# Patient Record
Sex: FEMALE | Race: OTHER | Employment: UNEMPLOYED | URBAN - METROPOLITAN AREA
[De-identification: names, ages, dates, MRNs, and addresses within clinical notes are randomized per-mention and may not be internally consistent; named-entity substitution may affect disease eponyms.]

---

## 2019-02-15 ENCOUNTER — OFFICE VISIT (OUTPATIENT)
Dept: FAMILY MEDICINE CLINIC | Age: 49
End: 2019-02-15
Payer: COMMERCIAL

## 2019-02-15 VITALS
WEIGHT: 195 LBS | TEMPERATURE: 96.7 F | RESPIRATION RATE: 18 BRPM | HEART RATE: 87 BPM | DIASTOLIC BLOOD PRESSURE: 82 MMHG | OXYGEN SATURATION: 100 % | BODY MASS INDEX: 35.88 KG/M2 | SYSTOLIC BLOOD PRESSURE: 138 MMHG | HEIGHT: 62 IN

## 2019-02-15 DIAGNOSIS — J06.9 URTI (ACUTE UPPER RESPIRATORY INFECTION): ICD-10-CM

## 2019-02-15 DIAGNOSIS — K59.00 CONSTIPATION, UNSPECIFIED CONSTIPATION TYPE: ICD-10-CM

## 2019-02-15 DIAGNOSIS — K64.4 EXTERNAL HEMORRHOIDS: Primary | ICD-10-CM

## 2019-02-15 DIAGNOSIS — N95.1 MENOPAUSAL AND FEMALE CLIMACTERIC STATES: ICD-10-CM

## 2019-02-15 DIAGNOSIS — Z00.00 HEALTH CARE MAINTENANCE: ICD-10-CM

## 2019-02-15 PROCEDURE — G8419 CALC BMI OUT NRM PARAM NOF/U: HCPCS | Performed by: INTERNAL MEDICINE

## 2019-02-15 PROCEDURE — 4004F PT TOBACCO SCREEN RCVD TLK: CPT | Performed by: INTERNAL MEDICINE

## 2019-02-15 PROCEDURE — G8427 DOCREV CUR MEDS BY ELIG CLIN: HCPCS | Performed by: INTERNAL MEDICINE

## 2019-02-15 PROCEDURE — G8484 FLU IMMUNIZE NO ADMIN: HCPCS | Performed by: INTERNAL MEDICINE

## 2019-02-15 PROCEDURE — 99204 OFFICE O/P NEW MOD 45 MIN: CPT | Performed by: INTERNAL MEDICINE

## 2019-02-15 PROCEDURE — 3017F COLORECTAL CA SCREEN DOC REV: CPT | Performed by: INTERNAL MEDICINE

## 2019-02-15 RX ORDER — AMOXICILLIN 250 MG
2 CAPSULE ORAL DAILY PRN
Qty: 60 TABLET | Refills: 2 | Status: ON HOLD | OUTPATIENT
Start: 2019-02-15 | End: 2020-08-31

## 2019-02-15 RX ORDER — FLUTICASONE PROPIONATE 50 MCG
2 SPRAY, SUSPENSION (ML) NASAL DAILY
Qty: 1 BOTTLE | Refills: 1 | Status: ON HOLD | OUTPATIENT
Start: 2019-02-15 | End: 2020-08-31

## 2019-02-15 ASSESSMENT — PATIENT HEALTH QUESTIONNAIRE - PHQ9
SUM OF ALL RESPONSES TO PHQ9 QUESTIONS 1 & 2: 0
2. FEELING DOWN, DEPRESSED OR HOPELESS: 0
SUM OF ALL RESPONSES TO PHQ QUESTIONS 1-9: 0
SUM OF ALL RESPONSES TO PHQ QUESTIONS 1-9: 0
1. LITTLE INTEREST OR PLEASURE IN DOING THINGS: 0

## 2019-02-17 ASSESSMENT — ENCOUNTER SYMPTOMS
CONSTIPATION: 1
WHEEZING: 0
NAUSEA: 0
RHINORRHEA: 0
SORE THROAT: 0
SHORTNESS OF BREATH: 0
RECTAL PAIN: 1
DIARRHEA: 0
SINUS PRESSURE: 0
ABDOMINAL PAIN: 0
SINUS PAIN: 0
VOMITING: 0
COUGH: 1

## 2019-02-18 ENCOUNTER — HOSPITAL ENCOUNTER (OUTPATIENT)
Dept: WOMENS IMAGING | Age: 49
Discharge: HOME | End: 2019-02-20
Payer: COMMERCIAL

## 2019-02-18 DIAGNOSIS — Z00.00 HEALTH CARE MAINTENANCE: ICD-10-CM

## 2019-02-18 LAB
ALBUMIN SERPL-MCNC: 3.7 G/DL (ref 3.5–4.6)
ALP BLD-CCNC: 88 U/L (ref 40–130)
ALT SERPL-CCNC: 11 U/L (ref 0–33)
ANION GAP SERPL CALCULATED.3IONS-SCNC: 13 MEQ/L (ref 9–15)
AST SERPL-CCNC: 16 U/L (ref 0–35)
BASOPHILS ABSOLUTE: 0 K/UL (ref 0–0.2)
BASOPHILS RELATIVE PERCENT: 0.5 %
BILIRUB SERPL-MCNC: <0.2 MG/DL (ref 0.2–0.7)
BUN BLDV-MCNC: 8 MG/DL (ref 6–20)
CALCIUM SERPL-MCNC: 9.2 MG/DL (ref 8.5–9.9)
CHLORIDE BLD-SCNC: 104 MEQ/L (ref 95–107)
CHOLESTEROL, TOTAL: 240 MG/DL (ref 0–199)
CO2: 24 MEQ/L (ref 20–31)
CREAT SERPL-MCNC: 0.64 MG/DL (ref 0.5–0.9)
EOSINOPHILS ABSOLUTE: 0.3 K/UL (ref 0–0.7)
EOSINOPHILS RELATIVE PERCENT: 5.1 %
GFR AFRICAN AMERICAN: >60
GFR NON-AFRICAN AMERICAN: >60
GLOBULIN: 3.4 G/DL (ref 2.3–3.5)
GLUCOSE BLD-MCNC: 84 MG/DL (ref 70–99)
HBA1C MFR BLD: 6.6 % (ref 4.8–5.9)
HCT VFR BLD CALC: 42.7 % (ref 37–47)
HDLC SERPL-MCNC: 38 MG/DL (ref 40–59)
HEMOGLOBIN: 14.2 G/DL (ref 12–16)
LDL CHOLESTEROL CALCULATED: 146 MG/DL (ref 0–129)
LYMPHOCYTES ABSOLUTE: 2 K/UL (ref 1–4.8)
LYMPHOCYTES RELATIVE PERCENT: 33.1 %
MCH RBC QN AUTO: 31.2 PG (ref 27–31.3)
MCHC RBC AUTO-ENTMCNC: 33.3 % (ref 33–37)
MCV RBC AUTO: 93.8 FL (ref 82–100)
MONOCYTES ABSOLUTE: 0.2 K/UL (ref 0.2–0.8)
MONOCYTES RELATIVE PERCENT: 3.9 %
NEUTROPHILS ABSOLUTE: 3.4 K/UL (ref 1.4–6.5)
NEUTROPHILS RELATIVE PERCENT: 57.4 %
PDW BLD-RTO: 13.7 % (ref 11.5–14.5)
PLATELET # BLD: 152 K/UL (ref 130–400)
POTASSIUM SERPL-SCNC: 4.3 MEQ/L (ref 3.4–4.9)
RBC # BLD: 4.55 M/UL (ref 4.2–5.4)
SODIUM BLD-SCNC: 141 MEQ/L (ref 135–144)
TOTAL PROTEIN: 7.1 G/DL (ref 6.3–8)
TRIGL SERPL-MCNC: 279 MG/DL (ref 0–150)
TSH SERPL DL<=0.05 MIU/L-ACNC: 1.26 UIU/ML (ref 0.44–3.86)
WBC # BLD: 5.9 K/UL (ref 4.8–10.8)

## 2019-02-18 PROCEDURE — 77067 SCR MAMMO BI INCL CAD: CPT

## 2019-02-19 ENCOUNTER — OFFICE VISIT (OUTPATIENT)
Dept: SURGERY | Age: 49
End: 2019-02-19
Payer: COMMERCIAL

## 2019-02-19 VITALS
BODY MASS INDEX: 37.17 KG/M2 | HEIGHT: 62 IN | OXYGEN SATURATION: 98 % | TEMPERATURE: 97.8 F | WEIGHT: 202 LBS | HEART RATE: 86 BPM

## 2019-02-19 DIAGNOSIS — K62.5 RECTAL BLEEDING: Primary | ICD-10-CM

## 2019-02-19 DIAGNOSIS — R73.09 ELEVATED HEMOGLOBIN A1C: Primary | ICD-10-CM

## 2019-02-19 PROCEDURE — 3017F COLORECTAL CA SCREEN DOC REV: CPT | Performed by: COLON & RECTAL SURGERY

## 2019-02-19 PROCEDURE — G8417 CALC BMI ABV UP PARAM F/U: HCPCS | Performed by: COLON & RECTAL SURGERY

## 2019-02-19 PROCEDURE — G8427 DOCREV CUR MEDS BY ELIG CLIN: HCPCS | Performed by: COLON & RECTAL SURGERY

## 2019-02-19 PROCEDURE — 46600 DIAGNOSTIC ANOSCOPY SPX: CPT | Performed by: COLON & RECTAL SURGERY

## 2019-02-19 PROCEDURE — 4004F PT TOBACCO SCREEN RCVD TLK: CPT | Performed by: COLON & RECTAL SURGERY

## 2019-02-19 PROCEDURE — G8484 FLU IMMUNIZE NO ADMIN: HCPCS | Performed by: COLON & RECTAL SURGERY

## 2019-02-19 PROCEDURE — 99203 OFFICE O/P NEW LOW 30 MIN: CPT | Performed by: COLON & RECTAL SURGERY

## 2019-02-19 ASSESSMENT — ENCOUNTER SYMPTOMS
VOMITING: 0
COLOR CHANGE: 0
DIARRHEA: 0
NAUSEA: 0
SHORTNESS OF BREATH: 0
ABDOMINAL DISTENTION: 0
ANAL BLEEDING: 1
CONSTIPATION: 1
RECTAL PAIN: 0
ABDOMINAL PAIN: 0
CHEST TIGHTNESS: 0

## 2019-02-20 LAB — HIV 1,2 COMBO ANTIGEN/ANTIBODY: NEGATIVE

## 2019-02-21 DIAGNOSIS — R73.09 ELEVATED HEMOGLOBIN A1C: ICD-10-CM

## 2019-02-21 LAB — HBA1C MFR BLD: 6.2 % (ref 4.8–5.9)

## 2019-02-27 DIAGNOSIS — E78.00 HYPERCHOLESTEREMIA: ICD-10-CM

## 2019-02-27 DIAGNOSIS — R73.03 PREDIABETES: Primary | ICD-10-CM

## 2019-02-27 RX ORDER — ATORVASTATIN CALCIUM 80 MG/1
80 TABLET, FILM COATED ORAL DAILY
Qty: 30 TABLET | Refills: 3 | Status: SHIPPED | OUTPATIENT
Start: 2019-02-27 | End: 2021-03-17 | Stop reason: DRUGHIGH

## 2019-03-26 ENCOUNTER — OFFICE VISIT (OUTPATIENT)
Dept: SURGERY | Age: 49
End: 2019-03-26
Payer: COMMERCIAL

## 2019-03-26 VITALS
BODY MASS INDEX: 36.99 KG/M2 | HEART RATE: 82 BPM | TEMPERATURE: 97.8 F | HEIGHT: 62 IN | WEIGHT: 201 LBS | OXYGEN SATURATION: 98 %

## 2019-03-26 DIAGNOSIS — K64.1 GRADE II HEMORRHOIDS: Primary | ICD-10-CM

## 2019-03-26 PROCEDURE — 99213 OFFICE O/P EST LOW 20 MIN: CPT | Performed by: COLON & RECTAL SURGERY

## 2019-03-26 PROCEDURE — 3017F COLORECTAL CA SCREEN DOC REV: CPT | Performed by: COLON & RECTAL SURGERY

## 2019-03-26 PROCEDURE — G8427 DOCREV CUR MEDS BY ELIG CLIN: HCPCS | Performed by: COLON & RECTAL SURGERY

## 2019-03-26 PROCEDURE — G8484 FLU IMMUNIZE NO ADMIN: HCPCS | Performed by: COLON & RECTAL SURGERY

## 2019-03-26 PROCEDURE — G8417 CALC BMI ABV UP PARAM F/U: HCPCS | Performed by: COLON & RECTAL SURGERY

## 2019-03-26 PROCEDURE — 4004F PT TOBACCO SCREEN RCVD TLK: CPT | Performed by: COLON & RECTAL SURGERY

## 2019-03-26 PROCEDURE — 46221 LIGATION OF HEMORRHOID(S): CPT | Performed by: COLON & RECTAL SURGERY

## 2019-03-26 ASSESSMENT — ENCOUNTER SYMPTOMS
ABDOMINAL PAIN: 0
ANAL BLEEDING: 1
CHEST TIGHTNESS: 0
SHORTNESS OF BREATH: 0
CHOKING: 0
DIARRHEA: 0
VOMITING: 0
CONSTIPATION: 1
ABDOMINAL DISTENTION: 0
COLOR CHANGE: 0

## 2019-07-19 ENCOUNTER — APPOINTMENT (OUTPATIENT)
Dept: GENERAL RADIOLOGY | Age: 49
End: 2019-07-19
Payer: COMMERCIAL

## 2019-07-19 ENCOUNTER — HOSPITAL ENCOUNTER (EMERGENCY)
Age: 49
Discharge: HOME | End: 2019-07-19
Attending: EMERGENCY MEDICINE
Payer: COMMERCIAL

## 2019-07-19 VITALS
RESPIRATION RATE: 16 BRPM | WEIGHT: 200 LBS | BODY MASS INDEX: 36.8 KG/M2 | TEMPERATURE: 98.2 F | SYSTOLIC BLOOD PRESSURE: 159 MMHG | HEART RATE: 98 BPM | OXYGEN SATURATION: 97 % | DIASTOLIC BLOOD PRESSURE: 90 MMHG | HEIGHT: 62 IN

## 2019-07-19 DIAGNOSIS — R51.9 ACUTE NONINTRACTABLE HEADACHE, UNSPECIFIED HEADACHE TYPE: ICD-10-CM

## 2019-07-19 DIAGNOSIS — T54.91XA INGESTION OF CORROSIVE CHEMICAL, ACCIDENTAL OR UNINTENTIONAL, INITIAL ENCOUNTER: Primary | ICD-10-CM

## 2019-07-19 LAB
ALBUMIN SERPL-MCNC: 4.5 G/DL (ref 3.5–4.6)
ALP BLD-CCNC: 103 U/L (ref 40–130)
ALT SERPL-CCNC: 18 U/L (ref 0–33)
ANION GAP SERPL CALCULATED.3IONS-SCNC: 15 MEQ/L (ref 9–15)
AST SERPL-CCNC: 17 U/L (ref 0–35)
BASOPHILS ABSOLUTE: 0 K/UL (ref 0–0.2)
BASOPHILS RELATIVE PERCENT: 0.5 %
BILIRUB SERPL-MCNC: 0.4 MG/DL (ref 0.2–0.7)
BUN BLDV-MCNC: 12 MG/DL (ref 6–20)
CALCIUM SERPL-MCNC: 9.2 MG/DL (ref 8.5–9.9)
CHLORIDE BLD-SCNC: 108 MEQ/L (ref 95–107)
CO2: 21 MEQ/L (ref 20–31)
CREAT SERPL-MCNC: 0.73 MG/DL (ref 0.5–0.9)
EOSINOPHILS ABSOLUTE: 0.5 K/UL (ref 0–0.7)
EOSINOPHILS RELATIVE PERCENT: 5.3 %
GFR AFRICAN AMERICAN: >60
GFR NON-AFRICAN AMERICAN: >60
GLOBULIN: 3.4 G/DL (ref 2.3–3.5)
GLUCOSE BLD-MCNC: 111 MG/DL (ref 70–99)
HCT VFR BLD CALC: 41.8 % (ref 37–47)
HEMOGLOBIN: 14.6 G/DL (ref 12–16)
LYMPHOCYTES ABSOLUTE: 2.4 K/UL (ref 1–4.8)
LYMPHOCYTES RELATIVE PERCENT: 27.8 %
MCH RBC QN AUTO: 31.4 PG (ref 27–31.3)
MCHC RBC AUTO-ENTMCNC: 34.8 % (ref 33–37)
MCV RBC AUTO: 90.3 FL (ref 82–100)
MONOCYTES ABSOLUTE: 0.5 K/UL (ref 0.2–0.8)
MONOCYTES RELATIVE PERCENT: 5.6 %
NEUTROPHILS ABSOLUTE: 5.3 K/UL (ref 1.4–6.5)
NEUTROPHILS RELATIVE PERCENT: 60.8 %
PDW BLD-RTO: 13.6 % (ref 11.5–14.5)
PLATELET # BLD: 199 K/UL (ref 130–400)
POTASSIUM SERPL-SCNC: 3.7 MEQ/L (ref 3.4–4.9)
RBC # BLD: 4.64 M/UL (ref 4.2–5.4)
SODIUM BLD-SCNC: 144 MEQ/L (ref 135–144)
TOTAL PROTEIN: 7.9 G/DL (ref 6.3–8)
WBC # BLD: 8.7 K/UL (ref 4.8–10.8)

## 2019-07-19 PROCEDURE — 71046 X-RAY EXAM CHEST 2 VIEWS: CPT

## 2019-07-19 PROCEDURE — 80053 COMPREHEN METABOLIC PANEL: CPT

## 2019-07-19 PROCEDURE — 85025 COMPLETE CBC W/AUTO DIFF WBC: CPT

## 2019-07-19 PROCEDURE — 2580000003 HC RX 258: Performed by: EMERGENCY MEDICINE

## 2019-07-19 PROCEDURE — 6370000000 HC RX 637 (ALT 250 FOR IP): Performed by: EMERGENCY MEDICINE

## 2019-07-19 PROCEDURE — 96374 THER/PROPH/DIAG INJ IV PUSH: CPT

## 2019-07-19 PROCEDURE — 99284 EMERGENCY DEPT VISIT MOD MDM: CPT

## 2019-07-19 PROCEDURE — 36415 COLL VENOUS BLD VENIPUNCTURE: CPT

## 2019-07-19 PROCEDURE — 6360000002 HC RX W HCPCS: Performed by: EMERGENCY MEDICINE

## 2019-07-19 RX ORDER — KETOROLAC TROMETHAMINE 30 MG/ML
30 INJECTION, SOLUTION INTRAMUSCULAR; INTRAVENOUS ONCE
Status: COMPLETED | OUTPATIENT
Start: 2019-07-19 | End: 2019-07-19

## 2019-07-19 RX ORDER — ACETAMINOPHEN 500 MG
1000 TABLET ORAL ONCE
Status: COMPLETED | OUTPATIENT
Start: 2019-07-19 | End: 2019-07-19

## 2019-07-19 RX ORDER — 0.9 % SODIUM CHLORIDE 0.9 %
500 INTRAVENOUS SOLUTION INTRAVENOUS ONCE
Status: COMPLETED | OUTPATIENT
Start: 2019-07-19 | End: 2019-07-19

## 2019-07-19 RX ADMIN — ACETAMINOPHEN 1000 MG: 500 TABLET ORAL at 18:36

## 2019-07-19 RX ADMIN — SODIUM CHLORIDE 500 ML: 9 INJECTION, SOLUTION INTRAVENOUS at 17:46

## 2019-07-19 RX ADMIN — KETOROLAC TROMETHAMINE 30 MG: 30 INJECTION, SOLUTION INTRAMUSCULAR at 18:36

## 2019-07-19 ASSESSMENT — ENCOUNTER SYMPTOMS
VOMITING: 1
BACK PAIN: 0
DIARRHEA: 0
NAUSEA: 0
COUGH: 0
SORE THROAT: 0
ABDOMINAL PAIN: 0
SHORTNESS OF BREATH: 1

## 2019-07-19 ASSESSMENT — PAIN SCALES - GENERAL: PAINLEVEL_OUTOF10: 8

## 2020-06-10 ENCOUNTER — HOSPITAL ENCOUNTER (EMERGENCY)
Age: 50
Discharge: HOME | End: 2020-06-10
Payer: COMMERCIAL

## 2020-06-10 VITALS
WEIGHT: 212 LBS | HEART RATE: 91 BPM | HEIGHT: 62 IN | BODY MASS INDEX: 39.01 KG/M2 | RESPIRATION RATE: 16 BRPM | DIASTOLIC BLOOD PRESSURE: 78 MMHG | SYSTOLIC BLOOD PRESSURE: 158 MMHG | TEMPERATURE: 98 F | OXYGEN SATURATION: 98 %

## 2020-06-10 LAB
ALBUMIN SERPL-MCNC: 4.3 G/DL (ref 3.5–4.6)
ALP BLD-CCNC: 114 U/L (ref 40–130)
ALT SERPL-CCNC: 43 U/L (ref 0–33)
ANION GAP SERPL CALCULATED.3IONS-SCNC: 10 MEQ/L (ref 9–15)
AST SERPL-CCNC: 28 U/L (ref 0–35)
BASE EXCESS VENOUS: 2 (ref -3–3)
BASOPHILS ABSOLUTE: 0.1 K/UL (ref 0–0.2)
BASOPHILS RELATIVE PERCENT: 0.8 %
BETA-HYDROXYBUTYRATE: 1.6 MG/DL (ref 0.2–2.8)
BILIRUB SERPL-MCNC: 0.3 MG/DL (ref 0.2–0.7)
BUN BLDV-MCNC: 11 MG/DL (ref 6–20)
CALCIUM IONIZED: 1.16 MMOL/L (ref 1.12–1.32)
CALCIUM SERPL-MCNC: 9.8 MG/DL (ref 8.5–9.9)
CHLORIDE BLD-SCNC: 100 MEQ/L (ref 95–107)
CO2: 25 MEQ/L (ref 20–31)
CREAT SERPL-MCNC: 0.63 MG/DL (ref 0.5–0.9)
EOSINOPHILS ABSOLUTE: 0.4 K/UL (ref 0–0.7)
EOSINOPHILS RELATIVE PERCENT: 5.5 %
GFR AFRICAN AMERICAN: >60
GFR AFRICAN AMERICAN: >60
GFR NON-AFRICAN AMERICAN: >60
GFR NON-AFRICAN AMERICAN: >60
GLOBULIN: 3.1 G/DL (ref 2.3–3.5)
GLUCOSE BLD-MCNC: 327 MG/DL (ref 70–99)
GLUCOSE BLD-MCNC: 339 MG/DL (ref 60–115)
HCO3 VENOUS: 27.1 MMOL/L (ref 23–29)
HCT VFR BLD CALC: 45.5 % (ref 37–47)
HEMOGLOBIN: 15.3 G/DL (ref 12–16)
HEMOGLOBIN: 16.5 GM/DL (ref 12–16)
LACTATE: 2.24 MMOL/L (ref 0.4–2)
LYMPHOCYTES ABSOLUTE: 2 K/UL (ref 1–4.8)
LYMPHOCYTES RELATIVE PERCENT: 28.7 %
MAGNESIUM: 2 MG/DL (ref 1.7–2.4)
MCH RBC QN AUTO: 30.5 PG (ref 27–31.3)
MCHC RBC AUTO-ENTMCNC: 33.7 % (ref 33–37)
MCV RBC AUTO: 90.6 FL (ref 82–100)
MONOCYTES ABSOLUTE: 0.4 K/UL (ref 0.2–0.8)
MONOCYTES RELATIVE PERCENT: 5.3 %
NEUTROPHILS ABSOLUTE: 4.1 K/UL (ref 1.4–6.5)
NEUTROPHILS RELATIVE PERCENT: 59.7 %
O2 SAT, VEN: 42 %
PCO2, VEN: 49.4 MM HG (ref 40–50)
PDW BLD-RTO: 13.1 % (ref 11.5–14.5)
PERFORMED ON: ABNORMAL
PH VENOUS: 7.35 (ref 7.35–7.45)
PLATELET # BLD: 176 K/UL (ref 130–400)
PO2, VEN: 25 MM HG
POC CHLORIDE: 101 MEQ/L (ref 99–110)
POC CREATININE: 0.7 MG/DL (ref 0.6–1.1)
POC HEMATOCRIT: 49 % (ref 36–48)
POC POTASSIUM: 3.8 MEQ/L (ref 3.5–5.1)
POC SAMPLE TYPE: ABNORMAL
POC SODIUM: 137 MEQ/L (ref 136–145)
POTASSIUM SERPL-SCNC: 4.1 MEQ/L (ref 3.4–4.9)
RBC # BLD: 5.02 M/UL (ref 4.2–5.4)
SODIUM BLD-SCNC: 135 MEQ/L (ref 135–144)
TCO2 CALC VENOUS: 29 MMOL/L
TOTAL PROTEIN: 7.4 G/DL (ref 6.3–8)
WBC # BLD: 6.9 K/UL (ref 4.8–10.8)

## 2020-06-10 PROCEDURE — 94640 AIRWAY INHALATION TREATMENT: CPT

## 2020-06-10 PROCEDURE — 82435 ASSAY OF BLOOD CHLORIDE: CPT

## 2020-06-10 PROCEDURE — 2580000003 HC RX 258: Performed by: PHYSICIAN ASSISTANT

## 2020-06-10 PROCEDURE — 85014 HEMATOCRIT: CPT

## 2020-06-10 PROCEDURE — 6370000000 HC RX 637 (ALT 250 FOR IP): Performed by: PHYSICIAN ASSISTANT

## 2020-06-10 PROCEDURE — 82330 ASSAY OF CALCIUM: CPT

## 2020-06-10 PROCEDURE — 82803 BLOOD GASES ANY COMBINATION: CPT

## 2020-06-10 PROCEDURE — 80053 COMPREHEN METABOLIC PANEL: CPT

## 2020-06-10 PROCEDURE — 36415 COLL VENOUS BLD VENIPUNCTURE: CPT

## 2020-06-10 PROCEDURE — 83605 ASSAY OF LACTIC ACID: CPT

## 2020-06-10 PROCEDURE — 99283 EMERGENCY DEPT VISIT LOW MDM: CPT

## 2020-06-10 PROCEDURE — 36600 WITHDRAWAL OF ARTERIAL BLOOD: CPT

## 2020-06-10 PROCEDURE — 82565 ASSAY OF CREATININE: CPT

## 2020-06-10 PROCEDURE — 96374 THER/PROPH/DIAG INJ IV PUSH: CPT

## 2020-06-10 PROCEDURE — 82010 KETONE BODYS QUAN: CPT

## 2020-06-10 PROCEDURE — 84132 ASSAY OF SERUM POTASSIUM: CPT

## 2020-06-10 PROCEDURE — 83735 ASSAY OF MAGNESIUM: CPT

## 2020-06-10 PROCEDURE — 85025 COMPLETE CBC W/AUTO DIFF WBC: CPT

## 2020-06-10 PROCEDURE — 84295 ASSAY OF SERUM SODIUM: CPT

## 2020-06-10 RX ORDER — 0.9 % SODIUM CHLORIDE 0.9 %
2000 INTRAVENOUS SOLUTION INTRAVENOUS ONCE
Status: COMPLETED | OUTPATIENT
Start: 2020-06-10 | End: 2020-06-10

## 2020-06-10 RX ORDER — ALBUTEROL SULFATE 90 UG/1
2 AEROSOL, METERED RESPIRATORY (INHALATION) 4 TIMES DAILY PRN
Qty: 1 INHALER | Refills: 1 | Status: ON HOLD | OUTPATIENT
Start: 2020-06-10 | End: 2020-07-23 | Stop reason: SDUPTHER

## 2020-06-10 RX ORDER — LABETALOL 20 MG/4 ML (5 MG/ML) INTRAVENOUS SYRINGE
10 ONCE
Status: DISCONTINUED | OUTPATIENT
Start: 2020-06-10 | End: 2020-06-10 | Stop reason: HOSPADM

## 2020-06-10 RX ORDER — IPRATROPIUM BROMIDE AND ALBUTEROL SULFATE 2.5; .5 MG/3ML; MG/3ML
1 SOLUTION RESPIRATORY (INHALATION) CONTINUOUS PRN
Status: DISCONTINUED | OUTPATIENT
Start: 2020-06-10 | End: 2020-06-10 | Stop reason: HOSPADM

## 2020-06-10 RX ADMIN — INSULIN HUMAN 6 UNITS: 100 INJECTION, SOLUTION PARENTERAL at 16:37

## 2020-06-10 RX ADMIN — IPRATROPIUM BROMIDE AND ALBUTEROL SULFATE 1 AMPULE: .5; 3 SOLUTION RESPIRATORY (INHALATION) at 16:38

## 2020-06-10 RX ADMIN — SODIUM CHLORIDE 2000 ML: 9 INJECTION, SOLUTION INTRAVENOUS at 16:15

## 2020-06-10 ASSESSMENT — ENCOUNTER SYMPTOMS
EYE DISCHARGE: 0
VOICE CHANGE: 0
WHEEZING: 1
BACK PAIN: 0
ABDOMINAL PAIN: 0
ABDOMINAL DISTENTION: 0
VOMITING: 0
PHOTOPHOBIA: 0
APNEA: 0
ANAL BLEEDING: 0
COUGH: 0

## 2020-06-10 NOTE — ED PROVIDER NOTES
[address] [facility] ED  eMERGENCY dEPARTMENT eNCOUnter      Pt Name: Suzan Cat  MRN: [id number]  [place] 1970  Date of evaluation: 6/10/2020  Provider: Jaky Kramer PA-C    CHIEF COMPLAINT       Chief Complaint   Patient presents with    Blood Sugar Problem     went to [facility] was advised she has elevated glucose 513         HISTORY OF PRESENT ILLNESS   (Location/Symptom, Timing/Onset,Context/Setting, Quality, Duration, Modifying Factors, Severity)  Note limiting factors. Suzan Cat is a 44 y.o. female who presents to the emergency department patient presents with elevated blood sugars she was seen at [facility] for frequent urination burning with urination. They noted that she had glucose in her urine blood sugar checked was 513 per [facility] notes that she brings with her patient notes she is a diabetic has not been taking any of her medications for diabetes blood pressure or asthma and over a year she was on metformin in the past she does have frequent urination she does have increased thirst denies chest pain shortness of breath nausea vomiting denies diarrhea denies leg swelling symptoms moderate severity nothing improves or worsens her symptoms. HPI    NursingNotes were reviewed. REVIEW OF SYSTEMS    (2-9 systems for level 4, 10 or more for level 5)     Review of Systems   Constitutional: Negative for activity change, appetite change, chills, fever and unexpected weight change. HENT: Negative for congestion, ear discharge, nosebleeds and voice change. Eyes: Negative for photophobia and discharge. Respiratory: Positive for wheezing. Negative for apnea and cough. Cardiovascular: Negative for chest pain. Gastrointestinal: Negative for abdominal distention, abdominal pain, anal bleeding and vomiting. Endocrine: Negative for cold intolerance, heat intolerance and polyphagia. Genitourinary: Positive for dysuria and frequency. Negative for genital sores. 147/82 (!) 158/78   Pulse: 96 82 91   Resp: 18 18 16   Temp: 98 °F (36.7 °C)     TempSrc: Oral     SpO2: 96% 97% 98%   Weight: 212 lb (96.2 kg)     Height: 5' 2\" (1.575 m)              MDM  Number of Diagnoses or Management Options  Diagnosis management comments: We will add medications fluids repeat Suhas glucose basic labs including VBG patient states [facility] is looking to find her primary care physician the next 2 days       Amount and/or Complexity of Data Reviewed  Clinical lab tests: ordered          CONSULTS:  None    PROCEDURES:  Unless otherwise noted below, none     Procedures    FINAL IMPRESSION      1. Type 2 diabetes mellitus with hyperglycemia, without long-term current use of insulin ([facility] [address]    2. Essential hypertension    3. History of medication noncompliance    4. Prediabetes          DISPOSITION/PLAN   DISPOSITION Decision To Discharge 06/10/2020 05:39:05 PM      PATIENT REFERRED TO:  Juan Carlos Quiroga MD  [facility] [phone]    Call in 1 day      [facility]) ED  [address] [address]  [phone]  Call in 1 day        DISCHARGE MEDICATIONS:  Discharge Medication List as of 6/10/2020  5:43 PM             (Please note that portions of this note were completed with a voice recognition program.  Efforts were made to edit the dictations but occasionally words are mis-transcribed.)    Phylicia Su PA-C (electronically signed)  Attending Emergency Physician       Phylicia Su PA-C  06/11/20 1410    ATTENDING PROVIDER ATTESTATION:     Tasneem Arredondo presented to the emergency department for evaluation of Blood Sugar Problem (went to [facility] was advised she has elevated glucose 513)    I have reviewed and discussed the case, including pertinent history (medical, surgical, family and social) and exam findings with the Midlevel and the Nurse assigned to Tasneem Arredondo.   I have personally performed and/or participated in the history, exam, medical

## 2020-07-21 ENCOUNTER — APPOINTMENT (OUTPATIENT)
Dept: GENERAL RADIOLOGY | Age: 50
DRG: 141 | End: 2020-07-21
Payer: COMMERCIAL

## 2020-07-21 ENCOUNTER — HOSPITAL ENCOUNTER (INPATIENT)
Age: 50
LOS: 2 days | Discharge: HOME | DRG: 141 | End: 2020-07-23
Attending: STUDENT IN AN ORGANIZED HEALTH CARE EDUCATION/TRAINING PROGRAM | Admitting: INTERNAL MEDICINE
Payer: COMMERCIAL

## 2020-07-21 PROBLEM — J45.901 ACUTE SEVERE EXACERBATION OF ASTHMA: Status: ACTIVE | Noted: 2020-07-21

## 2020-07-21 LAB
ALBUMIN SERPL-MCNC: 4.6 G/DL (ref 3.5–4.6)
ALP BLD-CCNC: 116 U/L (ref 40–130)
ALT SERPL-CCNC: 30 U/L (ref 0–33)
ANION GAP SERPL CALCULATED.3IONS-SCNC: 14 MEQ/L (ref 9–15)
AST SERPL-CCNC: 22 U/L (ref 0–35)
BASE EXCESS ARTERIAL: -2 (ref -3–3)
BASOPHILS ABSOLUTE: 0.1 K/UL (ref 0–0.2)
BASOPHILS RELATIVE PERCENT: 0.8 %
BILIRUB SERPL-MCNC: 0.6 MG/DL (ref 0.2–0.7)
BUN BLDV-MCNC: 8 MG/DL (ref 6–20)
CALCIUM IONIZED: 1.19 MMOL/L (ref 1.12–1.32)
CALCIUM SERPL-MCNC: 9.4 MG/DL (ref 8.5–9.9)
CHLORIDE BLD-SCNC: 102 MEQ/L (ref 95–107)
CO2: 23 MEQ/L (ref 20–31)
CREAT SERPL-MCNC: 0.57 MG/DL (ref 0.5–0.9)
D DIMER: 0.49 MG/L FEU (ref 0–0.5)
EOSINOPHILS ABSOLUTE: 0.6 K/UL (ref 0–0.7)
EOSINOPHILS RELATIVE PERCENT: 6.8 %
FIBRINOGEN: 529 MG/DL (ref 235–507)
GFR AFRICAN AMERICAN: >60
GFR AFRICAN AMERICAN: >60
GFR NON-AFRICAN AMERICAN: >60
GFR NON-AFRICAN AMERICAN: >60
GLOBULIN: 3.2 G/DL (ref 2.3–3.5)
GLUCOSE BLD-MCNC: 163 MG/DL (ref 70–99)
GLUCOSE BLD-MCNC: 167 MG/DL (ref 60–115)
GLUCOSE BLD-MCNC: 193 MG/DL (ref 60–115)
GLUCOSE BLD-MCNC: 285 MG/DL (ref 60–115)
GONADOTROPIN, CHORIONIC (HCG) QUANT: 0.4 MIU/ML
HCO3 ARTERIAL: 22.9 MMOL/L (ref 21–29)
HCT VFR BLD CALC: 45.4 % (ref 37–47)
HEMOGLOBIN: 14.6 GM/DL (ref 12–16)
HEMOGLOBIN: 15.4 G/DL (ref 12–16)
LACTATE DEHYDROGENASE: 521 U/L (ref 135–214)
LACTATE: 1.4 MMOL/L (ref 0.4–2)
LYMPHOCYTES ABSOLUTE: 2.1 K/UL (ref 1–4.8)
LYMPHOCYTES RELATIVE PERCENT: 26.3 %
MAGNESIUM: 1.9 MG/DL (ref 1.7–2.4)
MCH RBC QN AUTO: 30.6 PG (ref 27–31.3)
MCHC RBC AUTO-ENTMCNC: 34 % (ref 33–37)
MCV RBC AUTO: 90 FL (ref 82–100)
MONOCYTES ABSOLUTE: 0.4 K/UL (ref 0.2–0.8)
MONOCYTES RELATIVE PERCENT: 4.6 %
NEUTROPHILS ABSOLUTE: 5 K/UL (ref 1.4–6.5)
NEUTROPHILS RELATIVE PERCENT: 61.5 %
O2 SAT, ARTERIAL: 96 % (ref 93–100)
PCO2 ARTERIAL: 35 MM HG (ref 35–45)
PDW BLD-RTO: 13 % (ref 11.5–14.5)
PERFORMED ON: ABNORMAL
PH ARTERIAL: 7.42 (ref 7.35–7.45)
PLATELET # BLD: 193 K/UL (ref 130–400)
PO2 ARTERIAL: 81 MM HG (ref 75–108)
POC CHLORIDE: 109 MEQ/L (ref 99–110)
POC CREATININE: 0.6 MG/DL (ref 0.6–1.1)
POC FIO2: 2
POC HEMATOCRIT: 43 % (ref 36–48)
POC POTASSIUM: 3.7 MEQ/L (ref 3.5–5.1)
POC SAMPLE TYPE: ABNORMAL
POC SODIUM: 138 MEQ/L (ref 136–145)
POTASSIUM SERPL-SCNC: 3.8 MEQ/L (ref 3.4–4.9)
PROCALCITONIN: 0.04 NG/ML (ref 0–0.15)
RBC # BLD: 5.04 M/UL (ref 4.2–5.4)
SARS-COV-2, NAAT: NOT DETECTED
SODIUM BLD-SCNC: 139 MEQ/L (ref 135–144)
TCO2 ARTERIAL: 24 (ref 22–29)
TOTAL CK: 115 U/L (ref 0–170)
TOTAL PROTEIN: 7.8 G/DL (ref 6.3–8)
WBC # BLD: 8.1 K/UL (ref 4.8–10.8)

## 2020-07-21 PROCEDURE — 82550 ASSAY OF CK (CPK): CPT

## 2020-07-21 PROCEDURE — 83615 LACTATE (LD) (LDH) ENZYME: CPT

## 2020-07-21 PROCEDURE — 99255 IP/OBS CONSLTJ NEW/EST HI 80: CPT | Performed by: INTERNAL MEDICINE

## 2020-07-21 PROCEDURE — 82435 ASSAY OF BLOOD CHLORIDE: CPT

## 2020-07-21 PROCEDURE — 84145 PROCALCITONIN (PCT): CPT

## 2020-07-21 PROCEDURE — 84132 ASSAY OF SERUM POTASSIUM: CPT

## 2020-07-21 PROCEDURE — 6370000000 HC RX 637 (ALT 250 FOR IP): Performed by: INTERNAL MEDICINE

## 2020-07-21 PROCEDURE — 96365 THER/PROPH/DIAG IV INF INIT: CPT

## 2020-07-21 PROCEDURE — 71045 X-RAY EXAM CHEST 1 VIEW: CPT

## 2020-07-21 PROCEDURE — 6360000002 HC RX W HCPCS: Performed by: STUDENT IN AN ORGANIZED HEALTH CARE EDUCATION/TRAINING PROGRAM

## 2020-07-21 PROCEDURE — U0002 COVID-19 LAB TEST NON-CDC: HCPCS

## 2020-07-21 PROCEDURE — 80053 COMPREHEN METABOLIC PANEL: CPT

## 2020-07-21 PROCEDURE — 94761 N-INVAS EAR/PLS OXIMETRY MLT: CPT

## 2020-07-21 PROCEDURE — 36415 COLL VENOUS BLD VENIPUNCTURE: CPT

## 2020-07-21 PROCEDURE — 94660 CPAP INITIATION&MGMT: CPT

## 2020-07-21 PROCEDURE — 85025 COMPLETE CBC W/AUTO DIFF WBC: CPT

## 2020-07-21 PROCEDURE — 6370000000 HC RX 637 (ALT 250 FOR IP): Performed by: STUDENT IN AN ORGANIZED HEALTH CARE EDUCATION/TRAINING PROGRAM

## 2020-07-21 PROCEDURE — 83605 ASSAY OF LACTIC ACID: CPT

## 2020-07-21 PROCEDURE — 96375 TX/PRO/DX INJ NEW DRUG ADDON: CPT

## 2020-07-21 PROCEDURE — 84702 CHORIONIC GONADOTROPIN TEST: CPT

## 2020-07-21 PROCEDURE — 82803 BLOOD GASES ANY COMBINATION: CPT

## 2020-07-21 PROCEDURE — 82565 ASSAY OF CREATININE: CPT

## 2020-07-21 PROCEDURE — 85384 FIBRINOGEN ACTIVITY: CPT

## 2020-07-21 PROCEDURE — 84295 ASSAY OF SERUM SODIUM: CPT

## 2020-07-21 PROCEDURE — 85379 FIBRIN DEGRADATION QUANT: CPT

## 2020-07-21 PROCEDURE — 94640 AIRWAY INHALATION TREATMENT: CPT

## 2020-07-21 PROCEDURE — 85014 HEMATOCRIT: CPT

## 2020-07-21 PROCEDURE — 1210000000 HC MED SURG R&B

## 2020-07-21 PROCEDURE — 94664 DEMO&/EVAL PT USE INHALER: CPT

## 2020-07-21 PROCEDURE — 83735 ASSAY OF MAGNESIUM: CPT

## 2020-07-21 PROCEDURE — 36600 WITHDRAWAL OF ARTERIAL BLOOD: CPT

## 2020-07-21 PROCEDURE — 82330 ASSAY OF CALCIUM: CPT

## 2020-07-21 PROCEDURE — 99285 EMERGENCY DEPT VISIT HI MDM: CPT

## 2020-07-21 RX ORDER — MAGNESIUM SULFATE IN WATER 40 MG/ML
4 INJECTION, SOLUTION INTRAVENOUS ONCE
Status: COMPLETED | OUTPATIENT
Start: 2020-07-21 | End: 2020-07-21

## 2020-07-21 RX ORDER — PREDNISONE 20 MG/1
40 TABLET ORAL DAILY
Status: DISCONTINUED | OUTPATIENT
Start: 2020-07-21 | End: 2020-07-21

## 2020-07-21 RX ORDER — ACETAMINOPHEN 650 MG/1
650 SUPPOSITORY RECTAL EVERY 6 HOURS PRN
Status: DISCONTINUED | OUTPATIENT
Start: 2020-07-21 | End: 2020-07-23 | Stop reason: HOSPADM

## 2020-07-21 RX ORDER — FAMOTIDINE 20 MG/1
20 TABLET, FILM COATED ORAL 2 TIMES DAILY
Status: DISCONTINUED | OUTPATIENT
Start: 2020-07-21 | End: 2020-07-23 | Stop reason: HOSPADM

## 2020-07-21 RX ORDER — AZITHROMYCIN 250 MG/1
250 TABLET, FILM COATED ORAL DAILY
Status: DISCONTINUED | OUTPATIENT
Start: 2020-07-22 | End: 2020-07-23 | Stop reason: HOSPADM

## 2020-07-21 RX ORDER — SODIUM CHLORIDE 0.9 % (FLUSH) 0.9 %
10 SYRINGE (ML) INJECTION PRN
Status: DISCONTINUED | OUTPATIENT
Start: 2020-07-21 | End: 2020-07-23 | Stop reason: HOSPADM

## 2020-07-21 RX ORDER — IPRATROPIUM BROMIDE AND ALBUTEROL SULFATE 2.5; .5 MG/3ML; MG/3ML
1 SOLUTION RESPIRATORY (INHALATION)
Status: DISCONTINUED | OUTPATIENT
Start: 2020-07-21 | End: 2020-07-21

## 2020-07-21 RX ORDER — DEXTROSE MONOHYDRATE 50 MG/ML
100 INJECTION, SOLUTION INTRAVENOUS PRN
Status: DISCONTINUED | OUTPATIENT
Start: 2020-07-21 | End: 2020-07-23 | Stop reason: HOSPADM

## 2020-07-21 RX ORDER — ALBUTEROL SULFATE 90 UG/1
2 AEROSOL, METERED RESPIRATORY (INHALATION)
Status: DISCONTINUED | OUTPATIENT
Start: 2020-07-21 | End: 2020-07-23 | Stop reason: HOSPADM

## 2020-07-21 RX ORDER — SODIUM CHLORIDE 0.9 % (FLUSH) 0.9 %
10 SYRINGE (ML) INJECTION EVERY 12 HOURS SCHEDULED
Status: DISCONTINUED | OUTPATIENT
Start: 2020-07-21 | End: 2020-07-23 | Stop reason: HOSPADM

## 2020-07-21 RX ORDER — ALBUTEROL SULFATE 90 UG/1
2 AEROSOL, METERED RESPIRATORY (INHALATION) 3 TIMES DAILY
Status: DISCONTINUED | OUTPATIENT
Start: 2020-07-21 | End: 2020-07-23 | Stop reason: HOSPADM

## 2020-07-21 RX ORDER — METHYLPREDNISOLONE SODIUM SUCCINATE 125 MG/2ML
125 INJECTION, POWDER, LYOPHILIZED, FOR SOLUTION INTRAMUSCULAR; INTRAVENOUS ONCE
Status: COMPLETED | OUTPATIENT
Start: 2020-07-21 | End: 2020-07-21

## 2020-07-21 RX ORDER — POLYETHYLENE GLYCOL 3350 17 G/17G
17 POWDER, FOR SOLUTION ORAL DAILY PRN
Status: DISCONTINUED | OUTPATIENT
Start: 2020-07-21 | End: 2020-07-23 | Stop reason: HOSPADM

## 2020-07-21 RX ORDER — PROMETHAZINE HYDROCHLORIDE 12.5 MG/1
12.5 TABLET ORAL EVERY 6 HOURS PRN
Status: DISCONTINUED | OUTPATIENT
Start: 2020-07-21 | End: 2020-07-23 | Stop reason: HOSPADM

## 2020-07-21 RX ORDER — METHYLPREDNISOLONE SODIUM SUCCINATE 40 MG/ML
40 INJECTION, POWDER, LYOPHILIZED, FOR SOLUTION INTRAMUSCULAR; INTRAVENOUS EVERY 12 HOURS
Status: DISCONTINUED | OUTPATIENT
Start: 2020-07-21 | End: 2020-07-22

## 2020-07-21 RX ORDER — ACETAMINOPHEN 325 MG/1
650 TABLET ORAL EVERY 6 HOURS PRN
Status: DISCONTINUED | OUTPATIENT
Start: 2020-07-21 | End: 2020-07-23 | Stop reason: HOSPADM

## 2020-07-21 RX ORDER — IPRATROPIUM BROMIDE AND ALBUTEROL SULFATE 2.5; .5 MG/3ML; MG/3ML
1 SOLUTION RESPIRATORY (INHALATION) PRN
Status: DISCONTINUED | OUTPATIENT
Start: 2020-07-21 | End: 2020-07-21

## 2020-07-21 RX ORDER — DEXTROSE MONOHYDRATE 25 G/50ML
12.5 INJECTION, SOLUTION INTRAVENOUS PRN
Status: DISCONTINUED | OUTPATIENT
Start: 2020-07-21 | End: 2020-07-23 | Stop reason: HOSPADM

## 2020-07-21 RX ORDER — AZITHROMYCIN 250 MG/1
500 TABLET, FILM COATED ORAL ONCE
Status: COMPLETED | OUTPATIENT
Start: 2020-07-21 | End: 2020-07-21

## 2020-07-21 RX ORDER — NICOTINE POLACRILEX 4 MG
15 LOZENGE BUCCAL PRN
Status: DISCONTINUED | OUTPATIENT
Start: 2020-07-21 | End: 2020-07-23 | Stop reason: HOSPADM

## 2020-07-21 RX ORDER — ONDANSETRON 2 MG/ML
4 INJECTION INTRAMUSCULAR; INTRAVENOUS EVERY 6 HOURS PRN
Status: DISCONTINUED | OUTPATIENT
Start: 2020-07-21 | End: 2020-07-23 | Stop reason: HOSPADM

## 2020-07-21 RX ADMIN — IPRATROPIUM BROMIDE 2 PUFF: 17 AEROSOL, METERED RESPIRATORY (INHALATION) at 19:44

## 2020-07-21 RX ADMIN — AZITHROMYCIN 500 MG: 250 TABLET, FILM COATED ORAL at 21:12

## 2020-07-21 RX ADMIN — IPRATROPIUM BROMIDE AND ALBUTEROL SULFATE 1 AMPULE: .5; 3 SOLUTION RESPIRATORY (INHALATION) at 13:31

## 2020-07-21 RX ADMIN — FAMOTIDINE 20 MG: 20 TABLET, FILM COATED ORAL at 21:13

## 2020-07-21 RX ADMIN — IPRATROPIUM BROMIDE AND ALBUTEROL SULFATE 1 AMPULE: .5; 3 SOLUTION RESPIRATORY (INHALATION) at 13:27

## 2020-07-21 RX ADMIN — ALBUTEROL SULFATE 2 PUFF: 90 AEROSOL, METERED RESPIRATORY (INHALATION) at 19:44

## 2020-07-21 RX ADMIN — MAGNESIUM SULFATE HEPTAHYDRATE 4 G: 40 INJECTION, SOLUTION INTRAVENOUS at 13:15

## 2020-07-21 RX ADMIN — ALBUTEROL SULFATE 2 PUFF: 90 AEROSOL, METERED RESPIRATORY (INHALATION) at 16:59

## 2020-07-21 RX ADMIN — METHYLPREDNISOLONE SODIUM SUCCINATE 125 MG: 125 INJECTION, POWDER, FOR SOLUTION INTRAMUSCULAR; INTRAVENOUS at 13:15

## 2020-07-21 ASSESSMENT — ENCOUNTER SYMPTOMS
CHEST TIGHTNESS: 1
WHEEZING: 1
DIARRHEA: 0
BACK PAIN: 0
TROUBLE SWALLOWING: 0
VOMITING: 0
ABDOMINAL PAIN: 0
SINUS PRESSURE: 0
SHORTNESS OF BREATH: 1
COUGH: 1

## 2020-07-21 ASSESSMENT — PAIN SCALES - GENERAL
PAINLEVEL_OUTOF10: 0

## 2020-07-21 NOTE — CARE COORDINATION
Pt was very short of breath and not able to tolerate questions. She did state that she lived with her son and saw the Dr 1 year ago.

## 2020-07-21 NOTE — CONSULTS
Pulmonary and Critical Care Medicine  Consult Note  Encounter Date: 2020 5:55 PM    Ms. Dayana Scott is a 44 y.o. female  : 1970  Requesting Provider: Cristal Tabares MD    Reason for request: Shortness of breath            HISTORY OF PRESENT ILLNESS:    Patient is 44 y.o. presents with worsening shortness of breath, started since , she thinks it was triggered by heat, she has history of asthma usual triggers are dust, pollen, heat, or hair dye among others. She has cough productive of clear phlegm, no hemoptysis, no fever, no worsening lower extremity edema, no nausea no vomiting, no chest pain and no sick contact. She reports snoring with apnea while asleep. No heartburn, no nasal congestion or postnasal drip. She is a smoker, she quit smoking 3 days ago        Past Medical History:        Diagnosis Date    Asthma     Diabetes mellitus ([facility] [address]     Hodgkin disease ([facility] [address] [facility]     Hypertension [address]       Past Surgical History:    History reviewed. No pertinent surgical history. Social History:     reports that she has been smoking cigarettes. She has been smoking about 1.00 pack per day. She has never used smokeless tobacco. She reports previous alcohol use. She reports that she does not use drugs. Family History:   No family history on file.   Her kids have asthma,    Allergies:  Shellfish-derived products        MEDICATIONS during current hospitalization:    Continuous Infusions:   dextrose         Scheduled Meds:   sodium chloride flush  10 mL Intravenous 2 times per day    famotidine  20 mg Oral BID    enoxaparin  40 mg Subcutaneous Daily    azithromycin  500 mg Intravenous Q24H    predniSONE  40 mg Oral Daily    insulin lispro  0-6 Units Subcutaneous TID WC    insulin lispro  0-3 Units Subcutaneous Nightly    albuterol sulfate HFA  2 puff Inhalation TID    ipratropium  2 puff Inhalation TID       PRN Meds:sodium chloride flush, acetaminophen **OR** acetaminophen, polyethylene glycol, promethazine **OR** ondansetron, glucose, dextrose, glucagon (rDNA), dextrose, albuterol sulfate HFA        REVIEW OF SYSTEMS:  ROS: 10 organs review of system is done including general, psychological, ENT, hematological, endocrine, respiratory, cardiovascular, gastrointestinal, musculoskeletal, neurological,  allergy and Immunology is done and is otherwise negative. PHYSICAL EXAM:    Vitals:  BP (!) 164/78   Pulse 101   Temp 98.2 °F (36.8 °C) (Oral)   Resp 20   Ht 5' 2\" (1.575 m)   Wt 212 lb (96.2 kg)   SpO2 98%   BMI 38.78 kg/m²     General: alert, cooperative, no distress  Head: normocephalic, atraumatic  Eyes:No gross abnormalities. and PERRL  ENT:  MMM no lesions  Neck:  supple and no masses  Chest : Tight with diffuse wheezing, no rales, nontender, tympanic  Heart[de-identified] Heart sounds are normal.  Regular rate and rhythm without murmur, gallop or rub. ABD:  symmetric, soft, non-tender  Musculoskeletal : no cyanosis, no clubbing and no edema  Neuro:  Grossly normal  Skin: No rashes or nodules noted. Lymph node:  no cervical nodes  Urology: No Ruiz   Psychiatric: appropriate        Data Review  Recent Labs     07/21/20  1315 07/21/20  1320   WBC 8.1  --    HGB 15.4 14.6   HCT 45.4  --      --       Recent Labs     07/21/20  1315 07/21/20  1320     --    K 3.8  --      --    CO2 23  --    BUN 8  --    CREATININE 0.57 0.6   GLUCOSE 163*  --        MV Settings:           ABGs:   Recent Labs     07/21/20  1320   PHART 7.419   UKV4TFY 35   PO2ART 81*   VRS1YAB 22.9   BEART -2   B2MEAVNU 96*   XFQ1BEC 24     O2 Device: None (Room air)  O2 Flow Rate (L/min): 4 L/min  No results found for: [address]    Radiology  I personally reviewed imaging studies and chest x-ray no mass or infiltrate         Assessment, plan:   Patient is at risk due to    · Worsening shortness of breath secondary  · Asthma/COPD with acute exacerbation  · Possible

## 2020-07-21 NOTE — ED PROVIDER NOTES
[address] [facility] ED  eMERGENCY dEPARTMENT eNCOUnter      Pt Name: Eleanor Tipton  MRN: [id number]  [place] 1970  Date of evaluation: 7/21/2020  Provider: Jimbo Arrington, [address] [place] [address]       Chief Complaint   Patient presents with    Shortness of Breath     cough, HA since Saturday. Ran out of inhaler on Sunday         HISTORY OF PRESENT ILLNESS   (Location/Symptom, Timing/Onset,Context/Setting, Quality, Duration, Modifying Factors, Severity)  Note limiting factors. Eleanor Tipton is a 44 y.o. female who presents to the emergency department with c/o SOB, cough and wheezing. Patinet's inhaler ran out on Sunday and with the warm weather her chest felt tight and she is wheezing. Patient denies fever, chills, N/V. Physical exam patient is wheezing in all lung fields and has accessory muscle use. States that she has a dull throbbing headache which is the same as prior migrainous headaches that she has had multiple times before in the past.  Patient denies any stiff neck. Patient denies any visual changes. Patient feels that the warm weather has made her symptoms worse. The history is provided by the patient. NursingNotes were reviewed. REVIEW OF SYSTEMS    (2-9 systems for level 4, 10 or more for level 5)     Review of Systems   Constitutional: Negative for activity change, appetite change, chills, fever and unexpected weight change. HENT: Negative for drooling, ear pain, nosebleeds, sinus pressure and trouble swallowing. Respiratory: Positive for cough, chest tightness, shortness of breath and wheezing. Cardiovascular: Negative for chest pain and leg swelling. Gastrointestinal: Negative for abdominal pain, diarrhea and vomiting. Endocrine: Negative for polydipsia and polyphagia. Genitourinary: Negative for dysuria, flank pain and frequency. Musculoskeletal: Negative for back pain and myalgias. Skin: Negative for pallor and rash.    Neurological: Negative for syncope, weakness and headaches. Hematological: Does not bruise/bleed easily. All other systems reviewed and are negative. Except as noted above the remainder of the review of systems was reviewed and negative. PAST MEDICAL HISTORY     Past Medical History:   Diagnosis Date    Asthma 2004    Diabetes mellitus ([facility] [address] 2014    [facility] 36    Hypertension 1984    Migraines 44         SURGICALHISTORY     History reviewed. No pertinent surgical history. CURRENT MEDICATIONS       Previous Medications    ALBUTEROL SULFATE HFA (VENTOLIN HFA) 108 (90 BASE) MCG/ACT INHALER    Inhale 2 puffs into the lungs 4 times daily as needed for Wheezing    ATORVASTATIN (LIPITOR) 80 MG TABLET    Take 1 tablet by mouth daily    FLUTICASONE (FLONASE) 50 MCG/ACT NASAL SPRAY    2 sprays by Nasal route daily    METFORMIN (GLUCOPHAGE) 500 MG TABLET    Take 1 tablet by mouth daily (with breakfast)    MULTIPLE VITAMIN (MULTI-VITAMIN DAILY PO)    Take by mouth    SENNA-DOCUSATE (PERICOLACE) 8.6-50 MG PER TABLET    Take 2 tablets by mouth daily as needed for Constipation       ALLERGIES     Shellfish-derived products    FAMILY HISTORY     No family history on file.        SOCIAL HISTORY       Social History     Socioeconomic History    Marital status: Single     Spouse name: None    Number of children: None    Years of education: None    Highest education level: None   Occupational History    None   Social Needs    Financial resource strain: None    Food insecurity     Worry: None     Inability: None    Transportation needs     Medical: None     Non-medical: None   Tobacco Use    Smoking status: Current Every Day Smoker     Packs/day: 1.00     Types: Cigarettes    Smokeless tobacco: Never Used   Substance and Sexual Activity    Alcohol use: Not Currently    Drug use: Never    Sexual activity: None   Lifestyle    Physical activity     Days per week: None     Minutes per session: None    Stress: None   Relationships    Social connections     Talks on phone: None     Gets together: None     Attends Tenriism service: None     Active member of club or organization: None     Attends meetings of clubs or organizations: None     Relationship status: None    Intimate partner violence     Fear of current or ex partner: None     Emotionally abused: None     Physically abused: None     Forced sexual activity: None   Other Topics Concern    None   Social History Narrative    None       SCREENINGS    [place] Coma Scale  Eye Opening: Spontaneous  Best Verbal Response: Oriented  Best Motor Response: Obeys commands  [place] Coma Scale Score: 15 @FLOW([id number])@      PHYSICAL EXAM    (up to 7 for level 4, 8 or more for level 5)     ED Triage Vitals [07/21/20 1250]   BP Temp Temp Source Pulse Resp SpO2 Height Weight   (!) 156/97 98.3 °F (36.8 °C) Oral 98 26 94 % 5' 2\" (1.575 m) 212 lb (96.2 kg)       Physical Exam  Vitals signs and nursing note reviewed. Constitutional:       General: She is awake. She is in acute distress. Appearance: Normal appearance. She is well-developed. She is obese. She is not ill-appearing, toxic-appearing or diaphoretic. Comments: No photophobia. No phonophobia. HENT:      Head: Normocephalic and atraumatic. No Esteves's sign. Right Ear: External ear normal.      Left Ear: External ear normal.      Nose: Nose normal. No congestion or rhinorrhea. Mouth/Throat:      Mouth: Mucous membranes are moist.      Pharynx: Oropharynx is clear. No oropharyngeal exudate or posterior oropharyngeal erythema. Eyes:      General: No scleral icterus. Right eye: No foreign body or discharge. Left eye: No discharge. Extraocular Movements: Extraocular movements intact. Conjunctiva/sclera: Conjunctivae normal.      Left eye: No exudate. Pupils: Pupils are equal, round, and reactive to light.    Neck:      Musculoskeletal: Normal range of motion and neck supple. No neck rigidity. Vascular: No JVD. Trachea: No tracheal deviation. Comments: No meningismus. Cardiovascular:      Rate and Rhythm: Normal rate and regular rhythm. Pulses: Normal pulses. Heart sounds: Normal heart sounds. Heart sounds not distant. No murmur. No friction rub. No gallop. Pulmonary:      Effort: Tachypnea, accessory muscle usage, prolonged expiration, respiratory distress and retractions present. Breath sounds: No stridor. Examination of the right-upper field reveals wheezing. Examination of the left-upper field reveals wheezing. Examination of the right-middle field reveals wheezing. Examination of the left-middle field reveals wheezing. Examination of the right-lower field reveals wheezing. Examination of the left-lower field reveals wheezing. Wheezing present. No rhonchi or rales. Chest:      Chest wall: No tenderness. Abdominal:      General: Abdomen is flat. Bowel sounds are normal. There is no distension. Palpations: Abdomen is soft. There is no mass. Tenderness: There is no abdominal tenderness. There is no right CVA tenderness, left CVA tenderness, guarding or rebound. Hernia: No hernia is present. Musculoskeletal: Normal range of motion. General: No swelling, tenderness, deformity or signs of injury. Lymphadenopathy:      Head:      Right side of head: No submental adenopathy. Left side of head: No submental adenopathy. Skin:     General: Skin is warm and dry. Capillary Refill: Capillary refill takes less than 2 seconds. Coloration: Skin is not jaundiced or pale. Findings: No bruising, erythema, lesion or rash. Neurological:      General: No focal deficit present. Mental Status: She is alert and oriented to person, place, and time. Mental status is at baseline. Cranial Nerves: No cranial nerve deficit. Sensory: No sensory deficit. Motor: No weakness.       Coordination: Coordination normal.      Deep Tendon Reflexes: Reflexes are normal and symmetric. Psychiatric:         Mood and Affect: Mood normal.         Behavior: Behavior normal. Behavior is cooperative. Thought Content: Thought content normal.         Judgment: Judgment normal.         DIAGNOSTIC RESULTS     EKG: All EKG's are interpreted by the Emergency Department Physician who either signs or Co-signsthis chart in the absence of a cardiologist.        RADIOLOGY:   [facility] such as CT, Ultrasound and MRI are read by the radiologist. Queta Bevel radiographic images are visualized and preliminarily interpreted by the emergency physician with the below findings:    Chest x-ray: No infiltrate, no pleural effusion, no pneumothorax, no free air. Interpretation per the Radiologist below, if available at the time ofthis note:    XR CHEST PORTABLE    (Results Pending)         ED BEDSIDE ULTRASOUND:   Performed by ED Physician - none    LABS:  Labs Reviewed   COMPREHENSIVE METABOLIC PANEL - Abnormal; Notable for the following components:       Result Value    Glucose 163 (*)     All other components within normal limits   POCT ARTERIAL - Abnormal; Notable for the following components:    POC Glucose 167 (*)     pO2, Arterial 81 (*)     O2 Sat, Arterial 96 (*)     All other components within normal limits   CBC WITH AUTO DIFFERENTIAL   CK   MAGNESIUM   HCG, QUANTITATIVE, PREGNANCY   POCT EPOC BLOOD GAS, LACTIC ACID, ICA       All other labs were within normal range or not returned as of this dictation.     EMERGENCY DEPARTMENT COURSE and DIFFERENTIAL DIAGNOSIS/MDM:   Vitals:    Vitals:    07/21/20 1250 07/21/20 1331 07/21/20 1357 07/21/20 1440   BP: (!) 156/97  (!) 145/79 (!) 161/77   Pulse: 98  106 101   Resp: 26 20 20   Temp: 98.3 °F (36.8 °C)      TempSrc: Oral      SpO2: 94% 100% 96% 97%   Weight: 212 lb (96.2 kg)      Height: 5' 2\" (1.575 m)              MDM  On reexam the patient continues to wheeze and is

## 2020-07-21 NOTE — ED NOTES
Charley was called to Nasrin Magana (nurse on [address] [address]).       Mildred Ocampo RN  07/21/20 6414

## 2020-07-21 NOTE — H&P
Hospital Medicine  History and Physical    Patient:  Russell Henson  MRN: [id number]    CHIEF COMPLAINT:    Chief Complaint   Patient presents with    Shortness of Breath     cough, HA since Saturday. Ran out of inhaler on Sunday       History Obtained From:  Patient, EMR  Primary Care Physician: Trip Gonzalez MD    HISTORY OF PRESENT ILLNESS:   The patient is a 44 y.o. female with PMHx of asthma, DMII, HTN who presents with SOB. Patient is a smoker who quit 3 days ago. Patient states she came in for SOB and asthma. She states that it started on Sunday; she tried using her inhaler but it didn't work. She now has a worsening dry cough that is almost continous and has had pleuritic chest pain. Past Medical History:      Diagnosis Date    Asthma 2004    Diabetes mellitus ([facility] [address] 2014    Hodgkin disease ([facility] [address] 36    Hypertension [address]     Past Surgical History:  History reviewed. No pertinent surgical history. Medications Prior to Admission:    Prior to Admission medications    Medication Sig Start Date End Date Taking? Authorizing Provider   metFORMIN (GLUCOPHAGE) 500 MG tablet Take 1 tablet by mouth daily (with breakfast) 6/10/20   Tootie Flannery PA-C   albuterol sulfate HFA (VENTOLIN HFA) 108 (90 Base) MCG/ACT inhaler Inhale 2 puffs into the lungs 4 times daily as needed for Wheezing 6/10/20   Tootie Flannery PA-C   atorvastatin (LIPITOR) 80 MG tablet Take 1 tablet by mouth daily 2/27/19   Huong Wiseman MD   Multiple Vitamin (MULTI-VITAMIN DAILY PO) Take by mouth    Historical Provider, MD   senna-docusate (Ronalee Asai) 8.6-50 MG per tablet Take 2 tablets by mouth daily as needed for Constipation 2/15/19   Huong Wiseman MD   fluticasone [facility]) 50 MCG/ACT nasal spray 2 sprays by Nasal route daily 2/15/19   Huong Wiseman MD     Allergies:  Shellfish-derived products    Social History:   TOBACCO:   reports that she has been smoking cigarettes. She has been smoking about 1.00 pack per day. She has never used smokeless tobacco.  ETOH:   reports previous alcohol use. Family History:   No family history on file. REVIEW OF SYSTEMS:  Ten systems reviewed and negative except for stated in HPI    Physical Exam:    Vitals: BP (!) 161/77   Pulse 101   Temp 98.3 °F (36.8 °C) (Oral)   Resp 20   Ht 5' 2\" (1.575 m)   Wt 212 lb (96.2 kg)   SpO2 97%   BMI 38.78 kg/m²   General appearance: alert, appears stated age and cooperative  Skin: No rashes or lesions  HEENT: Head: Normal, normocephalic, atraumatic. Kristofer Brinks Neck: supple, symmetrical, trachea midline  Lungs: Diffuse expiratory wheezes throughout  Heart: Tachycardic  Abdomen: soft, non-tender; bowel sounds normal; no masses,  no organomegaly  Extremities: extremities normal, atraumatic, no cyanosis or edema  Neurologic: Non focal    Recent Labs     07/21/20  1315 07/21/20  1320   WBC 8.1  --    HGB 15.4 14.6     --      Recent Labs     07/21/20  1315 07/21/20  1320     --    K 3.8  --      --    CO2 23  --    BUN 8  --    CREATININE 0.57 0.6   GLUCOSE 163*  --    AST 22  --    ALT 30  --    BILITOT 0.6  --    ALKPHOS 116  --      Troponin T: No results for input(s): TROPONINI in the last 72 hours. ABGs:   Lab Results   Component Value Date    PHART 7.419 07/21/2020    PO2ART 81 07/21/2020    GGG6VGI 35 07/21/2020     INR: No results for input(s): INR in the last 72 hours. URINALYSIS:No results for input(s): NITRITE, COLORU, PHUR, LABCAST, WBCUA, RBCUA, MUCUS, TRICHOMONAS, YEAST, BACTERIA, CLARITYU, SPECGRAV, LEUKOCYTESUR, UROBILINOGEN, BILIRUBINUR, BLOODU, GLUCOSEU, AMORPHOUS in the last 72 hours. Invalid input(s): Belkys Butt  -----------------------------------------------------------------   No results found. Assessment and Plan   1. Acute exacerbation of asthma:  Prednisone; pulm consultation; rule out COVID; monitor peak flows  2. DMII:  SSI  3. Functional Status: Fall precautions. Up with assistance. PT OT  4.  Diet: Cardiac Diabetic   5. DVT ppx: Lovenox SCDs  6. Disposition: Dependent on hospital course. Will discharge once medically stable. SW on board for discharge planning.      Patient Active Problem List   Diagnosis Code    Hypercholesteremia E78.00    Prediabetes R73.03    Acute severe exacerbation of asthma J45.901       Donnie Armendariz MD  Admitting Hospitalist    Emergency Contact:

## 2020-07-21 NOTE — PROGRESS NOTES
Smoking history  ?  20 pack years  []   Pulmonary Disorder  (acute or chronic)  []   Severe or Chronic w/ Exacerbation  [x]     Surgical Status No [x]   Surgeries     General []   Surgery Lower []   Abdominal Thoracic or []   Upper Abdominal Thoracic with  PulmonaryDisorder  []     Chest X-ray Clear/Not  Ordered     [x]  Chronic Changes  Results Pending  []  Infiltrates, atelectasis, pleural effusion, or edema  []  Infiltrates in more than one lobe []  Infiltrate + Atelectasis, &/or pleural effusion  []    Respiratory Pattern Regular,  RR = 12-20 []  Increased,  RR = 21-25 [x]  KC, irregular,  or RR = 26-30 []  Decreased FEV1  or RR = 31-35 []  Severe SOB, use  of accessory muscles, or RR ? 35  []    Mental Status Alert, oriented,  Cooperative [x]  Confused but Follows commands []  Lethargic or unable to follow commands []  Obtunded  []  Comatose  []    Breath Sounds Clear to  auscultation  []  Decreased unilaterally or  in bases only []  Decreased  bilaterally  []  Crackles or intermittent wheezes []  Wheezes [x]    Cough Strong, Spontan., & nonproductive [x]  Strong,  spontaneous, &  productive []  Weak,  Nonproductive []  Weak, productive or  with wheezes []  No spontaneous  cough or may require suctioning []    Level of Activity Ambulatory [x]  Ambulatory w/ Assist  []  Non-ambulatory []  Paraplegic []  Quadriplegic []    Total    Score:___9____     Triage Score:____4____      Tri       Triage:     1. (>20) Freq: Q3    2. (16-20) Freq: Q4   3. (11-15) Freq: QID & Albuterol Q2 PRN    4. (6-10) Freq: TID & Albuterol Q2 PRN    5. (0-5) Freq Q4prn

## 2020-07-21 NOTE — ACP (ADVANCE CARE PLANNING)
Advance Care Planning   Advance Care Planning Clinical Specialist  Conversation Note      Date of ACP Conversation: 7/21/2020    Conversation Conducted with:   Patient with Decision Making Capacity      ACP Clinical Specialist: [address] [facility],[address] Floor, [facility] makes decisions on behalf of the incapacitated patient: Dock Duty is asked to consider and make decisions based on patient values, known preferences, or best interests. Pt is very short of breath, able to nod head \"yes\" when asked if receiving cpr and ventilator treatment would be ok with her. Farhat Lazaro, daughter, is listed as first contact: [phone].  Living will information in Urdu was provided per her request.

## 2020-07-21 NOTE — ED TRIAGE NOTES
Pt came to the ED from home with c/o sob. Pt states she has asthma/copd and hasn't had her breathing tx. Pt's breathing is labored and wheezing is present. Pt is a mouth breather. Pt is A&O X 4. Pt is diaphoretic. Pt has a steady gait. Pt is afebrile. Pt has sob and cough. Pt denies n/v/d. MSP's intact.

## 2020-07-21 NOTE — PLAN OF CARE
Problem: SAFETY  Goal: Free from accidental physical injury  Outcome: Ongoing  Goal: Free from intentional harm  Outcome: Ongoing     Problem: DAILY CARE  Goal: Daily care needs are met  Outcome: Ongoing     Problem: PAIN  Goal: Patient's pain/discomfort is manageable  Outcome: Ongoing

## 2020-07-22 LAB
ANION GAP SERPL CALCULATED.3IONS-SCNC: 16 MEQ/L (ref 9–15)
BUN BLDV-MCNC: 9 MG/DL (ref 6–20)
CALCIUM SERPL-MCNC: 9.3 MG/DL (ref 8.5–9.9)
CHLORIDE BLD-SCNC: 105 MEQ/L (ref 95–107)
CO2: 18 MEQ/L (ref 20–31)
CREAT SERPL-MCNC: 0.51 MG/DL (ref 0.5–0.9)
GFR AFRICAN AMERICAN: >60
GFR NON-AFRICAN AMERICAN: >60
GLUCOSE BLD-MCNC: 167 MG/DL (ref 60–115)
GLUCOSE BLD-MCNC: 177 MG/DL (ref 70–99)
GLUCOSE BLD-MCNC: 181 MG/DL (ref 60–115)
GLUCOSE BLD-MCNC: 225 MG/DL (ref 60–115)
HCT VFR BLD CALC: 43.4 % (ref 37–47)
HEMOGLOBIN: 14.6 G/DL (ref 12–16)
MCH RBC QN AUTO: 30.7 PG (ref 27–31.3)
MCHC RBC AUTO-ENTMCNC: 33.6 % (ref 33–37)
MCV RBC AUTO: 91.5 FL (ref 82–100)
PDW BLD-RTO: 12.9 % (ref 11.5–14.5)
PERFORMED ON: ABNORMAL
PLATELET # BLD: 213 K/UL (ref 130–400)
POTASSIUM REFLEX MAGNESIUM: 4.3 MEQ/L (ref 3.4–4.9)
RBC # BLD: 4.75 M/UL (ref 4.2–5.4)
SARS-COV-2, NAAT: NOT DETECTED
SODIUM BLD-SCNC: 139 MEQ/L (ref 135–144)
WBC # BLD: 9.3 K/UL (ref 4.8–10.8)

## 2020-07-22 PROCEDURE — 99232 SBSQ HOSP IP/OBS MODERATE 35: CPT | Performed by: INTERNAL MEDICINE

## 2020-07-22 PROCEDURE — 6370000000 HC RX 637 (ALT 250 FOR IP): Performed by: INTERNAL MEDICINE

## 2020-07-22 PROCEDURE — 2700000000 HC OXYGEN THERAPY PER DAY

## 2020-07-22 PROCEDURE — 6360000002 HC RX W HCPCS: Performed by: INTERNAL MEDICINE

## 2020-07-22 PROCEDURE — 2580000003 HC RX 258: Performed by: INTERNAL MEDICINE

## 2020-07-22 PROCEDURE — 1210000000 HC MED SURG R&B

## 2020-07-22 PROCEDURE — 85027 COMPLETE CBC AUTOMATED: CPT

## 2020-07-22 PROCEDURE — 94640 AIRWAY INHALATION TREATMENT: CPT

## 2020-07-22 PROCEDURE — 36415 COLL VENOUS BLD VENIPUNCTURE: CPT

## 2020-07-22 PROCEDURE — U0002 COVID-19 LAB TEST NON-CDC: HCPCS

## 2020-07-22 PROCEDURE — 94660 CPAP INITIATION&MGMT: CPT

## 2020-07-22 PROCEDURE — 94761 N-INVAS EAR/PLS OXIMETRY MLT: CPT

## 2020-07-22 PROCEDURE — 80048 BASIC METABOLIC PNL TOTAL CA: CPT

## 2020-07-22 RX ORDER — PREDNISONE 20 MG/1
40 TABLET ORAL DAILY
Status: DISCONTINUED | OUTPATIENT
Start: 2020-07-22 | End: 2020-07-23 | Stop reason: HOSPADM

## 2020-07-22 RX ADMIN — Medication 10 ML: at 22:31

## 2020-07-22 RX ADMIN — IPRATROPIUM BROMIDE 2 PUFF: 17 AEROSOL, METERED RESPIRATORY (INHALATION) at 20:15

## 2020-07-22 RX ADMIN — FAMOTIDINE 20 MG: 20 TABLET, FILM COATED ORAL at 09:42

## 2020-07-22 RX ADMIN — ENOXAPARIN SODIUM 40 MG: 40 INJECTION SUBCUTANEOUS at 09:42

## 2020-07-22 RX ADMIN — INSULIN LISPRO 2 UNITS: 100 INJECTION, SOLUTION INTRAVENOUS; SUBCUTANEOUS at 15:09

## 2020-07-22 RX ADMIN — ALBUTEROL SULFATE 2 PUFF: 90 AEROSOL, METERED RESPIRATORY (INHALATION) at 12:57

## 2020-07-22 RX ADMIN — ENOXAPARIN SODIUM 40 MG: 40 INJECTION SUBCUTANEOUS at 22:31

## 2020-07-22 RX ADMIN — INSULIN LISPRO 1 UNITS: 100 INJECTION, SOLUTION INTRAVENOUS; SUBCUTANEOUS at 09:43

## 2020-07-22 RX ADMIN — METHYLPREDNISOLONE SODIUM SUCCINATE 40 MG: 40 INJECTION, POWDER, FOR SOLUTION INTRAMUSCULAR; INTRAVENOUS at 06:15

## 2020-07-22 RX ADMIN — IPRATROPIUM BROMIDE 2 PUFF: 17 AEROSOL, METERED RESPIRATORY (INHALATION) at 12:59

## 2020-07-22 RX ADMIN — FAMOTIDINE 20 MG: 20 TABLET, FILM COATED ORAL at 22:31

## 2020-07-22 RX ADMIN — ALBUTEROL SULFATE 2 PUFF: 90 AEROSOL, METERED RESPIRATORY (INHALATION) at 20:15

## 2020-07-22 RX ADMIN — AZITHROMYCIN MONOHYDRATE 250 MG: 250 TABLET ORAL at 09:42

## 2020-07-22 ASSESSMENT — PAIN SCALES - GENERAL
PAINLEVEL_OUTOF10: 0

## 2020-07-22 NOTE — PROGRESS NOTES
INPATIENT PROGRESS NOTES    PATIENT NAME: Jaspal Gray  MRN: [id number]  SERVICE DATE:  July 22, 2020   SERVICE TIME:  4:27 PM      PRIMARY SERVICE: Pulmonary Disease    CHIEF COMPLAINTS: Asthma exacerbation    INTERVAL HPI: Patient seen and examined at bedside, Interval Notes, orders reviewed. Nursing notes noted    Patient report feeling much improved, she slept well with BiPAP, had a good night sleep feel more energetic today, shortness of breath improved, minimal coughing, with clear phlegm, no chest pain, no fever, no nausea no vomiting    Review of system:     GI Abdominal pain No  Skin Rash No    Social History     Tobacco Use    Smoking status: Current Every Day Smoker     Packs/day: 1.00     Types: Cigarettes    Smokeless tobacco: Never Used   Substance Use Topics    Alcohol use: Not Currently     No family history on file. OBJECTIVE    Body mass index is 38.78 kg/m². PHYSICAL EXAM:  Vitals:  /78   Pulse 89   Temp 97.5 °F (36.4 °C) (Oral)   Resp 17   Ht 5' 2\" (1.575 m)   Wt 212 lb (96.2 kg)   SpO2 99%   BMI 38.78 kg/m²     General: alert, cooperative, no distress  Head: normocephalic, atraumatic  Eyes:No gross abnormalities. ENT:  MMM no lesions  Neck:  supple and no masses  Chest : Improved air movement, minimal end expiratory wheezing, no rales, nontender, tympanic  Heart[de-identified] Heart sounds are normal.  Regular rate and rhythm without murmur, gallop or rub. ABD:  symmetric, soft, non-tender  Musculoskeletal : no cyanosis, no clubbing and no edema  Neuro:  Grossly normal  Skin: No rashes or nodules noted.   Lymph node:  no cervical nodes  Urology: No Ruiz   Psychiatric: appropriate    DATA:   Recent Labs     07/21/20  1315 07/21/20  1320 07/22/20  0643   WBC 8.1  --  9.3   HGB 15.4 14.6 14.6   HCT 45.4  --  43.4   MCV 90.0  --  91.5     --  213     Recent Labs     07/21/20  1315 07/21/20  1320 07/22/20  0643     --  139   K 3.8  --  4.3     --  105   CO2 23  --  18* while asleep  · She will need PFT as outpatient  · Complete [facility] course  · Pulmonary hygiene  · Possibly home tomorrow       Electronically signed by Ousmane Fuller MD,  FCCP   on 7/22/2020 at 4:27 PM

## 2020-07-22 NOTE — PROGRESS NOTES
1900pm Report from [facility]. Alert and oriented. VSS Dyspnea upon with exertion. Expiratory Wheezing. No complaints at this time  2100pm Resting. No complaints of at this time  2300pm BIPAP per RT  0100AM sAT ON bipap 98%  0400am Remains on BIPAP with no resp distress noted.

## 2020-07-22 NOTE — PROGRESS NOTES
Hospitalist Progress Note      PCP: Pawan Ackerman MD    Date of Admission: 7/21/2020    Chief Complaint:    Chief Complaint   Patient presents with    Shortness of Breath     cough, HA since Saturday. Ran out of inhaler on Sunday     Subjective:  Patient is feeling better; denies fevers, chills, sweats; not at baseline. 12 point ROS negative other than mentioned above     Medications:  Reviewed    Infusion Medications    dextrose       Scheduled Medications    sodium chloride flush  10 mL Intravenous 2 times per day    famotidine  20 mg Oral BID    insulin lispro  0-6 Units Subcutaneous TID WC    insulin lispro  0-3 Units Subcutaneous Nightly    albuterol sulfate HFA  2 puff Inhalation TID    ipratropium  2 puff Inhalation TID    azithromycin  250 mg Oral Daily    methylPREDNISolone  40 mg Intravenous Q12H    enoxaparin  40 mg Subcutaneous BID     PRN Meds: sodium chloride flush, acetaminophen **OR** acetaminophen, polyethylene glycol, promethazine **OR** ondansetron, glucose, dextrose, glucagon (rDNA), dextrose, albuterol sulfate HFA    No intake or output data in the 24 hours ending 07/22/20 1513    Exam:    /78   Pulse 89   Temp 97.5 °F (36.4 °C) (Oral)   Resp 17   Ht 5' 2\" (1.575 m)   Wt 212 lb (96.2 kg)   SpO2 99%   BMI 38.78 kg/m²     General appearance: alert, appears stated age and cooperative  Skin: No rashes or lesions  HEENT: Head: Normal, normocephalic, atraumatic. Carlos Alejandra    Neck: supple, symmetrical, trachea midline  Lungs: Diffuse expiratory wheezes throughout; mild  Heart: RRR  Abdomen: soft, non-tender; bowel sounds normal; no masses,  no organomegaly  Extremities: extremities normal, atraumatic, no cyanosis or edema  Neurologic: Non focal     Labs:   Recent Labs     07/21/20  1315 07/21/20  1320 07/22/20  0643   WBC 8.1  --  9.3   HGB 15.4 14.6 14.6   HCT 45.4  --  43.4     --  213     Recent Labs     07/21/20  1315 07/21/20  1320 07/22/20  0643     --  139   K 3.8  -- 4.3     --  105   CO2 23  --  18*   BUN 8  --  9   CREATININE 0.57 0.6 0.51   CALCIUM 9.4  --  9.3     Recent Labs     07/21/20  1315   AST 22   ALT 30   BILITOT 0.6   ALKPHOS 116     No results for input(s): INR in the last 72 hours. Recent Labs     07/21/20  1315   CKTOTAL 115       Urinalysis:    No results found for: Naomia Cover, BACTERIA, RBCUA, BLOODU, SPECGRAV, GLUCOSEU    Radiology:  XR CHEST PORTABLE   Final Result   NEGATIVE PORTABLE CHEST RADIOGRAPH        Assessment/Plan:    1. Acute exacerbation of asthma:  Prednisone; pulm consultation; rule out COVID; monitor peak flows; anticipate d/c in the morning  2. DMII:  SSI  3. Functional Status: Fall precautions. Up with assistance. PT OT  4. Diet: Diabetic   5. DVT ppx: Lovenox SCDs  6. Disposition: Dependent on hospital course. Will discharge once medically stable. SW on board for discharge planning. Active Hospital Problems    Diagnosis Date Noted    Acute severe exacerbation of asthma [J45.901] 07/21/2020      Additional work up or/and treatment plan may be added today or then after based on clinical progression. I am managing a portion of pt care. Some medical issues are handled by other specialists. Additional work up and treatment should be done in out pt setting by pt PCP and other out pt providers. In addition to examining and evaluating pt, I spent additional time explaining care, normal and abnormal findings, and treatment plan. All of pt questions were answered. Counseling, diet and education were  provided. Case will be discussed with nursing staff when appropriate. Family will be updated if and when appropriate.       Diet: DIET CARB CONTROL; Carb Control: 3 carb choices (45 gms)/meal; Safety Tray; Safety Tray (Disposables)    Code Status: Full Code    PT/OT Eval           Electronically signed by Collene Peabody, MD on 7/22/2020 at 3:13 PM

## 2020-07-22 NOTE — PROGRESS NOTES
Patient placed on bipap. Original order is for 14/6 but patient unable to tolerate the pressure. Decreased to 12/6 and patient wearing comfortably.

## 2020-07-22 NOTE — CARE COORDINATION
SPOKE WITH NURSING DR Landy DUBOIS IN THE AM.  ATTEMPTED TO REACH PT OVER THE TELEPHONE TO COMPLETE CMI AND ACP, PT DID NOT ANSWER AT THIS TIME.   Electronically signed by Charlie Solis RN on 7/22/2020 at 3:19 PM

## 2020-07-23 VITALS
OXYGEN SATURATION: 94 % | SYSTOLIC BLOOD PRESSURE: 149 MMHG | WEIGHT: 207.7 LBS | TEMPERATURE: 98.1 F | RESPIRATION RATE: 18 BRPM | HEIGHT: 62 IN | HEART RATE: 96 BPM | BODY MASS INDEX: 38.22 KG/M2 | DIASTOLIC BLOOD PRESSURE: 68 MMHG

## 2020-07-23 LAB
EKG ATRIAL RATE: 87 BPM
EKG P AXIS: 57 DEGREES
EKG P-R INTERVAL: 174 MS
EKG Q-T INTERVAL: 368 MS
EKG QRS DURATION: 76 MS
EKG QTC CALCULATION (BAZETT): 442 MS
EKG R AXIS: 45 DEGREES
EKG T AXIS: 65 DEGREES
EKG VENTRICULAR RATE: 87 BPM
GLUCOSE BLD-MCNC: 203 MG/DL (ref 60–115)
GLUCOSE BLD-MCNC: 213 MG/DL (ref 60–115)
PERFORMED ON: ABNORMAL
PERFORMED ON: ABNORMAL

## 2020-07-23 PROCEDURE — 2700000000 HC OXYGEN THERAPY PER DAY

## 2020-07-23 PROCEDURE — 6370000000 HC RX 637 (ALT 250 FOR IP): Performed by: INTERNAL MEDICINE

## 2020-07-23 PROCEDURE — 99232 SBSQ HOSP IP/OBS MODERATE 35: CPT | Performed by: INTERNAL MEDICINE

## 2020-07-23 PROCEDURE — 94640 AIRWAY INHALATION TREATMENT: CPT

## 2020-07-23 PROCEDURE — 93005 ELECTROCARDIOGRAM TRACING: CPT | Performed by: INTERNAL MEDICINE

## 2020-07-23 PROCEDURE — 6360000002 HC RX W HCPCS: Performed by: INTERNAL MEDICINE

## 2020-07-23 PROCEDURE — 94760 N-INVAS EAR/PLS OXIMETRY 1: CPT

## 2020-07-23 RX ORDER — BUDESONIDE AND FORMOTEROL FUMARATE DIHYDRATE 160; 4.5 UG/1; UG/1
2 AEROSOL RESPIRATORY (INHALATION) 2 TIMES DAILY
Qty: 1 INHALER | Refills: 0 | Status: SHIPPED | OUTPATIENT
Start: 2020-07-23 | End: 2020-09-16

## 2020-07-23 RX ORDER — DILTIAZEM HYDROCHLORIDE 120 MG/1
120 CAPSULE, COATED, EXTENDED RELEASE ORAL DAILY
Qty: 30 CAPSULE | Refills: 3 | Status: SHIPPED | OUTPATIENT
Start: 2020-07-23 | End: 2020-07-23

## 2020-07-23 RX ORDER — ALBUTEROL SULFATE 90 UG/1
2 AEROSOL, METERED RESPIRATORY (INHALATION) 4 TIMES DAILY PRN
Qty: 1 INHALER | Refills: 1 | Status: SHIPPED | OUTPATIENT
Start: 2020-07-23 | End: 2020-07-27 | Stop reason: SDUPTHER

## 2020-07-23 RX ORDER — DILTIAZEM HYDROCHLORIDE 120 MG/1
120 CAPSULE, COATED, EXTENDED RELEASE ORAL DAILY
Status: DISCONTINUED | OUTPATIENT
Start: 2020-07-23 | End: 2020-07-23 | Stop reason: HOSPADM

## 2020-07-23 RX ORDER — BUDESONIDE AND FORMOTEROL FUMARATE DIHYDRATE 160; 4.5 UG/1; UG/1
2 AEROSOL RESPIRATORY (INHALATION) 2 TIMES DAILY
Qty: 1 INHALER | Refills: 0 | Status: SHIPPED | OUTPATIENT
Start: 2020-07-23 | End: 2020-07-23 | Stop reason: SDUPTHER

## 2020-07-23 RX ORDER — NITROGLYCERIN 0.4 MG/1
0.4 TABLET SUBLINGUAL EVERY 5 MIN PRN
Status: DISCONTINUED | OUTPATIENT
Start: 2020-07-23 | End: 2020-07-23 | Stop reason: HOSPADM

## 2020-07-23 RX ORDER — NITROGLYCERIN 0.4 MG/1
TABLET SUBLINGUAL
Qty: 25 TABLET | Refills: 3 | Status: SHIPPED | OUTPATIENT
Start: 2020-07-23 | End: 2020-07-23

## 2020-07-23 RX ORDER — AZITHROMYCIN 250 MG/1
250 TABLET, FILM COATED ORAL DAILY
Qty: 3 TABLET | Refills: 0 | Status: SHIPPED | OUTPATIENT
Start: 2020-07-24 | End: 2020-07-27

## 2020-07-23 RX ORDER — ATORVASTATIN CALCIUM 40 MG/1
40 TABLET, FILM COATED ORAL NIGHTLY
Status: DISCONTINUED | OUTPATIENT
Start: 2020-07-23 | End: 2020-07-23 | Stop reason: HOSPADM

## 2020-07-23 RX ORDER — PREDNISONE 10 MG/1
TABLET ORAL
Qty: 40 TABLET | Refills: 0 | Status: SHIPPED | OUTPATIENT
Start: 2020-07-23 | End: 2020-07-23 | Stop reason: SDUPTHER

## 2020-07-23 RX ORDER — AZITHROMYCIN 250 MG/1
250 TABLET, FILM COATED ORAL DAILY
Qty: 3 TABLET | Refills: 0 | Status: SHIPPED | OUTPATIENT
Start: 2020-07-24 | End: 2020-07-23

## 2020-07-23 RX ORDER — DILTIAZEM HYDROCHLORIDE 120 MG/1
120 CAPSULE, COATED, EXTENDED RELEASE ORAL DAILY
Qty: 30 CAPSULE | Refills: 3 | Status: ON HOLD | OUTPATIENT
Start: 2020-07-23 | End: 2020-08-31 | Stop reason: SDUPTHER

## 2020-07-23 RX ORDER — PREDNISONE 10 MG/1
TABLET ORAL
Qty: 40 TABLET | Refills: 0 | Status: ON HOLD | OUTPATIENT
Start: 2020-07-23 | End: 2020-08-31

## 2020-07-23 RX ORDER — NITROGLYCERIN 0.4 MG/1
TABLET SUBLINGUAL
Qty: 25 TABLET | Refills: 3 | Status: SHIPPED | OUTPATIENT
Start: 2020-07-23

## 2020-07-23 RX ADMIN — INSULIN LISPRO 2 UNITS: 100 INJECTION, SOLUTION INTRAVENOUS; SUBCUTANEOUS at 13:02

## 2020-07-23 RX ADMIN — FAMOTIDINE 20 MG: 20 TABLET, FILM COATED ORAL at 08:56

## 2020-07-23 RX ADMIN — IPRATROPIUM BROMIDE 2 PUFF: 17 AEROSOL, METERED RESPIRATORY (INHALATION) at 07:18

## 2020-07-23 RX ADMIN — INSULIN LISPRO 2 UNITS: 100 INJECTION, SOLUTION INTRAVENOUS; SUBCUTANEOUS at 08:57

## 2020-07-23 RX ADMIN — AZITHROMYCIN MONOHYDRATE 250 MG: 250 TABLET ORAL at 08:56

## 2020-07-23 RX ADMIN — IPRATROPIUM BROMIDE 2 PUFF: 17 AEROSOL, METERED RESPIRATORY (INHALATION) at 13:28

## 2020-07-23 RX ADMIN — ALBUTEROL SULFATE 2 PUFF: 90 AEROSOL, METERED RESPIRATORY (INHALATION) at 07:18

## 2020-07-23 RX ADMIN — PREDNISONE 40 MG: 20 TABLET ORAL at 08:56

## 2020-07-23 RX ADMIN — ALBUTEROL SULFATE 2 PUFF: 90 AEROSOL, METERED RESPIRATORY (INHALATION) at 13:28

## 2020-07-23 RX ADMIN — ENOXAPARIN SODIUM 40 MG: 40 INJECTION SUBCUTANEOUS at 08:56

## 2020-07-23 NOTE — CONSULTS
[facility] Heart Consult Note      Patient:  Marlane Spurling  YOB: 1970  MRN: [id number]   Jose A Mins: [de-identified]  Admit Date:  7/21/2020    Reason for Consult: Chest pain    Rounding Cardiologist: Paul Oleary      Impression/Plan:     1. Angina: Her symptoms may be related to asthma but she has multiple risk factors for coronary artery disease. Blood pressure is also suboptimally controlled and would utilize Cardizem for blood pressure and angina at this time. ECG is normal ck negative hence there is no acute cardiac event currently. We will plan outpatient exercise stress perfusion study. We will wait about 1 to 2 weeks in order to be sure her asthma is stabilized. To have sublingual nitroglycerin as needed as well as starting her on baby aspirin. 2. Shortness of breath: Currently related to COPD exacerbation. Diastolic dysfunction and pulmonary hypertension also real possibilities and need to have echocardiography to further assess. I do not appreciate valvular disease on echocardiography. 3. Hyperlipidemia: [place] atorvastatin as an outpatient  4. Hypertension: Suboptimal control institute Cardizem for blood pressure and angina  5. Obesity: She has started a Mediterranean diet weight loss will be important for control of lipids blood pressure and her diabetes    Chief Complaint/Active Symptoms: 49-year-old  woman with diabetes, hypertension and continued tobacco abuse though she has not smoke for 1 week and intends to stop altogether. She presented to the hospital with severe exacerbation of asthma. This exacerbation began about 2 weeks ago after her daughter colored her hair but with products that to the patient felt like she was inhaling ammonia. This set off her asthma and her hospitalization. Also she noted since that time she has had anterior precordial fullness squeezing tightness crescendo decrescendo in pattern when she climbs her second flight of stairs at her home. It resolves that she gets to the top of the stairs. She is not had that sensation at rest.  She also does not recall having a sensation prior to getting her hair colored. She has no palpitation, lightheadedness or syncope. She is had no prior history of myocardial infarction, stroke or symptoms to suggest peripheral vascular disease. She previously smoked as much as 5 packs of cigarettes a day but that was some years ago. She does not abuse drugs or drink alcohol. She has a healthy 13year-old son at home. Remote history of Hodgkin's disease. Review of Systems:   12 point review of system negative. CARDIAC HISTORY:  No prior card events  Hyperlipidemia    Past Medical History:   Diagnosis Date    Asthma 2004    Diabetes mellitus ([facility] [address] 2014    Hodgkin disease ([facility] [address] 36    Hypertension [address]   History reviewed. No pertinent surgical history. Family history: Father with myocardial infarction and stroke in his 46s. Paternal and maternal grandparents with disease. Sister with diabetes        Soc: 1ppd tobacco a few years ago 3 to 5 packs a day lives at home    No current facility-administered medications on file prior to encounter.       Current Outpatient Medications on File Prior to Encounter   Medication Sig Dispense Refill    metFORMIN (GLUCOPHAGE) 500 MG tablet Take 1 tablet by mouth daily (with breakfast) 30 tablet 0    albuterol sulfate HFA (VENTOLIN HFA) 108 (90 Base) MCG/ACT inhaler Inhale 2 puffs into the lungs 4 times daily as needed for Wheezing 1 Inhaler 1    atorvastatin (LIPITOR) 80 MG tablet Take 1 tablet by mouth daily 30 tablet 3    Multiple Vitamin (MULTI-VITAMIN DAILY PO) Take by mouth      senna-docusate (PERICOLACE) 8.6-50 MG per tablet Take 2 tablets by mouth daily as needed for Constipation 60 tablet 2    fluticasone (FLONASE) 50 MCG/ACT nasal spray 2 sprays by Nasal route daily 1 Bottle 1     Allergies   Allergen Reactions    Shellfish-Derived Products              Objective:   Vitals:    07/23/20 0431 07/23/20 0600 07/23/20 0718 07/23/20 0754   BP:    138/71   Pulse:    72   Resp: 27  16 17   Temp:    97.5 °F (36.4 °C)   TempSrc:    Oral   SpO2:   97% 99%   Weight:  207 lb 11.2 oz (94.2 kg)     Height:          Wt Readings from Last 3 Encounters:   07/23/20 207 lb 11.2 oz (94.2 kg)   06/10/20 212 lb (96.2 kg)   07/19/19 200 lb (90.7 kg)       Physical Exam:  General Appearance: alert and oriented to person, place and time, in no acute distress  Cardiovascular: normal rate, regular rhythm, normal S1 and S2, no murmurs, rubs, clicks, or gallops,  no JVD  Pulmonary/Chest: soft, bilateral wheezing  Abdomen: soft, non-tender, non-distended, normal bowel sounds, no masses   Extremities: no cyanosis, clubbing or edema  Skin: warm and dry, no rash or erythema  Eyes: EOMI  Neck: supple and non-tender without mass, no thyromegaly   Neurological: alert, oriented, normal speech, no focal findings or movement disorder noted  Vascular: No bruits carotid femoral abdominal.  Pulses 2+    Allergies:   Allergies   Allergen Reactions    Shellfish-Derived Products         Medications:    atorvastatin  40 mg Oral Nightly    dilTIAZem  120 mg Oral Daily    predniSONE  40 mg Oral Daily    sodium chloride flush  10 mL Intravenous 2 times per day    famotidine  20 mg Oral BID    insulin lispro  0-6 Units Subcutaneous TID WC    insulin lispro  0-3 Units Subcutaneous Nightly    albuterol sulfate HFA  2 puff Inhalation TID    ipratropium  2 puff Inhalation TID    azithromycin  250 mg Oral Daily    enoxaparin  40 mg Subcutaneous BID      dextrose       sodium chloride flush, 10 mL, PRN  acetaminophen, 650 mg, Q6H PRN    Or  acetaminophen, 650 mg, Q6H PRN  polyethylene glycol, 17 g, Daily PRN  promethazine, 12.5 mg, Q6H PRN    Or  ondansetron, 4 mg, Q6H PRN  glucose, 15 g, PRN  dextrose, 12.5 g, PRN  glucagon (rDNA), 1 mg, PRN  dextrose, 100 mL/hr, PRN  albuterol sulfate

## 2020-07-23 NOTE — CARE COORDINATION
[facility] Case Management Initial Discharge Assessment    Met with Patient to discuss discharge plan. PCP: Dr. Tamara Carbone [facility]                          Date of Last Visit: June 2020    If no PCP, list provided? N/A    Discharge Planning    Living Arrangements: independently at home    Who do you live with? son    Who helps you with your care:  self    If lives at home:     Do you have any barriers navigating in your home? yes - 16 steps    Patient can perform ADL? Yes    Current Services (outpatient and in home) :  None    Dialysis: No    Is transportation available to get to your appointments? Yes    DME Equipment:  no    Respiratory equipment: None    Respiratory provider:  no     Pharmacy:  yes - [facility] with Medication Assistance Program?  No      Patient agreeable to [facility]? No    Patient agreeable to SNF/Rehab? N/A    Other discharge needs identified? N/A    Freedom of choice list provided with basic dialogue that supports the patient's individualized plan of care/goals and shares the quality data associated with the providers. Yes    The plan for Transition of Care is related to the following treatment goals: Medical plan finalized    Initial Discharge Plan? (Note: please see concurrent daily documentation for any updates after initial note). Met with patient who reports she is independent and has no needs. Home with 13 yr old son.      The Patient and/or patient representative: Patient was provided with choice of any post-acute providers for care and equipment and agrees with discharge plan  Yes    Electronically signed by MYRTLE Montalvo on 7/23/2020 at 1:10 PM   Electronically signed by MYRTLE Montalvo on 7/23/2020 at 1:16 PM

## 2020-07-23 NOTE — ACP (ADVANCE CARE PLANNING)
Advance Care Planning     Advance Care Planning Activator (Inpatient)  Conversation Note      Date of ACP Conversation: 7/21/2020    Conversation Conducted with: Patient with Decision Making Capacity    ACP Activator: GREG Kowalski LSW    *When Decision Maker makes decisions on behalf of the incapacitated patient: Decision Maker is asked to consider and make decisions based on patient values, known preferences, or best interests. Health Care Decision Maker:     Current Designated Health Care Decision Maker:   (If there is a valid [place] named in the [address] [place] [address] Makers\" box in the ACP activity, but it is not visible above, be sure to open that field and then select the health care decision maker relationship (ie \"primary\") in the blank space to the right of the name.) Validate  this information as still accurate & up-to-date; edit [place] field as needed.)    Note: Assess and validate information in current ACP documents, as indicated. If no Decision Maker listed above or available through scanned documents, then:    If no Authorized Decision Maker has previously been identified, then patient chooses [place]:  \"Who would you like to name as your primary health care decision-maker? \"               Name:    Sam Davis   Relationship: daughter      Phone number: [phone]  Sangeeta Fleischer this person be reached easily? \" Yes  \"Who would you like to name as your back-up decision maker? \"   Name: Ferdinand Negron       Relationship: boyfriend      Phone number: [phone]  Lena Fleischer this person be reached easily? \" Yes    Note: If the relationship of these Decision-Makers to the patient does NOT follow your state's Next of Kin hierarchy, recommend that patient complete ACP document that meets state-specific requirements to allow them to act on the patient's behalf when appropriate. Care Preferences    Ventilation:   \"If you were in your present state of health and suddenly became very ill and were unable to breathe on your own, what would your preference be about the use of a ventilator (breathing machine) if it were available to you? \"      Would the patient desire the use of ventilator (breathing machine)?: yes    \"If your health worsens and it becomes clear that your chance of recovery is unlikely, what would your preference be about the use of a ventilator (breathing machine) if it were available to you? \"     Would the patient desire the use of ventilator (breathing machine)?: Yes      Resuscitation  \"CPR works best to restart the heart when there is a sudden event, like a heart attack, in someone who is otherwise healthy. Unfortunately, CPR does not typically restart the heart for people who have serious health conditions or who are very sick. \"    \"In the event your heart stopped as a result of an underlying serious health condition, would you want attempts to be made to restart your heart (answer \"yes\" for attempt to resuscitate) or would you prefer a natural death (answer \"no\" for do not attempt to resuscitate)? \" yes      NOTE: If the patient has a valid advance directive AND now provides care preference(s) that are inconsistent with that prior directive, advise the patient to consider either: creating a new advance directive that complies with state-specific requirements; or, if that is not possible, orally revoking that prior directive in accordance with state-specific requirements, which must be documented in the EHR. [] Yes   [] No   Educated Patient / Alberto Palm regarding differences between Advance Directives and portable DNR orders.     Length of ACP Conversation in minutes:      Conversation Outcomes:  [x] ACP discussion completed  [] Existing advance directive reviewed with patient; no changes to patient's previously recorded wishes  [] New Advance Directive completed  [] Portable Do Not Rescitate prepared for Provider review and signature  [] POLST/POST/MOLST/MOST prepared for Provider review and signature      Follow-up plan:  NOT ROUTED AS DAVID IS NOT PCP ANY LONGER  [] Schedule follow-up conversation to continue planning  [] Referred individual to Provider for additional questions/concerns   [] Advised patient/agent/surrogate to review completed ACP document and update if needed with changes in condition, patient preferences or care setting    [] This note routed to one or more involved healthcare providers

## 2020-07-23 NOTE — PROGRESS NOTES
INPATIENT PROGRESS NOTES    PATIENT NAME: Kriss Fajardo  MRN: [id number]  SERVICE DATE:  July 23, 2020   SERVICE TIME:  1:18 PM      PRIMARY SERVICE: Pulmonary Disease    CHIEF COMPLAINTS: Asthma exacerbation    INTERVAL HPI: Patient seen and examined at bedside, Interval Notes, orders reviewed. Nursing notes noted    Patient feels much improved, minimal dyspnea on exertion however back to baseline, no chest pain, tolerated BiPAP overnight, no fever, no coughing, feels ready to go home, no lower extremity edema, no nausea no vomiting    Review of system:     GI Abdominal pain No  Skin Rash No    Social History     Tobacco Use    Smoking status: Current Every Day Smoker     Packs/day: 1.00     Types: Cigarettes    Smokeless tobacco: Never Used   Substance Use Topics    Alcohol use: Not Currently     No family history on file. OBJECTIVE    Body mass index is 37.99 kg/m². PHYSICAL EXAM:  Vitals:  /71   Pulse 72   Temp 97.5 °F (36.4 °C) (Oral)   Resp 17   Ht 5' 2\" (1.575 m)   Wt 207 lb 11.2 oz (94.2 kg)   SpO2 99%   BMI 37.99 kg/m²     General: alert, cooperative, no distress  Head: normocephalic, atraumatic  Eyes:No gross abnormalities. ENT:  MMM no lesions  Neck:  supple and no masses  Chest : Improved air movement, minimal end expiratory wheezing, no rales, nontender, tympanic  Heart[de-identified] Heart sounds are normal.  Regular rate and rhythm without murmur, gallop or rub. ABD:  symmetric, soft, non-tender  Musculoskeletal : no cyanosis, no clubbing and no edema  Neuro:  Grossly normal  Skin: No rashes or nodules noted.   Lymph node:  no cervical nodes  Urology: No Ruiz   Psychiatric: appropriate    DATA:   Recent Labs     07/21/20  1315 07/21/20  1320 07/22/20  0643   WBC 8.1  --  9.3   HGB 15.4 14.6 14.6   HCT 45.4  --  43.4   MCV 90.0  --  91.5     --  213     Recent Labs     07/21/20  1315 07/21/20  1320 07/22/20  0643     --  139   K 3.8  --  4.3     --  105   CO2 23  --  18* BUN 8  --  9   CREATININE 0.57 0.6 0.51   GLUCOSE 163*  --  177*   CALCIUM 9.4  --  9.3   PROT 7.8  --   --    LABALBU 4.6  --   --    BILITOT 0.6  --   --    ALKPHOS 116  --   --    AST 22  --   --    ALT 30  --   --    LABGLOM >60.0 >60 >60.0   GFRAA >60.0 >60 >60.0   GLOB 3.2  --   --        MV Settings:     FiO2 : 35 %    Recent Labs     07/21/20  1320   PHART 7.419   HMG3LQX 35   PO2ART 81*   UPL0NTT 22.9   BEART -2   B4JTITKK 96*       O2 Device: Nasal cannula  O2 Flow Rate (L/min): 2 L/min    DIET CARB CONTROL; Carb Control: 3 carb choices (45 gms)/meal; Safety Tray; Safety Tray (Disposables)     MEDICATIONS during current hospitalization:    Continuous Infusions:   dextrose         Scheduled Meds:   atorvastatin  40 mg Oral Nightly    dilTIAZem  120 mg Oral Daily    predniSONE  40 mg Oral Daily    sodium chloride flush  10 mL Intravenous 2 times per day    famotidine  20 mg Oral BID    insulin lispro  0-6 Units Subcutaneous TID WC    insulin lispro  0-3 Units Subcutaneous Nightly    albuterol sulfate HFA  2 puff Inhalation TID    ipratropium  2 puff Inhalation TID    azithromycin  250 mg Oral Daily    enoxaparin  40 mg Subcutaneous BID       PRN Meds:nitroGLYCERIN, sodium chloride flush, acetaminophen **OR** acetaminophen, polyethylene glycol, promethazine **OR** ondansetron, glucose, dextrose, glucagon (rDNA), dextrose, albuterol sulfate HFA    Radiology  Xr Chest Portable    Result Date: 7/21/2020  EXAMINATION: XR CHEST PORTABLE CLINICAL HISTORY:  SOB, wheezing, cough COMPARISONS: 7/19/2019 FINDINGS: Cardiac size and pulmonary vascularity are normal. The lungs are clear. There is no evidence of adenopathy. There are no pleural effusions. There is no pneumothorax. The bones are unremarkable.      NEGATIVE PORTABLE CHEST RADIOGRAPH            IMPRESSION AND SUGGESTION:  Patient is at risk due to   · Asthma COPD overlap syndrome with acute exacerbation  · Probable KEITH  · Smoking  · Obesity    Recommendation  · Start prednisone taper  · Sleep study as outpatient  · PFT as outpatient  · Complete Z-[facility] course  · Pulmonary hygiene  · Okay to DC home from pulmonary point of view  · Follow-up with me in 2 weeks       Electronically signed by Bernabe Walden MD,  FCCP   on 7/23/2020 at 1:17 PM

## 2020-07-24 ENCOUNTER — CARE COORDINATION (OUTPATIENT)
Dept: CASE MANAGEMENT | Age: 50
End: 2020-07-24

## 2020-07-24 NOTE — CARE COORDINATION
7/24/2020: COPD booklet and zones sheet mailed to patient's home. Care Transition Nurse will phone patient to review literature and provide follow up.

## 2020-07-26 NOTE — DISCHARGE SUMMARY
Hospital Medicine Discharge Summary    Timothy Bledsoe  :  1970  MRN:  [id number]    Admit date:  2020  Discharge date:  20    Admitting Physician:  Robin Chadwick MD  Primary Care Physician:  Poonam Rodriguez MD      Discharge Diagnoses:    Acute exacerbation of Asthma    Chief Complaint   Patient presents with    Shortness of Breath     cough, HA since Saturday. Ran out of inhaler on      Hospital Course:   Patient presented with SOB secondary to an acute exacerbation of Asthma. Improved with steroids; did complain of chest tightness which was evaluated by cardiology and plan for outpatient stress test when breathing has completely resolved. Discharged on prednisone taper. Patient was seen by the following consultants while admitted to [facility]:   Consults:  IP CONSULT TO PULMONOLOGY  IP CONSULT TO PULMONOLOGY  IP CONSULT TO CARDIOLOGY    Significant Diagnostic Studies:    Refer to chart     Please refer to chart if no studies are shown here    Xr Chest Portable    Result Date: 2020  EXAMINATION: XR CHEST PORTABLE CLINICAL HISTORY:  SOB, wheezing, cough COMPARISONS: 2019 FINDINGS: Cardiac size and pulmonary vascularity are normal. The lungs are clear. There is no evidence of adenopathy. There are no pleural effusions. There is no pneumothorax. The bones are unremarkable.      NEGATIVE PORTABLE CHEST RADIOGRAPH      Discharge Medications:       Shad Spears   Home Medication Instructions AXZ:[id number]    Printed on:20 6982   Medication Information                      albuterol sulfate HFA (VENTOLIN HFA) 108 (90 Base) MCG/ACT inhaler  Inhale 2 puffs into the lungs 4 times daily as needed for Wheezing             atorvastatin (LIPITOR) 80 MG tablet  Take 1 tablet by mouth daily             azithromycin (ZITHROMAX) 250 MG tablet  Take 1 tablet by mouth daily for 3 days             budesonide-formoterol (SYMBICORT) 160-4.5 MCG/ACT AERO  Inhale 2 puffs into the lungs 2 times daily             dilTIAZem (CARDIZEM CD) 120 MG extended release capsule  Take 1 capsule by mouth daily             fluticasone (FLONASE) 50 MCG/ACT nasal spray  2 sprays by Nasal route daily             metFORMIN (GLUCOPHAGE) 500 MG tablet  Take 1 tablet by mouth daily (with breakfast)             Multiple Vitamin (MULTI-VITAMIN DAILY PO)  Take by mouth             nitroGLYCERIN (NITROSTAT) 0.4 MG SL tablet  up to max of 3 total doses. If no relief after 1 dose, call 911. predniSONE (DELTASONE) 10 MG tablet  Take 4 tablet daily X 4 days then 3 tablets daily x 4 days then 2 tablets daily x 4 days then 1 tablet daily x 4 days then stop             senna-docusate (PERICOLACE) 8.6-50 MG per tablet  Take 2 tablets by mouth daily as needed for Constipation                 Disposition:   If discharged to Home, Any [place] [address] needs that were indicated and/or required as been addressed and set up by Social Work. Condition at discharge: good     Activity: activity as tolerated    Total time taken for discharging this patient: 40 minutes. Greater than 70% of time was spent focused exclusively on this patient. Time was taken to review chart, discuss plans with consultants, reconciling medications, discussing plan answering questions with patient.      SignedClaudene Hosteller  7/26/2020, 2:52 PM  ----------------------------------------------------------------------------------------------------------------------    Mae Urias

## 2020-07-27 RX ORDER — ALBUTEROL SULFATE 90 UG/1
2 AEROSOL, METERED RESPIRATORY (INHALATION) 4 TIMES DAILY PRN
Qty: 1 INHALER | Refills: 1 | Status: SHIPPED | OUTPATIENT
Start: 2020-07-27 | End: 2021-03-17 | Stop reason: SDUPTHER

## 2020-07-27 NOTE — TELEPHONE ENCOUNTER
Pt was discharged from hospital and they called stating she needed a refill on her inhaler.   Please send to pharmacy listed

## 2020-07-31 ENCOUNTER — CARE COORDINATION (OUTPATIENT)
Dept: CASE MANAGEMENT | Age: 50
End: 2020-07-31

## 2020-07-31 RX ORDER — ALBUTEROL SULFATE 90 UG/1
2 AEROSOL, METERED RESPIRATORY (INHALATION) 4 TIMES DAILY PRN
Qty: 2 INHALER | Refills: 3 | Status: ON HOLD | OUTPATIENT
Start: 2020-07-31 | End: 2020-08-31

## 2020-07-31 NOTE — CARE COORDINATION
Care Transition Nurse/ Ambulatory Care Manager contacted the patient by telephone to perform follow-up assessment. Verified name and  with patient as identifiers. Patient has following risk factors of: asthma. Symptoms reviewed with patient who verbalized the following symptoms: no new symptoms and no worsening symptoms. Due to no new or worsening symptoms encounter was not routed to provider for escalation. Education provided regarding infection prevention, and signs and symptoms of COVID-19 and when to seek medical attention with patient who verbalized understanding. Discussed exposure protocols and quarantine from [address] you at higher risk for severe illness  and given an opportunity for questions and concerns. The patient agrees to contact the COVID-19 hotline [phone] or PCP office for questions related to their healthcare. CTN/ACM provided contact information for future reference. From CDC: Are you at higher risk for severe illness?  Wash your hands often.  Avoid close contact (6 feet, which is about two arm lengths) with people who are sick.  Put distance between yourself and other people if COVID-19 is spreading in your community.  Clean and disinfect frequently touched surfaces.  Avoid all cruise travel and non-essential air travel.  Call your healthcare professional if you have concerns about COVID-19 and your underlying condition or if you are sick. For more information on steps you can take to protect yourself, see CDC's How to [place] for follow-up call in 5-7 days based on severity of symptoms and risk factors. Pt states she's been having some SOB with the hot weather. States she doesn't have an albuterol inhaler because when she went to  her medications at [facility], it wasn't there. Informed pt there is a refill on this inhaler at [facility].  States she doesn't use that pharmacy anymore because they don't take her insurance. Informed pt I would transfer her script to [address] [place] [address] on [place]. Pt states she will be able to pick that up when it's ready. States she's also been having panic attacks over the past couple days which contributes to her SOB. Discussed breathing exercises to help with panic and feelings of anxiety. Pt also expressed interest in seeing a therapist. Bianca Zapata pt to call the number on the back of her insurance card to find therapists in network. Pt verbalized understanding. States she hasn't been sleeping well lately and her BS have been close to 300. Informed pt prednisone can raise BS and also cause problems with sleep for some people. Pt states she has a few days left of her prednisone. She has a f/u with her PCP Monday morning and will discuss this at that time. Routed message to pt's PCP regarding BS. Pt denies other questions or concerns at this time. Placed call to [facility] and confirmed refill available for albuterol. Placed call to [address] [place] [address] and initiated prescription transfer.

## 2020-07-31 NOTE — TELEPHONE ENCOUNTER
Yes steroids can make her BG rise. But she is prediabetic and needs to repeat her A1c since last year. Pls have pt repeat her A1c. Any increased urination or thirst. If so might need to go to ER.

## 2020-08-04 ENCOUNTER — OFFICE VISIT (OUTPATIENT)
Dept: PULMONOLOGY | Age: 50
End: 2020-08-04
Payer: COMMERCIAL

## 2020-08-04 VITALS
SYSTOLIC BLOOD PRESSURE: 118 MMHG | TEMPERATURE: 98.7 F | OXYGEN SATURATION: 99 % | RESPIRATION RATE: 14 BRPM | BODY MASS INDEX: 38.09 KG/M2 | HEART RATE: 85 BPM | WEIGHT: 207 LBS | HEIGHT: 62 IN | DIASTOLIC BLOOD PRESSURE: 70 MMHG

## 2020-08-04 PROCEDURE — 99214 OFFICE O/P EST MOD 30 MIN: CPT | Performed by: INTERNAL MEDICINE

## 2020-08-04 PROCEDURE — G8417 CALC BMI ABV UP PARAM F/U: HCPCS | Performed by: INTERNAL MEDICINE

## 2020-08-04 PROCEDURE — 3017F COLORECTAL CA SCREEN DOC REV: CPT | Performed by: INTERNAL MEDICINE

## 2020-08-04 PROCEDURE — 4004F PT TOBACCO SCREEN RCVD TLK: CPT | Performed by: INTERNAL MEDICINE

## 2020-08-04 PROCEDURE — G8926 SPIRO NO PERF OR DOC: HCPCS | Performed by: INTERNAL MEDICINE

## 2020-08-04 PROCEDURE — 3023F SPIROM DOC REV: CPT | Performed by: INTERNAL MEDICINE

## 2020-08-04 PROCEDURE — G8427 DOCREV CUR MEDS BY ELIG CLIN: HCPCS | Performed by: INTERNAL MEDICINE

## 2020-08-04 PROCEDURE — 1111F DSCHRG MED/CURRENT MED MERGE: CPT | Performed by: INTERNAL MEDICINE

## 2020-08-04 RX ORDER — OMEPRAZOLE 20 MG/1
20 CAPSULE, DELAYED RELEASE ORAL
Qty: 30 CAPSULE | Refills: 5 | Status: ON HOLD | OUTPATIENT
Start: 2020-08-04 | End: 2020-11-13 | Stop reason: HOSPADM

## 2020-08-04 NOTE — PROGRESS NOTES
Subjective:     Samina Ruvalcaba is a 44 y.o. female who complains today of:     Chief Complaint   Patient presents with    Follow-Up from Hospital       HPI  Patient presents for post hospital discharge follow-up      She is back to smoking 5 to 6 cigarettes/day, she feels short of breath on exertion, she is currently on Symbicort, no chest pain, no fever, does have heartburn, she is on prednisone currently 20 mg daily and tapering, no lower extremity edema, does not have cough or wheezing at night but she does wake up early morning coughing occasionally, continues to have snoring and symptoms of sleep apnea, along with daytime tiredness, no lower extremity edema, no fever or chills, weight is stable. She was seen yesterday at PCP office found to have thrush and currently on nystatin    She has history of heavy smoking up to 5 packs/day since age 11, currently trying to cut down        Allergies:  Shellfish-derived products  Past Medical History:   Diagnosis Date    Asthma 2004    Diabetes mellitus ([facility] [address] 2014    Hodgkin disease ([facility] [address] 36    Hypertension [address]     History reviewed. No pertinent surgical history. No family history on file.   Social History     Socioeconomic History    Marital status: Single     Spouse name: Not on file    Number of children: Not on file    Years of education: Not on file    Highest education level: Not on file   Occupational History    Not on file   Social Needs    Financial resource strain: Not on file    Food insecurity     Worry: Not on file     Inability: Not on file    Transportation needs     Medical: Not on file     Non-medical: Not on file   Tobacco Use    Smoking status: Current Every Day Smoker     Packs/day: 1.00     Types: Cigarettes    Smokeless tobacco: Never Used   Substance and Sexual Activity    Alcohol use: Not Currently    Drug use: Never    Sexual activity: Not on file   Lifestyle    Physical activity     Days per week: Not on 1 tablet by mouth daily (with breakfast) 30 tablet 0    atorvastatin (LIPITOR) 80 MG tablet Take 1 tablet by mouth daily 30 tablet 3    Multiple Vitamin (MULTI-VITAMIN DAILY PO) Take by mouth      senna-docusate (PERICOLACE) 8.6-50 MG per tablet Take 2 tablets by mouth daily as needed for Constipation 60 tablet 2    fluticasone (FLONASE) 50 MCG/ACT nasal spray 2 sprays by Nasal route daily 1 Bottle 1    nitroGLYCERIN (NITROSTAT) 0.4 MG SL tablet up to max of 3 total doses. If no relief after 1 dose, call 911. (Patient not taking: Reported on 7/24/2020) 25 tablet 3     No current facility-administered medications for this visit. Objective:     Vitals:    08/04/20 1559   BP: 118/70   Site: Left Upper Arm   Position: Sitting   Pulse: 85   Resp: 14   Temp: 98.7 °F (37.1 °C)   TempSrc: Temporal   SpO2: 99%   Weight: 207 lb (93.9 kg)   Height: 5' 2\" (1.575 m)         Physical Exam  Constitutional:       General: She is not in acute distress. Appearance: She is well-developed. She is not diaphoretic. HENT:      Head: Normocephalic and atraumatic. Mouth/Throat:      Comments: Thrush  Eyes:      Conjunctiva/sclera: Conjunctivae normal.      Pupils: Pupils are equal, round, and reactive to light. Neck:      Musculoskeletal: Normal range of motion and neck supple. Cardiovascular:      Rate and Rhythm: Normal rate and regular rhythm. Heart sounds: No murmur. No friction rub. No gallop. Pulmonary:      Effort: Pulmonary effort is normal. No respiratory distress. Breath sounds: Normal breath sounds. No wheezing or rales. Chest:      Chest wall: No tenderness. Abdominal:      General: There is no distension. Palpations: Abdomen is soft. Tenderness: There is no abdominal tenderness. There is no rebound. Musculoskeletal:         General: No tenderness. Right lower leg: No edema. Left lower leg: No edema. Lymphadenopathy:      Cervical: No cervical adenopathy. Skin:     General: Skin is warm and dry. Findings: No erythema. Neurological:      Mental Status: She is alert and oriented to person, place, and time. Psychiatric:         Judgment: Judgment normal.         Imaging studies reviewed by me chest x-ray no mass or infiltrate  Lab results reviewed in chart  PFT none  ECHO: None    Assessment and Plan       Diagnosis Orders   1. Asthma-COPD overlap syndrome (HCC)  tiotropium (SPIRIVA RESPIMAT) 2.5 MCG/ACT AERS inhaler   2. KIETH (obstructive sleep apnea)     3. Gastroesophageal reflux disease without esophagitis  omeprazole (PRILOSEC) 20 MG delayed release capsule   4. Class 2 obesity due to excess calories without serious comorbidity with body mass index (BMI) of 37.0 to 37.9 in adult     5. Thrush     6. Smoking       · Probable asthma with COPD overlap syndrome, continue Symbicort, will add Spiriva for now  · She has PFT scheduled for the 21st of this month  · Probable KEITH, sleep study is ordered provided patient with a phone number to call and schedule  · Will start PPI  · Weight loss is recommended  · Continue nystatin swish and swallow  · Smoking cessation counseling done, reviewed with the patient STA RT steps, she tried Chantix in the past she had bad dreams and could not tolerate. No orders of the defined types were placed in this encounter. Orders Placed This Encounter   Medications    omeprazole (PRILOSEC) 20 MG delayed release capsule     Sig: Take 1 capsule by mouth every morning (before breakfast)     Dispense:  30 capsule     Refill:  5    tiotropium (SPIRIVA RESPIMAT) 2.5 MCG/ACT AERS inhaler     Sig: Inhale 2 puffs into the lungs daily     Dispense:  1 Inhaler     Refill:  2            Discussed with patient the importance of exercise and weight control and  overall health and well-being. Reviewed with the patient: current clinical status, medications, activities and diet.       Side effects, adverse effects of the medication prescribed today, as well as treatment plan and result expectations have been discussed with the patient who expresses understanding and desires to proceed. Return in about 6 weeks (around 9/15/2020).       Quyen Hooper MD

## 2020-08-09 ENCOUNTER — TELEPHONE (OUTPATIENT)
Dept: PRIMARY CARE CLINIC | Age: 50
End: 2020-08-09

## 2020-08-21 ENCOUNTER — HOSPITAL ENCOUNTER (OUTPATIENT)
Dept: PULMONOLOGY | Age: 50
Discharge: HOME | End: 2020-08-21
Payer: COMMERCIAL

## 2020-08-21 PROCEDURE — 94060 EVALUATION OF WHEEZING: CPT | Performed by: INTERNAL MEDICINE

## 2020-08-21 PROCEDURE — 94726 PLETHYSMOGRAPHY LUNG VOLUMES: CPT

## 2020-08-21 PROCEDURE — 94010 BREATHING CAPACITY TEST: CPT

## 2020-08-21 PROCEDURE — 94726 PLETHYSMOGRAPHY LUNG VOLUMES: CPT | Performed by: INTERNAL MEDICINE

## 2020-08-21 PROCEDURE — 94729 DIFFUSING CAPACITY: CPT | Performed by: INTERNAL MEDICINE

## 2020-08-21 PROCEDURE — 94729 DIFFUSING CAPACITY: CPT

## 2020-08-24 ENCOUNTER — NURSE ONLY (OUTPATIENT)
Dept: PRIMARY CARE CLINIC | Age: 50
End: 2020-08-24

## 2020-08-28 ENCOUNTER — HOSPITAL ENCOUNTER (OUTPATIENT)
Dept: GENERAL RADIOLOGY | Age: 50
Discharge: HOME | End: 2020-08-30
Payer: COMMERCIAL

## 2020-08-28 ENCOUNTER — HOSPITAL ENCOUNTER (OUTPATIENT)
Dept: ULTRASOUND IMAGING | Age: 50
Discharge: HOME | End: 2020-08-30
Payer: COMMERCIAL

## 2020-08-28 LAB
ALBUMIN SERPL-MCNC: 3.8 G/DL (ref 3.5–4.6)
ALP BLD-CCNC: 97 U/L (ref 40–130)
ALT SERPL-CCNC: 34 U/L (ref 0–33)
ANION GAP SERPL CALCULATED.3IONS-SCNC: 14 MEQ/L (ref 9–15)
AST SERPL-CCNC: 27 U/L (ref 0–35)
BILIRUB SERPL-MCNC: 0.4 MG/DL (ref 0.2–0.7)
BILIRUBIN DIRECT: <0.2 MG/DL (ref 0–0.4)
BILIRUBIN, INDIRECT: ABNORMAL MG/DL (ref 0–0.6)
BUN BLDV-MCNC: 5 MG/DL (ref 6–20)
C-REACTIVE PROTEIN, HIGH SENSITIVITY: 11.4 MG/L (ref 0–5)
CALCIUM SERPL-MCNC: 9.2 MG/DL (ref 8.5–9.9)
CHLORIDE BLD-SCNC: 104 MEQ/L (ref 95–107)
CHOLESTEROL, TOTAL: 142 MG/DL (ref 0–199)
CO2: 19 MEQ/L (ref 20–31)
CREAT SERPL-MCNC: 0.49 MG/DL (ref 0.5–0.9)
D DIMER: 0.39 MG/L FEU (ref 0–0.5)
GFR AFRICAN AMERICAN: >60
GFR NON-AFRICAN AMERICAN: >60
GLUCOSE BLD-MCNC: 120 MG/DL (ref 70–99)
HCT VFR BLD CALC: 42.7 % (ref 37–47)
HDLC SERPL-MCNC: 27 MG/DL (ref 40–59)
HEMOGLOBIN: 13.8 G/DL (ref 12–16)
LDL CHOLESTEROL CALCULATED: 51 MG/DL (ref 0–129)
MAGNESIUM: 2.1 MG/DL (ref 1.7–2.4)
MCH RBC QN AUTO: 30.6 PG (ref 27–31.3)
MCHC RBC AUTO-ENTMCNC: 32.3 % (ref 33–37)
MCV RBC AUTO: 94.7 FL (ref 82–100)
PDW BLD-RTO: 13.4 % (ref 11.5–14.5)
PLATELET # BLD: 187 K/UL (ref 130–400)
PLATELET SLIDE REVIEW: ADEQUATE
POTASSIUM SERPL-SCNC: 4.8 MEQ/L (ref 3.4–4.9)
RBC # BLD: 4.51 M/UL (ref 4.2–5.4)
SODIUM BLD-SCNC: 137 MEQ/L (ref 135–144)
TOTAL PROTEIN: 6.8 G/DL (ref 6.3–8)
TRIGL SERPL-MCNC: 318 MG/DL (ref 0–150)
TSH SERPL DL<=0.05 MIU/L-ACNC: 0.85 UIU/ML (ref 0.44–3.86)
WBC # BLD: 8.1 K/UL (ref 4.8–10.8)

## 2020-08-28 PROCEDURE — 71046 X-RAY EXAM CHEST 2 VIEWS: CPT

## 2020-08-28 PROCEDURE — 93970 EXTREMITY STUDY: CPT

## 2020-08-31 ENCOUNTER — HOSPITAL ENCOUNTER (OUTPATIENT)
Dept: CARDIAC CATH/INVASIVE PROCEDURES | Age: 50
Discharge: HOME | End: 2020-08-31
Attending: INTERNAL MEDICINE | Admitting: INTERNAL MEDICINE
Payer: COMMERCIAL

## 2020-08-31 VITALS
HEART RATE: 89 BPM | OXYGEN SATURATION: 100 % | WEIGHT: 211 LBS | BODY MASS INDEX: 38.83 KG/M2 | TEMPERATURE: 97.3 F | DIASTOLIC BLOOD PRESSURE: 55 MMHG | SYSTOLIC BLOOD PRESSURE: 130 MMHG | HEIGHT: 62 IN | RESPIRATION RATE: 22 BRPM

## 2020-08-31 PROBLEM — R94.39 ABNORMAL EXERCISE MYOCARDIAL PERFUSION STUDY: Status: ACTIVE | Noted: 2020-08-31

## 2020-08-31 PROBLEM — I20.8 ANGINA AT REST (HCC): Status: ACTIVE | Noted: 2020-08-31

## 2020-08-31 PROBLEM — I20.89 ANGINA AT REST: Status: ACTIVE | Noted: 2020-08-31

## 2020-08-31 LAB
EKG ATRIAL RATE: 89 BPM
EKG P AXIS: 49 DEGREES
EKG P-R INTERVAL: 204 MS
EKG Q-T INTERVAL: 364 MS
EKG QRS DURATION: 76 MS
EKG QTC CALCULATION (BAZETT): 442 MS
EKG R AXIS: 19 DEGREES
EKG T AXIS: 68 DEGREES
EKG VENTRICULAR RATE: 89 BPM

## 2020-08-31 PROCEDURE — 93005 ELECTROCARDIOGRAM TRACING: CPT | Performed by: INTERNAL MEDICINE

## 2020-08-31 PROCEDURE — 2709999900 HC NON-CHARGEABLE SUPPLY

## 2020-08-31 PROCEDURE — 2500000003 HC RX 250 WO HCPCS: Performed by: INTERNAL MEDICINE

## 2020-08-31 PROCEDURE — 6360000002 HC RX W HCPCS: Performed by: INTERNAL MEDICINE

## 2020-08-31 PROCEDURE — 93458 L HRT ARTERY/VENTRICLE ANGIO: CPT | Performed by: INTERNAL MEDICINE

## 2020-08-31 PROCEDURE — 2500000003 HC RX 250 WO HCPCS

## 2020-08-31 PROCEDURE — C1725 CATH, TRANSLUMIN NON-LASER: HCPCS

## 2020-08-31 PROCEDURE — 2580000003 HC RX 258: Performed by: INTERNAL MEDICINE

## 2020-08-31 PROCEDURE — C1760 CLOSURE DEV, VASC: HCPCS

## 2020-08-31 PROCEDURE — 6360000002 HC RX W HCPCS

## 2020-08-31 PROCEDURE — 92928 PRQ TCAT PLMT NTRAC ST 1 LES: CPT | Performed by: INTERNAL MEDICINE

## 2020-08-31 PROCEDURE — C1769 GUIDE WIRE: HCPCS

## 2020-08-31 PROCEDURE — 6370000000 HC RX 637 (ALT 250 FOR IP)

## 2020-08-31 PROCEDURE — C1887 CATHETER, GUIDING: HCPCS

## 2020-08-31 PROCEDURE — C1894 INTRO/SHEATH, NON-LASER: HCPCS

## 2020-08-31 PROCEDURE — 6360000004 HC RX CONTRAST MEDICATION: Performed by: INTERNAL MEDICINE

## 2020-08-31 PROCEDURE — C1874 STENT, COATED/COV W/DEL SYS: HCPCS

## 2020-08-31 RX ORDER — SODIUM CHLORIDE 0.9 % (FLUSH) 0.9 %
10 SYRINGE (ML) INJECTION EVERY 12 HOURS SCHEDULED
Status: DISCONTINUED | OUTPATIENT
Start: 2020-08-31 | End: 2020-08-31 | Stop reason: HOSPADM

## 2020-08-31 RX ORDER — ONDANSETRON 2 MG/ML
4 INJECTION INTRAMUSCULAR; INTRAVENOUS EVERY 6 HOURS PRN
Status: DISCONTINUED | OUTPATIENT
Start: 2020-08-31 | End: 2020-08-31 | Stop reason: HOSPADM

## 2020-08-31 RX ORDER — JUNIPER 300 MG
1000 CAPSULE ORAL DAILY PRN
Status: ON HOLD | COMMUNITY
End: 2020-08-31 | Stop reason: HOSPADM

## 2020-08-31 RX ORDER — 0.9 % SODIUM CHLORIDE 0.9 %
500 INTRAVENOUS SOLUTION INTRAVENOUS ONCE
Status: DISCONTINUED | OUTPATIENT
Start: 2020-08-31 | End: 2020-08-31 | Stop reason: HOSPADM

## 2020-08-31 RX ORDER — ACETAMINOPHEN 325 MG/1
650 TABLET ORAL EVERY 4 HOURS PRN
Status: DISCONTINUED | OUTPATIENT
Start: 2020-08-31 | End: 2020-08-31 | Stop reason: HOSPADM

## 2020-08-31 RX ORDER — ASPIRIN 81 MG/1
81 TABLET, CHEWABLE ORAL DAILY
COMMUNITY
End: 2020-09-16 | Stop reason: CLARIF

## 2020-08-31 RX ORDER — SODIUM CHLORIDE 0.9 % (FLUSH) 0.9 %
10 SYRINGE (ML) INJECTION PRN
Status: DISCONTINUED | OUTPATIENT
Start: 2020-08-31 | End: 2020-08-31 | Stop reason: HOSPADM

## 2020-08-31 RX ORDER — FUROSEMIDE 20 MG/1
20 TABLET ORAL DAILY
COMMUNITY

## 2020-08-31 RX ORDER — ASPIRIN 81 MG/1
81 TABLET ORAL DAILY
Status: DISCONTINUED | OUTPATIENT
Start: 2020-09-01 | End: 2020-08-31 | Stop reason: HOSPADM

## 2020-08-31 RX ORDER — ASPIRIN 81 MG/1
81 TABLET ORAL DAILY
Qty: 90 TABLET | Refills: 3 | Status: SHIPPED | OUTPATIENT
Start: 2020-09-01

## 2020-08-31 RX ORDER — ATROPINE SULFATE 0.4 MG/ML
0.6 AMPUL (ML) INJECTION
Status: DISCONTINUED | OUTPATIENT
Start: 2020-08-31 | End: 2020-08-31 | Stop reason: HOSPADM

## 2020-08-31 RX ORDER — DIPHENHYDRAMINE HYDROCHLORIDE 50 MG/ML
50 INJECTION INTRAMUSCULAR; INTRAVENOUS ONCE
Status: COMPLETED | OUTPATIENT
Start: 2020-08-31 | End: 2020-08-31

## 2020-08-31 RX ORDER — ASPIRIN 81 MG/1
81 TABLET, CHEWABLE ORAL ONCE
Status: DISCONTINUED | OUTPATIENT
Start: 2020-08-31 | End: 2020-08-31

## 2020-08-31 RX ORDER — SODIUM CHLORIDE 9 MG/ML
INJECTION, SOLUTION INTRAVENOUS CONTINUOUS
Status: DISCONTINUED | OUTPATIENT
Start: 2020-08-31 | End: 2020-08-31 | Stop reason: HOSPADM

## 2020-08-31 RX ORDER — LISINOPRIL 5 MG/1
10 TABLET ORAL DAILY
COMMUNITY

## 2020-08-31 RX ORDER — HYDRALAZINE HYDROCHLORIDE 20 MG/ML
10 INJECTION INTRAMUSCULAR; INTRAVENOUS EVERY 10 MIN PRN
Status: DISCONTINUED | OUTPATIENT
Start: 2020-08-31 | End: 2020-08-31 | Stop reason: HOSPADM

## 2020-08-31 RX ORDER — POTASSIUM CHLORIDE 750 MG/1
10 TABLET, EXTENDED RELEASE ORAL DAILY
COMMUNITY

## 2020-08-31 RX ORDER — DILTIAZEM HYDROCHLORIDE 120 MG/1
120 CAPSULE, COATED, EXTENDED RELEASE ORAL 2 TIMES DAILY
Qty: 60 CAPSULE | Refills: 2 | Status: ON HOLD | OUTPATIENT
Start: 2020-08-31 | End: 2021-05-20 | Stop reason: HOSPADM

## 2020-08-31 RX ADMIN — HYDROCORTISONE SODIUM SUCCINATE 100 MG: 100 INJECTION, POWDER, FOR SOLUTION INTRAMUSCULAR; INTRAVENOUS at 11:20

## 2020-08-31 RX ADMIN — IOPAMIDOL 167 ML: 612 INJECTION, SOLUTION INTRAVENOUS at 13:06

## 2020-08-31 RX ADMIN — FAMOTIDINE 40 MG: 10 INJECTION INTRAVENOUS at 11:20

## 2020-08-31 RX ADMIN — SODIUM CHLORIDE: 9 INJECTION, SOLUTION INTRAVENOUS at 08:20

## 2020-08-31 RX ADMIN — DIPHENHYDRAMINE HYDROCHLORIDE 50 MG: 50 INJECTION, SOLUTION INTRAMUSCULAR; INTRAVENOUS at 11:21

## 2020-08-31 NOTE — PROGRESS NOTES
Patient assisted up to ambulate. VSS. Denies any chest pain, SOB, lightheadedness or dizziness. Right groin site stable. IV's removed. Patient dressed. Discharge instructions given and patient verbalized understanding.

## 2020-08-31 NOTE — CONSULTS
Consult received from Dr. Tawanda Curz w/ thanks. Program introduced and brochure given. Patient interested. Will follow.

## 2020-08-31 NOTE — DISCHARGE INSTR - ACTIVITY
No driving for 24 hours. No excessive bending, twisting or lifting more than 5 lbs for 48 hours. Dressing can be removed in 24 hours.

## 2020-08-31 NOTE — OP NOTE
INDICATIONS:  The patient is a 44 y.o. female with risk factors and crescendo angina pectoris. Perfusion imaging study showed anterior ischemia. PROCEDURE:  Left heart catheterization, coronary angiography, left ventriculography, LV/AO pullback, and selective right common femoral angiography was performed. PCI and drug-eluting stent of the proximal right coronary artery was performed. Benefits, alternatives and risks of the procedure were explained to the patient to include but not limited to MI, Stroke, Death, Allergic reaction to dye, bleeding, risk of kidney injury, and possible need for emergent coronary artery bypass grafting and informed consent was obtained. The patient was brought to the cath lab and was prepped and draped in the normal sterile technique. IV conscious sedation was maintained. 1% lidocaine was used for local anesthesia. DESCRIPTION OF PROCEDURE: Under local anesthesia, a 5-Comoran short sheath wasplaced in the right common femoral artery by single anterior percutaneous stick. Selective right common femoral angiography showed that the access was in the right common femoral artery above its bifurcation. A 5-Comoran JL4 diagnostic catheter was advanced over a 0.035 guidewire and the left main coronary artery was selectively engaged. Angiography of the left system was performed in multiple orthogonal views. The 5-Comoran JL4 diagnostic catheter and the guidewire were removed. A 5-Comoran 3DRC diagnostic catheter was advanced over a 0.035 guidewire and the right coronary artery was selectively engaged. Angiography of the right coronary artery was performed in multiple orthogonal views. The 5-Comoran [facility] diagnostic catheter and guidewire were removed. A 5-Comoran angled pigtail catheter was advanced over a 0.035 guidewire across the aortic valve into the left ventricle. Left ventricular end-diastolic pressure was measured. Left ventriculography was performed in about 30° MORIN view. Slow manual pullback was performed across the aortic valve. A 5 Western Terri JL 3.5 diagnostic catheter was readvanced over a 0.035 guidewire, and the left main coronary artery was selectively engaged, this catheter was used to better opacify the left anterior descending artery and its branches. At the conclusion of the procedure, it was decided to proceed with PCI and stenting of the proximal right coronary artery. The existing 5 Namibian right femoral artery sheath was changed to a 6 Western Terri short sheath. Heparin was administered and ACT was documented. A 6 Namibian AR1  guide catheter with side holes  was advanced over a 0.035 guidewire and the Right  coronary artery was selectively engaged. 0.014 180 cm run-through wire was advanced into the distal right coronary artery. A 2.5 x 12 mmTrek balloon was advanced and the lesion was dilated to 13 to 14 darius. The balloon was removed. A 3.0 x 23 mm Xience Angelina drug-eluting stent was then deployed in the proximal right coronary artery at 15 darius. The stent balloon was removed. A 3.0 x 12 mm Xience Angelina drug-eluting stent was then advanced distal to the first stent and deployed with minimal overlap at 13 darius. Final results showed 0% residual stenosis with GENEVIEVE-3 flow and no dissection. The  guide catheter and the guidewire were removed. The right femoral artery sheath was removed and hemostasis was achieved using Angio-Seal hemostatic device. Patient was given 180 mg of Brilinta in the cardiac catheterization laboratory. There were no immediate complications. HEMODYNAMIC / ANGIOGRAPHIC DATA:    1. Left ventricular end diastolic pressure is 5 to 8  mmHg. No systolic gradient across the aortic valve. 2. Left ventriculography revealed an EF around 55%. 3. The left main coronary artery has about 20% stenosis. It arises from the left sinus of Valsalva and divides into the left anterior descending artery and the left circumflex artery.   4. The left anterior descending artery has 20 to 30% smooth proximal stenosis. There is 50 to 70% smooth stenosis in the mid left anterior descending artery. First and second diagonal branches have less than 20% stenosis septal perforators are small distal LAD has less than 20% stenosis it reaches the apex but does not curve around it. 5. The left circumflex is nondominant. Gives off a small first obtuse marginal branch, after that  is 40 to 50% tandem stenoses in the AV groove left circumflex artery. The left circumflex artery terminates in a moderate sized second obtuse marginal branch which has less than 10% stenosis te  6. The right coronary artery is dominant, there is about 20% ostial stenosis 90% tubular proximal stenosis and less than 20% stenosis in the mid and distal segments. The terminal branches have less than 10% stenosis. 7. Successful PCI/stenting of the proximal right coronary artery was performed, with reduction of stenosis from 90% to 0%. There was GENEVIEVE 3 flow before and after the procedure. 3.0 x 23 mm Xience Angelina drug-eluting stent and 3.0 x 12 mm Xience Angelina drug-eluting stent were used in a minimally overlapping fashion. Selective right common femoral angiography showed that the access was in the right common femoral artery above its bifurcation. Angio-Seal hemostatic device was used. There were no immediate complications. RECOMMENDATIONS:  Post-procedure care will focus on prevention of any ischemic events and congestive complications. Aggressive risk factor modification and dual antiplatelet therapy are recommended.      Ed Sam MD

## 2020-09-10 ENCOUNTER — HOSPITAL ENCOUNTER (EMERGENCY)
Age: 50
Discharge: HOME | End: 2020-09-10
Payer: COMMERCIAL

## 2020-09-10 ENCOUNTER — APPOINTMENT (OUTPATIENT)
Dept: ULTRASOUND IMAGING | Age: 50
End: 2020-09-10
Payer: COMMERCIAL

## 2020-09-10 ENCOUNTER — APPOINTMENT (OUTPATIENT)
Dept: CT IMAGING | Age: 50
End: 2020-09-10
Payer: COMMERCIAL

## 2020-09-10 VITALS
WEIGHT: 205 LBS | SYSTOLIC BLOOD PRESSURE: 111 MMHG | DIASTOLIC BLOOD PRESSURE: 74 MMHG | TEMPERATURE: 97.8 F | RESPIRATION RATE: 17 BRPM | HEIGHT: 62 IN | BODY MASS INDEX: 37.73 KG/M2 | OXYGEN SATURATION: 97 % | HEART RATE: 74 BPM

## 2020-09-10 PROCEDURE — 93971 EXTREMITY STUDY: CPT

## 2020-09-10 PROCEDURE — 96372 THER/PROPH/DIAG INJ SC/IM: CPT

## 2020-09-10 PROCEDURE — 99284 EMERGENCY DEPT VISIT MOD MDM: CPT

## 2020-09-10 PROCEDURE — 6360000002 HC RX W HCPCS: Performed by: PHYSICIAN ASSISTANT

## 2020-09-10 PROCEDURE — 72125 CT NECK SPINE W/O DYE: CPT

## 2020-09-10 RX ORDER — ORPHENADRINE CITRATE 30 MG/ML
60 INJECTION INTRAMUSCULAR; INTRAVENOUS ONCE
Status: COMPLETED | OUTPATIENT
Start: 2020-09-10 | End: 2020-09-10

## 2020-09-10 RX ORDER — KETOROLAC TROMETHAMINE 30 MG/ML
30 INJECTION, SOLUTION INTRAMUSCULAR; INTRAVENOUS ONCE
Status: COMPLETED | OUTPATIENT
Start: 2020-09-10 | End: 2020-09-10

## 2020-09-10 RX ORDER — CYCLOBENZAPRINE HCL 5 MG
5 TABLET ORAL 2 TIMES DAILY PRN
Qty: 10 TABLET | Refills: 0 | Status: SHIPPED | OUTPATIENT
Start: 2020-09-10 | End: 2020-09-20

## 2020-09-10 RX ORDER — HYDROCODONE BITARTRATE AND ACETAMINOPHEN 5; 325 MG/1; MG/1
1 TABLET ORAL EVERY 6 HOURS PRN
Qty: 10 TABLET | Refills: 0 | Status: SHIPPED | OUTPATIENT
Start: 2020-09-10 | End: 2020-09-13

## 2020-09-10 RX ADMIN — ORPHENADRINE CITRATE 60 MG: 30 INJECTION INTRAMUSCULAR; INTRAVENOUS at 15:16

## 2020-09-10 RX ADMIN — HYDROMORPHONE HYDROCHLORIDE 1 MG: 1 INJECTION, SOLUTION INTRAMUSCULAR; INTRAVENOUS; SUBCUTANEOUS at 15:16

## 2020-09-10 RX ADMIN — KETOROLAC TROMETHAMINE 30 MG: 30 INJECTION, SOLUTION INTRAMUSCULAR at 15:16

## 2020-09-10 ASSESSMENT — PAIN SCALES - GENERAL: PAINLEVEL_OUTOF10: 10

## 2020-09-10 ASSESSMENT — ENCOUNTER SYMPTOMS
NAUSEA: 0
VOMITING: 0
EYE DISCHARGE: 0
DIARRHEA: 0
BACK PAIN: 0
RHINORRHEA: 0
ABDOMINAL PAIN: 0
SINUS PAIN: 0
COUGH: 0
COLOR CHANGE: 0
EYE REDNESS: 0
SHORTNESS OF BREATH: 0
CHEST TIGHTNESS: 0

## 2020-09-10 ASSESSMENT — PAIN DESCRIPTION - DESCRIPTORS: DESCRIPTORS: STABBING

## 2020-09-10 ASSESSMENT — PAIN DESCRIPTION - PAIN TYPE: TYPE: ACUTE PAIN

## 2020-09-10 ASSESSMENT — PAIN DESCRIPTION - LOCATION: LOCATION: ARM

## 2020-09-10 ASSESSMENT — PAIN DESCRIPTION - ORIENTATION: ORIENTATION: RIGHT

## 2020-09-10 NOTE — ED PROVIDER NOTES
[address] [facility] ED  EMERGENCY DEPARTMENT ENCOUNTER      Pt Name: Viki Erazo  MRN: [id number]  [place] 1970  Date of evaluation: 9/10/2020  Provider: Beena Spaulding PA-C    CHIEF COMPLAINT       Chief Complaint   Patient presents with    Arm Pain     right arm pain since yesterday with no mechanism of injury         HISTORY OF PRESENT ILLNESS   (Location/Symptom, Timing/Onset, Context/Setting, Quality, Duration, Modifying Factors, Severity)  Note limiting factors. Viki Erazo is a 44 y.o. female who presents to the emergency department acute onset, severe, constant, sharp, right arm pain. Patient states last night she went to bed she was had to have some hand edema. Patient states that she began to have pain started from the hand went to the elbow. And now the pain goes from the hand all the way up to the shoulder joint. Now radiates into the T and C-spine. Patient states she did not fall. Patient denies any trauma. Patient denies loss of sensation. Patient states she has decreased range of motion both elbow and shoulder. Along with the wrist.    HPI    Nursing Notes were reviewed. REVIEW OF SYSTEMS    (2-9 systems for level 4, 10 or more for level 5)     Review of Systems   Constitutional: Negative for activity change, chills, fatigue and fever. HENT: Negative for congestion, hearing loss, rhinorrhea, sinus pain, sneezing and tinnitus. Eyes: Negative for discharge, redness and visual disturbance. Respiratory: Negative for cough, chest tightness and shortness of breath. Cardiovascular: Negative for chest pain and leg swelling. Gastrointestinal: Negative for abdominal pain, diarrhea, nausea and vomiting. Endocrine: Negative for heat intolerance, polydipsia and polyuria. Genitourinary: Negative for dysuria, flank pain, hematuria and urgency. Musculoskeletal: Positive for arthralgias. Negative for back pain, joint swelling and myalgias.         Right arm weakness and pain.  Denies numbness tingling loss of sensation. Skin: Negative for color change, rash and wound. Allergic/Immunologic: Negative for immunocompromised state. Neurological: Positive for weakness. Negative for tremors, seizures, syncope, light-headedness and headaches. Hematological: Does not bruise/bleed easily. Psychiatric/Behavioral: Negative for agitation and behavioral problems. The patient is not nervous/anxious. Except as noted above the remainder of the review of systems was reviewed and negative. PAST MEDICAL HISTORY     Past Medical History:   Diagnosis Date    Asthma 2004    Diabetes mellitus ([facility] [address] [facility] 2014    Hodgkin disease ([facility] [address] 36    Hypertension 1984    Migraines 1989         SURGICAL HISTORY     History reviewed. No pertinent surgical history. CURRENT MEDICATIONS       Previous Medications    ALBUTEROL SULFATE HFA (VENTOLIN HFA) 108 (90 BASE) MCG/ACT INHALER    Inhale 2 puffs into the lungs 4 times daily as needed for Wheezing    ASPIRIN 81 MG CHEWABLE TABLET    Take 81 mg by mouth daily    ASPIRIN 81 MG EC TABLET    Take 1 tablet by mouth daily    ATORVASTATIN (LIPITOR) 80 MG TABLET    Take 1 tablet by mouth daily    BUDESONIDE-FORMOTEROL (SYMBICORT) 160-4.5 MCG/ACT AERO    Inhale 2 puffs into the lungs 2 times daily    DILTIAZEM (CARDIZEM CD) 120 MG EXTENDED RELEASE CAPSULE    Take 1 capsule by mouth 2 times daily    FUROSEMIDE (LASIX) 20 MG TABLET    Take 20 mg by mouth daily    LISINOPRIL (PRINIVIL;ZESTRIL) 5 MG TABLET    Take 5 mg by mouth daily    MULTIPLE VITAMIN (MULTI-VITAMIN DAILY PO)    Take 1 tablet by mouth daily     NITROGLYCERIN (NITROSTAT) 0.4 MG SL TABLET    up to max of 3 total doses. If no relief after 1 dose, call 911.     OMEPRAZOLE (PRILOSEC) 20 MG DELAYED RELEASE CAPSULE    Take 1 capsule by mouth every morning (before breakfast)    POTASSIUM CHLORIDE (KLOR-CON M) 10 MEQ EXTENDED RELEASE TABLET    Take 10 mEq by mouth daily    TICAGRELOR (BRILINTA) 90 MG TABS TABLET    Take 1 tablet by mouth 2 times daily    TIOTROPIUM (SPIRIVA RESPIMAT) 2.5 MCG/ACT AERS INHALER    Inhale 2 puffs into the lungs daily       ALLERGIES     Shellfish-derived products    FAMILY HISTORY     History reviewed. No pertinent family history.        SOCIAL HISTORY       Social History     Socioeconomic History    Marital status: Single     Spouse name: None    Number of children: None    Years of education: None    Highest education level: None   Occupational History    None   Social Needs    Financial resource strain: None    Food insecurity     Worry: None     Inability: None    Transportation needs     Medical: None     Non-medical: None   Tobacco Use    Smoking status: Current Every Day Smoker     Packs/day: 1.00     Types: Cigarettes    Smokeless tobacco: Never Used   Substance and Sexual Activity    Alcohol use: Not Currently    Drug use: Never    Sexual activity: None   Lifestyle    Physical activity     Days per week: None     Minutes per session: None    Stress: None   Relationships    Social connections     Talks on phone: None     Gets together: None     Attends Shinto service: None     Active member of club or organization: None     Attends meetings of clubs or organizations: None     Relationship status: None    Intimate partner violence     Fear of current or ex partner: None     Emotionally abused: None     Physically abused: None     Forced sexual activity: None   Other Topics Concern    None   Social History Narrative    None       SCREENINGS        [place] Coma Scale  Eye Opening: Spontaneous  Best Verbal Response: Oriented  Best Motor Response: Obeys commands  Cat Coma Scale Score: 15               PHYSICAL EXAM    (up to 7 for level 4, 8 or more for level 5)     ED Triage Vitals [09/10/20 1447]   BP Temp Temp Source Pulse Resp SpO2 Height Weight   (!) 150/84 97.8 °F (36.6 °C) Temporal 98 17 97 % 5' 2\" (1.575 m) 205 lb (93 kg) Physical Exam  Vitals signs and nursing note reviewed. Constitutional:       General: She is not in acute distress. Appearance: Normal appearance. She is normal weight. HENT:      Head: Normocephalic. Right Ear: Tympanic membrane, ear canal and external ear normal.      Left Ear: Tympanic membrane, ear canal and external ear normal.      Nose: Nose normal.      Mouth/Throat:      Mouth: Mucous membranes are moist.      Pharynx: Oropharynx is clear. No oropharyngeal exudate or posterior oropharyngeal erythema. Eyes:      Conjunctiva/sclera: Conjunctivae normal.      Pupils: Pupils are equal, round, and reactive to light. Neck:      Musculoskeletal: Normal range of motion. No neck rigidity. Cardiovascular:      Rate and Rhythm: Normal rate and regular rhythm. Pulses: Normal pulses. Heart sounds: Normal heart sounds. No murmur. No friction rub. Pulmonary:      Effort: Pulmonary effort is normal. No respiratory distress. Breath sounds: Normal breath sounds. No stridor. Abdominal:      General: Abdomen is flat. Bowel sounds are normal.      Palpations: Abdomen is soft. Genitourinary:     Vagina: No vaginal discharge. Musculoskeletal:         General: No swelling. Right shoulder: She exhibits decreased range of motion, tenderness and pain. She exhibits no deformity and no spasm. Right elbow: She exhibits decreased range of motion. Tenderness found. Right wrist: She exhibits decreased range of motion and tenderness. Comments: Dorsum of right hand with mild edema. Cap refill less than 2 seconds. Patient has 2+ radial pulses. Negative loss of sensation. Decreased strength at the shoulder, elbow, wrist, . No discoloration noted to the right arm. Neurological:      General: No focal deficit present. Mental Status: She is alert.    Psychiatric:         Mood and Affect: Mood normal.         Behavior: Behavior normal.         DIAGNOSTIC RESULTS EKG: All EKG's are interpreted by the Emergency Department Physician who either signs or Co-signs this chart in the absence of a cardiologist.        RADIOLOGY:   Non-plain film images such as CT, Ultrasound and MRI are read by the radiologist. Plain radiographic images are visualized and preliminarily interpreted by the emergency physician with the below findings:      Interpretation per the Radiologist below, if available at the time of this note:    CT CERVICAL SPINE WO CONTRAST   Final Result      No fracture. All CT scans at this facility use dose modulation, iterative reconstruction, and/or weight based dosing when appropriate to reduce radiation dose to as low as reasonably achievable. US DUP UPPER EXTREMITY RIGHT VENOUS    (Results Pending)         ED BEDSIDE ULTRASOUND:   Performed by ED Physician - none    LABS:  Labs Reviewed - No data to display    All other labs were within normal range or not returned as of this dictation. EMERGENCY DEPARTMENT COURSE and DIFFERENTIAL DIAGNOSIS/MDM:   Vitals:    Vitals:    09/10/20 1447 09/10/20 1610 09/10/20 1630   BP: (!) 150/84 112/67 106/73   Pulse: 98 80 78   Resp: 17  17   Temp: 97.8 °F (36.6 °C)     TempSrc: Temporal     SpO2: 97% 97% 98%   Weight: 205 lb (93 kg)     Height: 5' 2\" (1.575 m)         51-year of age female who presents with sudden onset right arm pain. Patient also complaining of decreased strength and range of motion. Patient has pain on both active and passive range of motion. Patient has mild edema to the right hand which she said precipitated all of the pain. Patient is an everyday smoker, has diabetes. Patient had a heart cath on August 31. Patient states she is currently taking Brilinta and aspirin. Ultrasound of the right upper extremity will be obtained along with CT C-spine. Patient will be given Norflex, Dilaudid, Toradol IM. Patient be reassessed.     MDM        REASSESSMENT    On reassessment patient's pain

## 2020-09-10 NOTE — ED NOTES
Discharge instructions explained with verbalization of understanding. Patient discharged with a steady gait in the care of her .       Rex Hobbs RN  09/10/20 1303

## 2020-09-11 ENCOUNTER — CARE COORDINATION (OUTPATIENT)
Dept: CARE COORDINATION | Age: 50
End: 2020-09-11

## 2020-09-11 NOTE — CARE COORDINATION
Patient contacted regarding recent discharge and COVID-19 risk. Discussed COVID-19 related testing which was not done at this time. Test results were not done. Patient informed of results, if available? Yes     Care Transition Nurse/ Ambulatory Care Manager contacted the patient by telephone to perform post discharge assessment. Verified name and  with patient as identifiers. Patient has following risk factors of: COPD and diabetes. CTN/ACM reviewed discharge instructions, medical action plan and red flags related to discharge diagnosis. Reviewed and educated them on any new and changed medications related to discharge diagnosis. Advised obtaining a 90-day supply of all daily and as-needed medications. Education provided regarding infection prevention, and signs and symptoms of COVID-19 and when to seek medical attention with patient who verbalized understanding. Discussed exposure protocols and quarantine from [address] you at higher risk for severe illness  and given an opportunity for questions and concerns. The patient agrees to contact the COVID-19 hotline [phone] or PCP office for questions related to their healthcare. CTN/ACM provided contact information for future reference. From CDC: Are you at higher risk for severe illness?  Wash your hands often.  Avoid close contact (6 feet, which is about two arm lengths) with people who are sick.  Put distance between yourself and other people if COVID-19 is spreading in your community.  Clean and disinfect frequently touched surfaces.  Avoid all cruise travel and non-essential air travel.  Call your healthcare professional if you have concerns about COVID-19 and your underlying condition or if you are sick. For more information on steps you can take to protect yourself, see CDC's How to [place] for follow-up call in 7-14 days based on severity of symptoms and risk factors.   ACM REVIEWED ED VISIT AND AVS  PATIENT OBTAINED MEDS AND FEELS BETTER  ACM AD VISED PATIENT TO FU WITH HER PCP  ACM REVIEWED COVID RISKS/S/S

## 2020-09-16 ENCOUNTER — OFFICE VISIT (OUTPATIENT)
Dept: PULMONOLOGY | Age: 50
End: 2020-09-16
Payer: COMMERCIAL

## 2020-09-16 VITALS
DIASTOLIC BLOOD PRESSURE: 74 MMHG | BODY MASS INDEX: 37.54 KG/M2 | HEIGHT: 62 IN | HEART RATE: 90 BPM | WEIGHT: 204 LBS | OXYGEN SATURATION: 98 % | SYSTOLIC BLOOD PRESSURE: 128 MMHG | TEMPERATURE: 97.2 F

## 2020-09-16 PROCEDURE — 4004F PT TOBACCO SCREEN RCVD TLK: CPT | Performed by: INTERNAL MEDICINE

## 2020-09-16 PROCEDURE — 3017F COLORECTAL CA SCREEN DOC REV: CPT | Performed by: INTERNAL MEDICINE

## 2020-09-16 PROCEDURE — G8427 DOCREV CUR MEDS BY ELIG CLIN: HCPCS | Performed by: INTERNAL MEDICINE

## 2020-09-16 PROCEDURE — 99214 OFFICE O/P EST MOD 30 MIN: CPT | Performed by: INTERNAL MEDICINE

## 2020-09-16 PROCEDURE — G8417 CALC BMI ABV UP PARAM F/U: HCPCS | Performed by: INTERNAL MEDICINE

## 2020-09-16 RX ORDER — NICOTINE 21 MG/24HR
1 PATCH, TRANSDERMAL 24 HOURS TRANSDERMAL DAILY
Qty: 42 PATCH | Refills: 0 | Status: SHIPPED | OUTPATIENT
Start: 2020-09-16 | End: 2021-03-17 | Stop reason: SDUPTHER

## 2020-09-16 RX ORDER — VARENICLINE TARTRATE
KIT
Qty: 1 BOX | Refills: 0 | Status: SHIPPED | OUTPATIENT
Start: 2020-09-16 | End: 2020-10-29 | Stop reason: ALTCHOICE

## 2020-09-16 NOTE — PROGRESS NOTES
Subjective:     Carlos Flynn is a 44 y.o. female who complains today of:     Chief Complaint   Patient presents with    Asthma     6 week follow up       HPI  Patient presents for shortness of breath    She is feeling better with Spiriva, shortness of breath is minimal mainly exertional, she is not using Symbicort, no lower extremity edema, she had stent placed in August, no chest pain, no nasal congestion postnasal drip, no heartburn, weight is stable, no joint swelling or pain, no skin rash. She has been trying to do her sleep study however she has not been able to schedule her appointment sleep center is not answering, I did contact the sleep  today and they are working on scheduling her appointment. She is agreeable to quit smoking and would like to try Chantix, I discussed with her side effect that include abnormal dreams and suicidal ideation, she will stop the medication and call me if this occurred. Also will start nicotine patches and reviewed with her STA RT steps            Allergies:  Shellfish-derived products  Past Medical History:   Diagnosis Date    Asthma 2004    Diabetes mellitus ([facility] [address] 2014    [facility] 36    Hypertension 1984    Migraines 1989     No past surgical history on file. No family history on file.   Social History     Socioeconomic History    Marital status: Single     Spouse name: Not on file    Number of children: Not on file    Years of education: Not on file    Highest education level: Not on file   Occupational History    Not on file   Social Needs    Financial resource strain: Not on file    Food insecurity     Worry: Not on file     Inability: Not on file    Transportation needs     Medical: Not on file     Non-medical: Not on file   Tobacco Use    Smoking status: Current Every Day Smoker     Packs/day: 1.00     Types: Cigarettes    Smokeless tobacco: Never Used   Substance and Sexual Activity    Alcohol use: Not Currently    Drug use: Never    Sexual activity: Not on file   Lifestyle    Physical activity     Days per week: Not on file     Minutes per session: Not on file    Stress: Not on file   Relationships    Social connections     Talks on phone: Not on file     Gets together: Not on file     Attends Buddhism service: Not on file     Active member of club or organization: Not on file     Attends meetings of clubs or organizations: Not on file     Relationship status: Not on file    Intimate partner violence     Fear of current or ex partner: Not on file     Emotionally abused: Not on file     Physically abused: Not on file     Forced sexual activity: Not on file   Other Topics Concern    Not on file   Social History Narrative    Not on file         Review of Systems      ROS: 10 organs review of system is done including general, psychological, ENT, hematological, endocrine, respiratory, cardiovascular, gastrointestinal,musculoskeletal, neurological,  allergy and Immunology is done and is otherwise negative. Current Outpatient Medications   Medication Sig Dispense Refill    varenicline (CHANTIX STARTING MONTH PAK) 0.5 MG X 11 & 1 MG X 42 tablet Take by mouth.  1 box 0    nicotine (NICODERM CQ) 21 MG/24HR Place 1 patch onto the skin daily 42 patch 0    cyclobenzaprine (FLEXERIL) 5 MG tablet Take 1 tablet by mouth 2 times daily as needed for Muscle spasms 10 tablet 0    furosemide (LASIX) 20 MG tablet Take 20 mg by mouth daily      potassium chloride (KLOR-CON M) 10 MEQ extended release tablet Take 10 mEq by mouth daily      lisinopril (PRINIVIL;ZESTRIL) 5 MG tablet Take 5 mg by mouth daily      dilTIAZem (CARDIZEM CD) 120 MG extended release capsule Take 1 capsule by mouth 2 times daily 60 capsule 2    aspirin 81 MG EC tablet Take 1 tablet by mouth daily 90 tablet 3    ticagrelor (BRILINTA) 90 MG TABS tablet Take 1 tablet by mouth 2 times daily 60 tablet 5    omeprazole (PRILOSEC) 20 MG delayed release capsule Take 1 capsule by mouth every morning (before breakfast) 30 capsule 5    tiotropium (SPIRIVA RESPIMAT) 2.5 MCG/ACT AERS inhaler Inhale 2 puffs into the lungs daily 1 Inhaler 2    albuterol sulfate HFA (VENTOLIN HFA) 108 (90 Base) MCG/ACT inhaler Inhale 2 puffs into the lungs 4 times daily as needed for Wheezing (Patient taking differently: Inhale 1 puff into the lungs 4 times daily as needed for Wheezing ) 1 Inhaler 1    nitroGLYCERIN (NITROSTAT) 0.4 MG SL tablet up to max of 3 total doses. If no relief after 1 dose, call 911. (Patient taking differently: Place 0.4 mg under the tongue every 5 minutes as needed up to max of 3 total doses. If no relief after 1 dose, call 911.) 25 tablet 3    atorvastatin (LIPITOR) 80 MG tablet Take 1 tablet by mouth daily (Patient taking differently: Take 10 mg by mouth daily ) 30 tablet 3    Multiple Vitamin (MULTI-VITAMIN DAILY PO) Take 1 tablet by mouth daily        No current facility-administered medications for this visit. Objective:     Vitals:    09/16/20 0840   BP: 128/74   Pulse: 90   Temp: 97.2 °F (36.2 °C)   SpO2: 98%   Weight: 204 lb (92.5 kg)   Height: 5' 2\" (1.575 m)         Physical Exam  Constitutional:       General: She is not in acute distress. Appearance: She is well-developed. She is not diaphoretic. HENT:      Head: Normocephalic and atraumatic. Eyes:      Conjunctiva/sclera: Conjunctivae normal.      Pupils: Pupils are equal, round, and reactive to light. Neck:      Musculoskeletal: Normal range of motion and neck supple. Cardiovascular:      Rate and Rhythm: Normal rate and regular rhythm. Heart sounds: No murmur. No friction rub. No gallop. Pulmonary:      Effort: Pulmonary effort is normal. No respiratory distress. Breath sounds: Normal breath sounds. No wheezing or rales. Chest:      Chest wall: No tenderness. Abdominal:      General: There is no distension. Palpations: Abdomen is soft. Tenderness:  There is no abdominal tenderness. There is no rebound. Musculoskeletal:         General: No tenderness. Right lower leg: No edema. Left lower leg: No edema. Lymphadenopathy:      Cervical: No cervical adenopathy. Skin:     General: Skin is warm and dry. Findings: No erythema. Neurological:      Mental Status: She is alert and oriented to person, place, and time. Psychiatric:         Judgment: Judgment normal.         Imaging studies reviewed by me chest x-ray August 2020 no mass or infiltrate  Lab results reviewed in chart  PFT normal, FEV1 91%, lung volumes, and normal DLCO       Assessment and Plan       Diagnosis Orders   1. SOB (shortness of breath)     2. Smoking  varenicline (CHANTIX STARTING MONTH PAK) 0.5 MG X 11 & 1 MG X 42 tablet    nicotine (NICODERM CQ) 21 MG/24HR   3. KEITH (obstructive sleep apnea)     4. Class 2 obesity due to excess calories without serious comorbidity with body mass index (BMI) of 37.0 to 37.9 in adult     5. Gastroesophageal reflux disease without esophagitis       · Shortness of breath, significantly improved on Spiriva, PFT is normal, I do not have enough evidence to consider COPD or asthma at this point, probably her shortness of breath is related to her ongoing smoking. For now she has improved and will continue Spiriva, she needs to quit smoking and smoking cessation counseling done reviewed with the patient STA RT steps, will start patient on Chantix, reviewed with her risk of vivid dreams, and suicidal ideation along with other side effect including nausea vomiting or diarrhea she will stop the medication if this occurred and call me immediately also will start nicotine patches. I think if we can get her to quit smoking shortness of breath and respiratory status will improve significantly.   · At age 52 will plan CT lung cancer screening  · If shortness of breath start worsening we will consider methacholine challenge test for now continue Spiriva she is not

## 2020-09-17 ENCOUNTER — TELEPHONE (OUTPATIENT)
Dept: PULMONOLOGY | Age: 50
End: 2020-09-17

## 2020-09-17 NOTE — TELEPHONE ENCOUNTER
Alfredo Payne from [employer] called and states that she needs verification for Chantix and the nicotine patches. She states that they are normally not prescribed at the same time and can't be taken and used at the same time. Please call pharmacy.     Spoke with pharmacist, will proceed with prescription

## 2020-09-26 ENCOUNTER — CARE COORDINATION (OUTPATIENT)
Dept: CARE COORDINATION | Age: 50
End: 2020-09-26

## 2020-09-26 NOTE — CARE COORDINATION
You Patient resolved from the Care Transitions episode on 9/26/2020  Discussed COVID-19 related testing which was not done at this time. Test results were not done. Patient informed of results, if available? Yes    Patient/family has been provided the following resources and education related to COVID-19:                         Signs, symptoms and red flags related to COVID-19            CDC exposure and quarantine guidelines            [facility] exposure contact - [phone]            Contact for their local Department of Health                 Patient currently reports that the following symptoms have improved:  NO VIRAL S/S     No further outreach scheduled with this CTN/ACM. Episode of Care resolved. Patient has this CTN/ACM contact information if future needs arise. NO VIRAL S/S    PATIENT REPORTS DOING WELL HER ONLY CONCERN IS IN THE DELAY WITH SLEEP LAB DR. FREIRE CALLED THEM HIMSELF ON 9/16/2020. PATIENT REPORTS THEY DID CALL HER BACK BUT SHE WAS TRYING TO GET INTO HOUSE AND COULD NOT ANSWE. SHE RETURNED CALL RIGHT AWAY AND IT WENT TO MESSAGE. SHE STATES SHE CALLED 3 MORE TIMES THAT DAY LEAVING MESSAGES. SHE HAS HAD NO RESPONSE. AC CALLED TODAY AND WAS DIRECTED TO . ACM LEFT DETAILED MESSAGE REQUESTING PATIENT BE CALLED AND SET UP FOR STUDY.  PATIENTS NEXT PULM APPT 10/29/2020 TO REVIEW     ACM REVIEWED COVID EDU/RISKSAND S/S

## 2020-09-27 ENCOUNTER — APPOINTMENT (OUTPATIENT)
Dept: GENERAL RADIOLOGY | Age: 50
DRG: 141 | End: 2020-09-27
Payer: COMMERCIAL

## 2020-09-27 ENCOUNTER — HOSPITAL ENCOUNTER (INPATIENT)
Age: 50
LOS: 5 days | Discharge: HOME | DRG: 141 | End: 2020-10-02
Attending: STUDENT IN AN ORGANIZED HEALTH CARE EDUCATION/TRAINING PROGRAM | Admitting: INTERNAL MEDICINE
Payer: COMMERCIAL

## 2020-09-27 PROBLEM — J96.01 ACUTE RESPIRATORY FAILURE WITH HYPOXIA (HCC): Status: ACTIVE | Noted: 2020-09-27

## 2020-09-27 LAB
ALBUMIN SERPL-MCNC: 4.1 G/DL (ref 3.5–4.6)
ALP BLD-CCNC: 129 U/L (ref 40–130)
ALT SERPL-CCNC: 22 U/L (ref 0–33)
ANION GAP SERPL CALCULATED.3IONS-SCNC: 12 MEQ/L (ref 9–15)
AST SERPL-CCNC: 17 U/L (ref 0–35)
BASE EXCESS ARTERIAL: -2 (ref -3–3)
BASOPHILS ABSOLUTE: 0.1 K/UL (ref 0–0.2)
BASOPHILS RELATIVE PERCENT: 0.6 %
BILIRUB SERPL-MCNC: 0.8 MG/DL (ref 0.2–0.7)
BUN BLDV-MCNC: 9 MG/DL (ref 6–20)
CALCIUM IONIZED: 1.15 MMOL/L (ref 1.12–1.32)
CALCIUM SERPL-MCNC: 8.9 MG/DL (ref 8.5–9.9)
CHLORIDE BLD-SCNC: 98 MEQ/L (ref 95–107)
CO2: 22 MEQ/L (ref 20–31)
CREAT SERPL-MCNC: 0.6 MG/DL (ref 0.5–0.9)
EOSINOPHILS ABSOLUTE: 0.7 K/UL (ref 0–0.7)
EOSINOPHILS RELATIVE PERCENT: 4 %
GFR AFRICAN AMERICAN: >60
GFR AFRICAN AMERICAN: >60
GFR NON-AFRICAN AMERICAN: >60
GFR NON-AFRICAN AMERICAN: >60
GLOBULIN: 3.1 G/DL (ref 2.3–3.5)
GLUCOSE BLD-MCNC: 185 MG/DL (ref 70–99)
GLUCOSE BLD-MCNC: 191 MG/DL (ref 60–115)
GLUCOSE BLD-MCNC: 252 MG/DL (ref 60–115)
GLUCOSE BLD-MCNC: 289 MG/DL (ref 60–115)
GONADOTROPIN, CHORIONIC (HCG) QUANT: 0.2 MIU/ML
HCO3 ARTERIAL: 21.9 MMOL/L (ref 21–29)
HCT VFR BLD CALC: 40.7 % (ref 37–47)
HEMOGLOBIN: 13.8 G/DL (ref 12–16)
HEMOGLOBIN: 14 GM/DL (ref 12–16)
LACTATE: 2.04 MMOL/L (ref 0.4–2)
LYMPHOCYTES ABSOLUTE: 1.7 K/UL (ref 1–4.8)
LYMPHOCYTES RELATIVE PERCENT: 9.7 %
MCH RBC QN AUTO: 30.7 PG (ref 27–31.3)
MCHC RBC AUTO-ENTMCNC: 34 % (ref 33–37)
MCV RBC AUTO: 90.4 FL (ref 82–100)
MONOCYTES ABSOLUTE: 0.7 K/UL (ref 0.2–0.8)
MONOCYTES RELATIVE PERCENT: 3.8 %
NEUTROPHILS ABSOLUTE: 14.6 K/UL (ref 1.4–6.5)
NEUTROPHILS RELATIVE PERCENT: 81.9 %
O2 SAT, ARTERIAL: 93 % (ref 93–100)
PCO2 ARTERIAL: 31 MM HG (ref 35–45)
PDW BLD-RTO: 13.4 % (ref 11.5–14.5)
PERFORMED ON: ABNORMAL
PH ARTERIAL: 7.45 (ref 7.35–7.45)
PLATELET # BLD: 247 K/UL (ref 130–400)
PO2 ARTERIAL: 62 MM HG (ref 75–108)
POC CHLORIDE: 103 MEQ/L (ref 99–110)
POC CREATININE: 0.7 MG/DL (ref 0.6–1.1)
POC HEMATOCRIT: 41 % (ref 36–48)
POC POTASSIUM: 3.6 MEQ/L (ref 3.5–5.1)
POC SAMPLE TYPE: ABNORMAL
POC SODIUM: 135 MEQ/L (ref 136–145)
POTASSIUM SERPL-SCNC: 4 MEQ/L (ref 3.4–4.9)
RBC # BLD: 4.5 M/UL (ref 4.2–5.4)
SODIUM BLD-SCNC: 132 MEQ/L (ref 135–144)
TCO2 ARTERIAL: 23 (ref 22–29)
TOTAL PROTEIN: 7.2 G/DL (ref 6.3–8)
TROPONIN: <0.01 NG/ML (ref 0–0.01)
TROPONIN: <0.01 NG/ML (ref 0–0.01)
WBC # BLD: 17.8 K/UL (ref 4.8–10.8)

## 2020-09-27 PROCEDURE — 84132 ASSAY OF SERUM POTASSIUM: CPT

## 2020-09-27 PROCEDURE — 82435 ASSAY OF BLOOD CHLORIDE: CPT

## 2020-09-27 PROCEDURE — 85014 HEMATOCRIT: CPT

## 2020-09-27 PROCEDURE — 80053 COMPREHEN METABOLIC PANEL: CPT

## 2020-09-27 PROCEDURE — 2060000000 HC ICU INTERMEDIATE R&B

## 2020-09-27 PROCEDURE — 2700000000 HC OXYGEN THERAPY PER DAY

## 2020-09-27 PROCEDURE — 84702 CHORIONIC GONADOTROPIN TEST: CPT

## 2020-09-27 PROCEDURE — 6360000002 HC RX W HCPCS: Performed by: STUDENT IN AN ORGANIZED HEALTH CARE EDUCATION/TRAINING PROGRAM

## 2020-09-27 PROCEDURE — 71045 X-RAY EXAM CHEST 1 VIEW: CPT

## 2020-09-27 PROCEDURE — 99285 EMERGENCY DEPT VISIT HI MDM: CPT

## 2020-09-27 PROCEDURE — 94761 N-INVAS EAR/PLS OXIMETRY MLT: CPT

## 2020-09-27 PROCEDURE — 6360000002 HC RX W HCPCS: Performed by: INTERNAL MEDICINE

## 2020-09-27 PROCEDURE — 6370000000 HC RX 637 (ALT 250 FOR IP): Performed by: INTERNAL MEDICINE

## 2020-09-27 PROCEDURE — 96374 THER/PROPH/DIAG INJ IV PUSH: CPT

## 2020-09-27 PROCEDURE — 99254 IP/OBS CNSLTJ NEW/EST MOD 60: CPT | Performed by: INTERNAL MEDICINE

## 2020-09-27 PROCEDURE — 82803 BLOOD GASES ANY COMBINATION: CPT

## 2020-09-27 PROCEDURE — 36415 COLL VENOUS BLD VENIPUNCTURE: CPT

## 2020-09-27 PROCEDURE — 85025 COMPLETE CBC W/AUTO DIFF WBC: CPT

## 2020-09-27 PROCEDURE — 94664 DEMO&/EVAL PT USE INHALER: CPT

## 2020-09-27 PROCEDURE — 2580000003 HC RX 258: Performed by: INTERNAL MEDICINE

## 2020-09-27 PROCEDURE — 94640 AIRWAY INHALATION TREATMENT: CPT

## 2020-09-27 PROCEDURE — 6370000000 HC RX 637 (ALT 250 FOR IP): Performed by: STUDENT IN AN ORGANIZED HEALTH CARE EDUCATION/TRAINING PROGRAM

## 2020-09-27 PROCEDURE — 2580000003 HC RX 258: Performed by: STUDENT IN AN ORGANIZED HEALTH CARE EDUCATION/TRAINING PROGRAM

## 2020-09-27 PROCEDURE — 96375 TX/PRO/DX INJ NEW DRUG ADDON: CPT

## 2020-09-27 PROCEDURE — 84484 ASSAY OF TROPONIN QUANT: CPT

## 2020-09-27 PROCEDURE — 82330 ASSAY OF CALCIUM: CPT

## 2020-09-27 PROCEDURE — 82565 ASSAY OF CREATININE: CPT

## 2020-09-27 PROCEDURE — 83605 ASSAY OF LACTIC ACID: CPT

## 2020-09-27 PROCEDURE — 84295 ASSAY OF SERUM SODIUM: CPT

## 2020-09-27 PROCEDURE — 36600 WITHDRAWAL OF ARTERIAL BLOOD: CPT

## 2020-09-27 RX ORDER — ASPIRIN 81 MG/1
81 TABLET ORAL DAILY
Status: DISCONTINUED | OUTPATIENT
Start: 2020-09-27 | End: 2020-10-02 | Stop reason: HOSPADM

## 2020-09-27 RX ORDER — METHYLPREDNISOLONE SODIUM SUCCINATE 125 MG/2ML
125 INJECTION, POWDER, LYOPHILIZED, FOR SOLUTION INTRAMUSCULAR; INTRAVENOUS ONCE
Status: COMPLETED | OUTPATIENT
Start: 2020-09-27 | End: 2020-09-27

## 2020-09-27 RX ORDER — SODIUM CHLORIDE 0.9 % (FLUSH) 0.9 %
10 SYRINGE (ML) INJECTION EVERY 12 HOURS SCHEDULED
Status: DISCONTINUED | OUTPATIENT
Start: 2020-09-27 | End: 2020-10-02 | Stop reason: HOSPADM

## 2020-09-27 RX ORDER — IPRATROPIUM BROMIDE AND ALBUTEROL SULFATE 2.5; .5 MG/3ML; MG/3ML
1 SOLUTION RESPIRATORY (INHALATION)
Status: DISCONTINUED | OUTPATIENT
Start: 2020-09-27 | End: 2020-09-29

## 2020-09-27 RX ORDER — DEXTROSE MONOHYDRATE 50 MG/ML
100 INJECTION, SOLUTION INTRAVENOUS PRN
Status: DISCONTINUED | OUTPATIENT
Start: 2020-09-27 | End: 2020-10-02 | Stop reason: HOSPADM

## 2020-09-27 RX ORDER — ONDANSETRON 2 MG/ML
4 INJECTION INTRAMUSCULAR; INTRAVENOUS EVERY 6 HOURS PRN
Status: DISCONTINUED | OUTPATIENT
Start: 2020-09-27 | End: 2020-10-02 | Stop reason: HOSPADM

## 2020-09-27 RX ORDER — PANTOPRAZOLE SODIUM 20 MG/1
20 TABLET, DELAYED RELEASE ORAL
Status: DISCONTINUED | OUTPATIENT
Start: 2020-09-28 | End: 2020-10-02 | Stop reason: HOSPADM

## 2020-09-27 RX ORDER — IPRATROPIUM BROMIDE AND ALBUTEROL SULFATE 2.5; .5 MG/3ML; MG/3ML
1 SOLUTION RESPIRATORY (INHALATION) 3 TIMES DAILY
Status: DISCONTINUED | OUTPATIENT
Start: 2020-09-27 | End: 2020-09-27

## 2020-09-27 RX ORDER — BUDESONIDE 0.5 MG/2ML
0.5 INHALANT ORAL 2 TIMES DAILY
Status: DISCONTINUED | OUTPATIENT
Start: 2020-09-27 | End: 2020-10-02 | Stop reason: HOSPADM

## 2020-09-27 RX ORDER — IPRATROPIUM BROMIDE AND ALBUTEROL SULFATE 2.5; .5 MG/3ML; MG/3ML
1 SOLUTION RESPIRATORY (INHALATION) EVERY 4 HOURS
Status: DISCONTINUED | OUTPATIENT
Start: 2020-09-27 | End: 2020-09-27

## 2020-09-27 RX ORDER — ATORVASTATIN CALCIUM 10 MG/1
10 TABLET, FILM COATED ORAL DAILY
Status: DISCONTINUED | OUTPATIENT
Start: 2020-09-28 | End: 2020-10-02 | Stop reason: HOSPADM

## 2020-09-27 RX ORDER — IPRATROPIUM BROMIDE AND ALBUTEROL SULFATE 2.5; .5 MG/3ML; MG/3ML
1 SOLUTION RESPIRATORY (INHALATION)
Status: DISCONTINUED | OUTPATIENT
Start: 2020-09-27 | End: 2020-09-27

## 2020-09-27 RX ORDER — ATORVASTATIN CALCIUM 80 MG/1
80 TABLET, FILM COATED ORAL DAILY
Status: DISCONTINUED | OUTPATIENT
Start: 2020-09-27 | End: 2020-09-27

## 2020-09-27 RX ORDER — BUDESONIDE AND FORMOTEROL FUMARATE DIHYDRATE 160; 4.5 UG/1; UG/1
2 AEROSOL RESPIRATORY (INHALATION) 2 TIMES DAILY
Status: ON HOLD | COMMUNITY
End: 2020-09-27

## 2020-09-27 RX ORDER — FUROSEMIDE 20 MG/1
20 TABLET ORAL DAILY
Status: DISCONTINUED | OUTPATIENT
Start: 2020-09-28 | End: 2020-10-02 | Stop reason: HOSPADM

## 2020-09-27 RX ORDER — POLYETHYLENE GLYCOL 3350 17 G/17G
17 POWDER, FOR SOLUTION ORAL DAILY PRN
Status: DISCONTINUED | OUTPATIENT
Start: 2020-09-27 | End: 2020-10-02 | Stop reason: HOSPADM

## 2020-09-27 RX ORDER — ALBUTEROL SULFATE 2.5 MG/3ML
2.5 SOLUTION RESPIRATORY (INHALATION) ONCE
Status: COMPLETED | OUTPATIENT
Start: 2020-09-27 | End: 2020-09-27

## 2020-09-27 RX ORDER — DEXTROSE MONOHYDRATE 25 G/50ML
12.5 INJECTION, SOLUTION INTRAVENOUS PRN
Status: DISCONTINUED | OUTPATIENT
Start: 2020-09-27 | End: 2020-10-02 | Stop reason: HOSPADM

## 2020-09-27 RX ORDER — ACETAMINOPHEN 650 MG/1
650 SUPPOSITORY RECTAL EVERY 6 HOURS PRN
Status: DISCONTINUED | OUTPATIENT
Start: 2020-09-27 | End: 2020-10-02 | Stop reason: HOSPADM

## 2020-09-27 RX ORDER — NICOTINE POLACRILEX 4 MG
15 LOZENGE BUCCAL PRN
Status: DISCONTINUED | OUTPATIENT
Start: 2020-09-27 | End: 2020-10-02 | Stop reason: HOSPADM

## 2020-09-27 RX ORDER — PREDNISONE 10 MG/1
40 TABLET ORAL DAILY
Status: DISCONTINUED | OUTPATIENT
Start: 2020-09-29 | End: 2020-09-28

## 2020-09-27 RX ORDER — NICOTINE 21 MG/24HR
1 PATCH, TRANSDERMAL 24 HOURS TRANSDERMAL DAILY
Status: DISCONTINUED | OUTPATIENT
Start: 2020-09-27 | End: 2020-10-02 | Stop reason: HOSPADM

## 2020-09-27 RX ORDER — LISINOPRIL 5 MG/1
5 TABLET ORAL DAILY
Status: DISCONTINUED | OUTPATIENT
Start: 2020-09-27 | End: 2020-10-02 | Stop reason: HOSPADM

## 2020-09-27 RX ORDER — ALBUTEROL SULFATE 2.5 MG/3ML
2.5 SOLUTION RESPIRATORY (INHALATION)
Status: DISCONTINUED | OUTPATIENT
Start: 2020-09-27 | End: 2020-10-02 | Stop reason: HOSPADM

## 2020-09-27 RX ORDER — 0.9 % SODIUM CHLORIDE 0.9 %
1000 INTRAVENOUS SOLUTION INTRAVENOUS ONCE
Status: COMPLETED | OUTPATIENT
Start: 2020-09-27 | End: 2020-09-27

## 2020-09-27 RX ORDER — DILTIAZEM HYDROCHLORIDE 120 MG/1
120 CAPSULE, COATED, EXTENDED RELEASE ORAL 2 TIMES DAILY
Status: DISCONTINUED | OUTPATIENT
Start: 2020-09-27 | End: 2020-10-02 | Stop reason: HOSPADM

## 2020-09-27 RX ORDER — ACETAMINOPHEN 325 MG/1
650 TABLET ORAL EVERY 6 HOURS PRN
Status: DISCONTINUED | OUTPATIENT
Start: 2020-09-27 | End: 2020-10-02 | Stop reason: HOSPADM

## 2020-09-27 RX ORDER — PROMETHAZINE HYDROCHLORIDE 12.5 MG/1
12.5 TABLET ORAL EVERY 6 HOURS PRN
Status: DISCONTINUED | OUTPATIENT
Start: 2020-09-27 | End: 2020-10-02 | Stop reason: HOSPADM

## 2020-09-27 RX ORDER — MAGNESIUM SULFATE IN WATER 40 MG/ML
4 INJECTION, SOLUTION INTRAVENOUS ONCE
Status: COMPLETED | OUTPATIENT
Start: 2020-09-27 | End: 2020-09-27

## 2020-09-27 RX ORDER — METHYLPREDNISOLONE SODIUM SUCCINATE 40 MG/ML
40 INJECTION, POWDER, LYOPHILIZED, FOR SOLUTION INTRAMUSCULAR; INTRAVENOUS EVERY 6 HOURS
Status: DISCONTINUED | OUTPATIENT
Start: 2020-09-27 | End: 2020-09-28

## 2020-09-27 RX ORDER — SODIUM CHLORIDE 0.9 % (FLUSH) 0.9 %
10 SYRINGE (ML) INJECTION PRN
Status: DISCONTINUED | OUTPATIENT
Start: 2020-09-27 | End: 2020-10-02 | Stop reason: HOSPADM

## 2020-09-27 RX ADMIN — METHYLPREDNISOLONE SODIUM SUCCINATE 125 MG: 125 INJECTION, POWDER, FOR SOLUTION INTRAMUSCULAR; INTRAVENOUS at 10:32

## 2020-09-27 RX ADMIN — SODIUM CHLORIDE 1000 ML: 9 INJECTION, SOLUTION INTRAVENOUS at 10:49

## 2020-09-27 RX ADMIN — TICAGRELOR 90 MG: 90 TABLET ORAL at 14:56

## 2020-09-27 RX ADMIN — MAGNESIUM SULFATE HEPTAHYDRATE 4 G: 40 INJECTION, SOLUTION INTRAVENOUS at 10:32

## 2020-09-27 RX ADMIN — INSULIN LISPRO 3 UNITS: 100 INJECTION, SOLUTION INTRAVENOUS; SUBCUTANEOUS at 16:32

## 2020-09-27 RX ADMIN — DILTIAZEM HYDROCHLORIDE 120 MG: 120 CAPSULE, COATED, EXTENDED RELEASE ORAL at 23:47

## 2020-09-27 RX ADMIN — METHYLPREDNISOLONE SODIUM SUCCINATE 40 MG: 40 INJECTION, POWDER, FOR SOLUTION INTRAMUSCULAR; INTRAVENOUS at 18:01

## 2020-09-27 RX ADMIN — ALBUTEROL SULFATE 2.5 MG: 2.5 SOLUTION RESPIRATORY (INHALATION) at 10:34

## 2020-09-27 RX ADMIN — ENOXAPARIN SODIUM 40 MG: 40 INJECTION SUBCUTANEOUS at 14:56

## 2020-09-27 RX ADMIN — IPRATROPIUM BROMIDE AND ALBUTEROL SULFATE 1 AMPULE: 2.5; .5 SOLUTION RESPIRATORY (INHALATION) at 11:41

## 2020-09-27 RX ADMIN — IPRATROPIUM BROMIDE AND ALBUTEROL SULFATE 1 AMPULE: .5; 3 SOLUTION RESPIRATORY (INHALATION) at 10:34

## 2020-09-27 RX ADMIN — LISINOPRIL 5 MG: 5 TABLET ORAL at 14:56

## 2020-09-27 RX ADMIN — TICAGRELOR 90 MG: 90 TABLET ORAL at 23:47

## 2020-09-27 RX ADMIN — METHYLPREDNISOLONE SODIUM SUCCINATE 40 MG: 40 INJECTION, POWDER, FOR SOLUTION INTRAMUSCULAR; INTRAVENOUS at 23:47

## 2020-09-27 RX ADMIN — BUDESONIDE 500 MCG: 0.5 INHALANT RESPIRATORY (INHALATION) at 19:24

## 2020-09-27 RX ADMIN — DILTIAZEM HYDROCHLORIDE 120 MG: 120 CAPSULE, COATED, EXTENDED RELEASE ORAL at 14:56

## 2020-09-27 RX ADMIN — IPRATROPIUM BROMIDE AND ALBUTEROL SULFATE 1 AMPULE: .5; 3 SOLUTION RESPIRATORY (INHALATION) at 19:24

## 2020-09-27 RX ADMIN — IPRATROPIUM BROMIDE AND ALBUTEROL SULFATE 1 AMPULE: .5; 3 SOLUTION RESPIRATORY (INHALATION) at 16:37

## 2020-09-27 RX ADMIN — Medication 10 ML: at 23:48

## 2020-09-27 RX ADMIN — ASPIRIN 81 MG: 81 TABLET, COATED ORAL at 14:57

## 2020-09-27 ASSESSMENT — ENCOUNTER SYMPTOMS
VOMITING: 0
TROUBLE SWALLOWING: 0
ABDOMINAL PAIN: 0
BACK PAIN: 0
SINUS PRESSURE: 0
CHEST TIGHTNESS: 0
WHEEZING: 1
NAUSEA: 0
SHORTNESS OF BREATH: 1
DIARRHEA: 0
COUGH: 0

## 2020-09-27 ASSESSMENT — PAIN SCALES - GENERAL
PAINLEVEL_OUTOF10: 5
PAINLEVEL_OUTOF10: 0
PAINLEVEL_OUTOF10: 0
PAINLEVEL_OUTOF10: 3
PAINLEVEL_OUTOF10: 0

## 2020-09-27 ASSESSMENT — PAIN DESCRIPTION - PAIN TYPE: TYPE: CHRONIC PAIN

## 2020-09-27 ASSESSMENT — PULMONARY FUNCTION TESTS: PEFR_L/MIN: 100

## 2020-09-27 ASSESSMENT — PAIN DESCRIPTION - LOCATION: LOCATION: CHEST

## 2020-09-27 NOTE — CARE COORDINATION
[facility] Case Management Initial Discharge Assessment    Met with Patient to discuss discharge plan. PCP: Brandi Art MD                                Date of Last Visit: 9/16/20 pulm    If no PCP, list provided? N/A    Discharge Planning    Living Arrangements: independently at home    Who do you live with? son    Who helps you with your care:  self    If lives at home:     Do you have any barriers navigating in your home? no    Patient can perform ADL? Yes    Current Services (outpatient and in home) :  None    Dialysis: No    Is transportation available to get to your appointments? Yes    DME Equipment:  no    Respiratory equipment: None    Respiratory provider:  no     Pharmacy:  yes - [facility]    Consult with Medication Assistance Program?  No        Does Patient Have a High-Risk for Readmission Diagnosis (CHF, PN, MI, COPD)? no    Initial Discharge Plan? (Note: please see concurrent daily documentation for any updates after initial note). CM to assess for any further d/c needs and referrals.       Electronically signed by Ilda Marquez on 9/27/2020 at 1:51 PM

## 2020-09-27 NOTE — ED PROVIDER NOTES
[address] [facility] ED  eMERGENCY dEPARTMENT eNCOUnter      Pt Name: Dusty Garcia  MRN: [id number]  [place] 1970  Date of evaluation: 9/27/2020  Provider: Claudetta Pallas, DO    CHIEF COMPLAINT       Chief Complaint   Patient presents with    Asthma         HISTORY OF PRESENT ILLNESS   (Location/Symptom, Timing/Onset,Context/Setting, Quality, Duration, Modifying Factors, Severity)  Note limiting factors. Dusty Garcia is a 44 y.o. female who presents to the emergency department with c/o SOB, and wheezing. Symptoms x 2 days. Patient knows that being at [facility], [facility] yesterday may have exposed her to do some dust, pollen and that is when her symptoms that started. Patient is using an inhaler but without a spacer. Patient states her chest feels tight. Patient denies any fever, chills or cough. On physical exam the patient is wheezing in all lung fields and has tachypnea. Patient denies any pain with inspiration. Patient states this is exactly the same as prior asthma symptoms. While speaking with the physician the patient's pulse ox dropped to 88%. Patient speaks in 3-4 word sentences. The patient's boyfriend is present. I have the patient's permission to interview, examined her, discussed her care with her boyfriend present. The history is provided by the patient and a significant other. NursingNotes were reviewed. REVIEW OF SYSTEMS    (2-9 systems for level 4, 10 or more for level 5)     Review of Systems   Constitutional: Negative for activity change, appetite change, chills, fever and unexpected weight change. HENT: Negative for drooling, ear pain, nosebleeds, sinus pressure and trouble swallowing. Respiratory: Positive for shortness of breath and wheezing. Negative for cough and chest tightness. Cardiovascular: Negative for chest pain and leg swelling. Gastrointestinal: Negative for abdominal pain, diarrhea, nausea and vomiting.    Endocrine: Negative for polydipsia and polyphagia. Genitourinary: Negative for dysuria, flank pain and frequency. Musculoskeletal: Negative for back pain and myalgias. Skin: Negative for pallor and rash. Neurological: Negative for syncope, weakness and headaches. Hematological: Does not bruise/bleed easily. All other systems reviewed and are negative. Except as noted above the remainder of the review of systems was reviewed and negative. PAST MEDICAL HISTORY     Past Medical History:   Diagnosis Date    Asthma 2004    Diabetes mellitus ([facility] [address] 2014    Hodgkin disease ([facility] [address] 36    Hypertension 1984    Migraines 1989         SURGICALHISTORY     No past surgical history on file. CURRENT MEDICATIONS       Previous Medications    ALBUTEROL SULFATE HFA (VENTOLIN HFA) 108 (90 BASE) MCG/ACT INHALER    Inhale 2 puffs into the lungs 4 times daily as needed for Wheezing    ASPIRIN 81 MG EC TABLET    Take 1 tablet by mouth daily    ATORVASTATIN (LIPITOR) 80 MG TABLET    Take 1 tablet by mouth daily    DILTIAZEM (CARDIZEM CD) 120 MG EXTENDED RELEASE CAPSULE    Take 1 capsule by mouth 2 times daily    FUROSEMIDE (LASIX) 20 MG TABLET    Take 20 mg by mouth daily    LISINOPRIL (PRINIVIL;ZESTRIL) 5 MG TABLET    Take 5 mg by mouth daily    MULTIPLE VITAMIN (MULTI-VITAMIN DAILY PO)    Take 1 tablet by mouth daily     NICOTINE (NICODERM CQ) 21 MG/24HR    Place 1 patch onto the skin daily    NITROGLYCERIN (NITROSTAT) 0.4 MG SL TABLET    up to max of 3 total doses. If no relief after 1 dose, call 911.     OMEPRAZOLE (PRILOSEC) 20 MG DELAYED RELEASE CAPSULE    Take 1 capsule by mouth every morning (before breakfast)    POTASSIUM CHLORIDE (KLOR-CON M) 10 MEQ EXTENDED RELEASE TABLET    Take 10 mEq by mouth daily    TICAGRELOR (BRILINTA) 90 MG TABS TABLET    Take 1 tablet by mouth 2 times daily    TIOTROPIUM (SPIRIVA RESPIMAT) 2.5 MCG/ACT AERS INHALER    Inhale 2 puffs into the lungs daily    VARENICLINE (CHANTIX STARTING MONTH PAK) 0.5 MG X 11 & 1 MG X 42 TABLET    Take by mouth. ALLERGIES     Shellfish-derived products    FAMILY HISTORY     No family history on file.        SOCIAL HISTORY       Social History     Socioeconomic History    Marital status: Single     Spouse name: Not on file    Number of children: Not on file    Years of education: Not on file    Highest education level: Not on file   Occupational History    Not on file   Social Needs    Financial resource strain: Not on file    Food insecurity     Worry: Not on file     Inability: Not on file    Transportation needs     Medical: Not on file     Non-medical: Not on file   Tobacco Use    Smoking status: Current Every Day Smoker     Packs/day: 1.00     Types: Cigarettes    Smokeless tobacco: Never Used   Substance and Sexual Activity    Alcohol use: Not Currently    Drug use: Never    Sexual activity: Not on file   Lifestyle    Physical activity     Days per week: Not on file     Minutes per session: Not on file    Stress: Not on file   Relationships    Social connections     Talks on phone: Not on file     Gets together: Not on file     Attends Congregation service: Not on file     Active member of club or organization: Not on file     Attends meetings of clubs or organizations: Not on file     Relationship status: Not on file    Intimate partner violence     Fear of current or ex partner: Not on file     Emotionally abused: Not on file     Physically abused: Not on file     Forced sexual activity: Not on file   Other Topics Concern    Not on file   Social History Narrative    Not on file       SCREENINGS    Cat Coma Scale  Eye Opening: Spontaneous  Best Verbal Response: Oriented  Best Motor Response: Obeys commands  [place] Coma Scale Score: 15 @FLOW(06210131)@      PHYSICAL EXAM    (up to 7 for level 4, 8 or more for level 5)     ED Triage Vitals [09/27/20 1009]   BP Temp Temp src Pulse Resp SpO2 Height Weight   -- 98.7 °F (37.1 °C) -- 130 19 (!) 89 % 5' 2\" (1.575 m) 250 lb (113.4 kg)       Physical Exam  Vitals signs and nursing note reviewed. Constitutional:       General: She is awake. She is in acute distress ( Secondary to shortness of breath). Appearance: Normal appearance. She is well-developed and normal weight. She is not ill-appearing, toxic-appearing or diaphoretic. Comments: No photophobia. No phonophobia. HENT:      Head: Normocephalic and atraumatic. No Esteves's sign. Right Ear: External ear normal.      Left Ear: External ear normal.      Nose: Nose normal. No congestion or rhinorrhea. Mouth/Throat:      Mouth: Mucous membranes are moist.      Pharynx: Oropharynx is clear. No oropharyngeal exudate or posterior oropharyngeal erythema. Eyes:      General: No scleral icterus. Right eye: No foreign body or discharge. Left eye: No discharge. Extraocular Movements: Extraocular movements intact. Conjunctiva/sclera: Conjunctivae normal.      Left eye: No exudate. Pupils: Pupils are equal, round, and reactive to light. Neck:      Musculoskeletal: Normal range of motion and neck supple. No neck rigidity. Vascular: No JVD. Trachea: No tracheal deviation. Comments: No meningismus. Cardiovascular:      Rate and Rhythm: Regular rhythm. Tachycardia present. Pulses: Normal pulses. Heart sounds: Normal heart sounds. Heart sounds not distant. No murmur. No friction rub. No gallop. Pulmonary:      Effort: Tachypnea, accessory muscle usage, respiratory distress and retractions present. Breath sounds: No stridor. Examination of the right-upper field reveals wheezing. Examination of the left-upper field reveals wheezing. Examination of the right-middle field reveals wheezing. Examination of the left-middle field reveals wheezing. Examination of the right-lower field reveals wheezing. Examination of the left-lower field reveals wheezing. Wheezing present. No rhonchi or rales. Chest:      Chest wall: No tenderness. Abdominal:      General: Abdomen is flat. Bowel sounds are normal. There is no distension. Palpations: Abdomen is soft. There is no mass. Tenderness: There is no abdominal tenderness. There is no right CVA tenderness, left CVA tenderness, guarding or rebound. Hernia: No hernia is present. Musculoskeletal: Normal range of motion. General: No swelling, tenderness, deformity or signs of injury. Lymphadenopathy:      Head:      Right side of head: No submental adenopathy. Left side of head: No submental adenopathy. Skin:     General: Skin is warm and dry. Capillary Refill: Capillary refill takes less than 2 seconds. Coloration: Skin is not jaundiced or pale. Findings: No bruising, erythema, lesion or rash. Neurological:      General: No focal deficit present. Mental Status: She is alert and oriented to person, place, and time. Mental status is at baseline. Cranial Nerves: No cranial nerve deficit. Sensory: No sensory deficit. Motor: No weakness. Coordination: Coordination normal.      Deep Tendon Reflexes: Reflexes are normal and symmetric. Psychiatric:         Mood and Affect: Mood normal.         Behavior: Behavior normal. Behavior is cooperative. Thought Content: Thought content normal.         Judgment: Judgment normal.         DIAGNOSTIC RESULTS     EKG: All EKG's are interpreted by the Emergency Department Physician who either signs or Co-signsthis chart in the absence of a cardiologist.    EKG: Sinus tachycardia 123 bpm, normal axis, there is slow R wave transition the precordial leads, QT intervals 306 ms, no PVCs. Low voltage in the limb leads.     RADIOLOGY:   Non-plain filmimages such as CT, Ultrasound and MRI are read by the radiologist. Plain radiographic images are visualized and preliminarily interpreted by the emergency physician with the below findings:    Chest x-ray: No infiltrate, no pleural effusion, no pneumothorax, no free air. Interpretation per the Radiologist below, if available at the time ofthis note:    XR CHEST PORTABLE    (Results Pending)         ED BEDSIDE ULTRASOUND:   Performed by ED Physician - none    LABS:  Labs Reviewed   CBC WITH AUTO DIFFERENTIAL - Abnormal; Notable for the following components:       Result Value    WBC 17.8 (*)     Neutrophils Absolute 14.6 (*)     All other components within normal limits   POCT ARTERIAL - Abnormal; Notable for the following components:    POC Sodium 135 (*)     POC Glucose 191 (*)     pH, Arterial 7.454 (*)     pCO2, Arterial 31 (*)     pO2, Arterial 62 (*)     O2 Sat, Arterial 93 (*)     Lactate 2.04 (*)     All other components within normal limits   COMPREHENSIVE METABOLIC PANEL   HCG, QUANTITATIVE, PREGNANCY   POCT EPOC BLOOD GAS, LACTIC ACID, ICA       All other labs were within normal range or not returned as of this dictation. EMERGENCY DEPARTMENT COURSE and DIFFERENTIAL DIAGNOSIS/MDM:   Vitals:    Vitals:    09/27/20 1009 09/27/20 1018   Pulse: 130 131   Resp: 19 (!) 32   Temp: 98.7 °F (37.1 °C)    SpO2: (!) 89% (!) 88%   Weight: 250 lb (113.4 kg)    Height: 5' 2\" (1.575 m)            MDM  ABG shows hypoxia. Potassium is within normal limits. Patient ordered IV magnesium sulfate, IV Solu-Medrol, and aerosolized breathing treatments. Patient was given a liter of IV fluids. Patient is tachycardic due to increased work of breathing. Well's criteria score for pulmonary embolism is calculates out to 1.5 which is low risk. Further work-up is unnecessary. I have spoken with the patient for greater than 3 minutes about smoking cessation. I have advised the cold turkey method for quitting. I discussed the risks of smoking such as infection, blood clots, poor healing, cancer, heart attack, strokes, and neuropathy. Case was discussed with Dr. Mali Coronado via teleconference. Dr. Slime Will gave report.  He has accepted the patient. Dr. Matt Kelly recommends tele due to the level of hypoxia. CRITICAL CARE TIME   Total Critical Care time was 33 minutes, excluding separately reportableprocedures. There was a high probability of clinicallysignificant/life threatening deterioration in the patient's condition which required my urgent intervention. CONSULTS:  None    PROCEDURES:  Unless otherwise noted below, none     Procedures    FINAL IMPRESSION      1. Persistent asthma with acute exacerbation, unspecified asthma severity    2. Tobacco dependence    3. Dehydration    4. Hypoxia          DISPOSITION/PLAN   DISPOSITION Admitted 09/27/2020 10:45:00 AM      PATIENT REFERRED TO:  No follow-up provider specified.     DISCHARGE MEDICATIONS:  New Prescriptions    No medications on file          (Please note that portions of this note were completed with a voice recognition program.  Efforts were made to edit the dictations but occasionally words are mis-transcribed.)    David Cuadra DO (electronically signed)  Attending Emergency Physician         David Cuadra DO  09/27/20 1057

## 2020-09-27 NOTE — PROGRESS NOTES
[facility] [place] Respiratory Therapy Evaluation   Current Order:  [facility] Regimen: NONE      Ordering Physician: CHILO  Re-evaluation Date:  9/30     Diagnosis: RESP. FAILURE      Patient Status: Stable / Unstable + Physician notified    The following MDI Criteria must be met in order to convert aerosol to MDI with spacer. If unable to meet, MDI will be converted to aerosol:  []  Patient able to demonstrate the ability to use MDI effectively  []  Patient alert and cooperative  []  Patient able to take deep breath with 5-10 second hold  []  Medication(s) available in this delivery method   []  Peak flow greater than or equal to 200 ml/min            Current Order Substituted To  (same drug, same frequency)   Aerosol to MDI [] Albuterol Sulfate 0.083% unit dose by aerosol Albuterol Sulfate MDI 2 puffs by inhalation with spacer    [] Levalbuterol 1.25 mg unit dose by aerosol Levalbuterol MDI 2 puffs by inhalation with spacer    [] Levalbuterol 0.63 mg unit dose by aerosol Levalbuterol MDI 2 puffs by inhalation with spacer    [] Ipratropium Bromide 0.02% unit dose by aerosol Ipratropium Bromide MDI 2 puffs by inhalation with spacer    [] Duoneb (Ipratropium + Albuterol) unit dose by aerosol Ipratropium MDI + Albuterol MDI 2 puffs by inhalation w/spacer   MDI to Aerosol [] Albuterol Sulfate MDI Albuterol Sulfate 0.083% unit dose by aerosol    [] Levalbuterol MDI 2 puffs by inhalation Levalbuterol 1.25 mg unit dose by aerosol    [] Ipratropium Bromide MDI by inhalation Ipratropium Bromide 0.02% unit dose by aerosol    [] Combivent (Ipratropium + Albuterol) MDI by inhalation Duoneb (Ipratropium + Albuterol) unit dose by aerosol   Treatment Assessment [Frequency/Schedule]:  Change frequency to: __________TID & Albuterol Q2 PRN________________________________________per Protocol, P&T, MEC      Points 0 1 2 3 4   Pulmonary Status  Non-Smoker  []   Smoking history   < 20 pack years  []   Smoking history  ?  20 pack years  [x]   Pulmonary Disorder  (acute or chronic)  []   Severe or Chronic w/ Exacerbation  []     Surgical Status No [x]   Surgeries     General []   Surgery Lower []   Abdominal Thoracic or []   Upper Abdominal Thoracic with  PulmonaryDisorder  []     Chest X-ray Clear/Not  Ordered     []  Chronic Changes  Results Pending  [x]  Infiltrates, atelectasis, pleural effusion, or edema  []  Infiltrates in more than one lobe []  Infiltrate + Atelectasis, &/or pleural effusion  []    Respiratory Pattern Regular,  RR = 12-20 []  Increased,  RR = 21-25 [x]  KC, irregular,  or RR = 26-30 []  Decreased FEV1  or RR = 31-35 []  Severe SOB, use  of accessory muscles, or RR ? 35  []    Mental Status Alert, oriented,  Cooperative [x]  Confused but Follows commands []  Lethargic or unable to follow commands []  Obtunded  []  Comatose  []    Breath Sounds Clear to  auscultation  []  Decreased unilaterally or  in bases only []  Decreased  bilaterally  []  Crackles or intermittent wheezes []  Wheezes [x]    Cough Strong, Spontan., & nonproductive [x]  Strong,  spontaneous, &  productive []  Weak,  Nonproductive []  Weak, productive or  with wheezes []  No spontaneous  cough or may require suctioning []    Level of Activity Ambulatory []  Ambulatory w/ Assist  [x]  Non-ambulatory []  Paraplegic []  Quadriplegic []    Total    Score:____9___     Triage Score:__4______      Tri       Triage:     1. (>20) Freq: Q3    2. (16-20) Freq: Q4   3. (11-15) Freq: QID & Albuterol Q2 PRN    4. (6-10) Freq: TID & Albuterol Q2 PRN    5. (0-5) Freq Q4prn

## 2020-09-27 NOTE — FLOWSHEET NOTE
1230--Pt arrived to floor from ER. Pt very SOB going from cart to bed. Respiratory here for treatment. Admission assessment in progress. 1420-- Pt up to [facility]. Pt instructed to use BSC due to increase SOB and wheezing.      Electronically signed by Alessio Levine RN on 9/27/2020 at 2:27 PM

## 2020-09-27 NOTE — CONSULTS
Pulmonary Medicine  Consult Note      Reason for consultation: Acute asthma exacerbation    Consulting physician: Dr. Shell Robles:    This is 39-year-old female with history of asthma was presented to ER with worsening shortness of breath for 1 day. She said she was feeling well until onset of symptoms. Yesterday while going to try to get in [address] she parked her car very far away but when she went to [address] where however she was feeling very short of breath and shortness of breath continue to worsen during nighttime and she was having trouble sleeping so decided to come to ER. She has no fever or chills. No cough or sputum production. She has no hospitalization or intubations for asthma in the past.  She has some chest pressure radiating to her back. She has some complaint of nausea which she thinks is due to Chantix which he recently states she started. .  She does have intermittent diarrhea she thinks is due to metformin. She has a history of smoking quit 5 days ago and started on Chantix and NicoDerm patch. She is currently using 3 L O2 via nasal cannula. She is still very short of breath. She had a chest x-ray shows no infiltration. She is on nebulizer with DuoNeb every 4 hourly and every 2 hours as needed. Solu-Medrol 40 mg q. 8 hourly. She recently had PFT in August 2020 shows normal spirometry lung volume study and diffusion capacity. Past Medical History:        Diagnosis Date    Asthma 2004    Diabetes mellitus ([facility] [address] 2014    Hodgkin disease ([facility] [address] 1999    Hypertension [address]       Past Surgical History:    No past surgical history on file. Social History:     reports that she has been smoking cigarettes. She has been smoking about 1.00 pack per day. She has never used smokeless tobacco. She reports previous alcohol use. She reports that she does not use drugs. Family History:   No family history on file.     Allergies:  Shellfish-derived products        MEDICATIONS during current hospitalization:    Continuous Infusions:   dextrose         Scheduled Meds:   ticagrelor  90 mg Oral BID    aspirin  81 mg Oral Daily    sodium chloride flush  10 mL Intravenous 2 times per day    enoxaparin  40 mg Subcutaneous Daily    methylPREDNISolone  40 mg Intravenous Q6H    Followed by   Gabriel Navarro ON 9/29/2020] predniSONE  40 mg Oral Daily    insulin lispro  0-6 Units Subcutaneous TID WC    insulin lispro  0-3 Units Subcutaneous Nightly    ipratropium-albuterol  1 ampule Inhalation Q4H    budesonide  0.5 mg Nebulization BID    [START ON 9/28/2020] atorvastatin  10 mg Oral Daily    dilTIAZem  120 mg Oral BID    [START ON 9/28/2020] furosemide  20 mg Oral Daily    lisinopril  5 mg Oral Daily    nicotine  1 patch Transdermal Daily    [START ON 9/28/2020] pantoprazole  20 mg Oral QAM AC       PRN Meds:sodium chloride flush, acetaminophen **OR** acetaminophen, polyethylene glycol, promethazine **OR** ondansetron, albuterol, glucose, dextrose, glucagon (rDNA), dextrose    REVIEW OF SYSTEMS:  As in history of present illness  Other 14 point review of system is negative. PHYSICAL EXAM:    Vitals:  /66   Pulse 106   Temp 99.5 °F (37.5 °C) (Oral)   Resp 22   Ht 5' 2\" (1.575 m)   Wt 196 lb 8 oz (89.1 kg)   SpO2 98%   BMI 35.94 kg/m²   General:Alert, awake, Oriented x3  .comfortable in bed, mild distress with respiratory rate 22-26. Head: Atraumatic , Normocephalic   Eyes: PERRL. No sclera icterus. No conjunctival injection. No discharge   ENT: No nasal  discharge. Pharynx clear. Neck:  Trachea midline. No thyromegaly, no JVD, No cervical adenopathy. Chest : Bilaterally symmetrical ,Normal effort,  No accessory muscle use  Lung : Diminished BS bilateraly. No Rales. Bilateral diffuse expiratory and inspiratory wheezing  Heart[de-identified] Normal  rate. Regular rhythm. No mumur ,  Rub or gallop  ABD: Non-tender. Non-distended. No masses. No organmegaly. Normal bowel sounds. No hernia. Musculoskeleton: normal range of motion in all extremites, strength and tone   Extremities: No pitting in both lower leg , No Cyanosis ,No clubbing  Neuro: no cranial nerve abnormality, normal reflex and sensation, no focal weakness   Skin: Warm and dry. No erythema rash on exposed extremities. Data Review  Recent Labs     09/27/20  1027 09/27/20  1030   WBC  --  17.8*   HGB 14.0 13.8   HCT  --  40.7   PLT  --  247      Recent Labs     09/27/20  1027 09/27/20  1030   NA  --  132*   K  --  4.0   CL  --  98   CO2  --  22   BUN  --  9   CREATININE 0.7 0.60   GLUCOSE  --  185*       MV Settings: ABGs:   Recent Labs     09/27/20  1027   PHART 7.454*   UXV3QJM 31*   PO2ART 62*   JQN3EZX 21.9   BEART -2   P2KRHSZM 93*   QSM1WAM 23     O2 Device: Nasal cannula  O2 Flow Rate (L/min): 3 L/min  No results found for: [address]    Radiology  Xr Chest (2 Vw)    Result Date: 8/29/2020  XR CHEST (2 VW): 8/28/2020 CLINICAL HISTORY: I20.9 Angina pectoris ([address] ICD10. COMPARISON: Portable chest 7/21/2020 and two-view chest 7/19/2019. TECHNIQUE: Upright PA and lateral radiographs of the chest were obtained. FINDINGS: Hyperinflation and coarsening of the bronchovascular structures is consistent with COPD, which is best evaluated clinically. There are no developing infiltrates, pleural effusion, cardiomegaly, vascular congestion, pneumothorax, or displaced fractures identified. NO EVIDENCE OF ACTIVE CARDIOPULMONARY DISEASE. SUSPECT COPD, WHICH IS BEST EVALUATED CLINICALLY. Ct Cervical Spine Wo Contrast    Result Date: 9/10/2020  CT cervical spine without intravenous contrast medium. HISTORY: Right arm pain since yesterday. TECHNICAL FACTORS: CT imaging cervical spine obtained and formatted as 2.5 mm contiguous axial images from skull base, to T3-T4. 2-D sagittal and coronal reconstruction obtained during postprocessing. No intravenous contrast medium utilized.  No prior imaging cervical spine  available for comparison. FINDINGS: Cervical vertebral bodies normal in height and alignment. Disc space narrowing C4-5 through C7-T1. Anterior and posterior osteophytes 4 through C6. No fracture, dislocation, bone lesion. Limited imaging lung apices without anomaly. No fracture. All CT scans at this facility use dose modulation, iterative reconstruction, and/or weight based dosing when appropriate to reduce radiation dose to as low as reasonably achievable. Xr Chest Portable    Result Date: 9/27/2020  XR CHEST PORTABLE : 9/27/2020 CLINICAL HISTORY:  SOB, asthma, respiratory distress . COMPARISON: 8/28/2020. TECHNIQUE: An upright portable AP radiograph of the chest was obtained FINDINGS: Shallow inspiratory volumes are present without significant infiltrate identified. There is no cardiomegaly, significant pleural effusion, vascular congestion, pneumothorax, or displaced fractures identified. Mild coarsening of the bronchovascular structures consistent with asthma is again noted. NO EVIDENCE OF ACTIVE CARDIOPULMONARY DISEASE. Us Dup Upper Extremity Right Venous    Result Date: 9/11/2020  RIGHT UPPER EXTREMITY DEEP VENOUS DUPLEX DOPPLER ULTRASOUND CLINICAL HISTORY:   R arm edema and pain   Arm Pain COMPARISON: None TECHNIQUE:  Gray scale ultrasound with compression maneuvers where accessible, color and spectral Doppler of the right internal jugular, subclavian, axillary, brachial, radial, and ulnar veins was performed. Grey scale with and without compression of the right basilic and cephalic veins was performed. Images were obtained and stored in a permanent archive. RESULT: RIGHT UPPER EXTREMITY DEEP VEINS: Internal Jugular vein: Normal compression, normal spontaneous flow, Subclavian vein:  Normal, spontaneous flow, normal response to augmentation.  Axillary vein:  Normal compression, normal spontaneous flow, Brachial vein:  Normal compression, normal spontaneous flow, Radial vein: Normal compression, normal spontaneous flow, Ulnar vein: Normal compression, normal spontaneous flow, RIGHT UPPER EXTREMITY SUPERFICIAL VEINS: Basilic vein: Normal compression Cephalic vein:  Normal compression     NEGATIVE STUDY FOR ACUTE DVT IN THE RIGHT UPPER EXTREMITY. NEGATIVE STUDY FOR SUPERFICIAL THROMBOPHLEBITIS IN THE RIGHT UPPER EXTREMITY     Us Dup Lower Extremities Bilateral Venous    Result Date: 8/29/2020  US DUP LOWER EXTREMITIES BILATERAL VENOUS : 8/28/2020 CLINICAL HISTORY: I20.9 Angina pectoris ([address] ICD10. Lower extremity swelling for 4 days, chest pain, shortness of breath and bilateral toe numbness. COMPARISON: None available. Grayscale, compression, color and waveform Doppler analysis of both lower extremity deep venous systems was performed with augmentation. FINDINGS: There is no deep venous thrombosis, abnormal masses, fluid collections or other findings of concern identified within either lower extremity. NO DVT IDENTIFIED IN EITHER LOWER EXTREMITY. Assessment and  plan:     1. Severe persistent asthma with exacerbation  2. Acute hypoxic respiratory failure secondary to above  3. Coronary artery disease  4. History of tobacco abuse quit 5 days ago  5. Obesity, KEITH  6. Hypertension  7. Diabetes mellitus    She is currently on 3 L O2 via nasal cannula ABG done showing pH of 7.45 with PCO2 31 PO2 62 saturation 93 bicarb 21.9. Continue O2 to keep saturation 90 to 92% or above. She is on nebulizer with DuoNeb every 4 hourly, and every 2 hours as needed. Add Pulmicort 0.5 mg twice daily. Continue Solu-Medrol 40 mg every 8 hourly. NicoDerm patch 21 mg. DVT and GI prophylaxis. Smoking cessation, weight loss, sleep study as outpatient. Patient had a PFT done recently which shows normal spirometry lung volume study and diffusion capacity. Patient is following with Dr. Shayy Tejada as outpatient.   Will follow    Thank you for this consult  Electronically signed by Sarah Bartholomew MD, FCCP on 9/27/2020 at 2:05 PM

## 2020-09-27 NOTE — ED NOTES
Placed patient on 3lnc   Pt resting comfortabley  No distress noted     Kevin Boggs RN  09/27/20 0763

## 2020-09-27 NOTE — H&P
Hospital Medicine  History and Physical    Patient:  Louis Alvarez  MRN: [id number]    CHIEF COMPLAINT:    Chief Complaint   Patient presents with    Asthma       History Obtained From:  Patient, EMR  Primary Care Physician: Anna Jaramillo MD    HISTORY OF PRESENT ILLNESS:   She presents with 1 day history of worsening dyspnea. She was felt well until the onset of symptoms: Yesterday while going to [employer] she parked her car very far away to create a challenge for herself to exercise-when she got into the [address] [place] [address] Street she was feeling very short of breath. This dyspnea continued into nighttime and she was having trouble sleeping-it even worsened this morning leading her to come to the emergency department. She denies any obvious fever or chills. She has never been intubated or hospitalized in the ICU before for her asthma. She does also admit to associated chest pressure radiating to her back, along with pain in her joints-in particular her knees. She does endorse nausea which she attributes to recently starting Chantix. She also endorses intermittent diarrhea which she attributes to metformin use. She denies urinary symptoms. She is a smoker however she quit 5 days ago-she recently started Chantix and nicotine patch. She denies alcohol or illicit drug use. She had RCA stent placed on 8/31 and reports 100% compliance with her antiplatelet therapy. She has history of diabetes, hypertension, hyperlipidemia. Past Medical History:      Diagnosis Date    Asthma 2004    Diabetes mellitus ([facility] [address] 2014    Hodgkin disease ([facility] [address] 1999    Hypertension [address]       Past Surgical History:  No past surgical history on file. Medications Prior to Admission:    Prior to Admission medications    Medication Sig Start Date End Date Taking? Authorizing Provider   varenicline (CHANTIX STARTING MONTH PAK) 0.5 MG X 11 & 1 MG X 42 tablet Take by mouth.  9/16/20   Krysta Baker MD nicotine (NICODERM CQ) 21 MG/24HR Place 1 patch onto the skin daily 9/16/20 10/28/20  Martha Orellana MD   furosemide (LASIX) 20 MG tablet Take 20 mg by mouth daily    Historical Provider, MD   potassium chloride (KLOR-CON M) 10 MEQ extended release tablet Take 10 mEq by mouth daily    Historical Provider, MD   lisinopril (PRINIVIL;ZESTRIL) 5 MG tablet Take 5 mg by mouth daily    Historical Provider, MD   dilTIAZem (CARDIZEM CD) 120 MG extended release capsule Take 1 capsule by mouth 2 times daily 8/31/20   Issac Franco MD   aspirin 81 MG EC tablet Take 1 tablet by mouth daily 9/1/20   Issac Franco MD   ticagrelor (BRILINTA) 90 MG TABS tablet Take 1 tablet by mouth 2 times daily 8/31/20   Issac Franco MD   omeprazole (PRILOSEC) 20 MG delayed release capsule Take 1 capsule by mouth every morning (before breakfast) 8/4/20   Martha Orellana MD   tiotropium (SPIRIVA RESPIMAT) 2.5 MCG/ACT AERS inhaler Inhale 2 puffs into the lungs daily 8/4/20   Martha Orellana MD   albuterol sulfate HFA (VENTOLIN HFA) 108 (90 Base) MCG/ACT inhaler Inhale 2 puffs into the lungs 4 times daily as needed for Wheezing  Patient taking differently: Inhale 1 puff into the lungs 4 times daily as needed for Wheezing  7/27/20   Andrey Moody MD   nitroGLYCERIN (NITROSTAT) 0.4 MG SL tablet up to max of 3 total doses. If no relief after 1 dose, call 911. Patient taking differently: Place 0.4 mg under the tongue every 5 minutes as needed up to max of 3 total doses. If no relief after 1 dose, call 911. 7/23/20   Edward Khanna MD   atorvastatin (LIPITOR) 80 MG tablet Take 1 tablet by mouth daily  Patient taking differently: Take 10 mg by mouth daily  2/27/19   Andrey Moody MD   Multiple Vitamin (MULTI-VITAMIN DAILY PO) Take 1 tablet by mouth daily     Historical Provider, MD       Allergies:  Shellfish-derived products    Social History:   TOBACCO:   reports that she has been smoking cigarettes. She has been smoking about 1.00 pack per day.  She has never used smokeless tobacco.  ETOH:   reports previous alcohol use. Family History:   No family history on file. REVIEW OF SYSTEMS:  Ten systems reviewed and negative except for stated in HPI    Physical Exam:    Vitals: /66   Pulse 106   Temp 99.5 °F (37.5 °C) (Oral)   Resp 22   Ht 5' 2\" (1.575 m)   Wt 196 lb 8 oz (89.1 kg)   SpO2 98%   BMI 35.94 kg/m²   General appearance: She is uncomfortable and in some degree of respiratory distress. She is able to speak approximately 4-5 words at a time but then will have to catch her breath. Skin: Skin color, texture, turgor normal. No rashes or lesions  HEENT: eomi, perrla. MMM  Neck: No JVD or lymphadenopathy  Lungs: Diffuse wheeze in all lung fields  Heart: RRR, no murmur or gallp  Abdomen: soft, nontender. Bsx4. No masses or organomegaly  Extremities: no edema, redness, or tenderness in calves. Neurologic: No focal deficits     Recent Labs     09/27/20  1027 09/27/20  1030   WBC  --  17.8*   HGB 14.0 13.8   PLT  --  247     Recent Labs     09/27/20  1027 09/27/20  1030   NA  --  132*   K  --  4.0   CL  --  98   CO2  --  22   BUN  --  9   CREATININE 0.7 0.60   GLUCOSE  --  185*   AST  --  17   ALT  --  22   BILITOT  --  0.8*   ALKPHOS  --  129     Troponin T: No results for input(s): TROPONINI in the last 72 hours. ABGs:   Lab Results   Component Value Date    PHART 7.454 09/27/2020    PO2ART 62 09/27/2020    GRM5WWZ 31 09/27/2020     INR: No results for input(s): INR in the last 72 hours. URINALYSIS:No results for input(s): NITRITE, COLORU, PHUR, LABCAST, WBCUA, RBCUA, MUCUS, TRICHOMONAS, YEAST, BACTERIA, CLARITYU, SPECGRAV, LEUKOCYTESUR, UROBILINOGEN, BILIRUBINUR, BLOODU, GLUCOSEU, AMORPHOUS in the last 72 hours. Invalid input(s): Zari Whitehead  -----------------------------------------------------------------   Xr Chest Portable    Result Date: 9/27/2020  XR CHEST PORTABLE : 9/27/2020 CLINICAL HISTORY:  SOB, asthma, respiratory distress .

## 2020-09-28 ENCOUNTER — APPOINTMENT (OUTPATIENT)
Dept: CT IMAGING | Age: 50
DRG: 141 | End: 2020-09-28
Payer: COMMERCIAL

## 2020-09-28 LAB
ANION GAP SERPL CALCULATED.3IONS-SCNC: 13 MEQ/L (ref 9–15)
BUN BLDV-MCNC: 11 MG/DL (ref 6–20)
CALCIUM SERPL-MCNC: 9.3 MG/DL (ref 8.5–9.9)
CHLORIDE BLD-SCNC: 104 MEQ/L (ref 95–107)
CO2: 21 MEQ/L (ref 20–31)
CREAT SERPL-MCNC: 0.51 MG/DL (ref 0.5–0.9)
GFR AFRICAN AMERICAN: >60
GFR NON-AFRICAN AMERICAN: >60
GLUCOSE BLD-MCNC: 214 MG/DL (ref 70–99)
GLUCOSE BLD-MCNC: 246 MG/DL (ref 60–115)
GLUCOSE BLD-MCNC: 272 MG/DL (ref 60–115)
GLUCOSE BLD-MCNC: 336 MG/DL (ref 60–115)
GLUCOSE BLD-MCNC: 338 MG/DL (ref 60–115)
LV EF: 60 %
LVEF MODALITY: NORMAL
PERFORMED ON: ABNORMAL
POTASSIUM REFLEX MAGNESIUM: 4.2 MEQ/L (ref 3.4–4.9)
SODIUM BLD-SCNC: 138 MEQ/L (ref 135–144)
TROPONIN: <0.01 NG/ML (ref 0–0.01)

## 2020-09-28 PROCEDURE — 99233 SBSQ HOSP IP/OBS HIGH 50: CPT | Performed by: INTERNAL MEDICINE

## 2020-09-28 PROCEDURE — 6370000000 HC RX 637 (ALT 250 FOR IP): Performed by: INTERNAL MEDICINE

## 2020-09-28 PROCEDURE — 94761 N-INVAS EAR/PLS OXIMETRY MLT: CPT

## 2020-09-28 PROCEDURE — 6360000002 HC RX W HCPCS: Performed by: INTERNAL MEDICINE

## 2020-09-28 PROCEDURE — 89220 SPUTUM SPECIMEN COLLECTION: CPT

## 2020-09-28 PROCEDURE — 80048 BASIC METABOLIC PNL TOTAL CA: CPT

## 2020-09-28 PROCEDURE — 84484 ASSAY OF TROPONIN QUANT: CPT

## 2020-09-28 PROCEDURE — 36415 COLL VENOUS BLD VENIPUNCTURE: CPT

## 2020-09-28 PROCEDURE — 94762 N-INVAS EAR/PLS OXIMTRY CONT: CPT

## 2020-09-28 PROCEDURE — 94760 N-INVAS EAR/PLS OXIMETRY 1: CPT

## 2020-09-28 PROCEDURE — 71250 CT THORAX DX C-: CPT

## 2020-09-28 PROCEDURE — 2580000003 HC RX 258: Performed by: INTERNAL MEDICINE

## 2020-09-28 PROCEDURE — 94640 AIRWAY INHALATION TREATMENT: CPT

## 2020-09-28 PROCEDURE — 93306 TTE W/DOPPLER COMPLETE: CPT

## 2020-09-28 PROCEDURE — 2700000000 HC OXYGEN THERAPY PER DAY

## 2020-09-28 PROCEDURE — 2060000000 HC ICU INTERMEDIATE R&B

## 2020-09-28 RX ORDER — VARENICLINE TARTRATE 1 MG/1
1 TABLET, FILM COATED ORAL 2 TIMES DAILY
Status: DISCONTINUED | OUTPATIENT
Start: 2020-09-28 | End: 2020-10-02 | Stop reason: HOSPADM

## 2020-09-28 RX ORDER — METHYLPREDNISOLONE SODIUM SUCCINATE 40 MG/ML
40 INJECTION, POWDER, LYOPHILIZED, FOR SOLUTION INTRAMUSCULAR; INTRAVENOUS DAILY
Status: DISCONTINUED | OUTPATIENT
Start: 2020-09-28 | End: 2020-10-01

## 2020-09-28 RX ORDER — INSULIN GLARGINE 100 [IU]/ML
10 INJECTION, SOLUTION SUBCUTANEOUS DAILY
Status: DISCONTINUED | OUTPATIENT
Start: 2020-09-28 | End: 2020-09-29

## 2020-09-28 RX ADMIN — PANTOPRAZOLE SODIUM 20 MG: 20 TABLET, DELAYED RELEASE ORAL at 06:34

## 2020-09-28 RX ADMIN — METHYLPREDNISOLONE SODIUM SUCCINATE 40 MG: 40 INJECTION, POWDER, FOR SOLUTION INTRAMUSCULAR; INTRAVENOUS at 12:09

## 2020-09-28 RX ADMIN — VARENICLINE TARTRATE 1 MG: 1 TABLET, FILM COATED ORAL at 11:46

## 2020-09-28 RX ADMIN — METHYLPREDNISOLONE SODIUM SUCCINATE 40 MG: 40 INJECTION, POWDER, FOR SOLUTION INTRAMUSCULAR; INTRAVENOUS at 06:34

## 2020-09-28 RX ADMIN — INSULIN LISPRO 4 UNITS: 100 INJECTION, SOLUTION INTRAVENOUS; SUBCUTANEOUS at 11:44

## 2020-09-28 RX ADMIN — BUDESONIDE 500 MCG: 0.5 INHALANT RESPIRATORY (INHALATION) at 20:07

## 2020-09-28 RX ADMIN — TICAGRELOR 90 MG: 90 TABLET ORAL at 08:48

## 2020-09-28 RX ADMIN — Medication 10 ML: at 06:35

## 2020-09-28 RX ADMIN — IPRATROPIUM BROMIDE AND ALBUTEROL SULFATE 1 AMPULE: .5; 3 SOLUTION RESPIRATORY (INHALATION) at 20:07

## 2020-09-28 RX ADMIN — BUDESONIDE 500 MCG: 0.5 INHALANT RESPIRATORY (INHALATION) at 07:00

## 2020-09-28 RX ADMIN — INSULIN LISPRO 3 UNITS: 100 INJECTION, SOLUTION INTRAVENOUS; SUBCUTANEOUS at 08:48

## 2020-09-28 RX ADMIN — IPRATROPIUM BROMIDE AND ALBUTEROL SULFATE 1 AMPULE: .5; 3 SOLUTION RESPIRATORY (INHALATION) at 14:22

## 2020-09-28 RX ADMIN — FUROSEMIDE 20 MG: 20 TABLET ORAL at 08:48

## 2020-09-28 RX ADMIN — IPRATROPIUM BROMIDE AND ALBUTEROL SULFATE 1 AMPULE: .5; 3 SOLUTION RESPIRATORY (INHALATION) at 07:00

## 2020-09-28 RX ADMIN — Medication 10 ML: at 09:00

## 2020-09-28 RX ADMIN — NITROGLYCERIN 1 INCH: 20 OINTMENT TOPICAL at 14:33

## 2020-09-28 RX ADMIN — INSULIN LISPRO 4 UNITS: 100 INJECTION, SOLUTION INTRAVENOUS; SUBCUTANEOUS at 16:22

## 2020-09-28 RX ADMIN — BENZOCAINE AND MENTHOL, UNSPECIFIED FORM 1 LOZENGE: 15; 3.6 LOZENGE ORAL at 18:00

## 2020-09-28 RX ADMIN — INSULIN GLARGINE 10 UNITS: 100 INJECTION, SOLUTION SUBCUTANEOUS at 11:44

## 2020-09-28 RX ADMIN — ASPIRIN 81 MG: 81 TABLET, COATED ORAL at 08:48

## 2020-09-28 RX ADMIN — LISINOPRIL 5 MG: 5 TABLET ORAL at 08:48

## 2020-09-28 RX ADMIN — DILTIAZEM HYDROCHLORIDE 120 MG: 120 CAPSULE, COATED, EXTENDED RELEASE ORAL at 08:48

## 2020-09-28 RX ADMIN — BENZOCAINE AND MENTHOL, UNSPECIFIED FORM 1 LOZENGE: 15; 3.6 LOZENGE ORAL at 22:39

## 2020-09-28 RX ADMIN — DILTIAZEM HYDROCHLORIDE 120 MG: 120 CAPSULE, COATED, EXTENDED RELEASE ORAL at 22:39

## 2020-09-28 RX ADMIN — NITROGLYCERIN 1 INCH: 20 OINTMENT TOPICAL at 22:40

## 2020-09-28 RX ADMIN — ENOXAPARIN SODIUM 40 MG: 40 INJECTION SUBCUTANEOUS at 08:50

## 2020-09-28 RX ADMIN — ATORVASTATIN CALCIUM 10 MG: 10 TABLET, FILM COATED ORAL at 08:48

## 2020-09-28 RX ADMIN — BENZOCAINE AND MENTHOL, UNSPECIFIED FORM 1 LOZENGE: 15; 3.6 LOZENGE ORAL at 11:12

## 2020-09-28 RX ADMIN — TICAGRELOR 90 MG: 90 TABLET ORAL at 22:39

## 2020-09-28 RX ADMIN — IPRATROPIUM BROMIDE AND ALBUTEROL SULFATE 1 AMPULE: .5; 3 SOLUTION RESPIRATORY (INHALATION) at 11:08

## 2020-09-28 RX ADMIN — Medication 10 ML: at 22:39

## 2020-09-28 ASSESSMENT — PAIN SCALES - GENERAL
PAINLEVEL_OUTOF10: 0

## 2020-09-28 ASSESSMENT — PULMONARY FUNCTION TESTS: PEFR_L/MIN: 150

## 2020-09-28 NOTE — FLOWSHEET NOTE
Patient just got up to the commode and after that she is very short of breath,her lung sounds have crackles through out lung campbell ,Natalie THOMPSON was in to administer her respiratory treatment. 20:45;She is seated on the bed and she voiced less short of breath,but coughing up thick yellow sputum      0000:patient VSS ,her lung sounds are diminished,the crackles that was heard earlier disappeared,she voiced that she is less short of breath     0630 patient expressed that she feel better this morning as compared to earlier on the shift. Amro Melendez

## 2020-09-28 NOTE — PROGRESS NOTES
INPATIENT PROGRESS NOTES    PATIENT NAME: Josh Martin  MRN: [id number]  SERVICE DATE:  September 28, 2020   SERVICE TIME:  10:49 AM      PRIMARY SERVICE: Pulmonary Disease    CHIEF COMPLAINTS: Shortness of breath    INTERVAL HPI: Patient seen and examined at bedside, Interval Notes, orders reviewed. Nursing notes noted    Patient report feeling better today, still has dyspnea on exertion but slightly improved, no chest pain, no coughing, she does report orthopnea but improved, no nausea no vomiting. Review of system:     GI Abdominal pain No  Skin Rash No    Social History     Tobacco Use    Smoking status: Current Every Day Smoker     Packs/day: 1.00     Types: Cigarettes    Smokeless tobacco: Never Used   Substance Use Topics    Alcohol use: Not Currently     No family history on file. OBJECTIVE    Body mass index is 35.94 kg/m². PHYSICAL EXAM:  Vitals:  /64   Pulse 97   Temp 97.9 °F (36.6 °C)   Resp 20   Ht 5' 2\" (1.575 m)   Wt 196 lb 8 oz (89.1 kg)   SpO2 97%   BMI 35.94 kg/m²     General: alert, cooperative, no distress  Head: normocephalic, atraumatic  Eyes:No gross abnormalities. ENT:  MMM no lesions  Neck:  supple and no masses  Chest : Good air movement, fine end expiratory wheezing, no rales, nontender, tympanic  Heart[de-identified] Heart sounds are normal.  Regular rate and rhythm without murmur, gallop or rub. ABD:  symmetric, soft, non-tender  Musculoskeletal : no cyanosis, no clubbing and no edema  Neuro:  Grossly normal  Skin: No rashes or nodules noted.   Lymph node:  no cervical nodes  Urology: No Ruiz   Psychiatric: appropriate    DATA:   Recent Labs     09/27/20  1027 09/27/20  1030   WBC  --  17.8*   HGB 14.0 13.8   HCT  --  40.7   MCV  --  90.4   PLT  --  247     Recent Labs     09/27/20  1030 09/28/20  0502   * 138   K 4.0 4.2   CL 98 104   CO2 22 21   BUN 9 11   CREATININE 0.60 0.51   GLUCOSE 185* 214*   CALCIUM 8.9 9.3   PROT 7.2  --    LABALBU 4.1  --    BILITOT 0.8* --    ALKPHOS 129  --    AST 17  --    ALT 22  --    LABGLOM >60.0 >60.0   GFRAA >60.0 >60.0   GLOB 3.1  --        MV Settings:          Recent Labs     09/27/20  1027   PHART 7.454*   EZF0WDM 31*   PO2ART 62*   SCX0IBU 21.9   BEART -2   [id number] 93*       O2 Device: Nasal cannula  O2 Flow Rate (L/min): 4 L/min(decreased to 3L)    DIET CARDIAC; Low Sodium (2 GM)     MEDICATIONS during current hospitalization:    Continuous Infusions:   dextrose         Scheduled Meds:   insulin glargine  10 Units Subcutaneous Daily    ticagrelor  90 mg Oral BID    aspirin  81 mg Oral Daily    sodium chloride flush  10 mL Intravenous 2 times per day    enoxaparin  40 mg Subcutaneous Daily    methylPREDNISolone  40 mg Intravenous Q6H    Followed by   Riley Free ON 9/29/2020] predniSONE  40 mg Oral Daily    insulin lispro  0-6 Units Subcutaneous TID WC    insulin lispro  0-3 Units Subcutaneous Nightly    budesonide  0.5 mg Nebulization BID    atorvastatin  10 mg Oral Daily    dilTIAZem  120 mg Oral BID    furosemide  20 mg Oral Daily    lisinopril  5 mg Oral Daily    nicotine  1 patch Transdermal Daily    pantoprazole  20 mg Oral QAM AC    ipratropium-albuterol  1 ampule Inhalation Q4H WA       PRN Meds:benzocaine-menthol, sodium chloride flush, acetaminophen **OR** acetaminophen, polyethylene glycol, promethazine **OR** ondansetron, albuterol, glucose, dextrose, glucagon (rDNA), dextrose    Radiology  Ct Cervical Spine Wo Contrast    Result Date: 9/10/2020  CT cervical spine without intravenous contrast medium. HISTORY: Right arm pain since yesterday. TECHNICAL FACTORS: CT imaging cervical spine obtained and formatted as 2.5 mm contiguous axial images from skull base, to T3-T4. 2-D sagittal and coronal reconstruction obtained during postprocessing. No intravenous contrast medium utilized. No prior imaging cervical spine  available for comparison. FINDINGS: Cervical vertebral bodies normal in height and alignment.  Disc space narrowing C4-5 through C7-T1. Anterior and posterior osteophytes 4 through C6. No fracture, dislocation, bone lesion. Limited imaging lung apices without anomaly. No fracture. All CT scans at this facility use dose modulation, iterative reconstruction, and/or weight based dosing when appropriate to reduce radiation dose to as low as reasonably achievable. Xr Chest Portable    Result Date: 9/27/2020  XR CHEST PORTABLE : 9/27/2020 CLINICAL HISTORY:  SOB, asthma, respiratory distress . COMPARISON: 8/28/2020. TECHNIQUE: An upright portable AP radiograph of the chest was obtained FINDINGS: Shallow inspiratory volumes are present without significant infiltrate identified. There is no cardiomegaly, significant pleural effusion, vascular congestion, pneumothorax, or displaced fractures identified. Mild coarsening of the bronchovascular structures consistent with asthma is again noted. NO EVIDENCE OF ACTIVE CARDIOPULMONARY DISEASE. Us Dup Upper Extremity Right Venous    Result Date: 9/11/2020  RIGHT UPPER EXTREMITY DEEP VENOUS DUPLEX DOPPLER ULTRASOUND CLINICAL HISTORY:   R arm edema and pain   Arm Pain COMPARISON: None TECHNIQUE:  Gray scale ultrasound with compression maneuvers where accessible, color and spectral Doppler of the right internal jugular, subclavian, axillary, brachial, radial, and ulnar veins was performed. Grey scale with and without compression of the right basilic and cephalic veins was performed. Images were obtained and stored in a permanent archive. RESULT: RIGHT UPPER EXTREMITY DEEP VEINS: Internal Jugular vein: Normal compression, normal spontaneous flow, Subclavian vein:  Normal, spontaneous flow, normal response to augmentation.  Axillary vein:  Normal compression, normal spontaneous flow, Brachial vein:  Normal compression, normal spontaneous flow, Radial vein: Normal compression, normal spontaneous flow, Ulnar vein: Normal compression, normal spontaneous flow, RIGHT UPPER EXTREMITY SUPERFICIAL VEINS: Basilic vein: Normal compression Cephalic vein:  Normal compression     NEGATIVE STUDY FOR ACUTE DVT IN THE RIGHT UPPER EXTREMITY. NEGATIVE STUDY FOR SUPERFICIAL THROMBOPHLEBITIS IN THE RIGHT UPPER EXTREMITY       Chest x-ray hazy interstitial changes at the bases but no infiltrate      IMPRESSION AND SUGGESTION:  Patient is at risk due to   · Probable asthma with acute exacerbation  · Need to rule out other etiologies, will obtain high-resolution CAT scan  · Smoking, patient quit recently she is on Chantix and will resume home dose  · Possible KEITH, she has sleep study and supposed to schedule sleep  · Obesity    Recommendations  · Solu-Medrol 40 mg IV daily  · Cardiology consult  · High-resolution CT scan  · Budesonide neb  · Continue DuoNeb  · Restart home dose Chantix  · Patient will need sleep study as outpatient  ·   methacholine challenge test as outpatient    Discussed with Dr. Bulmaro Erwin  I spent 35 min with this patient, greater the 50% of this time was spent in counseling and/or coordinating of care.     Electronically signed by Michael Price MD,  FCCP   on 9/28/2020 at 10:49 AM

## 2020-09-28 NOTE — PROGRESS NOTES
Physician Progress Note    9/28/2020   1:58 PM    Name:  Yamilet Cruz  MRN:    [id number]      Day: 1     Admit Date: 9/27/2020 10:11 AM  PCP: Nilton Esqueda MD    Code Status:  Full Code    Subjective:     Breathing improved. Coughing up yellow sputum.      Current Facility-Administered Medications   Medication Dose Route Frequency Provider Last Rate Last Dose    benzocaine-menthol (CEPACOL SORE THROAT) lozenge 1 lozenge  1 lozenge Oral Q2H PRN Adalberto Palmer MD   1 lozenge at 09/28/20 1112    insulin glargine (LANTUS) injection vial 10 Units  10 Units Subcutaneous Daily Miguel Meredith, DO   10 Units at 09/28/20 1144    varenicline (CHANTIX) tablet 1 mg  1 mg Oral BID Adalberto Palmer MD   1 mg at 09/28/20 1146    methylPREDNISolone sodium (SOLU-MEDROL) injection 40 mg  40 mg Intravenous Daily Adalberto Palmer MD   40 mg at 09/28/20 1209    nitroglycerin (NITRO-BID) 2 % ointment 1 inch  1 inch Topical 3 times per day Deniz Travis MD        ticagrelor (BRILINTA) tablet 90 mg  90 mg Oral BID Miguel Meredith, DO   90 mg at 09/28/20 0848    aspirin EC tablet 81 mg  81 mg Oral Daily Miguel Meredith, DO   81 mg at 09/28/20 0848    sodium chloride flush 0.9 % injection 10 mL  10 mL Intravenous 2 times per day Miguel Meredith, DO   10 mL at 09/28/20 0900    sodium chloride flush 0.9 % injection 10 mL  10 mL Intravenous PRN Miguel Ruizo, DO   10 mL at 09/28/20 9686    acetaminophen (TYLENOL) tablet 650 mg  650 mg Oral Q6H PRN Miguel Meredith, DO        Or    acetaminophen (TYLENOL) suppository 650 mg  650 mg Rectal Q6H PRN Miguel Meredith, DO        polyethylene glycol (GLYCOLAX) packet 17 g  17 g Oral Daily PRN Miguel Ruizo, DO        promethazine (PHENERGAN) tablet 12.5 mg  12.5 mg Oral Q6H PRN Miguel Ruizo, DO        Or    ondansetron [facility]) injection 4 mg  4 mg Intravenous Q6H PRN Miguel Ruizo, DO        enoxaparin (LOVENOX) injection 40 mg  40 mg Subcutaneous Daily Beverly Merritt Germán, DO   40 mg at 20 0850    albuterol (PROVENTIL) nebulizer solution 2.5 mg  2.5 mg Nebulization Q2H PRN Louellen Honey, DO        glucose (GLUTOSE) 40 % oral gel 15 g  15 g Oral PRN Louellen Honey, DO        dextrose 50 % IV solution  12.5 g Intravenous PRN Louellen Honey, DO        glucagon (rDNA) injection 1 mg  1 mg Intramuscular PRN Louellen Honey, DO        dextrose 5 % solution  100 mL/hr Intravenous PRN Louellen Honey, DO        insulin lispro (HUMALOG) injection vial 0-6 Units  0-6 Units Subcutaneous TID WC Louellen Honey, DO   4 Units at 20 1144    insulin lispro (HUMALOG) injection vial 0-3 Units  0-3 Units Subcutaneous Nightly Louellen Honey, DO        budesonide (PULMICORT) nebulizer suspension 500 mcg  0.5 mg Nebulization BID Ree Ray MD   500 mcg at 20 0700    atorvastatin (LIPITOR) tablet 10 mg  10 mg Oral Daily Louellen Honey, DO   10 mg at 20 0848    dilTIAZem (CARDIZEM CD) extended release capsule 120 mg  120 mg Oral BID Louellen Honey, DO   120 mg at 20 0848    furosemide (LASIX) tablet 20 mg  20 mg Oral Daily Louellen Honey, DO   20 mg at 20 0848    lisinopril (PRINIVIL;ZESTRIL) tablet 5 mg  5 mg Oral Daily Louellen Honey, DO   5 mg at 20 0848    nicotine (NICODERM CQ) 21 MG/24HR 1 patch  1 patch Transdermal Daily Louellen Honey, DO   1 patch at 20 0849    pantoprazole (PROTONIX) tablet 20 mg  20 mg Oral QAM AC Louellen Honey, DO   20 mg at 20 0058    ipratropium-albuterol (DUONEB) nebulizer solution 1 ampule  1 ampule Inhalation Q4H WA Louellen Honey, DO   1 ampule at 20 1108       Physical Examination:      Vitals:  /64   Pulse 97   Temp 97.9 °F (36.6 °C)   Resp 20   Ht 5' 2\" (1.575 m)   Wt 196 lb 8 oz (89.1 kg)   SpO2 97%   BMI 35.94 kg/m²   Temp (24hrs), Av °F (36.7 °C), Min:97.9 °F (36.6 °C), Max:98.1 °F (36.7 °C)      General appearance: alert, cooperative and mild distress- pausing after each sentence. obese  Mental Status: oriented to person, place and time and normal affect  Lungs: expiratory wheeze present much improved from admission  Heart: regular rate and rhythm, no murmur  Abdomen: soft, nontender, nondistended, bowel sounds present, no masses  Extremities: no edema, redness, tenderness in the calves  Skin: no gross lesions, rashes    Data:     Labs:  Recent Labs     09/27/20  1027 09/27/20  1030   WBC  --  17.8*   HGB 14.0 13.8   PLT  --  247     Recent Labs     09/27/20  1030 09/28/20  0502   * 138   K 4.0 4.2   CL 98 104   CO2 22 21   BUN 9 11   CREATININE 0.60 0.51   GLUCOSE 185* 214*     Recent Labs     09/27/20  1030   AST 17   ALT 22   BILITOT 0.8*   ALKPHOS 129       Assessment and Plan:        49-year-old female with obesity, asthma, CAD (s/p ERMIAS 8/31), tobacco use (quit 5 days prior to admission), type 2 diabetes, hypertension presents with 1 day history of worsening dyspnea.     1. Acute respiratory failure with hypoxia secondary to acute exacerbation of asthma: Symptoms started during visit to [address] [place] [address] Ave: patient improving  - IV steroids, beta agonist, and supportive respiratory care  - ACS ruled out  - pulmonology has ordered CT and consulted cardiology who have ordered stress test- I prefer to hold off on stress test while she is in an acute asthma exacerbation. Can reorder when patient improved or as outpatient.     CAD: Continue dual antiplatelet, statin  X9UN: SSI for now  HTN: cont home medications  H/o tobacco use  Obesity      Lovenox ppx  Full Code    Electronically signed by Park Apley, DO on 9/28/2020 at 1:58 PM

## 2020-09-28 NOTE — CONSULTS
[facility] Consult Note        Date of Consult:   2020    Patient:    Josh Martin    :    1970  CSN:    [id number]    Consulting Cardiologist: Sharita Martin MD     Primary Cardiologist: Desean Muñiz MD    Requesting Physician:  Lily Chua DO      Reason for Consult:  Chest Pain, Shortness of Breathe      Assessment:    Shortness of breath  Chest pain symptoms with exertion  Palpitations. Edema, bipedal  Negative troponins x3 to date. CAD, post RCA ERMIAS 2020, Known 50% LCX medically treated to date. Preserved left ventricular function, LVEF 75%  Hypertension  Hyperlipidemia  Diabetes  COPD  Nocturnal snoring / insomnia, probable sleep apnea  Obesity  Ongoing tobacco abuse    Plan:    1. Cardiac Supportive Care  2. Probable Respiratory primary illness, Pulmonary care. 3. Suggest Myoview Stress test in AM or as outpatient if discharged today. 4. Serial Labs. 5. Telemetry and ECG. 6. Continue current Medications. 7. Dr Roni Ross to reassume care upon her return. 8. Further Recommendations to follow  9. See Orders        HISTORY OF PRESENT ILLNESS:      Josh Martin is a pleasant 44 y.o. female who has the above-noted history including recent right coronary stent angioplasty 2020 by Dr. Roni Ross. She has preserved left ventricular function, hypertension, hyperlipidemia, diabetes and COPD. She has probable sleep apnea. She presented to the emergency room with 1 day history of worsening shortness of breath. Shortness of breath is with exertion. Her symptoms progressed and had troubles sleeping and therefore came to the emergency room. She is had no fever or chills. She said no productive cough or sputum production. She noted some mild chest discomfort symptoms radiating to her back. She has had no exertional chest pain symptoms. She has had some intermittent diarrhea. She quit smoking 5 days ago and started Chantix and NicoDerm patch.   She uses 3 L of nasal cannula oxygen at home. Given her cardiovascular history she was admitted for further evaluation and given her cardiac history she was asked to see cardiology. Patient follows with Kellie Dorantes MD, [address]. See attached notes below. Patient History and Records, EMR reviewed. Patient Interviewed and examined. Fair historian. She was conservatively treated has been seen by pulmonary medicine. She currently feels much improved. She still has some shortness of breath and is on oxygen. Her chest pain has resolved. Serial troponin levels have been negative. EKG is unremarkable. Telemetry is unremarkable. Denies LH, Dizziness, TIA or CVA Symptoms. No Orthopnea, Edema or CHF symptoms. No Palpitations. No Syncope. No Fever, Chills or Cold symptoms. No GI,  or Bleeding complaints. Cardiac and general ROS otherwise negative. [address] otherwise negative other than noted. Past Medical History:   Diagnosis Date    Asthma 2004    Diabetes mellitus ([facility] [address] [facility] 2014    Hodgkin disease ([facility] [address] 36    Hypertension 1984    Migraines 1989       No past surgical history on file. Prior to Admission medications    Medication Sig Start Date End Date Taking? Authorizing Provider   varenicline (CHANTIX STARTING MONTH PAK) 0.5 MG X 11 & 1 MG X 42 tablet Take by mouth.  9/16/20  Yes Cadence Child MD   nicotine (NICODERM CQ) 21 MG/24HR Place 1 patch onto the skin daily 9/16/20 10/28/20 Yes Cadence Child MD   furosemide (LASIX) 20 MG tablet Take 20 mg by mouth daily   Yes Historical Provider, MD   potassium chloride (KLOR-CON M) 10 MEQ extended release tablet Take 10 mEq by mouth daily   Yes Historical Provider, MD   lisinopril (PRINIVIL;ZESTRIL) 5 MG tablet Take 5 mg by mouth daily   Yes Historical Provider, MD   dilTIAZem (CARDIZEM CD) 120 MG extended release capsule Take 1 capsule by mouth 2 times daily 8/31/20  Yes Dinorah Gómez MD   aspirin 81 MG EC tablet Take 1 tablet by mouth daily 9/1/20  Yes Terese Correa Megan Cassidy MD   ticagrelor (BRILINTA) 90 MG TABS tablet Take 1 tablet by mouth 2 times daily 8/31/20  Yes Ирина Stanley MD   omeprazole (PRILOSEC) 20 MG delayed release capsule Take 1 capsule by mouth every morning (before breakfast) 8/4/20  Yes Lary Briones MD   tiotropium (SPIRIVA RESPIMAT) 2.5 MCG/ACT AERS inhaler Inhale 2 puffs into the lungs daily 8/4/20  Yes Lary Briones MD   albuterol sulfate HFA (VENTOLIN HFA) 108 (90 Base) MCG/ACT inhaler Inhale 2 puffs into the lungs 4 times daily as needed for Wheezing  Patient taking differently: Inhale 1 puff into the lungs 4 times daily as needed for Wheezing  7/27/20  Yes Clair Jones MD   nitroGLYCERIN (NITROSTAT) 0.4 MG SL tablet up to max of 3 total doses. If no relief after 1 dose, call 911. Patient taking differently: Place 0.4 mg under the tongue every 5 minutes as needed up to max of 3 total doses.  If no relief after 1 dose, call 911. 7/23/20  Yes Leandro Duong MD   atorvastatin (LIPITOR) 80 MG tablet Take 1 tablet by mouth daily  Patient taking differently: Take 10 mg by mouth daily  2/27/19  Yes Clair Jones MD       Scheduled Meds:   insulin glargine  10 Units Subcutaneous Daily    varenicline  1 mg Oral BID    methylPREDNISolone  40 mg Intravenous Daily    ticagrelor  90 mg Oral BID    aspirin  81 mg Oral Daily    sodium chloride flush  10 mL Intravenous 2 times per day    enoxaparin  40 mg Subcutaneous Daily    insulin lispro  0-6 Units Subcutaneous TID WC    insulin lispro  0-3 Units Subcutaneous Nightly    budesonide  0.5 mg Nebulization BID    atorvastatin  10 mg Oral Daily    dilTIAZem  120 mg Oral BID    furosemide  20 mg Oral Daily    lisinopril  5 mg Oral Daily    nicotine  1 patch Transdermal Daily    pantoprazole  20 mg Oral QAM AC    ipratropium-albuterol  1 ampule Inhalation Q4H WA     Continuous Infusions:   dextrose       PRN Meds:benzocaine-menthol, sodium chloride flush, acetaminophen **OR** acetaminophen, polyethylene glycol, promethazine **OR** ondansetron, albuterol, glucose, dextrose, glucagon (rDNA), dextrose    Allergies   Allergen Reactions    Shellfish-Derived Products        Social History     Socioeconomic History    Marital status: Single     Spouse name: Not on file    Number of children: Not on file    Years of education: Not on file    Highest education level: Not on file   Occupational History    Not on file   Social Needs    Financial resource strain: Not on file    Food insecurity     Worry: Not on file     Inability: Not on file    Transportation needs     Medical: Not on file     Non-medical: Not on file   Tobacco Use    Smoking status: Current Every Day Smoker     Packs/day: 1.00     Types: Cigarettes    Smokeless tobacco: Never Used   Substance and Sexual Activity    Alcohol use: Not Currently    Drug use: Never    Sexual activity: Not on file   Lifestyle    Physical activity     Days per week: Not on file     Minutes per session: Not on file    Stress: Not on file   Relationships    Social connections     Talks on phone: Not on file     Gets together: Not on file     Attends Synagogue service: Not on file     Active member of club or organization: Not on file     Attends meetings of clubs or organizations: Not on file     Relationship status: Not on file    Intimate partner violence     Fear of current or ex partner: Not on file     Emotionally abused: Not on file     Physically abused: Not on file     Forced sexual activity: Not on file   Other Topics Concern    Not on file   Social History Narrative    Not on file       No family history on file. Review Of Systems:    14 point ROS negative other than mentioned.      Physical Exam:    CURRENT VITALS: /64   Pulse 97   Temp 97.9 °F (36.6 °C)   Resp 20   Ht 5' 2\" (1.575 m)   Wt 196 lb 8 oz (89.1 kg)   SpO2 97%   BMI 35.94 kg/m²     CONSTITUTIONAL:  awake, alert, cooperative, no apparent distress,   ENT:  Normocephalic, without Calcium 9.3 8.5 - 9.9 mg/dL   POCT Glucose    Collection Time: 09/28/20  6:10 AM   Result Value Ref Range    POC Glucose 246 (H) 60 - 115 mg/dl    Performed on ACCU-CHEK    POCT Glucose    Collection Time: 09/28/20 11:12 AM   Result Value Ref Range    POC Glucose 338 (H) 60 - 115 mg/dl    Performed on ACCU-CHEK        ECG:     Pending     Myoview cardiac stress Test:  Pending. Adalberto Vargas MD  [facility] Cardiologist      Electronically signed on 9/28/20 at 11:46 AM EDT      -----     [facility] OV NOTE:    Subjective  PCP: Primary Care Physician is Dr. Lemus Harness:   08/28/2020 - Ssuanne Espinoza was seen in cardiac evaluation at the [facility] office 08/28/2020. The patients problems are listed in the impression below. Electronic medical records reviewed. I was asked to see this patient for Dr. Elmer Laguna in her absence. Patient has right greater than left worsening edema. She has some shortness of breath and chest pain particularly with exertion. She has a recent Myoview perfusion stress test which shows anterior apical ischemia with ejection fraction 75%. She has been scheduled for cardiac catheterization for next week. She otherwise feels well. She has no other cardiovascular complaints. Patient denies Lightheadedness, Dizziness, TIA or CVA symptoms. No CHF or Edema. No Palpitations. No GI,  or Bleeding Issues. No Recent Fever or Chills. Patient continues to smoke a half a pack of cigarettes per day. Cardiovascular and general review of systems is otherwise negative. A 14-system review is otherwise negative, other than noted. ELECTROCARDIOGRAM: Sinus rhythm rate 94 no acute changes. LABORATORY DATA: None    All above testing was personally reviewed. PHYSICAL EXAMINATION:   General: No acute distress. Vital signs as noted. Alert and oriented. Head And Neck Examination: No jugular venous distention, no carotid bruits, no mass.  Carotid upstrokes preserved. Oral mucosa moist. No xanthelasma. Head and neck examination otherwise unremarkable. Lungs: Clear to auscultation and percussion. No wheezes, no rales, and no rhonchi. Chest: Excursion appeared to be normal. No chest wall tenderness on palpation. Heart: Normal S1 and S2. No S3. No S4. No rub. Grade 1/6 systolic murmur, best heard at the left sternal border. Point of maximal impulse was within normal limits. Abdomen: Soft. Nontender. No organomegaly. No bruits. No masses. Obese. Extremities: Right greater than left 2+ bipedal edema. No clubbing. No cyanosis. Pulses are diminished throughout. No bruits. Musculoskeletal Exam: No ulcers, otherwise unremarkable. Neuro: Neurologically appeared grossly intact. IMPRESSION:     Shortness of breath  Chest pain symptoms with exertion  Edema, bipedal  Abnormal Myoview perfusion stress test with anterior apical ischemia by Lexiscan, LVEF 75% 8/2020. Preserved left ventricular function  Hypertension  Hyperlipidemia  Diabetes  COPD  Nocturnal snoring / insomnia, probable sleep apnea  Obesity  Ongoing tobacco abuse  Otherwise as per assessment below. RECOMMENDATIONS:     Would suggest that we adjust medications as follows: Lasix 20 mg daily, K-Dur 10 mEq daily increase Cartia 220 mg twice daily. Patient will continue other medications as current. Refills were provided. Agree with cardiac catheterization which is scheduled for next week with Dr. Dora Travis. Patient wishes to proceed. Patient should have a sleep study later for evaluation of her probable sleep apnea. I have instructed her to discontinue tobacco.    Exercise dietary program was encouraged after her cardiac catheterization is complete. Weight loss program.    Hydration. Follow my health portal was encouraged. We will plan to see back in 1 month with Dr. Dora Travis post cardiac catheterization with Laboratory Studies and ECG as noted below.      Patient will follow up with their primary physician for general care. The patient knows to contact medical care earlier if need be. Javi Jain MD, [address] / [address] [facility]      Of Note:  Quofore voice recognition dictation software was utilized partially in the preparation of this note, therefore, inaccuracies in spelling, word choice and punctuation may have occurred which were not recognized the time of signing. Chief Complaint  Cardiology Reason for Visit_NOH:   Additional Information: OV GM EKG. Active Problems   1. Abnormal stress test (794.39) (R94.39)   2. Angina pectoris (413.9) (I20.9)   3. Current every day smoker (305.1) (F17.200)   4. Diabetes mellitus (250.00) (E11.9)   5. Leg swelling (729.81) (M79.89)   6. Shortness of breath (786.05) (R06.02)   7. Tachycardia (785.0) (R00.0)    Family History   1. Family history of Aneurysm : Father   2. Family history of cerebrovascular accident (CVA) (V17.1) (Z82.3) : Father   3. Family history of diabetes mellitus (V18.0) (Z83.3) : Maternal Grandmother, Paternal   Grandmother   3. Family history of hypertension (V17.49) (Z82.49) : Maternal Grandmother, Paternal   Grandmother   11. Family history of malignant neoplasm of ovary (V16.41) (Z80.41) : Mother   6. Family history of myocardial infarction (V17.3) (Z82.49) : Maternal Grandfather, Paternal   Grandfather    Social History   · Current every day smoker (305.1) (F17.200)   · Daily caffeine consumption, 2-3 servings a day   · No alcohol use   · No illicit drug use    Current Meds   1. Albuterol Sulfate  (90 Base) MCG/ACT Inhalation Aerosol Solution; INHALE 1   PUFF EVERY 4 HOURS AS NEEDED; Therapy: 34Ugx2361 to Recorded   2. Aspirin Adult Low Dose 81 MG Oral Tablet Delayed Release; take 4 tablets today, then 1   tablet daily thereafter; Therapy: 87CUV2679 to (Evaluate:84Dhf3234); Last Rx:00Gxl0768 Ordered   3. Atorvastatin Calcium 10 MG Oral Tablet; TAKE 1 TABLET DAILY; Therapy: 88Bcn4000 to Recorded   4. Cartia  MG Oral Capsule Extended Release 24 Hour; TAKE 1 CAPSULE ONCE   DAILY; Therapy: 86Tqc2001 to Recorded   5. Lisinopril 5 MG Oral Tablet; TAKE 1 TABLET DAILY; Therapy: 93NHQ8968 to Recorded   6. metFORMIN HCl - 500 MG Oral Tablet; TAKE TWO TABS TWICE DAILY; Therapy: 95Lfy8078 to Recorded   7. Mood Formula TABS; Natrol Stress & Anxiety; Therapy: (Recorded:16Rta3812) to Recorded   8. Omeprazole 20 MG Oral Capsule Delayed Release; TAKE 1 CAPSULE DAILY EVERY   MORNING BEFORE BREAKFAST; Therapy: 69Yha1880 to Recorded   9. Spiriva Respimat 2.5 MCG/ACT Inhalation Aerosol Solution; TAKE 2 INHALATIONS DAILY; Therapy: 05Ipy5024 to Recorded   10. St Johns Wort 1000 MG Oral Capsule; TAKE AS DIRECTED; Therapy: (Recorded:39Oxi3902) to Recorded    Allergies   1. Shellfish-derived Products   Anaphylaxis;; Updated By: Dario Benson; 08/27/2020 10:33:56 AM    Vitals  Vital Signs   Recorded: 28Aug2020 10:44AM   Height: 5 ft 2 in  Weight: 211 lb   BMI Calculated: 38.59 kg/m2  BSA Calculated: 1.96  Falls Screening: No falls within the past year  Blood Pressure: 116 / 60, RUE, Sitting  Heart Rate: 94, Apical    Procedure    EKG done in office today; :   .     Plan   1. Stop: Cartia  MG Oral Capsule Extended Release 24 Hour   2. Start: Cartia  MG Oral Capsule Extended Release 24 Hour; TAKE 1 CAPSULE   TWICE DAILY   3. Start: Furosemide 20 MG Oral Tablet (Lasix); take 1 tablet daily   4. Start: Potassium Chloride Carrol ER 10 MEQ Oral Tablet Extended Release; TAKE 1   TABLET DAILY   5. D-Dimer; Status:Active; Requested for:28Aug2020;    6. High Sensitivity CRP; Status:Active; Requested for:28Aug2020;    7. Lifestyle education regarding diet; Status:Complete;   Done: 28Aug2020   8. Lipid Panel w/Reflex LDL; Status:Active; Requested for:28Aug2020;    9. LIVER PANEL; Status:Active; Requested for:28Aug2020;    10. Magnesium; Status:Active; Requested for:28Aug2020;    11. TSH; Status:Active;  Requested for:35Jro2243;    12. Venous Duplex (bilateral legs); Status:Active; Requested for:18Wym7044;    13. XRay Chest, PA/LAT; Status:Active; Requested for:37Kjs6015;    14. Access your medical record, request prescriptions, view laboratory and testing done in    our office, request appointments, view immunization records and message your provider    through our safe and secure patient portal. Ask a staff member how    to register or visit us at [email or web address] to get started today.;    Status:Complete;   Done: 56Yyg1059   15. Begin or continue regular aerobic exercise. Gradually work up to at least 3 sessions of    30 minutes of exercise a week.; Status:Active; Requested for:17Anw0465;    16. Counseled on smoking cessation; Status:Complete;   Done: 30Orh5609   17. Drink plenty of fluids.; Status:Active; Requested for:30Vup9765;    18. Please bring all medicines, vitamins, and herbal supplements with you when you come    to the office.; Status:Active; Requested for:64Adh4306;    19. Please bring any lab results from other providers/physicians to your next appointment.;    Status:Active; Requested for:38Ygd6698;    20. Prescriptions will not be filled unless you are compliant with your follow up appointments    or have a follow up appointments scheduled as per the instruction of your physician. Refills for medications should be requested at the time of your office visit.; Status:Active; Requested for:38Xep0759;    21. Thank you for being a patient at [facility]. Our goal is to    provide high quality medical care. Following today's visit, please check your email for an    invitation to complete our patient satisfaction survey. By sharing your feedback, you will    help us continue our mission to provide excellent medical care.  Your participation in the    survey is voluntary and completely confidential. We appreciate your thoughts regarding    today's visit.; Status:Active; Requested for:32Awq0253;    22. Tobacco use Hx Reported; Status:Complete;   Done: 25Dxm9234   23. You are overweight. Please increase activity level and reduce calorie intake. Please    inform the staff if you are willing to go for dietary counseling; Status:Active; Requested    for:98Vne4806;    24. You need to quit smoking.; Status:Active; Requested for:49Npz0445;    25. 1 week follow-up/call if interval problems. Evaluation and Treatment  Follow-up  Status:    Active  Requested for: 80Pre0522   26. Follow up per family physician. Evaluation and Treatment  Follow-up  Status: Active     Requested for: 91Rzf1514    Discussion/Summary  Increase Cardizem to 120 mg twice daily  Start Lasix 20 mg daily  Start Potassium 10 meq daily     Signatures  Electronically signed by : Ayala Proctor, ; Aug 28 2020 10:48AM EST                        Electronically signed by : Neta Apley, LPN;  Aug 28 2020 11:58AM EST                        Electronically signed by : Cassidy Fung M.D.; Sep  3 2020  7:16PM EST

## 2020-09-29 ENCOUNTER — APPOINTMENT (OUTPATIENT)
Dept: GENERAL RADIOLOGY | Age: 50
DRG: 141 | End: 2020-09-29
Payer: COMMERCIAL

## 2020-09-29 LAB
ANION GAP SERPL CALCULATED.3IONS-SCNC: 12 MEQ/L (ref 9–15)
BASOPHILS ABSOLUTE: 0 K/UL (ref 0–0.2)
BASOPHILS RELATIVE PERCENT: 0.1 %
BUN BLDV-MCNC: 16 MG/DL (ref 6–20)
C-REACTIVE PROTEIN, HIGH SENSITIVITY: 46.6 MG/L (ref 0–5)
CALCIUM SERPL-MCNC: 8.9 MG/DL (ref 8.5–9.9)
CHLORIDE BLD-SCNC: 103 MEQ/L (ref 95–107)
CO2: 23 MEQ/L (ref 20–31)
CREAT SERPL-MCNC: 0.53 MG/DL (ref 0.5–0.9)
EKG ATRIAL RATE: 123 BPM
EKG ATRIAL RATE: 88 BPM
EKG P AXIS: 60 DEGREES
EKG P AXIS: 62 DEGREES
EKG P-R INTERVAL: 154 MS
EKG P-R INTERVAL: 182 MS
EKG Q-T INTERVAL: 306 MS
EKG Q-T INTERVAL: 362 MS
EKG QRS DURATION: 72 MS
EKG QRS DURATION: 80 MS
EKG QTC CALCULATION (BAZETT): 438 MS
EKG QTC CALCULATION (BAZETT): 438 MS
EKG R AXIS: 39 DEGREES
EKG R AXIS: 48 DEGREES
EKG T AXIS: 57 DEGREES
EKG T AXIS: 63 DEGREES
EKG VENTRICULAR RATE: 123 BPM
EKG VENTRICULAR RATE: 88 BPM
EOSINOPHILS ABSOLUTE: 0 K/UL (ref 0–0.7)
EOSINOPHILS RELATIVE PERCENT: 0 %
GFR AFRICAN AMERICAN: >60
GFR NON-AFRICAN AMERICAN: >60
GLUCOSE BLD-MCNC: 238 MG/DL (ref 70–99)
GLUCOSE BLD-MCNC: 240 MG/DL (ref 60–115)
GLUCOSE BLD-MCNC: 260 MG/DL (ref 60–115)
GLUCOSE BLD-MCNC: 276 MG/DL (ref 60–115)
GLUCOSE BLD-MCNC: 291 MG/DL (ref 60–115)
HBA1C MFR BLD: 8.6 % (ref 4.8–5.9)
HCT VFR BLD CALC: 35.2 % (ref 37–47)
HEMOGLOBIN: 11.7 G/DL (ref 12–16)
LYMPHOCYTES ABSOLUTE: 0.9 K/UL (ref 1–4.8)
LYMPHOCYTES RELATIVE PERCENT: 5.9 %
MCH RBC QN AUTO: 30.8 PG (ref 27–31.3)
MCHC RBC AUTO-ENTMCNC: 33.2 % (ref 33–37)
MCV RBC AUTO: 92.5 FL (ref 82–100)
MONOCYTES ABSOLUTE: 0.7 K/UL (ref 0.2–0.8)
MONOCYTES RELATIVE PERCENT: 4.8 %
NEUTROPHILS ABSOLUTE: 13.7 K/UL (ref 1.4–6.5)
NEUTROPHILS RELATIVE PERCENT: 89.2 %
PDW BLD-RTO: 13.5 % (ref 11.5–14.5)
PERFORMED ON: ABNORMAL
PLATELET # BLD: 246 K/UL (ref 130–400)
POTASSIUM REFLEX MAGNESIUM: 4.1 MEQ/L (ref 3.4–4.9)
RBC # BLD: 3.81 M/UL (ref 4.2–5.4)
SARS-COV-2, NAAT: NOT DETECTED
SEDIMENTATION RATE, ERYTHROCYTE: 74 MM (ref 0–30)
SODIUM BLD-SCNC: 138 MEQ/L (ref 135–144)
TROPONIN: <0.01 NG/ML (ref 0–0.01)
WBC # BLD: 15.4 K/UL (ref 4.8–10.8)

## 2020-09-29 PROCEDURE — 93005 ELECTROCARDIOGRAM TRACING: CPT | Performed by: INTERNAL MEDICINE

## 2020-09-29 PROCEDURE — 71045 X-RAY EXAM CHEST 1 VIEW: CPT

## 2020-09-29 PROCEDURE — 6370000000 HC RX 637 (ALT 250 FOR IP): Performed by: INTERNAL MEDICINE

## 2020-09-29 PROCEDURE — 87205 SMEAR GRAM STAIN: CPT

## 2020-09-29 PROCEDURE — 6360000002 HC RX W HCPCS: Performed by: INTERNAL MEDICINE

## 2020-09-29 PROCEDURE — 87185 SC STD ENZYME DETCJ PER NZM: CPT

## 2020-09-29 PROCEDURE — 87077 CULTURE AEROBIC IDENTIFY: CPT

## 2020-09-29 PROCEDURE — 85025 COMPLETE CBC W/AUTO DIFF WBC: CPT

## 2020-09-29 PROCEDURE — 94761 N-INVAS EAR/PLS OXIMETRY MLT: CPT

## 2020-09-29 PROCEDURE — 2060000000 HC ICU INTERMEDIATE R&B

## 2020-09-29 PROCEDURE — 87070 CULTURE OTHR SPECIMN AEROBIC: CPT

## 2020-09-29 PROCEDURE — 83036 HEMOGLOBIN GLYCOSYLATED A1C: CPT

## 2020-09-29 PROCEDURE — 99233 SBSQ HOSP IP/OBS HIGH 50: CPT | Performed by: INTERNAL MEDICINE

## 2020-09-29 PROCEDURE — 2580000003 HC RX 258: Performed by: INTERNAL MEDICINE

## 2020-09-29 PROCEDURE — 80048 BASIC METABOLIC PNL TOTAL CA: CPT

## 2020-09-29 PROCEDURE — 84484 ASSAY OF TROPONIN QUANT: CPT

## 2020-09-29 PROCEDURE — 94640 AIRWAY INHALATION TREATMENT: CPT

## 2020-09-29 PROCEDURE — 86141 C-REACTIVE PROTEIN HS: CPT

## 2020-09-29 PROCEDURE — U0002 COVID-19 LAB TEST NON-CDC: HCPCS

## 2020-09-29 PROCEDURE — 36415 COLL VENOUS BLD VENIPUNCTURE: CPT

## 2020-09-29 PROCEDURE — 85652 RBC SED RATE AUTOMATED: CPT

## 2020-09-29 RX ORDER — LEVOFLOXACIN 5 MG/ML
750 INJECTION, SOLUTION INTRAVENOUS EVERY 24 HOURS
Status: DISCONTINUED | OUTPATIENT
Start: 2020-09-29 | End: 2020-10-01

## 2020-09-29 RX ORDER — INSULIN GLARGINE 100 [IU]/ML
10 INJECTION, SOLUTION SUBCUTANEOUS 2 TIMES DAILY
Status: DISCONTINUED | OUTPATIENT
Start: 2020-09-29 | End: 2020-10-02 | Stop reason: HOSPADM

## 2020-09-29 RX ADMIN — ACETAMINOPHEN 650 MG: 325 TABLET, FILM COATED ORAL at 22:14

## 2020-09-29 RX ADMIN — NITROGLYCERIN 1 INCH: 20 OINTMENT TOPICAL at 21:56

## 2020-09-29 RX ADMIN — INSULIN LISPRO 3 UNITS: 100 INJECTION, SOLUTION INTRAVENOUS; SUBCUTANEOUS at 12:28

## 2020-09-29 RX ADMIN — FUROSEMIDE 20 MG: 20 TABLET ORAL at 09:39

## 2020-09-29 RX ADMIN — VARENICLINE TARTRATE 1 MG: 1 TABLET, FILM COATED ORAL at 00:01

## 2020-09-29 RX ADMIN — INSULIN LISPRO 3 UNITS: 100 INJECTION, SOLUTION INTRAVENOUS; SUBCUTANEOUS at 17:32

## 2020-09-29 RX ADMIN — ENOXAPARIN SODIUM 40 MG: 40 INJECTION SUBCUTANEOUS at 09:41

## 2020-09-29 RX ADMIN — IPRATROPIUM BROMIDE AND ALBUTEROL SULFATE 1 AMPULE: .5; 3 SOLUTION RESPIRATORY (INHALATION) at 07:13

## 2020-09-29 RX ADMIN — Medication 10 ML: at 22:14

## 2020-09-29 RX ADMIN — TICAGRELOR 90 MG: 90 TABLET ORAL at 09:39

## 2020-09-29 RX ADMIN — IPRATROPIUM BROMIDE AND ALBUTEROL 1 PUFF: 20; 100 SPRAY, METERED RESPIRATORY (INHALATION) at 19:22

## 2020-09-29 RX ADMIN — BENZOCAINE AND MENTHOL, UNSPECIFIED FORM 1 LOZENGE: 15; 3.6 LOZENGE ORAL at 22:14

## 2020-09-29 RX ADMIN — ACETAMINOPHEN 650 MG: 325 TABLET, FILM COATED ORAL at 10:01

## 2020-09-29 RX ADMIN — VARENICLINE TARTRATE 1 MG: 1 TABLET, FILM COATED ORAL at 11:39

## 2020-09-29 RX ADMIN — INSULIN LISPRO 2 UNITS: 100 INJECTION, SOLUTION INTRAVENOUS; SUBCUTANEOUS at 09:47

## 2020-09-29 RX ADMIN — Medication 10 ML: at 09:41

## 2020-09-29 RX ADMIN — METHYLPREDNISOLONE SODIUM SUCCINATE 40 MG: 40 INJECTION, POWDER, FOR SOLUTION INTRAMUSCULAR; INTRAVENOUS at 09:40

## 2020-09-29 RX ADMIN — ATORVASTATIN CALCIUM 10 MG: 10 TABLET, FILM COATED ORAL at 09:39

## 2020-09-29 RX ADMIN — LISINOPRIL 5 MG: 5 TABLET ORAL at 09:39

## 2020-09-29 RX ADMIN — LEVOFLOXACIN 750 MG: 5 INJECTION, SOLUTION INTRAVENOUS at 11:38

## 2020-09-29 RX ADMIN — DILTIAZEM HYDROCHLORIDE 120 MG: 120 CAPSULE, COATED, EXTENDED RELEASE ORAL at 21:56

## 2020-09-29 RX ADMIN — IPRATROPIUM BROMIDE AND ALBUTEROL SULFATE 1 AMPULE: .5; 3 SOLUTION RESPIRATORY (INHALATION) at 11:28

## 2020-09-29 RX ADMIN — INSULIN GLARGINE 10 UNITS: 100 INJECTION, SOLUTION SUBCUTANEOUS at 09:46

## 2020-09-29 RX ADMIN — BUDESONIDE 500 MCG: 0.5 INHALANT RESPIRATORY (INHALATION) at 07:13

## 2020-09-29 RX ADMIN — INSULIN GLARGINE 10 UNITS: 100 INJECTION, SOLUTION SUBCUTANEOUS at 22:00

## 2020-09-29 RX ADMIN — DILTIAZEM HYDROCHLORIDE 120 MG: 120 CAPSULE, COATED, EXTENDED RELEASE ORAL at 09:39

## 2020-09-29 RX ADMIN — NITROGLYCERIN 1 INCH: 20 OINTMENT TOPICAL at 06:05

## 2020-09-29 RX ADMIN — PANTOPRAZOLE SODIUM 20 MG: 20 TABLET, DELAYED RELEASE ORAL at 06:05

## 2020-09-29 RX ADMIN — TICAGRELOR 90 MG: 90 TABLET ORAL at 21:56

## 2020-09-29 RX ADMIN — ASPIRIN 81 MG: 81 TABLET, COATED ORAL at 09:39

## 2020-09-29 ASSESSMENT — PAIN SCALES - GENERAL
PAINLEVEL_OUTOF10: 5
PAINLEVEL_OUTOF10: 6

## 2020-09-29 NOTE — PROGRESS NOTES
Physician Progress Note    9/29/2020   2:40 PM    Name:  Ryan Avila  MRN:    [id number]      Day: 2     Admit Date: 9/27/2020 10:11 AM  PCP: Candy Coleman MD    Code Status:  Full Code    Subjective:     Breathing improved. Coughing up yellow sputum.      Current Facility-Administered Medications   Medication Dose Route Frequency Provider Last Rate Last Dose    levoFLOXacin (LEVAQUIN) 750 MG/150ML infusion 750 mg  750 mg Intravenous Q24H Sheeba Junior  mL/hr at 09/29/20 1138 750 mg at 09/29/20 1138    benzocaine-menthol (CEPACOL SORE THROAT) lozenge 1 lozenge  1 lozenge Oral Q2H PRN Sheeba Junior MD   1 lozenge at 09/28/20 2239    insulin glargine (LANTUS) injection vial 10 Units  10 Units Subcutaneous Daily Francella Needs, DO   10 Units at 09/29/20 0946    varenicline (CHANTIX) tablet 1 mg  1 mg Oral BID Sheeba Junior MD   1 mg at 09/29/20 1139    methylPREDNISolone sodium (SOLU-MEDROL) injection 40 mg  40 mg Intravenous Daily Sheeba Junior MD   40 mg at 09/29/20 0940    nitroglycerin (NITRO-BID) 2 % ointment 1 inch  1 inch Topical 3 times per day Ced Ruiz MD   1 inch at 09/29/20 0605    ticagrelor (BRILINTA) tablet 90 mg  90 mg Oral BID Francella Needs, DO   90 mg at 09/29/20 1142    aspirin EC tablet 81 mg  81 mg Oral Daily Francella Needs, DO   81 mg at 09/29/20 1705    sodium chloride flush 0.9 % injection 10 mL  10 mL Intravenous 2 times per day Francella Needs, DO   10 mL at 09/29/20 0941    sodium chloride flush 0.9 % injection 10 mL  10 mL Intravenous PRN Francella Needs, DO   10 mL at 09/28/20 1527    acetaminophen (TYLENOL) tablet 650 mg  650 mg Oral Q6H PRN Francella Needs, DO   650 mg at 09/29/20 1001    Or    acetaminophen (TYLENOL) suppository 650 mg  650 mg Rectal Q6H PRN Francella Needs, DO        polyethylene glycol (GLYCOLAX) packet 17 g  17 g Oral Daily PRN Lit Needs, DO        promethazine (PHENERGAN) tablet 12.5 mg  12.5 mg Oral Q6H PRN Savannah Cove, DO        Or    ondansetron [facility]) injection 4 mg  4 mg Intravenous Q6H PRN Savannah Cove, DO        enoxaparin (LOVENOX) injection 40 mg  40 mg Subcutaneous Daily Savannah Cove, DO   40 mg at 09/29/20 0941    albuterol (PROVENTIL) nebulizer solution 2.5 mg  2.5 mg Nebulization Q2H PRN Savannah Cove, DO        glucose (GLUTOSE) 40 % oral gel 15 g  15 g Oral PRN Savannah Cove, DO        dextrose 50 % IV solution  12.5 g Intravenous PRN Savannah Cove, DO        glucagon (rDNA) injection 1 mg  1 mg Intramuscular PRN Savannah Cove, DO        dextrose 5 % solution  100 mL/hr Intravenous PRN Savannah Cove, DO        insulin lispro (HUMALOG) injection vial 0-6 Units  0-6 Units Subcutaneous TID WC Saavnnah Meriden, DO   3 Units at 09/29/20 1228    insulin lispro (HUMALOG) injection vial 0-3 Units  0-3 Units Subcutaneous Nightly Savannah Meriden, DO   2 Units at 09/28/20 2241    budesonide (PULMICORT) nebulizer suspension 500 mcg  0.5 mg Nebulization BID Cody Walsh MD   500 mcg at 09/29/20 0713    atorvastatin (LIPITOR) tablet 10 mg  10 mg Oral Daily Savannah Cove, DO   10 mg at 09/29/20 6986    dilTIAZem (CARDIZEM CD) extended release capsule 120 mg  120 mg Oral BID Savannah Cove, DO   120 mg at 09/29/20 1288    furosemide (LASIX) tablet 20 mg  20 mg Oral Daily Savannah Cove, DO   20 mg at 09/29/20 5916    lisinopril (PRINIVIL;ZESTRIL) tablet 5 mg  5 mg Oral Daily Savannah Cove, DO   5 mg at 09/29/20 7595    nicotine (NICODERM CQ) 21 MG/24HR 1 patch  1 patch Transdermal Daily Savannah Cove, DO   1 patch at 09/29/20 0941    pantoprazole (PROTONIX) tablet 20 mg  20 mg Oral QAM AC Savannah Cove, DO   20 mg at 09/29/20 5701    ipratropium-albuterol (DUONEB) nebulizer solution 1 ampule  1 ampule Inhalation Q4H NICOLE Cagle DO   1 ampule at 09/29/20 1128       Physical Examination:      Vitals:  /62   Pulse 100   Temp 97.9 °F (36.6 °C) (Oral) Resp 18   Ht 5' 2\" (1.575 m)   Wt 196 lb 8 oz (89.1 kg)   SpO2 96%   BMI 35.94 kg/m²   Temp (24hrs), Av.9 °F (36.6 °C), Min:97.9 °F (36.6 °C), Max:97.9 °F (36.6 °C)      General appearance: alert, cooperative and mild distress- pausing after each sentence. obese  Mental Status: oriented to person, place and time and normal affect  Lungs: expiratory wheeze present much improved from admission  Heart: regular rate and rhythm, no murmur  Abdomen: soft, nontender, nondistended, bowel sounds present, no masses  Extremities: no edema, redness, tenderness in the calves  Skin: no gross lesions, rashes    Data:     Labs:  Recent Labs     20  1030 20  0533   WBC 17.8* 15.4*   HGB 13.8 11.7*    246     Recent Labs     20  0502 20  0534    138   K 4.2 4.1    103   CO2 21 23   BUN 11 16   CREATININE 0.51 0.53   GLUCOSE 214* 238*     Recent Labs     20  1030   AST 17   ALT 22   BILITOT 0.8*   ALKPHOS 129                 Assessment and Plan:        20-year-old female with obesity, asthma, CAD (s/p ERMIAS ), tobacco use (quit 5 days prior to admission), type 2 diabetes, hypertension presents with 1 day history of worsening dyspnea.     1. Acute respiratory failure with hypoxia secondary to acute exacerbation of asthma with probable community acquired pneumonia:  Improving  - CT reviewed- images shown above- I will discuss with pulmonology if he feels further viral testing needs to be performed  - IV steroids, beta agonist, antibiotics and supportive respiratory care    CAD: Continue dual antiplatelet, statin.  ACS ruled out  T2DM with hyperglycemia: Escalate lantus (on metformin at home- not on home medication list)  HTN: cont home medications  H/o tobacco use  Obesity      Lovenox ppx  Full Code    Electronically signed by Byron Wagner DO on 2020 at 2:40 PM

## 2020-09-29 NOTE — CARE COORDINATION
PLAN DISCUSSED IN AM ROUNDS. PATIENT IN ISOLATION. PT FROM HOME INDEPENDENT. DENIES NEEDS. PLAN REMAINS HOME.

## 2020-09-29 NOTE — PROGRESS NOTES
Alert & oriented x4. Patient continues to cough up green/yellow sputum, with crackles heard bilaterally. Remains on 3L NC with Sp02 at 98%. Patient has throat pain (7/10) that was relieved with benzocaine-menthol. Patient is content and laying in semi-fowlers.

## 2020-09-29 NOTE — PROGRESS NOTES
INPATIENT PROGRESS NOTES    PATIENT NAME: Ryan Avila  MRN: [id number]  SERVICE DATE:  September 29, 2020   SERVICE TIME:  9:54 AM      PRIMARY SERVICE: Pulmonary Disease    CHIEF COMPLAINTS: Shortness of breath    INTERVAL HPI: Patient seen and examined at bedside, Interval Notes, orders reviewed. Nursing notes noted    Patient still wheezing tight and short of breath, denies chest pain, no fever, slept okay, no nausea no vomiting no worsening lower extremity edema. She has cough productive of yellow dark/brown phlegm    Review of system:     GI Abdominal pain No  Skin Rash No    Social History     Tobacco Use    Smoking status: Current Every Day Smoker     Packs/day: 1.00     Types: Cigarettes    Smokeless tobacco: Never Used   Substance Use Topics    Alcohol use: Not Currently     No family history on file. OBJECTIVE    Body mass index is 35.94 kg/m². PHYSICAL EXAM:  Vitals:  /62   Pulse 100   Temp 97.9 °F (36.6 °C) (Oral)   Resp 18   Ht 5' 2\" (1.575 m)   Wt 196 lb 8 oz (89.1 kg)   SpO2 94%   BMI 35.94 kg/m²     General: alert, cooperative, no distress  Head: normocephalic, atraumatic  Eyes:No gross abnormalities. ENT:  MMM no lesions  Neck:  supple and no masses  Chest : More tight today, with minimal rales, and wheezing, nontender, tympanic  Heart[de-identified] Heart sounds are normal.  Regular rate and rhythm without murmur, gallop or rub. ABD:  symmetric, soft, non-tender  Musculoskeletal : no cyanosis, no clubbing and no edema  Neuro:  Grossly normal  Skin: No rashes or nodules noted.   Lymph node:  no cervical nodes  Urology: No Ruiz   Psychiatric: appropriate    DATA:   Recent Labs     09/27/20  1030 09/29/20  0533   WBC 17.8* 15.4*   HGB 13.8 11.7*   HCT 40.7 35.2*   MCV 90.4 92.5    246     Recent Labs     09/27/20  1030 09/28/20  0502 09/29/20  0534   * 138 138   K 4.0 4.2 4.1   CL 98 104 103   CO2 22 21 23   BUN 9 11 16   CREATININE 0.60 0.51 0.53   GLUCOSE 185* 214* 238* CALCIUM 8.9 9.3 8.9   PROT 7.2  --   --    LABALBU 4.1  --   --    BILITOT 0.8*  --   --    ALKPHOS 129  --   --    AST 17  --   --    ALT 22  --   --    LABGLOM >60.0 >60.0 >60.0   GFRAA >60.0 >60.0 >60.0   GLOB 3.1  --   --        MV Settings:          Recent Labs     09/27/20  1027   PHART 7.454*   [id number] 31*   PO2ART 62*   [id number] 21.9   BEART -2   [id number] 93*       O2 Device: None (Room air)  O2 Flow Rate (L/min): 3 L/min    DIET CARDIAC; Low Sodium (2 GM)     MEDICATIONS during current hospitalization:    Continuous Infusions:   dextrose         Scheduled Meds:   levofloxacin  750 mg Intravenous Q24H    insulin glargine  10 Units Subcutaneous Daily    varenicline  1 mg Oral BID    methylPREDNISolone  40 mg Intravenous Daily    nitroglycerin  1 inch Topical 3 times per day    ticagrelor  90 mg Oral BID    aspirin  81 mg Oral Daily    sodium chloride flush  10 mL Intravenous 2 times per day    enoxaparin  40 mg Subcutaneous Daily    insulin lispro  0-6 Units Subcutaneous TID WC    insulin lispro  0-3 Units Subcutaneous Nightly    budesonide  0.5 mg Nebulization BID    atorvastatin  10 mg Oral Daily    dilTIAZem  120 mg Oral BID    furosemide  20 mg Oral Daily    lisinopril  5 mg Oral Daily    nicotine  1 patch Transdermal Daily    pantoprazole  20 mg Oral QAM AC    ipratropium-albuterol  1 ampule Inhalation Q4H WA       PRN Meds:benzocaine-menthol, sodium chloride flush, acetaminophen **OR** acetaminophen, polyethylene glycol, promethazine **OR** ondansetron, albuterol, glucose, dextrose, glucagon (rDNA), dextrose    Radiology  Ct Cervical Spine Wo Contrast    Result Date: 9/10/2020  CT cervical spine without intravenous contrast medium. HISTORY: Right arm pain since yesterday. TECHNICAL FACTORS: CT imaging cervical spine obtained and formatted as 2.5 mm contiguous axial images from skull base, to T3-T4. 2-D sagittal and coronal reconstruction obtained during postprocessing.  No intravenous contrast medium utilized. No prior imaging cervical spine  available for comparison. FINDINGS: Cervical vertebral bodies normal in height and alignment. Disc space narrowing C4-5 through C7-T1. Anterior and posterior osteophytes 4 through C6. No fracture, dislocation, bone lesion. Limited imaging lung apices without anomaly. No fracture. All CT scans at this facility use dose modulation, iterative reconstruction, and/or weight based dosing when appropriate to reduce radiation dose to as low as reasonably achievable. Xr Chest Portable    Result Date: 9/27/2020  XR CHEST PORTABLE : 9/27/2020 CLINICAL HISTORY:  SOB, asthma, respiratory distress . COMPARISON: 8/28/2020. TECHNIQUE: An upright portable AP radiograph of the chest was obtained FINDINGS: Shallow inspiratory volumes are present without significant infiltrate identified. There is no cardiomegaly, significant pleural effusion, vascular congestion, pneumothorax, or displaced fractures identified. Mild coarsening of the bronchovascular structures consistent with asthma is again noted. NO EVIDENCE OF ACTIVE CARDIOPULMONARY DISEASE. Us Dup Upper Extremity Right Venous    Result Date: 9/11/2020  RIGHT UPPER EXTREMITY DEEP VENOUS DUPLEX DOPPLER ULTRASOUND CLINICAL HISTORY:   R arm edema and pain   Arm Pain COMPARISON: None TECHNIQUE:  Gray scale ultrasound with compression maneuvers where accessible, color and spectral Doppler of the right internal jugular, subclavian, axillary, brachial, radial, and ulnar veins was performed. Grey scale with and without compression of the right basilic and cephalic veins was performed. Images were obtained and stored in a permanent archive. RESULT: RIGHT UPPER EXTREMITY DEEP VEINS: Internal Jugular vein: Normal compression, normal spontaneous flow, Subclavian vein:  Normal, spontaneous flow, normal response to augmentation.  Axillary vein:  Normal compression, normal spontaneous flow, Brachial vein: Normal compression, normal spontaneous flow, Radial vein: Normal compression, normal spontaneous flow, Ulnar vein: Normal compression, normal spontaneous flow, RIGHT UPPER EXTREMITY SUPERFICIAL VEINS: Basilic vein: Normal compression Cephalic vein:  Normal compression     NEGATIVE STUDY FOR ACUTE DVT IN THE RIGHT UPPER EXTREMITY. NEGATIVE STUDY FOR SUPERFICIAL THROMBOPHLEBITIS IN THE RIGHT UPPER EXTREMITY     CT chest shows hazy groundglass infiltrates some looks like nodules    IMPRESSION AND SUGGESTION:  Patient is at risk due to   · Probable asthma with acute exacerbation  · Probable community-acquired pneumonia  · Need to rule out interstitial lung disease also patient has history of non-Hodgkin lymphoma   · Smoking, smoking and she quit recently  · Possible KEITH, she did not schedule sleep study yet  · Obesity    Recommendations  · Change Solu-Medrol 40 mg IV twice daily  · Appreciate cardiology, patient has appointment for left heart cath next week  · Start levofloxacin  · Sputum Gram stain and culture  · Budesonide neb  · Continue DuoNeb  · Restart home dose Chantix  · Discussed with social workers, we will schedule sleep study for the patient  ·   methacholine challenge test as outpatient    DW RN     I spent 30 min with this patient, greater the 50% of this time was spent in counseling and/or coordinating of care.     Electronically signed by Enedina Roca MD,  [facility]   on 9/29/2020 at 9:54 AM

## 2020-09-29 NOTE — PROGRESS NOTES
[address] [facility] Cardiology Progress Note        Date:   2020    Patient:    Carlie Boxer    :    1970  CSN:    [id number]    Rounding Cardiologist: Susana Thompson MD     Primary Cardiologist: Chanda Benito MD    Requesting Physician:  Vanna Kelly DO      Reason for Initial Consult:  Chest Pain, Shortness of Breathe    Subjective:    Still quite SOB. Still with cough and CP with deep breaths. CXR and CT Suggest PNA. Myoview Cancelled, Troponins negative x 4, CAD progression unlikely. No new complaints otherwise. 14 system General and Cardiac ROS otherwise negative or unchanged. Assessment:    Shortness of breath  Chest pain symptoms with exertion  Palpitations. Edema, bipedal  PNA. COPD, Exacerbation  Negative troponins x3 to date. CAD, post RCA ERMIAS 2020, Known 50% LCX medically treated to date. Preserved left ventricular function, LVEF 60%  Negative ECHO,    Hypertension  Hyperlipidemia  Diabetes  Nocturnal snoring / insomnia, probable sleep apnea  Obesity  Ongoing tobacco abuse    Plan:    1. Cardiac Supportive Care  2. Probable Respiratory primary illness / PNA, Pulmonary care. 3. Suggest Myoview Stress test eventually as outpatient. 4. Telemetry and ECG. 5. Continue current Medications. 6. Dr Melvena Angelucci to reassume care upon her return. 7. OK to Discharge once ok with others. 8. Further Recommendations to follow  9. See Orders        HISTORY OF PRESENT ILLNESS:      Carlie Boxer is a pleasant 44 y.o. female who has the above-noted history including recent right coronary stent angioplasty 2020 by Dr. Melvena Angelucci. She has preserved left ventricular function, hypertension, hyperlipidemia, diabetes and COPD. She has probable sleep apnea. She presented to the emergency room with 1 day history of worsening shortness of breath. Shortness of breath is with exertion. Her symptoms progressed and had troubles sleeping and therefore came to the emergency room.   She is had no fever or chills. She said no productive cough or sputum production. She noted some mild chest discomfort symptoms radiating to her back. She has had no exertional chest pain symptoms. She has had some intermittent diarrhea. She quit smoking 5 days ago and started Chantix and NicoDerm patch. She uses 3 L of nasal cannula oxygen at home. Given her cardiovascular history she was admitted for further evaluation and given her cardiac history she was asked to see cardiology. Patient follows with Yessica Khan MD, [facility]. See attached notes below. Patient History and Records, EMR reviewed. Patient Interviewed and examined. Fair historian. She was conservatively treated has been seen by pulmonary medicine. She currently feels much improved. She still has some shortness of breath and is on oxygen. Her chest pain has resolved. Serial troponin levels have been negative. EKG is unremarkable. Telemetry is unremarkable. Denies LH, Dizziness, TIA or CVA Symptoms. No Orthopnea, Edema or CHF symptoms. No Palpitations. No Syncope. No Fever, Chills or Cold symptoms. No GI,  or Bleeding complaints. Cardiac and general ROS otherwise negative. [address] otherwise negative other than noted. Past Medical History:   Diagnosis Date    Asthma 2004    Diabetes mellitus ([facility] [address] 2014    Hodgkin disease ([facility] [address] 36    Hypertension 1984    Migraines 1989       No past surgical history on file. Prior to Admission medications    Medication Sig Start Date End Date Taking? Authorizing Provider   varenicline (CHANTIX STARTING MONTH PAK) 0.5 MG X 11 & 1 MG X 42 tablet Take by mouth.  9/16/20  Yes Rufino Varner MD   nicotine (NICODERM CQ) 21 MG/24HR Place 1 patch onto the skin daily 9/16/20 10/28/20 Yes Rufino Varner MD   furosemide (LASIX) 20 MG tablet Take 20 mg by mouth daily   Yes Historical Provider, MD   potassium chloride (KLOR-CON M) 10 MEQ extended release tablet Take 10 mEq by mouth daily   Yes Historical Provider, MD   lisinopril (PRINIVIL;ZESTRIL) 5 MG tablet Take 5 mg by mouth daily   Yes Historical Provider, MD   dilTIAZem (CARDIZEM CD) 120 MG extended release capsule Take 1 capsule by mouth 2 times daily 8/31/20  Yes Marisel Ferguson MD   aspirin 81 MG EC tablet Take 1 tablet by mouth daily 9/1/20  Yes Marisel Ferguson MD   ticagrelor (BRILINTA) 90 MG TABS tablet Take 1 tablet by mouth 2 times daily 8/31/20  Yes Marisel Ferguson MD   omeprazole (PRILOSEC) 20 MG delayed release capsule Take 1 capsule by mouth every morning (before breakfast) 8/4/20  Yes Geremias Melo MD   tiotropium (SPIRIVA RESPIMAT) 2.5 MCG/ACT AERS inhaler Inhale 2 puffs into the lungs daily 8/4/20  Yes Geremias Melo MD   albuterol sulfate HFA (VENTOLIN HFA) 108 (90 Base) MCG/ACT inhaler Inhale 2 puffs into the lungs 4 times daily as needed for Wheezing  Patient taking differently: Inhale 1 puff into the lungs 4 times daily as needed for Wheezing  7/27/20  Yes Christi Billy MD   nitroGLYCERIN (NITROSTAT) 0.4 MG SL tablet up to max of 3 total doses. If no relief after 1 dose, call 911. Patient taking differently: Place 0.4 mg under the tongue every 5 minutes as needed up to max of 3 total doses.  If no relief after 1 dose, call 911. 7/23/20  Yes Neptali Raymond MD   atorvastatin (LIPITOR) 80 MG tablet Take 1 tablet by mouth daily  Patient taking differently: Take 10 mg by mouth daily  2/27/19  Yes Christi Billy MD       Scheduled Meds:   levofloxacin  750 mg Intravenous Q24H    insulin glargine  10 Units Subcutaneous Daily    varenicline  1 mg Oral BID    methylPREDNISolone  40 mg Intravenous Daily    nitroglycerin  1 inch Topical 3 times per day    ticagrelor  90 mg Oral BID    aspirin  81 mg Oral Daily    sodium chloride flush  10 mL Intravenous 2 times per day    enoxaparin  40 mg Subcutaneous Daily    insulin lispro  0-6 Units Subcutaneous TID WC    insulin lispro  0-3 Units Subcutaneous Nightly    budesonide  0.5 mg Nebulization BID    atorvastatin  10 mg Oral Daily    dilTIAZem  120 mg Oral BID    furosemide  20 mg Oral Daily    lisinopril  5 mg Oral Daily    nicotine  1 patch Transdermal Daily    pantoprazole  20 mg Oral QAM AC    ipratropium-albuterol  1 ampule Inhalation Q4H WA     Continuous Infusions:   dextrose       PRN Meds:benzocaine-menthol, sodium chloride flush, acetaminophen **OR** acetaminophen, polyethylene glycol, promethazine **OR** ondansetron, albuterol, glucose, dextrose, glucagon (rDNA), dextrose    Allergies   Allergen Reactions    Shellfish-Derived Products        Social History     Socioeconomic History    Marital status: Single     Spouse name: Not on file    Number of children: Not on file    Years of education: Not on file    Highest education level: Not on file   Occupational History    Not on file   Social Needs    Financial resource strain: Not on file    Food insecurity     Worry: Not on file     Inability: Not on file    Transportation needs     Medical: Not on file     Non-medical: Not on file   Tobacco Use    Smoking status: Current Every Day Smoker     Packs/day: 1.00     Types: Cigarettes    Smokeless tobacco: Never Used   Substance and Sexual Activity    Alcohol use: Not Currently    Drug use: Never    Sexual activity: Not on file   Lifestyle    Physical activity     Days per week: Not on file     Minutes per session: Not on file    Stress: Not on file   Relationships    Social connections     Talks on phone: Not on file     Gets together: Not on file     Attends Nondenominational service: Not on file     Active member of club or organization: Not on file     Attends meetings of clubs or organizations: Not on file     Relationship status: Not on file    Intimate partner violence     Fear of current or ex partner: Not on file     Emotionally abused: Not on file     Physically abused: Not on file     Forced sexual activity: Not on file   Other Topics Concern    Not on file   Social History Narrative    Not on file       No family history on file. Review Of Systems:    14 point ROS negative other than mentioned. Physical Exam:    CURRENT VITALS: /62   Pulse 100   Temp 97.9 °F (36.6 °C) (Oral)   Resp 18   Ht 5' 2\" (1.575 m)   Wt 196 lb 8 oz (89.1 kg)   SpO2 96%   BMI 35.94 kg/m²     CONSTITUTIONAL:  awake, alert, cooperative, no apparent distress,   ENT:  Normocephalic, without obvious abnormality, atraumatic, sinuses nontender on palpation, external ears without lesions,  NECK:  Supple, symmetrical, trachea midline, no adenopathy, thyroid symmetric, not enlarged and no tenderness, skin normal, No bruits. LUNGS:  Increased work of breathing, Decreased air exchange, bilateral wheezing. CARDIOVASCULAR:  Normal apical impulse, regular rate and rhythm, normal S1 and S2,  1/6 Systolic murmur noted. ABDOMEN:  Obese, normal bowel sounds, soft, non-distended, non-tender, no masses palpated, no hepatosplenomegally  EXTREMETIES: 1+ edema, Pulses Strong Thruout. No ulcers. NEUROLOGIC:  Awake, alert, oriented to name, place and time. Following all commands and moving all extremties.   SKIN:  no bruising or bleeding, normal skin color, texture, turgor and no rashes    Labs:  Recent Results (from the past 24 hour(s))   POCT Glucose    Collection Time: 09/28/20  4:01 PM   Result Value Ref Range    POC Glucose 336 (H) 60 - 115 mg/dl    Performed on ACCU-CHEK    POCT Glucose    Collection Time: 09/28/20 10:32 PM   Result Value Ref Range    POC Glucose 272 (H) 60 - 115 mg/dl    Performed on ACCU-CHEK    EKG 12 Lead    Collection Time: 09/29/20 12:47 AM   Result Value Ref Range    Ventricular Rate 88 BPM    Atrial Rate 88 BPM    P-R Interval 182 ms    QRS Duration 80 ms    Q-T Interval 362 ms    QTc Calculation (Bazett) 438 ms    P Axis 60 degrees    R Axis 39 degrees    T Axis 63 degrees   CBC Auto Differential    Collection Time: 09/29/20  5:33 AM   Result Value Ref Range WBC 15.4 (H) 4.8 - 10.8 K/uL    RBC 3.81 (L) 4.20 - 5.40 M/uL    Hemoglobin 11.7 (L) 12.0 - 16.0 g/dL    Hematocrit 35.2 (L) 37.0 - 47.0 %    MCV 92.5 82.0 - 100.0 fL    MCH 30.8 27.0 - 31.3 pg    MCHC 33.2 33.0 - 37.0 %    RDW 13.5 11.5 - 14.5 %    Platelets 527 859 - 764 K/uL    Neutrophils % 89.2 %    Lymphocytes % 5.9 %    Monocytes % 4.8 %    Eosinophils % 0.0 %    Basophils % 0.1 %    Neutrophils Absolute 13.7 (H) 1.4 - 6.5 K/uL    Lymphocytes Absolute 0.9 (L) 1.0 - 4.8 K/uL    Monocytes Absolute 0.7 0.2 - 0.8 K/uL    Eosinophils Absolute 0.0 0.0 - 0.7 K/uL    Basophils Absolute 0.0 0.0 - 0.2 K/uL   Hemoglobin A1C    Collection Time: 09/29/20  5:33 AM   Result Value Ref Range    Hemoglobin A1C 8.6 (H) 4.8 - 5.9 %   Sedimentation Rate    Collection Time: 09/29/20  5:33 AM   Result Value Ref Range    Sed Rate 74 (H) 0 - 30 mm   Basic Metabolic Panel w/ Reflex to MG    Collection Time: 09/29/20  5:34 AM   Result Value Ref Range    Sodium 138 135 - 144 mEq/L    Potassium reflex Magnesium 4.1 3.4 - 4.9 mEq/L    Chloride 103 95 - 107 mEq/L    CO2 23 20 - 31 mEq/L    Anion Gap 12 9 - 15 mEq/L    Glucose 238 (H) 70 - 99 mg/dL    BUN 16 6 - 20 mg/dL    CREATININE 0.53 0.50 - 0.90 mg/dL    GFR Non-African American >60.0 >60    GFR  >60.0 >60    Calcium 8.9 8.5 - 9.9 mg/dL   Troponin    Collection Time: 09/29/20  5:34 AM   Result Value Ref Range    Troponin <0.010 0.000 - 0.010 ng/mL   High sensitivity CRP    Collection Time: 09/29/20  5:34 AM   Result Value Ref Range    CRP High Sensitivity 46.6 (H) 0.0 - 5.0 mg/L   POCT Glucose    Collection Time: 09/29/20  5:40 AM   Result Value Ref Range    POC Glucose 240 (H) 60 - 115 mg/dl    Performed on ACCU-CHEK    POCT Glucose    Collection Time: 09/29/20 10:52 AM   Result Value Ref Range    POC Glucose 260 (H) 60 - 115 mg/dl    Performed on ACCU-CHEK        ECG:     Normal sinus rhythm, 100 rate  Normal ECG     ECHO:   Normal left ventricular function, LVEF chest wall tenderness on palpation. Heart: Normal S1 and S2. No S3. No S4. No rub. Grade 1/6 systolic murmur, best heard at the left sternal border. Point of maximal impulse was within normal limits. Abdomen: Soft. Nontender. No organomegaly. No bruits. No masses. Obese. Extremities: Right greater than left 2+ bipedal edema. No clubbing. No cyanosis. Pulses are diminished throughout. No bruits. Musculoskeletal Exam: No ulcers, otherwise unremarkable. Neuro: Neurologically appeared grossly intact. IMPRESSION:     Shortness of breath  Chest pain symptoms with exertion  Edema, bipedal  Abnormal Myoview perfusion stress test with anterior apical ischemia by Lexiscan, LVEF 75% 8/2020. Preserved left ventricular function  Hypertension  Hyperlipidemia  Diabetes  COPD  Nocturnal snoring / insomnia, probable sleep apnea  Obesity  Ongoing tobacco abuse  Otherwise as per assessment below. RECOMMENDATIONS:     Would suggest that we adjust medications as follows: Lasix 20 mg daily, K-Dur 10 mEq daily increase Cartia 220 mg twice daily. Patient will continue other medications as current. Refills were provided. Agree with cardiac catheterization which is scheduled for next week with Dr. Melvena Angelucci. Patient wishes to proceed. Patient should have a sleep study later for evaluation of her probable sleep apnea. I have instructed her to discontinue tobacco.    Exercise dietary program was encouraged after her cardiac catheterization is complete. Weight loss program.    Hydration. Follow my health portal was encouraged. We will plan to see back in 1 month with Dr. Melvena Angelucci post cardiac catheterization with Laboratory Studies and ECG as noted below. Patient will follow up with their primary physician for general care. The patient knows to contact medical care earlier if need be.       Susana Thompson MD, [address] [facility] Cardiology      Of Note:  Compario voice recognition dictation software was utilized partially in the preparation of this note, therefore, inaccuracies in spelling, word choice and punctuation may have occurred which were not recognized the time of signing. Chief Complaint  Cardiology Reason for Visit_NOH:   Additional Information: OV GM EKG. Active Problems   1. Abnormal stress test (794.39) (R94.39)   2. Angina pectoris (413.9) (I20.9)   3. Current every day smoker (305.1) (F17.200)   4. Diabetes mellitus (250.00) (E11.9)   5. Leg swelling (729.81) (M79.89)   6. Shortness of breath (786.05) (R06.02)   7. Tachycardia (785.0) (R00.0)    Family History   1. Family history of Aneurysm : Father   2. Family history of cerebrovascular accident (CVA) (V17.1) (Z82.3) : Father   3. Family history of diabetes mellitus (V18.0) (Z83.3) : Maternal Grandmother, Paternal   Grandmother   3. Family history of hypertension (V17.49) (Z82.49) : Maternal Grandmother, Paternal   Grandmother   11. Family history of malignant neoplasm of ovary (V16.41) (Z80.41) : Mother   6. Family history of myocardial infarction (V17.3) (Z82.49) : Maternal Grandfather, Paternal   Grandfather    Social History   · Current every day smoker (305.1) (F17.200)   · Daily caffeine consumption, 2-3 servings a day   · No alcohol use   · No illicit drug use    Current Meds   1. Albuterol Sulfate  (90 Base) MCG/ACT Inhalation Aerosol Solution; INHALE 1   PUFF EVERY 4 HOURS AS NEEDED; Therapy: 42Gjt6114 to Recorded   2. Aspirin Adult Low Dose 81 MG Oral Tablet Delayed Release; take 4 tablets today, then 1   tablet daily thereafter; Therapy: 91ZYD4773 to (Evaluate:15Dpm8461); Last Rx:43Sse1555 Ordered   3. Atorvastatin Calcium 10 MG Oral Tablet; TAKE 1 TABLET DAILY; Therapy: 63Kcw6097 to Recorded   4. Cartia  MG Oral Capsule Extended Release 24 Hour; TAKE 1 CAPSULE ONCE   DAILY; Therapy: 36Zul2005 to Recorded   5. Lisinopril 5 MG Oral Tablet; TAKE 1 TABLET DAILY;    Therapy: 18HTZ2090 to Recorded   6. metFORMIN HCl - 500 MG Oral Tablet; TAKE TWO TABS TWICE DAILY; Therapy: 89Kwt3460 to Recorded   7. Mood Formula TABS; Natrol Stress & Anxiety; Therapy: (Recorded:26Nrn5230) to Recorded   8. Omeprazole 20 MG Oral Capsule Delayed Release; TAKE 1 CAPSULE DAILY EVERY   MORNING BEFORE BREAKFAST; Therapy: 35Opn2934 to Recorded   9. Spiriva Respimat 2.5 MCG/ACT Inhalation Aerosol Solution; TAKE 2 INHALATIONS DAILY; Therapy: 44Kvg9281 to Recorded   10. St Johns Wort 1000 MG Oral Capsule; TAKE AS DIRECTED; Therapy: (Recorded:11Sss8721) to Recorded    Allergies   1. Shellfish-derived Products   Anaphylaxis;; Updated By: Lucy Ireland; 08/27/2020 10:33:56 AM    Vitals  Vital Signs   Recorded: 28Aug2020 10:44AM   Height: 5 ft 2 in  Weight: 211 lb   BMI Calculated: 38.59 kg/m2  BSA Calculated: 1.96  Falls Screening: No falls within the past year  Blood Pressure: 116 / 60, RUE, Sitting  Heart Rate: 94, Apical    Procedure    EKG done in office today; :   .     Plan   1. Stop: Cartia  MG Oral Capsule Extended Release 24 Hour   2. Start: Cartia  MG Oral Capsule Extended Release 24 Hour; TAKE 1 CAPSULE   TWICE DAILY   3. Start: Furosemide 20 MG Oral Tablet (Lasix); take 1 tablet daily   4. Start: Potassium Chloride Carrol ER 10 MEQ Oral Tablet Extended Release; TAKE 1   TABLET DAILY   5. D-Dimer; Status:Active; Requested for:28Aug2020;    6. High Sensitivity CRP; Status:Active; Requested for:28Aug2020;    7. Lifestyle education regarding diet; Status:Complete;   Done: 28Aug2020   8. Lipid Panel w/Reflex LDL; Status:Active; Requested for:28Aug2020;    9. LIVER PANEL; Status:Active; Requested for:28Aug2020;    10. Magnesium; Status:Active; Requested for:16Nrp2848;    11. TSH; Status:Active; Requested for:75Luj0083;    12. Venous Duplex (bilateral legs); Status:Active; Requested for:15Lam8577;    13. XRay Chest, PA/LAT; Status:Active; Requested for:59Akv9264;    14.  Access your medical record, request prescriptions, view laboratory and testing done in    our office, request appointments, view immunization records and message your provider    through our safe and secure patient portal. Ask a staff member how    to register or visit us at [email or web address] to get started today.;    Status:Complete;   Done: 55Grg9408   15. Begin or continue regular aerobic exercise. Gradually work up to at least 3 sessions of    30 minutes of exercise a week.; Status:Active; Requested for:92Ftc3983;    16. Counseled on smoking cessation; Status:Complete;   Done: 23Zmo4276   17. Drink plenty of fluids.; Status:Active; Requested for:25Flm5556;    18. Please bring all medicines, vitamins, and herbal supplements with you when you come    to the office.; Status:Active; Requested for:40Sty0913;    19. Please bring any lab results from other providers/physicians to your next appointment.;    Status:Active; Requested for:81Ezc9137;    20. Prescriptions will not be filled unless you are compliant with your follow up appointments    or have a follow up appointments scheduled as per the instruction of your physician. Refills for medications should be requested at the time of your office visit.; Status:Active; Requested for:06Txp8523;    21. Thank you for being a patient at [facility]. Our goal is to    provide high quality medical care. Following today's visit, please check your email for an    invitation to complete our patient satisfaction survey. By sharing your feedback, you will    help us continue our mission to provide excellent medical care. Your participation in the    survey is voluntary and completely confidential. We appreciate your thoughts regarding    today's visit.; Status:Active; Requested for:00Elt8338;    22. Tobacco use Hx Reported; Status:Complete;   Done: 53Rir1662   23. You are overweight. Please increase activity level and reduce calorie intake.  Please    inform the staff if you are willing to go for dietary counseling; Status:Active; Requested    for:85Xbq3353;    24. You need to quit smoking.; Status:Active; Requested for:28Nvz4977;    25. 1 week follow-up/call if interval problems. Evaluation and Treatment  Follow-up  Status:    Active  Requested for: 45Fic9616   26. Follow up per family physician. Evaluation and Treatment  Follow-up  Status: Active     Requested for: 14Xuy6604    Discussion/Summary  Increase Cardizem to 120 mg twice daily  Start Lasix 20 mg daily  Start Potassium 10 meq daily     Signatures  Electronically signed by : Ronan Neal, ; Aug 28 2020 10:48AM EST                        Electronically signed by : Usman Guillaume LPN;  Aug 28 2020 11:58AM EST                        Electronically signed by : Allen Tom M.D.; Sep  3 2020  7:16PM EST

## 2020-09-29 NOTE — PROGRESS NOTES
Spiritual Care Services     Summary of Visit:  Initial visit with this patient. She was resting in bed. Patient was having obvious difficulty breathing. Patient is Ethiopian speaking, however, always responded to this 's Ethiopian queries with [place]. Patient is very deliberate in her choice of words and expressions in [place]. Patient share some of her life journey to this point. Patient expressed that she recently quit smoking and is hopeful that will help her with her respiratory issues. This  provided encouragement and a blessing. Spiritual Assessment/Intervention/Outcomes:    Encounter Summary  Services provided to[de-identified] Patient  Referral/Consult From[de-identified] [facility]  Support System: Significant other, Children, Family members  Place of Jewish: None  Continue Visiting: No  Complexity of Encounter: Low  Length of Encounter: 15 minutes  Spiritual Assessment Completed: Yes  Routine  Type: Initial  Assessment: Calm, Approachable, Coping  Intervention: Active listening, Explored feelings, thoughts, concerns, Nurtured hope, [place], Discussed belief system/Sikhism practices/reinaldo, Discussed illness/injury and it's impact  Outcome: Acceptance, Expressed gratitude, Engaged in conversation, Expressed feelings/needs/concerns, Receptive, Encouraged              Advance Directives (For Healthcare)  Pre-existing DNR Comfort Care/DNR Arrest/DNI Order: No  Healthcare Directive: No, patient does not have an advance directive for healthcare treatment  Information on Healthcare Directives Requested: No  Patient Requests Assistance: No  Advance Directives: Pt. not interested at this time           Values / Beliefs  Do you have any ethnic, cultural, sacramental, or spiritual Sikhism needs you would like us to be aware of while you are in the hospital?: No    Care Plan:    No follow up required unless patient requests.     Spiritual Care Services   Electronically signed by Brenda Nick on 9/29/20 at 11:15 AM EDT To reach a  for emotional and spiritual support, place an [facility] consult request.   If a  is needed immediately, dial 0 and ask to page the on-call .

## 2020-09-29 NOTE — FLOWSHEET NOTE
Dr. Ochoa Nolen called. He is placing patient on droplet plus isolation to R/O covid 19. Patient move to rm 165 with reverse air flow. Patient updated regarding reason for move and will notify her family that she can no longer have visitors until covid 19 is ruled out.

## 2020-09-30 LAB
ANION GAP SERPL CALCULATED.3IONS-SCNC: 12 MEQ/L (ref 9–15)
BUN BLDV-MCNC: 17 MG/DL (ref 6–20)
CALCIUM SERPL-MCNC: 9.1 MG/DL (ref 8.5–9.9)
CHLORIDE BLD-SCNC: 104 MEQ/L (ref 95–107)
CO2: 23 MEQ/L (ref 20–31)
CREAT SERPL-MCNC: 0.53 MG/DL (ref 0.5–0.9)
GFR AFRICAN AMERICAN: >60
GFR NON-AFRICAN AMERICAN: >60
GLUCOSE BLD-MCNC: 198 MG/DL (ref 70–99)
GLUCOSE BLD-MCNC: 203 MG/DL (ref 60–115)
GLUCOSE BLD-MCNC: 234 MG/DL (ref 60–115)
GLUCOSE BLD-MCNC: 281 MG/DL (ref 60–115)
GLUCOSE BLD-MCNC: 304 MG/DL (ref 60–115)
PERFORMED ON: ABNORMAL
POTASSIUM REFLEX MAGNESIUM: 3.9 MEQ/L (ref 3.4–4.9)
SARS-COV-2, NAAT: NOT DETECTED
SODIUM BLD-SCNC: 139 MEQ/L (ref 135–144)

## 2020-09-30 PROCEDURE — 6370000000 HC RX 637 (ALT 250 FOR IP): Performed by: INTERNAL MEDICINE

## 2020-09-30 PROCEDURE — 99232 SBSQ HOSP IP/OBS MODERATE 35: CPT | Performed by: INTERNAL MEDICINE

## 2020-09-30 PROCEDURE — 6360000002 HC RX W HCPCS: Performed by: INTERNAL MEDICINE

## 2020-09-30 PROCEDURE — 94664 DEMO&/EVAL PT USE INHALER: CPT

## 2020-09-30 PROCEDURE — 94640 AIRWAY INHALATION TREATMENT: CPT

## 2020-09-30 PROCEDURE — 94761 N-INVAS EAR/PLS OXIMETRY MLT: CPT

## 2020-09-30 PROCEDURE — 36415 COLL VENOUS BLD VENIPUNCTURE: CPT

## 2020-09-30 PROCEDURE — 80048 BASIC METABOLIC PNL TOTAL CA: CPT

## 2020-09-30 PROCEDURE — U0002 COVID-19 LAB TEST NON-CDC: HCPCS

## 2020-09-30 PROCEDURE — 2580000003 HC RX 258: Performed by: INTERNAL MEDICINE

## 2020-09-30 PROCEDURE — 2060000000 HC ICU INTERMEDIATE R&B

## 2020-09-30 RX ORDER — POLYETHYLENE GLYCOL 3350 17 G/17G
17 POWDER, FOR SOLUTION ORAL DAILY
Status: COMPLETED | OUTPATIENT
Start: 2020-09-30 | End: 2020-10-01

## 2020-09-30 RX ORDER — SENNA PLUS 8.6 MG/1
1 TABLET ORAL NIGHTLY
Status: COMPLETED | OUTPATIENT
Start: 2020-09-30 | End: 2020-09-30

## 2020-09-30 RX ADMIN — METFORMIN HYDROCHLORIDE 500 MG: 500 TABLET ORAL at 17:12

## 2020-09-30 RX ADMIN — Medication 10 ML: at 19:58

## 2020-09-30 RX ADMIN — POLYETHYLENE GLYCOL 3350 17 G: 17 POWDER, FOR SOLUTION ORAL at 11:57

## 2020-09-30 RX ADMIN — NITROGLYCERIN 1 INCH: 20 OINTMENT TOPICAL at 21:21

## 2020-09-30 RX ADMIN — IPRATROPIUM BROMIDE AND ALBUTEROL 1 PUFF: 20; 100 SPRAY, METERED RESPIRATORY (INHALATION) at 11:36

## 2020-09-30 RX ADMIN — INSULIN LISPRO 4 UNITS: 100 INJECTION, SOLUTION INTRAVENOUS; SUBCUTANEOUS at 12:03

## 2020-09-30 RX ADMIN — ATORVASTATIN CALCIUM 10 MG: 10 TABLET, FILM COATED ORAL at 08:12

## 2020-09-30 RX ADMIN — TICAGRELOR 90 MG: 90 TABLET ORAL at 08:12

## 2020-09-30 RX ADMIN — INSULIN GLARGINE 10 UNITS: 100 INJECTION, SOLUTION SUBCUTANEOUS at 20:51

## 2020-09-30 RX ADMIN — METFORMIN HYDROCHLORIDE 500 MG: 500 TABLET ORAL at 11:57

## 2020-09-30 RX ADMIN — DILTIAZEM HYDROCHLORIDE 120 MG: 120 CAPSULE, COATED, EXTENDED RELEASE ORAL at 19:57

## 2020-09-30 RX ADMIN — DILTIAZEM HYDROCHLORIDE 120 MG: 120 CAPSULE, COATED, EXTENDED RELEASE ORAL at 08:12

## 2020-09-30 RX ADMIN — LISINOPRIL 5 MG: 5 TABLET ORAL at 08:12

## 2020-09-30 RX ADMIN — ASPIRIN 81 MG: 81 TABLET, COATED ORAL at 08:11

## 2020-09-30 RX ADMIN — IPRATROPIUM BROMIDE AND ALBUTEROL 1 PUFF: 20; 100 SPRAY, METERED RESPIRATORY (INHALATION) at 07:57

## 2020-09-30 RX ADMIN — Medication 10 ML: at 08:14

## 2020-09-30 RX ADMIN — INSULIN LISPRO 2 UNITS: 100 INJECTION, SOLUTION INTRAVENOUS; SUBCUTANEOUS at 17:12

## 2020-09-30 RX ADMIN — Medication 8.6 MG: at 19:58

## 2020-09-30 RX ADMIN — PANTOPRAZOLE SODIUM 20 MG: 20 TABLET, DELAYED RELEASE ORAL at 05:57

## 2020-09-30 RX ADMIN — ENOXAPARIN SODIUM 40 MG: 40 INJECTION SUBCUTANEOUS at 08:13

## 2020-09-30 RX ADMIN — VARENICLINE TARTRATE 1 MG: 1 TABLET, FILM COATED ORAL at 19:57

## 2020-09-30 RX ADMIN — IPRATROPIUM BROMIDE AND ALBUTEROL 1 PUFF: 20; 100 SPRAY, METERED RESPIRATORY (INHALATION) at 19:59

## 2020-09-30 RX ADMIN — NITROGLYCERIN 1 INCH: 20 OINTMENT TOPICAL at 17:12

## 2020-09-30 RX ADMIN — INSULIN GLARGINE 10 UNITS: 100 INJECTION, SOLUTION SUBCUTANEOUS at 08:17

## 2020-09-30 RX ADMIN — VARENICLINE TARTRATE 1 MG: 1 TABLET, FILM COATED ORAL at 08:12

## 2020-09-30 RX ADMIN — BUDESONIDE 500 MCG: 0.5 INHALANT RESPIRATORY (INHALATION) at 20:10

## 2020-09-30 RX ADMIN — METHYLPREDNISOLONE SODIUM SUCCINATE 40 MG: 40 INJECTION, POWDER, FOR SOLUTION INTRAMUSCULAR; INTRAVENOUS at 08:12

## 2020-09-30 RX ADMIN — NITROGLYCERIN 1 INCH: 20 OINTMENT TOPICAL at 05:57

## 2020-09-30 RX ADMIN — TICAGRELOR 90 MG: 90 TABLET ORAL at 19:57

## 2020-09-30 RX ADMIN — FUROSEMIDE 20 MG: 20 TABLET ORAL at 08:11

## 2020-09-30 RX ADMIN — LEVOFLOXACIN 750 MG: 5 INJECTION, SOLUTION INTRAVENOUS at 11:57

## 2020-09-30 RX ADMIN — IPRATROPIUM BROMIDE AND ALBUTEROL 1 PUFF: 20; 100 SPRAY, METERED RESPIRATORY (INHALATION) at 15:38

## 2020-09-30 RX ADMIN — INSULIN LISPRO 2 UNITS: 100 INJECTION, SOLUTION INTRAVENOUS; SUBCUTANEOUS at 08:17

## 2020-09-30 ASSESSMENT — PAIN SCALES - GENERAL
PAINLEVEL_OUTOF10: 0

## 2020-09-30 NOTE — PROGRESS NOTES
Physician Progress Note    9/30/2020   1:52 PM    Name:  Delmy Salgado  MRN:    [id number]      Day: 3     Admit Date: 9/27/2020 10:11 AM  PCP: Nena Osborne MD    Code Status:  Full Code    Subjective:     Breathing continues to improve but still with significant cough and yellow sputum. She has not had bowel movement in 2 days. Sputum color has changed from green to yellowish-white.     Current Facility-Administered Medications   Medication Dose Route Frequency Provider Last Rate Last Dose    metFORMIN (GLUCOPHAGE) tablet 500 mg  500 mg Oral BID WC Hellen Grief, DO   500 mg at 09/30/20 1157    [START ON 10/1/2020] metFORMIN (GLUCOPHAGE) tablet 1,000 mg  1,000 mg Oral BID WC Hellen Grief, DO        polyethylene glycol (GLYCOLAX) packet 17 g  17 g Oral Daily Hellen Grief, DO   17 g at 09/30/20 1157    senna (SENOKOT) tablet 8.6 mg  1 tablet Oral Nightly Hellen Grief, DO        levoFLOXacin (LEVAQUIN) 750 MG/150ML infusion 750 mg  750 mg Intravenous Q24H Martha Orellana  mL/hr at 09/30/20 1157 750 mg at 09/30/20 1157    insulin glargine (LANTUS) injection vial 10 Units  10 Units Subcutaneous BID Hellen Grief, DO   10 Units at 09/30/20 0817    albuterol-ipratropium (COMBIVENT RESPIMAT)  MCG/ACT inhaler 1 puff  1 puff Inhalation Q4H While awake Hellen Grief, DO   1 puff at 09/30/20 1136    benzocaine-menthol (CEPACOL SORE THROAT) lozenge 1 lozenge  1 lozenge Oral Q2H PRN Martha Orellana MD   1 lozenge at 09/29/20 2214    varenicline (CHANTIX) tablet 1 mg  1 mg Oral BID Martha Orellana MD   1 mg at 09/30/20 4363    methylPREDNISolone sodium (SOLU-MEDROL) injection 40 mg  40 mg Intravenous Daily Martha Orellana MD   40 mg at 09/30/20 0812    nitroglycerin (NITRO-BID) 2 % ointment 1 inch  1 inch Topical 3 times per day Yvon Hollis MD   1 inch at 09/30/20 0557    ticagrelor (BRILINTA) tablet 90 mg  90 mg Oral BID Hellen Grief, DO   90 mg at 09/30/20 6088    aspirin EC tablet 81 mg  81 mg Oral Daily Louellen Honey, DO   81 mg at 09/30/20 7209    sodium chloride flush 0.9 % injection 10 mL  10 mL Intravenous 2 times per day Louellen Honey, DO   10 mL at 09/30/20 3396    sodium chloride flush 0.9 % injection 10 mL  10 mL Intravenous PRN Louellen Honey, DO   10 mL at 09/28/20 5272    acetaminophen (TYLENOL) tablet 650 mg  650 mg Oral Q6H PRN Louellen Honey, DO   650 mg at 09/29/20 2214    Or    acetaminophen (TYLENOL) suppository 650 mg  650 mg Rectal Q6H PRN Louellen Honey, DO        polyethylene glycol (GLYCOLAX) packet 17 g  17 g Oral Daily PRN Louellen Honey, DO        promethazine (PHENERGAN) tablet 12.5 mg  12.5 mg Oral Q6H PRN Louellen Honey, DO        Or    ondansetron [facility]) injection 4 mg  4 mg Intravenous Q6H PRN Louellen Honey, DO        enoxaparin (LOVENOX) injection 40 mg  40 mg Subcutaneous Daily Louellen Honey, DO   40 mg at 09/30/20 0813    albuterol (PROVENTIL) nebulizer solution 2.5 mg  2.5 mg Nebulization Q2H PRN Louellen Honey, DO        glucose (GLUTOSE) 40 % oral gel 15 g  15 g Oral PRN Louellen Honey, DO        dextrose 50 % IV solution  12.5 g Intravenous PRN Louellen Honey, DO        glucagon (rDNA) injection 1 mg  1 mg Intramuscular PRN Louellen Honey, DO        dextrose 5 % solution  100 mL/hr Intravenous PRN Louellen Honey, DO        insulin lispro (HUMALOG) injection vial 0-6 Units  0-6 Units Subcutaneous TID WC Louellen Honey, DO   4 Units at 09/30/20 1203    insulin lispro (HUMALOG) injection vial 0-3 Units  0-3 Units Subcutaneous Nightly Louellen Honey, DO   2 Units at 09/29/20 2200    budesonide (PULMICORT) nebulizer suspension 500 mcg  0.5 mg Nebulization BID Ree Ray MD   Stopped at 09/30/20 0728    atorvastatin (LIPITOR) tablet 10 mg  10 mg Oral Daily Louellen Honey, DO   10 mg at 09/30/20 7440    dilTIAZem (CARDIZEM CD) extended release capsule 120 mg  120 mg Oral BID Kishore Boateng, DO 120 mg at 20 2300    furosemide (LASIX) tablet 20 mg  20 mg Oral Daily Lucy Nay, DO   20 mg at 20 9534    lisinopril (PRINIVIL;ZESTRIL) tablet 5 mg  5 mg Oral Daily Lucy Nay, DO   5 mg at 20 6217    nicotine (NICODERM CQ) 21 MG/24HR 1 patch  1 patch Transdermal Daily Lucy Nay, DO   1 patch at 20 0813    pantoprazole (PROTONIX) tablet 20 mg  20 mg Oral QAM AC Lucy Nay, DO   20 mg at 20 0557       Physical Examination:      Vitals:  BP (!) 147/83   Pulse 95   Temp 98.1 °F (36.7 °C) (Oral)   Resp 15   Ht 5' 2\" (1.575 m)   Wt 196 lb 8 oz (89.1 kg)   SpO2 97%   BMI 35.94 kg/m²   Temp (24hrs), Av.1 °F (36.7 °C), Min:98.1 °F (36.7 °C), Max:98.2 °F (36.8 °C)      General appearance: alert, cooperative and mild distress- pausing after each sentence. obese  Mental Status: oriented to person, place and time and normal affect  Lungs: expiratory wheeze present much improved from admission  Heart: regular rate and rhythm, no murmur  Abdomen: soft, nontender, nondistended, bowel sounds present, no masses  Extremities: no edema, redness, tenderness in the calves  Skin: no gross lesions, rashes    Data:     Labs:  Recent Labs     20  0533   WBC 15.4*   HGB 11.7*        Recent Labs     20  0534 20  0531    139   K 4.1 3.9    104   CO2 23 23   BUN 16 17   CREATININE 0.53 0.53   GLUCOSE 238* 198*     No results for input(s): AST, ALT, ALB, BILITOT, ALKPHOS in the last 72 hours. Assessment and Plan:        49-year-old female with obesity, asthma, CAD (s/p ERMIAS ), tobacco use (quit 5 days prior to admission), type 2 diabetes, hypertension presented on  with 1 day history of worsening dyspnea.     1.   Acute respiratory failure with hypoxia secondary to acute exacerbation of asthma with probable community acquired pneumonia:  Improving  - CT reviewed-COVID rule out in process  - continue IV steroids, beta agonist, antibiotics and supportive respiratory care    2. T2DM with hyperglycemia exacerbated by steroids: On Lantus while in house (on metformin at home- not on home medication list.  PCP also prescribed Jardiance which she has not yet picked up from pharmacy). Will reduce dose of steroid and add back metformin. CAD: Continue dual antiplatelet, statin. ACS ruled out  HTN: cont home medications  H/o tobacco use  Obesity  Constipation: Add bowel regimen     Lovenox ppx  Full Code    Anticipate d/c home in 1-2 days.      Electronically signed by Kishore Boateng DO on 9/30/2020 at 1:52 PM

## 2020-09-30 NOTE — PROGRESS NOTES
INPATIENT PROGRESS NOTES    PATIENT NAME: Osiris Malloy  MRN: [id number]  SERVICE DATE:  September 30, 2020   SERVICE TIME:  12:58 PM      PRIMARY SERVICE: Pulmonary Disease    CHIEF COMPLAINTS: Shortness of breath    INTERVAL HPI: Patient seen and examined at bedside, Interval Notes, orders reviewed. Nursing notes noted    She feels better today, cough and shortness of breath improved, she did have hemoptysis yesterday but this is clearing now, she slept better, no lower extremity edema, no fever, no nausea no vomiting. Cough is productive of yellow phlegm and is getting clear  Review of system:     GI Abdominal pain No  Skin Rash No    Social History     Tobacco Use    Smoking status: Current Every Day Smoker     Packs/day: 1.00     Types: Cigarettes    Smokeless tobacco: Never Used   Substance Use Topics    Alcohol use: Not Currently     No family history on file. OBJECTIVE    Body mass index is 35.94 kg/m². PHYSICAL EXAM:  Vitals:  BP (!) 147/83   Pulse 95   Temp 98.1 °F (36.7 °C) (Oral)   Resp 15   Ht 5' 2\" (1.575 m)   Wt 196 lb 8 oz (89.1 kg)   SpO2 97%   BMI 35.94 kg/m²     General: alert, cooperative, no distress  Head: normocephalic, atraumatic  Eyes:No gross abnormalities. ENT:  MMM no lesions  Neck:  supple and no masses  Chest : Better than yesterday, mild end expiratory wheezing anteriorly, scattered minimal rales, nontender, tympanic  Heart[de-identified] Heart sounds are normal.  Regular rate and rhythm without murmur, gallop or rub. ABD:  symmetric, soft, non-tender  Musculoskeletal : no cyanosis, no clubbing and no edema  Neuro:  Grossly normal  Skin: No rashes or nodules noted.   Lymph node:  no cervical nodes  Urology: No Ruiz   Psychiatric: appropriate    DATA:   Recent Labs     09/29/20  0533   WBC 15.4*   HGB 11.7*   HCT 35.2*   MCV 92.5        Recent Labs     09/29/20  0534 09/30/20  0531    139   K 4.1 3.9    104   CO2 23 23   BUN 16 17   CREATININE 0.53 0.53 obtained during postprocessing. No intravenous contrast medium utilized. No prior imaging cervical spine  available for comparison. FINDINGS: Cervical vertebral bodies normal in height and alignment. Disc space narrowing C4-5 through C7-T1. Anterior and posterior osteophytes 4 through C6. No fracture, dislocation, bone lesion. Limited imaging lung apices without anomaly. No fracture. All CT scans at this facility use dose modulation, iterative reconstruction, and/or weight based dosing when appropriate to reduce radiation dose to as low as reasonably achievable. Xr Chest Portable    Result Date: 9/27/2020  XR CHEST PORTABLE : 9/27/2020 CLINICAL HISTORY:  SOB, asthma, respiratory distress . COMPARISON: 8/28/2020. TECHNIQUE: An upright portable AP radiograph of the chest was obtained FINDINGS: Shallow inspiratory volumes are present without significant infiltrate identified. There is no cardiomegaly, significant pleural effusion, vascular congestion, pneumothorax, or displaced fractures identified. Mild coarsening of the bronchovascular structures consistent with asthma is again noted. NO EVIDENCE OF ACTIVE CARDIOPULMONARY DISEASE. Us Dup Upper Extremity Right Venous    Result Date: 9/11/2020  RIGHT UPPER EXTREMITY DEEP VENOUS DUPLEX DOPPLER ULTRASOUND CLINICAL HISTORY:   R arm edema and pain   Arm Pain COMPARISON: None TECHNIQUE:  Gray scale ultrasound with compression maneuvers where accessible, color and spectral Doppler of the right internal jugular, subclavian, axillary, brachial, radial, and ulnar veins was performed. Grey scale with and without compression of the right basilic and cephalic veins was performed. Images were obtained and stored in a permanent archive. RESULT: RIGHT UPPER EXTREMITY DEEP VEINS: Internal Jugular vein: Normal compression, normal spontaneous flow, Subclavian vein:  Normal, spontaneous flow, normal response to augmentation.  Axillary vein:  Normal compression, normal spontaneous flow, Brachial vein:  Normal compression, normal spontaneous flow, Radial vein: Normal compression, normal spontaneous flow, Ulnar vein: Normal compression, normal spontaneous flow, RIGHT UPPER EXTREMITY SUPERFICIAL VEINS: Basilic vein: Normal compression Cephalic vein:  Normal compression     NEGATIVE STUDY FOR ACUTE DVT IN THE RIGHT UPPER EXTREMITY.  NEGATIVE STUDY FOR SUPERFICIAL THROMBOPHLEBITIS IN THE RIGHT UPPER EXTREMITY     CT chest shows hazy groundglass infiltrates some looks like nodules    IMPRESSION AND SUGGESTION:  Patient is at risk due to   · Probable asthma with acute exacerbation  · Community-acquired pneumonia  · Need to rule out interstitial lung disease also patient has history of non-Hodgkin lymphoma   · History of smoking, she quit recently  · Possible KEITH, she did not schedule sleep study yet  · Obesity  · Hyperglycemia secondary to steroid    Recommendations  · Decrease Solu-Medrol to once daily due to hyperglycemia  · Continue levofloxacin  · Follow-up sputum culture  · Budesonide neb  · Continue DuoNeb  · Continue Chantix  · Sleep study scheduled for the patient  ·   methacholine challenge test as outpatient    JUANA ARROYO       Electronically signed by Dany Pak MD,  [facility]   on 9/30/2020 at 12:58 PM

## 2020-09-30 NOTE — PROGRESS NOTES
[address] [facility] Cardiology Progress Note        Date:   2020    Patient:    Yamilet Cruz    :    1970  CSN:    [id number]    Rounding Cardiologist: Deniz Travis MD     Primary Cardiologist: Agusto Rebollar MD    Requesting Physician:  Miguel Meredith DO      Reason for Initial Consult:  Chest Pain, Shortness of Breathe    Subjective:    Still quite SOB. Still with cough and CP with deep breaths. CXR and CT Suggest PNA. Myoview Cancelled, Troponins negative x 4, CAD progression unlikely. No new complaints otherwise. 14 system General and Cardiac ROS otherwise negative or unchanged. Assessment:    Shortness of breath  Chest pain symptoms with exertion  Palpitations. Edema, bipedal  PNA. COPD, Exacerbation  Negative troponins x3 to date. CAD, post RCA ERMIAS 2020, Known 50% LCX medically treated to date. Preserved left ventricular function, LVEF 60%  Negative ECHO,    Hypertension  Hyperlipidemia  Diabetes  Nocturnal snoring / insomnia, probable sleep apnea  Obesity  Ongoing tobacco abuse    Plan:    1. Cardiac Supportive Care  2. Probable Respiratory primary illness / PNA, Pulmonary care. 3. Suggest Myoview Stress test eventually as outpatient. 4. Telemetry and ECG. 5. Continue current Medications. 6. Dr Selam Rousseau to reassume care upon her return. 7. OK to Discharge once ok with others. 8. Further Recommendations to follow  9. See Orders        HISTORY OF PRESENT ILLNESS:      Yamilet Cruz is a pleasant 44 y.o. female who has the above-noted history including recent right coronary stent angioplasty 2020 by Dr. Selam Rousseau. She has preserved left ventricular function, hypertension, hyperlipidemia, diabetes and COPD. She has probable sleep apnea. She presented to the emergency room with 1 day history of worsening shortness of breath. Shortness of breath is with exertion. Her symptoms progressed and had troubles sleeping and therefore came to the emergency room.   She is had no fever or chills. She said no productive cough or sputum production. She noted some mild chest discomfort symptoms radiating to her back. She has had no exertional chest pain symptoms. She has had some intermittent diarrhea. She quit smoking 5 days ago and started Chantix and NicoDerm patch. She uses 3 L of nasal cannula oxygen at home. Given her cardiovascular history she was admitted for further evaluation and given her cardiac history she was asked to see cardiology. Patient follows with Radha Marquis MD, [facility]. See attached notes below. Patient History and Records, EMR reviewed. Patient Interviewed and examined. Fair historian. She was conservatively treated has been seen by pulmonary medicine. She currently feels much improved. She still has some shortness of breath and is on oxygen. Her chest pain has resolved. Serial troponin levels have been negative. EKG is unremarkable. Telemetry is unremarkable. Denies LH, Dizziness, TIA or CVA Symptoms. No Orthopnea, Edema or CHF symptoms. No Palpitations. No Syncope. No Fever, Chills or Cold symptoms. No GI,  or Bleeding complaints. Cardiac and general ROS otherwise negative. [address] otherwise negative other than noted. Past Medical History:   Diagnosis Date    Asthma 2004    Diabetes mellitus ([facility] [address] 2014    Hodgkin disease ([facility] [address] 36    Hypertension 1984    Migraines 1989       No past surgical history on file. Prior to Admission medications    Medication Sig Start Date End Date Taking? Authorizing Provider   varenicline (CHANTIX STARTING MONTH PAK) 0.5 MG X 11 & 1 MG X 42 tablet Take by mouth.  9/16/20  Yes Bennie Rodriguez MD   nicotine (NICODERM CQ) 21 MG/24HR Place 1 patch onto the skin daily 9/16/20 10/28/20 Yes Bennie Rodriguez MD   furosemide (LASIX) 20 MG tablet Take 20 mg by mouth daily   Yes Historical Provider, MD   potassium chloride (KLOR-CON M) 10 MEQ extended release tablet Take 10 mEq by mouth daily   Yes Historical Provider, MD   lisinopril (PRINIVIL;ZESTRIL) 5 MG tablet Take 5 mg by mouth daily   Yes Historical Provider, MD   dilTIAZem (CARDIZEM CD) 120 MG extended release capsule Take 1 capsule by mouth 2 times daily 8/31/20  Yes Brady Barreto MD   aspirin 81 MG EC tablet Take 1 tablet by mouth daily 9/1/20  Yes Brady Barreto MD   ticagrelor (BRILINTA) 90 MG TABS tablet Take 1 tablet by mouth 2 times daily 8/31/20  Yes Brady Barreto MD   omeprazole (PRILOSEC) 20 MG delayed release capsule Take 1 capsule by mouth every morning (before breakfast) 8/4/20  Yes Nathalia Miller MD   tiotropium (SPIRIVA RESPIMAT) 2.5 MCG/ACT AERS inhaler Inhale 2 puffs into the lungs daily 8/4/20  Yes Nathalia Miller MD   albuterol sulfate HFA (VENTOLIN HFA) 108 (90 Base) MCG/ACT inhaler Inhale 2 puffs into the lungs 4 times daily as needed for Wheezing  Patient taking differently: Inhale 1 puff into the lungs 4 times daily as needed for Wheezing  7/27/20  Yes Terri Mata MD   nitroGLYCERIN (NITROSTAT) 0.4 MG SL tablet up to max of 3 total doses. If no relief after 1 dose, call 911. Patient taking differently: Place 0.4 mg under the tongue every 5 minutes as needed up to max of 3 total doses.  If no relief after 1 dose, call 911. 7/23/20  Yes Anselmo Hart MD   atorvastatin (LIPITOR) 80 MG tablet Take 1 tablet by mouth daily  Patient taking differently: Take 10 mg by mouth daily  2/27/19  Yes Terri Mata MD       Scheduled Meds:   levofloxacin  750 mg Intravenous Q24H    insulin glargine  10 Units Subcutaneous BID    albuterol-ipratropium  1 puff Inhalation Q4H While awake    varenicline  1 mg Oral BID    methylPREDNISolone  40 mg Intravenous Daily    nitroglycerin  1 inch Topical 3 times per day    ticagrelor  90 mg Oral BID    aspirin  81 mg Oral Daily    sodium chloride flush  10 mL Intravenous 2 times per day    enoxaparin  40 mg Subcutaneous Daily    insulin lispro  0-6 Units Subcutaneous TID WC    insulin lispro  0-3 Units Subcutaneous Nightly    budesonide  0.5 mg Nebulization BID    atorvastatin  10 mg Oral Daily    dilTIAZem  120 mg Oral BID    furosemide  20 mg Oral Daily    lisinopril  5 mg Oral Daily    nicotine  1 patch Transdermal Daily    pantoprazole  20 mg Oral QAM AC     Continuous Infusions:   dextrose       PRN Meds:benzocaine-menthol, sodium chloride flush, acetaminophen **OR** acetaminophen, polyethylene glycol, promethazine **OR** ondansetron, albuterol, glucose, dextrose, glucagon (rDNA), dextrose    Allergies   Allergen Reactions    Shellfish-Derived Products        Social History     Socioeconomic History    Marital status: Single     Spouse name: Not on file    Number of children: Not on file    Years of education: Not on file    Highest education level: Not on file   Occupational History    Not on file   Social Needs    Financial resource strain: Not on file    Food insecurity     Worry: Not on file     Inability: Not on file    Transportation needs     Medical: Not on file     Non-medical: Not on file   Tobacco Use    Smoking status: Current Every Day Smoker     Packs/day: 1.00     Types: Cigarettes    Smokeless tobacco: Never Used   Substance and Sexual Activity    Alcohol use: Not Currently    Drug use: Never    Sexual activity: Not on file   Lifestyle    Physical activity     Days per week: Not on file     Minutes per session: Not on file    Stress: Not on file   Relationships    Social connections     Talks on phone: Not on file     Gets together: Not on file     Attends Jainism service: Not on file     Active member of club or organization: Not on file     Attends meetings of clubs or organizations: Not on file     Relationship status: Not on file    Intimate partner violence     Fear of current or ex partner: Not on file     Emotionally abused: Not on file     Physically abused: Not on file     Forced sexual activity: Not on file   Other Topics Concern    Not on file   Social History Narrative    Not on file       No family history on file. Review Of Systems:    14 point ROS negative other than mentioned. Physical Exam:    CURRENT VITALS: BP (!) 147/83   Pulse 95   Temp 98.1 °F (36.7 °C) (Oral)   Resp 15   Ht 5' 2\" (1.575 m)   Wt 196 lb 8 oz (89.1 kg)   SpO2 98%   BMI 35.94 kg/m²     CONSTITUTIONAL:  awake, alert, cooperative, no apparent distress,   ENT:  Normocephalic, without obvious abnormality, atraumatic, sinuses nontender on palpation, external ears without lesions,  NECK:  Supple, symmetrical, trachea midline, no adenopathy, thyroid symmetric, not enlarged and no tenderness, skin normal, No bruits. LUNGS:  Increased work of breathing, Decreased air exchange, bilateral wheezing. CARDIOVASCULAR:  Normal apical impulse, regular rate and rhythm, normal S1 and S2,  1/6 Systolic murmur noted. ABDOMEN:  Obese, normal bowel sounds, soft, non-distended, non-tender, no masses palpated, no hepatosplenomegally  EXTREMETIES: 1+ edema, Pulses Strong Thruout. No ulcers. NEUROLOGIC:  Awake, alert, oriented to name, place and time. Following all commands and moving all extremties.   SKIN:  no bruising or bleeding, normal skin color, texture, turgor and no rashes    Labs:  Recent Results (from the past 24 hour(s))   POCT Glucose    Collection Time: 09/29/20 10:52 AM   Result Value Ref Range    POC Glucose 260 (H) 60 - 115 mg/dl    Performed on ACCU-CHEK    COVID-19    Collection Time: 09/29/20  3:50 PM   Result Value Ref Range    SARS-CoV-2, NAAT Not Detected Not Detected   POCT Glucose    Collection Time: 09/29/20  5:16 PM   Result Value Ref Range    POC Glucose 276 (H) 60 - 115 mg/dl    Performed on ACCU-CHEK    POCT Glucose    Collection Time: 09/29/20  8:21 PM   Result Value Ref Range    POC Glucose 291 (H) 60 - 115 mg/dl    Performed on ACCU-CHEK    Basic Metabolic Panel w/ Reflex to MG    Collection Time: 09/30/20  5:31 AM   Result Value Ref Range Sodium 139 135 - 144 mEq/L    Potassium reflex Magnesium 3.9 3.4 - 4.9 mEq/L    Chloride 104 95 - 107 mEq/L    CO2 23 20 - 31 mEq/L    Anion Gap 12 9 - 15 mEq/L    Glucose 198 (H) 70 - 99 mg/dL    BUN 17 6 - 20 mg/dL    CREATININE 0.53 0.50 - 0.90 mg/dL    GFR Non-African American >60.0 >60    GFR  >60.0 >60    Calcium 9.1 8.5 - 9.9 mg/dL   POCT Glucose    Collection Time: 09/30/20  6:04 AM   Result Value Ref Range    POC Glucose 203 (H) 60 - 115 mg/dl    Performed on ACCU-CHEK        ECG:     Normal sinus rhythm, 100 rate  Normal ECG     ECHO:   Normal left ventricular function, LVEF 60%.    Normal chamber sizes.    No significant valvular heart disease noted.    Normal pericardium. Myoview cardiac stress Test:  Scheduled for outpatient. Sharita Martin MD  [facility] Cardiologist      Electronically signed on 9/28/20 at 11:46 AM EDT      -----     [facility] OV NOTE:    Subjective  PCP: Primary Care Physician is Dr. Dilcia Cleaning:   08/28/2020 - Jayden May was seen in cardiac evaluation at the [facility] office 08/28/2020. The patients problems are listed in the impression below. Electronic medical records reviewed. I was asked to see this patient for Dr. Roni Ross in her absence. Patient has right greater than left worsening edema. She has some shortness of breath and chest pain particularly with exertion. She has a recent Myoview perfusion stress test which shows anterior apical ischemia with ejection fraction 75%. She has been scheduled for cardiac catheterization for next week. She otherwise feels well. She has no other cardiovascular complaints. Patient denies Lightheadedness, Dizziness, TIA or CVA symptoms. No CHF or Edema. No Palpitations. No GI,  or Bleeding Issues. No Recent Fever or Chills. Patient continues to smoke a half a pack of cigarettes per day. Cardiovascular and general review of systems is otherwise negative.     A 14-system review is otherwise negative, other than noted. ELECTROCARDIOGRAM: Sinus rhythm rate 94 no acute changes. LABORATORY DATA: None    All above testing was personally reviewed. PHYSICAL EXAMINATION:   General: No acute distress. Vital signs as noted. Alert and oriented. Head And Neck Examination: No jugular venous distention, no carotid bruits, no mass. Carotid upstrokes preserved. Oral mucosa moist. No xanthelasma. Head and neck examination otherwise unremarkable. Lungs: Clear to auscultation and percussion. No wheezes, no rales, and no rhonchi. Chest: Excursion appeared to be normal. No chest wall tenderness on palpation. Heart: Normal S1 and S2. No S3. No S4. No rub. Grade 1/6 systolic murmur, best heard at the left sternal border. Point of maximal impulse was within normal limits. Abdomen: Soft. Nontender. No organomegaly. No bruits. No masses. Obese. Extremities: Right greater than left 2+ bipedal edema. No clubbing. No cyanosis. Pulses are diminished throughout. No bruits. Musculoskeletal Exam: No ulcers, otherwise unremarkable. Neuro: Neurologically appeared grossly intact. IMPRESSION:     Shortness of breath  Chest pain symptoms with exertion  Edema, bipedal  Abnormal Myoview perfusion stress test with anterior apical ischemia by Lexiscan, LVEF 75% 8/2020. Preserved left ventricular function  Hypertension  Hyperlipidemia  Diabetes  COPD  Nocturnal snoring / insomnia, probable sleep apnea  Obesity  Ongoing tobacco abuse  Otherwise as per assessment below. RECOMMENDATIONS:     Would suggest that we adjust medications as follows: Lasix 20 mg daily, K-Dur 10 mEq daily increase Cartia 220 mg twice daily. Patient will continue other medications as current. Refills were provided. Agree with cardiac catheterization which is scheduled for next week with Dr. Bin Mcclellan. Patient wishes to proceed. Patient should have a sleep study later for evaluation of her probable sleep apnea.     I have instructed her to discontinue tobacco.    Exercise dietary program was encouraged after her cardiac catheterization is complete. Weight loss program.    Hydration. Follow my health portal was encouraged. We will plan to see back in 1 month with Dr. Alban Yoon post cardiac catheterization with Laboratory Studies and ECG as noted below. Patient will follow up with their primary physician for general care. The patient knows to contact medical care earlier if need be. Mita Javier MD, [address] / [address] [facility] Cardiology      Of Note:  [employer] voice recognition dictation software was utilized partially in the preparation of this note, therefore, inaccuracies in spelling, word choice and punctuation may have occurred which were not recognized the time of signing. Chief Complaint  Cardiology Reason for Visit_NOH:   Additional Information: OV GM EKG. Active Problems   1. Abnormal stress test (794.39) (R94.39)   2. Angina pectoris (413.9) (I20.9)   3. Current every day smoker (305.1) (F17.200)   4. Diabetes mellitus (250.00) (E11.9)   5. Leg swelling (729.81) (M79.89)   6. Shortness of breath (786.05) (R06.02)   7. Tachycardia (785.0) (R00.0)    Family History   1. Family history of Aneurysm : Father   2. Family history of cerebrovascular accident (CVA) (V17.1) (Z82.3) : Father   3. Family history of diabetes mellitus (V18.0) (Z83.3) : Maternal Grandmother, Paternal   Grandmother   3. Family history of hypertension (V17.49) (Z82.49) : Maternal Grandmother, Paternal   Grandmother   11. Family history of malignant neoplasm of ovary (V16.41) (Z80.41) : Mother   6. Family history of myocardial infarction (V17.3) (Z82.49) : Maternal Grandfather, Paternal   Grandfather    Social History   · Current every day smoker (305.1) (F17.200)   · Daily caffeine consumption, 2-3 servings a day   · No alcohol use   · No illicit drug use    Current Meds   1.  Albuterol Sulfate  (90 Base) MCG/ACT Inhalation Aerosol Solution; INHALE 1   PUFF EVERY 4 HOURS AS NEEDED; Therapy: 61Dsn0393 to Recorded   2. Aspirin Adult Low Dose 81 MG Oral Tablet Delayed Release; take 4 tablets today, then 1   tablet daily thereafter; Therapy: 46BRP7566 to (Evaluate:65Urq1363); Last Rx:06Egb5171 Ordered   3. Atorvastatin Calcium 10 MG Oral Tablet; TAKE 1 TABLET DAILY; Therapy: 63Dqc7911 to Recorded   4. Cartia  MG Oral Capsule Extended Release 24 Hour; TAKE 1 CAPSULE ONCE   DAILY; Therapy: 41Ety9745 to Recorded   5. Lisinopril 5 MG Oral Tablet; TAKE 1 TABLET DAILY; Therapy: 98PQU9394 to Recorded   6. metFORMIN HCl - 500 MG Oral Tablet; TAKE TWO TABS TWICE DAILY; Therapy: 94Bwx7184 to Recorded   7. Mood Formula TABS; Natrol Stress & Anxiety; Therapy: (Recorded:51Lzs6797) to Recorded   8. Omeprazole 20 MG Oral Capsule Delayed Release; TAKE 1 CAPSULE DAILY EVERY   MORNING BEFORE BREAKFAST; Therapy: 41Epm7124 to Recorded   9. Spiriva Respimat 2.5 MCG/ACT Inhalation Aerosol Solution; TAKE 2 INHALATIONS DAILY; Therapy: 50Cab6790 to Recorded   10. St Johns Wort 1000 MG Oral Capsule; TAKE AS DIRECTED; Therapy: (Recorded:57Tpi1951) to Recorded    Allergies   1. Shellfish-derived Products   Anaphylaxis;; Updated By: Sandhya Jennings; 08/27/2020 10:33:56 AM    Vitals  Vital Signs   Recorded: 28Aug2020 10:44AM   Height: 5 ft 2 in  Weight: 211 lb   BMI Calculated: 38.59 kg/m2  BSA Calculated: 1.96  Falls Screening: No falls within the past year  Blood Pressure: 116 / 60, RUE, Sitting  Heart Rate: 94, Apical    Procedure    EKG done in office today; :   .     Plan   1. Stop: Cartia  MG Oral Capsule Extended Release 24 Hour   2. Start: Cartia  MG Oral Capsule Extended Release 24 Hour; TAKE 1 CAPSULE   TWICE DAILY   3. Start: Furosemide 20 MG Oral Tablet (Lasix); take 1 tablet daily   4. Start: Potassium Chloride Carrol ER 10 MEQ Oral Tablet Extended Release; TAKE 1   TABLET DAILY   5. D-Dimer; Status:Active;  Requested for:28Aug2020;    6. High Sensitivity CRP; Status:Active; Requested for:24Out8354;    7. Lifestyle education regarding diet; Status:Complete;   Done: 83Clc4870   8. Lipid Panel w/Reflex LDL; Status:Active; Requested for:18Iez6885;    9. LIVER PANEL; Status:Active; Requested for:89Ocv9122;    10. Magnesium; Status:Active; Requested for:89Lzm2310;    11. TSH; Status:Active; Requested for:82Lcp3136;    12. Venous Duplex (bilateral legs); Status:Active; Requested for:97Rzw8611;    13. XRay Chest, PA/LAT; Status:Active; Requested for:98Fdh1035;    14. Access your medical record, request prescriptions, view laboratory and testing done in    our office, request appointments, view immunization records and message your provider    through our safe and secure patient portal. Ask a staff member how    to register or visit us at [email or web address] to get started today.;    Status:Complete;   Done: 10Plr8634   15. Begin or continue regular aerobic exercise. Gradually work up to at least 3 sessions of    30 minutes of exercise a week.; Status:Active; Requested for:91Kfe7296;    16. Counseled on smoking cessation; Status:Complete;   Done: 85Jis8440   17. Drink plenty of fluids.; Status:Active; Requested for:23Ptd3857;    18. Please bring all medicines, vitamins, and herbal supplements with you when you come    to the office.; Status:Active; Requested for:86Nma7470;    19. Please bring any lab results from other providers/physicians to your next appointment.;    Status:Active; Requested for:58Yvr1325;    20. Prescriptions will not be filled unless you are compliant with your follow up appointments    or have a follow up appointments scheduled as per the instruction of your physician. Refills for medications should be requested at the time of your office visit.; Status:Active; Requested for:00Cpc9649;    21. Thank you for being a patient at [facility].   Our goal is to    provide high quality medical care. Following today's visit, please check your email for an    invitation to complete our patient satisfaction survey. By sharing your feedback, you will    help us continue our mission to provide excellent medical care. Your participation in the    survey is voluntary and completely confidential. We appreciate your thoughts regarding    today's visit.; Status:Active; Requested for:41Jhm8356;    22. Tobacco use Hx Reported; Status:Complete;   Done: 78Gig0798   23. You are overweight. Please increase activity level and reduce calorie intake. Please    inform the staff if you are willing to go for dietary counseling; Status:Active; Requested    for:96Tyv5859;    24. You need to quit smoking.; Status:Active; Requested for:50Aqb0956;    25. 1 week follow-up/call if interval problems. Evaluation and Treatment  Follow-up  Status:    Active  Requested for: 05Scu2180   26. Follow up per family physician. Evaluation and Treatment  Follow-up  Status: Active     Requested for: 03Yfc1090    Discussion/Summary  Increase Cardizem to 120 mg twice daily  Start Lasix 20 mg daily  Start Potassium 10 meq daily     Signatures  Electronically signed by : Jonathan Hogan, ; Aug 28 2020 10:48AM EST                        Electronically signed by : Joselito Catherine LPN;  Aug 28 2020 11:58AM EST                        Electronically signed by : Luna Villanueva M.D.; Sep  3 2020  7:16PM EST

## 2020-09-30 NOTE — PROGRESS NOTES
[facility] AT [place] Respiratory Therapy Evaluation   Current Order:  combivent tid     Home Regimen: albuterol hfa qidprn     Ordering Physician: Allyssa Hamilton  Re-evaluation Date: 10/3     Diagnosis: acute respiratory failure      Patient Status: Stable / Unstable + Physician notified    The following MDI Criteria must be met in order to convert aerosol to MDI with spacer.  If unable to meet, MDI will be converted to aerosol:  []  Patient able to demonstrate the ability to use MDI effectively  []  Patient alert and cooperative  []  Patient able to take deep breath with 5-10 second hold  []  Medication(s) available in this delivery method   []  Peak flow greater than or equal to 200 ml/min            Current Order Substituted To  (same drug, same frequency)   Aerosol to MDI [] Albuterol Sulfate 0.083% unit dose by aerosol Albuterol Sulfate MDI 2 puffs by inhalation with spacer    [] Levalbuterol 1.25 mg unit dose by aerosol Levalbuterol MDI 2 puffs by inhalation with spacer    [] Levalbuterol 0.63 mg unit dose by aerosol Levalbuterol MDI 2 puffs by inhalation with spacer    [] Ipratropium Bromide 0.02% unit dose by aerosol Ipratropium Bromide MDI 2 puffs by inhalation with spacer    [] Duoneb (Ipratropium + Albuterol) unit dose by aerosol Ipratropium MDI + Albuterol MDI 2 puffs by inhalation w/spacer   MDI to Aerosol [] Albuterol Sulfate MDI Albuterol Sulfate 0.083% unit dose by aerosol    [] Levalbuterol MDI 2 puffs by inhalation Levalbuterol 1.25 mg unit dose by aerosol    [] Ipratropium Bromide MDI by inhalation Ipratropium Bromide 0.02% unit dose by aerosol    [] Combivent (Ipratropium + Albuterol) MDI by inhalation Duoneb (Ipratropium + Albuterol) unit dose by aerosol   Treatment Assessment [Frequency/Schedule]:  Change frequency to:no change __________________________________________________per Protocol, P&T, MEC      Points 0 1 2 3 4   Pulmonary Status  Non-Smoker  []   Smoking history   < 20 pack years  []   Smoking history  ?  20 pack years  []   Pulmonary Disorder  (acute or chronic)  [x]   Severe or Chronic w/ Exacerbation  []     Surgical Status No [x]   Surgeries     General []   Surgery Lower []   Abdominal Thoracic or []   Upper Abdominal Thoracic with  PulmonaryDisorder  []     Chest X-ray Clear/Not  Ordered     []  Chronic Changes  Results Pending  []  Infiltrates, atelectasis, pleural effusion, or edema  []  Infiltrates in more than one lobe [x]  Infiltrate + Atelectasis, &/or pleural effusion  []    Respiratory Pattern Regular,  RR = 12-20 [x]  Increased,  RR = 21-25 []  KC, irregular,  or RR = 26-30 []  Decreased FEV1  or RR = 31-35 []  Severe SOB, use  of accessory muscles, or RR ? 35  []    Mental Status Alert, oriented,  Cooperative [x]  Confused but Follows commands []  Lethargic or unable to follow commands []  Obtunded  []  Comatose  []    Breath Sounds Clear to  auscultation  []  Decreased unilaterally or  in bases only [x]  Decreased  bilaterally  []  Crackles or intermittent wheezes []  Wheezes []    Cough Strong, Spontan., & nonproductive [x]  Strong,  spontaneous, &  productive []  Weak,  Nonproductive []  Weak, productive or  with wheezes []  No spontaneous  cough or may require suctioning []    Level of Activity Ambulatory [x]  Ambulatory w/ Assist  []  Non-ambulatory []  Paraplegic []  Quadriplegic []    Total    Score:___7____     Triage Score:_4_______      Tri       Triage:     1. (>20) Freq: Q3    2. (16-20) Freq: Q4   3. (11-15) Freq: QID & Albuterol Q2 PRN    4. (6-10) Freq: TID & Albuterol Q2 PRN    5. (0-5) Freq Q4prn

## 2020-10-01 LAB
ANION GAP SERPL CALCULATED.3IONS-SCNC: 10 MEQ/L (ref 9–15)
BASOPHILS ABSOLUTE: 0 K/UL (ref 0–0.2)
BASOPHILS RELATIVE PERCENT: 0.1 %
BUN BLDV-MCNC: 20 MG/DL (ref 6–20)
CALCIUM SERPL-MCNC: 8.7 MG/DL (ref 8.5–9.9)
CHLORIDE BLD-SCNC: 100 MEQ/L (ref 95–107)
CO2: 26 MEQ/L (ref 20–31)
CREAT SERPL-MCNC: 0.59 MG/DL (ref 0.5–0.9)
CULTURE, RESPIRATORY: ABNORMAL
EOSINOPHILS ABSOLUTE: 0.1 K/UL (ref 0–0.7)
EOSINOPHILS RELATIVE PERCENT: 1 %
GFR AFRICAN AMERICAN: >60
GFR NON-AFRICAN AMERICAN: >60
GLUCOSE BLD-MCNC: 138 MG/DL (ref 60–115)
GLUCOSE BLD-MCNC: 157 MG/DL (ref 70–99)
GLUCOSE BLD-MCNC: 160 MG/DL (ref 60–115)
GLUCOSE BLD-MCNC: 212 MG/DL (ref 60–115)
GLUCOSE BLD-MCNC: 233 MG/DL (ref 60–115)
GRAM STAIN RESULT: ABNORMAL
HCT VFR BLD CALC: 36.5 % (ref 37–47)
HEMOGLOBIN: 12.2 G/DL (ref 12–16)
LYMPHOCYTES ABSOLUTE: 2.1 K/UL (ref 1–4.8)
LYMPHOCYTES RELATIVE PERCENT: 19 %
MCH RBC QN AUTO: 30.4 PG (ref 27–31.3)
MCHC RBC AUTO-ENTMCNC: 33.4 % (ref 33–37)
MCV RBC AUTO: 91 FL (ref 82–100)
METAMYELOCYTES RELATIVE PERCENT: 1 %
MONOCYTES ABSOLUTE: 0.5 K/UL (ref 0.2–0.8)
MONOCYTES RELATIVE PERCENT: 3.8 %
NEUTROPHILS ABSOLUTE: 8.6 K/UL (ref 1.4–6.5)
NEUTROPHILS RELATIVE PERCENT: 75 %
ORGANISM: ABNORMAL
PDW BLD-RTO: 13.4 % (ref 11.5–14.5)
PERFORMED ON: ABNORMAL
PLATELET # BLD: 257 K/UL (ref 130–400)
PLATELET SLIDE REVIEW: NORMAL
POTASSIUM REFLEX MAGNESIUM: 3.6 MEQ/L (ref 3.4–4.9)
RBC # BLD: 4.02 M/UL (ref 4.2–5.4)
RBC # BLD: NORMAL 10*6/UL
SODIUM BLD-SCNC: 136 MEQ/L (ref 135–144)
WBC # BLD: 11.3 K/UL (ref 4.8–10.8)

## 2020-10-01 PROCEDURE — 6360000002 HC RX W HCPCS: Performed by: INTERNAL MEDICINE

## 2020-10-01 PROCEDURE — 6370000000 HC RX 637 (ALT 250 FOR IP): Performed by: INTERNAL MEDICINE

## 2020-10-01 PROCEDURE — 2060000000 HC ICU INTERMEDIATE R&B

## 2020-10-01 PROCEDURE — 94761 N-INVAS EAR/PLS OXIMETRY MLT: CPT

## 2020-10-01 PROCEDURE — 85025 COMPLETE CBC W/AUTO DIFF WBC: CPT

## 2020-10-01 PROCEDURE — 2580000003 HC RX 258: Performed by: INTERNAL MEDICINE

## 2020-10-01 PROCEDURE — 36415 COLL VENOUS BLD VENIPUNCTURE: CPT

## 2020-10-01 PROCEDURE — 99232 SBSQ HOSP IP/OBS MODERATE 35: CPT | Performed by: INTERNAL MEDICINE

## 2020-10-01 PROCEDURE — 80048 BASIC METABOLIC PNL TOTAL CA: CPT

## 2020-10-01 PROCEDURE — 94640 AIRWAY INHALATION TREATMENT: CPT

## 2020-10-01 RX ORDER — PREDNISONE 10 MG/1
30 TABLET ORAL DAILY
Status: DISCONTINUED | OUTPATIENT
Start: 2020-10-02 | End: 2020-10-02 | Stop reason: HOSPADM

## 2020-10-01 RX ORDER — CEFUROXIME AXETIL 500 MG/1
500 TABLET ORAL EVERY 12 HOURS SCHEDULED
Status: DISCONTINUED | OUTPATIENT
Start: 2020-10-02 | End: 2020-10-02 | Stop reason: HOSPADM

## 2020-10-01 RX ORDER — PREDNISONE 10 MG/1
10 TABLET ORAL DAILY
Status: DISCONTINUED | OUTPATIENT
Start: 2020-10-08 | End: 2020-10-02 | Stop reason: HOSPADM

## 2020-10-01 RX ORDER — BENZONATATE 100 MG/1
100 CAPSULE ORAL EVERY 4 HOURS PRN
Status: DISCONTINUED | OUTPATIENT
Start: 2020-10-01 | End: 2020-10-02 | Stop reason: HOSPADM

## 2020-10-01 RX ORDER — PREDNISONE 10 MG/1
20 TABLET ORAL DAILY
Status: DISCONTINUED | OUTPATIENT
Start: 2020-10-05 | End: 2020-10-02 | Stop reason: HOSPADM

## 2020-10-01 RX ADMIN — METFORMIN HYDROCHLORIDE 1000 MG: 500 TABLET ORAL at 17:11

## 2020-10-01 RX ADMIN — Medication 10 ML: at 08:40

## 2020-10-01 RX ADMIN — TICAGRELOR 90 MG: 90 TABLET ORAL at 08:39

## 2020-10-01 RX ADMIN — FUROSEMIDE 20 MG: 20 TABLET ORAL at 08:39

## 2020-10-01 RX ADMIN — INSULIN GLARGINE 10 UNITS: 100 INJECTION, SOLUTION SUBCUTANEOUS at 22:07

## 2020-10-01 RX ADMIN — INSULIN LISPRO 2 UNITS: 100 INJECTION, SOLUTION INTRAVENOUS; SUBCUTANEOUS at 17:11

## 2020-10-01 RX ADMIN — LISINOPRIL 5 MG: 5 TABLET ORAL at 08:39

## 2020-10-01 RX ADMIN — BUDESONIDE 500 MCG: 0.5 INHALANT RESPIRATORY (INHALATION) at 08:46

## 2020-10-01 RX ADMIN — NITROGLYCERIN 1 INCH: 20 OINTMENT TOPICAL at 05:19

## 2020-10-01 RX ADMIN — LEVOFLOXACIN 750 MG: 5 INJECTION, SOLUTION INTRAVENOUS at 11:44

## 2020-10-01 RX ADMIN — METHYLPREDNISOLONE SODIUM SUCCINATE 40 MG: 40 INJECTION, POWDER, FOR SOLUTION INTRAMUSCULAR; INTRAVENOUS at 08:39

## 2020-10-01 RX ADMIN — INSULIN GLARGINE 10 UNITS: 100 INJECTION, SOLUTION SUBCUTANEOUS at 11:44

## 2020-10-01 RX ADMIN — ASPIRIN 81 MG: 81 TABLET, COATED ORAL at 08:39

## 2020-10-01 RX ADMIN — METFORMIN HYDROCHLORIDE 1000 MG: 500 TABLET ORAL at 08:39

## 2020-10-01 RX ADMIN — IPRATROPIUM BROMIDE AND ALBUTEROL 1 PUFF: 20; 100 SPRAY, METERED RESPIRATORY (INHALATION) at 08:46

## 2020-10-01 RX ADMIN — Medication 10 ML: at 21:20

## 2020-10-01 RX ADMIN — IPRATROPIUM BROMIDE AND ALBUTEROL 1 PUFF: 20; 100 SPRAY, METERED RESPIRATORY (INHALATION) at 17:10

## 2020-10-01 RX ADMIN — TICAGRELOR 90 MG: 90 TABLET ORAL at 21:19

## 2020-10-01 RX ADMIN — ATORVASTATIN CALCIUM 10 MG: 10 TABLET, FILM COATED ORAL at 08:39

## 2020-10-01 RX ADMIN — BUDESONIDE 500 MCG: 0.5 INHALANT RESPIRATORY (INHALATION) at 20:33

## 2020-10-01 RX ADMIN — ENOXAPARIN SODIUM 40 MG: 40 INJECTION SUBCUTANEOUS at 08:40

## 2020-10-01 RX ADMIN — DILTIAZEM HYDROCHLORIDE 120 MG: 120 CAPSULE, COATED, EXTENDED RELEASE ORAL at 08:40

## 2020-10-01 RX ADMIN — DILTIAZEM HYDROCHLORIDE 120 MG: 120 CAPSULE, COATED, EXTENDED RELEASE ORAL at 21:20

## 2020-10-01 RX ADMIN — VARENICLINE TARTRATE 1 MG: 1 TABLET, FILM COATED ORAL at 21:20

## 2020-10-01 RX ADMIN — IPRATROPIUM BROMIDE AND ALBUTEROL 1 PUFF: 20; 100 SPRAY, METERED RESPIRATORY (INHALATION) at 12:23

## 2020-10-01 RX ADMIN — POLYETHYLENE GLYCOL 3350 17 G: 17 POWDER, FOR SOLUTION ORAL at 08:40

## 2020-10-01 RX ADMIN — INSULIN LISPRO 1 UNITS: 100 INJECTION, SOLUTION INTRAVENOUS; SUBCUTANEOUS at 08:40

## 2020-10-01 RX ADMIN — PANTOPRAZOLE SODIUM 20 MG: 20 TABLET, DELAYED RELEASE ORAL at 05:19

## 2020-10-01 RX ADMIN — VARENICLINE TARTRATE 1 MG: 1 TABLET, FILM COATED ORAL at 08:39

## 2020-10-01 RX ADMIN — BENZOCAINE AND MENTHOL, UNSPECIFIED FORM 1 LOZENGE: 15; 3.6 LOZENGE ORAL at 05:27

## 2020-10-01 RX ADMIN — ALBUTEROL SULFATE 2.5 MG: 2.5 SOLUTION RESPIRATORY (INHALATION) at 20:34

## 2020-10-01 ASSESSMENT — PAIN SCALES - GENERAL
PAINLEVEL_OUTOF10: 0

## 2020-10-01 ASSESSMENT — PULMONARY FUNCTION TESTS: PEFR_L/MIN: 230

## 2020-10-01 NOTE — PROGRESS NOTES
Physician Progress Note    10/1/2020   4:35 PM    Name:  Louis Alvarez  MRN:    [id number]     [address] [facility] Day: 4     Admit Date: 9/27/2020 10:11 AM  PCP: Anna Jaramillo MD    Code Status:  Full Code    Subjective:     Pt seen and examined. Still with sob. Respiratory culture shows H influenzae. Pt on Ceftin. COVID negative x2.  Isolation removed    Current Facility-Administered Medications   Medication Dose Route Frequency Provider Last Rate Last Dose    benzonatate (TESSALON) capsule 100 mg  100 mg Oral Q4H PRN Eden Boswell, APRN - CNP       Payette Self Bisi Bigness ON 10/2/2020] predniSONE (DELTASONE) tablet 30 mg  30 mg Oral Daily Krysta Baker MD        Followed by   Bri Pacheco ON 10/5/2020] predniSONE (DELTASONE) tablet 20 mg  20 mg Oral Daily Krysta Baker MD        Followed by   Bri Pacheco ON 10/8/2020] predniSONE (DELTASONE) tablet 10 mg  10 mg Oral Daily Krysta Baker MD        [START ON 10/2/2020] cefUROXime (CEFTIN) tablet 500 mg  500 mg Oral 2 times per day Krysta Baker MD        metFORMIN (GLUCOPHAGE) tablet 1,000 mg  1,000 mg Oral BID WC Nithin Loan, DO   1,000 mg at 10/01/20 0839    insulin glargine (LANTUS) injection vial 10 Units  10 Units Subcutaneous BID Nithin Loan, DO   10 Units at 10/01/20 1144    albuterol-ipratropium (COMBIVENT RESPIMAT)  MCG/ACT inhaler 1 puff  1 puff Inhalation Q4H While awake Nithin Loan, DO   1 puff at 10/01/20 1223    benzocaine-menthol (CEPACOL SORE THROAT) lozenge 1 lozenge  1 lozenge Oral Q2H PRN Krysta Baker MD   1 lozenge at 10/01/20 0527    varenicline (CHANTIX) tablet 1 mg  1 mg Oral BID Krysta Baker MD   1 mg at 10/01/20 0839    nitroglycerin (NITRO-BID) 2 % ointment 1 inch  1 inch Topical 3 times per day Adelso Chahal MD   1 inch at 10/01/20 0519    ticagrelor (BRILINTA) tablet 90 mg  90 mg Oral BID Nithin Broussard DO   90 mg at 10/01/20 0839    aspirin EC tablet 81 mg  81 mg Oral Daily Janes Dunlap DO   81 mg at 10/01/20 0839    sodium chloride flush 0.9 % injection 10 mL  10 mL Intravenous 2 times per day Suzanne Essie, DO   10 mL at 10/01/20 0840    sodium chloride flush 0.9 % injection 10 mL  10 mL Intravenous PRN Suzanne Essie, DO   10 mL at 09/28/20 0635    acetaminophen (TYLENOL) tablet 650 mg  650 mg Oral Q6H PRN Suzanne Essie, DO   650 mg at 09/29/20 2214    Or    acetaminophen (TYLENOL) suppository 650 mg  650 mg Rectal Q6H PRN Suzanne Essie, DO        polyethylene glycol (GLYCOLAX) packet 17 g  17 g Oral Daily PRN Suzanne Essie, DO        promethazine (PHENERGAN) tablet 12.5 mg  12.5 mg Oral Q6H PRN Suzanne Essie, DO        Or    ondansetron [facility]) injection 4 mg  4 mg Intravenous Q6H PRN Suzanne Essie, DO        enoxaparin (LOVENOX) injection 40 mg  40 mg Subcutaneous Daily Suzanne Essie, DO   40 mg at 10/01/20 0840    albuterol (PROVENTIL) nebulizer solution 2.5 mg  2.5 mg Nebulization Q2H PRN Suzanne Essie, DO        glucose (GLUTOSE) 40 % oral gel 15 g  15 g Oral PRN Suzanne Essie, DO        dextrose 50 % IV solution  12.5 g Intravenous PRN Suzanne Essie, DO        glucagon (rDNA) injection 1 mg  1 mg Intramuscular PRN Suzanne Essie, DO        dextrose 5 % solution  100 mL/hr Intravenous PRN Suzanne Essie, DO        insulin lispro (HUMALOG) injection vial 0-6 Units  0-6 Units Subcutaneous TID WC Suzanne Essie, DO   1 Units at 10/01/20 0840    insulin lispro (HUMALOG) injection vial 0-3 Units  0-3 Units Subcutaneous Nightly Suzanne Essie, DO   2 Units at 09/30/20 2051    budesonide (PULMICORT) nebulizer suspension 500 mcg  0.5 mg Nebulization BID Jose Novoa MD   500 mcg at 10/01/20 0846    atorvastatin (LIPITOR) tablet 10 mg  10 mg Oral Daily Suzanne Essie, DO   10 mg at 10/01/20 4744    dilTIAZem (CARDIZEM CD) extended release capsule 120 mg  120 mg Oral BID Suzanne Fountain DO   120 mg at 10/01/20 0840    furosemide (LASIX) tablet 20 mg  20 mg Oral Daily Albinoabimael Escobedoman, DO   20 mg at 10/01/20 9470    lisinopril (PRINIVIL;ZESTRIL) tablet 5 mg  5 mg Oral Daily Albino Sevilla, DO   5 mg at 10/01/20 0253    nicotine (NICODERM CQ) 21 MG/24HR 1 patch  1 patch Transdermal Daily Albino Sevilla, DO   1 patch at 10/01/20 0840    pantoprazole (PROTONIX) tablet 20 mg  20 mg Oral QAM AC Albino Sevilla, DO   20 mg at 10/01/20 1313       Physical Examination:      Vitals:  BP (!) 143/76   Pulse 85   Temp 97.9 °F (36.6 °C) (Oral)   Resp 16   Ht 5' 2\" (1.575 m)   Wt 196 lb 6.4 oz (89.1 kg)   SpO2 95%   BMI 35.92 kg/m²   Temp (24hrs), Av.9 °F (36.6 °C), Min:97.9 °F (36.6 °C), Max:97.9 °F (36.6 °C)      General appearance: alert, cooperative and mild distress- pausing after each sentence. obese  Mental Status: oriented to person, place and time and normal affect  Lungs: expiratory wheeze present much improved from admission  Heart: regular rate and rhythm, no murmur  Abdomen: soft, nontender, nondistended, bowel sounds present, no masses  Extremities: no edema, redness, tenderness in the calves  Skin: no gross lesions, rashes    Data:     Labs:  Recent Labs     20  0533 10/01/20  0548   WBC 15.4* 11.3*   HGB 11.7* 12.2    257     Recent Labs     20  0531 10/01/20  0548    136   K 3.9 3.6    100   CO2 23 26   BUN 17 20   CREATININE 0.53 0.59   GLUCOSE 198* 157*     No results for input(s): AST, ALT, ALB, BILITOT, ALKPHOS in the last 72 hours.               Assessment and Plan:        57-year-old female with obesity, asthma, CAD (s/p ERMIAS ), tobacco use (quit 5 days prior to admission), type 2 diabetes, hypertension presented on  with 1 day history of worsening dyspnea.     # Acute respiratory failure with hypoxia secondary to acute exacerbation of asthma with probable community acquired pneumonia:  Improving  - Resp Cx with H influenzae - on ceftin per pulm  - COVID negative x2  - continue IV steroids, beta agonist, antibiotics and supportive respiratory care    # T2DM with hyperglycemia exacerbated by steroids: On Lantus while in house (on metformin at home- not on home medication list.  PCP also prescribed Jardiance which she has not yet picked up from pharmacy). Will reduce dose of steroid and add back metformin. CAD: Continue dual antiplatelet, statin. ACS ruled out  HTN: cont home medications  H/o tobacco use  Obesity  Constipation: Add bowel regimen     Lovenox ppx  Full Code    Disposition: Pt improved today. Possible discharge home tomorrow if improved.     Richy Barnard DO  Internal Medicine      Electronically signed by Richy Barnard DO on 10/1/2020 at 4:35 PM

## 2020-10-01 NOTE — PROGRESS NOTES
INPATIENT PROGRESS NOTES    PATIENT NAME: Samuel Paredes  MRN: [id number]  SERVICE DATE:  October 1, 2020   SERVICE TIME:  1:23 PM      PRIMARY SERVICE: Pulmonary Disease    CHIEF COMPLAINTS: Shortness of breath    INTERVAL HPI: Patient seen and examined at bedside, Interval Notes, orders reviewed. Nursing notes noted    She did have a rough night yesterday, with insomnia, woke up in the morning tight with skin rash, she improved after removing nicotine patch, she feels better now, no chest pain, no coughing, no wheezing, and shortness of breath has improved. Review of system:     GI Abdominal pain No  Skin Rash No    Social History     Tobacco Use    Smoking status: Current Every Day Smoker     Packs/day: 1.00     Types: Cigarettes    Smokeless tobacco: Never Used   Substance Use Topics    Alcohol use: Not Currently     No family history on file. OBJECTIVE    Body mass index is 35.92 kg/m². PHYSICAL EXAM:  Vitals:  BP (!) 143/76   Pulse 85   Temp 97.9 °F (36.6 °C) (Oral)   Resp 16   Ht 5' 2\" (1.575 m)   Wt 196 lb 6.4 oz (89.1 kg)   SpO2 95%   BMI 35.92 kg/m²     General: alert, cooperative, no distress  Head: normocephalic, atraumatic  Eyes:No gross abnormalities. ENT:  MMM no lesions  Neck:  supple and no masses  Chest : Good air movement, no wheezing, no rales, nontender, tympanic  Heart[de-identified] Heart sounds are normal.  Regular rate and rhythm without murmur, gallop or rub. ABD:  symmetric, soft, non-tender  Musculoskeletal : no cyanosis, no clubbing and no edema  Neuro:  Grossly normal  Skin: No rashes or nodules noted.   Lymph node:  no cervical nodes  Urology: No Ruiz   Psychiatric: appropriate    DATA:   Recent Labs     09/29/20  0533 10/01/20  0548   WBC 15.4* 11.3*   HGB 11.7* 12.2   HCT 35.2* 36.5*   MCV 92.5 91.0    257     Recent Labs     09/30/20  0531 10/01/20  0548    136   K 3.9 3.6    100   CO2 23 26   BUN 17 20   CREATININE 0.53 0.59   GLUCOSE 198* 157*   CALCIUM 9.1 8.7   LABGLOM >60.0 >60.0   GFRAA >60.0 >60.0       MV Settings:          No results for input(s): PHART, ODY7ACQ, PO2ART, TQO7FFA, BEART, A1TTVMFH in the last 72 hours. O2 Device: None (Room air)  O2 Flow Rate (L/min): 3 L/min    DIET CARDIAC; Low Sodium (2 GM)     MEDICATIONS during current hospitalization:    Continuous Infusions:   dextrose         Scheduled Meds:   metFORMIN  1,000 mg Oral BID WC    levofloxacin  750 mg Intravenous Q24H    insulin glargine  10 Units Subcutaneous BID    albuterol-ipratropium  1 puff Inhalation Q4H While awake    varenicline  1 mg Oral BID    methylPREDNISolone  40 mg Intravenous Daily    nitroglycerin  1 inch Topical 3 times per day    ticagrelor  90 mg Oral BID    aspirin  81 mg Oral Daily    sodium chloride flush  10 mL Intravenous 2 times per day    enoxaparin  40 mg Subcutaneous Daily    insulin lispro  0-6 Units Subcutaneous TID WC    insulin lispro  0-3 Units Subcutaneous Nightly    budesonide  0.5 mg Nebulization BID    atorvastatin  10 mg Oral Daily    dilTIAZem  120 mg Oral BID    furosemide  20 mg Oral Daily    lisinopril  5 mg Oral Daily    nicotine  1 patch Transdermal Daily    pantoprazole  20 mg Oral QAM AC       PRN Meds:benzonatate, benzocaine-menthol, sodium chloride flush, acetaminophen **OR** acetaminophen, polyethylene glycol, promethazine **OR** ondansetron, albuterol, glucose, dextrose, glucagon (rDNA), dextrose    Radiology  Ct Cervical Spine Wo Contrast    Result Date: 9/10/2020  CT cervical spine without intravenous contrast medium. HISTORY: Right arm pain since yesterday. TECHNICAL FACTORS: CT imaging cervical spine obtained and formatted as 2.5 mm contiguous axial images from skull base, to T3-T4. 2-D sagittal and coronal reconstruction obtained during postprocessing. No intravenous contrast medium utilized. No prior imaging cervical spine  available for comparison.  FINDINGS: Cervical vertebral bodies normal in height and alignment. Disc space narrowing C4-5 through C7-T1. Anterior and posterior osteophytes 4 through C6. No fracture, dislocation, bone lesion. Limited imaging lung apices without anomaly. No fracture. All CT scans at this facility use dose modulation, iterative reconstruction, and/or weight based dosing when appropriate to reduce radiation dose to as low as reasonably achievable. Xr Chest Portable    Result Date: 9/27/2020  XR CHEST PORTABLE : 9/27/2020 CLINICAL HISTORY:  SOB, asthma, respiratory distress . COMPARISON: 8/28/2020. TECHNIQUE: An upright portable AP radiograph of the chest was obtained FINDINGS: Shallow inspiratory volumes are present without significant infiltrate identified. There is no cardiomegaly, significant pleural effusion, vascular congestion, pneumothorax, or displaced fractures identified. Mild coarsening of the bronchovascular structures consistent with asthma is again noted. NO EVIDENCE OF ACTIVE CARDIOPULMONARY DISEASE. Us Dup Upper Extremity Right Venous    Result Date: 9/11/2020  RIGHT UPPER EXTREMITY DEEP VENOUS DUPLEX DOPPLER ULTRASOUND CLINICAL HISTORY:   R arm edema and pain   Arm Pain COMPARISON: None TECHNIQUE:  Gray scale ultrasound with compression maneuvers where accessible, color and spectral Doppler of the right internal jugular, subclavian, axillary, brachial, radial, and ulnar veins was performed. Grey scale with and without compression of the right basilic and cephalic veins was performed. Images were obtained and stored in a permanent archive. RESULT: RIGHT UPPER EXTREMITY DEEP VEINS: Internal Jugular vein: Normal compression, normal spontaneous flow, Subclavian vein:  Normal, spontaneous flow, normal response to augmentation.  Axillary vein:  Normal compression, normal spontaneous flow, Brachial vein:  Normal compression, normal spontaneous flow, Radial vein: Normal compression, normal spontaneous flow, Ulnar vein: Normal compression, normal spontaneous flow, RIGHT UPPER EXTREMITY SUPERFICIAL VEINS: Basilic vein: Normal compression Cephalic vein:  Normal compression     NEGATIVE STUDY FOR ACUTE DVT IN THE RIGHT UPPER EXTREMITY.  NEGATIVE STUDY FOR SUPERFICIAL THROMBOPHLEBITIS IN THE RIGHT UPPER EXTREMITY     CT chest shows hazy groundglass infiltrates some looks like nodules  H. influenzae positive  IMPRESSION AND SUGGESTION:  Patient is at risk due to   · Probable asthma with acute exacerbation  · H. influenzae community-acquired pneumonia  · History of smoking, she quit recently  · Possible KEITH, sleep study scheduled  · Obesity  · Hyperglycemia secondary to steroid    Recommendations  · Change to prednisone and start taper  · Change to cefuroxime to complete 8 days of treatment  · Budesonide neb  · Continue DuoNeb  · Continue Chantix  · Sleep study scheduled for the patient  ·   methacholine challenge test as outpatient  · Likely home tomorrow       Electronically signed by Yocasta Gutierrez MD,  FCCP   on 10/1/2020 at 1:23 PM

## 2020-10-01 NOTE — PROGRESS NOTES
[address] [facility] Cardiology Progress Note        Date:   10/1/2020    Patient:    Dusty Garcia    :    1970  CSN:    [id number]    Rounding Cardiologist: Richy No MD     Primary Cardiologist: Whit Duvall MD    Requesting Physician:  Deana Foster DO      Reason for Initial Consult:  Chest Pain, Shortness of Breathe    Subjective:    Still quite SOB. Still with cough and CP with deep breaths. CXR and CT Suggest PNA. Myoview Cancelled, Troponins negative x 4, CAD progression unlikely. No new complaints otherwise. 14 system General and Cardiac ROS otherwise negative or unchanged. Assessment:    Shortness of breath  Chest pain symptoms with exertion  Palpitations. Edema, bipedal  PNA. COPD, Exacerbation  Negative troponins x3 to date. CAD, post RCA ERMIAS 2020, Known 50% LCX medically treated to date. Preserved left ventricular function, LVEF 60%  Negative ECHO,    Hypertension  Hyperlipidemia  Diabetes  Nocturnal snoring / insomnia, probable sleep apnea  Obesity  Ongoing tobacco abuse    Plan:    1. Cardiac Supportive Care  2. Probable Respiratory primary illness / PNA, Pulmonary care. 3. Suggest Myoview Stress test eventually as outpatient. 4. Telemetry and ECG. 5. Continue current Medications. 6. Dr Amberly Shankar to reassume care upon her return. 7. OK to Discharge once ok with others. 8. Further Recommendations to follow  9. See Orders        HISTORY OF PRESENT ILLNESS:      Dusty Garcia is a pleasant 44 y.o. female who has the above-noted history including recent right coronary stent angioplasty 2020 by Dr. Amberly Shankar. She has preserved left ventricular function, hypertension, hyperlipidemia, diabetes and COPD. She has probable sleep apnea. She presented to the emergency room with 1 day history of worsening shortness of breath. Shortness of breath is with exertion. Her symptoms progressed and had troubles sleeping and therefore came to the emergency room.   She is had Oral BID    aspirin  81 mg Oral Daily    sodium chloride flush  10 mL Intravenous 2 times per day    enoxaparin  40 mg Subcutaneous Daily    insulin lispro  0-6 Units Subcutaneous TID WC    insulin lispro  0-3 Units Subcutaneous Nightly    budesonide  0.5 mg Nebulization BID    atorvastatin  10 mg Oral Daily    dilTIAZem  120 mg Oral BID    furosemide  20 mg Oral Daily    lisinopril  5 mg Oral Daily    nicotine  1 patch Transdermal Daily    pantoprazole  20 mg Oral QAM AC     Continuous Infusions:   dextrose       PRN Meds:benzonatate, benzocaine-menthol, sodium chloride flush, acetaminophen **OR** acetaminophen, polyethylene glycol, promethazine **OR** ondansetron, albuterol, glucose, dextrose, glucagon (rDNA), dextrose    Allergies   Allergen Reactions    Shellfish-Derived Products        Social History     Socioeconomic History    Marital status: Single     Spouse name: Not on file    Number of children: Not on file    Years of education: Not on file    Highest education level: Not on file   Occupational History    Not on file   Social Needs    Financial resource strain: Not on file    Food insecurity     Worry: Not on file     Inability: Not on file    Transportation needs     Medical: Not on file     Non-medical: Not on file   Tobacco Use    Smoking status: Current Every Day Smoker     Packs/day: 1.00     Types: Cigarettes    Smokeless tobacco: Never Used   Substance and Sexual Activity    Alcohol use: Not Currently    Drug use: Never    Sexual activity: Not on file   Lifestyle    Physical activity     Days per week: Not on file     Minutes per session: Not on file    Stress: Not on file   Relationships    Social connections     Talks on phone: Not on file     Gets together: Not on file     Attends Restorationism service: Not on file     Active member of club or organization: Not on file     Attends meetings of clubs or organizations: Not on file     Relationship status: Not on file   Amy [place] Intimate partner violence     Fear of current or ex partner: Not on file     Emotionally abused: Not on file     Physically abused: Not on file     Forced sexual activity: Not on file   Other Topics Concern    Not on file   Social History Narrative    Not on file       No family history on file. Review Of Systems:    14 point ROS negative other than mentioned. Physical Exam:    CURRENT VITALS: BP (!) 143/76   Pulse 85   Temp 97.9 °F (36.6 °C) (Oral)   Resp 16   Ht 5' 2\" (1.575 m)   Wt 196 lb 6.4 oz (89.1 kg)   SpO2 95%   BMI 35.92 kg/m²     CONSTITUTIONAL:  awake, alert, cooperative, no apparent distress,   ENT:  Normocephalic, without obvious abnormality, atraumatic, sinuses nontender on palpation, external ears without lesions,  NECK:  Supple, symmetrical, trachea midline, no adenopathy, thyroid symmetric, not enlarged and no tenderness, skin normal, No bruits. LUNGS:  Increased work of breathing, Decreased air exchange, bilateral wheezing. CARDIOVASCULAR:  Normal apical impulse, regular rate and rhythm, normal S1 and S2,  1/6 Systolic murmur noted. ABDOMEN:  Obese, normal bowel sounds, soft, non-distended, non-tender, no masses palpated, no hepatosplenomegally  EXTREMETIES: 1+ edema, Pulses Strong Thruout. No ulcers. NEUROLOGIC:  Awake, alert, oriented to name, place and time. Following all commands and moving all extremties.   SKIN:  no bruising or bleeding, normal skin color, texture, turgor and no rashes    Labs:  Recent Results (from the past 24 hour(s))   POCT Glucose    Collection Time: 09/30/20  8:45 PM   Result Value Ref Range    POC Glucose 281 (H) 60 - 115 mg/dl    Performed on ACCU-CHEK    Basic Metabolic Panel w/ Reflex to MG    Collection Time: 10/01/20  5:48 AM   Result Value Ref Range    Sodium 136 135 - 144 mEq/L    Potassium reflex Magnesium 3.6 3.4 - 4.9 mEq/L    Chloride 100 95 - 107 mEq/L    CO2 26 20 - 31 mEq/L    Anion Gap 10 9 - 15 mEq/L    Glucose 157 (H) 70 - 99 mg/dL BUN 20 6 - 20 mg/dL    CREATININE 0.59 0.50 - 0.90 mg/dL    GFR Non-African American >60.0 >60    GFR  >60.0 >60    Calcium 8.7 8.5 - 9.9 mg/dL   CBC Auto Differential    Collection Time: 10/01/20  5:48 AM   Result Value Ref Range    WBC 11.3 (H) 4.8 - 10.8 K/uL    RBC 4.02 (L) 4.20 - 5.40 M/uL    Hemoglobin 12.2 12.0 - 16.0 g/dL    Hematocrit 36.5 (L) 37.0 - 47.0 %    MCV 91.0 82.0 - 100.0 fL    MCH 30.4 27.0 - 31.3 pg    MCHC 33.4 33.0 - 37.0 %    RDW 13.4 11.5 - 14.5 %    Platelets 735 094 - 818 K/uL    PLATELET SLIDE REVIEW Normal     Neutrophils % 75.0 %    Lymphocytes % 19.0 %    Monocytes % 3.8 %    Eosinophils % 1.0 %    Basophils % 0.1 %    Neutrophils Absolute 8.6 (H) 1.4 - 6.5 K/uL    Lymphocytes Absolute 2.1 1.0 - 4.8 K/uL    Monocytes Absolute 0.5 0.2 - 0.8 K/uL    Eosinophils Absolute 0.1 0.0 - 0.7 K/uL    Basophils Absolute 0.0 0.0 - 0.2 K/uL    Metamyelocytes Relative 1 %    RBC Morphology Normal    POCT Glucose    Collection Time: 10/01/20  6:36 AM   Result Value Ref Range    POC Glucose 160 (H) 60 - 115 mg/dl    Performed on ACCU-CHEK    POCT Glucose    Collection Time: 10/01/20 10:56 AM   Result Value Ref Range    POC Glucose 138 (H) 60 - 115 mg/dl    Performed on ACCU-CHEK    POCT Glucose    Collection Time: 10/01/20  3:46 PM   Result Value Ref Range    POC Glucose 233 (H) 60 - 115 mg/dl    Performed on ACCU-CHEK        ECG:     Normal sinus rhythm, 100 rate  Normal ECG     ECHO:   Normal left ventricular function, LVEF 60%.    Normal chamber sizes.    No significant valvular heart disease noted.    Normal pericardium. Myoview cardiac stress Test:  Scheduled for outpatient.         Beatrice Hernandez MD  [address] Cardiologist      Electronically signed on 9/28/20 at 11:46 AM EDT      -----     [facility] OV NOTE:    Subjective  PCP: Primary Care Physician is Dr. Zuluaga Her:   08/28/2020 - Derricklyly Yoo was seen in cardiac evaluation at the [facility] office 08/28/2020. The patients problems are listed in the impression below. Electronic medical records reviewed. I was asked to see this patient for Dr. Fernando Meléndez in her absence. Patient has right greater than left worsening edema. She has some shortness of breath and chest pain particularly with exertion. She has a recent Myoview perfusion stress test which shows anterior apical ischemia with ejection fraction 75%. She has been scheduled for cardiac catheterization for next week. She otherwise feels well. She has no other cardiovascular complaints. Patient denies Lightheadedness, Dizziness, TIA or CVA symptoms. No CHF or Edema. No Palpitations. No GI,  or Bleeding Issues. No Recent Fever or Chills. Patient continues to smoke a half a pack of cigarettes per day. Cardiovascular and general review of systems is otherwise negative. A 14-system review is otherwise negative, other than noted. ELECTROCARDIOGRAM: Sinus rhythm rate 94 no acute changes. LABORATORY DATA: None    All above testing was personally reviewed. PHYSICAL EXAMINATION:   General: No acute distress. Vital signs as noted. Alert and oriented. Head And Neck Examination: No jugular venous distention, no carotid bruits, no mass. Carotid upstrokes preserved. Oral mucosa moist. No xanthelasma. Head and neck examination otherwise unremarkable. Lungs: Clear to auscultation and percussion. No wheezes, no rales, and no rhonchi. Chest: Excursion appeared to be normal. No chest wall tenderness on palpation. Heart: Normal S1 and S2. No S3. No S4. No rub. Grade 1/6 systolic murmur, best heard at the left sternal border. Point of maximal impulse was within normal limits. Abdomen: Soft. Nontender. No organomegaly. No bruits. No masses. Obese. Extremities: Right greater than left 2+ bipedal edema. No clubbing. No cyanosis. Pulses are diminished throughout. No bruits. Musculoskeletal Exam: No ulcers, otherwise unremarkable.   Neuro: Neurologically appeared grossly intact. IMPRESSION:     Shortness of breath  Chest pain symptoms with exertion  Edema, bipedal  Abnormal Myoview perfusion stress test with anterior apical ischemia by Lexiscan, LVEF 75% 8/2020. Preserved left ventricular function  Hypertension  Hyperlipidemia  Diabetes  COPD  Nocturnal snoring / insomnia, probable sleep apnea  Obesity  Ongoing tobacco abuse  Otherwise as per assessment below. RECOMMENDATIONS:     Would suggest that we adjust medications as follows: Lasix 20 mg daily, K-Dur 10 mEq daily increase Cartia 220 mg twice daily. Patient will continue other medications as current. Refills were provided. Agree with cardiac catheterization which is scheduled for next week with Dr. Shannan Pedro. Patient wishes to proceed. Patient should have a sleep study later for evaluation of her probable sleep apnea. I have instructed her to discontinue tobacco.    Exercise dietary program was encouraged after her cardiac catheterization is complete. Weight loss program.    Hydration. Follow my health portal was encouraged. We will plan to see back in 1 month with Dr. Shannan Pedro post cardiac catheterization with Laboratory Studies and ECG as noted below. Patient will follow up with their primary physician for general care. The patient knows to contact medical care earlier if need be. Nicole Benitez MD, [address] [facility] Cardiology      Of Note:  [employer] voice recognition dictation software was utilized partially in the preparation of this note, therefore, inaccuracies in spelling, word choice and punctuation may have occurred which were not recognized the time of signing. Chief Complaint  Cardiology Reason for Visit_NOH:   Additional Information: OV GM EKG. Active Problems   1. Abnormal stress test (794.39) (R94.39)   2. Angina pectoris (413.9) (I20.9)   3. Current every day smoker (305.1) (F17.200)   4. Diabetes mellitus (250.00) (E11.9)   5.  Leg Capsule; TAKE AS DIRECTED; Therapy: (Recorded:07Zje1600) to Recorded    Allergies   1. Shellfish-derived Products   Anaphylaxis;; Updated By: Farhat Tao; 08/27/2020 10:33:56 AM    Vitals  Vital Signs   Recorded: 28Aug2020 10:44AM   Height: 5 ft 2 in  Weight: 211 lb   BMI Calculated: 38.59 kg/m2  BSA Calculated: 1.96  Falls Screening: No falls within the past year  Blood Pressure: 116 / 60, RUE, Sitting  Heart Rate: 94, Apical    Procedure    EKG done in office today; :   .     Plan   1. Stop: Cartia  MG Oral Capsule Extended Release 24 Hour   2. Start: Cartia  MG Oral Capsule Extended Release 24 Hour; TAKE 1 CAPSULE   TWICE DAILY   3. Start: Furosemide 20 MG Oral Tablet (Lasix); take 1 tablet daily   4. Start: Potassium Chloride Carrol ER 10 MEQ Oral Tablet Extended Release; TAKE 1   TABLET DAILY   5. D-Dimer; Status:Active; Requested for:28Aug2020;    6. High Sensitivity CRP; Status:Active; Requested for:28Aug2020;    7. Lifestyle education regarding diet; Status:Complete;   Done: 28Aug2020   8. Lipid Panel w/Reflex LDL; Status:Active; Requested for:28Aug2020;    9. LIVER PANEL; Status:Active; Requested for:28Aug2020;    10. Magnesium; Status:Active; Requested for:96Xdm4883;    11. TSH; Status:Active; Requested for:17Rjs2459;    12. Venous Duplex (bilateral legs); Status:Active; Requested for:28Aug2020;    13. XRay Chest, PA/LAT; Status:Active; Requested for:17Fgc9298;    14. Access your medical record, request prescriptions, view laboratory and testing done in    our office, request appointments, view immunization records and message your provider    through our safe and secure patient portal. Ask a staff member how    to register or visit us at [email or web address] to get started today.;    Status:Complete;   Done: 28Aug2020   15. Begin or continue regular aerobic exercise. Gradually work up to at least 3 sessions of    30 minutes of exercise a week.; Status:Active;  Requested for:25Xhc4970;    16. Counseled on smoking cessation; Status:Complete;   Done: 15Bnr2914   17. Drink plenty of fluids.; Status:Active; Requested for:00Gwz0440;    18. Please bring all medicines, vitamins, and herbal supplements with you when you come    to the office.; Status:Active; Requested for:01Rqs1436;    19. Please bring any lab results from other providers/physicians to your next appointment.;    Status:Active; Requested for:75Mth1099;    20. Prescriptions will not be filled unless you are compliant with your follow up appointments    or have a follow up appointments scheduled as per the instruction of your physician. Refills for medications should be requested at the time of your office visit.; Status:Active; Requested for:10Fgk2650;    21. Thank you for being a patient at [facility]. Our goal is to    provide high quality medical care. Following today's visit, please check your email for an    invitation to complete our patient satisfaction survey. By sharing your feedback, you will    help us continue our mission to provide excellent medical care. Your participation in the    survey is voluntary and completely confidential. We appreciate your thoughts regarding    today's visit.; Status:Active; Requested for:57Yod2873;    22. Tobacco use Hx Reported; Status:Complete;   Done: 17Mue2746   23. You are overweight. Please increase activity level and reduce calorie intake. Please    inform the staff if you are willing to go for dietary counseling; Status:Active; Requested    for:38Tmc3649;    24. You need to quit smoking.; Status:Active; Requested for:87Uxg4964;    25. 1 week follow-up/call if interval problems. Evaluation and Treatment  Follow-up  Status:    Active  Requested for: 36Frk1864   26. Follow up per family physician.  Evaluation and Treatment  Follow-up  Status: Active     Requested for: 03Olo4206    Discussion/Summary  Increase Cardizem to 120 mg twice daily  Start Lasix 20 mg daily  Start Potassium 10 meq daily     Signatures  Electronically signed by : Esa Castro, ; Aug 28 2020 10:48AM EST                        Electronically signed by : Kiara Rondon LPN;  Aug 28 2020 11:58AM EST                        Electronically signed by : Sharon Lehman M.D.; Sep  3 2020  7:16PM EST

## 2020-10-02 VITALS
BODY MASS INDEX: 36.14 KG/M2 | SYSTOLIC BLOOD PRESSURE: 133 MMHG | HEART RATE: 99 BPM | HEIGHT: 62 IN | DIASTOLIC BLOOD PRESSURE: 78 MMHG | TEMPERATURE: 98.2 F | WEIGHT: 196.4 LBS | OXYGEN SATURATION: 97 % | RESPIRATION RATE: 18 BRPM

## 2020-10-02 LAB
ANION GAP SERPL CALCULATED.3IONS-SCNC: 16 MEQ/L (ref 9–15)
BUN BLDV-MCNC: 21 MG/DL (ref 6–20)
CALCIUM SERPL-MCNC: 9.1 MG/DL (ref 8.5–9.9)
CHLORIDE BLD-SCNC: 100 MEQ/L (ref 95–107)
CO2: 22 MEQ/L (ref 20–31)
CREAT SERPL-MCNC: 0.6 MG/DL (ref 0.5–0.9)
GFR AFRICAN AMERICAN: >60
GFR NON-AFRICAN AMERICAN: >60
GLUCOSE BLD-MCNC: 165 MG/DL (ref 60–115)
GLUCOSE BLD-MCNC: 175 MG/DL (ref 60–115)
GLUCOSE BLD-MCNC: 184 MG/DL (ref 70–99)
PERFORMED ON: ABNORMAL
PERFORMED ON: ABNORMAL
POTASSIUM REFLEX MAGNESIUM: 3.9 MEQ/L (ref 3.4–4.9)
SODIUM BLD-SCNC: 138 MEQ/L (ref 135–144)

## 2020-10-02 PROCEDURE — 6370000000 HC RX 637 (ALT 250 FOR IP): Performed by: INTERNAL MEDICINE

## 2020-10-02 PROCEDURE — 6360000002 HC RX W HCPCS: Performed by: INTERNAL MEDICINE

## 2020-10-02 PROCEDURE — 36415 COLL VENOUS BLD VENIPUNCTURE: CPT

## 2020-10-02 PROCEDURE — 99232 SBSQ HOSP IP/OBS MODERATE 35: CPT | Performed by: INTERNAL MEDICINE

## 2020-10-02 PROCEDURE — 2580000003 HC RX 258: Performed by: INTERNAL MEDICINE

## 2020-10-02 PROCEDURE — 80048 BASIC METABOLIC PNL TOTAL CA: CPT

## 2020-10-02 PROCEDURE — 94640 AIRWAY INHALATION TREATMENT: CPT

## 2020-10-02 PROCEDURE — 94760 N-INVAS EAR/PLS OXIMETRY 1: CPT

## 2020-10-02 RX ORDER — PREDNISONE 10 MG/1
TABLET ORAL
Qty: 40 TABLET | Refills: 0 | Status: SHIPPED | OUTPATIENT
Start: 2020-10-02 | End: 2020-10-29 | Stop reason: ALTCHOICE

## 2020-10-02 RX ORDER — PREDNISONE 20 MG/1
40 TABLET ORAL DAILY
Qty: 14 TABLET | Refills: 0 | Status: SHIPPED | OUTPATIENT
Start: 2020-10-02 | End: 2020-10-02 | Stop reason: HOSPADM

## 2020-10-02 RX ORDER — BUDESONIDE AND FORMOTEROL FUMARATE DIHYDRATE 160; 4.5 UG/1; UG/1
2 AEROSOL RESPIRATORY (INHALATION) 2 TIMES DAILY
Qty: 1 INHALER | Refills: 3 | Status: SHIPPED | OUTPATIENT
Start: 2020-10-02 | End: 2021-03-17 | Stop reason: SDUPTHER

## 2020-10-02 RX ORDER — CEFUROXIME AXETIL 500 MG/1
500 TABLET ORAL EVERY 12 HOURS SCHEDULED
Qty: 8 TABLET | Refills: 0 | Status: SHIPPED | OUTPATIENT
Start: 2020-10-02 | End: 2020-10-06

## 2020-10-02 RX ORDER — IPRATROPIUM BROMIDE AND ALBUTEROL SULFATE 2.5; .5 MG/3ML; MG/3ML
1 SOLUTION RESPIRATORY (INHALATION) 3 TIMES DAILY PRN
Qty: 270 ML | Refills: 0 | Status: SHIPPED | OUTPATIENT
Start: 2020-10-02 | End: 2021-09-20 | Stop reason: SDUPTHER

## 2020-10-02 RX ORDER — BENZONATATE 100 MG/1
100 CAPSULE ORAL EVERY 4 HOURS PRN
Qty: 42 CAPSULE | Refills: 0 | Status: SHIPPED | OUTPATIENT
Start: 2020-10-02 | End: 2020-10-09

## 2020-10-02 RX ADMIN — ENOXAPARIN SODIUM 40 MG: 40 INJECTION SUBCUTANEOUS at 09:09

## 2020-10-02 RX ADMIN — DILTIAZEM HYDROCHLORIDE 120 MG: 120 CAPSULE, COATED, EXTENDED RELEASE ORAL at 09:09

## 2020-10-02 RX ADMIN — METFORMIN HYDROCHLORIDE 1000 MG: 500 TABLET ORAL at 09:09

## 2020-10-02 RX ADMIN — VARENICLINE TARTRATE 1 MG: 1 TABLET, FILM COATED ORAL at 09:08

## 2020-10-02 RX ADMIN — LISINOPRIL 5 MG: 5 TABLET ORAL at 09:09

## 2020-10-02 RX ADMIN — FUROSEMIDE 20 MG: 20 TABLET ORAL at 09:09

## 2020-10-02 RX ADMIN — IPRATROPIUM BROMIDE AND ALBUTEROL 1 PUFF: 20; 100 SPRAY, METERED RESPIRATORY (INHALATION) at 07:32

## 2020-10-02 RX ADMIN — Medication 10 ML: at 09:09

## 2020-10-02 RX ADMIN — INSULIN LISPRO 1 UNITS: 100 INJECTION, SOLUTION INTRAVENOUS; SUBCUTANEOUS at 12:05

## 2020-10-02 RX ADMIN — IPRATROPIUM BROMIDE AND ALBUTEROL 1 PUFF: 20; 100 SPRAY, METERED RESPIRATORY (INHALATION) at 11:36

## 2020-10-02 RX ADMIN — PANTOPRAZOLE SODIUM 20 MG: 20 TABLET, DELAYED RELEASE ORAL at 05:25

## 2020-10-02 RX ADMIN — INSULIN LISPRO 1 UNITS: 100 INJECTION, SOLUTION INTRAVENOUS; SUBCUTANEOUS at 09:11

## 2020-10-02 RX ADMIN — BUDESONIDE 500 MCG: 0.5 INHALANT RESPIRATORY (INHALATION) at 07:32

## 2020-10-02 RX ADMIN — INSULIN GLARGINE 10 UNITS: 100 INJECTION, SOLUTION SUBCUTANEOUS at 09:11

## 2020-10-02 RX ADMIN — ASPIRIN 81 MG: 81 TABLET, COATED ORAL at 09:09

## 2020-10-02 RX ADMIN — PREDNISONE 30 MG: 10 TABLET ORAL at 09:09

## 2020-10-02 RX ADMIN — TICAGRELOR 90 MG: 90 TABLET ORAL at 09:09

## 2020-10-02 RX ADMIN — ATORVASTATIN CALCIUM 10 MG: 10 TABLET, FILM COATED ORAL at 09:09

## 2020-10-02 RX ADMIN — CEFUROXIME AXETIL 500 MG: 500 TABLET ORAL at 09:08

## 2020-10-02 ASSESSMENT — PAIN SCALES - GENERAL
PAINLEVEL_OUTOF10: 0
PAINLEVEL_OUTOF10: 0

## 2020-10-02 NOTE — PROGRESS NOTES
INPATIENT PROGRESS NOTES    PATIENT NAME: Higinio Soriano  MRN: [id number]  SERVICE DATE:  October 2, 2020   SERVICE TIME:  3:42 PM      PRIMARY SERVICE: Pulmonary Disease    CHIEF COMPLAINTS: Shortness of breath    INTERVAL HPI: Patient seen and examined at bedside, Interval Notes, orders reviewed. Nursing notes noted    Feels much improved, shortness of breath is at baseline, no coughing, no chest pain, no fever, slept well, no nausea no vomiting    Review of system:     GI Abdominal pain No  Skin Rash No    Social History     Tobacco Use    Smoking status: Current Every Day Smoker     Packs/day: 1.00     Types: Cigarettes    Smokeless tobacco: Never Used   Substance Use Topics    Alcohol use: Not Currently     No family history on file. OBJECTIVE    Body mass index is 35.92 kg/m². PHYSICAL EXAM:  Vitals:  /78   Pulse 99   Temp 98.2 °F (36.8 °C) (Oral)   Resp 18   Ht 5' 2\" (1.575 m)   Wt 196 lb 6.4 oz (89.1 kg)   SpO2 97%   BMI 35.92 kg/m²     General: alert, cooperative, no distress  Head: normocephalic, atraumatic  Eyes:No gross abnormalities. ENT:  MMM no lesions  Neck:  supple and no masses  Chest : Good air movement, no wheezing, no rales, nontender, tympanic  Heart[de-identified] Heart sounds are normal.  Regular rate and rhythm without murmur, gallop or rub. ABD:  symmetric, soft, non-tender  Musculoskeletal : no cyanosis, no clubbing and no edema  Neuro:  Grossly normal  Skin: No rashes or nodules noted.   Lymph node:  no cervical nodes  Urology: No Ruiz   Psychiatric: appropriate    DATA:   Recent Labs     10/01/20  0548   WBC 11.3*   HGB 12.2   HCT 36.5*   MCV 91.0        Recent Labs     10/01/20  0548 10/02/20  0623    138   K 3.6 3.9    100   CO2 26 22   BUN 20 21*   CREATININE 0.59 0.60   GLUCOSE 157* 184*   CALCIUM 8.7 9.1   LABGLOM >60.0 >60.0   GFRAA >60.0 >60.0       MV Settings:          No results for input(s): PHART, SJO1AMY, PO2ART, PDO3FSA, BEART, A3VXWMWE in the last 72 hours. O2 Device: None (Room air)  O2 Flow Rate (L/min): 3 L/min    DIET CARDIAC; Low Sodium (2 GM)     MEDICATIONS during current hospitalization:    Continuous Infusions:   dextrose         Scheduled Meds:   predniSONE  30 mg Oral Daily    Followed by   Clovis Carmen ON 10/5/2020] predniSONE  20 mg Oral Daily    Followed by   Clovis Carmen ON 10/8/2020] predniSONE  10 mg Oral Daily    cefUROXime  500 mg Oral 2 times per day    metFORMIN  1,000 mg Oral BID WC    insulin glargine  10 Units Subcutaneous BID    albuterol-ipratropium  1 puff Inhalation Q4H While awake    varenicline  1 mg Oral BID    nitroglycerin  1 inch Topical 3 times per day    ticagrelor  90 mg Oral BID    aspirin  81 mg Oral Daily    sodium chloride flush  10 mL Intravenous 2 times per day    enoxaparin  40 mg Subcutaneous Daily    insulin lispro  0-6 Units Subcutaneous TID WC    insulin lispro  0-3 Units Subcutaneous Nightly    budesonide  0.5 mg Nebulization BID    atorvastatin  10 mg Oral Daily    dilTIAZem  120 mg Oral BID    furosemide  20 mg Oral Daily    lisinopril  5 mg Oral Daily    nicotine  1 patch Transdermal Daily    pantoprazole  20 mg Oral QAM AC       PRN Meds:benzonatate, benzocaine-menthol, sodium chloride flush, acetaminophen **OR** acetaminophen, polyethylene glycol, promethazine **OR** ondansetron, albuterol, glucose, dextrose, glucagon (rDNA), dextrose    Radiology  Ct Cervical Spine Wo Contrast    Result Date: 9/10/2020  CT cervical spine without intravenous contrast medium. HISTORY: Right arm pain since yesterday. TECHNICAL FACTORS: CT imaging cervical spine obtained and formatted as 2.5 mm contiguous axial images from skull base, to T3-T4. 2-D sagittal and coronal reconstruction obtained during postprocessing. No intravenous contrast medium utilized. No prior imaging cervical spine  available for comparison. FINDINGS: Cervical vertebral bodies normal in height and alignment.  Disc space narrowing C4-5 through C7-T1. Anterior and posterior osteophytes 4 through C6. No fracture, dislocation, bone lesion. Limited imaging lung apices without anomaly. No fracture. All CT scans at this facility use dose modulation, iterative reconstruction, and/or weight based dosing when appropriate to reduce radiation dose to as low as reasonably achievable. Xr Chest Portable    Result Date: 9/27/2020  XR CHEST PORTABLE : 9/27/2020 CLINICAL HISTORY:  SOB, asthma, respiratory distress . COMPARISON: 8/28/2020. TECHNIQUE: An upright portable AP radiograph of the chest was obtained FINDINGS: Shallow inspiratory volumes are present without significant infiltrate identified. There is no cardiomegaly, significant pleural effusion, vascular congestion, pneumothorax, or displaced fractures identified. Mild coarsening of the bronchovascular structures consistent with asthma is again noted. NO EVIDENCE OF ACTIVE CARDIOPULMONARY DISEASE. Us Dup Upper Extremity Right Venous    Result Date: 9/11/2020  RIGHT UPPER EXTREMITY DEEP VENOUS DUPLEX DOPPLER ULTRASOUND CLINICAL HISTORY:   R arm edema and pain   Arm Pain COMPARISON: None TECHNIQUE:  Gray scale ultrasound with compression maneuvers where accessible, color and spectral Doppler of the right internal jugular, subclavian, axillary, brachial, radial, and ulnar veins was performed. Grey scale with and without compression of the right basilic and cephalic veins was performed. Images were obtained and stored in a permanent archive. RESULT: RIGHT UPPER EXTREMITY DEEP VEINS: Internal Jugular vein: Normal compression, normal spontaneous flow, Subclavian vein:  Normal, spontaneous flow, normal response to augmentation.  Axillary vein:  Normal compression, normal spontaneous flow, Brachial vein:  Normal compression, normal spontaneous flow, Radial vein: Normal compression, normal spontaneous flow, Ulnar vein: Normal compression, normal spontaneous flow, RIGHT UPPER EXTREMITY SUPERFICIAL VEINS: Basilic vein: Normal compression Cephalic vein:  Normal compression     NEGATIVE STUDY FOR ACUTE DVT IN THE RIGHT UPPER EXTREMITY.  NEGATIVE STUDY FOR SUPERFICIAL THROMBOPHLEBITIS IN THE RIGHT UPPER EXTREMITY     CT chest shows hazy groundglass infiltrates some looks like nodules  H. influenzae positive  IMPRESSION AND SUGGESTION:  Patient is at risk due to   · Probable asthma with acute exacerbation  · H. influenzae community-acquired pneumonia  · History of smoking, she quit recently  · Possible KEITH, sleep study scheduled  · Obesity  · Hyperglycemia secondary to steroid    Recommendations  · Taper of prednisone  · Complete 8 days of antibiotic  · Resume home inhalers  · Continue DuoNeb  · Continue Chantix  · Sleep study scheduled for the patient  · methacholine challenge test as outpatient  · Ok to DC from pulmonary point of view  · Follow-up with me scheduled for October 29th       Electronically signed by Alicia Méndez MD,  [facility]   on 10/2/2020 at 3:42 PM

## 2020-10-02 NOTE — PROGRESS NOTES
Physician Progress Note      PATIENTMellisa Gonzalez  CSN #:                  [id number]  :                       1970  ADMIT DATE:       2020 10:11 AM  DISCH DATE:  RESPONDING  PROVIDER #:        Melanie De Dios DO          QUERY TEXT:    Pt admitted with pneumonia . Pt noted to have  Haemophilus influenzae in   respiratory culture. If possible, please document in the progress notes and   discharge summary if you are evaluating and/or treating any of the following: The medical record reflects the following:  Risk Factors: asthma, DM, Hodgkins, HTN  Clinical Indicators:  Haemophilus influenzae, WBC 17.8, 15.4,   7.454/31/62/21.9/93%  Treatment: pulmonology consult, NS 1L, combivent, Pulmicort, duoneb, levaquin,   solumedrol    Dearl Jeff BSN, RN, CDS  [phone]  Options provided:  -- Gram negative pneumonia  -- Viral pneumonia  -- Aspiration pneumonia  -- Unable to determine pneumonia specificity  -- Other - I will add my own diagnosis  -- Disagree - Not applicable / Not valid  -- Disagree - Clinically unable to determine / Unknown  -- Refer to Clinical Documentation Reviewer    PROVIDER RESPONSE TEXT:    This patient has gram negative pneumonia.     Query created by: Yumiko Smith on 10/1/2020 11:09 AM      Electronically signed by:  Melanie De Dios DO 10/2/2020 3:49 PM

## 2020-10-02 NOTE — CONSULTS
Patient seen at bedside about pulmonary rehab. Pt very interested ,  Will talk to her  On follow up.

## 2020-10-02 NOTE — FLOWSHEET NOTE
Perfect serve to pulmonary to determine if patient ok to d.c from pulm standpoint.  Electronically signed by Da Patiño RN on 10/2/2020 at 1:47 PM

## 2020-10-02 NOTE — PROGRESS NOTES
room.  She is had no fever or chills. She said no productive cough or sputum production. She noted some mild chest discomfort symptoms radiating to her back. She has had no exertional chest pain symptoms. She has had some intermittent diarrhea. She quit smoking 5 days ago and started Chantix and NicoDerm patch. She uses 3 L of nasal cannula oxygen at home. Given her cardiovascular history she was admitted for further evaluation and given her cardiac history she was asked to see cardiology. Patient follows with Kishor Zheng MD, [address] [facility]. See attached notes below. Patient History and Records, EMR reviewed. Patient Interviewed and examined. Fair historian. She was conservatively treated has been seen by pulmonary medicine. She currently feels much improved. She still has some shortness of breath and is on oxygen. Her chest pain has resolved. Serial troponin levels have been negative. EKG is unremarkable. Telemetry is unremarkable. Denies LH, Dizziness, TIA or CVA Symptoms. No Orthopnea, Edema or CHF symptoms. No Palpitations. No Syncope. No Fever, Chills or Cold symptoms. No GI,  or Bleeding complaints. Cardiac and general ROS otherwise negative. [address] otherwise negative other than noted. Past Medical History:   Diagnosis Date    Asthma 2004    Diabetes mellitus ([facility] [address] 2014    Hodgkin disease ([facility] [address] [facility]    Hypertension 1984    Migraines 1989       No past surgical history on file. Prior to Admission medications    Medication Sig Start Date End Date Taking?  Authorizing Provider   cefUROXime (CEFTIN) 500 MG tablet Take 1 tablet by mouth every 12 hours for 4 days 10/2/20 10/6/20 Yes [facility] DO   predniSONE (DELTASONE) 20 MG tablet Take 2 tablets by mouth daily for 7 days 10/2/20 10/9/20 Yes [facility] DO   benzonatate (TESSALON) 100 MG capsule Take 1 capsule by mouth every 4 hours as needed for Cough 10/2/20 10/9/20 Yes [facility] DO benzocaine-menthol (CEPACOL SORE THROAT) 15-3.6 MG lozenge Take 1 lozenge by mouth every 2 hours as needed for Sore Throat 10/2/20 10/9/20 Yes Master Merida,    tiotropium (SPIRIVA RESPIMAT) 2.5 MCG/ACT AERS inhaler Inhale 2 puffs into the lungs daily 10/2/20  Yes Master Merida,    ipratropium-albuterol (DUONEB) 0.5-2.5 (3) MG/3ML SOLN nebulizer solution Inhale 3 mLs into the lungs 3 times daily as needed for Shortness of Breath 10/2/20 11/1/20 Yes Flavio Lainez, DO   varenicline (CHANTIX STARTING MONTH PAK) 0.5 MG X 11 & 1 MG X 42 tablet Take by mouth. 9/16/20  Yes Alicia Méndez MD   nicotine (NICODERM CQ) 21 MG/24HR Place 1 patch onto the skin daily 9/16/20 10/28/20 Yes Alicia Méndez MD   furosemide (LASIX) 20 MG tablet Take 20 mg by mouth daily   Yes Historical Provider, MD   potassium chloride (KLOR-CON M) 10 MEQ extended release tablet Take 10 mEq by mouth daily   Yes Historical Provider, MD   lisinopril (PRINIVIL;ZESTRIL) 5 MG tablet Take 5 mg by mouth daily   Yes Historical Provider, MD   dilTIAZem (CARDIZEM CD) 120 MG extended release capsule Take 1 capsule by mouth 2 times daily 8/31/20  Yes Inés Fermin MD   aspirin 81 MG EC tablet Take 1 tablet by mouth daily 9/1/20  Yes Inés Fermin MD   ticagrelor (BRILINTA) 90 MG TABS tablet Take 1 tablet by mouth 2 times daily 8/31/20  Yes Inés Fermin MD   omeprazole (PRILOSEC) 20 MG delayed release capsule Take 1 capsule by mouth every morning (before breakfast) 8/4/20  Yes Alicia Méndez MD   albuterol sulfate HFA (VENTOLIN HFA) 108 (90 Base) MCG/ACT inhaler Inhale 2 puffs into the lungs 4 times daily as needed for Wheezing  Patient taking differently: Inhale 1 puff into the lungs 4 times daily as needed for Wheezing  7/27/20  Yes Kriss Tee MD   nitroGLYCERIN (NITROSTAT) 0.4 MG SL tablet up to max of 3 total doses. If no relief after 1 dose, call 911.   Patient taking differently: Place 0.4 mg under the tongue every 5 minutes as needed up to max of 3 total doses.  If no relief after 1 dose, call 911. 7/23/20  Yes Arlene Barrera MD   atorvastatin (LIPITOR) 80 MG tablet Take 1 tablet by mouth daily  Patient taking differently: Take 10 mg by mouth daily  2/27/19  Yes Manan Lockhart MD       Scheduled Meds:   predniSONE  30 mg Oral Daily    Followed by   Willard Queen ON 10/5/2020] predniSONE  20 mg Oral Daily    Followed by   Willard Queen ON 10/8/2020] predniSONE  10 mg Oral Daily    cefUROXime  500 mg Oral 2 times per day    metFORMIN  1,000 mg Oral BID WC    insulin glargine  10 Units Subcutaneous BID    albuterol-ipratropium  1 puff Inhalation Q4H While awake    varenicline  1 mg Oral BID    nitroglycerin  1 inch Topical 3 times per day    ticagrelor  90 mg Oral BID    aspirin  81 mg Oral Daily    sodium chloride flush  10 mL Intravenous 2 times per day    enoxaparin  40 mg Subcutaneous Daily    insulin lispro  0-6 Units Subcutaneous TID WC    insulin lispro  0-3 Units Subcutaneous Nightly    budesonide  0.5 mg Nebulization BID    atorvastatin  10 mg Oral Daily    dilTIAZem  120 mg Oral BID    furosemide  20 mg Oral Daily    lisinopril  5 mg Oral Daily    nicotine  1 patch Transdermal Daily    pantoprazole  20 mg Oral QAM AC     Continuous Infusions:   dextrose       PRN Meds:benzonatate, benzocaine-menthol, sodium chloride flush, acetaminophen **OR** acetaminophen, polyethylene glycol, promethazine **OR** ondansetron, albuterol, glucose, dextrose, glucagon (rDNA), dextrose    Allergies   Allergen Reactions    Shellfish-Derived Products        Social History     Socioeconomic History    Marital status: Single     Spouse name: Not on file    Number of children: Not on file    Years of education: Not on file    Highest education level: Not on file   Occupational History    Not on file   Social Needs    Financial resource strain: Not on file    Food insecurity     Worry: Not on file     Inability: Not on file   [employer] needs Medical: Not on file     Non-medical: Not on file   Tobacco Use    Smoking status: Current Every Day Smoker     Packs/day: 1.00     Types: Cigarettes    Smokeless tobacco: Never Used   Substance and Sexual Activity    Alcohol use: Not Currently    Drug use: Never    Sexual activity: Not on file   Lifestyle    Physical activity     Days per week: Not on file     Minutes per session: Not on file    Stress: Not on file   Relationships    Social connections     Talks on phone: Not on file     Gets together: Not on file     Attends Gnosticist service: Not on file     Active member of club or organization: Not on file     Attends meetings of clubs or organizations: Not on file     Relationship status: Not on file    Intimate partner violence     Fear of current or ex partner: Not on file     Emotionally abused: Not on file     Physically abused: Not on file     Forced sexual activity: Not on file   Other Topics Concern    Not on file   Social History Narrative    Not on file       No family history on file. Review Of Systems:    14 point ROS negative other than mentioned. Physical Exam:    CURRENT VITALS: /78   Pulse 99   Temp 98.2 °F (36.8 °C) (Oral)   Resp 18   Ht 5' 2\" (1.575 m)   Wt 196 lb 6.4 oz (89.1 kg)   SpO2 97%   BMI 35.92 kg/m²     CONSTITUTIONAL:  awake, alert, cooperative, no apparent distress,   ENT:  Normocephalic, without obvious abnormality, atraumatic, sinuses nontender on palpation, external ears without lesions,  NECK:  Supple, symmetrical, trachea midline, no adenopathy, thyroid symmetric, not enlarged and no tenderness, skin normal, No bruits. LUNGS:  Increased work of breathing, Decreased air exchange, bilateral wheezing. CARDIOVASCULAR:  Normal apical impulse, regular rate and rhythm, normal S1 and S2,  1/6 Systolic murmur noted.   ABDOMEN:  Obese, normal bowel sounds, soft, non-distended, non-tender, no masses palpated, no hepatosplenomegally  EXTREMETIES: 1+ edema, Pulses Strong Thruout. No ulcers. NEUROLOGIC:  Awake, alert, oriented to name, place and time. Following all commands and moving all extremties. SKIN:  no bruising or bleeding, normal skin color, texture, turgor and no rashes    Labs:  Recent Results (from the past 24 hour(s))   POCT Glucose    Collection Time: 10/01/20  9:08 PM   Result Value Ref Range    POC Glucose 212 (H) 60 - 115 mg/dl    Performed on ACCU-CHEK    Basic Metabolic Panel w/ Reflex to MG    Collection Time: 10/02/20  6:23 AM   Result Value Ref Range    Sodium 138 135 - 144 mEq/L    Potassium reflex Magnesium 3.9 3.4 - 4.9 mEq/L    Chloride 100 95 - 107 mEq/L    CO2 22 20 - 31 mEq/L    Anion Gap 16 (H) 9 - 15 mEq/L    Glucose 184 (H) 70 - 99 mg/dL    BUN 21 (H) 6 - 20 mg/dL    CREATININE 0.60 0.50 - 0.90 mg/dL    GFR Non-African American >60.0 >60    GFR  >60.0 >60    Calcium 9.1 8.5 - 9.9 mg/dL   POCT Glucose    Collection Time: 10/02/20  6:29 AM   Result Value Ref Range    POC Glucose 175 (H) 60 - 115 mg/dl    Performed on ACCU-CHEK    POCT Glucose    Collection Time: 10/02/20 11:08 AM   Result Value Ref Range    POC Glucose 165 (H) 60 - 115 mg/dl    Performed on ACCU-CHEK        ECG:     Normal sinus rhythm, 100 rate  Normal ECG     ECHO:   Normal left ventricular function, LVEF 60%.    Normal chamber sizes.    No significant valvular heart disease noted.    Normal pericardium. Myoview cardiac stress Test:  Scheduled for outpatient. Yvon Hollis MD  [facility] Cardiologist      Electronically signed on 9/28/20 at 11:46 AM EDT      -----     [facility] OV NOTE:    Subjective  PCP: Primary Care Physician is Dr. Lucie Alva:   08/28/2020 - Ruperto Myers was seen in cardiac evaluation at the [facility] office 08/28/2020. The patients problems are listed in the impression below. Electronic medical records reviewed. I was asked to see this patient for Dr. Fernando Meléndez in her absence.  Patient has right greater than left worsening edema. She has some shortness of breath and chest pain particularly with exertion. She has a recent Myoview perfusion stress test which shows anterior apical ischemia with ejection fraction 75%. She has been scheduled for cardiac catheterization for next week. She otherwise feels well. She has no other cardiovascular complaints. Patient denies Lightheadedness, Dizziness, TIA or CVA symptoms. No CHF or Edema. No Palpitations. No GI,  or Bleeding Issues. No Recent Fever or Chills. Patient continues to smoke a half a pack of cigarettes per day. Cardiovascular and general review of systems is otherwise negative. A 14-system review is otherwise negative, other than noted. ELECTROCARDIOGRAM: Sinus rhythm rate 94 no acute changes. LABORATORY DATA: None    All above testing was personally reviewed. PHYSICAL EXAMINATION:   General: No acute distress. Vital signs as noted. Alert and oriented. Head And Neck Examination: No jugular venous distention, no carotid bruits, no mass. Carotid upstrokes preserved. Oral mucosa moist. No xanthelasma. Head and neck examination otherwise unremarkable. Lungs: Clear to auscultation and percussion. No wheezes, no rales, and no rhonchi. Chest: Excursion appeared to be normal. No chest wall tenderness on palpation. Heart: Normal S1 and S2. No S3. No S4. No rub. Grade 1/6 systolic murmur, best heard at the left sternal border. Point of maximal impulse was within normal limits. Abdomen: Soft. Nontender. No organomegaly. No bruits. No masses. Obese. Extremities: Right greater than left 2+ bipedal edema. No clubbing. No cyanosis. Pulses are diminished throughout. No bruits. Musculoskeletal Exam: No ulcers, otherwise unremarkable. Neuro: Neurologically appeared grossly intact.     IMPRESSION:     Shortness of breath  Chest pain symptoms with exertion  Edema, bipedal  Abnormal Myoview perfusion stress test with anterior apical ischemia by Lexiscan, LVEF 75% 8/2020. Preserved left ventricular function  Hypertension  Hyperlipidemia  Diabetes  COPD  Nocturnal snoring / insomnia, probable sleep apnea  Obesity  Ongoing tobacco abuse  Otherwise as per assessment below. RECOMMENDATIONS:     Would suggest that we adjust medications as follows: Lasix 20 mg daily, K-Dur 10 mEq daily increase Cartia 220 mg twice daily. Patient will continue other medications as current. Refills were provided. Agree with cardiac catheterization which is scheduled for next week with Dr. Coleen Curiel. Patient wishes to proceed. Patient should have a sleep study later for evaluation of her probable sleep apnea. I have instructed her to discontinue tobacco.    Exercise dietary program was encouraged after her cardiac catheterization is complete. Weight loss program.    Hydration. Follow my health portal was encouraged. We will plan to see back in 1 month with Dr. Coleen Curiel post cardiac catheterization with Laboratory Studies and ECG as noted below. Patient will follow up with their primary physician for general care. The patient knows to contact medical care earlier if need be. Corey Antony MD, [address] / [facility] Cardiology      Of Note:  [email or web address] voice recognition dictation software was utilized partially in the preparation of this note, therefore, inaccuracies in spelling, word choice and punctuation may have occurred which were not recognized the time of signing. Chief Complaint  Cardiology Reason for Visit_NOH:   Additional Information: OV GM EKG. Active Problems   1. Abnormal stress test (794.39) (R94.39)   2. Angina pectoris (413.9) (I20.9)   3. Current every day smoker (305.1) (F17.200)   4. Diabetes mellitus (250.00) (E11.9)   5. Leg swelling (729.81) (M79.89)   6. Shortness of breath (786.05) (R06.02)   7. Tachycardia (785.0) (R00.0)    Family History   1. Family history of Aneurysm : Father   2.  Family history of cerebrovascular accident (CVA) (V17.1) (Z82.3) : Father   3. Family history of diabetes mellitus (V18.0) (Z83.3) : Maternal Grandmother, Paternal   Grandmother   3. Family history of hypertension (V17.49) (Z82.49) : Maternal Grandmother, Paternal   Grandmother   11. Family history of malignant neoplasm of ovary (V16.41) (Z80.41) : Mother   6. Family history of myocardial infarction (V17.3) (Z82.49) : Maternal Grandfather, Paternal   Grandfather    Social History   · Current every day smoker (305.1) (F17.200)   · Daily caffeine consumption, 2-3 servings a day   · No alcohol use   · No illicit drug use    Current Meds   1. Albuterol Sulfate  (90 Base) MCG/ACT Inhalation Aerosol Solution; INHALE 1   PUFF EVERY 4 HOURS AS NEEDED; Therapy: [id number] to Recorded   2. Aspirin Adult Low Dose 81 MG Oral Tablet Delayed Release; take 4 tablets today, then 1   tablet daily thereafter; Therapy: 15VUB2210 to (Evaluate:90Njg7251); Last Rx:[id number] Ordered   3. Atorvastatin Calcium 10 MG Oral Tablet; TAKE 1 TABLET DAILY; Therapy: 42Umc1005 to Recorded   4. Cartia  MG Oral Capsule Extended Release 24 Hour; TAKE 1 CAPSULE ONCE   DAILY; Therapy: 14Foo3617 to Recorded   5. Lisinopril 5 MG Oral Tablet; TAKE 1 TABLET DAILY; Therapy: 73PMG7565 to Recorded   6. metFORMIN HCl - 500 MG Oral Tablet; TAKE TWO TABS TWICE DAILY; Therapy: 85Oyc3242 to Recorded   7. Mood Formula TABS; Natrol Stress & Anxiety; Therapy: (Recorded:29Hjg0410) to Recorded   8. Omeprazole 20 MG Oral Capsule Delayed Release; TAKE 1 CAPSULE DAILY EVERY   MORNING BEFORE BREAKFAST; Therapy: 82Tzm7173 to Recorded   9. Spiriva Respimat 2.5 MCG/ACT Inhalation Aerosol Solution; TAKE 2 INHALATIONS DAILY; Therapy: 63Ybf1904 to Recorded   10. St Johns Wort 1000 MG Oral Capsule; TAKE AS DIRECTED; Therapy: (Recorded:59Qjn4491) to Recorded    Allergies   1.  Shellfish-derived Products   Anaphylaxis;; Updated By: Laura Narayan; 08/27/2020 10:33:56 AM    Vitals  Vital Signs   Recorded: 28Aug2020 10:44AM   Height: 5 ft 2 in  Weight: 211 lb   BMI Calculated: 38.59 kg/m2  BSA Calculated: 1.96  Falls Screening: No falls within the past year  Blood Pressure: 116 / 60, RUE, Sitting  Heart Rate: 94, Apical    Procedure    EKG done in office today; :   .     Plan   1. Stop: Cartia  MG Oral Capsule Extended Release 24 Hour   2. Start: Cartia  MG Oral Capsule Extended Release 24 Hour; TAKE 1 CAPSULE   TWICE DAILY   3. Start: Furosemide 20 MG Oral Tablet (Lasix); take 1 tablet daily   4. Start: Potassium Chloride Carrol ER 10 MEQ Oral Tablet Extended Release; TAKE 1   TABLET DAILY   5. D-Dimer; Status:Active; Requested for:28Aug2020;    6. High Sensitivity CRP; Status:Active; Requested for:28Aug2020;    7. Lifestyle education regarding diet; Status:Complete;   Done: 28Aug2020   8. Lipid Panel w/Reflex LDL; Status:Active; Requested for:28Aug2020;    9. LIVER PANEL; Status:Active; Requested for:28Aug2020;    10. Magnesium; Status:Active; Requested for:28Aug2020;    11. TSH; Status:Active; Requested for:28Aug2020;    12. Venous Duplex (bilateral legs); Status:Active; Requested for:28Aug2020;    13. XRay Chest, PA/LAT; Status:Active; Requested for:28Aug2020;    14. Access your medical record, request prescriptions, view laboratory and testing done in    our office, request appointments, view immunization records and message your provider    through our safe and secure patient portal. Ask a staff member how    to register or visit us at [email or web address] to get started today.;    Status:Complete;   Done: 28Aug2020   15. Begin or continue regular aerobic exercise. Gradually work up to at least 3 sessions of    30 minutes of exercise a week.; Status:Active; Requested for:28Aug2020;    16. Counseled on smoking cessation; Status:Complete;   Done: 28Aug2020   17. Drink plenty of fluids.; Status:Active;  Requested for:28Aug2020;    18. Please bring all Flaquita Copeland LPN;  Aug 28 2020 11:58AM EST                        Electronically signed by : Geoffrey Mckeon M.D.; Sep  3 2020  7:16PM EST

## 2020-10-02 NOTE — FLOWSHEET NOTE
Perfect serve to hospitalist to notify that patient states she does not have spiriva refill for home. Also notified that patient had requested order for home nebulizer machine. Electronically signed by Francois Lucas RN on 10/2/2020 at 3:31 PM    Remainder of discharge instructions reviewed with patient. Pts  here to  patient. Awaiting response from hospitalist regarding spiriva and nebulizer. Electronically signed by Francois Lucas RN on 10/2/2020 at 3:32 PM    Orders received. Will notify patient. Electronically signed by Francois Lucas RN on 10/2/2020 at 3:37 PM    Pt aware of nebulizer order. Pt also aware to follow up with Dr. Mina Flores as appointment has already been made. Electronically signed by Francois Lucas RN on 10/2/2020 at 3:54 PM    Pt declining wheelchair  - she prefers to walk to front door with her .  Electronically signed by Francois Lucas RN on 10/2/2020 at 3:55 PM

## 2020-10-02 NOTE — ADT AUTH CERT
Patient Demographics     Name  Patient ID  SSN  Gender Identity  Birth Date    Jayesh Hodges  [id number]    Female  [birth year] (44 yrs)    Address  Phone  Email  Employer     [employer] [address] [phone] (A)   [phone] (H)  [email or web address] [facility]  Race  Occupation  Emp Status     Griselda Cake  Other  --  Not Employed     Reg Status  PCP  Date Last Verified  Next Review Date     Verified  [place], [place]  [phone]  20     Admission Date  Discharge Date  Admitting Provider      20  --  Tessie Moss,       Marital Status  Muslim       Single  None       Emergency Contact 1  Emergency Contact 2    Nancy Torres (P)   [phone] (J)   [phone] Candy Gentle)  Bubba Johnson (C)   [phone] (H)    Scout Alvarez  [de-identified]   CV  Subscriber Name/Sex/Relation  Subscriber   Subscriber Address/Phone  Subscriber Emp/Emp Phone    1. Shayna Edilson   [phone]  Demetrice Wolfe - Female   (Self)  1970  [address]   Griselda Cake[address]   [phone](O)  NONE    Utilization Reviews         COVID by Margaux Chen RN         Review Status  Review Entered    In Primary  10/1/2020 14:51        Criteria Review    The illness suspected to be related to the Coronavirus (COVID-19)? No, Other  Has the member been tested for the COVID-19? Yes   If Yes, what are the results of the COVID-19? Negative   What is the severity of the members condition (i.e. Isolation, Ventilator use)? MACHO PRATER influenza isolation          Asthma - Care Day 5 (10/1/2020) by Margaux Chen RN         Review Status  Review Entered    Completed  10/1/2020 14:45        Criteria Review       Care Day: 5 Care Date: 10/1/2020 Level of Care: Inpatient Floor    Guideline Day 2    Level Of Care    ( ) Floor to discharge    10/1/2020 2:45 PM EDT by Jordan Thompson      no discharge today, start prednosine taper, continue duoneb, schedule sleep study (X) Complete discharge planning    10/1/2020 2:45 PM EDT by Jonnie Ingram      per CM, denies needs @ discharge    Clinical Status    (X) * No requirement for assisted ventilation    10/1/2020 2:45 PM EDT by Jonnie Ingram      RA    (X) * Hemodynamic stability    10/1/2020 2:45 PM EDT by Jonnie Ingram      143/76, 85, 16, 97.9, 95% on RA    (X) * Infection absent or outpatient treatment planned    10/1/2020 2:45 PM EDT by Shelbie Shaw to cefuroxime to complete 8 days of treatment    (X) * Airflow rates greater than or equal to 60% of baseline or predicted    (X) * Tachypnea absent    10/1/2020 2:45 PM EDT by Jonnie Ingram      RR 16    (X) * Hypoxemia absent    10/1/2020 2:45 PM EDT by Jonnie Ingram      95% on RA    (X) * Breathing without retractions or accessory muscle use    10/1/2020 2:45 PM EDT by Jonnie Ingram      Good air movement, no wheezing, no rales, nontender, tympanic    (X) * Beta-agonist treatment not needed or response sustained for at least 4 hours    10/1/2020 2:45 PM EDT by Jonnie Ingram      albuterol-ipratropium (COMBIVENT RESPIMAT)  MCG/ACT inhaler 1 puff Q 4 hr w/a, no prn use, budesonide (PULMICORT) nebulizer suspension 500 mcg  INH BID    ( ) * Discharge plans and education understood    Activity    (X) * Ambulatory or acceptable for next level of care    Routes    (X) * Oral hydration, medications, and diet    10/1/2020 2:45 PM EDT by Jonnie Ingram      diet and meds ordered    Interventions    (X) * Oxygen absent or at baseline    10/1/2020 2:45 PM EDT by Jonnie Ingram      on RA    (X) Pulse oximetry    10/1/2020 2:45 PM EDT by Jonnie Ingram      routine w/ vitals    (X) Airflow measurements    Medications    (X) Convert to oral and inhaled medications    * Milestone    Additional Notes    10/1/2020 Care day 5       Physical Exam:    General: alert, cooperative, no distress    Head: normocephalic, atraumatic    Eyes:No gross abnormalities.     ENT:  MMM no lesions    Neck:  supple and no masses    Chest : Good air movement, no wheezing, no rales, nontender, tympanic    Heart[de-identified] Heart sounds are normal.  Regular rate and rhythm without murmur, gallop or rub. ABD:  symmetric, soft, non-tender    Musculoskeletal : no cyanosis, no clubbing and no edema    Neuro:  Grossly normal    Skin: No rashes or nodules noted. Lymph node:  no cervical nodes    Urology: No Ruiz    Psychiatric: appropriate       Radiology testing/Labs pertinent:    No imaging    LABS:    WBC 4.8 - 10.8 K/uL 11.3High      RBC 4.20 - 5.40 M/uL 4. 02Low      Hemoglobin 12.0 - 16.0 g/dL 12.2    Hematocrit 37.0 - 47.0 % 36.5Low      MCV 82.0 - 100.0 fL 91.0    MCH 27.0 - 31.3 pg 30.4    MCHC 33.0 - 37.0 % 33.4    RDW 11.5 - 14.5 % 13.4    Platelets 056 - 932 K/uL 257       9/29    m Stain Result Many WBC's    Rare epithelial cells    Many Gram negative rods    Organism Haemophilus influenzaeAbnormal                  Meds-name, dose, route,rate:    albuterol-ipratropium (COMBIVENT RESPIMAT)  MCG/ACT inhaler 1 puff Q 4 hrs w/a    Aspirin 81 mg po daily    Lipitor 10 mg po daily    budesonide (PULMICORT) nebulizer suspension 500 mcg INH BID    Cardizem cd 120 mg po BID    Lovenox 40 mg SC daily    Lasix 20 mg po daily    insulin glargine (LANTUS) injection vial 10 Units SC BID    S/S insulin AC & HS    Levaquin 750 mg IV Q 24 hrs - dose given - d/c'd this day    Lisinopril 5 mg po daily    Metformin 1,000 mg po BID    nicotine (NICODERM CQ) 21 MG/24HR 1 patch TD daily    Protonix 20 mg po daily    ticagrelor (BRILINTA) tablet 90 mg po BID    Chantix 1 mg po BID    HEP-LOCKED    PRN: benzocaine-menthol (CEPACOL SORE THROAT) lozenge 1 lozenge X 1,                   Treatment plan-attending, consults:    PULMONOLOGY IMPRESSION AND SUGGESTION:    She did have a rough night yesterday, with insomnia, woke up in the morning tight with skin rash, she improved after removing nicotine patch, she feels better now, no chest pain, no coughing, no wheezing, and shortness of breath has improved.     Patient is at risk due to    · Probable asthma with acute exacerbation    · H. influenzae community-acquired pneumonia    · History of smoking, she quit recently    · Possible KEITH, sleep study scheduled    · Obesity    · Hyperglycemia secondary to steroid    Recommendations    · Change to prednisone and start taper    · Change to cefuroxime to complete 8 days of treatment    · Budesonide neb    · Continue DuoNeb    · Continue Chantix    · Sleep study scheduled for the patient    ·  methacholine challenge test as outpatient    · Likely home tomorrow       Orders              Asthma - Care Day 4 (9/30/2020) by Chapincito Carrera RN         Review Status  Review Entered    Completed  10/1/2020 13:04        Criteria Review       Care Day: 4 Care Date: 9/30/2020 Level of Care: Inpatient Floor    Guideline Day 2    Level Of Care    ( ) Floor to discharge    10/1/2020 1:04 PM EDT by Ileana Fermin      no d/c  inpt tele floor  dist- cardiac  droplet isolation  up with assist  tele montitor ordered  continuous pulse ox   bipap    ( ) Complete discharge planning    10/1/2020 1:04 PM EDT by Ileana Fermin      per CM    Clinical Status    (X) * No requirement for assisted ventilation    (X) * Hemodynamic stability    10/1/2020 1:04 PM EDT by Alana Davis      temp 98.2, pulse 94, tele nsr, resp 18, bp 164/83, pulse ox 97 % on ra    ( ) * Infection absent or outpatient treatment planned    10/1/2020 1:04 PM EDT by Aric Davis      gluc 203  resp culture + Haemophilus influenzae  covid not detected    (X) * Airflow rates greater than or equal to 60% of baseline or predicted    10/1/2020 1:04 PM EDT by Alana Davis      PRE FLOW 100 L/MIN POST FLOW 110 L/MIN    (X) * Tachypnea absent    (X) * Hypoxemia absent    ( ) * Breathing without retractions or accessory muscle use    10/1/2020 1:04 PM EDT by Aric Davis      Better than yesterday, mild end expiratory wheezing anteriorly, scattered minimal rales, nontender, tympanic    (X) * Beta-agonist treatment not needed or response sustained for at least 4 hours    ( ) * Discharge plans and education understood    10/1/2020 1:04 PM EDT by Kierra inman d/c this day    Activity    (X) * Ambulatory or acceptable for next level of care    Routes    (X) * Oral hydration, medications, and diet    10/1/2020 1:04 PM EDT by Gato Davis      cardiac diet  lovenox 40 mg sc daily   remains on iv levaquin 750 mg iv daily   remains on iv solumedrol 40 mg iv daily   oral hydration  iv- hl'd  prn none this day    Interventions    (X) * Oxygen absent or at baseline    (X) Pulse oximetry    (X) Airflow measurements    10/1/2020 1:04 PM EDT by Alana Davis      PRE FLOW 100 L/MIN POST FLOW 110 L/MIN    Medications    ( ) Convert to oral and inhaled medications    10/1/2020 1:04 PM EDT by Alana Davis      combivent 20/100 mcg 1 puff Q4h  pulmoicort 500 mcg neb bid    * Milestone    Additional Notes    9/30/2020  care day 4    Breathing continues to improve but still with significant cough and yellow sputum.  Sputum color has changed from green to yellowish-white. Physical Exam    Chest : Better than yesterday, mild end expiratory wheezing anteriorly, scattered minimal rales, nontender, tympanic    Heart[de-identified] Heart sounds are normal.  Regular rate and rhythm without murmur, gallop or rub.        Meds- names, dose, route, rate    Scheduled Meds:    metFORMIN, 1,000 mg, Oral, BID     levofloxacin, 750 mg, Intravenous, Q24H    insulin glargine, 10 Units, Subcutaneous, BID    albuterol-ipratropium, 1 puff, Inhalation, Q4H While awake    varenicline, 1 mg, Oral, BID    methylPREDNISolone, 40 mg, Intravenous, Daily    nitroglycerin, 1 inch, Topical, 3 times per day    ticagrelor, 90 mg, Oral, BID    aspirin, 81 mg, Oral, Daily    enoxaparin, 40 mg, Subcutaneous, Daily    insulin lispro, 0-6 Units, Subcutaneous, TID WC    insulin lispro, 0-3 Units, Subcutaneous, for the patient    ·  methacholine challenge test as outpatient         Cardiac impression    Assessment:     Shortness of breath    Chest pain symptoms with exertion    Palpitations. Edema, bipedal    PNA. COPD, Exacerbation    Negative troponins x3 to date. CAD, post RCA ERMIAS 8/2020, Known 50% LCX medically treated to date. Preserved left ventricular function, LVEF 60%    Negative ECHO,  9/20    Hypertension    Hyperlipidemia    Diabetes    Nocturnal snoring / insomnia, probable sleep apnea    Obesity    Ongoing tobacco abuse         Plan:     1. Cardiac Supportive Care    2. Probable Respiratory primary illness / PNA, Pulmonary care. 3. Suggest Myoview Stress test eventually as outpatient. 4. Telemetry and ECG. 5. Continue current Medications. 6. Dr Sandoval [place] to reassume care upon her return. 7. OK to Discharge once ok with others. 8. Further Recommendations to follow    9.  See Orders

## 2020-10-02 NOTE — DISCHARGE SUMMARY
Physician Discharge Summary     Patient ID:  Freddy López  [id number]  86 y.o.  1970    Admit date: 9/27/2020    Discharge date : 10/02/20     Admitting Physician: Lucy Reynolds DO     Discharge Physician: Krystal Serna DO     Admission Diagnoses: Acute respiratory failure with hypoxia [facility]) [J96.01]    Discharge Diagnoses: Acute hypoxic respiratory failure 2/2 acute asthma exacerbation and H Influenzae pneumonia    Admission Condition: fair    Discharged Condition: good    Hospital Course: Pt presented with 1 day history of worsening dyspnea. She had felt well until the onset of symptoms: While going to [employer] she parked her car very far away to create a challenge for herself to exercise-when she got into the [address] she was feeling very short of breath. This dyspnea continued into nighttime and she was having trouble sleeping-it even worsened this morning leading her to come to the emergency department. Pt noted to be hypoxic in the ED requiring nasal cannula and was wheezing. Pt admitted for acute asthma exacerbation and pulm was consulted. Pt started on steroids and nebs and BiPAP as needed, but was able to be weaned to just NC. Respiratory culture came back with H. Influenzae and patient was started on Ceftin by pulm. Pt continued to improve and was weaned down to room air. Pulm was ok for discharge on 10/2 and patient was discharged home in stable and improved condition to finish course of steroids and antibiotics and follow up with PCP and pulm after discharge. Of note, COVID was ruled out after two negative tests. Consults: pulmonary/intensive care      Discharge Exam:  Constitutional: Awake and alert in no acute distress.  Lying in bed comfortably  Head: Normocephalic, atraumatic  Eyes: EOMI, PERRLA  ENT: moist mucous membranes  Neck: neck supple, trachea midline  Lungs: Good inspiratory effort, CTABL, no wheeze, no rhonchi, no rales  Heart: RRR, normal S1 and S2, no murmurs  GI: Soft, non-distended, non tender, no guarding, no rebound, +BS  MSK: Full ROM bilaterally, 5/5 strength bilaterally, no edema noted  Skin: warm, dry  Psych: appropriate affect       Labs:   Recent Labs     10/01/20  0548   WBC 11.3*   HGB 12.2   HCT 36.5*        Recent Labs     09/30/20  0531 10/01/20  0548 10/02/20  0623    136 138   K 3.9 3.6 3.9    100 100   CO2 23 26 22   BUN 17 20 21*   CREATININE 0.53 0.59 0.60   CALCIUM 9.1 8.7 9.1     No results for input(s): AST, ALT, BILIDIR, BILITOT, ALKPHOS in the last 72 hours. No results for input(s): INR in the last 72 hours. No results for input(s): Edy Stanton in the last 72 hours. Urinalysis:   No results found for: Dee Li, BACTERIA, RBCUA, BLOODU, [address], [address]    Radiology:   Most recent    Chest CT      WITH CONTRAST:No results found for this or any previous visit. WITHOUT CONTRAST: No results found for this or any previous visit. CXR      2-view:   Results for orders placed during the hospital encounter of 08/28/20   XR CHEST (2 VW)    Narrative XR CHEST (2 VW): 8/28/2020    CLINICAL HISTORY: I20.9 Angina pectoris ([facility] [address] ICD10. COMPARISON: Portable chest 7/21/2020 and two-view chest 7/19/2019. TECHNIQUE: Upright PA and lateral radiographs of the chest were obtained. FINDINGS:    Hyperinflation and coarsening of the bronchovascular structures is consistent with COPD, which is best evaluated clinically. There are no developing infiltrates, pleural effusion, cardiomegaly, vascular congestion, pneumothorax, or displaced fractures identified. Impression NO EVIDENCE OF ACTIVE CARDIOPULMONARY DISEASE. SUSPECT COPD, WHICH IS BEST EVALUATED CLINICALLY.                 Portable:   Results for orders placed during the hospital encounter of 09/27/20   XR CHEST PORTABLE    Narrative Exam: XR CHEST PORTABLE    History: Breast    Technique: AP portable view of the chest obtained. Comparison: CT chest from September 28, 2020    Findings: The cardiomediastinal silhouette is within normal limits. Small subtle patchy bilateral airspace opacities are identified but are better visualized on recent CT. No pneumothorax or pleural effusion. No acute osseous abnormality. Impression Small scattered bilateral infiltrates correlating with the abnormalities seen on recent CT are again identified. Echo No results found for this or any previous visit. Disposition: home    In process/preliminary results:  Outstanding Order Results     No orders found from 8/29/2020 to 9/28/2020. Patient Instructions:   Current Discharge Medication List      START taking these medications    Details   cefUROXime (CEFTIN) 500 MG tablet Take 1 tablet by mouth every 12 hours for 4 days  Qty: 8 tablet, Refills: 0      predniSONE (DELTASONE) 20 MG tablet Take 2 tablets by mouth daily for 7 days  Qty: 14 tablet, Refills: 0      benzonatate (TESSALON) 100 MG capsule Take 1 capsule by mouth every 4 hours as needed for Cough  Qty: 42 capsule, Refills: 0      benzocaine-menthol (CEPACOL SORE THROAT) 15-3.6 MG lozenge Take 1 lozenge by mouth every 2 hours as needed for Sore Throat  Qty: 84 lozenge, Refills: 0      ipratropium-albuterol (DUONEB) 0.5-2.5 (3) MG/3ML SOLN nebulizer solution Inhale 3 mLs into the lungs 3 times daily as needed for Shortness of Breath  Qty: 270 mL, Refills: 0         CONTINUE these medications which have CHANGED    Details   tiotropium (SPIRIVA RESPIMAT) 2.5 MCG/ACT AERS inhaler Inhale 2 puffs into the lungs daily  Qty: 1 Inhaler, Refills: 2    Associated Diagnoses: Asthma-COPD overlap syndrome (HCC)         CONTINUE these medications which have NOT CHANGED    Details   varenicline (CHANTIX STARTING MONTH PAK) 0.5 MG X 11 & 1 MG X 42 tablet Take by mouth.   Qty: 1 box, Refills: 0    Associated Diagnoses: Smoking      nicotine (NICODERM CQ) 21 MG/24HR Place 1 patch onto the skin daily  Qty: 42 patch, Refills: 0    Associated Diagnoses: Smoking      furosemide (LASIX) 20 MG tablet Take 20 mg by mouth daily      potassium chloride (KLOR-CON M) 10 MEQ extended release tablet Take 10 mEq by mouth daily      lisinopril (PRINIVIL;ZESTRIL) 5 MG tablet Take 5 mg by mouth daily      dilTIAZem (CARDIZEM CD) 120 MG extended release capsule Take 1 capsule by mouth 2 times daily  Qty: 60 capsule, Refills: 2      aspirin 81 MG EC tablet Take 1 tablet by mouth daily  Qty: 90 tablet, Refills: 3      ticagrelor (BRILINTA) 90 MG TABS tablet Take 1 tablet by mouth 2 times daily  Qty: 60 tablet, Refills: 5      omeprazole (PRILOSEC) 20 MG delayed release capsule Take 1 capsule by mouth every morning (before breakfast)  Qty: 30 capsule, Refills: 5    Associated Diagnoses: Gastroesophageal reflux disease without esophagitis      albuterol sulfate HFA (VENTOLIN HFA) 108 (90 Base) MCG/ACT inhaler Inhale 2 puffs into the lungs 4 times daily as needed for Wheezing  Qty: 1 Inhaler, Refills: 1      nitroGLYCERIN (NITROSTAT) 0.4 MG SL tablet up to max of 3 total doses. If no relief after 1 dose, call 911. Qty: 25 tablet, Refills: 3      atorvastatin (LIPITOR) 80 MG tablet Take 1 tablet by mouth daily  Qty: 30 tablet, Refills: 3    Associated Diagnoses: Prediabetes; Hypercholesteremia         STOP taking these medications       budesonide-formoterol (SYMBICORT) 160-4.5 MCG/ACT AERO Comments:   Reason for Stopping:         Multiple Vitamin (MULTI-VITAMIN DAILY PO) Comments:   Reason for Stopping:             Activity: activity as tolerated  Diet: diabetic diet  Wound Care: none needed    Follow-up with Ezekiel Breswter MD  in 1 week.     DC time 35 minutes    Signed:  Electronically signed by Cecily Ruano DO on 10/2/2020 at 3:50 PM

## 2020-10-03 ENCOUNTER — CARE COORDINATION (OUTPATIENT)
Dept: CASE MANAGEMENT | Age: 50
End: 2020-10-03

## 2020-10-03 NOTE — CARE COORDINATION
-10/2/2020 [address] Acute hypoxic respiratory failure 2/2 acute asthma exacerbation and H Influenzae pneumonia. NR  2020 CV-19 lab neg  Dr Sarita Valenzuela 10/29 9:30  Pending Pulmonary Rehab  Med rec/1111F order completed. Challenges to be reviewed by the provider   Additional needs identified to be addressed with provider No  none    Discussed COVID-19 related testing which was available at this time. Test results were negative. Patient informed of results, if available? Yes         Method of communication with provider : none    Advance Care Planning:   Does patient have an Advance Directive:  not on file. Was this a readmission? No  Patient stated reason for admission: Flu, PN  Patients top risk factors for readmission: medical condition    Care Transition Nurse (CTN) contacted the patient by telephone to perform post hospital discharge assessment. Verified name and  with patient as identifiers. Provided introduction to self, and explanation of the CTN role. CTN reviewed discharge instructions, medical action plan and red flags with patient who verbalized understanding. Patient given an opportunity to ask questions and does not have any further questions or concerns at this time. Were discharge instructions available to patient? Yes. Reviewed appropriate site of care based on symptoms and resources available to patient including: When to call 911. The patient agrees to contact the PCP office for questions related to their healthcare. Medication reconciliation was performed with patient, who verbalizes understanding of administration of home medications. Advised obtaining a 90-day supply of all daily and as-needed medications. Covid Risk Education    Patient has following risk factors of: COPD and diabetes. Education provided regarding infection prevention, and signs and symptoms of COVID-19 and when to seek medical attention with patient who verbalized understanding.  Discussed exposure protocols and quarantine From CDC: Are you at higher risk for severe illness?   and given an opportunity for questions and concerns. The patient agrees to contact the COVID-19 hotline [phone] or PCP office for questions related to COVID-19. For more information on steps you can take to protect yourself, see CDC's How to Protect Yourself     Patient/family/caregiver given information for [facility] and agrees to enroll no  Patient's preferred e-mail: declines  Patient's preferred phone number: declines    Discussed follow-up appointments. If no appointment was previously scheduled, appointment scheduling offered: Reviewed. Is follow up appointment scheduled within 7 days of discharge? Yes  Non-[facility] follow up appointment(s):     Plan for follow-up call in 7-10 days based on severity of symptoms and risk factors. Plan for next call: symptom management-Flu/PN. Pt admits cough worse at night, fatigue, and some weakness. Denies fevers, chills, or SOB at rest. Using nebs as directed. Reviewed to complete course of antibiotic and steroid, v/u. Reports today's BS as 148. Advised to closely monitor BS and limit carbs while on steroids, v/u. Denies any home or DME needs. CTN provided contact information for future needs.

## 2020-10-05 ENCOUNTER — TELEPHONE (OUTPATIENT)
Dept: PULMONOLOGY | Age: 50
End: 2020-10-05

## 2020-10-05 NOTE — TELEPHONE ENCOUNTER
I discharge patient on tapered dose prednisone for tablet daily for 4 days then 4 tablets daily for 4 days, then 2 tablets daily for 4 days then 1 tablet daily for 4 days then stop. I think the order noted below   was placed by hospitalist and she does not need both orders listed below chest or when she was discharged home.

## 2020-10-05 NOTE — TELEPHONE ENCOUNTER
[facility] Coordinator called stating when Pt was discharged from the hospital (10/2/2020), she was prescribed PREDNISONE (DELTASONE) 20 MG tablet Take 2 tablets by mouth daily for 7 days  Qty: 14 tablet, Refills: 0 and the pt also mentioned she was prescribed MEDROL DOSE PACKET. There is nothing in her chart mentioning the MEDROL DOSE PACKET. The Care Coordinator would like to know if you want the Pt taking both of these medications.  Please advise, thanks.

## 2020-10-20 ENCOUNTER — HOSPITAL ENCOUNTER (OUTPATIENT)
Dept: CARDIAC REHAB | Age: 50
Setting detail: THERAPIES SERIES
Discharge: HOME | End: 2020-10-20
Payer: COMMERCIAL

## 2020-10-20 PROCEDURE — G0422 INTENS CARDIAC REHAB W/EXERC: HCPCS

## 2020-10-20 PROCEDURE — G0423 INTENS CARDIAC REHAB NO EXER: HCPCS

## 2020-10-20 NOTE — PROGRESS NOTES
Video Education Report - ICR/CR    Name:  Louis Alvarez  Date:  10/20/2020  MRN: [id number]       Session Length: 34:22 min    Recommended Videos        [x]01 [employer] - Program Overview   34:22    []02 Overview of [employer] Eating Plan   34:10    []03 Becoming a Morenita Later   33:08     []04 Diseases of Our Time - Part 1   34:22    []05 Calorie Density     33:39   []06 Label Reading - Part 1    32:15   []07 Move it      32.54   []08 Healthy Minds, Bodies, Hearts   32:14   []09 Dining Out - Part 1    32:28   []10 Heart Disease Risk Reduction   61:32   []81 Metabolic Syndrome and Belly Fat  31:52   []12 Facts on Fat     35:29   []13 Diseases of Our Time - Part 2   33:07   []14 Biology of Weight Control   32:36   []15 Biomechanical Limitations   35:20   []16 Nutrition Action Plan    34:23    Additional Videos         []17 Hypertension & Heart Disease   32:39        []18 Cooking Breakfasts and Snacks  32:00   []19 Planning Your Eating Strategy   33:30   []20 Label Reading - Part 2    32:36  []21 Cooking Soups and Desserts   31:41  []22 How Our Thoughts Can Heal Our Hearts 33:05  []23 Targeting Your Nutrition Priorities  33:58  []24 Healthy Salads & Dressings   35:32  []25 Dining Out - Part 2    32:35  []26 Cooking Dinner and Sides   35:06  []27 Sleep Disorders     33:14  []28 Menu Workshop     32:06  []29 Decoding Lab Results    32:42     []30 Vitamins and Minerals    32:54  []31 Exercise Action Plan    32:26  []32 Body Composition    34:03  []33 Improving Performance    32:13  []34 Fueling a Healthy Body    31:32  []35 Introduction to Yoga    33:47  []36 Aging-Enhancing the Quality of Your Life 33:22  []37 Smoking Cessation    36:19    Comments:  Video completed, binder reviewed, and handouts given

## 2020-10-21 ENCOUNTER — HOSPITAL ENCOUNTER (OUTPATIENT)
Dept: SLEEP CENTER | Age: 50
Discharge: HOME | End: 2020-10-23
Payer: COMMERCIAL

## 2020-10-21 ENCOUNTER — HOSPITAL ENCOUNTER (OUTPATIENT)
Dept: CARDIAC REHAB | Age: 50
Setting detail: THERAPIES SERIES
Discharge: HOME | End: 2020-10-21
Payer: COMMERCIAL

## 2020-10-21 PROCEDURE — 95810 POLYSOM 6/> YRS 4/> PARAM: CPT

## 2020-10-21 PROCEDURE — G0422 INTENS CARDIAC REHAB W/EXERC: HCPCS

## 2020-10-21 PROCEDURE — G0423 INTENS CARDIAC REHAB NO EXER: HCPCS

## 2020-10-21 PROCEDURE — 95810 POLYSOM 6/> YRS 4/> PARAM: CPT | Performed by: INTERNAL MEDICINE

## 2020-10-21 NOTE — PROGRESS NOTES
[facility]  Card[facility] Rehabilitation Department    Leonor Rubinstein YOB: 1970    MRN:  [id number]    Date: 10/21/2020      Session Length:  48 min   Session # ___1____    EXERCISE WORKSHOP:  Understanding the Benefits of Exercise                      The purpose of today's class is to promote a comprehensive and effective weekly exercise routine in order to improve ICR patients overall level of fitness. At the conclusion of this workshop, [facility] patients will understand the benefits of incorporating physical activity and regular exercise into their weekly routines. Patients will understand the FITT (Frequency, Intensity, Time, and Type) principle and how this principle impacts their fitness levels. In addition, safety concerns and other considerations for exercise and cardiac rehab will be addressed by the presenter. Readiness to change:    ( ) Pre-contemplative   (x ) Contemplative - ambivalent about change    ( ) Action - ready to set action plan and implement   ( ) Maintenance - has made change and is trying, and or practicing different alternative behaviors     Additional Notes:      Marcello Spear was in the Workshop with the exercise specialist  for 48 minutes. The content was presented via Powerpoint, lecture, and patient participation based format. Motivational interviewing was utilized when needed, to promote change. Patient voiced understanding.     Electronically signed by Fidelina Feliciano on 10/21/2020 at 4:26 PM

## 2020-10-23 ENCOUNTER — HOSPITAL ENCOUNTER (OUTPATIENT)
Dept: CARDIAC REHAB | Age: 50
Setting detail: THERAPIES SERIES
Discharge: HOME | End: 2020-10-23
Payer: COMMERCIAL

## 2020-10-23 PROCEDURE — G0422 INTENS CARDIAC REHAB W/EXERC: HCPCS

## 2020-10-23 PROCEDURE — G0423 INTENS CARDIAC REHAB NO EXER: HCPCS

## 2020-10-26 ENCOUNTER — HOSPITAL ENCOUNTER (OUTPATIENT)
Dept: CARDIAC REHAB | Age: 50
Setting detail: THERAPIES SERIES
Discharge: HOME | End: 2020-10-26
Payer: COMMERCIAL

## 2020-10-26 PROCEDURE — G0422 INTENS CARDIAC REHAB W/EXERC: HCPCS

## 2020-10-26 PROCEDURE — G0423 INTENS CARDIAC REHAB NO EXER: HCPCS

## 2020-10-26 NOTE — PROGRESS NOTES
Video Education Report - ICR/CR    Name:  Charleen Nageotte     Date:  10/26/2020  MRN: [id number]     Session #:  1  Session Length: 50 min    Recommended Videos        []01 [employer] - Program Overview   34:22    []02 Overview of [employer] Eating Plan   34:10    []03 Becoming a Amber Starr   33:08     []04 Diseases of Our Time - Part 1   34:22    []05 Calorie Density     33:39   []06 Label Reading - Part 1    32:15   []07 Move it      32.54   []08 Healthy Minds, Bodies, Hearts   32:14   []09 Dining Out - Part 1    32:28   []10 Heart Disease Risk Reduction   09:95   []39 Metabolic Syndrome and Belly Fat  31:52   []12 Facts on Fat     35:29   []13 Diseases of Our Time - Part 2   33:07   []14 Biology of Weight Control   32:36   [x]15 Biomechanical Limitations   35:20   []16 Nutrition Action Plan    34:23    Additional Videos         []17 Hypertension & Heart Disease   32:39        []18 Cooking Breakfasts and Snacks  32:00   []19 Planning Your Eating Strategy   33:30   []20 Label Reading - Part 2    32:36  []21 Cooking Soups and Desserts   31:41  []22 How Our Thoughts Can Heal Our Hearts 33:05  []23 Targeting Your Nutrition Priorities  33:58  []24 Healthy Salads & Dressings   35:32  []25 Dining Out - Part 2    32:35  []26 Cooking Dinner and Sides   35:06  []27 Sleep Disorders     33:14  []28 Menu Workshop     32:06  []29 Decoding Lab Results    32:42     []30 Vitamins and Minerals    32:54  []31 Exercise Action Plan    32:26  []32 Body Composition    34:03  []33 Improving Performance    32:13  []34 Fueling a Healthy Body    31:32  []35 Introduction to Yoga    33:47  []36 Aging-Enhancing the Quality of Your Life 33:22  []37 Smoking Cessation    36:19    Comments:  Video completed.

## 2020-10-28 ENCOUNTER — HOSPITAL ENCOUNTER (OUTPATIENT)
Dept: CARDIAC REHAB | Age: 50
Setting detail: THERAPIES SERIES
Discharge: HOME | End: 2020-10-28
Payer: COMMERCIAL

## 2020-10-28 PROCEDURE — G0422 INTENS CARDIAC REHAB W/EXERC: HCPCS

## 2020-10-28 PROCEDURE — G0423 INTENS CARDIAC REHAB NO EXER: HCPCS

## 2020-10-29 ENCOUNTER — OFFICE VISIT (OUTPATIENT)
Dept: PULMONOLOGY | Age: 50
End: 2020-10-29
Payer: COMMERCIAL

## 2020-10-29 VITALS
DIASTOLIC BLOOD PRESSURE: 72 MMHG | HEIGHT: 62 IN | OXYGEN SATURATION: 99 % | BODY MASS INDEX: 37.91 KG/M2 | TEMPERATURE: 97.4 F | WEIGHT: 206 LBS | HEART RATE: 89 BPM | SYSTOLIC BLOOD PRESSURE: 130 MMHG

## 2020-10-29 PROCEDURE — 1036F TOBACCO NON-USER: CPT | Performed by: INTERNAL MEDICINE

## 2020-10-29 PROCEDURE — G8427 DOCREV CUR MEDS BY ELIG CLIN: HCPCS | Performed by: INTERNAL MEDICINE

## 2020-10-29 PROCEDURE — 1111F DSCHRG MED/CURRENT MED MERGE: CPT | Performed by: INTERNAL MEDICINE

## 2020-10-29 PROCEDURE — 99213 OFFICE O/P EST LOW 20 MIN: CPT | Performed by: INTERNAL MEDICINE

## 2020-10-29 PROCEDURE — G8417 CALC BMI ABV UP PARAM F/U: HCPCS | Performed by: INTERNAL MEDICINE

## 2020-10-29 PROCEDURE — G8484 FLU IMMUNIZE NO ADMIN: HCPCS | Performed by: INTERNAL MEDICINE

## 2020-10-29 PROCEDURE — 3017F COLORECTAL CA SCREEN DOC REV: CPT | Performed by: INTERNAL MEDICINE

## 2020-10-29 NOTE — PROGRESS NOTES
Subjective:     Jovon Kincaid is a 44 y.o. female who complains today of:     Chief Complaint   Patient presents with    Asthma     6 week f/u patient currently using inhalers    COPD       HPI  Patient presents for asthma follow-up    She is doing good, she quit smoking and continues not to smoke, she is done with Chantix and doing well, no craving symptoms, no chest pain, she is using Spiriva and Symbicort symptoms of shortness of breath has resolved, she does 20-minute of treadmill today in cardiac rehab with no shortness of breath, she has not been needing to use any of her rescue inhalers, no fever or chills, no heartburn, no lower extremity edema, weight is stable, no chest pain no joint swelling or pain no joint stiffness. She did have sleep study on the 21st.            Allergies:  Shellfish-derived products  Past Medical History:   Diagnosis Date    Asthma 2004    Diabetes mellitus ([facility] [address] 2014    Hodgkin disease ([facility] [address] 36    Hypertension 1984    Migraines 1989     No past surgical history on file. No family history on file.   Social History     Socioeconomic History    Marital status: Single     Spouse name: Not on file    Number of children: Not on file    Years of education: Not on file    Highest education level: Not on file   Occupational History    Not on file   Social Needs    Financial resource strain: Not on file    Food insecurity     Worry: Not on file     Inability: Not on file    Transportation needs     Medical: Not on file     Non-medical: Not on file   Tobacco Use    Smoking status: Former Smoker     Packs/day: 1.00     Years: 37.00     Pack years: 37.00     Types: Cigarettes    Smokeless tobacco: Never Used   Substance and Sexual Activity    Alcohol use: Not Currently    Drug use: Never    Sexual activity: Not on file   Lifestyle    Physical activity     Days per week: Not on file     Minutes per session: Not on file    Stress: Not on file   Relationships    Social connections     Talks on phone: Not on file     Gets together: Not on file     Attends Caodaism service: Not on file     Active member of club or organization: Not on file     Attends meetings of clubs or organizations: Not on file     Relationship status: Not on file    Intimate partner violence     Fear of current or ex partner: Not on file     Emotionally abused: Not on file     Physically abused: Not on file     Forced sexual activity: Not on file   Other Topics Concern    Not on file   Social History Narrative    Not on file         Review of Systems      ROS: 10 organs review of system is done including general, psychological, ENT, hematological, endocrine, respiratory, cardiovascular, gastrointestinal,musculoskeletal, neurological,  allergy and Immunology is done and is otherwise negative.     Current Outpatient Medications   Medication Sig Dispense Refill    tiotropium (SPIRIVA RESPIMAT) 2.5 MCG/ACT AERS inhaler Inhale 2 puffs into the lungs daily 1 Inhaler 2    ipratropium-albuterol (DUONEB) 0.5-2.5 (3) MG/3ML SOLN nebulizer solution Inhale 3 mLs into the lungs 3 times daily as needed for Shortness of Breath 270 mL 0    budesonide-formoterol (SYMBICORT) 160-4.5 MCG/ACT AERO Inhale 2 puffs into the lungs 2 times daily 1 Inhaler 3    furosemide (LASIX) 20 MG tablet Take 20 mg by mouth daily      potassium chloride (KLOR-CON M) 10 MEQ extended release tablet Take 10 mEq by mouth daily      lisinopril (PRINIVIL;ZESTRIL) 5 MG tablet Take 5 mg by mouth daily      dilTIAZem (CARDIZEM CD) 120 MG extended release capsule Take 1 capsule by mouth 2 times daily 60 capsule 2    aspirin 81 MG EC tablet Take 1 tablet by mouth daily 90 tablet 3    ticagrelor (BRILINTA) 90 MG TABS tablet Take 1 tablet by mouth 2 times daily 60 tablet 5    omeprazole (PRILOSEC) 20 MG delayed release capsule Take 1 capsule by mouth every morning (before breakfast) 30 capsule 5    albuterol sulfate HFA (VENTOLIN HFA) 108 (90 leg: No edema. Lymphadenopathy:      Cervical: No cervical adenopathy. Skin:     General: Skin is warm and dry. Findings: No erythema. Neurological:      Mental Status: She is alert and oriented to person, place, and time. Psychiatric:         Judgment: Judgment normal.         Imaging studies reviewed by me CT chest September 2020 shows patchy groundglass infiltrates  Lab results reviewed in chart  PFT August 2020 shows FEV1 91%, FEV1/FVC 0.81  ECHO: August 2020 shows EF 60%  Sleep study is negative and no desaturation at night  Assessment and Plan       Diagnosis Orders   1. Moderate persistent asthma without complication     2. Class 2 obesity due to excess calories without serious comorbidity with body mass index (BMI) of 37.0 to 37.9 in adult     3. Gastroesophageal reflux disease without esophagitis     4. Abnormal CT of the chest     5. History of smoking       · Continue Spiriva and Symbicort  · Patient quit smoking and congratulated  · Lose weight  · Continue PPI  · Patient on Brilinta, currently no signs of shortness of breath and tolerating well  · Abnormal CT chest in setting of Haemophilus influenza infection  · Will plan CT lung cancer screening at age 52  · Sleep study is negative  · Yearly flu shot      No orders of the defined types were placed in this encounter. No orders of the defined types were placed in this encounter. Discussed with patient the importance of exercise and weight control and  overall health and well-being. Reviewed with the patient: current clinical status, medications, activities and diet. Side effects, adverse effects of the medication prescribed today, as well as treatment plan and result expectations have been discussed with the patient who expresses understanding and desires to proceed. Return in about 3 months (around 1/29/2021).       Constantino Frye MD

## 2020-10-30 ENCOUNTER — HOSPITAL ENCOUNTER (OUTPATIENT)
Dept: CARDIAC REHAB | Age: 50
Setting detail: THERAPIES SERIES
Discharge: HOME | End: 2020-10-30
Payer: COMMERCIAL

## 2020-10-30 PROCEDURE — G0422 INTENS CARDIAC REHAB W/EXERC: HCPCS

## 2020-10-30 PROCEDURE — G0423 INTENS CARDIAC REHAB NO EXER: HCPCS

## 2020-10-30 NOTE — PROGRESS NOTES
Sheree HERNANDEZ.:  1970  Acct Number: [de-identified]  MRN:  [id number]                [facility] COOKING SCHOOL WORKSHOP             Date: 10/30/2020        Session # 8  Todays class covered:      () Adding Flavor     () Fast Breakfasts     () Salads and Dressings     () Soups and Sauces     () Simple Sides     () Appetizers and Snacks     () Delicious Desserts     (X) Plant Based Proteins     () Fast Evening Meals     () Weekend Breakfasts     () Efficiency Cooking     () One [facility]     Patients were shown how to choose, prep, and cook; substitutions and other options were given. Samples were offered. Recipes were given and questions answered. The patient above was in the [employer] for 45 minutes.       Electronically signed by Pawel Smith RD, LD on 10/30/2020 at 11:57 AM

## 2020-11-02 ENCOUNTER — HOSPITAL ENCOUNTER (OUTPATIENT)
Dept: CARDIAC REHAB | Age: 50
Setting detail: THERAPIES SERIES
Discharge: HOME | End: 2020-11-02
Payer: COMMERCIAL

## 2020-11-02 PROCEDURE — G0423 INTENS CARDIAC REHAB NO EXER: HCPCS

## 2020-11-02 PROCEDURE — G0422 INTENS CARDIAC REHAB W/EXERC: HCPCS

## 2020-11-04 ENCOUNTER — HOSPITAL ENCOUNTER (OUTPATIENT)
Dept: CARDIAC REHAB | Age: 50
Setting detail: THERAPIES SERIES
Discharge: HOME | End: 2020-11-04
Payer: COMMERCIAL

## 2020-11-04 PROCEDURE — G0423 INTENS CARDIAC REHAB NO EXER: HCPCS

## 2020-11-04 PROCEDURE — G0422 INTENS CARDIAC REHAB W/EXERC: HCPCS

## 2020-11-04 NOTE — PROGRESS NOTES
Sissy HERNANDEZ.:  1970    Acct Number: [de-identified]   MRN:  [id number]        [facility] NUTRITION 1:1 Counseling             Date:        Session #     Todays class covered:    1:1 Counseling Session  Receptiveness to education/goals:  (x) Agreeable ( ) No Interest   ( ) Refused  Evaluation of education:  (x) Indicates understanding   ( ) Needs reinforcement     () Unsuccessful     Readiness to change:    ( ) Pre-contemplative   ( ) Contemplative - ambivalent about change    (x) Action - ready to set action plan and implement   (x ) Maintenance - has made change and is trying, and or practicing different alternative behaviors     GOALS:  1. Decrease salt intake, discussed low sodium foods  2. Fill 1/2 plate with non starchy veggies at most meals. 3. Try brown rice and other whole grains such as quinoa; limit refined grains. 4. Try fat-free sour cream or fat free Greek yogurt in place of regular sour cream    Braxton Hernandez reports making many changes since starting [employer] [facility] including eating more vegetables, stopping frying, cutting out high fat meats, cutting out all caffeine, and watching portion sizes. Braxton Hernandez is very motivated to continue to apply the changes she has made while incorporating more changes to improve her diet. She has already lost 17 lbs x past month (intentional planned loss) and would like to continue to lose weight to reach her goal of weighing less than 150 lbs. She is a smoker, but is trying to quit. She realizes that quitting smoking may contribute to unintentional weight gain.      Food Recall:  Breakfast: plain oatmeal ; egg whites with veggies; raghavendra bread  Lunch:  Typically dinner leftovers; ie: meat and vegetables  Dinner:  Chicken, quinoa and black bean salad; lettuce  Snacks:  Fresh fruit  Main Beverages:  Water; 1% milk at times (rare) ; NO alcohol or caffeine    Grocery Shopping: she does (typically [facility])  Meal Prep/Cooking: she does  Dining Out: rarely; for special occassions only    Mariana Clark was counseled by the Dietitian for 65 minutes. Motivational Interviewing was used to help promote change. Patient voiced understanding. Provided pt with : [email or web address] eating plan brochure and 14 day sample menus.     Electronically signed by Shahla Haynes RD, JHON on 11/4/2020 at 11:31 AM

## 2020-11-06 ENCOUNTER — HOSPITAL ENCOUNTER (OUTPATIENT)
Dept: CARDIAC REHAB | Age: 50
Setting detail: THERAPIES SERIES
Discharge: HOME | End: 2020-11-06
Payer: COMMERCIAL

## 2020-11-06 PROCEDURE — G0423 INTENS CARDIAC REHAB NO EXER: HCPCS

## 2020-11-06 PROCEDURE — G0422 INTENS CARDIAC REHAB W/EXERC: HCPCS

## 2020-11-06 NOTE — PROGRESS NOTES
Osiris HERNANDEZ.:  1970  Acct Number: [de-identified]  MRN:  [id number]                [facility] COOKING SCHOOL WORKSHOP             Date: 2020        Session # 9  Todays class covered:      () Adding Flavor     () Fast Breakfasts     () Salads and Dressings     () Soups and Sauces     () Simple Sides     () Appetizers and Snacks     () Delicious Desserts     () Plant Based Proteins     (X) Fast Evening Meals     () Weekend Breakfasts     () Efficiency Cooking     () One Pot Meals     Patients were shown how to choose, prep, and cook; substitutions and other options were given. Samples were offered. Recipes were given and questions answered. The patient above was in the [employer] for 45 minutes.       Electronically signed by Niraj Mehta RD, LD on 2020 at 11:25 AM

## 2020-11-09 ENCOUNTER — HOSPITAL ENCOUNTER (OUTPATIENT)
Dept: CARDIAC REHAB | Age: 50
Setting detail: THERAPIES SERIES
Discharge: HOME | End: 2020-11-09
Payer: COMMERCIAL

## 2020-11-09 PROCEDURE — G0422 INTENS CARDIAC REHAB W/EXERC: HCPCS

## 2020-11-09 PROCEDURE — G0423 INTENS CARDIAC REHAB NO EXER: HCPCS

## 2020-11-11 ENCOUNTER — HOSPITAL ENCOUNTER (OUTPATIENT)
Dept: CARDIAC REHAB | Age: 50
Setting detail: THERAPIES SERIES
Discharge: HOME | End: 2020-11-11
Payer: COMMERCIAL

## 2020-11-11 PROCEDURE — G0422 INTENS CARDIAC REHAB W/EXERC: HCPCS

## 2020-11-11 PROCEDURE — G0423 INTENS CARDIAC REHAB NO EXER: HCPCS

## 2020-11-11 NOTE — CONSULTS
Video Education Report - ICR/CR    Name:  Robert Sanchez     Date:  11/11/2020  MRN: [id number]     Session #:  3  Session Length: 50 min    Recommended Videos        []01 [employer] - Program Overview   34:22    []02 Overview of [employer] Eating Plan   34:10    []03 Becoming a Kassidy Herr   33:08     []04 Diseases of Our Time - Part 1   34:22    []05 Calorie Density     33:39   []06 Label Reading - Part 1    32:15   []07 Move it      32.54   []08 Healthy Minds, Bodies, Hearts   32:14   []09 Dining Out - Part 1    32:28   []10 Heart Disease Risk Reduction   06:14   []23 Metabolic Syndrome and Belly Fat  31:52   []12 Facts on Fat     35:29   []13 Diseases of Our Time - Part 2   33:07   [x]14 Biology of Weight Control   32:36   []15 Biomechanical Limitations   35:20   []16 Nutrition Action Plan    34:23    Additional Videos         []17 Hypertension & Heart Disease   32:39        []18 Cooking Breakfasts and Snacks  32:00   []19 Planning Your Eating Strategy   33:30   []20 Label Reading - Part 2    32:36  []21 Cooking Soups and Desserts   31:41  []22 How Our Thoughts Can Heal Our Hearts 33:05  []23 Targeting Your Nutrition Priorities  33:58  []24 Healthy Salads & Dressings   35:32  []25 Dining Out - Part 2    32:35  []26 Cooking Dinner and Sides   35:06  []27 Sleep Disorders     33:14  []28 Menu Workshop     32:06  []29 Decoding Lab Results    32:42     []30 Vitamins and Minerals    32:54  []31 Exercise Action Plan    32:26  []32 Body Composition    34:03  []33 Improving Performance    32:13  []34 Fueling a Healthy Body    31:32  []35 Introduction to Yoga    33:47  []36 Aging-Enhancing the Quality of Your Life 33:22  []37 Smoking Cessation    36:19    Comments:  Video completed.

## 2020-11-11 NOTE — CONSULTS
Video Education Report - ICR/CR    Name:  Robert Sanchez     Date:  11/11/2020  MRN: [id number]     Session #:  10  Session Length: 50 min    Recommended Videos        []01 [employer] - Program Overview   34:22    []02 Overview of [employer] Eating Plan   34:10    []03 Becoming a Kassidy Rubbetty   33:08     []04 Diseases of Our Time - Part 1   34:22    []05 Calorie Density     33:39   []06 Label Reading - Part 1    32:15   []07 Move it      32.54   []08 Healthy Minds, Bodies, Hearts   32:14   []09 Dining Out - Part 1    32:28   []10 Heart Disease Risk Reduction   04:82   []93 Metabolic Syndrome and Belly Fat  31:52   []12 Facts on Fat     35:29   []13 Diseases of Our Time - Part 2   33:07   [x]14 Biology of Weight Control   32:36   []15 Biomechanical Limitations   35:20   []16 Nutrition Action Plan    34:23    Additional Videos         []17 Hypertension & Heart Disease   32:39        []18 Cooking Breakfasts and Snacks  32:00   []19 Planning Your Eating Strategy   33:30   []20 Label Reading - Part 2    32:36  []21 Cooking Soups and Desserts   31:41  []22 How Our Thoughts Can Heal Our Hearts 33:05  []23 Targeting Your Nutrition Priorities  33:58  []24 Healthy Salads & Dressings   35:32  []25 Dining Out - Part 2    32:35  []26 Cooking Dinner and Sides   35:06  []27 Sleep Disorders     33:14  []28 Menu Workshop     32:06  []29 Decoding Lab Results    32:42     []30 Vitamins and Minerals    32:54  []31 Exercise Action Plan    32:26  []32 Body Composition    34:03  []33 Improving Performance    32:13  []34 Fueling a Healthy Body    31:32  []35 Introduction to Yoga    33:47  []36 Aging-Enhancing the Quality of Your Life 33:22  []37 Smoking Cessation    36:19    Comments:  Video completed.

## 2020-11-12 ENCOUNTER — HOSPITAL ENCOUNTER (OUTPATIENT)
Age: 50
Setting detail: OBSERVATION
Discharge: HOME | End: 2020-11-13
Attending: INTERNAL MEDICINE | Admitting: INTERNAL MEDICINE
Payer: COMMERCIAL

## 2020-11-12 ENCOUNTER — APPOINTMENT (OUTPATIENT)
Dept: GENERAL RADIOLOGY | Age: 50
End: 2020-11-12
Payer: COMMERCIAL

## 2020-11-12 PROBLEM — R07.9 CHEST PAIN: Status: ACTIVE | Noted: 2020-11-12

## 2020-11-12 LAB
ALBUMIN SERPL-MCNC: 4.2 G/DL (ref 3.5–4.6)
ALP BLD-CCNC: 84 U/L (ref 40–130)
ALT SERPL-CCNC: 36 U/L (ref 0–33)
ANION GAP SERPL CALCULATED.3IONS-SCNC: 14 MEQ/L (ref 9–15)
AST SERPL-CCNC: 27 U/L (ref 0–35)
BASOPHILS ABSOLUTE: 0.1 K/UL (ref 0–0.2)
BASOPHILS RELATIVE PERCENT: 0.9 %
BILIRUB SERPL-MCNC: 0.3 MG/DL (ref 0.2–0.7)
BUN BLDV-MCNC: 12 MG/DL (ref 6–20)
CALCIUM SERPL-MCNC: 8.9 MG/DL (ref 8.5–9.9)
CHLORIDE BLD-SCNC: 102 MEQ/L (ref 95–107)
CO2: 25 MEQ/L (ref 20–31)
CREAT SERPL-MCNC: 0.67 MG/DL (ref 0.5–0.9)
D DIMER: 0.28 MG/L FEU (ref 0–0.5)
EKG ATRIAL RATE: 74 BPM
EKG ATRIAL RATE: 79 BPM
EKG ATRIAL RATE: 81 BPM
EKG P AXIS: 49 DEGREES
EKG P AXIS: 52 DEGREES
EKG P AXIS: 53 DEGREES
EKG P-R INTERVAL: 182 MS
EKG P-R INTERVAL: 212 MS
EKG P-R INTERVAL: 214 MS
EKG Q-T INTERVAL: 368 MS
EKG Q-T INTERVAL: 382 MS
EKG Q-T INTERVAL: 384 MS
EKG QRS DURATION: 76 MS
EKG QRS DURATION: 78 MS
EKG QRS DURATION: 84 MS
EKG QTC CALCULATION (BAZETT): 424 MS
EKG QTC CALCULATION (BAZETT): 427 MS
EKG QTC CALCULATION (BAZETT): 440 MS
EKG R AXIS: 14 DEGREES
EKG R AXIS: 17 DEGREES
EKG R AXIS: 31 DEGREES
EKG T AXIS: 56 DEGREES
EKG T AXIS: 58 DEGREES
EKG T AXIS: 62 DEGREES
EKG VENTRICULAR RATE: 74 BPM
EKG VENTRICULAR RATE: 79 BPM
EKG VENTRICULAR RATE: 81 BPM
EOSINOPHILS ABSOLUTE: 0.3 K/UL (ref 0–0.7)
EOSINOPHILS RELATIVE PERCENT: 3.7 %
GFR AFRICAN AMERICAN: >60
GFR NON-AFRICAN AMERICAN: >60
GLOBULIN: 2.8 G/DL (ref 2.3–3.5)
GLUCOSE BLD-MCNC: 113 MG/DL (ref 60–115)
GLUCOSE BLD-MCNC: 92 MG/DL (ref 60–115)
GLUCOSE BLD-MCNC: 97 MG/DL (ref 70–99)
HCT VFR BLD CALC: 38.3 % (ref 37–47)
HEMOGLOBIN: 13 G/DL (ref 12–16)
LYMPHOCYTES ABSOLUTE: 2 K/UL (ref 1–4.8)
LYMPHOCYTES RELATIVE PERCENT: 23.8 %
MCH RBC QN AUTO: 31.3 PG (ref 27–31.3)
MCHC RBC AUTO-ENTMCNC: 34 % (ref 33–37)
MCV RBC AUTO: 91.9 FL (ref 82–100)
MONOCYTES ABSOLUTE: 0.4 K/UL (ref 0.2–0.8)
MONOCYTES RELATIVE PERCENT: 4.8 %
NEUTROPHILS ABSOLUTE: 5.6 K/UL (ref 1.4–6.5)
NEUTROPHILS RELATIVE PERCENT: 66.8 %
PDW BLD-RTO: 13.8 % (ref 11.5–14.5)
PERFORMED ON: NORMAL
PERFORMED ON: NORMAL
PLATELET # BLD: 246 K/UL (ref 130–400)
POTASSIUM SERPL-SCNC: 3.9 MEQ/L (ref 3.4–4.9)
RBC # BLD: 4.16 M/UL (ref 4.2–5.4)
SODIUM BLD-SCNC: 141 MEQ/L (ref 135–144)
TOTAL PROTEIN: 7 G/DL (ref 6.3–8)
TROPONIN: <0.01 NG/ML (ref 0–0.01)
WBC # BLD: 8.4 K/UL (ref 4.8–10.8)

## 2020-11-12 PROCEDURE — 96372 THER/PROPH/DIAG INJ SC/IM: CPT

## 2020-11-12 PROCEDURE — 36415 COLL VENOUS BLD VENIPUNCTURE: CPT

## 2020-11-12 PROCEDURE — 2580000003 HC RX 258: Performed by: INTERNAL MEDICINE

## 2020-11-12 PROCEDURE — 85025 COMPLETE CBC W/AUTO DIFF WBC: CPT

## 2020-11-12 PROCEDURE — G0378 HOSPITAL OBSERVATION PER HR: HCPCS

## 2020-11-12 PROCEDURE — 94761 N-INVAS EAR/PLS OXIMETRY MLT: CPT

## 2020-11-12 PROCEDURE — 99285 EMERGENCY DEPT VISIT HI MDM: CPT

## 2020-11-12 PROCEDURE — 6370000000 HC RX 637 (ALT 250 FOR IP): Performed by: PHYSICIAN ASSISTANT

## 2020-11-12 PROCEDURE — 6370000000 HC RX 637 (ALT 250 FOR IP): Performed by: INTERNAL MEDICINE

## 2020-11-12 PROCEDURE — 85379 FIBRIN DEGRADATION QUANT: CPT

## 2020-11-12 PROCEDURE — 84484 ASSAY OF TROPONIN QUANT: CPT

## 2020-11-12 PROCEDURE — 6360000002 HC RX W HCPCS: Performed by: INTERNAL MEDICINE

## 2020-11-12 PROCEDURE — 93005 ELECTROCARDIOGRAM TRACING: CPT | Performed by: PHYSICIAN ASSISTANT

## 2020-11-12 PROCEDURE — 94664 DEMO&/EVAL PT USE INHALER: CPT

## 2020-11-12 PROCEDURE — 80053 COMPREHEN METABOLIC PANEL: CPT

## 2020-11-12 PROCEDURE — 2060000000 HC ICU INTERMEDIATE R&B

## 2020-11-12 PROCEDURE — 71045 X-RAY EXAM CHEST 1 VIEW: CPT

## 2020-11-12 PROCEDURE — 94640 AIRWAY INHALATION TREATMENT: CPT

## 2020-11-12 RX ORDER — POLYETHYLENE GLYCOL 3350 17 G/17G
17 POWDER, FOR SOLUTION ORAL DAILY PRN
Status: DISCONTINUED | OUTPATIENT
Start: 2020-11-12 | End: 2020-11-14 | Stop reason: HOSPADM

## 2020-11-12 RX ORDER — EMPAGLIFLOZIN 10 MG/1
25 TABLET, FILM COATED ORAL DAILY
COMMUNITY

## 2020-11-12 RX ORDER — OMEPRAZOLE 20 MG/1
20 CAPSULE, DELAYED RELEASE ORAL
Status: DISCONTINUED | OUTPATIENT
Start: 2020-11-13 | End: 2020-11-12 | Stop reason: CLARIF

## 2020-11-12 RX ORDER — SODIUM CHLORIDE 0.9 % (FLUSH) 0.9 %
10 SYRINGE (ML) INJECTION EVERY 12 HOURS SCHEDULED
Status: DISCONTINUED | OUTPATIENT
Start: 2020-11-12 | End: 2020-11-14 | Stop reason: HOSPADM

## 2020-11-12 RX ORDER — PROMETHAZINE HYDROCHLORIDE 12.5 MG/1
12.5 TABLET ORAL EVERY 6 HOURS PRN
Status: DISCONTINUED | OUTPATIENT
Start: 2020-11-12 | End: 2020-11-14 | Stop reason: HOSPADM

## 2020-11-12 RX ORDER — MAGNESIUM SULFATE IN WATER 40 MG/ML
2 INJECTION, SOLUTION INTRAVENOUS PRN
Status: DISCONTINUED | OUTPATIENT
Start: 2020-11-12 | End: 2020-11-14 | Stop reason: HOSPADM

## 2020-11-12 RX ORDER — POTASSIUM CHLORIDE 7.45 MG/ML
10 INJECTION INTRAVENOUS PRN
Status: DISCONTINUED | OUTPATIENT
Start: 2020-11-12 | End: 2020-11-14 | Stop reason: HOSPADM

## 2020-11-12 RX ORDER — ALBUTEROL SULFATE 90 UG/1
1 AEROSOL, METERED RESPIRATORY (INHALATION) 4 TIMES DAILY PRN
Status: DISCONTINUED | OUTPATIENT
Start: 2020-11-12 | End: 2020-11-14 | Stop reason: HOSPADM

## 2020-11-12 RX ORDER — ASPIRIN 81 MG/1
81 TABLET, CHEWABLE ORAL DAILY
Status: DISCONTINUED | OUTPATIENT
Start: 2020-11-13 | End: 2020-11-14 | Stop reason: HOSPADM

## 2020-11-12 RX ORDER — DEXTROSE MONOHYDRATE 25 G/50ML
12.5 INJECTION, SOLUTION INTRAVENOUS PRN
Status: DISCONTINUED | OUTPATIENT
Start: 2020-11-12 | End: 2020-11-14 | Stop reason: HOSPADM

## 2020-11-12 RX ORDER — NICOTINE POLACRILEX 4 MG
15 LOZENGE BUCCAL PRN
Status: DISCONTINUED | OUTPATIENT
Start: 2020-11-12 | End: 2020-11-14 | Stop reason: HOSPADM

## 2020-11-12 RX ORDER — ATORVASTATIN CALCIUM 40 MG/1
40 TABLET, FILM COATED ORAL NIGHTLY
Status: DISCONTINUED | OUTPATIENT
Start: 2020-11-12 | End: 2020-11-14 | Stop reason: HOSPADM

## 2020-11-12 RX ORDER — POTASSIUM CHLORIDE 20 MEQ/1
40 TABLET, EXTENDED RELEASE ORAL PRN
Status: DISCONTINUED | OUTPATIENT
Start: 2020-11-12 | End: 2020-11-14 | Stop reason: HOSPADM

## 2020-11-12 RX ORDER — BUDESONIDE AND FORMOTEROL FUMARATE DIHYDRATE 160; 4.5 UG/1; UG/1
2 AEROSOL RESPIRATORY (INHALATION) 2 TIMES DAILY
Status: DISCONTINUED | OUTPATIENT
Start: 2020-11-12 | End: 2020-11-14 | Stop reason: HOSPADM

## 2020-11-12 RX ORDER — ACETAMINOPHEN 325 MG/1
650 TABLET ORAL EVERY 6 HOURS PRN
Status: DISCONTINUED | OUTPATIENT
Start: 2020-11-12 | End: 2020-11-14 | Stop reason: HOSPADM

## 2020-11-12 RX ORDER — LISINOPRIL 5 MG/1
5 TABLET ORAL DAILY
Status: DISCONTINUED | OUTPATIENT
Start: 2020-11-13 | End: 2020-11-14 | Stop reason: HOSPADM

## 2020-11-12 RX ORDER — SODIUM CHLORIDE 0.9 % (FLUSH) 0.9 %
10 SYRINGE (ML) INJECTION PRN
Status: DISCONTINUED | OUTPATIENT
Start: 2020-11-12 | End: 2020-11-14 | Stop reason: HOSPADM

## 2020-11-12 RX ORDER — ACETAMINOPHEN 650 MG/1
650 SUPPOSITORY RECTAL EVERY 6 HOURS PRN
Status: DISCONTINUED | OUTPATIENT
Start: 2020-11-12 | End: 2020-11-14 | Stop reason: HOSPADM

## 2020-11-12 RX ORDER — ONDANSETRON 2 MG/ML
4 INJECTION INTRAMUSCULAR; INTRAVENOUS EVERY 6 HOURS PRN
Status: DISCONTINUED | OUTPATIENT
Start: 2020-11-12 | End: 2020-11-14 | Stop reason: HOSPADM

## 2020-11-12 RX ORDER — PANTOPRAZOLE SODIUM 40 MG/1
40 TABLET, DELAYED RELEASE ORAL
Status: DISCONTINUED | OUTPATIENT
Start: 2020-11-13 | End: 2020-11-14 | Stop reason: HOSPADM

## 2020-11-12 RX ORDER — DILTIAZEM HYDROCHLORIDE 120 MG/1
120 CAPSULE, COATED, EXTENDED RELEASE ORAL 2 TIMES DAILY
Status: DISCONTINUED | OUTPATIENT
Start: 2020-11-12 | End: 2020-11-14 | Stop reason: HOSPADM

## 2020-11-12 RX ORDER — MOMETASONE FUROATE 1 MG/G
CREAM TOPICAL DAILY
COMMUNITY

## 2020-11-12 RX ORDER — FUROSEMIDE 20 MG/1
20 TABLET ORAL DAILY
Status: DISCONTINUED | OUTPATIENT
Start: 2020-11-12 | End: 2020-11-14 | Stop reason: HOSPADM

## 2020-11-12 RX ORDER — NITROGLYCERIN 0.4 MG/1
0.4 TABLET SUBLINGUAL EVERY 5 MIN PRN
Status: DISCONTINUED | OUTPATIENT
Start: 2020-11-12 | End: 2020-11-14 | Stop reason: HOSPADM

## 2020-11-12 RX ORDER — DEXTROSE MONOHYDRATE 50 MG/ML
100 INJECTION, SOLUTION INTRAVENOUS PRN
Status: DISCONTINUED | OUTPATIENT
Start: 2020-11-12 | End: 2020-11-14 | Stop reason: HOSPADM

## 2020-11-12 RX ORDER — DOXYCYCLINE HYCLATE 100 MG/1
100 CAPSULE ORAL 2 TIMES DAILY
COMMUNITY
Start: 2020-11-06 | End: 2020-11-16

## 2020-11-12 RX ADMIN — ENOXAPARIN SODIUM 40 MG: 40 INJECTION SUBCUTANEOUS at 17:53

## 2020-11-12 RX ADMIN — NITROGLYCERIN 0.4 MG: 0.4 TABLET, ORALLY DISINTEGRATING SUBLINGUAL at 11:20

## 2020-11-12 RX ADMIN — TICAGRELOR 90 MG: 90 TABLET ORAL at 20:21

## 2020-11-12 RX ADMIN — DILTIAZEM HYDROCHLORIDE 120 MG: 120 CAPSULE, COATED, EXTENDED RELEASE ORAL at 20:20

## 2020-11-12 RX ADMIN — BUDESONIDE AND FORMOTEROL FUMARATE DIHYDRATE 2 PUFF: 160; 4.5 AEROSOL RESPIRATORY (INHALATION) at 19:41

## 2020-11-12 RX ADMIN — Medication 10 ML: at 20:21

## 2020-11-12 ASSESSMENT — ENCOUNTER SYMPTOMS
VOMITING: 0
NAUSEA: 1
ABDOMINAL PAIN: 0
RHINORRHEA: 0
BACK PAIN: 0
SHORTNESS OF BREATH: 0
EYE PAIN: 0
DIARRHEA: 0
COUGH: 0
SORE THROAT: 0
PHOTOPHOBIA: 0

## 2020-11-12 ASSESSMENT — PAIN DESCRIPTION - ONSET: ONSET: ON-GOING

## 2020-11-12 ASSESSMENT — PAIN SCALES - GENERAL
PAINLEVEL_OUTOF10: 6
PAINLEVEL_OUTOF10: 0
PAINLEVEL_OUTOF10: 5

## 2020-11-12 ASSESSMENT — PAIN DESCRIPTION - PAIN TYPE: TYPE: ACUTE PAIN

## 2020-11-12 ASSESSMENT — PAIN DESCRIPTION - FREQUENCY: FREQUENCY: INTERMITTENT

## 2020-11-12 ASSESSMENT — PAIN DESCRIPTION - LOCATION: LOCATION: CHEST;ABDOMEN

## 2020-11-12 ASSESSMENT — PAIN DESCRIPTION - ORIENTATION: ORIENTATION: MID

## 2020-11-12 ASSESSMENT — PAIN DESCRIPTION - DESCRIPTORS: DESCRIPTORS: PRESSURE

## 2020-11-12 ASSESSMENT — PAIN DESCRIPTION - PROGRESSION: CLINICAL_PROGRESSION: NOT CHANGED

## 2020-11-12 NOTE — CONSULTS
[facility] Cardiology Consult Note        Date of Consult:   2020    Patient:    Robert Sanchez    :    1970  CSN:    [id number]    Consulting Cardiologist: Marcela Mo MD     Primary Cardiologist: Marcela Mo MD [facility]      Requesting Physician:  Glen Cardenas DO      Reason for Consult:  CP / SOB      Assessment:    1. Shortness of breath  2. Chest pain symptoms with exertion  3. Palpitations. 4. Edema, bipedal  5. COPD  6. Negative troponins to date. 7. CAD, post RCA ERMIAS 2020, Known 50% LCX medically treated to date. 8. Preserved left ventricular function, LVEF 60%  9. Negative ECHO,    10. Hypertension  11. Hyperlipidemia  12. Diabetes  13. Nocturnal snoring / insomnia, probable sleep apnea  14. Obesity  15. Ongoing tobacco abuse    Plan:    1. Cardiac Supportive Care  2. Lexiscan Myoview stress test in the morning if troponins remain negative. 3. Cardiac catheterization possible vascular patient troponins are positive. 4. Continue current medications. Adjust as needed. 5. Further Recommendations to follow  6. See Orders        HISTORY OF PRESENT ILLNESS:      Robert Sanchez is a pleasant 44 y.o. female who has the above-noted history including recent right coronary stent angioplasty 2020 by Dr. Flavio Jama. She has preserved left ventricular function, hypertension, hyperlipidemia, diabetes and COPD. She has probable sleep apnea. She presented to the emergency room with 1 day history of worsening shortness of breath. Shortness of breath is with exertion. Her symptoms progressed and had troubles sleeping and therefore came to the emergency room. She is had no fever or chills. She said no productive cough or sputum production. She noted some mild chest discomfort symptoms radiating to her back. She has had no exertional chest pain symptoms. She has had some intermittent diarrhea. She quit smoking 5 days ago and started Chantix and NicoDerm patch.   She uses 3 L of nasal cannula oxygen at home.     Given her cardiovascular history she was admitted for further evaluation and given her cardiac history she was asked to see cardiology.     Patient follows with Flakita Dale MD, [facility]. See attached notes below. Patient History and Records, EMR reviewed. Patient Interviewed and examined. Denies CP, SOB, LH, Dizziness, TIA or CVA Symptoms. No Orthopnea, Edema or CHF symptoms. No Palpitations. No Syncope. No Fever, Chills or Cold symptoms. No GI,  or Bleeding complaints. Cardiac and general ROS otherwise negative. [address] otherwise negative other than noted. Past Medical History:   Diagnosis Date    Anticoagulant long-term use     Asthma 2004    CAD (coronary artery disease)     COPD (chronic obstructive pulmonary disease) (HCC)     Diabetes mellitus ([facility] [facility] 2014    Hodgkin disease ([facility] [facility] 1999    Hyperlipidemia     Hypertension 1984    Migraines 1989    Sleep apnea     recently tested     Type 2 diabetes mellitus without complication ([facility] [address]     type II       Past Surgical History:   Procedure Laterality Date    TUBAL LIGATION         Prior to Admission medications    Medication Sig Start Date End Date Taking?  Authorizing Provider   metFORMIN (GLUCOPHAGE) 500 MG tablet Take 1,000 mg by mouth 2 times daily (with meals)   Yes Historical Provider, MD   empagliflozin (JARDIANCE) 10 MG tablet Take 10 mg by mouth daily   Yes Historical Provider, MD   doxycycline hyclate (VIBRAMYCIN) 100 MG capsule Take 100 mg by mouth 2 times daily 11/6/20 11/16/20 Yes Historical Provider, MD   mupirocin (BACTROBAN) 2 % ointment Apply topically 2 times daily Apply topically twice daily inside of nose   Yes Historical Provider, MD   mometasone (ELOCON) 0.1 % cream Apply topically daily Apply topically daily inside of both ears   Yes Historical Provider, MD   tiotropium (SPIRIVA RESPIMAT) 2.5 MCG/ACT AERS inhaler Inhale 2 puffs into the lungs daily 10/2/20  Yes Caryl Lowry Migel,    budesonide-formoterol (SYMBICORT) 160-4.5 MCG/ACT AERO Inhale 2 puffs into the lungs 2 times daily 10/2/20  Yes Geremias Melo MD   furosemide (LASIX) 20 MG tablet Take 20 mg by mouth daily   Yes Historical Provider, MD   potassium chloride (KLOR-CON M) 10 MEQ extended release tablet Take 10 mEq by mouth daily   Yes Historical Provider, MD   lisinopril (PRINIVIL;ZESTRIL) 5 MG tablet Take 5 mg by mouth daily   Yes Historical Provider, MD   dilTIAZem (CARDIZEM CD) 120 MG extended release capsule Take 1 capsule by mouth 2 times daily 8/31/20  Yes Marisel Ferguson MD   aspirin 81 MG EC tablet Take 1 tablet by mouth daily 9/1/20  Yes Marisel Ferguson MD   ticagrelor (BRILINTA) 90 MG TABS tablet Take 1 tablet by mouth 2 times daily 8/31/20  Yes Marisel Ferguson MD   omeprazole (PRILOSEC) 20 MG delayed release capsule Take 1 capsule by mouth every morning (before breakfast) 8/4/20  Yes Geremias Melo MD   albuterol sulfate HFA (VENTOLIN HFA) 108 (90 Base) MCG/ACT inhaler Inhale 2 puffs into the lungs 4 times daily as needed for Wheezing  Patient taking differently: Inhale 1 puff into the lungs 4 times daily as needed for Wheezing  7/27/20  Yes Christi Billy MD   nitroGLYCERIN (NITROSTAT) 0.4 MG SL tablet up to max of 3 total doses.  If no relief after 1 dose, call 911. 7/23/20  Yes Neptali Raymond MD   atorvastatin (LIPITOR) 80 MG tablet Take 1 tablet by mouth daily  Patient taking differently: Take 10 mg by mouth daily  2/27/19  Yes Christi Billy MD   ipratropium-albuterol (DUONEB) 0.5-2.5 (3) MG/3ML SOLN nebulizer solution Inhale 3 mLs into the lungs 3 times daily as needed for Shortness of Breath 10/2/20 11/1/20  Lizette Dillard DO   nicotine (NICODERM CQ) 21 MG/24HR Place 1 patch onto the skin daily  Patient not taking: Reported on 10/3/2020 9/16/20 10/28/20  Geremias eMlo MD       Scheduled Meds:   sodium chloride flush  10 mL Intravenous 2 times per day    atorvastatin  40 mg Oral Nightly    [START ON 2020] aspirin  81 mg Oral Daily    enoxaparin  40 mg Subcutaneous Daily    insulin lispro  0-6 Units Subcutaneous TID WC    insulin lispro  0-3 Units Subcutaneous Nightly     Continuous Infusions:   dextrose       PRN Meds:nitroGLYCERIN, sodium chloride flush, acetaminophen **OR** acetaminophen, polyethylene glycol, promethazine **OR** ondansetron, potassium chloride **OR** potassium alternative oral replacement **OR** potassium chloride, magnesium sulfate, glucose, dextrose, glucagon (rDNA), dextrose    Allergies   Allergen Reactions    Shellfish-Derived Products Anaphylaxis       Social History     Socioeconomic History    Marital status: Single     Spouse name: Not on file    Number of children: Not on file    Years of education: Not on file    Highest education level: Not on file   Occupational History    Not on file   Social Needs    Financial resource strain: Not on file    Food insecurity     Worry: Not on file     Inability: Not on file   [employer] needs     Medical: Not on file     Non-medical: Not on file   Tobacco Use    Smoking status: Former Smoker     Packs/day: 1.00     Years: 37.00     Pack years: 37.00     Types: Cigarettes     Last attempt to quit: 2020     Years since quittin.1    Smokeless tobacco: Never Used   Substance and Sexual Activity    Alcohol use: Not Currently    Drug use: Never    Sexual activity: Yes     Partners: Male   Lifestyle    Physical activity     Days per week: Not on file     Minutes per session: Not on file    Stress: Not on file   Relationships    Social connections     Talks on phone: Not on file     Gets together: Not on file     Attends Yarsani service: Not on file     Active member of club or organization: Not on file     Attends meetings of clubs or organizations: Not on file     Relationship status: Not on file    Intimate partner violence     Fear of current or ex partner: Not on file     Emotionally abused: Not on file Physically abused: Not on file     Forced sexual activity: Not on file   Other Topics Concern    Not on file   Social History Narrative    Not on file       History reviewed. No pertinent family history. Review Of Systems:    14 point ROS negative other than mentioned. Physical Exam:    CURRENT VITALS: /64   Pulse 74   Temp 98.2 °F (36.8 °C) (Oral)   Resp 18   Ht 5' 2\" (1.575 m)   Wt 201 lb 8 oz (91.4 kg)   SpO2 99%   BMI 36.85 kg/m²     CONSTITUTIONAL:  awake, alert, cooperative, no apparent distress,   ENT:  Normocephalic, without obvious abnormality, atraumatic, sinuses nontender on palpation, external ears without lesions,  NECK:  Supple, symmetrical, trachea midline, no adenopathy, thyroid symmetric, not enlarged and no tenderness, skin normal, No bruits. LUNGS:  Increased work of breathing, Decreased air exchange, bilateral wheezing. CARDIOVASCULAR:  Normal apical impulse, regular rate and rhythm, normal S1 and S2,  1/6 Systolic murmur noted. ABDOMEN:  Obese, normal bowel sounds, soft, non-distended, non-tender, no masses palpated, no hepatosplenomegally  EXTREMETIES: 1+ edema, Pulses Strong Thruout. No ulcers. NEUROLOGIC:  Awake, alert, oriented to name, place and time. Following all commands and moving all extremties.   SKIN:  no bruising or bleeding, normal skin color, texture, turgor and no rashes    Labs:  Recent Results (from the past 24 hour(s))   EKG 12 Lead    Collection Time: 11/12/20 10:28 AM   Result Value Ref Range    Ventricular Rate 81 BPM    Atrial Rate 81 BPM    P-R Interval 182 ms    QRS Duration 78 ms    Q-T Interval 368 ms    QTc Calculation (Bazett) 427 ms    P Axis 53 degrees    R Axis 31 degrees    T Axis 56 degrees   Comprehensive Metabolic Panel    Collection Time: 11/12/20 10:45 AM   Result Value Ref Range    Sodium 141 135 - 144 mEq/L    Potassium 3.9 3.4 - 4.9 mEq/L    Chloride 102 95 - 107 mEq/L    CO2 25 20 - 31 mEq/L    Anion Gap 14 9 - 15 mEq/L Glucose 97 70 - 99 mg/dL    BUN 12 6 - 20 mg/dL    CREATININE 0.67 0.50 - 0.90 mg/dL    GFR Non-African American >60.0 >60    GFR  >60.0 >60    Calcium 8.9 8.5 - 9.9 mg/dL    Total Protein 7.0 6.3 - 8.0 g/dL    Alb 4.2 3.5 - 4.6 g/dL    Total Bilirubin 0.3 0.2 - 0.7 mg/dL    Alkaline Phosphatase 84 40 - 130 U/L    ALT 36 (H) 0 - 33 U/L    AST 27 0 - 35 U/L    Globulin 2.8 2.3 - 3.5 g/dL   CBC Auto Differential    Collection Time: 11/12/20 10:45 AM   Result Value Ref Range    WBC 8.4 4.8 - 10.8 K/uL    RBC 4.16 (L) 4.20 - 5.40 M/uL    Hemoglobin 13.0 12.0 - 16.0 g/dL    Hematocrit 38.3 37.0 - 47.0 %    MCV 91.9 82.0 - 100.0 fL    MCH 31.3 27.0 - 31.3 pg    MCHC 34.0 33.0 - 37.0 %    RDW 13.8 11.5 - 14.5 %    Platelets 620 815 - 858 K/uL    Neutrophils % 66.8 %    Lymphocytes % 23.8 %    Monocytes % 4.8 %    Eosinophils % 3.7 %    Basophils % 0.9 %    Neutrophils Absolute 5.6 1.4 - 6.5 K/uL    Lymphocytes Absolute 2.0 1.0 - 4.8 K/uL    Monocytes Absolute 0.4 0.2 - 0.8 K/uL    Eosinophils Absolute 0.3 0.0 - 0.7 K/uL    Basophils Absolute 0.1 0.0 - 0.2 K/uL   Troponin    Collection Time: 11/12/20 10:45 AM   Result Value Ref Range    Troponin <0.010 0.000 - 0.010 ng/mL   D-Dimer, Quantitative    Collection Time: 11/12/20 10:45 AM   Result Value Ref Range    D-Dimer, Quant 0.28 0.00 - 0.50 mg/L FEU   POCT Glucose    Collection Time: 11/12/20  3:57 PM   Result Value Ref Range    POC Glucose 92 60 - 115 mg/dl    Performed on ACCU-CHEK        ECG:     NSR, NSSTS, rate 81     ECHO:    Pending        Doris Alfaro MD  [facility] Cardiologist      Electronically signed on 11/12/20 at 4:16 PM EST      -----

## 2020-11-12 NOTE — ED PROVIDER NOTES
[facility] [facility] ED  eMERGENCY dEPARTMENTeNCOUnter      Pt Name: Viet Estevez  MRN: [id number]  [place] 1970  Date ofevaluation: 11/12/2020  Provider: Shelbie Calle Dr       Chief Complaint   Patient presents with    Chest Pain     sharp chest pain on and off since 530 am         HISTORY OF PRESENT ILLNESS   (Location/Symptom, Timing/Onset,Context/Setting, Quality, Duration, Modifying Factors, Severity)  Note limiting factors. Viet Estevez is a 44 y.o. female who presents to the emergency department chest pain that started this morning. She says she has a history of angina at rest and this feels very similar. She was at her [facility] clinic doctor's appointment when she got another episode of this chest pain that was described as pressure with associated nausea with no diaphoresis and the pain did not radiate to her arm. They told her to come to the emergency department due to this pain. She does see Dr. Jarvis Nail has stents and states she has been compliant with her medications. She denies any fevers cough. HPI    NursingNotes were reviewed. REVIEW OF SYSTEMS    (2-9 systems for level 4, 10 or more for level 5)     Review of Systems   Constitutional: Negative for chills, diaphoresis, fatigue and fever. HENT: Negative for congestion, rhinorrhea and sore throat. Eyes: Negative for photophobia and pain. Respiratory: Negative for cough and shortness of breath. Cardiovascular: Positive for chest pain. Negative for palpitations. Gastrointestinal: Positive for nausea. Negative for abdominal pain, diarrhea and vomiting. Genitourinary: Negative for dysuria and flank pain. Musculoskeletal: Negative for back pain. Skin: Negative for rash. Neurological: Negative for dizziness, light-headedness and headaches. Psychiatric/Behavioral: Negative. All other systems reviewed and are negative.       Except as noted above the remainder of the review of systems education: Not on file    Highest education level: Not on file   Occupational History    Not on file   Social Needs    Financial resource strain: Not on file    Food insecurity     Worry: Not on file     Inability: Not on file    Transportation needs     Medical: Not on file     Non-medical: Not on file   Tobacco Use    Smoking status: Former Smoker     Packs/day: 1.00     Years: 37.00     Pack years: 37.00     Types: Cigarettes    Smokeless tobacco: Never Used   Substance and Sexual Activity    Alcohol use: Not Currently    Drug use: Never    Sexual activity: Not on file   Lifestyle    Physical activity     Days per week: Not on file     Minutes per session: Not on file    Stress: Not on file   Relationships    Social connections     Talks on phone: Not on file     Gets together: Not on file     Attends Rastafari service: Not on file     Active member of club or organization: Not on file     Attends meetings of clubs or organizations: Not on file     Relationship status: Not on file    Intimate partner violence     Fear of current or ex partner: Not on file     Emotionally abused: Not on file     Physically abused: Not on file     Forced sexual activity: Not on file   Other Topics Concern    Not on file   Social History Narrative    Not on file       SCREENINGS      @PODR([id number])@      PHYSICAL EXAM    (up to 7 for level 4, 8 or more for level 5)     ED Triage Vitals [11/12/20 1017]   BP Temp Temp Source Pulse Resp SpO2 Height Weight   (!) 141/88 97.5 °F (36.4 °C) Oral 95 20 98 % 5' 2\" (1.575 m) 200 lb (90.7 kg)       Physical Exam  Vitals signs and nursing note reviewed. Constitutional:       General: She is not in acute distress. Appearance: Normal appearance. She is well-developed. She is not diaphoretic. HENT:      Head: Normocephalic and atraumatic.    Eyes:      General: Lids are normal.      Conjunctiva/sclera: Conjunctivae normal.   Neck:      Musculoskeletal: Normal range of motion and neck supple. Cardiovascular:      Rate and Rhythm: Normal rate and regular rhythm. Pulses: Normal pulses. Heart sounds: Normal heart sounds. Pulmonary:      Effort: Pulmonary effort is normal.      Breath sounds: Normal breath sounds. Abdominal:      General: Bowel sounds are normal.      Palpations: Abdomen is soft. Tenderness: There is no abdominal tenderness. Lymphadenopathy:      Cervical: No cervical adenopathy. Skin:     General: Skin is warm and dry. Capillary Refill: Capillary refill takes less than 2 seconds. Findings: No rash. Neurological:      Mental Status: She is alert and oriented to person, place, and time. Psychiatric:         Thought Content: Thought content normal.         Judgment: Judgment normal.         DIAGNOSTIC RESULTS     EKG: All EKG's are interpreted by the Emergency Department Physician who either signs or Co-signsthis chart in the absence of a cardiologist.    nsr 81bpm no acute st changes no ectopy    RADIOLOGY:   Non-plain filmimages such as CT, Ultrasound and MRI are read by the radiologist. Plain radiographic images are visualized and preliminarily interpreted by the emergency physician with the below findings:        Interpretation per the Radiologist below, if available at the time ofthis note:    XR CHEST PORTABLE   Final Result      No significant interval change from prior. Nonspecific patchy bibasilar opacities. ED BEDSIDE ULTRASOUND:   Performed by ED Physician - none    LABS:  Labs Reviewed   COMPREHENSIVE METABOLIC PANEL - Abnormal; Notable for the following components:       Result Value    ALT 36 (*)     All other components within normal limits   CBC WITH AUTO DIFFERENTIAL - Abnormal; Notable for the following components:    RBC 4.16 (*)     All other components within normal limits   TROPONIN   D-DIMER, QUANTITATIVE       All other labs were within normal range or not returned as of this dictation.     EMERGENCY DEPARTMENT COURSE and DIFFERENTIAL DIAGNOSIS/MDM:   Vitals:    Vitals:    11/12/20 1017   BP: (!) 141/88   Pulse: 95   Resp: 20   Temp: 97.5 °F (36.4 °C)   TempSrc: Oral   SpO2: 98%   Weight: 200 lb (90.7 kg)   Height: 5' 2\" (1.575 m)           MDM    Patient is nontoxic no acute distress afebrile hemodynamically stable. She is provided with nitro for her chest pain completely relieved her pain. EKG is normal sinus rhythm she has no elevation in her troponin but has a significant cardiac history will admit for cardiac rule out. Dr. Sofiya Mckeon hospitalist accepted the patient. I did speak to Dr. Mika Roy no further orders per cardiology. Patient is stable ready for admission. REASSESSMENT          CRITICAL CARE TIME   Total Critical Care time was  minutes, excluding separatelyreportable procedures. There was a high probability ofclinically significant/life threatening deterioration in the patient's condition which required my urgent intervention. CONSULTS:  None    PROCEDURES:  Unless otherwise noted below, none     Procedures    FINAL IMPRESSION      1. Chest pain, unspecified type          DISPOSITION/PLAN   DISPOSITION Decision To Admit 11/12/2020 12:34:28 PM      PATIENT REFERREDTO:  No follow-up provider specified.     DISCHARGEMEDICATIONS:  New Prescriptions    No medications on file          (Please note that portions of this note were completed with a voice recognition program.  Efforts were made to edit the dictations but occasionally words are mis-transcribed.)    Lorena Queen PA-C (electronically signed)  Attending Emergency Physician         Lorena Queen PA-C  11/12/20 [id number]

## 2020-11-12 NOTE — PROGRESS NOTES
Not on file   Tobacco Use    Smoking status: Former Smoker     Packs/day: 1.00     Years: 37.00     Pack years: 37.00     Types: Cigarettes     Last attempt to quit: 2020     Years since quittin.1    Smokeless tobacco: Never Used   Substance and Sexual Activity    Alcohol use: Not Currently    Drug use: Never    Sexual activity: Yes     Partners: Male   Lifestyle    Physical activity     Days per week: Not on file     Minutes per session: Not on file    Stress: Not on file   Relationships    Social connections     Talks on phone: Not on file     Gets together: Not on file     Attends [employer] service: Not on file     Active member of club or organization: Not on file     Attends meetings of clubs or organizations: Not on file     Relationship status: Not on file    Intimate partner violence     Fear of current or ex partner: Not on file     Emotionally abused: Not on file     Physically abused: Not on file     Forced sexual activity: Not on file   Other Topics Concern    Not on file   Social History Narrative    Not on file       Family History:   History reviewed. No pertinent family history. Medications Prior to Admission:    Prior to Admission medications    Medication Sig Start Date End Date Taking?  Authorizing Provider   metFORMIN (GLUCOPHAGE) 500 MG tablet Take 1,000 mg by mouth 2 times daily (with meals)   Yes Historical Provider, MD   tiotropium (SPIRIVA RESPIMAT) 2.5 MCG/ACT AERS inhaler Inhale 2 puffs into the lungs daily 10/2/20  Yes Micaela Gates DO   budesonide-formoterol (SYMBICORT) 160-4.5 MCG/ACT AERO Inhale 2 puffs into the lungs 2 times daily 10/2/20  Yes Cadence Child MD   furosemide (LASIX) 20 MG tablet Take 20 mg by mouth daily   Yes Historical Provider, MD   potassium chloride (KLOR-CON M) 10 MEQ extended release tablet Take 10 mEq by mouth daily   Yes Historical Provider, MD   lisinopril (PRINIVIL;ZESTRIL) 5 MG tablet Take 5 mg by mouth daily   Yes Historical MD Lisha   dilTIAZem (CARDIZEM CD) 120 MG extended release capsule Take 1 capsule by mouth 2 times daily 20  Yes Kenan Stallings MD   aspirin 81 MG EC tablet Take 1 tablet by mouth daily 20  Yes Kenan Stallings MD   ticagrelor (BRILINTA) 90 MG TABS tablet Take 1 tablet by mouth 2 times daily 20  Yes Kenan Stallings MD   omeprazole (PRILOSEC) 20 MG delayed release capsule Take 1 capsule by mouth every morning (before breakfast) 20  Yes Bob Parnell MD   albuterol sulfate HFA (VENTOLIN HFA) 108 (90 Base) MCG/ACT inhaler Inhale 2 puffs into the lungs 4 times daily as needed for Wheezing  Patient taking differently: Inhale 1 puff into the lungs 4 times daily as needed for Wheezing  20  Yes Viviane Zaragoza MD   nitroGLYCERIN (NITROSTAT) 0.4 MG SL tablet up to max of 3 total doses. If no relief after 1 dose, call 911. 20  Yes Janes Koch MD   atorvastatin (LIPITOR) 80 MG tablet Take 1 tablet by mouth daily  Patient taking differently: Take 10 mg by mouth daily  19  Yes Viviane Zaragoza MD   ipratropium-albuterol (DUONEB) 0.5-2.5 (3) MG/3ML SOLN nebulizer solution Inhale 3 mLs into the lungs 3 times daily as needed for Shortness of Breath 10/2/20 11/1/20  Osmin Livers, DO   nicotine (NICODERM CQ) 21 MG/24HR Place 1 patch onto the skin daily  Patient not taking: Reported on 10/3/2020 9/16/20 10/28/20  Bob Parnell MD       Allergies:  Shellfish-derived products    REVIEW OF SYSTEMS:  All systems reviewed and negative except for what is in HPI . PHYSICAL EXAM:  Vitals:  /64   Pulse 74   Temp 98.2 °F (36.8 °C) (Oral)   Resp 18   Ht 5' 2\" (1.575 m)   Wt 201 lb 8 oz (91.4 kg)   SpO2 99%   BMI 36.85 kg/m²   BMI Classification: Obese (BMI 30.0-39. 9)  Pulse Ox:  SpO2  Av.3 %  Min: 98 %  Max: 99 %  Supplemental O2:      CONSTITUTIONAL:  awake, alert, cooperative, no apparent distress, and appears stated age  [de-identified]: Normocephalic, PERRLA  NECK: no JVD, no LAD  HEART: RRR, no murmurs, gallops, or rubs  LUNGS: clear to auscultation bilaterally, no wheezes, crackles, or rhonchi.   ABDOMEN: soft/NT/ND, positive BS  MUSCULOSKELETAL: negative for edema, +2 pulses  SKIN: intact without rash or jaundice  NEURO:  CN intact and no focal deficits    DATA:  Recent Results (from the past 24 hour(s))   EKG 12 Lead    Collection Time: 11/12/20 10:28 AM   Result Value Ref Range    Ventricular Rate 81 BPM    Atrial Rate 81 BPM    P-R Interval 182 ms    QRS Duration 78 ms    Q-T Interval 368 ms    QTc Calculation (Bazett) 427 ms    P Axis 53 degrees    R Axis 31 degrees    T Axis 56 degrees   Comprehensive Metabolic Panel    Collection Time: 11/12/20 10:45 AM   Result Value Ref Range    Sodium 141 135 - 144 mEq/L    Potassium 3.9 3.4 - 4.9 mEq/L    Chloride 102 95 - 107 mEq/L    CO2 25 20 - 31 mEq/L    Anion Gap 14 9 - 15 mEq/L    Glucose 97 70 - 99 mg/dL    BUN 12 6 - 20 mg/dL    CREATININE 0.67 0.50 - 0.90 mg/dL    GFR Non-African American >60.0 >60    GFR  >60.0 >60    Calcium 8.9 8.5 - 9.9 mg/dL    Total Protein 7.0 6.3 - 8.0 g/dL    Alb 4.2 3.5 - 4.6 g/dL    Total Bilirubin 0.3 0.2 - 0.7 mg/dL    Alkaline Phosphatase 84 40 - 130 U/L    ALT 36 (H) 0 - 33 U/L    AST 27 0 - 35 U/L    Globulin 2.8 2.3 - 3.5 g/dL   CBC Auto Differential    Collection Time: 11/12/20 10:45 AM   Result Value Ref Range    WBC 8.4 4.8 - 10.8 K/uL    RBC 4.16 (L) 4.20 - 5.40 M/uL    Hemoglobin 13.0 12.0 - 16.0 g/dL    Hematocrit 38.3 37.0 - 47.0 %    MCV 91.9 82.0 - 100.0 fL    MCH 31.3 27.0 - 31.3 pg    MCHC 34.0 33.0 - 37.0 %    RDW 13.8 11.5 - 14.5 %    Platelets 195 251 - 438 K/uL    Neutrophils % 66.8 %    Lymphocytes % 23.8 %    Monocytes % 4.8 %    Eosinophils % 3.7 %    Basophils % 0.9 %    Neutrophils Absolute 5.6 1.4 - 6.5 K/uL    Lymphocytes Absolute 2.0 1.0 - 4.8 K/uL    Monocytes Absolute 0.4 0.2 - 0.8 K/uL    Eosinophils Absolute 0.3 0.0 - 0.7 K/uL    Basophils Absolute 0.1 0.0 - 0.2 K/uL Troponin    Collection Time: 11/12/20 10:45 AM   Result Value Ref Range    Troponin <0.010 0.000 - 0.010 ng/mL   D-Dimer, Quantitative    Collection Time: 11/12/20 10:45 AM   Result Value Ref Range    D-Dimer, Quant 0.28 0.00 - 0.50 mg/L FEU           ASSESSMENT AND PLAN:  1) chest pain-history of CAD status post recent PCI       We will observe on telemetry floor, consult cardiology, trend troponin, EKG and troponin so far not showing acute coronary syndrome picture. However, given her high risk will work her up for ACS    2) diabetes       We will use sliding scale insulin, monitor, hypoglycemia protocol.   Hold Metformin    3) HTN/HLD/KEITH      Resume outpatient management**    DVT prophylaxis         DISCHARGE PLANNING  Observation    Code status: Full Code    Electronically signed by Lacy Pedro DO on 11/12/20 at 3:32 PM EST

## 2020-11-12 NOTE — FLOWSHEET NOTE
Spiritual Care Services     Summary of Visit:   responded to spiritual care consult, Josie Mir was given to patient for her review and let her know that a  is avialable to assist in completing the document if needed     Spiritual Assessment/Intervention/Outcomes:    Encounter Summary  Services provided to[de-identified] (P) Patient  Referral/Consult From[de-identified] (P) Nurse, Physician  Support System: (P) Significant other, Children  Continue Visiting: (P) Yes  Complexity of Encounter: (P) Moderate  Length of Encounter: (P) 15 minutes  Spiritual Assessment Completed: (P) Yes  Advance Care Planning: (P) Yes  Routine  Type: (P) Initial  Assessment: (P) Approachable, Calm  Intervention: (P) Sustaining presence/ Ministry of presence  Outcome: (P) Expressed gratitude, Receptive              Advance Directives (For Healthcare)  Pre-existing DNR Comfort Care/DNR Arrest/DNI Order: No  Healthcare Directive: (P) No, patient does not have an advance directive for healthcare treatment  Information on Healthcare Directives Requested: (P) Yes  Patient Requests Assistance: (P) Yes, will do independently  Advance Directives: (P) Documents given           Values / Beliefs  Do you have any ethnic, cultural, sacramental, or spiritual Church needs you would like us to be aware of while you are in the hospital?: No    Care Plan:        [id number] [address]   Electronically signed by Maksim Thurman on 11/12/20 at 4:55 PM EST     To reach a  for emotional and spiritual support, place an [facility] consult request.   If a  is needed immediately, dial 0 and ask to page the on-call .

## 2020-11-12 NOTE — ED NOTES
Report called to [facility] on [address], [address] [facility]  11/12/20 2001

## 2020-11-12 NOTE — PROGRESS NOTES
[facility] Respiratory Therapy Evaluation   Current Order:  ALB PRN SPIRIVIA SYM      Home Regimen: Y      Ordering Physician: LEO  Re-evaluation Date:  EVAL DONE     Diagnosis: CP      Patient Status: Stable / Unstable + Physician notified    The following MDI Criteria must be met in order to convert aerosol to MDI with spacer. If unable to meet, MDI will be converted to aerosol:  []  Patient able to demonstrate the ability to use MDI effectively  []  Patient alert and cooperative  []  Patient able to take deep breath with 5-10 second hold  []  Medication(s) available in this delivery method   []  Peak flow greater than or equal to 200 ml/min            Current Order Substituted To  (same drug, same frequency)   Aerosol to MDI [] Albuterol Sulfate 0.083% unit dose by aerosol Albuterol Sulfate MDI 2 puffs by inhalation with spacer    [] Levalbuterol 1.25 mg unit dose by aerosol Levalbuterol MDI 2 puffs by inhalation with spacer    [] Levalbuterol 0.63 mg unit dose by aerosol Levalbuterol MDI 2 puffs by inhalation with spacer    [] Ipratropium Bromide 0.02% unit dose by aerosol Ipratropium Bromide MDI 2 puffs by inhalation with spacer    [] Duoneb (Ipratropium + Albuterol) unit dose by aerosol Ipratropium MDI + Albuterol MDI 2 puffs by inhalation w/spacer   MDI to Aerosol [] Albuterol Sulfate MDI Albuterol Sulfate 0.083% unit dose by aerosol    [] Levalbuterol MDI 2 puffs by inhalation Levalbuterol 1.25 mg unit dose by aerosol    [] Ipratropium Bromide MDI by inhalation Ipratropium Bromide 0.02% unit dose by aerosol    [] Combivent (Ipratropium + Albuterol) MDI by inhalation Duoneb (Ipratropium + Albuterol) unit dose by aerosol   Treatment Assessment [Frequency/Schedule]:  Change frequency to: __________________________________________________per Protocol, P&T, MEC      Points 0 1 2 3 4   Pulmonary Status  Non-Smoker  []   Smoking history   < 20 pack years  []   Smoking history  ?  20 pack years  []   Pulmonary Disorder  (acute or chronic)  [x]   Severe or Chronic w/ Exacerbation  []     Surgical Status No [x]   Surgeries     General []   Surgery Lower []   Abdominal Thoracic or []   Upper Abdominal Thoracic with  PulmonaryDisorder  []     Chest X-ray Clear/Not  Ordered     [x]  Chronic Changes  Results Pending  []  Infiltrates, atelectasis, pleural effusion, or edema  []  Infiltrates in more than one lobe []  Infiltrate + Atelectasis, &/or pleural effusion  []    Respiratory Pattern Regular,  RR = 12-20 [x]  Increased,  RR = 21-25 []  KC, irregular,  or RR = 26-30 []  Decreased FEV1  or RR = 31-35 []  Severe SOB, use  of accessory muscles, or RR ? 35  []    Mental Status Alert, oriented,  Cooperative [x]  Confused but Follows commands []  Lethargic or unable to follow commands []  Obtunded  []  Comatose  []    Breath Sounds Clear to  auscultation  []  Decreased unilaterally or  in bases only [x]  Decreased  bilaterally  []  Crackles or intermittent wheezes []  Wheezes []    Cough Strong, Spontan., & nonproductive [x]  Strong,  spontaneous, &  productive []  Weak,  Nonproductive []  Weak, productive or  with wheezes []  No spontaneous  cough or may require suctioning []    Level of Activity Ambulatory [x]  Ambulatory w/ Assist  []  Non-ambulatory []  Paraplegic []  Quadriplegic []    Total    Score:__4____     Triage Score:____5____      Tri       Triage:     1. (>20) Freq: Q3    2. (16-20) Freq: Q4   3. (11-15) Freq: QID & Albuterol Q2 PRN    4. (6-10) Freq: TID & Albuterol Q2 PRN    5. (0-5) Freq Q4prn

## 2020-11-12 NOTE — ED TRIAGE NOTES
Pt was at [address] [place] [address]  For an appointment for her allergies but she  Told them off the  Sharp chest pains she has been having on and off since  530 am and she was instructed to come to er.  Pt had stent placed aug 31st

## 2020-11-12 NOTE — FLOWSHEET NOTE
1500  Pt arrived to [address] [place] from ER. Ambulatory to bed with steady gait. Pt has no c/o chest pain or SOB. Resp unlabored, no distress noted. Admission assessment started. Pt calm and cooperative. Will continue to monitor.

## 2020-11-13 ENCOUNTER — APPOINTMENT (OUTPATIENT)
Dept: NUCLEAR MEDICINE | Age: 50
End: 2020-11-13
Payer: COMMERCIAL

## 2020-11-13 VITALS
OXYGEN SATURATION: 97 % | WEIGHT: 201.72 LBS | TEMPERATURE: 98.1 F | BODY MASS INDEX: 37.12 KG/M2 | HEIGHT: 62 IN | HEART RATE: 80 BPM | DIASTOLIC BLOOD PRESSURE: 60 MMHG | RESPIRATION RATE: 18 BRPM | SYSTOLIC BLOOD PRESSURE: 114 MMHG

## 2020-11-13 LAB
ALBUMIN SERPL-MCNC: 3.7 G/DL (ref 3.5–4.6)
ALBUMIN SERPL-MCNC: 3.8 G/DL (ref 3.5–4.6)
ALP BLD-CCNC: 79 U/L (ref 40–130)
ALP BLD-CCNC: 80 U/L (ref 40–130)
ALT SERPL-CCNC: 32 U/L (ref 0–33)
ALT SERPL-CCNC: 34 U/L (ref 0–33)
ANION GAP SERPL CALCULATED.3IONS-SCNC: 11 MEQ/L (ref 9–15)
ANION GAP SERPL CALCULATED.3IONS-SCNC: 11 MEQ/L (ref 9–15)
AST SERPL-CCNC: 19 U/L (ref 0–35)
AST SERPL-CCNC: 19 U/L (ref 0–35)
BILIRUB SERPL-MCNC: 0.4 MG/DL (ref 0.2–0.7)
BILIRUB SERPL-MCNC: 0.5 MG/DL (ref 0.2–0.7)
BUN BLDV-MCNC: 11 MG/DL (ref 6–20)
BUN BLDV-MCNC: 9 MG/DL (ref 6–20)
C-REACTIVE PROTEIN, HIGH SENSITIVITY: 10.2 MG/L (ref 0–5)
CALCIUM SERPL-MCNC: 8.3 MG/DL (ref 8.5–9.9)
CALCIUM SERPL-MCNC: 8.5 MG/DL (ref 8.5–9.9)
CHLORIDE BLD-SCNC: 102 MEQ/L (ref 95–107)
CHLORIDE BLD-SCNC: 104 MEQ/L (ref 95–107)
CO2: 25 MEQ/L (ref 20–31)
CO2: 25 MEQ/L (ref 20–31)
CREAT SERPL-MCNC: 0.61 MG/DL (ref 0.5–0.9)
CREAT SERPL-MCNC: 0.75 MG/DL (ref 0.5–0.9)
GFR AFRICAN AMERICAN: >60
GFR AFRICAN AMERICAN: >60
GFR NON-AFRICAN AMERICAN: >60
GFR NON-AFRICAN AMERICAN: >60
GLOBULIN: 2.8 G/DL (ref 2.3–3.5)
GLOBULIN: 2.8 G/DL (ref 2.3–3.5)
GLUCOSE BLD-MCNC: 118 MG/DL (ref 70–99)
GLUCOSE BLD-MCNC: 128 MG/DL (ref 60–115)
GLUCOSE BLD-MCNC: 156 MG/DL (ref 60–115)
GLUCOSE BLD-MCNC: 178 MG/DL (ref 70–99)
GLUCOSE BLD-MCNC: 196 MG/DL (ref 60–115)
HCT VFR BLD CALC: 36 % (ref 37–47)
HEMOGLOBIN: 12.3 G/DL (ref 12–16)
MAGNESIUM: 1.2 MG/DL (ref 1.7–2.4)
MCH RBC QN AUTO: 31.5 PG (ref 27–31.3)
MCHC RBC AUTO-ENTMCNC: 34.1 % (ref 33–37)
MCV RBC AUTO: 92.3 FL (ref 82–100)
PDW BLD-RTO: 13.7 % (ref 11.5–14.5)
PERFORMED ON: ABNORMAL
PLATELET # BLD: 237 K/UL (ref 130–400)
POTASSIUM SERPL-SCNC: 3 MEQ/L (ref 3.4–4.9)
POTASSIUM SERPL-SCNC: 3.7 MEQ/L (ref 3.4–4.9)
RBC # BLD: 3.91 M/UL (ref 4.2–5.4)
SEDIMENTATION RATE, ERYTHROCYTE: 39 MM (ref 0–30)
SODIUM BLD-SCNC: 138 MEQ/L (ref 135–144)
SODIUM BLD-SCNC: 140 MEQ/L (ref 135–144)
TOTAL PROTEIN: 6.5 G/DL (ref 6.3–8)
TOTAL PROTEIN: 6.6 G/DL (ref 6.3–8)
TROPONIN: <0.01 NG/ML (ref 0–0.01)
WBC # BLD: 6.5 K/UL (ref 4.8–10.8)

## 2020-11-13 PROCEDURE — 2580000003 HC RX 258: Performed by: INTERNAL MEDICINE

## 2020-11-13 PROCEDURE — 85027 COMPLETE CBC AUTOMATED: CPT

## 2020-11-13 PROCEDURE — 96366 THER/PROPH/DIAG IV INF ADDON: CPT

## 2020-11-13 PROCEDURE — G0378 HOSPITAL OBSERVATION PER HR: HCPCS

## 2020-11-13 PROCEDURE — 83735 ASSAY OF MAGNESIUM: CPT

## 2020-11-13 PROCEDURE — 6360000002 HC RX W HCPCS: Performed by: INTERNAL MEDICINE

## 2020-11-13 PROCEDURE — 93010 ELECTROCARDIOGRAM REPORT: CPT | Performed by: INTERNAL MEDICINE

## 2020-11-13 PROCEDURE — 93005 ELECTROCARDIOGRAM TRACING: CPT | Performed by: INTERNAL MEDICINE

## 2020-11-13 PROCEDURE — A9502 TC99M TETROFOSMIN: HCPCS | Performed by: INTERNAL MEDICINE

## 2020-11-13 PROCEDURE — 78452 HT MUSCLE IMAGE SPECT MULT: CPT

## 2020-11-13 PROCEDURE — 6370000000 HC RX 637 (ALT 250 FOR IP): Performed by: INTERNAL MEDICINE

## 2020-11-13 PROCEDURE — 80053 COMPREHEN METABOLIC PANEL: CPT

## 2020-11-13 PROCEDURE — 36415 COLL VENOUS BLD VENIPUNCTURE: CPT

## 2020-11-13 PROCEDURE — 85652 RBC SED RATE AUTOMATED: CPT

## 2020-11-13 PROCEDURE — 86141 C-REACTIVE PROTEIN HS: CPT

## 2020-11-13 PROCEDURE — 3430000000 HC RX DIAGNOSTIC RADIOPHARMACEUTICAL: Performed by: INTERNAL MEDICINE

## 2020-11-13 PROCEDURE — 93017 CV STRESS TEST TRACING ONLY: CPT

## 2020-11-13 PROCEDURE — 96372 THER/PROPH/DIAG INJ SC/IM: CPT

## 2020-11-13 PROCEDURE — 96365 THER/PROPH/DIAG IV INF INIT: CPT

## 2020-11-13 PROCEDURE — 84484 ASSAY OF TROPONIN QUANT: CPT

## 2020-11-13 RX ORDER — MAGNESIUM SULFATE IN WATER 40 MG/ML
4 INJECTION, SOLUTION INTRAVENOUS ONCE
Status: COMPLETED | OUTPATIENT
Start: 2020-11-13 | End: 2020-11-13

## 2020-11-13 RX ORDER — SODIUM CHLORIDE 0.9 % (FLUSH) 0.9 %
10 SYRINGE (ML) INJECTION PRN
Status: DISCONTINUED | OUTPATIENT
Start: 2020-11-13 | End: 2020-11-14 | Stop reason: HOSPADM

## 2020-11-13 RX ORDER — LANOLIN ALCOHOL/MO/W.PET/CERES
400 CREAM (GRAM) TOPICAL 2 TIMES DAILY
Status: DISCONTINUED | OUTPATIENT
Start: 2020-11-13 | End: 2020-11-14 | Stop reason: HOSPADM

## 2020-11-13 RX ORDER — POTASSIUM CHLORIDE 7.45 MG/ML
40 INJECTION INTRAVENOUS ONCE
Status: DISCONTINUED | OUTPATIENT
Start: 2020-11-13 | End: 2020-11-13 | Stop reason: ALTCHOICE

## 2020-11-13 RX ORDER — POTASSIUM CHLORIDE 7.45 MG/ML
10 INJECTION INTRAVENOUS
Status: DISPENSED | OUTPATIENT
Start: 2020-11-13 | End: 2020-11-13

## 2020-11-13 RX ORDER — PANTOPRAZOLE SODIUM 40 MG/1
40 TABLET, DELAYED RELEASE ORAL
Qty: 30 TABLET | Refills: 3 | Status: SHIPPED | OUTPATIENT
Start: 2020-11-14

## 2020-11-13 RX ORDER — LANOLIN ALCOHOL/MO/W.PET/CERES
400 CREAM (GRAM) TOPICAL 2 TIMES DAILY
Qty: 30 TABLET | Refills: 3 | Status: SHIPPED | OUTPATIENT
Start: 2020-11-13 | End: 2022-02-08

## 2020-11-13 RX ORDER — SODIUM CHLORIDE 9 MG/ML
INJECTION, SOLUTION INTRAVENOUS
Status: DISCONTINUED
Start: 2020-11-13 | End: 2020-11-13 | Stop reason: HOSPADM

## 2020-11-13 RX ADMIN — LISINOPRIL 5 MG: 5 TABLET ORAL at 13:41

## 2020-11-13 RX ADMIN — TETROFOSMIN 11.7 MILLICURIE: 1.38 INJECTION, POWDER, LYOPHILIZED, FOR SOLUTION INTRAVENOUS at 09:00

## 2020-11-13 RX ADMIN — REGADENOSON 0.4 MG: 0.08 INJECTION, SOLUTION INTRAVENOUS at 10:53

## 2020-11-13 RX ADMIN — DILTIAZEM HYDROCHLORIDE 120 MG: 120 CAPSULE, COATED, EXTENDED RELEASE ORAL at 13:41

## 2020-11-13 RX ADMIN — Medication 10 ML: at 10:53

## 2020-11-13 RX ADMIN — TETROFOSMIN 31 MILLICURIE: 1.38 INJECTION, POWDER, LYOPHILIZED, FOR SOLUTION INTRAVENOUS at 10:53

## 2020-11-13 RX ADMIN — MAGNESIUM SULFATE HEPTAHYDRATE 4 G: 40 INJECTION, SOLUTION INTRAVENOUS at 13:42

## 2020-11-13 RX ADMIN — POTASSIUM CHLORIDE 10 MEQ: 10 INJECTION, SOLUTION INTRAVENOUS at 18:30

## 2020-11-13 RX ADMIN — PANTOPRAZOLE SODIUM 40 MG: 40 TABLET, DELAYED RELEASE ORAL at 05:38

## 2020-11-13 RX ADMIN — FUROSEMIDE 20 MG: 20 TABLET ORAL at 13:41

## 2020-11-13 RX ADMIN — POTASSIUM CHLORIDE 10 MEQ: 10 INJECTION, SOLUTION INTRAVENOUS at 15:01

## 2020-11-13 RX ADMIN — POTASSIUM CHLORIDE 10 MEQ: 10 INJECTION, SOLUTION INTRAVENOUS at 16:39

## 2020-11-13 RX ADMIN — ASPIRIN 81 MG: 81 TABLET, CHEWABLE ORAL at 13:41

## 2020-11-13 RX ADMIN — POTASSIUM CHLORIDE 10 MEQ: 10 INJECTION, SOLUTION INTRAVENOUS at 13:42

## 2020-11-13 RX ADMIN — TICAGRELOR 90 MG: 90 TABLET ORAL at 13:41

## 2020-11-13 RX ADMIN — INSULIN LISPRO 1 UNITS: 100 INJECTION, SOLUTION INTRAVENOUS; SUBCUTANEOUS at 16:42

## 2020-11-13 RX ADMIN — ENOXAPARIN SODIUM 40 MG: 40 INJECTION SUBCUTANEOUS at 16:40

## 2020-11-13 RX ADMIN — INSULIN LISPRO 1 UNITS: 100 INJECTION, SOLUTION INTRAVENOUS; SUBCUTANEOUS at 13:51

## 2020-11-13 RX ADMIN — Medication 10 ML: at 09:14

## 2020-11-13 ASSESSMENT — PAIN DESCRIPTION - PROGRESSION
CLINICAL_PROGRESSION: NOT CHANGED

## 2020-11-13 NOTE — H&P
Not on file   Tobacco Use    Smoking status: Former Smoker     Packs/day: 1.00     Years: 37.00     Pack years: 37.00     Types: Cigarettes     Last attempt to quit: 2020     Years since quittin.1    Smokeless tobacco: Never Used   Substance and Sexual Activity    Alcohol use: Not Currently    Drug use: Never    Sexual activity: Yes     Partners: Male   Lifestyle    Physical activity     Days per week: Not on file     Minutes per session: Not on file    Stress: Not on file   Relationships    Social connections     Talks on phone: Not on file     Gets together: Not on file     Attends Yazdanism service: Not on file     Active member of club or organization: Not on file     Attends meetings of clubs or organizations: Not on file     Relationship status: Not on file    Intimate partner violence     Fear of current or ex partner: Not on file     Emotionally abused: Not on file     Physically abused: Not on file     Forced sexual activity: Not on file   Other Topics Concern    Not on file   Social History Narrative    Not on file       Family History:   History reviewed. No pertinent family history. Medications Prior to Admission:    Prior to Admission medications    Medication Sig Start Date End Date Taking?  Authorizing Provider   metFORMIN (GLUCOPHAGE) 500 MG tablet Take 1,000 mg by mouth 2 times daily (with meals)   Yes Historical Provider, MD   empagliflozin (JARDIANCE) 10 MG tablet Take 10 mg by mouth daily   Yes Historical Provider, MD   doxycycline hyclate (VIBRAMYCIN) 100 MG capsule Take 100 mg by mouth 2 times daily 20 Yes Historical Provider, MD   mupirocin (BACTROBAN) 2 % ointment Apply topically 2 times daily Apply topically twice daily inside of nose   Yes Historical Provider, MD   mometasone (ELOCON) 0.1 % cream Apply topically daily Apply topically daily inside of both ears   Yes Historical Provider, MD   tiotropium (SPIRIVA RESPIMAT) 2.5 MCG/ACT AERS inhaler Inhale 2 puffs into the lungs daily 10/2/20  Yes Zahra Melo DO   budesonide-formoterol [facility]) 160-4.5 MCG/ACT AERO Inhale 2 puffs into the lungs 2 times daily 10/2/20  Yes Yocasta Gutierrez MD   furosemide (LASIX) 20 MG tablet Take 20 mg by mouth daily   Yes Historical Provider, MD   potassium chloride (KLOR-CON M) 10 MEQ extended release tablet Take 10 mEq by mouth daily   Yes Historical Provider, MD   lisinopril (PRINIVIL;ZESTRIL) 5 MG tablet Take 5 mg by mouth daily   Yes Historical Provider, MD   dilTIAZem (CARDIZEM CD) 120 MG extended release capsule Take 1 capsule by mouth 2 times daily 8/31/20  Yes Maame Fox MD   aspirin 81 MG EC tablet Take 1 tablet by mouth daily 9/1/20  Yes Maame Fox MD   ticagrelor (BRILINTA) 90 MG TABS tablet Take 1 tablet by mouth 2 times daily 8/31/20  Yes Maame Fox MD   omeprazole (PRILOSEC) 20 MG delayed release capsule Take 1 capsule by mouth every morning (before breakfast) 8/4/20  Yes Yocasta Gutierrez MD   albuterol sulfate HFA (VENTOLIN HFA) 108 (90 Base) MCG/ACT inhaler Inhale 2 puffs into the lungs 4 times daily as needed for Wheezing  Patient taking differently: Inhale 1 puff into the lungs 4 times daily as needed for Wheezing  7/27/20  Yes Hayward Prader, MD   nitroGLYCERIN (NITROSTAT) 0.4 MG SL tablet up to max of 3 total doses.  If no relief after 1 dose, call 911. 7/23/20  Yes Mark Jacobson MD   atorvastatin (LIPITOR) 80 MG tablet Take 1 tablet by mouth daily  Patient taking differently: Take 10 mg by mouth daily  2/27/19  Yes Hayward Prader, MD   ipratropium-albuterol (DUONEB) 0.5-2.5 (3) MG/3ML SOLN nebulizer solution Inhale 3 mLs into the lungs 3 times daily as needed for Shortness of Breath 10/2/20 11/1/20  Zahra Melo DO   nicotine (NICODERM CQ) 21 MG/24HR Place 1 patch onto the skin daily  Patient not taking: Reported on 10/3/2020 9/16/20 10/28/20  Yocasta Gutierrez MD       Allergies:  Shellfish-derived products    REVIEW OF SYSTEMS:  All systems reviewed and Hematocrit 38.3 37.0 - 47.0 %    MCV 91.9 82.0 - 100.0 fL    MCH 31.3 27.0 - 31.3 pg    MCHC 34.0 33.0 - 37.0 %    RDW 13.8 11.5 - 14.5 %    Platelets 531 334 - 932 K/uL    Neutrophils % 66.8 %    Lymphocytes % 23.8 %    Monocytes % 4.8 %    Eosinophils % 3.7 %    Basophils % 0.9 %    Neutrophils Absolute 5.6 1.4 - 6.5 K/uL    Lymphocytes Absolute 2.0 1.0 - 4.8 K/uL    Monocytes Absolute 0.4 0.2 - 0.8 K/uL    Eosinophils Absolute 0.3 0.0 - 0.7 K/uL    Basophils Absolute 0.1 0.0 - 0.2 K/uL   Troponin    Collection Time: 11/12/20 10:45 AM   Result Value Ref Range    Troponin <0.010 0.000 - 0.010 ng/mL   D-Dimer, Quantitative    Collection Time: 11/12/20 10:45 AM   Result Value Ref Range    D-Dimer, Quant 0.28 0.00 - 0.50 mg/L FEU   POCT Glucose    Collection Time: 11/12/20  3:57 PM   Result Value Ref Range    POC Glucose 92 60 - 115 mg/dl    Performed on ACCU-CHEK    Troponin    Collection Time: 11/12/20  4:12 PM   Result Value Ref Range    Troponin <0.010 0.000 - 0.010 ng/mL   Troponin    Collection Time: 11/12/20  7:02 PM   Result Value Ref Range    Troponin <0.010 0.000 - 0.010 ng/mL   POCT Glucose    Collection Time: 11/12/20  8:23 PM   Result Value Ref Range    POC Glucose 113 60 - 115 mg/dl    Performed on ACCU-CHEK    Sedimentation Rate    Collection Time: 11/13/20  5:29 AM   Result Value Ref Range    Sed Rate 39 (H) 0 - 30 mm   CBC    Collection Time: 11/13/20  5:30 AM   Result Value Ref Range    WBC 6.5 4.8 - 10.8 K/uL    RBC 3.91 (L) 4.20 - 5.40 M/uL    Hemoglobin 12.3 12.0 - 16.0 g/dL    Hematocrit 36.0 (L) 37.0 - 47.0 %    MCV 92.3 82.0 - 100.0 fL    MCH 31.5 (H) 27.0 - 31.3 pg    MCHC 34.1 33.0 - 37.0 %    RDW 13.7 11.5 - 14.5 %    Platelets 008 910 - 039 K/uL   Comprehensive Metabolic Panel    Collection Time: 11/13/20  5:30 AM   Result Value Ref Range    Sodium 138 135 - 144 mEq/L    Potassium 3.0 (LL) 3.4 - 4.9 mEq/L    Chloride 102 95 - 107 mEq/L    CO2 25 20 - 31 mEq/L    Anion Gap 11 9 - 15 mEq/L Glucose 118 (H) 70 - 99 mg/dL    BUN 9 6 - 20 mg/dL    CREATININE 0.61 0.50 - 0.90 mg/dL    GFR Non-African American >60.0 >60    GFR  >60.0 >60    Calcium 8.3 (L) 8.5 - 9.9 mg/dL    Total Protein 6.5 6.3 - 8.0 g/dL    Alb 3.7 3.5 - 4.6 g/dL    Total Bilirubin 0.5 0.2 - 0.7 mg/dL    Alkaline Phosphatase 79 40 - 130 U/L    ALT 32 0 - 33 U/L    AST 19 0 - 35 U/L    Globulin 2.8 2.3 - 3.5 g/dL   Magnesium    Collection Time: 11/13/20  5:30 AM   Result Value Ref Range    Magnesium 1.2 (L) 1.7 - 2.4 mg/dL   Troponin    Collection Time: 11/13/20  5:30 AM   Result Value Ref Range    Troponin <0.010 0.000 - 0.010 ng/mL   High sensitivity CRP    Collection Time: 11/13/20  5:30 AM   Result Value Ref Range    CRP High Sensitivity 10.2 (H) 0.0 - 5.0 mg/L   EKG 12 lead    Collection Time: 11/13/20  5:33 AM   Result Value Ref Range    Ventricular Rate 79 BPM    Atrial Rate 79 BPM    P-R Interval 214 ms    QRS Duration 76 ms    Q-T Interval 384 ms    QTc Calculation (Bazett) 440 ms    P Axis 49 degrees    R Axis 17 degrees    T Axis 62 degrees   POCT Glucose    Collection Time: 11/13/20  6:35 AM   Result Value Ref Range    POC Glucose 128 (H) 60 - 115 mg/dl    Performed on ACCU-CHEK            ASSESSMENT AND PLAN:  1) chest pain-history of CAD status post recent PCI       We will observe on telemetry floor, consult cardiology, trend troponin, EKG and troponin so far not showing acute coronary syndrome picture. However, given her high risk will work her up for ACS    2) diabetes       We will use sliding scale insulin, monitor, hypoglycemia protocol.   Hold Metformin    3) HTN/HLD/KEITH      Resume outpatient management**    DVT prophylaxis          DISCHARGE PLANNING  Observation    Code status: Full Code    Electronically signed by Henrique Sanford DO on 11/12/20 at 3:32 PM EST

## 2020-11-13 NOTE — PROGRESS NOTES
Reviewed history, allergies, and medications. Consent confirmed. Lexiscan exam explained. Placed patient on monitor. @ [id number]  Nuclear Medicine tech here to inject [employer]. SOB noted during recovery phase. Patient c/o neck pain, arm numbness, and chest pressure right after 1 pvc we noted. Denied chest pain. Patient off monitor and instructed to eat, will have last part of exam in 1 hour.

## 2020-11-13 NOTE — PROCEDURES
[address]                      [address], [address] [facility]                              CARDIAC STRESS TEST    PATIENT NAME: Patito Bradford                    :        1970  MED REC NO:   [id number]                            ROOM:       L604  ACCOUNT NO:   [de-identified]                           ADMIT DATE: 2020  PROVIDER:     Amada Paniagua MD    CARDIOVASCULAR DIAGNOSTIC DEPARTMENT    DATE OF STUDY:  2020    LEXISCAN MYOCARDIAL PERFUSION STRESS TEST    INDICATIONS:  Chest pain and coronary artery disease. TECHNIQUE RESULTS:  Standard Lexiscan Myoview cardiac perfusion stress  testing protocol was followed. Rest and stress tomographic images were  obtained. Left ventricular ejection fraction and gated wall motion  acquired. RESULTS:  Resting heart rate and blood pressure were 73 beats per minute  and 121/68 respectively. Resting electrocardiogram revealed sinus  rhythm, poor R-wave anterior progression. The patient had an adequate  vasodilatory response to Daryle Block. There were no significant ischemic  EKG changes or dysrhythmias. There was normal recovery phase. Rest and stress tomographic images were reviewed and revealed normal  perfusion. There was no evidence of myocardial ischemia, myocardial  infarction, or left ventricular dilatation with stress. Overall, left  ventricular systolic function appeared to be normal without obvious  focal wall motion abnormalities. Left ventricular ejection fraction was  72%. Image quality was good. IMPRESSION:  1. Negative Lexiscan Myoview cardiac perfusion stress test with  significant coronary artery disease. 2.  No evidence of myocardial ischemia or myocardial infarction by  perfusion imaging. 3.  Normal left ventricular systolic function, left ventricular ejection  fraction 72%. COMMENTS:  Clinical correlation is advised.         Jennyfer Scott MD    D: 2020 #16:00:38       T: 11/13/2020 17:35:21     [email or web address]  Job#: [id number]     Doc#: [id number]    CC:

## 2020-11-13 NOTE — CARE COORDINATION
[facility] [place] Case Management Initial Discharge Assessment    Met with Patient to discuss discharge plan. READMIT RISK   24%    PCP: Gretchen Deng MD                                Date of Last Visit: 11/2    PT DOES NOT SEE [facility] ANYMORE    Discharge Planning    Living Arrangements: independently at home    Who do you live with? 15 YR OLD SON AND BF STAYS WITH HER    Who helps you with your care:  self    If lives at home:     Do you have any barriers navigating in your home? no    Patient can perform ADL? Yes    Current Services (outpatient and in home) :  Cardiac Rehab ([facility])    Dialysis: No    Is transportation available to get to your appointments? Yes    DME Equipment:  no    Respiratory equipment: None    Respiratory provider:  no     Pharmacy:  yes - [address] [place], [place] [address] Med[facility]?  No      Patient agreeable to [place] [id number]? Declined    Patient agreeable to SNF/Rehab? Declined    Other discharge needs identified? Cardiac Rehab-CONTINUE    [place] of choice list provided with basic dialogue that supports the patient's individualized plan of care/goals and shares the quality data associated with the providers. Yes    Does Patient Have a High-Risk for Readmission Diagnosis (CHF, PN, MI, COPD)? No      The plan for Transition of Care is related to the following treatment goals: CHEST PAIN FREE    Initial Discharge Plan? (Note: please see concurrent daily documentation for any updates after initial note). MET WITH PATIENT, FROM HOME WITH15 YR OLD SON AND HER BF STAYS. PT IS INDEPENDENT AND GOES TO CARDIAC REHAB. PT DENIES ANY ADDITIONAL NEEDS.      The Patient and/or patient representative: PT was provided with choice of any post-acute providers for care and equipment and agrees with discharge plan  Yes    Electronically signed by Nahid Ventura RN on 11/13/2020 at 8:42 AM

## 2020-11-13 NOTE — DISCHARGE INSTR - DIET

## 2020-11-13 NOTE — DISCHARGE SUMMARY
murmurs, rubs or gallops. Abdomen: Soft, non-tender, non-distended with normal bowel sounds. Musculoskeletal: No clubbing, cyanosis or edema bilaterally. Full range of motion without deformity. Skin: Skin color, texture, turgor normal.  No rashes or lesions. Neuro: Non Focal. Symetrical motor and tone. Nl Comprehension, Alert,awake and oriented. NL CN. Symetrical tone and reflexes. Psychiatric: Alert and oriented, thought content appropriate, normal insight  Capillary Refill: Brisk,< 3 seconds   Peripheral Pulses: +2 palpable, equal bilaterally     Patient was seen by the following consultants while admitted to [facility]:   Consults:  [address]    Significant Diagnostic Studies:    Refer to chart     Please refer to chart if no studies are shown here    Xr Chest Portable    Result Date: 11/12/2020  XR CHEST PORTABLE Clinical History:   cp    Chest Pain . Comparison:  9/29/2020  RESULT: Patchy bibasilar opacities, unchanged from prior. No new consolidation. No pleural effusion. No pneumothorax. Normal pulmonary vascular pattern. Stable cardiomediastinal silhouette. No acute osseous findings. No significant interval change from prior. Nonspecific patchy bibasilar opacities.       Discharge Medications:       Sharlette Foster   Home Medication Instructions THP:[id number]    Printed on:11/13/20 0820   Medication Information                      albuterol sulfate HFA (VENTOLIN HFA) 108 (90 Base) MCG/ACT inhaler  Inhale 2 puffs into the lungs 4 times daily as needed for Wheezing             aspirin 81 MG EC tablet  Take 1 tablet by mouth daily             atorvastatin (LIPITOR) 80 MG tablet  Take 1 tablet by mouth daily             budesonide-formoterol (SYMBICORT) 160-4.5 MCG/ACT AERO  Inhale 2 puffs into the lungs 2 times daily             dilTIAZem (CARDIZEM CD) 120 MG extended release capsule  Take 1 capsule by mouth 2 times daily Roger  11/13/2020, 8:20 AM  ----------------------------------------------------------------------------------------------------------------------    Esa Alonso

## 2020-11-13 NOTE — FLOWSHEET NOTE
2015-HS assessment completed. Vitals WNL. Pt reports 6 of 10 CP, describes it as pressure. Unchanged from admission. Midsternal to stomach. Reports some reflux too. EKG completed. NSR- no changes noted. Team to continue to monitor closely.  .Electronically signed by Ravindra Mosquera RN on 11/12/2020 at 8:27 PM

## 2020-11-13 NOTE — PROGRESS NOTES
[facility] Progress Note        Date:     2020    Patient:    Sang Weber    :    1970  CSN:    [id number]    Rounding Cardiologist: Mattie Severe, MD     Primary Cardiologist: Mattie Severe, MD [id number]  [employer]      Requesting Physician:  Michelle Arambula DO      Reason for Initial Consult:  CP / SOB      Subjective:    Feels better. CP and SOB resolved. Lexiscan myoview stress negative. 14 system General and Cardiac ROS otherwise negative or unchanged. Assessment:    1. Shortness of breath  2. Chest pain symptoms with exertion  3. Palpitations. 4. Edema, bipedal  5. COPD  6. Negative troponins to date. 7. Negative Myoview Stress 2020  8. CAD, post RCA ERMIAS 2020, Known 50% LCX medically treated to date. 9. Preserved left ventricular function, LVEF 60%  10. Negative ECHO,    11. Hypertension  12. Hyperlipidemia  13. Diabetes  14. Nocturnal snoring / insomnia, probable sleep apnea  15. Obesity  16. Ongoing tobacco abuse    Plan:    1. OK to DC home from CV point once ok with others. 2. Continue current cardiac medications. 3. Follow up with [address] as noted. 4. Cardiology sign off.  5. See Orders        HISTORY OF PRESENT ILLNESS:      Sang Weber is a pleasant 44 y.o. female who has the above-noted history including recent right coronary stent angioplasty 2020 by Dr. Shashank Estevez. She has preserved left ventricular function, hypertension, hyperlipidemia, diabetes and COPD. She has probable sleep apnea. She presented to the emergency room with 1 day history of worsening shortness of breath. Shortness of breath is with exertion. Her symptoms progressed and had troubles sleeping and therefore came to the emergency room. She is had no fever or chills. She said no productive cough or sputum production. She noted some mild chest discomfort symptoms radiating to her back. She has had no exertional chest pain symptoms.   She has had some (SPIRIVA RESPIMAT) 2.5 MCG/ACT AERS inhaler Inhale 2 puffs into the lungs daily 10/2/20  Yes VENTURA [facility] DO   budesonide-formoterol (SYMBICORT) 160-4.5 MCG/ACT AERO Inhale 2 puffs into the lungs 2 times daily 10/2/20  Yes Paradise Narayanan MD   furosemide (LASIX) 20 MG tablet Take 20 mg by mouth daily   Yes Historical Provider, MD   potassium chloride (KLOR-CON M) 10 MEQ extended release tablet Take 10 mEq by mouth daily   Yes Historical Provider, MD   lisinopril (PRINIVIL;ZESTRIL) 5 MG tablet Take 5 mg by mouth daily   Yes Historical Provider, MD   dilTIAZem (CARDIZEM CD) 120 MG extended release capsule Take 1 capsule by mouth 2 times daily 8/31/20  Yes Shanice Arambula MD   aspirin 81 MG EC tablet Take 1 tablet by mouth daily 9/1/20  Yes Shanice Arambula MD   ticagrelor (BRILINTA) 90 MG TABS tablet Take 1 tablet by mouth 2 times daily 8/31/20  Yes Shanice Arambula MD   omeprazole (PRILOSEC) 20 MG delayed release capsule Take 1 capsule by mouth every morning (before breakfast) 8/4/20  Yes Paradise Narayanan MD   albuterol sulfate HFA (VENTOLIN HFA) 108 (90 Base) MCG/ACT inhaler Inhale 2 puffs into the lungs 4 times daily as needed for Wheezing  Patient taking differently: Inhale 1 puff into the lungs 4 times daily as needed for Wheezing  7/27/20  Yes Kasey Davis MD   nitroGLYCERIN (NITROSTAT) 0.4 MG SL tablet up to max of 3 total doses.  If no relief after 1 dose, call 911. 7/23/20  Yes Champ Cleaning MD   atorvastatin (LIPITOR) 80 MG tablet Take 1 tablet by mouth daily  Patient taking differently: Take 10 mg by mouth daily  2/27/19  Yes Kasey Davis MD   ipratropium-albuterol (DUONEB) 0.5-2.5 (3) MG/3ML SOLN nebulizer solution Inhale 3 mLs into the lungs 3 times daily as needed for Shortness of Breath 10/2/20 11/1/20  [facility] DO   nicotine (NICODERM CQ) 21 MG/24HR Place 1 patch onto the skin daily  Patient not taking: Reported on 10/3/2020 9/16/20 10/28/20  Paradise Narayanan MD       Scheduled Meds:   magnesium sulfate  4 g Intravenous Once    sodium chloride flush  10 mL Intravenous 2 times per day    atorvastatin  40 mg Oral Nightly    aspirin  81 mg Oral Daily    enoxaparin  40 mg Subcutaneous Daily    insulin lispro  0-6 Units Subcutaneous TID WC    insulin lispro  0-3 Units Subcutaneous Nightly    budesonide-formoterol  2 puff Inhalation BID    dilTIAZem  120 mg Oral BID    empagliflozin  10 mg Oral Daily    furosemide  20 mg Oral Daily    lisinopril  5 mg Oral Daily    ticagrelor  90 mg Oral BID    tiotropium  2 puff Inhalation Daily    pantoprazole  40 mg Oral QAM AC     Continuous Infusions:   sodium chloride      dextrose       PRN Meds:sodium chloride flush, nitroGLYCERIN, sodium chloride flush, acetaminophen **OR** acetaminophen, polyethylene glycol, promethazine **OR** ondansetron, potassium chloride **OR** potassium alternative oral replacement **OR** potassium chloride, magnesium sulfate, glucose, dextrose, glucagon (rDNA), dextrose, albuterol sulfate HFA    Allergies   Allergen Reactions    Shellfish-Derived Products Anaphylaxis       Social History     Socioeconomic History    Marital status: Single     Spouse name: Not on file    Number of children: Not on file    Years of education: Not on file    Highest education level: Not on file   Occupational History    Not on file   Social Needs    Financial resource strain: Not on file    Food insecurity     Worry: Not on file     Inability: Not on file   [employer] needs     Medical: Not on file     Non-medical: Not on file   Tobacco Use    Smoking status: Former Smoker     Packs/day: 1.00     Years: 37.00     Pack years: 37.00     Types: Cigarettes     Last attempt to quit: 2020     Years since quittin.1    Smokeless tobacco: Never Used   Substance and Sexual Activity    Alcohol use: Not Currently    Drug use: Never    Sexual activity: Yes     Partners: Male   Lifestyle    Physical activity     Days per week: Not on Result Value Ref Range    POC Glucose 92 60 - 115 mg/dl    Performed on ACCU-CHEK    Troponin    Collection Time: 11/12/20  4:12 PM   Result Value Ref Range    Troponin <0.010 0.000 - 0.010 ng/mL   Troponin    Collection Time: 11/12/20  7:02 PM   Result Value Ref Range    Troponin <0.010 0.000 - 0.010 ng/mL   POCT Glucose    Collection Time: 11/12/20  8:23 PM   Result Value Ref Range    POC Glucose 113 60 - 115 mg/dl    Performed on ACCU-CHEK    Sedimentation Rate    Collection Time: 11/13/20  5:29 AM   Result Value Ref Range    Sed Rate 39 (H) 0 - 30 mm   CBC    Collection Time: 11/13/20  5:30 AM   Result Value Ref Range    WBC 6.5 4.8 - 10.8 K/uL    RBC 3.91 (L) 4.20 - 5.40 M/uL    Hemoglobin 12.3 12.0 - 16.0 g/dL    Hematocrit 36.0 (L) 37.0 - 47.0 %    MCV 92.3 82.0 - 100.0 fL    MCH 31.5 (H) 27.0 - 31.3 pg    MCHC 34.1 33.0 - 37.0 %    RDW 13.7 11.5 - 14.5 %    Platelets 871 210 - 181 K/uL   Comprehensive Metabolic Panel    Collection Time: 11/13/20  5:30 AM   Result Value Ref Range    Sodium 138 135 - 144 mEq/L    Potassium 3.0 (LL) 3.4 - 4.9 mEq/L    Chloride 102 95 - 107 mEq/L    CO2 25 20 - 31 mEq/L    Anion Gap 11 9 - 15 mEq/L    Glucose 118 (H) 70 - 99 mg/dL    BUN 9 6 - 20 mg/dL    CREATININE 0.61 0.50 - 0.90 mg/dL    GFR Non-African American >60.0 >60    GFR  >60.0 >60    Calcium 8.3 (L) 8.5 - 9.9 mg/dL    Total Protein 6.5 6.3 - 8.0 g/dL    Alb 3.7 3.5 - 4.6 g/dL    Total Bilirubin 0.5 0.2 - 0.7 mg/dL    Alkaline Phosphatase 79 40 - 130 U/L    ALT 32 0 - 33 U/L    AST 19 0 - 35 U/L    Globulin 2.8 2.3 - 3.5 g/dL   Magnesium    Collection Time: 11/13/20  5:30 AM   Result Value Ref Range    Magnesium 1.2 (L) 1.7 - 2.4 mg/dL   Troponin    Collection Time: 11/13/20  5:30 AM   Result Value Ref Range    Troponin <0.010 0.000 - 0.010 ng/mL   High sensitivity CRP    Collection Time: 11/13/20  5:30 AM   Result Value Ref Range    CRP High Sensitivity 10.2 (H) 0.0 - 5.0 mg/L   EKG 12 lead

## 2020-11-14 NOTE — FLOWSHEET NOTE
1835 pt for discharge. Pt complaints of pain at iv site with last K supplement. Iv stopped per pt request. States the pain made her feel dizzy. Vitals checked.  SR 76. bp 114/60

## 2020-11-16 ENCOUNTER — HOSPITAL ENCOUNTER (OUTPATIENT)
Dept: CARDIAC REHAB | Age: 50
Setting detail: THERAPIES SERIES
Discharge: HOME | End: 2020-11-16
Payer: COMMERCIAL

## 2020-11-16 PROCEDURE — G0422 INTENS CARDIAC REHAB W/EXERC: HCPCS

## 2020-11-16 PROCEDURE — G0423 INTENS CARDIAC REHAB NO EXER: HCPCS

## 2020-11-16 NOTE — CONSULTS
Video Education Report - ICR/CR    Name:  Sesar Matthew     Date:  11/16/2020  MRN: [id number]     Session #:  4  Session Length: 45 min    Recommended Videos        []01 [employer] - Program Overview   34:22    []02 Overview of [employer] Eating Plan   34:10    []03 Becoming a Bevely Bonier   33:08     []04 Diseases of Our Time - Part 1   34:22    []05 Calorie Density     33:39   []06 Label Reading - Part 1    32:15   []07 Move it      32.54   []08 Healthy Minds, Bodies, Hearts   32:14   []09 Dining Out - Part 1    32:28   []10 Heart Disease Risk Reduction   13:98   []54 Metabolic Syndrome and Belly Fat  31:52   []12 Facts on Fat     35:29   [x]13 Diseases of Our Time - Part 2   33:07   []14 Biology of Weight Control   32:36   []15 Biomechanical Limitations   35:20   []16 Nutrition Action Plan    34:23    Additional Videos         []17 Hypertension & Heart Disease   32:39        []18 Cooking Breakfasts and Snacks  32:00   []19 Planning Your Eating Strategy   33:30   []20 Label Reading - Part 2    32:36  []21 Cooking Soups and Desserts   31:41  []22 How Our Thoughts Can Heal Our Hearts 33:05  []23 Targeting Your Nutrition Priorities  33:58  []24 Healthy Salads & Dressings   35:32  []25 Dining Out - Part 2    32:35  []26 Cooking Dinner and Sides   35:06  []27 Sleep Disorders     33:14  []28 Menu Workshop     32:06  []29 Decoding Lab Results    32:42     []30 Vitamins and Minerals    32:54  []31 Exercise Action Plan    32:26  []32 Body Composition    34:03  []33 Improving Performance    32:13  []34 Fueling a Healthy Body    31:32  []35 Introduction to Yoga    33:47  []36 Aging-Enhancing the Quality of Your Life 33:22  []37 Smoking Cessation    36:19    Comments:  Video completed.

## 2020-11-18 ENCOUNTER — HOSPITAL ENCOUNTER (OUTPATIENT)
Dept: CARDIAC REHAB | Age: 50
Setting detail: THERAPIES SERIES
Discharge: HOME | End: 2020-11-18
Payer: COMMERCIAL

## 2020-11-18 PROCEDURE — G0422 INTENS CARDIAC REHAB W/EXERC: HCPCS

## 2020-11-18 PROCEDURE — G0423 INTENS CARDIAC REHAB NO EXER: HCPCS

## 2020-11-18 NOTE — PROGRESS NOTES
Viet HERNANDEZ.:  1970    Acct Number: [de-identified]   MRN:  [id number]                         [facility] NUTRITION WORKSHOP             Date: 2020        Session # 4    Todays class covered:    ()  Fueling a Healthy Body  (X)  Label Reading  ()  Menu Selection  ()  Mindful Eating  ()  Targeting Nutrition Priorities    Readiness to change:    ( ) Pre-contemplative   ( ) Contemplative - ambivalent about change    ( ) Action - ready to set action plan and implement   (X) Maintenance - has made change and is trying, and or practicing different alternative behaviors     Notes:      Rosario Huerta was in the Workshop with the Dietitian for 60 minutes. The content was presented via Powerpoint, lecture, and patient participation based format. Motivational interviewing was utilized when needed, to promote change. Patient voiced understanding.     Electronically signed by Soha Hathaway RD, JHON on 2020 at 3:20 PM

## 2020-11-20 ENCOUNTER — HOSPITAL ENCOUNTER (OUTPATIENT)
Dept: CARDIAC REHAB | Age: 50
Setting detail: THERAPIES SERIES
Discharge: HOME | End: 2020-11-20
Payer: COMMERCIAL

## 2020-11-20 PROCEDURE — G0423 INTENS CARDIAC REHAB NO EXER: HCPCS

## 2020-11-20 PROCEDURE — G0422 INTENS CARDIAC REHAB W/EXERC: HCPCS

## 2020-11-20 NOTE — PROGRESS NOTES
Louis Alvarez YOB: 1970  Acct Number: [de-identified]  MRN:  [id number]                [facility] COOKING SCHOOL WORKSHOP             Date: 2020      Session # 11  Todays class covered      () Adding Flavor     () Fast Breakfasts     () Salads and Dressings     () Soups and Sauces     () Simple Sides     () Appetizers and Snacks     () Delicious Desserts     () Plant Based Proteins     () Fast Evening Meals     () Weekend Breakfasts     (X) Efficiency Cooking     () [facility]     Patients were shown how to choose, prep, and cook; substitutions and other options were given. Samples were offered. Recipes were given and questions answered. The patient above was in the [employer] for 45 minutes.       Electronically signed by Alexandria Temple RD, JHON on 2020 at 12:50 PM

## 2020-11-23 ENCOUNTER — HOSPITAL ENCOUNTER (OUTPATIENT)
Dept: CARDIAC REHAB | Age: 50
Setting detail: THERAPIES SERIES
Discharge: HOME | End: 2020-11-23
Payer: COMMERCIAL

## 2020-11-23 PROCEDURE — G0423 INTENS CARDIAC REHAB NO EXER: HCPCS

## 2020-11-23 PROCEDURE — G0422 INTENS CARDIAC REHAB W/EXERC: HCPCS

## 2020-11-25 ENCOUNTER — HOSPITAL ENCOUNTER (OUTPATIENT)
Dept: CARDIAC REHAB | Age: 50
Setting detail: THERAPIES SERIES
Discharge: HOME | End: 2020-11-25
Payer: COMMERCIAL

## 2020-11-25 PROCEDURE — G0423 INTENS CARDIAC REHAB NO EXER: HCPCS

## 2020-11-25 PROCEDURE — G0422 INTENS CARDIAC REHAB W/EXERC: HCPCS

## 2020-11-25 NOTE — CONSULTS
Video Education Report - ICR/CR    Name:  Adam Oconnor     Date:  11/25/2020  MRN: [id number]     Session #:    Session Length: 45 min    Recommended Videos        []01 [employer] - Program Overview   34:22    []02 Overview of P[employer]in Eating Plan   34:10    []03 Becoming a Rheba Broody   33:08     []04 Diseases of Our Time - Part 1   34:22    []05 Calorie Density     33:39   []06 Label Reading - Part 1    32:15   []07 Move it      32.54   []08 Healthy Minds, Bodies, Hearts   32:14   []09 Dining Out - Part 1    32:28   []10 Heart Disease Risk Reduction   56:35   []12 Metabolic Syndrome and Belly Fat  31:52   []12 Facts on Fat     35:29   []13 Diseases of Our Time - Part 2   33:07   []14 Biology of Weight Control   32:36   []15 Biomechanical Limitations   35:20   []16 Nutrition Action Plan    34:23    Additional Videos         []17 Hypertension & Heart Disease   32:39        []18 Cooking Breakfasts and Snacks  32:00   []19 Planning Your Eating Strategy   33:30   []20 Label Reading - Part 2    32:36  []21 Cooking Soups and Desserts   31:41  []22 How Our Thoughts Can Heal Our Hearts 33:05  []23 Targeting Your Nutrition Priorities  33:58  []24 Healthy Salads & Dressings   35:32  []25 Dining Out - Part 2    32:35  []26 Cooking Dinner and Sides   35:06  []27 Sleep Disorders     33:14  []28 Menu Workshop     32:06  []29 Decoding Lab Results    32:42     []30 Vitamins and Minerals    32:54  []31 Exercise Action Plan    32:26  []32 Body Composition    34:03  []33 Improving Performance    32:13  []34 Fueling a Healthy Body    31:32  []35 Introduction to Yoga    33:47  []36 Aging-Enhancing the Quality of Your Life 33:22  []37 Smoking Cessation    36:19    Comments:  Video completed.

## 2020-11-30 ENCOUNTER — APPOINTMENT (OUTPATIENT)
Dept: CARDIAC REHAB | Age: 50
End: 2020-11-30
Payer: COMMERCIAL

## 2021-01-22 DIAGNOSIS — J44.9 ASTHMA-COPD OVERLAP SYNDROME (HCC): ICD-10-CM

## 2021-01-27 DIAGNOSIS — J44.9 ASTHMA-COPD OVERLAP SYNDROME (HCC): ICD-10-CM

## 2021-01-27 NOTE — TELEPHONE ENCOUNTER
Why was this refused? He original 3 month appointment was canceled due to you not being in office. She was R/S at that time for March. Please advise, thanks.

## 2021-02-08 ENCOUNTER — HOSPITAL ENCOUNTER (OUTPATIENT)
Dept: CARDIAC REHAB | Age: 51
Setting detail: THERAPIES SERIES
Discharge: HOME | End: 2021-02-08
Payer: COMMERCIAL

## 2021-02-08 PROCEDURE — G0422 INTENS CARDIAC REHAB W/EXERC: HCPCS

## 2021-02-08 PROCEDURE — G0423 INTENS CARDIAC REHAB NO EXER: HCPCS

## 2021-02-08 NOTE — CONSULTS
Video Education Report - ICR/CR    Name:  Gamal Jorge     Date:  2/8/2021  MRN: [id number]     Session #:  7  Session Length: 50 min    Recommended Videos        []01 [employer] - Program Overview   34:22    [x]02 Overview of Prit[employer]in Eating Plan   34:10    []03 Becoming a Mark Alvarado   33:08     []04 Diseases of Our Time - Part 1   34:22    []05 Calorie Density     33:39   []06 Label Reading - Part 1    32:15   []07 Move it      32.54   []08 Healthy Minds, Bodies, Hearts   32:14   []09 Dining Out - Part 1    32:28   []10 Heart Disease Risk Reduction   13:21   []99 Metabolic Syndrome and Belly Fat  31:52   []12 Facts on Fat     35:29   []13 Diseases of Our Time - Part 2   33:07   []14 Biology of Weight Control   32:36   []15 Biomechanical Limitations   35:20   []16 Nutrition Action Plan    34:23    Additional Videos         []17 Hypertension & Heart Disease   32:39        []18 Cooking Breakfasts and Snacks  32:00   []19 Planning Your Eating Strategy   33:30   []20 Label Reading - Part 2    32:36  []21 Cooking Soups and Desserts   31:41  []22 How Our Thoughts Can Heal Our Hearts 33:05  []23 Targeting Your Nutrition Priorities  33:58  []24 Healthy Salads & Dressings   35:32  []25 Dining Out - Part 2    32:35  []26 Cooking Dinner and Sides   35:06  []27 Sleep Disorders     33:14  []28 Menu Workshop     32:06  []29 Decoding Lab Results    32:42     []30 Vitamins and Minerals    32:54  []31 Exercise Action Plan    32:26  []32 Body Composition    34:03  []33 Improving Performance    32:13  []34 Fueling a Healthy Body    31:32  []35 Introduction to Yoga    33:47  []36 Aging-Enhancing the Quality of Your Life 33:22  []37 Smoking Cessation    36:19    Comments:  Video completed.

## 2021-02-10 ENCOUNTER — APPOINTMENT (OUTPATIENT)
Dept: CARDIAC REHAB | Age: 51
End: 2021-02-10
Payer: COMMERCIAL

## 2021-02-12 ENCOUNTER — HOSPITAL ENCOUNTER (OUTPATIENT)
Dept: CARDIAC REHAB | Age: 51
Setting detail: THERAPIES SERIES
Discharge: HOME | End: 2021-02-12
Payer: COMMERCIAL

## 2021-02-12 PROCEDURE — G0423 INTENS CARDIAC REHAB NO EXER: HCPCS

## 2021-02-12 PROCEDURE — G0422 INTENS CARDIAC REHAB W/EXERC: HCPCS

## 2021-02-12 NOTE — CONSULTS
Leopoldo Galli YOB: 1970  Acct Number: [de-identified]  MRN:  [id number]                [facility] COOKING SCHOOL WORKSHOP             Date: 2021        Session # 6   Todays class covered:      (X) Adding Flavor     () Fast Breakfasts     () Salads and Dressings     () Soups and Sauces     () Simple Sides     () Appetizers and Snacks     () Delicious Desserts     () Plant Based Proteins     () Fast Evening Meals     () Weekend Breakfasts     () Efficiency Cooking     () [facility]     Patients were shown how to choose, prep, and cook; substitutions and other options were given. Samples were offered. Recipes were given and questions answered. The patient above was in the [employer] for 60 minutes.       Electronically signed by Jaden Ulloa PTA on 2021 at 11:54 AM

## 2021-02-15 ENCOUNTER — APPOINTMENT (OUTPATIENT)
Dept: CARDIAC REHAB | Age: 51
End: 2021-02-15
Payer: COMMERCIAL

## 2021-02-19 ENCOUNTER — APPOINTMENT (OUTPATIENT)
Dept: CARDIAC REHAB | Age: 51
End: 2021-02-19
Payer: COMMERCIAL

## 2021-02-22 ENCOUNTER — HOSPITAL ENCOUNTER (OUTPATIENT)
Dept: CARDIAC REHAB | Age: 51
Setting detail: THERAPIES SERIES
Discharge: HOME | End: 2021-02-22
Payer: COMMERCIAL

## 2021-02-22 PROCEDURE — G0422 INTENS CARDIAC REHAB W/EXERC: HCPCS

## 2021-02-22 PROCEDURE — G0423 INTENS CARDIAC REHAB NO EXER: HCPCS

## 2021-02-22 NOTE — PROGRESS NOTES
Video Education Report - ICR/CR    Name:  Ian Schmidt     Date:  2/22/2021  MRN: [id number]     Session #: 8  Session Length: 34:22    Recommended Videos        []01 [employer] - Program Overview   34:22    []02 Overview of P[employer]in Eating Plan   34:10    []03 Becoming a Arline Master   33:08     [x]04 Diseases of Our Time - Part 1   34:22    []05 Calorie Density     33:39   []06 Label Reading - Part 1    32:15   []07 Move it      32.54   []08 Healthy Minds, Bodies, Hearts   32:14   []09 Dining Out - Part 1    32:28   []10 Heart Disease Risk Reduction   18:56   []70 Metabolic Syndrome and Belly Fat  31:52   []12 Facts on Fat     35:29   []13 Diseases of Our Time - Part 2   33:07   []14 Biology of Weight Control   32:36   []15 Biomechanical Limitations   35:20   []16 Nutrition Action Plan    34:23    Additional Videos         []17 Hypertension & Heart Disease   32:39        []18 Cooking Breakfasts and Snacks  32:00   []19 Planning Your Eating Strategy   33:30   []20 Label Reading - Part 2    32:36  []21 Cooking Soups and Desserts   31:41  []22 How Our Thoughts Can Heal Our Hearts 33:05  []23 Targeting Your Nutrition Priorities  33:58  []24 Healthy Salads & Dressings   35:32  []25 Dining Out - Part 2    32:35  []26 Cooking Dinner and Sides   35:06  []27 Sleep Disorders     33:14  []28 Menu Workshop     32:06  []29 Decoding Lab Results    32:42     []30 Vitamins and Minerals    32:54  []31 Exercise Action Plan    32:26  []32 Body Composition    34:03  []33 Improving Performance    32:13  []34 Fueling a Healthy Body    31:32  []35 Introduction to Yoga    33:47  []36 Aging-Enhancing the Quality of Your Life 33:22  []37 Smoking Cessation    36:19    Comments:  Video completed. Patient was engaged and added to post video discussion.

## 2021-02-24 ENCOUNTER — APPOINTMENT (OUTPATIENT)
Dept: CARDIAC REHAB | Age: 51
End: 2021-02-24
Payer: COMMERCIAL

## 2021-02-25 ENCOUNTER — APPOINTMENT (OUTPATIENT)
Dept: GENERAL RADIOLOGY | Age: 51
End: 2021-02-25
Payer: COMMERCIAL

## 2021-02-25 ENCOUNTER — HOSPITAL ENCOUNTER (EMERGENCY)
Age: 51
Discharge: HOME | End: 2021-02-25
Payer: COMMERCIAL

## 2021-02-25 VITALS
TEMPERATURE: 98 F | DIASTOLIC BLOOD PRESSURE: 72 MMHG | HEIGHT: 62 IN | OXYGEN SATURATION: 99 % | HEART RATE: 88 BPM | SYSTOLIC BLOOD PRESSURE: 121 MMHG | BODY MASS INDEX: 36.8 KG/M2 | WEIGHT: 200 LBS | RESPIRATION RATE: 20 BRPM

## 2021-02-25 DIAGNOSIS — R07.9 CHEST PAIN, UNSPECIFIED TYPE: ICD-10-CM

## 2021-02-25 DIAGNOSIS — L02.91 ABSCESS: ICD-10-CM

## 2021-02-25 DIAGNOSIS — R04.0 EPISTAXIS: Primary | ICD-10-CM

## 2021-02-25 LAB
ALBUMIN SERPL-MCNC: 3.9 G/DL (ref 3.5–4.6)
ALP BLD-CCNC: 108 U/L (ref 40–130)
ALT SERPL-CCNC: 12 U/L (ref 0–33)
ANION GAP SERPL CALCULATED.3IONS-SCNC: 12 MEQ/L (ref 9–15)
APTT: 29.1 SEC (ref 24.4–36.8)
AST SERPL-CCNC: 9 U/L (ref 0–35)
BASOPHILS ABSOLUTE: 0.1 K/UL (ref 0–0.2)
BASOPHILS RELATIVE PERCENT: 0.7 %
BILIRUB SERPL-MCNC: <0.2 MG/DL (ref 0.2–0.7)
BUN BLDV-MCNC: 11 MG/DL (ref 6–20)
CALCIUM SERPL-MCNC: 9.2 MG/DL (ref 8.5–9.9)
CHLORIDE BLD-SCNC: 102 MEQ/L (ref 95–107)
CO2: 21 MEQ/L (ref 20–31)
CREAT SERPL-MCNC: 0.62 MG/DL (ref 0.5–0.9)
EKG ATRIAL RATE: 88 BPM
EKG P AXIS: 55 DEGREES
EKG P-R INTERVAL: 216 MS
EKG Q-T INTERVAL: 350 MS
EKG QRS DURATION: 68 MS
EKG QTC CALCULATION (BAZETT): 423 MS
EKG R AXIS: 22 DEGREES
EKG T AXIS: 55 DEGREES
EKG VENTRICULAR RATE: 88 BPM
EOSINOPHILS ABSOLUTE: 0.6 K/UL (ref 0–0.7)
EOSINOPHILS RELATIVE PERCENT: 8.1 %
GFR AFRICAN AMERICAN: >60
GFR NON-AFRICAN AMERICAN: >60
GLOBULIN: 3.2 G/DL (ref 2.3–3.5)
GLUCOSE BLD-MCNC: 116 MG/DL (ref 70–99)
HCT VFR BLD CALC: 38.5 % (ref 37–47)
HEMOGLOBIN: 13.3 G/DL (ref 12–16)
INR BLD: 0.9
LYMPHOCYTES ABSOLUTE: 1.5 K/UL (ref 1–4.8)
LYMPHOCYTES RELATIVE PERCENT: 18.8 %
MCH RBC QN AUTO: 29.7 PG (ref 27–31.3)
MCHC RBC AUTO-ENTMCNC: 34.5 % (ref 33–37)
MCV RBC AUTO: 85.9 FL (ref 82–100)
MONOCYTES ABSOLUTE: 0.4 K/UL (ref 0.2–0.8)
MONOCYTES RELATIVE PERCENT: 4.6 %
NEUTROPHILS ABSOLUTE: 5.4 K/UL (ref 1.4–6.5)
NEUTROPHILS RELATIVE PERCENT: 67.8 %
PDW BLD-RTO: 13.9 % (ref 11.5–14.5)
PLATELET # BLD: 225 K/UL (ref 130–400)
POTASSIUM SERPL-SCNC: 3.9 MEQ/L (ref 3.4–4.9)
PROTHROMBIN TIME: 12.4 SEC (ref 12.3–14.9)
RBC # BLD: 4.49 M/UL (ref 4.2–5.4)
SODIUM BLD-SCNC: 135 MEQ/L (ref 135–144)
TOTAL CK: 37 U/L (ref 0–170)
TOTAL PROTEIN: 7.1 G/DL (ref 6.3–8)
TROPONIN: <0.01 NG/ML (ref 0–0.01)
WBC # BLD: 7.9 K/UL (ref 4.8–10.8)

## 2021-02-25 PROCEDURE — 85730 THROMBOPLASTIN TIME PARTIAL: CPT

## 2021-02-25 PROCEDURE — 93005 ELECTROCARDIOGRAM TRACING: CPT | Performed by: PHYSICIAN ASSISTANT

## 2021-02-25 PROCEDURE — 84484 ASSAY OF TROPONIN QUANT: CPT

## 2021-02-25 PROCEDURE — 71045 X-RAY EXAM CHEST 1 VIEW: CPT

## 2021-02-25 PROCEDURE — 80053 COMPREHEN METABOLIC PANEL: CPT

## 2021-02-25 PROCEDURE — 36415 COLL VENOUS BLD VENIPUNCTURE: CPT

## 2021-02-25 PROCEDURE — 99285 EMERGENCY DEPT VISIT HI MDM: CPT

## 2021-02-25 PROCEDURE — 6370000000 HC RX 637 (ALT 250 FOR IP): Performed by: PHYSICIAN ASSISTANT

## 2021-02-25 PROCEDURE — 82550 ASSAY OF CK (CPK): CPT

## 2021-02-25 PROCEDURE — 85025 COMPLETE CBC W/AUTO DIFF WBC: CPT

## 2021-02-25 PROCEDURE — 85610 PROTHROMBIN TIME: CPT

## 2021-02-25 PROCEDURE — 93010 ELECTROCARDIOGRAM REPORT: CPT | Performed by: INTERNAL MEDICINE

## 2021-02-25 RX ORDER — ACETAMINOPHEN 500 MG
1000 TABLET ORAL ONCE
Status: COMPLETED | OUTPATIENT
Start: 2021-02-25 | End: 2021-02-25

## 2021-02-25 RX ORDER — SULFAMETHOXAZOLE AND TRIMETHOPRIM 800; 160 MG/1; MG/1
1 TABLET ORAL 2 TIMES DAILY
Qty: 14 TABLET | Refills: 0 | Status: SHIPPED | OUTPATIENT
Start: 2021-02-25 | End: 2021-03-04

## 2021-02-25 RX ORDER — NITROGLYCERIN 0.4 MG/1
0.4 TABLET SUBLINGUAL EVERY 5 MIN PRN
Status: DISCONTINUED | OUTPATIENT
Start: 2021-02-25 | End: 2021-02-25 | Stop reason: HOSPADM

## 2021-02-25 RX ADMIN — ACETAMINOPHEN 1000 MG: 500 TABLET ORAL at 10:08

## 2021-02-25 RX ADMIN — NITROGLYCERIN 0.4 MG: 0.4 TABLET, ORALLY DISINTEGRATING SUBLINGUAL at 09:37

## 2021-02-25 ASSESSMENT — PAIN SCALES - GENERAL: PAINLEVEL_OUTOF10: 7

## 2021-02-25 ASSESSMENT — ENCOUNTER SYMPTOMS
SORE THROAT: 0
RHINORRHEA: 0
EYE DISCHARGE: 0
ABDOMINAL DISTENTION: 0
DIARRHEA: 0
ABDOMINAL PAIN: 0
COLOR CHANGE: 0
SHORTNESS OF BREATH: 0
COUGH: 1
VOMITING: 0
STRIDOR: 0
CONSTIPATION: 0
NAUSEA: 0
WHEEZING: 0

## 2021-02-25 ASSESSMENT — PAIN DESCRIPTION - LOCATION: LOCATION: CHEST

## 2021-02-25 NOTE — ED PROVIDER NOTES
[address] [facility] ED  eMERGENCY dEPARTMENT eNCOUnter      Pt Name: Andrew Sales  MRN: [id number]  [place] 1970  Date of evaluation: 2/25/2021  Provider: Urvashi Miner PA-C    CHIEF COMPLAINT       Chief Complaint   Patient presents with    Chest Pain     patient states she woke up with blood all over her mouth chest pain, and an absess on her inner thigh, dizzy and fatigued. HISTORY OF PRESENT ILLNESS   (Location/Symptom, Timing/Onset,Context/Setting, Quality, Duration, Modifying Factors, Severity)  Note limiting factors. Andrew Sales is a 44 y.o. female who presents to the emergency department after stating she woke up with blood in her mouth this morning. She denies any acute injury. No shortness of breath, she also complains of intermittent chest pain to the left side of her chest she describes as sharp stabbing she states it last for just a brief period time 30 seconds to 1 minute and then goes away. She does have past history of COPD. She states her breathing is no worse than normal.  Patient states that she is currently having pain which she rates as a 5 out of 10 to the left sternal border there is no nausea, there is no diaphoresis. She denies any acute injury. She states she is currently active in cardiac rehab. She is followed by Dr. Carlos Tamez of [facility]. Patient states also an abscess on her leg which she noticed yesterday. She said it was open and draining. HPI    NursingNotes were reviewed. REVIEW OF SYSTEMS    (2-9 systems for level 4, 10 or more for level 5)     Review of Systems   Constitutional: Negative for activity change and appetite change. HENT: Positive for nosebleeds. Negative for congestion, ear discharge, ear pain, rhinorrhea and sore throat. Eyes: Negative for discharge. Respiratory: Positive for cough. Negative for shortness of breath, wheezing and stridor. Cardiovascular: Positive for chest pain.  Negative for palpitations and leg swelling. Gastrointestinal: Negative for abdominal distention, abdominal pain, constipation, diarrhea, nausea and vomiting. Genitourinary: Negative for difficulty urinating, dysuria and flank pain. Musculoskeletal: Negative for arthralgias. Skin: Negative for color change. Abscess to leg. Neurological: Negative for dizziness, syncope, numbness and headaches. Psychiatric/Behavioral: Negative for agitation and confusion. Except as noted above the remainder of the review of systems was reviewed and negative.        PAST MEDICAL HISTORY     Past Medical History:   Diagnosis Date    Anticoagulant long-term use     Asthma 2004    CAD (coronary artery disease)     [facility]     Diabetes mellitus ([facility] 2014    [facility] 1999    Hyperlipidemia     Hypertension 1984    Migraines 1989    Sleep apnea     recently tested     Type 2 diabetes mellitus without complication ([facility]     type II         SURGICALHISTORY       Past Surgical History:   Procedure Laterality Date    TUBAL LIGATION           CURRENT MEDICATIONS       Previous Medications    ALBUTEROL SULFATE HFA (VENTOLIN HFA) 108 (90 BASE) MCG/ACT INHALER    Inhale 2 puffs into the lungs 4 times daily as needed for Wheezing    ASPIRIN 81 MG EC TABLET    Take 1 tablet by mouth daily    ATORVASTATIN (LIPITOR) 80 MG TABLET    Take 1 tablet by mouth daily    BUDESONIDE-FORMOTEROL (SYMBICORT) 160-4.5 MCG/ACT AERO    Inhale 2 puffs into the lungs 2 times daily    DILTIAZEM (CARDIZEM CD) 120 MG EXTENDED RELEASE CAPSULE    Take 1 capsule by mouth 2 times daily    EMPAGLIFLOZIN (JARDIANCE) 10 MG TABLET    Take 10 mg by mouth daily    FUROSEMIDE (LASIX) 20 MG TABLET    Take 20 mg by mouth daily    IPRATROPIUM-ALBUTEROL (DUONEB) 0.5-2.5 (3) MG/3ML SOLN NEBULIZER SOLUTION    Inhale 3 mLs into the lungs 3 times daily as needed for Shortness of Breath    LISINOPRIL (PRINIVIL;ZESTRIL) 5 MG TABLET Take 5 mg by mouth daily    MAGNESIUM OXIDE (MAG-OX) 400 (240 MG) MG TABLET    Take 1 tablet by mouth 2 times daily    METFORMIN (GLUCOPHAGE) 500 MG TABLET    Take 1,000 mg by mouth 2 times daily (with meals)    MOMETASONE (ELOCON) 0.1 % CREAM    Apply topically daily Apply topically daily inside of both ears    MUPIROCIN (BACTROBAN) 2 % OINTMENT    Apply topically 2 times daily Apply topically twice daily inside of nose    NICOTINE (NICODERM CQ) 21 MG/24HR    Place 1 patch onto the skin daily    NITROGLYCERIN (NITROSTAT) 0.4 MG SL TABLET    up to max of 3 total doses. If no relief after 1 dose, call 911. PANTOPRAZOLE (PROTONIX) 40 MG TABLET    Take 1 tablet by mouth every morning (before breakfast)    POTASSIUM CHLORIDE (KLOR-CON M) 10 MEQ EXTENDED RELEASE TABLET    Take 10 mEq by mouth daily    TICAGRELOR (BRILINTA) 90 MG TABS TABLET    Take 1 tablet by mouth 2 times daily    TIOTROPIUM (SPIRIVA RESPIMAT) 2.5 MCG/ACT AERS INHALER    Inhale 2 puffs into the lungs daily       ALLERGIES     Shellfish-derived products    FAMILY HISTORY     No family history on file.        SOCIAL HISTORY       Social History     Socioeconomic History    Marital status: Single     Spouse name: Not on file    Number of children: Not on file    Years of education: Not on file    Highest education level: Not on file   Occupational History    Not on file   Social Needs    Financial resource strain: Not on file    Food insecurity     Worry: Not on file     Inability: Not on file    Transportation needs     Medical: Not on file     Non-medical: Not on file   Tobacco Use    Smoking status: Former Smoker     Packs/day: 1.00     Years: 37.00     Pack years: 37.00     Types: Cigarettes     Quit date: 2020     Years since quittin.4    Smokeless tobacco: Never Used   Substance and Sexual Activity    Alcohol use: Not Currently    Drug use: Never    Sexual activity: Yes     Partners: Male   Lifestyle    Physical activity Days per week: Not on file     Minutes per session: Not on file    Stress: Not on file   Relationships    Social connections     Talks on phone: Not on file     Gets together: Not on file     Attends [employer] service: Not on file     Active member of club or organization: Not on file     Attends meetings of clubs or organizations: Not on file     Relationship status: Not on file    Intimate partner violence     Fear of current or ex partner: Not on file     Emotionally abused: Not on file     Physically abused: Not on file     Forced sexual activity: Not on file   Other Topics Concern    Not on file   Social History Narrative    Not on file       SCREENINGS      @[facility]([id number])@      PHYSICAL EXAM    (up to 7 for level 4, 8 or more for level 5)     ED Triage Vitals   BP Temp Temp src Pulse Resp SpO2 Height Weight   02/25/21 0852 02/25/21 0854 -- 02/25/21 0852 02/25/21 0852 02/25/21 0852 02/25/21 0852 02/25/21 0852   134/79 98 °F (36.7 °C)  90 18 97 % 5' 2\" (1.575 m) 200 lb (90.7 kg)       Physical Exam  Vitals signs and nursing note reviewed. Constitutional:       General: She is not in acute distress. Appearance: She is well-developed. She is not ill-appearing, toxic-appearing or diaphoretic. HENT:      Head: Normocephalic. Right Ear: Tympanic membrane normal.      Left Ear: Tympanic membrane normal.      Nose: Nose normal. No congestion. Comments: examination of the nose shows an area where appears to have been previous bleeding in the nasal septum anteriorly. There is no active bleeding at this time,     Mouth/Throat:      Mouth: Mucous membranes are moist.      Pharynx: No oropharyngeal exudate or posterior oropharyngeal erythema. Comments: On visual examination of the mouth, there is no cuts or bites noted to the tongue, there is no injuries noted to the soft tissue, there is no intraoral swelling, there is no injuries to the soft palate, or floor the mouth.   Eyes: Extraocular Movements: Extraocular movements intact. Conjunctiva/sclera: Conjunctivae normal.      Pupils: Pupils are equal, round, and reactive to light. Neck:      Musculoskeletal: Normal range of motion and neck supple. No neck rigidity. Vascular: No JVD. Trachea: No tracheal deviation. Cardiovascular:      Rate and Rhythm: Normal rate. Pulses: Normal pulses. Heart sounds: Normal heart sounds. No murmur. No friction rub. No gallop. Pulmonary:      Effort: Pulmonary effort is normal. No tachypnea, accessory muscle usage, respiratory distress or retractions. Breath sounds: No stridor. Wheezing present. No rhonchi or rales. Comments: Lung sounds are mildly diminished in all fields, there are crackles and wheezes noted throughout. Patient does have past history of COPD. She displays no acute respiratory distress. Chest:      Chest wall: No tenderness. Abdominal:      General: Abdomen is flat. Bowel sounds are normal. There is no distension or abdominal bruit. Palpations: There is no shifting dullness, fluid wave, hepatomegaly, splenomegaly, mass or pulsatile mass. Tenderness: There is no abdominal tenderness. There is no right CVA tenderness, left CVA tenderness, guarding or rebound. Negative signs include Warner's sign, Rovsing's sign and McBurney's sign. Comments: Abdomen is soft nondistended nontender no guarding mass or rebound. No CVA tenderness   Musculoskeletal: Normal range of motion. General: No deformity. Skin:     General: Skin is warm and dry. Capillary Refill: Capillary refill takes less than 2 seconds. Coloration: Skin is not jaundiced. Comments: Has small area of cellulitis to inner thigh on the right side, there is no fluctuance there, and it look like it opened up and drained a serous central opening. Is mild tenderness on palpation, no current drainage at this time.    Neurological:      General: No focal deficit present. Mental Status: She is alert and oriented to person, place, and time. Mental status is at baseline. Cranial Nerves: No cranial nerve deficit. Sensory: No sensory deficit. Motor: No weakness. Coordination: Coordination normal.   Psychiatric:         Mood and Affect: Mood normal.         DIAGNOSTIC RESULTS     EKG: All EKG's are interpreted by the Emergency Department Physician who either signs or Co-signsthis chart in the absence of a cardiologist.    Adalberto Ford shows sinus rhythm with a first-degree A-V block at 88 bpm no acute ST segment abnormality no ventricular ectopy QTC is 423 ms    RADIOLOGY:   Non-plain filmimages such as CT, Ultrasound and MRI are read by the radiologist. Plain radiographic images are visualized and preliminarily interpreted by the emergency physician with the below findings:    CXR shows no acute pulmonary process    Interpretation per the Radiologist below, if available at the time ofthis note:    XR CHEST PORTABLE   Final Result   No radiographic evidence of acute intrathoracic process. ED BEDSIDE ULTRASOUND:   Performed by ED Physician - none    LABS:  Labs Reviewed   COMPREHENSIVE METABOLIC PANEL - Abnormal; Notable for the following components:       Result Value    Glucose 116 (*)     All other components within normal limits   CBC WITH AUTO DIFFERENTIAL   TROPONIN   CK   PROTIME-INR   APTT       All other labs were within normal range or not returned as of this dictation. EMERGENCY DEPARTMENT COURSE and DIFFERENTIAL DIAGNOSIS/MDM:   Vitals:    Vitals:    02/25/21 0852 02/25/21 0854 02/25/21 0939   BP: 134/79  121/72   Pulse: 90  88   Resp: 18  20   Temp:  98 °F (36.7 °C)    SpO2: 97%  99%   Weight: 200 lb (90.7 kg)     Height: 5' 2\" (1.575 m)            MDM  Number of Diagnoses or Management Options  Diagnosis management comments: Patient presented to the ED with a complaint of blood in her mouth when she woke up this morning. She denies any acute injury. On examination patient has what appeared as active bleeding in the right nares, this area is now scabbed over and there is no current or active bleeding. There is no visible signs of injuries within the mouth, or tongue. There is no bite marks, no soft tissue swelling, no signs of bleeding or previous injury. Patient also complained of some intermittent sharp stabbing chest pain which she has had over the last couple of days. She states last 30 seconds to 1 minute but then is resolved. Her cardiac evaluation today shows no significant change, chest x-ray shows no acute pulmonary process, cardiac enzymes are negative. She did complaint of headache after receiving 1 nitroglycerin, she was given Tylenol for control of pain. Patient also complains of abscess in the area of the right inner thigh which she states she has had for the last 2 to 3 days, she stated did open and drain yesterday and seemed to be making some improvement but wanted it evaluated while in the ER. This does seem to be draining, it is nonfluctuant at this time, but there is some mild erythema, patient will be placed on antibiotics and advised to follow-up with her regular family physician. Patient was advised if she has any worsening or changes symptoms she should return to the ER for reevaluation. CRITICAL CARE TIME   Total Critical Care time was 0 minutes, excluding separately reportableprocedures. There was a high probability of clinicallysignificant/life threatening deterioration in the patient's condition which required my urgent intervention. CONSULTS:  None    PROCEDURES:  Unless otherwise noted below, none     Procedures    FINAL IMPRESSION      1. Epistaxis    2. Chest pain, unspecified type    3.  Abscess          DISPOSITION/PLAN   DISPOSITION Decision To Discharge 02/25/2021 10:07:18 AM      PATIENT REFERRED TO:  Bonilla Clark MD  [address] [facility] [place] [address]  [phone]    In 3 days      George Ramos MD  [address] [facility]  [phone]    In 3 days        DISCHARGE MEDICATIONS:  New Prescriptions    SULFAMETHOXAZOLE-TRIMETHOPRIM (BACTRIM DS) 800-160 MG PER TABLET    Take 1 tablet by mouth 2 times daily for 7 days          (Please note that portions of this note were completed with a voice recognition program.  Efforts were made to edit the dictations but occasionally words are mis-transcribed.)    Dillon Ge PA-C (electronically signed)  Attending Emergency Physician         Dillon Ge PA-C  02/25/21 [facility] [address] Kulwinder Hahn PA-C  02/25/21 1679

## 2021-02-25 NOTE — ED NOTES
Patient denies chest pain after 1st nitro. No 2/3 nitro given due to decreased pain. Patient stated she now has a headache. Sarbjit DURBIN made aware.       Von Oleary RN  02/25/21 0871

## 2021-02-26 ENCOUNTER — APPOINTMENT (OUTPATIENT)
Dept: CARDIAC REHAB | Age: 51
End: 2021-02-26
Payer: COMMERCIAL

## 2021-03-01 ENCOUNTER — HOSPITAL ENCOUNTER (OUTPATIENT)
Dept: CARDIAC REHAB | Age: 51
Setting detail: THERAPIES SERIES
Discharge: HOME | End: 2021-03-01
Payer: COMMERCIAL

## 2021-03-01 PROCEDURE — G0422 INTENS CARDIAC REHAB W/EXERC: HCPCS

## 2021-03-01 PROCEDURE — G0423 INTENS CARDIAC REHAB NO EXER: HCPCS

## 2021-03-01 NOTE — PROGRESS NOTES
Video Education Report - ICR/CR    Name:  Richy Timmons     Date:  3/1/2021  MRN: [id number]     Session #: 9  Session Length: 33 min 14 seconds    Recommended Videos        []01 [employer] - Program Overview   34:22    []02 Overview of [employer] Eating Plan   34:10    []03 Becoming a Janeece Roll   33:08     []04 Diseases of Our Time - Part 1   34:22    []05 Calorie Density     33:39   []06 Label Reading - Part 1    32:15   []07 Move it      32.54   []08 Healthy Minds, Bodies, Hearts   32:14   []09 Dining Out - Part 1    32:28   []10 Heart Disease Risk Reduction   34:76   []98 Metabolic Syndrome and Belly Fat  31:52   []12 Facts on Fat     35:29   []13 Diseases of Our Time - Part 2   33:07   []14 Biology of Weight Control   32:36   []15 Biomechanical Limitations   35:20   []16 Nutrition Action Plan    34:23    Additional Videos         []17 Hypertension & Heart Disease   32:39        []18 Cooking Breakfasts and Snacks  32:00   []19 Planning Your Eating Strategy   33:30   []20 Label Reading - Part 2    32:36  []21 Cooking Soups and Desserts   31:41  []22 How Our Thoughts Can Heal Our Hearts 33:05  []23 Targeting Your Nutrition Priorities  33:58  []24 Healthy Salads & Dressings   35:32  []25 Dining Out - Part 2    32:35  []26 Cooking Dinner and Sides   35:06  [x]27 Sleep Disorders     33:14  []28 Menu Workshop     32:06  []29 Decoding Lab Results    32:42     []30 Vitamins and Minerals    32:54  []31 Exercise Action Plan    32:26  []32 Body Composition    34:03  []33 Improving Performance    32:13  []34 Fueling a Healthy Body    31:32  []35 Introduction to Yoga    33:47  []36 Aging-Enhancing the Quality of Your Life 33:22  []37 Smoking Cessation    36:19    Comments:  Video completed.

## 2021-03-03 ENCOUNTER — HOSPITAL ENCOUNTER (OUTPATIENT)
Dept: CARDIAC REHAB | Age: 51
Setting detail: THERAPIES SERIES
Discharge: HOME | End: 2021-03-03
Payer: COMMERCIAL

## 2021-03-03 PROCEDURE — G0423 INTENS CARDIAC REHAB NO EXER: HCPCS

## 2021-03-03 PROCEDURE — G0422 INTENS CARDIAC REHAB W/EXERC: HCPCS

## 2021-03-05 ENCOUNTER — HOSPITAL ENCOUNTER (OUTPATIENT)
Dept: CARDIAC REHAB | Age: 51
Setting detail: THERAPIES SERIES
Discharge: HOME | End: 2021-03-05
Payer: COMMERCIAL

## 2021-03-05 PROCEDURE — G0422 INTENS CARDIAC REHAB W/EXERC: HCPCS

## 2021-03-05 PROCEDURE — G0423 INTENS CARDIAC REHAB NO EXER: HCPCS

## 2021-03-05 NOTE — PROGRESS NOTES
Nelson HERNANDEZ.:  1970  Acct Number: [de-identified]  MRN:  [id number]                [facility] COOKING SCHOOL WORKSHOP             Date: 3/5/2021        Session # 7   Todays class covered:      () Adding Flavor     () Fast Breakfasts     () Salads and Dressings     (X) Soups and Sauces     () Simple Sides     () Appetizers and Snacks     () Delicious Desserts     () Plant Based Proteins     () Fast Evening Meals     () Weekend Breakfasts     () Efficiency Cooking     () One [facility]     Patients were shown how to choose, prep, and cook; substitutions and other options were given. Samples were offered. Recipes were given and questions answered. The patient above was in the [employer] for 45 minutes.       Electronically signed by Fredrick Ramirez RN on 3/5/2021 at 12:26 PM

## 2021-03-08 ENCOUNTER — APPOINTMENT (OUTPATIENT)
Dept: CARDIAC REHAB | Age: 51
End: 2021-03-08
Payer: COMMERCIAL

## 2021-03-12 ENCOUNTER — HOSPITAL ENCOUNTER (OUTPATIENT)
Dept: CARDIAC REHAB | Age: 51
Setting detail: THERAPIES SERIES
Discharge: HOME | End: 2021-03-12
Payer: COMMERCIAL

## 2021-03-12 PROCEDURE — G0422 INTENS CARDIAC REHAB W/EXERC: HCPCS

## 2021-03-12 PROCEDURE — G0423 INTENS CARDIAC REHAB NO EXER: HCPCS

## 2021-03-12 NOTE — CONSULTS
David HERNANDEZ.:  1970  Acct Number: [de-identified]  MRN:  [id number]                [facility] COOKING SCHOOL WORKSHOP             Date: 3/12/2021        Session # 8   Todays class covered:      () Adding Flavor     () Fast Breakfasts     () Salads and Dressings     () Soups and Sauces     (X) Simple Sides     () Appetizers and Snacks     () Delicious Desserts     () Plant Based Proteins     () Fast Evening Meals     () Weekend Breakfasts     () Efficiency Cooking     () One [facility]     Patients were shown how to choose, prep, and cook; substitutions and other options were given. Samples were offered. Recipes were given and questions answered. The patient above was in the [employer] for 50 minutes.       Electronically signed by Tanesha Leyva PTA on 3/12/2021 at 11:31 AM

## 2021-03-15 ENCOUNTER — HOSPITAL ENCOUNTER (OUTPATIENT)
Dept: CARDIAC REHAB | Age: 51
Setting detail: THERAPIES SERIES
Discharge: HOME | End: 2021-03-15
Payer: COMMERCIAL

## 2021-03-15 PROCEDURE — G0422 INTENS CARDIAC REHAB W/EXERC: HCPCS

## 2021-03-15 PROCEDURE — G0423 INTENS CARDIAC REHAB NO EXER: HCPCS

## 2021-03-15 NOTE — PROGRESS NOTES
Video Education Report - ICR/CR    Name:  Clair Poll     Date:  3/15/2021  MRN: [id number]     Session #:  10  Session Length: 31:52 min    Recommended Videos        []01 [employer] - Program Overview   34:22    []02 Overview of [employer] Eating Plan   34:10    []03 Becoming a Mardel Ramin   33:08     []04 Diseases of Our Time - Part 1   34:22    []05 Calorie Density     33:39   []06 Label Reading - Part 1    32:15   []07 Move it      32.54   []08 Healthy Minds, Bodies, Hearts   32:14   []09 Dining Out - Part 1    32:28   []10 Heart Disease Risk Reduction   70:74   [Y]98 Metabolic Syndrome and Belly Fat  31:52   []12 Facts on Fat     35:29   []13 Diseases of Our Time - Part 2   33:07   []14 Biology of Weight Control   32:36   []15 Biomechanical Limitations   35:20   []16 Nutrition Action Plan    34:23    Additional Videos         []17 Hypertension & Heart Disease   32:39        []18 Cooking Breakfasts and Snacks  32:00   []19 Planning Your Eating Strategy   33:30   []20 Label Reading - Part 2    32:36  []21 Cooking Soups and Desserts   31:41  []22 How Our Thoughts Can Heal Our Hearts 33:05  []23 Targeting Your Nutrition Priorities  33:58  []24 Healthy Salads & Dressings   35:32  []25 Dining Out - Part 2    32:35  []26 Cooking Dinner and Sides   35:06  []27 Sleep Disorders     33:14  []28 Menu Workshop     32:06  []29 Decoding Lab Results    32:42     []30 Vitamins and Minerals    32:54  []31 Exercise Action Plan    32:26  []32 Body Composition    34:03  []33 Improving Performance    32:13  []34 Fueling a Healthy Body    31:32  []35 Introduction to Yoga    33:47  []36 Aging-Enhancing the Quality of Your Life 33:22  []37 Smoking Cessation    36:19    Comments:  Video completed, patient joined in post video discussion

## 2021-03-17 ENCOUNTER — OFFICE VISIT (OUTPATIENT)
Dept: PULMONOLOGY | Age: 51
End: 2021-03-17
Payer: COMMERCIAL

## 2021-03-17 VITALS
OXYGEN SATURATION: 97 % | WEIGHT: 192.4 LBS | HEART RATE: 95 BPM | SYSTOLIC BLOOD PRESSURE: 117 MMHG | DIASTOLIC BLOOD PRESSURE: 67 MMHG | RESPIRATION RATE: 16 BRPM | TEMPERATURE: 96.4 F | HEIGHT: 62 IN | BODY MASS INDEX: 35.41 KG/M2

## 2021-03-17 DIAGNOSIS — J45.40 MODERATE PERSISTENT ASTHMA WITHOUT COMPLICATION: Primary | ICD-10-CM

## 2021-03-17 DIAGNOSIS — E66.09 CLASS 2 OBESITY DUE TO EXCESS CALORIES WITHOUT SERIOUS COMORBIDITY WITH BODY MASS INDEX (BMI) OF 37.0 TO 37.9 IN ADULT: ICD-10-CM

## 2021-03-17 DIAGNOSIS — F17.200 SMOKING: ICD-10-CM

## 2021-03-17 DIAGNOSIS — K21.9 GASTROESOPHAGEAL REFLUX DISEASE WITHOUT ESOPHAGITIS: ICD-10-CM

## 2021-03-17 PROCEDURE — 1036F TOBACCO NON-USER: CPT | Performed by: INTERNAL MEDICINE

## 2021-03-17 PROCEDURE — G8484 FLU IMMUNIZE NO ADMIN: HCPCS | Performed by: INTERNAL MEDICINE

## 2021-03-17 PROCEDURE — G8417 CALC BMI ABV UP PARAM F/U: HCPCS | Performed by: INTERNAL MEDICINE

## 2021-03-17 PROCEDURE — G8427 DOCREV CUR MEDS BY ELIG CLIN: HCPCS | Performed by: INTERNAL MEDICINE

## 2021-03-17 PROCEDURE — 99214 OFFICE O/P EST MOD 30 MIN: CPT | Performed by: INTERNAL MEDICINE

## 2021-03-17 PROCEDURE — 3017F COLORECTAL CA SCREEN DOC REV: CPT | Performed by: INTERNAL MEDICINE

## 2021-03-17 RX ORDER — EPINEPHRINE 0.3 MG/.3ML
0.3 INJECTION SUBCUTANEOUS PRN
COMMUNITY
Start: 2020-12-21

## 2021-03-17 RX ORDER — ALBUTEROL SULFATE 90 UG/1
2 AEROSOL, METERED RESPIRATORY (INHALATION) 4 TIMES DAILY PRN
Qty: 1 INHALER | Refills: 5 | Status: SHIPPED | OUTPATIENT
Start: 2021-03-17 | End: 2021-11-11 | Stop reason: SDUPTHER

## 2021-03-17 RX ORDER — BUDESONIDE AND FORMOTEROL FUMARATE DIHYDRATE 160; 4.5 UG/1; UG/1
2 AEROSOL RESPIRATORY (INHALATION) 2 TIMES DAILY
Qty: 1 INHALER | Refills: 5 | Status: SHIPPED | OUTPATIENT
Start: 2021-03-17 | End: 2021-04-12 | Stop reason: SDUPTHER

## 2021-03-17 RX ORDER — MAGNESIUM OXIDE 400 MG/1
400 TABLET ORAL 2 TIMES DAILY
COMMUNITY
Start: 2020-11-13

## 2021-03-17 RX ORDER — TRIAMCINOLONE ACETONIDE 55 UG/1
SPRAY, METERED NASAL
COMMUNITY
Start: 2020-12-21

## 2021-03-17 RX ORDER — ATORVASTATIN CALCIUM 40 MG/1
80 TABLET, FILM COATED ORAL
COMMUNITY
Start: 2021-02-10

## 2021-03-17 RX ORDER — ESOMEPRAZOLE MAGNESIUM 20 MG/1
20 TABLET ORAL DAILY PRN
COMMUNITY

## 2021-03-17 RX ORDER — DILTIAZEM HYDROCHLORIDE 180 MG/1
240 CAPSULE, EXTENDED RELEASE ORAL 2 TIMES DAILY
COMMUNITY

## 2021-03-17 RX ORDER — NICOTINE 21 MG/24HR
1 PATCH, TRANSDERMAL 24 HOURS TRANSDERMAL DAILY
Qty: 42 PATCH | Refills: 0 | Status: SHIPPED | OUTPATIENT
Start: 2021-03-17 | End: 2022-02-08

## 2021-03-17 NOTE — PROGRESS NOTES
Subjective:     Henrry Mariee is a 44 y.o. female who complains today of:     Chief Complaint   Patient presents with    Follow-up     3 Month F/U for Moderate persistent asthma        HPI  Patient presents for asthma/COPD follow-up    She is doing okay when she takes her inhalers, she was off Symbicort for some time due to shortage of medication at her pharmacy, and symptoms recurred currently back on Symbicort she is doing okay, no shortness of breath, no chest pain, occasional cough productive of clear phlegm, no wheezing, no lower extremity edema, no heartburn and she takes Nexium over-the-counter. She is back into smoking, had a fight with her boyfriend who currently left the house, she was under a lot of stress , she is trying to quit again and she asked for nicotine patches. Weight is stable, no fever no chills. Allergies:  Shellfish-derived products, Cumin oil, and Piper  Past Medical History:   Diagnosis Date    Anticoagulant long-term use     Asthma 2004    CAD (coronary artery disease)     COPD (chronic obstructive pulmonary disease) (HCC)     Diabetes mellitus ([facility] [address] 2014    Hodgkin disease ([facility] [address] 1999    Hyperlipidemia     Hypertension 1984    Migraines 1989    Sleep apnea     recently tested     Type 2 diabetes mellitus without complication ([facility] [address]     type II     Past Surgical History:   Procedure Laterality Date    TUBAL LIGATION       History reviewed. No pertinent family history.   Social History     Socioeconomic History    Marital status: Single     Spouse name: Not on file    Number of children: Not on file    Years of education: Not on file    Highest education level: Not on file   Occupational History    Not on file   Social Needs    Financial resource strain: Not on file    Food insecurity     Worry: Not on file     Inability: Not on file    Transportation needs     Medical: Not on file     Non-medical: Not on file   Tobacco Use    Smoking status: Former Smoker Packs/day: 1.00     Years: 37.00     Pack years: 37.00     Types: Cigarettes     Quit date: 2020     Years since quittin.4    Smokeless tobacco: Never Used   Substance and Sexual Activity    Alcohol use: Not Currently    Drug use: Never    Sexual activity: Yes     Partners: Male   Lifestyle    Physical activity     Days per week: Not on file     Minutes per session: Not on file    Stress: Not on file   Relationships    Social connections     Talks on phone: Not on file     Gets together: Not on file     Attends Anglican service: Not on file     Active member of club or organization: Not on file     Attends meetings of clubs or organizations: Not on file     Relationship status: Not on file    Intimate partner violence     Fear of current or ex partner: Not on file     Emotionally abused: Not on file     Physically abused: Not on file     Forced sexual activity: Not on file   Other Topics Concern    Not on file   Social History Narrative    Not on file         Review of Systems      ROS: 10 organs review of system is done including general, psychological, ENT, hematological, endocrine, respiratory, cardiovascular, gastrointestinal,musculoskeletal, neurological,  allergy and Immunology is done and is otherwise negative.     Current Outpatient Medications   Medication Sig Dispense Refill    dilTIAZem (TIAZAC) 180 MG extended release capsule Take 180 mg by mouth 2 times daily      EPINEPHrine (EPIPEN) 0.3 MG/0.3ML SOAJ injection Inject 0.3 mg into the muscle as needed      Esomeprazole Magnesium (NEXIUM 24HR) 20 MG TBEC Take 20 mg by mouth daily as needed      magnesium oxide (MAG-OX) 400 MG tablet Take 400 mg by mouth 2 times daily      triamcinolone (NASACORT) 55 MCG/ACT nasal inhaler USE 2 SPRAYS INTO THE NOSE ONCE DAILY      atorvastatin (LIPITOR) 40 MG tablet TAKE 1 TABLET BY MOUTH ONCE DAILY AT BEDTIME      albuterol sulfate HFA (VENTOLIN HFA) 108 (90 Base) MCG/ACT inhaler Inhale 2 puffs into the lungs 4 times daily as needed for Wheezing 1 Inhaler 5    budesonide-formoterol (SYMBICORT) 160-4.5 MCG/ACT AERO Inhale 2 puffs into the lungs 2 times daily 1 Inhaler 5    nicotine (NICODERM CQ) 21 MG/24HR Place 1 patch onto the skin daily 42 patch 0    tiotropium (SPIRIVA RESPIMAT) 2.5 MCG/ACT AERS inhaler Inhale 2 puffs into the lungs daily 1 Inhaler 2    pantoprazole (PROTONIX) 40 MG tablet Take 1 tablet by mouth every morning (before breakfast) 30 tablet 3    magnesium oxide (MAG-OX) 400 (240 Mg) MG tablet Take 1 tablet by mouth 2 times daily 30 tablet 3    metFORMIN (GLUCOPHAGE) 500 MG tablet Take 1,000 mg by mouth 2 times daily (with meals)      empagliflozin (JARDIANCE) 10 MG tablet Take 10 mg by mouth daily      mupirocin (BACTROBAN) 2 % ointment Apply topically 2 times daily Apply topically twice daily inside of nose      mometasone (ELOCON) 0.1 % cream Apply topically daily Apply topically daily inside of both ears      furosemide (LASIX) 20 MG tablet Take 20 mg by mouth daily      potassium chloride (KLOR-CON M) 10 MEQ extended release tablet Take 10 mEq by mouth daily      lisinopril (PRINIVIL;ZESTRIL) 5 MG tablet Take 5 mg by mouth daily      dilTIAZem (CARDIZEM CD) 120 MG extended release capsule Take 1 capsule by mouth 2 times daily 60 capsule 2    aspirin 81 MG EC tablet Take 1 tablet by mouth daily 90 tablet 3    ticagrelor (BRILINTA) 90 MG TABS tablet Take 1 tablet by mouth 2 times daily 60 tablet 5    nitroGLYCERIN (NITROSTAT) 0.4 MG SL tablet up to max of 3 total doses. If no relief after 1 dose, call 911. 25 tablet 3    ipratropium-albuterol (DUONEB) 0.5-2.5 (3) MG/3ML SOLN nebulizer solution Inhale 3 mLs into the lungs 3 times daily as needed for Shortness of Breath 270 mL 0     No current facility-administered medications for this visit.         Objective:     Vitals:    03/17/21 0901   BP: 117/67   Site: Right Upper Arm   Position: Sitting   Cuff Size: Large

## 2021-04-12 DIAGNOSIS — J45.40 MODERATE PERSISTENT ASTHMA WITHOUT COMPLICATION: ICD-10-CM

## 2021-04-12 RX ORDER — BUDESONIDE AND FORMOTEROL FUMARATE DIHYDRATE 160; 4.5 UG/1; UG/1
2 AEROSOL RESPIRATORY (INHALATION) 2 TIMES DAILY
Qty: 1 INHALER | Refills: 5 | Status: SHIPPED | OUTPATIENT
Start: 2021-04-12 | End: 2021-09-20 | Stop reason: SDUPTHER

## 2021-04-27 ENCOUNTER — TELEPHONE (OUTPATIENT)
Dept: PULMONOLOGY | Age: 51
End: 2021-04-27

## 2021-04-27 DIAGNOSIS — J44.9 ASTHMA-COPD OVERLAP SYNDROME (HCC): ICD-10-CM

## 2021-05-06 NOTE — PROGRESS NOTES
Physician Progress Note    2020   8:19 AM    Name:  Leonor Rubinstein  MRN:    [id number]     IP Day: 1     Admit Date: 2020 10:21 AM  PCP: Amanda Grey MD    Code Status:  Full Code      Assessment and Plan: Active Problems/ diagnosis:     1) chest pain-history of CAD status post recent PCI      plan for cardiac stress test today. We will observe on telemetry floor, consult cardiology, trend troponin, EKG and troponin so far not showing acute coronary syndrome picture. However, given her high risk will work her up for ACS     2) diabetes       We will use sliding scale insulin, monitor, hypoglycemia protocol. Hold Metformin     3) HTN/HLD/KEITH      Resume outpatient management      DVT prophylaxis       Subjective:      no new events. No chest pain today.      Physical Examination:      Vitals:  BP (!) 123/58   Pulse 79   Temp 98 °F (36.7 °C) (Oral)   Resp 18   Ht 5' 2\" (1.575 m)   Wt 201 lb 11.5 oz (91.5 kg)   SpO2 99%   BMI 36.90 kg/m²   Temp (24hrs), Av °F (36.7 °C), Min:97.5 °F (36.4 °C), Max:98.2 °F (36.8 °C)      General appearance: alert, cooperative and no distress  Mental Status: oriented to person, place and time and normal affect  Lungs: clear to auscultation bilaterally, normal effort  Heart: regular rate and rhythm, no murmur  Abdomen: soft, nontender, nondistended, bowel sounds present, no masses  Extremities: no edema, redness, tenderness in the calves  Skin: no gross lesions, rashes    Data:     Labs:  Recent Labs     20  1045 20  0530   WBC 8.4 6.5   HGB 13.0 12.3    237     Recent Labs     205 20  0530    138   K 3.9 3.0*    102   CO2 25 25   BUN 12 9   CREATININE 0.67 0.61   GLUCOSE 97 118*     Recent Labs     20  1045 20  0530   AST 27 19   ALT 36* 32   BILITOT 0.3 0.5   ALKPHOS 84 79       Current Facility-Administered Medications   Medication Dose Route Frequency Provider Last Rate Last Dose    magnesium sulfate 4 g in 100 mL IVPB premix  4 g Intravenous Once Yayo Cox, DO        potassium chloride 10 mEq/100 mL IVPB (Peripheral Line)  10 mEq Intravenous Q1H Yayo Cox, DO        nitroGLYCERIN (NITROSTAT) SL tablet 0.4 mg  0.4 mg Sublingual Q5 Min PRN Yayo Cox, DO   0.4 mg at 11/12/20 1120    sodium chloride flush 0.9 % injection 10 mL  10 mL Intravenous 2 times per day Yayo Cox, DO   10 mL at 11/12/20 2021    sodium chloride flush 0.9 % injection 10 mL  10 mL Intravenous PRN Yayo Cox, DO        acetaminophen (TYLENOL) tablet 650 mg  650 mg Oral Q6H PRN Yayo Cox, DO        Or    acetaminophen (TYLENOL) suppository 650 mg  650 mg Rectal Q6H PRN Paz Ríosin, DO        polyethylene glycol (GLYCOLAX) packet 17 g  17 g Oral Daily PRN Yayo Cox, DO        promethazine (PHENERGAN) tablet 12.5 mg  12.5 mg Oral Q6H PRN Paz Duran, DO        Or    ondansetron [facility]) injection 4 mg  4 mg Intravenous Q6H PRN Yayo Cox, DO        atorvastatin (LIPITOR) tablet 40 mg  40 mg Oral Nightly Yayo Cox, DO        aspirin chewable tablet 81 mg  81 mg Oral Daily Yayo Cox, DO        potassium chloride (KLOR-CON M) extended release tablet 40 mEq  40 mEq Oral PRN Yayo Cox, DO        Or    potassium bicarb-citric acid (EFFER-K) effervescent tablet 40 mEq  40 mEq Oral PRN Yayo Rodgersan, DO        Or    potassium chloride 10 mEq/100 mL IVPB (Peripheral Line)  10 mEq Intravenous PRN Yayo Rodgersan, DO        magnesium sulfate 2 g in 50 mL IVPB premix  2 g Intravenous PRN Yayo Cox, DO        enoxaparin (LOVENOX) injection 40 mg  40 mg Subcutaneous Daily Yayo Cox, DO   40 mg at 11/12/20 1753    glucose (GLUTOSE) 40 % oral gel 15 g  15 g Oral PRN Yayo Cox, DO        dextrose 50 % IV solution  12.5 g Intravenous PRN Yayo Cox, DO        glucagon (rDNA) injection 1 mg  1 mg Intramuscular PRN Yayo Cox DO  dextrose 5 % solution  100 mL/hr Intravenous PRN Sarah Omani, DO        insulin lispro (HUMALOG) injection vial 0-6 Units  0-6 Units Subcutaneous TID WC Sarah Omani, DO        insulin lispro (HUMALOG) injection vial 0-3 Units  0-3 Units Subcutaneous Nightly Sarah Omani, DO   Stopped at 11/12/20 2029    albuterol sulfate  (90 Base) MCG/ACT inhaler 1 puff  1 puff Inhalation 4x Daily PRN Sarah Omani, DO        budesonide-formoterol (SYMBICORT) 160-4.5 MCG/ACT inhaler 2 puff  2 puff Inhalation BID Sarah Omani, DO   2 puff at 11/12/20 1941    dilTIAZem (CARDIZEM CD) extended release capsule 120 mg  120 mg Oral BID Sarah Omani, DO   120 mg at 11/12/20 2020    empagliflozin (JARDIANCE) tablet TABS 10 mg  10 mg Oral Daily Sarah Omani, DO        furosemide (LASIX) tablet 20 mg  20 mg Oral Daily Sarah Omani, DO        lisinopril (PRINIVIL;ZESTRIL) tablet 5 mg  5 mg Oral Daily José Miguel R Roger, DO        [facility] tablet 90 mg  90 mg Oral BID Sarah Omani, DO   90 mg at 11/12/20 2021    tiotropium (SPIRIVA RESPIMAT) 2.5 MCG/ACT inhaler 2 puff  2 puff Inhalation Daily Sarah Omani, DO        pantoprazole (PROTONIX) tablet 40 mg  40 mg Oral QAM AC José Miguel R Roger, DO   40 mg at 11/13/20 3204       Additional work up or/and treatment plan may be added today or then after based on clinical progression. I am managing a portion of pt care. Some medical issues are handled by other specialists. Additional work up and treatment should be done in out pt setting by pt PCP and other out pt providers. In addition to examining and evaluating pt, I spent additional time explaining care, normaland abnormal findings, and treatment plan. All of pt questions were answered. Counseling, diet and education were provided. Case will be discussed with nursing staff when appropriate. Family will be updated if and when appropriate.        Electronically signed by Amadou Carter DO Roger on 11/13/2020 at 8:19 AM Never no suicidal ideation

## 2021-05-11 LAB
ALBUMIN SERPL-MCNC: 4.1 G/DL (ref 3.5–4.6)
ALP BLD-CCNC: 124 U/L (ref 40–130)
ALT SERPL-CCNC: 14 U/L (ref 0–33)
ANION GAP SERPL CALCULATED.3IONS-SCNC: 12 MEQ/L (ref 9–15)
AST SERPL-CCNC: 9 U/L (ref 0–35)
BILIRUB SERPL-MCNC: 0.3 MG/DL (ref 0.2–0.7)
BILIRUBIN DIRECT: <0.2 MG/DL (ref 0–0.4)
BILIRUBIN, INDIRECT: NORMAL MG/DL (ref 0–0.6)
BUN BLDV-MCNC: 11 MG/DL (ref 6–20)
CALCIUM SERPL-MCNC: 9.7 MG/DL (ref 8.5–9.9)
CHLORIDE BLD-SCNC: 105 MEQ/L (ref 95–107)
CHOLESTEROL, TOTAL: 171 MG/DL (ref 0–199)
CO2: 24 MEQ/L (ref 20–31)
CREAT SERPL-MCNC: 0.77 MG/DL (ref 0.5–0.9)
GFR AFRICAN AMERICAN: >60
GFR NON-AFRICAN AMERICAN: >60
GLUCOSE BLD-MCNC: 141 MG/DL (ref 70–99)
HCT VFR BLD CALC: 42.5 % (ref 37–47)
HDLC SERPL-MCNC: 36 MG/DL (ref 40–59)
HEMOGLOBIN: 14.1 G/DL (ref 12–16)
LDL CHOLESTEROL CALCULATED: 91 MG/DL (ref 0–129)
MAGNESIUM: 1.9 MG/DL (ref 1.7–2.4)
MCH RBC QN AUTO: 29.6 PG (ref 27–31.3)
MCHC RBC AUTO-ENTMCNC: 33.3 % (ref 33–37)
MCV RBC AUTO: 88.9 FL (ref 82–100)
PDW BLD-RTO: 15 % (ref 11.5–14.5)
PLATELET # BLD: 236 K/UL (ref 130–400)
POTASSIUM SERPL-SCNC: 4.4 MEQ/L (ref 3.4–4.9)
RBC # BLD: 4.78 M/UL (ref 4.2–5.4)
SODIUM BLD-SCNC: 141 MEQ/L (ref 135–144)
TOTAL PROTEIN: 7 G/DL (ref 6.3–8)
TRIGL SERPL-MCNC: 219 MG/DL (ref 0–150)
TSH SERPL DL<=0.05 MIU/L-ACNC: 1.63 UIU/ML (ref 0.44–3.86)
WBC # BLD: 9.1 K/UL (ref 4.8–10.8)

## 2021-05-20 ENCOUNTER — APPOINTMENT (OUTPATIENT)
Dept: GENERAL RADIOLOGY | Age: 51
End: 2021-05-20
Payer: COMMERCIAL

## 2021-05-20 ENCOUNTER — APPOINTMENT (OUTPATIENT)
Dept: CT IMAGING | Age: 51
End: 2021-05-20
Payer: COMMERCIAL

## 2021-05-20 ENCOUNTER — HOSPITAL ENCOUNTER (OUTPATIENT)
Age: 51
Setting detail: OBSERVATION
Discharge: HOME | End: 2021-05-20
Attending: INTERNAL MEDICINE | Admitting: INTERNAL MEDICINE
Payer: COMMERCIAL

## 2021-05-20 VITALS
HEIGHT: 62 IN | WEIGHT: 186.6 LBS | DIASTOLIC BLOOD PRESSURE: 59 MMHG | HEART RATE: 69 BPM | BODY MASS INDEX: 34.34 KG/M2 | SYSTOLIC BLOOD PRESSURE: 115 MMHG | TEMPERATURE: 97.7 F | OXYGEN SATURATION: 95 % | RESPIRATION RATE: 18 BRPM

## 2021-05-20 DIAGNOSIS — R55 SYNCOPE, UNSPECIFIED SYNCOPE TYPE: ICD-10-CM

## 2021-05-20 DIAGNOSIS — R07.9 CHEST PAIN, UNSPECIFIED TYPE: Primary | ICD-10-CM

## 2021-05-20 PROBLEM — I25.10 CAD (CORONARY ARTERY DISEASE): Status: ACTIVE | Noted: 2021-05-20

## 2021-05-20 PROBLEM — J44.9 COPD (CHRONIC OBSTRUCTIVE PULMONARY DISEASE) (HCC): Status: ACTIVE | Noted: 2021-05-20

## 2021-05-20 LAB
ALBUMIN SERPL-MCNC: 4.2 G/DL (ref 3.5–4.6)
ALP BLD-CCNC: 104 U/L (ref 40–130)
ALT SERPL-CCNC: 17 U/L (ref 0–33)
ANION GAP SERPL CALCULATED.3IONS-SCNC: 13 MEQ/L (ref 9–15)
AST SERPL-CCNC: 24 U/L (ref 0–35)
BASOPHILS ABSOLUTE: 0 K/UL (ref 0–0.2)
BASOPHILS RELATIVE PERCENT: 0.4 %
BILIRUB SERPL-MCNC: 0.3 MG/DL (ref 0.2–0.7)
BUN BLDV-MCNC: 11 MG/DL (ref 6–20)
CALCIUM SERPL-MCNC: 9.5 MG/DL (ref 8.5–9.9)
CHLORIDE BLD-SCNC: 101 MEQ/L (ref 95–107)
CO2: 23 MEQ/L (ref 20–31)
CREAT SERPL-MCNC: 0.63 MG/DL (ref 0.5–0.9)
EKG ATRIAL RATE: 75 BPM
EKG ATRIAL RATE: 85 BPM
EKG P AXIS: 52 DEGREES
EKG P AXIS: 61 DEGREES
EKG P-R INTERVAL: 274 MS
EKG P-R INTERVAL: 282 MS
EKG Q-T INTERVAL: 362 MS
EKG Q-T INTERVAL: 394 MS
EKG QRS DURATION: 74 MS
EKG QRS DURATION: 78 MS
EKG QTC CALCULATION (BAZETT): 430 MS
EKG QTC CALCULATION (BAZETT): 439 MS
EKG R AXIS: 21 DEGREES
EKG R AXIS: 34 DEGREES
EKG T AXIS: 56 DEGREES
EKG T AXIS: 66 DEGREES
EKG VENTRICULAR RATE: 75 BPM
EKG VENTRICULAR RATE: 85 BPM
EOSINOPHILS ABSOLUTE: 0.6 K/UL (ref 0–0.7)
EOSINOPHILS RELATIVE PERCENT: 5.1 %
GFR AFRICAN AMERICAN: >60
GFR NON-AFRICAN AMERICAN: >60
GLOBULIN: 3.5 G/DL (ref 2.3–3.5)
GLUCOSE BLD-MCNC: 115 MG/DL (ref 60–115)
GLUCOSE BLD-MCNC: 131 MG/DL (ref 60–115)
GLUCOSE BLD-MCNC: 87 MG/DL (ref 60–115)
GLUCOSE BLD-MCNC: 95 MG/DL (ref 70–99)
HCT VFR BLD CALC: 40.5 % (ref 37–47)
HEMOGLOBIN: 13.7 G/DL (ref 12–16)
LYMPHOCYTES ABSOLUTE: 2.3 K/UL (ref 1–4.8)
LYMPHOCYTES RELATIVE PERCENT: 20.9 %
MAGNESIUM: 2 MG/DL (ref 1.7–2.4)
MCH RBC QN AUTO: 29.4 PG (ref 27–31.3)
MCHC RBC AUTO-ENTMCNC: 33.7 % (ref 33–37)
MCV RBC AUTO: 87.1 FL (ref 82–100)
MONOCYTES ABSOLUTE: 0.6 K/UL (ref 0.2–0.8)
MONOCYTES RELATIVE PERCENT: 5 %
NEUTROPHILS ABSOLUTE: 7.6 K/UL (ref 1.4–6.5)
NEUTROPHILS RELATIVE PERCENT: 68.6 %
PDW BLD-RTO: 14.5 % (ref 11.5–14.5)
PERFORMED ON: ABNORMAL
PERFORMED ON: NORMAL
PERFORMED ON: NORMAL
PLATELET # BLD: 207 K/UL (ref 130–400)
POTASSIUM SERPL-SCNC: 5.2 MEQ/L (ref 3.4–4.9)
RBC # BLD: 4.65 M/UL (ref 4.2–5.4)
SODIUM BLD-SCNC: 137 MEQ/L (ref 135–144)
TOTAL PROTEIN: 7.7 G/DL (ref 6.3–8)
TROPONIN: <0.01 NG/ML (ref 0–0.01)
WBC # BLD: 11.1 K/UL (ref 4.8–10.8)

## 2021-05-20 PROCEDURE — 93005 ELECTROCARDIOGRAM TRACING: CPT | Performed by: INTERNAL MEDICINE

## 2021-05-20 PROCEDURE — 71045 X-RAY EXAM CHEST 1 VIEW: CPT

## 2021-05-20 PROCEDURE — 83735 ASSAY OF MAGNESIUM: CPT

## 2021-05-20 PROCEDURE — 6370000000 HC RX 637 (ALT 250 FOR IP): Performed by: PERSONAL EMERGENCY RESPONSE ATTENDANT

## 2021-05-20 PROCEDURE — 96375 TX/PRO/DX INJ NEW DRUG ADDON: CPT

## 2021-05-20 PROCEDURE — 2580000003 HC RX 258: Performed by: NURSE PRACTITIONER

## 2021-05-20 PROCEDURE — G0378 HOSPITAL OBSERVATION PER HR: HCPCS

## 2021-05-20 PROCEDURE — 99285 EMERGENCY DEPT VISIT HI MDM: CPT

## 2021-05-20 PROCEDURE — 94640 AIRWAY INHALATION TREATMENT: CPT

## 2021-05-20 PROCEDURE — 94664 DEMO&/EVAL PT USE INHALER: CPT

## 2021-05-20 PROCEDURE — 80053 COMPREHEN METABOLIC PANEL: CPT

## 2021-05-20 PROCEDURE — 93010 ELECTROCARDIOGRAM REPORT: CPT | Performed by: INTERNAL MEDICINE

## 2021-05-20 PROCEDURE — 96374 THER/PROPH/DIAG INJ IV PUSH: CPT

## 2021-05-20 PROCEDURE — 93005 ELECTROCARDIOGRAM TRACING: CPT | Performed by: NURSE PRACTITIONER

## 2021-05-20 PROCEDURE — 84484 ASSAY OF TROPONIN QUANT: CPT

## 2021-05-20 PROCEDURE — 70450 CT HEAD/BRAIN W/O DYE: CPT

## 2021-05-20 PROCEDURE — 6370000000 HC RX 637 (ALT 250 FOR IP): Performed by: NURSE PRACTITIONER

## 2021-05-20 PROCEDURE — 6360000002 HC RX W HCPCS: Performed by: NURSE PRACTITIONER

## 2021-05-20 PROCEDURE — 6360000002 HC RX W HCPCS: Performed by: PERSONAL EMERGENCY RESPONSE ATTENDANT

## 2021-05-20 PROCEDURE — 94761 N-INVAS EAR/PLS OXIMETRY MLT: CPT

## 2021-05-20 PROCEDURE — 85025 COMPLETE CBC W/AUTO DIFF WBC: CPT

## 2021-05-20 PROCEDURE — 36415 COLL VENOUS BLD VENIPUNCTURE: CPT

## 2021-05-20 PROCEDURE — 96372 THER/PROPH/DIAG INJ SC/IM: CPT

## 2021-05-20 RX ORDER — ACETAMINOPHEN 325 MG/1
650 TABLET ORAL EVERY 6 HOURS PRN
Status: DISCONTINUED | OUTPATIENT
Start: 2021-05-20 | End: 2021-05-20 | Stop reason: HOSPADM

## 2021-05-20 RX ORDER — SODIUM CHLORIDE 0.9 % (FLUSH) 0.9 %
5-40 SYRINGE (ML) INJECTION EVERY 12 HOURS SCHEDULED
Status: DISCONTINUED | OUTPATIENT
Start: 2021-05-20 | End: 2021-05-20 | Stop reason: HOSPADM

## 2021-05-20 RX ORDER — BUDESONIDE AND FORMOTEROL FUMARATE DIHYDRATE 160; 4.5 UG/1; UG/1
2 AEROSOL RESPIRATORY (INHALATION) 2 TIMES DAILY
Status: DISCONTINUED | OUTPATIENT
Start: 2021-05-20 | End: 2021-05-20 | Stop reason: HOSPADM

## 2021-05-20 RX ORDER — ALBUTEROL SULFATE 90 UG/1
2 AEROSOL, METERED RESPIRATORY (INHALATION) 4 TIMES DAILY PRN
Status: DISCONTINUED | OUTPATIENT
Start: 2021-05-20 | End: 2021-05-20 | Stop reason: HOSPADM

## 2021-05-20 RX ORDER — ACETAMINOPHEN 650 MG/1
650 SUPPOSITORY RECTAL EVERY 6 HOURS PRN
Status: DISCONTINUED | OUTPATIENT
Start: 2021-05-20 | End: 2021-05-20 | Stop reason: HOSPADM

## 2021-05-20 RX ORDER — ATORVASTATIN CALCIUM 80 MG/1
80 TABLET, FILM COATED ORAL NIGHTLY
Status: DISCONTINUED | OUTPATIENT
Start: 2021-05-20 | End: 2021-05-20 | Stop reason: HOSPADM

## 2021-05-20 RX ORDER — PANTOPRAZOLE SODIUM 40 MG/1
40 TABLET, DELAYED RELEASE ORAL
Status: DISCONTINUED | OUTPATIENT
Start: 2021-05-20 | End: 2021-05-20 | Stop reason: HOSPADM

## 2021-05-20 RX ORDER — SODIUM CHLORIDE 9 MG/ML
25 INJECTION, SOLUTION INTRAVENOUS PRN
Status: DISCONTINUED | OUTPATIENT
Start: 2021-05-20 | End: 2021-05-20 | Stop reason: HOSPADM

## 2021-05-20 RX ORDER — ASPIRIN 81 MG/1
81 TABLET ORAL DAILY
Status: DISCONTINUED | OUTPATIENT
Start: 2021-05-20 | End: 2021-05-20 | Stop reason: HOSPADM

## 2021-05-20 RX ORDER — LANOLIN ALCOHOL/MO/W.PET/CERES
400 CREAM (GRAM) TOPICAL 2 TIMES DAILY
Status: DISCONTINUED | OUTPATIENT
Start: 2021-05-20 | End: 2021-05-20 | Stop reason: HOSPADM

## 2021-05-20 RX ORDER — MORPHINE SULFATE 2 MG/ML
4 INJECTION, SOLUTION INTRAMUSCULAR; INTRAVENOUS ONCE
Status: COMPLETED | OUTPATIENT
Start: 2021-05-20 | End: 2021-05-20

## 2021-05-20 RX ORDER — SODIUM CHLORIDE 0.9 % (FLUSH) 0.9 %
5-40 SYRINGE (ML) INJECTION PRN
Status: DISCONTINUED | OUTPATIENT
Start: 2021-05-20 | End: 2021-05-20 | Stop reason: HOSPADM

## 2021-05-20 RX ORDER — ONDANSETRON 2 MG/ML
4 INJECTION INTRAMUSCULAR; INTRAVENOUS ONCE
Status: COMPLETED | OUTPATIENT
Start: 2021-05-20 | End: 2021-05-20

## 2021-05-20 RX ORDER — FLUTICASONE PROPIONATE 50 MCG
2 SPRAY, SUSPENSION (ML) NASAL DAILY
Status: DISCONTINUED | OUTPATIENT
Start: 2021-05-20 | End: 2021-05-20 | Stop reason: HOSPADM

## 2021-05-20 RX ORDER — AZELASTINE 1 MG/ML
2 SPRAY, METERED NASAL 2 TIMES DAILY
Status: DISCONTINUED | OUTPATIENT
Start: 2021-05-20 | End: 2021-05-20 | Stop reason: CLARIF

## 2021-05-20 RX ORDER — FUROSEMIDE 20 MG/1
20 TABLET ORAL DAILY
Status: DISCONTINUED | OUTPATIENT
Start: 2021-05-20 | End: 2021-05-20 | Stop reason: HOSPADM

## 2021-05-20 RX ORDER — DILTIAZEM HYDROCHLORIDE 180 MG/1
180 CAPSULE, COATED, EXTENDED RELEASE ORAL 2 TIMES DAILY
Status: DISCONTINUED | OUTPATIENT
Start: 2021-05-20 | End: 2021-05-20 | Stop reason: HOSPADM

## 2021-05-20 RX ORDER — LISINOPRIL 5 MG/1
5 TABLET ORAL DAILY
Status: DISCONTINUED | OUTPATIENT
Start: 2021-05-20 | End: 2021-05-20 | Stop reason: HOSPADM

## 2021-05-20 RX ORDER — ONDANSETRON 2 MG/ML
4 INJECTION INTRAMUSCULAR; INTRAVENOUS EVERY 6 HOURS PRN
Status: DISCONTINUED | OUTPATIENT
Start: 2021-05-20 | End: 2021-05-20 | Stop reason: HOSPADM

## 2021-05-20 RX ORDER — POLYETHYLENE GLYCOL 3350 17 G/17G
17 POWDER, FOR SOLUTION ORAL DAILY PRN
Status: DISCONTINUED | OUTPATIENT
Start: 2021-05-20 | End: 2021-05-20 | Stop reason: HOSPADM

## 2021-05-20 RX ORDER — ESOMEPRAZOLE MAGNESIUM 20 MG/1
20 TABLET, DELAYED RELEASE ORAL DAILY
Status: DISCONTINUED | OUTPATIENT
Start: 2021-05-20 | End: 2021-05-20 | Stop reason: CLARIF

## 2021-05-20 RX ORDER — PROMETHAZINE HYDROCHLORIDE 12.5 MG/1
12.5 TABLET ORAL EVERY 6 HOURS PRN
Status: DISCONTINUED | OUTPATIENT
Start: 2021-05-20 | End: 2021-05-20 | Stop reason: HOSPADM

## 2021-05-20 RX ORDER — AZELASTINE 1 MG/ML
2 SPRAY, METERED NASAL 2 TIMES DAILY
COMMUNITY

## 2021-05-20 RX ADMIN — LISINOPRIL 5 MG: 5 TABLET ORAL at 08:00

## 2021-05-20 RX ADMIN — ACETAMINOPHEN 650 MG: 325 TABLET ORAL at 07:35

## 2021-05-20 RX ADMIN — FUROSEMIDE 20 MG: 20 TABLET ORAL at 08:00

## 2021-05-20 RX ADMIN — Medication 400 MG: at 08:00

## 2021-05-20 RX ADMIN — ONDANSETRON 4 MG: 2 INJECTION INTRAMUSCULAR; INTRAVENOUS at 02:39

## 2021-05-20 RX ADMIN — TICAGRELOR 90 MG: 90 TABLET ORAL at 08:00

## 2021-05-20 RX ADMIN — PANTOPRAZOLE SODIUM 40 MG: 40 TABLET, DELAYED RELEASE ORAL at 06:17

## 2021-05-20 RX ADMIN — ASPIRIN 81 MG: 81 TABLET, COATED ORAL at 08:00

## 2021-05-20 RX ADMIN — SODIUM CHLORIDE, PRESERVATIVE FREE 10 ML: 5 INJECTION INTRAVENOUS at 07:36

## 2021-05-20 RX ADMIN — NITROGLYCERIN 1 INCH: 20 OINTMENT TOPICAL at 02:39

## 2021-05-20 RX ADMIN — MORPHINE SULFATE 4 MG: 2 INJECTION, SOLUTION INTRAMUSCULAR; INTRAVENOUS at 02:39

## 2021-05-20 RX ADMIN — DILTIAZEM HYDROCHLORIDE 180 MG: 180 CAPSULE, COATED, EXTENDED RELEASE ORAL at 08:00

## 2021-05-20 RX ADMIN — ENOXAPARIN SODIUM 40 MG: 40 INJECTION SUBCUTANEOUS at 08:00

## 2021-05-20 RX ADMIN — TIOTROPIUM BROMIDE INHALATION SPRAY 2 PUFF: 3.12 SPRAY, METERED RESPIRATORY (INHALATION) at 08:43

## 2021-05-20 RX ADMIN — BUDESONIDE AND FORMOTEROL FUMARATE DIHYDRATE 2 PUFF: 160; 4.5 AEROSOL RESPIRATORY (INHALATION) at 08:43

## 2021-05-20 ASSESSMENT — ENCOUNTER SYMPTOMS
VOMITING: 0
BACK PAIN: 0
EYES NEGATIVE: 1
COLOR CHANGE: 0
ABDOMINAL PAIN: 0
COUGH: 0
ALLERGIC/IMMUNOLOGIC NEGATIVE: 1
PHOTOPHOBIA: 0
RHINORRHEA: 0
SHORTNESS OF BREATH: 0
NAUSEA: 0
COUGH: 1
WHEEZING: 0
BLOOD IN STOOL: 0
DIARRHEA: 0
SORE THROAT: 0

## 2021-05-20 ASSESSMENT — PAIN SCALES - GENERAL
PAINLEVEL_OUTOF10: 0

## 2021-05-20 ASSESSMENT — PAIN DESCRIPTION - DESCRIPTORS: DESCRIPTORS: PRESSURE

## 2021-05-20 ASSESSMENT — PAIN DESCRIPTION - LOCATION: LOCATION: CHEST

## 2021-05-20 NOTE — H&P
[place] [address] MEDICINE    HISTORY AND PHYSICAL EXAM    PATIENT NAME:  Matt Ni    MRN:  [id number]  SERVICE DATE:  5/20/2021   SERVICE TIME:  4:38 AM    Primary Care Physician: Lilly Echavarria PA-C         SUBJECTIVE  CHIEF COMPLAINT: Chest pain    HPI: Patient being admitted for chest pain. Patient is a pleasant, alert and oriented x1 52-year-old female. Patient reports that she began to have chest pain this evening before coming to the ED. Patient has nitro at home and took 1 nitro with no relief. She then took a second nitro and waited for the pain to subside. Pain did not subside and she took a third nitro. At this point she said that she started to feel dizzy and decided to walk to her bed to lay down and she passed out. Patient states that her pain was a 4 out of 5 and a stabbing pain. Her pain is also worse upon palpation. Patient denied diaphoresis, numbness in extremities, shortness of breath, fever, abdominal pain, nausea, or vomiting. Patient has a history of CAD with a stent placed in the [facility] on 8/31/2020. She was also found at that time to have a 20 to 30% stenosis of the proximal LAD and a 50 to 70% stenosis of the mid LAD. Patient's other past medical history includes COPD, diabetes type 2 without the use of insulin, Hodgkin's disease, and hypertension. Patient is a tobacco smoker and has reducing her smoking to 3 to 5 cigarettes daily. Patient denies use of alcohol or illicit drug use.       PAST MEDICAL HISTORY:    Past Medical History:   Diagnosis Date    Anticoagulant long-term use     Asthma 2004    CAD (coronary artery disease)     COPD (chronic obstructive pulmonary disease) (HCC)     Diabetes mellitus ([facility] [address] 2014    Hodgkin disease ([facility] [address] 1999    Hyperlipidemia     Hypertension 1984    Migraines 1989    Sleep apnea     recently tested     [facility]     type II     PAST SURGICAL HISTORY:    Past Surgical History: Procedure Laterality Date    TUBAL LIGATION       FAMILY HISTORY:  No family history on file. SOCIAL HISTORY:    Social History     Socioeconomic History    Marital status: Single     Spouse name: Not on file    Number of children: Not on file    Years of education: Not on file    Highest education level: Not on file   Occupational History    Not on file   Tobacco Use    Smoking status: Former Smoker     Packs/day: 1.00     Years: 37.00     Pack years: 37.00     Types: Cigarettes     Quit date: 2020     Years since quittin.6    Smokeless tobacco: Never Used   Vaping Use    Vaping Use: Never used   Substance and Sexual Activity    Alcohol use: Not Currently    Drug use: Never    Sexual activity: Yes     Partners: Male   Other Topics Concern    Not on file   Social History Narrative    Not on file     Social Determinants of Health     Financial Resource Strain:     Difficulty of Paying Living Expenses:    Food Insecurity:     Worried About Running Out of Food in the Last Year:     Ran Out of Food in the Last Year:    Transportation Needs:     Lack of Transportation (Medical):  Lack of Transportation (Non-Medical):    Physical Activity:     Days of Exercise per Week:     Minutes of Exercise per Session:    Stress:     Feeling of Stress :    Social Connections:     Frequency of Communication with Friends and Family:     Frequency of Social Gatherings with Friends and Family:     Attends Mosque Services:     Active Member of Clubs or Organizations:     Attends Club or Organization Meetings:     Marital Status:    Intimate Partner Violence:     Fear of Current or Ex-Partner:     Emotionally Abused:     Physically Abused:     Sexually Abused:      MEDICATIONS:   Prior to Admission medications    Medication Sig Start Date End Date Taking?  Authorizing Provider   azelastine (ASTELIN) 0.1 % nasal spray 2 sprays by Nasal route 2 times daily Use in each nostril as directed   Yes Historical Provider, MD   tiotropium (SPIRIVA RESPIMAT) 2.5 MCG/ACT AERS inhaler Inhale 2 puffs into the lungs daily 4/27/21  Yes Maya Haines MD   budesonide-formoterol (SYMBICORT) 160-4.5 MCG/ACT AERO Inhale 2 puffs into the lungs 2 times daily 4/12/21  Yes Maya Haines MD   dilTIAZem (TIAZAC) 180 MG extended release capsule Take 180 mg by mouth 2 times daily   Yes Historical Provider, MD   Esomeprazole Magnesium (NEXIUM 24HR) 20 MG TBEC Take 20 mg by mouth daily as needed   Yes Historical Provider, MD   magnesium oxide (MAG-OX) 400 MG tablet Take 400 mg by mouth 2 times daily 11/13/20  Yes Historical Provider, MD   triamcinolone (NASACORT) 55 MCG/ACT nasal inhaler USE 2 SPRAYS INTO THE NOSE ONCE DAILY 12/21/20  Yes Historical Provider, MD   atorvastatin (LIPITOR) 40 MG tablet 80 mg  2/10/21  Yes Historical Provider, MD   albuterol sulfate HFA (VENTOLIN HFA) 108 (90 Base) MCG/ACT inhaler Inhale 2 puffs into the lungs 4 times daily as needed for Wheezing 3/17/21  Yes Maya Haines MD   metFORMIN (GLUCOPHAGE) 500 MG tablet Take 1,000 mg by mouth 2 times daily (with meals)   Yes Historical Provider, MD   empagliflozin (JARDIANCE) 10 MG tablet Take 10 mg by mouth daily   Yes Historical Provider, MD   mometasone (ELOCON) 0.1 % cream Apply topically daily Apply topically daily inside of both ears   Yes Historical Provider, MD   furosemide (LASIX) 20 MG tablet Take 20 mg by mouth daily   Yes Historical Provider, MD   potassium chloride (KLOR-CON M) 10 MEQ extended release tablet Take 10 mEq by mouth daily   Yes Historical Provider, MD   lisinopril (PRINIVIL;ZESTRIL) 5 MG tablet Take 5 mg by mouth daily   Yes Historical Provider, MD   dilTIAZem (CARDIZEM CD) 120 MG extended release capsule Take 1 capsule by mouth 2 times daily 8/31/20  Yes Lindsay Juares MD   aspirin 81 MG EC tablet Take 1 tablet by mouth daily 9/1/20  Yes Lindsay Juares MD   ticagrelor (BRILINTA) 90 MG TABS tablet Take 1 tablet by mouth 2 times daily 8/31/20  Yes Harley Jackson MD   nitroGLYCERIN (NITROSTAT) 0.4 MG SL tablet up to max of 3 total doses. If no relief after 1 dose, call 911. 7/23/20  Yes Faisal Roth MD   EPINEPHrine (EPIPEN) 0.3 MG/0.3ML SOAJ injection Inject 0.3 mg into the muscle as needed 12/21/20   Historical Provider, MD   nicotine (NICODERM CQ) 21 MG/24HR Place 1 patch onto the skin daily 3/17/21 4/28/21  Lesli Crews MD   pantoprazole (PROTONIX) 40 MG tablet Take 1 tablet by mouth every morning (before breakfast) 11/14/20   Aaron Winn MD   magnesium oxide (MAG-OX) 400 (240 Mg) MG tablet Take 1 tablet by mouth 2 times daily 11/13/20   Aaron Winn MD   mupirocin (BACTROBAN) 2 % ointment Apply topically 2 times daily Apply topically twice daily inside of nose    Historical Provider, MD   ipratropium-albuterol (DUONEB) 0.5-2.5 (3) MG/3ML SOLN nebulizer solution Inhale 3 mLs into the lungs 3 times daily as needed for Shortness of Breath 10/2/20 11/1/20  Danna Jorgensen DO       ALLERGIES: Shellfish-derived products, Cumin oil, and Piper    REVIEW OF SYSTEM:   Review of Systems   Constitutional: Negative for appetite change, fatigue, fever and unexpected weight change. HENT: Negative for congestion, rhinorrhea and sore throat. Eyes: Negative. Negative for photophobia and visual disturbance. Respiratory: Negative for cough, shortness of breath and wheezing. Cardiovascular: Positive for chest pain. Negative for leg swelling. Gastrointestinal: Negative for abdominal pain, nausea and vomiting. Endocrine: Negative. Negative for polydipsia, polyphagia and polyuria. Genitourinary: Negative for difficulty urinating, dysuria and pelvic pain. Musculoskeletal: Negative for back pain. Skin: Negative. Negative for rash. Allergic/Immunologic: Negative. Neurological: Positive for dizziness and syncope. Negative for speech difficulty and weakness. Hematological: Negative.     Psychiatric/Behavioral: Negative. Negative for behavioral problems and confusion. OBJECTIVE  PHYSICAL EXAM: /85   Pulse 72   Temp 98.2 °F (36.8 °C) (Oral)   Resp 15   Ht 5' 2\" (1.575 m)   Wt 186 lb 9.6 oz (84.6 kg)   SpO2 98%   BMI 34.13 kg/m²     Physical Exam  Vitals and nursing note reviewed. Constitutional:       General: She is not in acute distress. Appearance: She is well-developed. HENT:      Head: Normocephalic and atraumatic. Nose: Nose normal.   Eyes:      Pupils: Pupils are equal, round, and reactive to light. Cardiovascular:      Rate and Rhythm: Normal rate and regular rhythm. Pulses: Normal pulses. Heart sounds: Normal heart sounds. Pulmonary:      Effort: Pulmonary effort is normal. No respiratory distress. Breath sounds: Normal breath sounds. No wheezing or rales. Chest:      Chest wall: Tenderness ( Left side tender on palpation) present. Abdominal:      General: Bowel sounds are normal. There is no distension. Palpations: Abdomen is soft. There is no mass. Tenderness: There is no abdominal tenderness. There is no guarding or rebound. Hernia: No hernia is present. Musculoskeletal:         General: Normal range of motion. Cervical back: Normal range of motion. Right lower leg: No edema. Left lower leg: No edema. Skin:     General: Skin is warm and dry. Capillary Refill: Capillary refill takes less than 2 seconds. Neurological:      Mental Status: She is alert and oriented to person, place, and time. DATA:     Diagnostic tests reviewed for today's visit:    Most recent labs and imaging results reviewed.      LABS:    Recent Results (from the past 24 hour(s))   Comprehensive Metabolic Panel    Collection Time: 05/20/21  2:15 AM   Result Value Ref Range    Sodium 137 135 - 144 mEq/L    Potassium 5.2 (H) 3.4 - 4.9 mEq/L    Chloride 101 95 - 107 mEq/L    CO2 23 20 - 31 mEq/L    Anion Gap 13 9 - 15 mEq/L    Glucose 95 70 - 99 mg/dL    BUN 11 6 - 20 mg/dL    CREATININE 0.63 0.50 - 0.90 mg/dL    GFR Non-African American >60.0 >60    GFR  >60.0 >60    Calcium 9.5 8.5 - 9.9 mg/dL    Total Protein 7.7 6.3 - 8.0 g/dL    Albumin 4.2 3.5 - 4.6 g/dL    Total Bilirubin 0.3 0.2 - 0.7 mg/dL    Alkaline Phosphatase 104 40 - 130 U/L    ALT 17 0 - 33 U/L    AST 24 0 - 35 U/L    Globulin 3.5 2.3 - 3.5 g/dL   CBC Auto Differential    Collection Time: 05/20/21  2:15 AM   Result Value Ref Range    WBC 11.1 (H) 4.8 - 10.8 K/uL    RBC 4.65 4.20 - 5.40 M/uL    Hemoglobin 13.7 12.0 - 16.0 g/dL    Hematocrit 40.5 37.0 - 47.0 %    MCV 87.1 82.0 - 100.0 fL    MCH 29.4 27.0 - 31.3 pg    MCHC 33.7 33.0 - 37.0 %    RDW 14.5 11.5 - 14.5 %    Platelets 980 316 - 593 K/uL    Neutrophils % 68.6 %    Lymphocytes % 20.9 %    Monocytes % 5.0 %    Eosinophils % 5.1 %    Basophils % 0.4 %    Neutrophils Absolute 7.6 (H) 1.4 - 6.5 K/uL    Lymphocytes Absolute 2.3 1.0 - 4.8 K/uL    Monocytes Absolute 0.6 0.2 - 0.8 K/uL    Eosinophils Absolute 0.6 0.0 - 0.7 K/uL    Basophils Absolute 0.0 0.0 - 0.2 K/uL   Troponin    Collection Time: 05/20/21  2:15 AM   Result Value Ref Range    Troponin <0.010 0.000 - 0.010 ng/mL   Magnesium    Collection Time: 05/20/21  2:15 AM   Result Value Ref Range    Magnesium 2.0 1.7 - 2.4 mg/dL       IMAGING:  No results found. VTE Prophylaxis: low molecular weight heparin -  start    ASSESSMENT AND PLAN    Principal Problem:  Chest pain: Chest pain relieved with Nitropaste in ED. Chest x-ray unremarkable. Troponin negative. EKG sinus with no ST elevation. We will consult cardiology. We will cycle troponin. We will repeat EKG. Patient will remain on telemetry monitoring. Active Problems:  CAD: History of cardiac stent in 2020. Patient on aspirin and Brilinta. We will resume home meds. Patient will remain on telemetry monitoring. COPD: Controlled with home medications. No recent acute exacerbations.   We will resume home meds. HTN: Patient on home meds to control. We will resume home meds. Diabetes mellitus: Patient on oral medications to control. We will monitor and cover with medium sliding scale insulin AC and at bedtime    Plan of care discussed with: patient    SIGNATURE: KAMILLE Villanueva CNP  DATE: May 20, 2021  TIME: 4:38 AM     ISSAC Mcdonald MD - supervising physician

## 2021-05-20 NOTE — CONSULTS
[facility] Cardiology Consult Note        Date of Consult:   2021    Patient:    Nila Delgado    :    1970  CSN:    [id number]    Consulting Cardiologist: Lupe Huerta MD     Primary Cardiologist: Maribell Guerra MD [facility]    Requesting Physician:  Samina Krishnamurthy DO      Reason for Consult:  Chest Pain      Assessment:    Chest Pain, Atypical, probable Muscular Skeletal.  CAD post right coronary drug-eluting stent angioplasty 2020, known 50% left circumflex. Negative troponins to date. Long-term Brilinta antiplatelet therapy. Negative Myoview Stress 2020  Preserved left ventricular function, LVEF 65%. Hypertension  Hyperlipidemia  Diabetes  COPD  Nocturnal snoring / insomnia, negative sleep study for sleep apnea. Obesity  Ongoing tobacco abuse  Hemorrhoidal bleeding. Abnormal mammogram, evaluating for breast cancer treatment. Plan:    1. Cardiac Conservative Supportive Care  2. Patients symptoms are non cardiac most likely and due to MSK discomfort. 3. Suggest continuing current medications. 4. OK to DC home from CV point once ok with others. 5. Follow up with [facility] and Me as noted, earlier if needed.   6. See Orders        HISTORY OF PRESENT ILLNESS:      Nila Delgado is a pleasant 44 y.o. female with a history of CAD status post stent, COPD, diabetes, hypertension, hyperlipidemia, presents with chest pain.  Patient mentioned that in the morning she was washing dishes when she started having chest pain.  She noted the pain for few hours but the pain continued.  Therefore, she took nitroglycerin and waited 5 minutes with no improvement.  She took another nitro and her pain improved.  Later patient started having pain after dinner as well.  She thought it was gas or acid reflux related.  However, the pain persists.  She took nitro in the evening and she felt dizzy.  She tried to walk to the couch but she passed out before getting there.  Family were trying to wake her up but she was having difficulty moving her extremities.  9 1 were called and patient was brought into the ED.  Patient's symptoms improved in the ED.  Patient described her chest pain as pressure sensation in the center of her chest with some stabbing sometimes. Pain reproducible, but due to history, patient admitted and cardiology consulted. Pain resolved. Troponins negative and no EKG changes. Pt was asking to go home. Pt did state that she was lifting heavy groceries two days ago and chest pain likely MSK related to that as it was reproducible with negative cardiac workup to date. Cardiology asked to see in consultation. Patient Follows with Dr Terri Hale, [facility]. See the last office note attached below. Patient History and Records, EMR reviewed. Patient Interviewed and examined. Good historian. She currently is feeling better. Much less discomfort today. Cardiac evaluation negative with negative troponins to date. Wishes to be discharged home. Denies exertional CP, SOB, LH, Dizziness, TIA or CVA Symptoms. No Orthopnea, significant Edema or CHF symptoms. No Palpitations. No Syncope. No Fever, Chills or Cold symptoms. No GI,  or Bleeding complaints. Cardiac and general ROS otherwise negative. [address] otherwise negative other than noted. Past Medical History:   Diagnosis Date    Anticoagulant long-term use     Asthma 2004    CAD (coronary artery disease)     COPD (chronic obstructive pulmonary disease) (HCC)     Diabetes mellitus ([facility] [address] [facility] 2014    Hodgkin disease ([facility] [facility] 1999    Hyperlipidemia     Hypertension 1984    Migraines 1989    Sleep apnea     recently tested     Type 2 diabetes mellitus without complication ([facility] [address] [facility]     type II       Past Surgical History:   Procedure Laterality Date    TUBAL LIGATION         Prior to Admission medications    Medication Sig Start Date End Date Taking?  Authorizing Provider   azelastine (ASTELIN) 0.1 % nasal spray 2 sprays by Nasal route 2 times daily Use in each nostril as directed   Yes Historical Provider, MD   tiotropium (SPIRIVA RESPIMAT) 2.5 MCG/ACT AERS inhaler Inhale 2 puffs into the lungs daily 4/27/21  Yes Geremias Low MD   budesonide-formoterol (SYMBICORT) 160-4.5 MCG/ACT AERO Inhale 2 puffs into the lungs 2 times daily 4/12/21  Yes Geremias Low MD   dilTIAZem (TIAZAC) 180 MG extended release capsule Take 180 mg by mouth 2 times daily   Yes Historical Provider, MD   Esomeprazole Magnesium (NEXIUM 24HR) 20 MG TBEC Take 20 mg by mouth daily as needed   Yes Historical Provider, MD   magnesium oxide (MAG-OX) 400 MG tablet Take 400 mg by mouth 2 times daily 11/13/20  Yes Historical Provider, MD   triamcinolone (NASACORT) 55 MCG/ACT nasal inhaler USE 2 SPRAYS INTO THE NOSE ONCE DAILY 12/21/20  Yes Historical Provider, MD   atorvastatin (LIPITOR) 40 MG tablet 80 mg  2/10/21  Yes Historical Provider, MD   albuterol sulfate HFA (VENTOLIN HFA) 108 (90 Base) MCG/ACT inhaler Inhale 2 puffs into the lungs 4 times daily as needed for Wheezing 3/17/21  Yes Geremias Low MD   metFORMIN (GLUCOPHAGE) 500 MG tablet Take 1,000 mg by mouth 2 times daily (with meals)   Yes Historical Provider, MD   empagliflozin (JARDIANCE) 10 MG tablet Take 10 mg by mouth daily   Yes Historical Provider, MD   mometasone (ELOCON) 0.1 % cream Apply topically daily Apply topically daily inside of both ears   Yes Historical Provider, MD   furosemide (LASIX) 20 MG tablet Take 20 mg by mouth daily   Yes Historical Provider, MD   potassium chloride (KLOR-CON M) 10 MEQ extended release tablet Take 10 mEq by mouth daily   Yes Historical Provider, MD   lisinopril (PRINIVIL;ZESTRIL) 5 MG tablet Take 5 mg by mouth daily   Yes Historical Provider, MD   aspirin 81 MG EC tablet Take 1 tablet by mouth daily 9/1/20  Yes Sienna Beard MD   ticagrelor (BRILINTA) 90 MG TABS tablet Take 1 tablet by mouth 2 times daily 8/31/20  Yes Sienna Beard MD nitroGLYCERIN (NITROSTAT) 0.4 MG SL tablet up to max of 3 total doses.  If no relief after 1 dose, call 911. 7/23/20  Yes Christelle Tinsley MD   EPINEPHrine (EPIPEN) 0.3 MG/0.3ML SOAJ injection Inject 0.3 mg into the muscle as needed 12/21/20   Historical Provider, MD   nicotine (NICODERM CQ) 21 MG/24HR Place 1 patch onto the skin daily 3/17/21 4/28/21  Marisa Eason MD   pantoprazole (PROTONIX) 40 MG tablet Take 1 tablet by mouth every morning (before breakfast) 11/14/20   Adrian Choudhury MD   magnesium oxide (MAG-OX) 400 (240 Mg) MG tablet Take 1 tablet by mouth 2 times daily 11/13/20   Adrian Choudhury MD   mupirocin (BACTROBAN) 2 % ointment Apply topically 2 times daily Apply topically twice daily inside of nose    Historical Provider, MD   ipratropium-albuterol (DUONEB) 0.5-2.5 (3) MG/3ML SOLN nebulizer solution Inhale 3 mLs into the lungs 3 times daily as needed for Shortness of Breath 10/2/20 11/1/20  Thierry Patino, DO       Scheduled Meds:   sodium chloride flush  5-40 mL Intravenous 2 times per day    enoxaparin  40 mg Subcutaneous Daily    aspirin  81 mg Oral Daily    atorvastatin  80 mg Oral Nightly    budesonide-formoterol  2 puff Inhalation BID    dilTIAZem  180 mg Oral BID    empagliflozin  10 mg Oral Daily    furosemide  20 mg Oral Daily    lisinopril  5 mg Oral Daily    magnesium oxide  400 mg Oral BID    ticagrelor  90 mg Oral BID    tiotropium  2 puff Inhalation Daily    fluticasone  2 spray Each Nostril Daily    nicotine  1 patch Transdermal Daily    pantoprazole  40 mg Oral QAM AC     Continuous Infusions:   sodium chloride       PRN Meds:sodium chloride flush, sodium chloride, promethazine **OR** ondansetron, polyethylene glycol, acetaminophen **OR** acetaminophen, albuterol sulfate HFA    Allergies   Allergen Reactions    Shellfish-Derived Products Anaphylaxis    Cumin Oil Itching    Piper Itching       Social History     Socioeconomic History    Marital status: Single     Spouse name: Not on file    Number of children: Not on file    Years of education: Not on file    Highest education level: Not on file   Occupational History    Not on file   Tobacco Use    Smoking status: Former Smoker     Packs/day: 1.00     Years: 37.00     Pack years: 37.00     Types: Cigarettes     Quit date: 2020     Years since quittin.6    Smokeless tobacco: Never Used   Vaping Use    Vaping Use: Never used   Substance and Sexual Activity    Alcohol use: Not Currently    Drug use: Never    Sexual activity: Yes     Partners: Male   Other Topics Concern    Not on file   Social History Narrative    Not on file     Social Determinants of Health     Financial Resource Strain:     Difficulty of Paying Living Expenses:    Food Insecurity:     Worried About Running Out of Food in the Last Year:     [address] in the Last Year:    Transportation Needs:     Lack of Transportation (Medical):  Lack of Transportation (Non-Medical):    Physical Activity:     Days of Exercise per Week:     Minutes of Exercise per Session:    Stress:     Feeling of Stress :    Social Connections:     Frequency of Communication with Friends and Family:     Frequency of Social Gatherings with Friends and Family:     Attends Anabaptist Services:     Active Member of Clubs or Organizations:     Attends Club or Organization Meetings:     Marital Status:    Intimate Partner Violence:     Fear of Current or Ex-Partner:     Emotionally Abused:     Physically Abused:     Sexually Abused:        No family history on file. Review Of Systems:    14 point ROS negative other than mentioned.      Physical Exam:    CURRENT VITALS: BP (!) 115/59   Pulse 69   Temp 97.7 °F (36.5 °C) (Oral)   Resp 18   Ht 5' 2\" (1.575 m)   Wt 186 lb 9.6 oz (84.6 kg)   SpO2 95%   BMI 34.13 kg/m²     CONSTITUTIONAL:  awake, alert, cooperative, no apparent distress,   ENT:  Normocephalic, without obvious abnormality, atraumatic, sinuses nontender on palpation, external ears without lesions,  NECK:  Supple, symmetrical, trachea midline, no adenopathy, thyroid symmetric, not enlarged and no tenderness, skin normal, No bruits. LUNGS:  Normal work of breathing, Decreased air exchange, CTA and P.  CARDIOVASCULAR:  Normal apical impulse, regular rate and rhythm, normal S1 and S2,  6/4 Systolic murmur noted. Reproducible Chest discomfort with palpation. ABDOMEN:  Obese, normal bowel sounds, soft, non-distended, non-tender, no masses palpated, no hepatosplenomegally  EXTREMETIES: Trace edema, Pulses Strong Thruout.  No ulcers. NEUROLOGIC:  Awake, alert, oriented to name, place and time. Following all commands and moving all extremties.   SKIN:  no bruising or bleeding, normal skin color, texture, turgor and no rashes    Labs:  Recent Results (from the past 24 hour(s))   EKG 12 lead    Collection Time: 05/20/21  1:51 AM   Result Value Ref Range    Ventricular Rate 85 BPM    Atrial Rate 85 BPM    P-R Interval 274 ms    QRS Duration 78 ms    Q-T Interval 362 ms    QTc Calculation (Bazett) 430 ms    P Axis 61 degrees    R Axis 34 degrees    T Axis 56 degrees   Comprehensive Metabolic Panel    Collection Time: 05/20/21  2:15 AM   Result Value Ref Range    Sodium 137 135 - 144 mEq/L    Potassium 5.2 (H) 3.4 - 4.9 mEq/L    Chloride 101 95 - 107 mEq/L    CO2 23 20 - 31 mEq/L    Anion Gap 13 9 - 15 mEq/L    Glucose 95 70 - 99 mg/dL    BUN 11 6 - 20 mg/dL    CREATININE 0.63 0.50 - 0.90 mg/dL    GFR Non-African American >60.0 >60    GFR  >60.0 >60    Calcium 9.5 8.5 - 9.9 mg/dL    Total Protein 7.7 6.3 - 8.0 g/dL    Albumin 4.2 3.5 - 4.6 g/dL    Total Bilirubin 0.3 0.2 - 0.7 mg/dL    Alkaline Phosphatase 104 40 - 130 U/L    ALT 17 0 - 33 U/L    AST 24 0 - 35 U/L    Globulin 3.5 2.3 - 3.5 g/dL   CBC Auto Differential    Collection Time: 05/20/21  2:15 AM   Result Value Ref Range    WBC 11.1 (H) 4.8 - 10.8 K/uL    RBC 4. 65 4.20 - 5.40 M/uL    Hemoglobin 13.7 12.0 - 16.0 g/dL    Hematocrit 40.5 37.0 - 47.0 %    MCV 87.1 82.0 - 100.0 fL    MCH 29.4 27.0 - 31.3 pg    MCHC 33.7 33.0 - 37.0 %    RDW 14.5 11.5 - 14.5 %    Platelets 848 893 - 039 K/uL    Neutrophils % 68.6 %    Lymphocytes % 20.9 %    Monocytes % 5.0 %    Eosinophils % 5.1 %    Basophils % 0.4 %    Neutrophils Absolute 7.6 (H) 1.4 - 6.5 K/uL    Lymphocytes Absolute 2.3 1.0 - 4.8 K/uL    Monocytes Absolute 0.6 0.2 - 0.8 K/uL    Eosinophils Absolute 0.6 0.0 - 0.7 K/uL    Basophils Absolute 0.0 0.0 - 0.2 K/uL   Troponin    Collection Time: 05/20/21  2:15 AM   Result Value Ref Range    Troponin <0.010 0.000 - 0.010 ng/mL   Magnesium    Collection Time: 05/20/21  2:15 AM   Result Value Ref Range    Magnesium 2.0 1.7 - 2.4 mg/dL   EKG 12 Lead    Collection Time: 05/20/21  6:00 AM   Result Value Ref Range    Ventricular Rate 75 BPM    Atrial Rate 75 BPM    P-R Interval 282 ms    QRS Duration 74 ms    Q-T Interval 394 ms    QTc Calculation (Bazett) 439 ms    P Axis 52 degrees    R Axis 21 degrees    T Axis 66 degrees   POCT Glucose    Collection Time: 05/20/21  6:15 AM   Result Value Ref Range    POC Glucose 115 60 - 115 mg/dl    Performed on ACCU-CHEK    Troponin    Collection Time: 05/20/21  7:52 AM   Result Value Ref Range    Troponin <0.010 0.000 - 0.010 ng/mL   POCT Glucose    Collection Time: 05/20/21 10:54 AM   Result Value Ref Range    POC Glucose 131 (H) 60 - 115 mg/dl    Performed on ACCU-CHEK    Troponin    Collection Time: 05/20/21  1:43 PM   Result Value Ref Range    Troponin <0.010 0.000 - 0.010 ng/mL       ECG:     SR, No Acute Changes     CT Head:  Negative        Sabiha Chavis MD  [facility] Cardiologist      Electronically signed on 5/20/21 at 3:24 PM EDT      -----    Last [facility] Office Note:      PCP: Primary Care Physician is Dr. James Inman    Subjective:   05/12/2021 - Bailey Cabrera was seen in cardiac evaluation at the [facility] office 05/12/2021. The patients problems are listed in the impression below. Electronic medical records reviewed. Patient returns after follow-up of February 2021. She had a recent myocardial infarction right coronary angioplasty stenting. She had a cardiac increase to twice a day and increase Lipitor. She feels better. She is lost 20 pounds. She has rare palpitations. Has had no further chest pain or dyspnea on exertion. She feels well otherwise. Patient denies Chest Pain, SOB, Lightheadedness, Dizziness, TIA or CVA symptoms. No CHF or Edema. No GI,  or Bleeding Issues. No Recent Fever or Chills. She still smoking occasionally. Cardiovascular and general review of systems is otherwise negative. A 14-system review is otherwise negative, other than noted. ELECTROCARDIOGRAM: Sinus rhythm, poor wave enter progression. Nonspecific ST wave changes. Inferior ST-T wave changes secondarily. Rate 93. CARDIAC TESTING: None    LABORATORY DATA: Chem-7, CBC, liver function studies, TSH and normal except for glucose 141,. TSH normal. Magnesium 1.9. Cholesterol 174, triglyceride 219, HDL 36, LDL 91. Otherwise as noted below. All above testing was personally reviewed. PHYSICAL EXAMINATION:   General: No acute distress. Vital signs as noted. Alert and oriented. Head And Neck Examination: No jugular venous distention, no carotid bruits, no mass. Carotid upstrokes preserved. Oral mucosa moist. No xanthelasma. Head and neck examination otherwise unremarkable. Lungs: Clear to auscultation and percussion. No wheezes, no rales, and no rhonchi. Chest: Excursion appeared to be normal. No chest wall tenderness on palpation. Heart: Normal S1 and S2. No S3. No S4. No rub. Grade 1/6 systolic murmur, best heard at the left sternal border. Point of maximal impulse was within normal limits. Abdomen: Soft. Nontender. No organomegaly. No bruits. No masses. Obese.   Extremities: Right greater than left 2+ bipedal edema. No clubbing. No cyanosis. Pulses are diminished throughout. No bruits. Musculoskeletal Exam: No ulcers, otherwise unremarkable. Neuro: Neurologically appeared grossly intact. IMPRESSION:     Cardiovascular status stable  Improved  Palpitations  CAD post right coronary drug-eluting stent angioplasty August 2020, known 50% left circumflex. Long-term Brilinta antiplatelet therapy. Abnormal Myoview perfusion stress test with anterior apical ischemia by Lexiscan, LVEF 75% 8/2020. Preserved left ventricular function, LVEF 65%. Hypertension  Hyperlipidemia  Diabetes  COPD  Nocturnal snoring / insomnia, negative sleep study for sleep apnea. Obesity  Ongoing tobacco abuse  Hemorrhoidal bleeding. Abnormal mammogram, evaluating for breast cancer treatment. Otherwise as per assessment below. RECOMMENDATIONS:     Would suggest that she continue her current medications. She is not quite well 1 year post angioplasty and therefore will continue on her Brilinta despite reported hemorrhoids. Suggested discontinuing smoking. We will increase her Lipitor to 80 mg daily and add Vascepa 2 gms twice daily. She will continue her other medications. Refills were provided. Follow mild portal was encouraged. We will plan to see back in 3 months. With Laboratory Studies and ECG as noted below. Patient will follow up with their primary physician for general care. The patient knows to contact medical care earlier if need be. Mira Judd MD, [address] [facility] Cardiology      Of Note:  Netsocket voice recognition dictation software was utilized partially in the preparation of this note, therefore, inaccuracies in spelling, word choice and punctuation may have occurred which were not recognized the time of signing. Active Problems   1. Abnormal electrocardiogram (794.31) (R94.31)   2. Abnormal stress test (794.39) (R94.39)   3.  CAD (coronary artery disease) (414.00) (I25.10)   · posr Requested for: 31IAT1527; Last   GS:95HSP9631 Ordered   6. Cartia  MG Oral Capsule Extended Release 24 Hour; 1 capsule twice a day; Therapy: 29Pbp9968 to (Evaluate:59Caw6856)  Requested for: 64Mcv9009; Last   Rx:12Knn1327 Ordered   7. FreeStyle Freedom Lite w/Device Kit; use as directed; Therapy: 77Eps5991 to Recorded   8. FreeStyle Lancets; Therapy: 32Vxk2729 to Recorded   9. FreeStyle Lite Test In [employer]; Therapy: 24RNW6033 to Recorded   10. Furosemide 20 MG Oral Tablet; take 1 tablet daily; Therapy: 05Pit2245 to (Evaluate:08Jan2022)  Requested for: 14Jan2021; Last    Rx:13Jan2021 Ordered   11. Jardiance 10 MG Oral Tablet; TAKE 1 TABLET BY MOUTH ONCE DAILY; Therapy: (Recorded:33Ncr4691) to Recorded   12. Lisinopril 5 MG Oral Tablet; TAKE 1 TABLET DAILY; Therapy: 35XVY1353 to (Evaluate:08Jan2022)  Requested for: 01IZV2896; Last    Rx:13Jan2021 Ordered   15. Magnesium Oxide 400 MG Oral Tablet; TAKE 1 TABLET TWICE DAILY; Therapy: 09WWK5879 to Recorded   14. metFORMIN HCl - 500 MG Oral Tablet; TAKE TWO TABS TWICE DAILY; Therapy: 30HIA2143 to Recorded   15. NexIUM 20 MG Oral Capsule Delayed Release; TAKE 1 CAPSULE TWICE DAILY; Therapy: (DOPBLSSS:82FYJ2836) to Recorded   16. Nitroglycerin 0.4 MG Sublingual Tablet Sublingual; PLACE 1 TABLET UNDER THE    TONGUE EVERY 5 MINUTES FOR UP TO 3 DOSES AS NEEDED FOR CHEST    PAIN. CALL 911 IF PAIN PERSISTS; Therapy: 02WZK9839 to Recorded   17. Potassium Chloride Carrol ER 10 MEQ Oral Tablet Extended Release; TAKE 1 TABLET    DAILY; Therapy: 88Riu2552 to (Evaluate:08Jan2022)  Requested for: 02OMM4926; Last    Rx:13Jan2021 Ordered   18. Spiriva Respimat 2.5 MCG/ACT Inhalation Aerosol Solution; TAKE 2 INHALATIONS DAILY; Therapy: 96Ybm7440 to Recorded   19. Symbicort 160-4.5 MCG/ACT Inhalation Aerosol; INHALE 2 PUFFS TWICE DAILY. RINSE    MOUTH AFTER USE; Therapy: (Recorded:26Oct2020) to Recorded   20.  Triamcinolone Acetonide 55 MCG/ACT 3.4-4.9   Chloride 105 mEq/L     Co2 24 mEq/L  20-31   BUN 11 mg/dL  6-20   Creatinine 0.77 mg/dL  0.50-0.90   Calcium 9.7 mg/dL  8.5-9.9   Glucose 141 mg/dL H 70-99   GAP 12 mEq/L  9-15   Glomerular Filtration Ratio >60.0  >60   Glomerular Filtration Ratio_AA >60.0  >60     TSH 30GFL1406 08:23AM KAYLA CLAYTON     Test Name Result Flag Reference   TSH 1.630 uIU/mL  0.440-3.860     Magnesium 57IFA7698 08:23AM KAYLA CLAYTON     Test Name Result Flag Reference   Magnesium 1.9 mg/dL  1.7-2.4       Past Medical History   1. History of angina pectoris (V12.59) (Z86.79)   2. History of shortness of breath (V13.89) (Z87.898)   3. History of tachycardia (V13.89) (Z87.898)   4. History of Leg swelling (729.81) (M79.89)    Procedure    EKG done in office today; :   .     Assessment   1. Palpitations (785.1) (R00.2)   2. Chest pain (786.50) (R07.9)   3. Abnormal electrocardiogram (794.31) (R94.31)   4. CAD (coronary artery disease) (414.00) (I25.10)   5. Long term current use of antithrombotics/antiplatelets (Q60.95) (N72.16)   6. S/P PTCA (percutaneous transluminal coronary angioplasty) (V45.82) (Z98.61)   7. HTN (hypertension) (401.9) (I10)   8. Hyperlipidemia (272.4) (E78.5)   9. Diabetes mellitus (250.00) (E11.9)   10. Current every day smoker (305.1) (F17.200)   11. Obesity (278.00) (E66.9)    Plan   1. Start: Atorvastatin Calcium 80 MG Oral Tablet (Lipitor); TAKE 1 TABLET AT BEDTIME   2. Start: Vascepa 1 GM Oral Capsule (Icosapent Ethyl); TAKE 2 CAPSULES  TWICE DAILY   3. 12 Lead EKG; Status:Hold For - Exact Date; Requested for:68Hpr6245 ekg done in office   today;    4. Basic Metabolic Panel; Status:Active; Requested for:12Aug2021;   Call if Potassium result is <3.5 or >5.0 : YES   5. CBC; Status:Active; Requested for:12Aug2021;    6. EKG at next office visit.; Status:Active; Requested for:12May2021;    7. Hemoglobin A1C; Status:Active; Requested for:12Aug2021;    8.  Lipid Panel w/Reflex LDL; you will    help us continue our mission to provide excellent medical care. Your participation in the    survey is voluntary and completely confidential. We appreciate your thoughts regarding    today's visit.; Status:Active; Requested for:12May2021;    21. Tobacco use Hx Reported; Status:Complete;   Done: 26UVG1120   22. You need to quit smoking.; Status:Active;  Requested for:12May2021;     Discussion/Summary  Start Vascepa 2 grams twice daily  Increase Atorvastatin to 80 mg at bedtime     Signatures  Electronically signed by : Honorio Woo, ; May 12 2021  1:51PM EST                        Electronically signed by : Nancy Irwin LPN; May 12 0889  4:13HW EST                        Electronically signed by : Devin Arthur M.D.; May 18 2021  4:51PM EST

## 2021-05-20 NOTE — PROGRESS NOTES
Patient given discharge instructions; advised to call the Dr. If any questions or concerns. Patient denies any CP. Aware of medications/purpose/times and follow up care. No acute distress.  at bedside. Patient left via wc to front;  without concerns also. Will delete NCP.

## 2021-05-20 NOTE — DISCHARGE SUMMARY
Physician Discharge Summary     Patient ID:  Octavio Doss  [id number]  69 y.o.  1970    Admit date: 5/20/2021    Discharge date : 05/20/21     Admitting Physician: Donnie Oden MD     Discharge Physician: Francisco Javier Olivares DO     Admission Diagnoses: Chest pain [R07.9]    Discharge Diagnoses: Atypical chest pain, likely MSK    Admission Condition: fair    Discharged Condition: good    Hospital Course: 44 y.o. female with a history of CAD status post stent, COPD, diabetes, hypertension, hyperlipidemia, presents with chest pain. Patient mentioned that in the morning she was washing dishes when she started having chest pain. She noted the pain for few hours but the pain continued. Therefore, she took nitroglycerin and waited 5 minutes with no improvement. She took another nitro and her pain improved. Later patient started having pain after dinner as well. She thought it was gas or acid reflux related. However, the pain persists. She took nitro in the evening and she felt dizzy. She tried to walk to the couch but she passed out before getting there. Family were trying to wake her up but she was having difficulty moving her extremities. 9 1 were called and patient was brought into the ED. Patient's symptoms improved in the ED. Patient described her chest pain as pressure sensation in the center of her chest with some stabbing sometimes. Pain reproducible, but due to history, patient admitted and cardiology consulted. Pain resolved. Troponins negative and no EKG changes. Pt was asking to go home. Pt did state that she was lifting heavy groceries two days ago and chest pain likely MSK related to that as it was reproducible with negative cardiac workup. Consults: cardiology      Discharge Exam:  Constitutional: Awake and alert in no acute distress.  Lying in bed comfortably  Head: Normocephalic, atraumatic  Eyes: EOMI, PERRLA  ENT: moist mucous membranes  Neck: neck supple, trachea midline  Lungs: Good inspiratory effort, CTABL, no wheeze, no rhonchi, no rales  Heart: RRR, normal S1 and S2, no murmurs, L pectoral pain reproducible with palpation  GI: Soft, non-distended, non tender, no guarding, no rebound, +BS  MSK: no edema noted  Skin: warm, dry  Psych: appropriate affect       Labs:   Recent Labs     05/20/21 0215   WBC 11.1*   HGB 13.7   HCT 40.5        Recent Labs     05/20/21 0215      K 5.2*      CO2 23   BUN 11   CREATININE 0.63   CALCIUM 9.5     Recent Labs     05/20/21 0215   AST 24   ALT 17   BILITOT 0.3   ALKPHOS 104     No results for input(s): INR in the last 72 hours. Recent Labs     05/20/21 0215 05/20/21  0752   TROPONINI <0.010 <0.010       Urinalysis:   No results found for: Wilhemena Rafiq, BACTERIA, RBCUA, BLOODU, [address] [place]    Radiology:   Most recent    Chest CT      WITH CONTRAST:No results found for this or any previous visit. WITHOUT CONTRAST: No results found for this or any previous visit. CXR      2-view: Results for orders placed during the hospital encounter of 08/28/20    XR CHEST (2 VW)    Narrative  XR CHEST (2 VW): 8/28/2020    CLINICAL HISTORY: I20.9 Angina pectoris ([facility] [address] ICD10. COMPARISON: Portable chest 7/21/2020 and two-view chest 7/19/2019. TECHNIQUE: Upright PA and lateral radiographs of the chest were obtained. FINDINGS:    Hyperinflation and coarsening of the bronchovascular structures is consistent with COPD, which is best evaluated clinically. There are no developing infiltrates, pleural effusion, cardiomegaly, vascular congestion, pneumothorax, or displaced fractures identified. Impression  NO EVIDENCE OF ACTIVE CARDIOPULMONARY DISEASE. SUSPECT COPD, WHICH IS BEST EVALUATED CLINICALLY.       Results for orders placed during the hospital encounter of 07/19/19    XR CHEST STANDARD (2 VW)    Narrative  X-RAY: A CHEST, 2 VIEW(S):    CLINICAL HISTORY:   ingestion  of grease    DATE: 7/19/2019 5:30 PM    COMPARISONS: None available. FINDINGS:  Normal cardiac silhouette. Lungs are clear without consolidation. No pleural effusion or pneumothorax. There are mild degenerative changes in spine. Impression  NO ACUTE CARDIOPULMONARY ABNORMALITY. Portable: Results for orders placed during the hospital encounter of 05/20/21    XR CHEST PORTABLE    Narrative  EXAMINATION: XR CHEST PORTABLE    CLINICAL HISTORY: CHEST PAIN    COMPARISONS: FEBRUARY 25, 2021    FINDINGS: Osseous structures intact. Cardiopericardial silhouette normal. Pulmonary vasculature normal. Lungs clear. Impression  NO ACUTE CARDIOPULMONARY DISEASE. Echo No results found for this or any previous visit. Disposition: home    In process/preliminary results:  Outstanding Order Results     No orders found from 4/21/2021 to 5/21/2021. Patient Instructions:   Current Discharge Medication List      CONTINUE these medications which have NOT CHANGED    Details   azelastine (ASTELIN) 0.1 % nasal spray 2 sprays by Nasal route 2 times daily Use in each nostril as directed      tiotropium (SPIRIVA RESPIMAT) 2.5 MCG/ACT AERS inhaler Inhale 2 puffs into the lungs daily  Qty: 1 Inhaler, Refills: 5    Associated Diagnoses: Asthma-COPD overlap syndrome (HCC)      budesonide-formoterol (SYMBICORT) 160-4.5 MCG/ACT AERO Inhale 2 puffs into the lungs 2 times daily  Qty: 1 Inhaler, Refills: 5    Associated Diagnoses:  Moderate persistent asthma without complication      dilTIAZem (TIAZAC) 180 MG extended release capsule Take 180 mg by mouth 2 times daily      Esomeprazole Magnesium (NEXIUM 24HR) 20 MG TBEC Take 20 mg by mouth daily as needed      magnesium oxide (MAG-OX) 400 MG tablet Take 400 mg by mouth 2 times daily      triamcinolone (NASACORT) 55 MCG/ACT nasal inhaler USE 2 SPRAYS INTO THE NOSE ONCE DAILY      atorvastatin (LIPITOR) 40 MG tablet 80 mg       albuterol sulfate HFA (VENTOLIN HFA) 108 (90 Base) MCG/ACT inhaler Inhale 2 puffs into the lungs 4 times daily as needed for Wheezing  Qty: 1 Inhaler, Refills: 5    Associated Diagnoses: Moderate persistent asthma without complication      metFORMIN (GLUCOPHAGE) 500 MG tablet Take 1,000 mg by mouth 2 times daily (with meals)      empagliflozin (JARDIANCE) 10 MG tablet Take 10 mg by mouth daily      mometasone (ELOCON) 0.1 % cream Apply topically daily Apply topically daily inside of both ears      furosemide (LASIX) 20 MG tablet Take 20 mg by mouth daily      potassium chloride (KLOR-CON M) 10 MEQ extended release tablet Take 10 mEq by mouth daily      lisinopril (PRINIVIL;ZESTRIL) 5 MG tablet Take 5 mg by mouth daily      aspirin 81 MG EC tablet Take 1 tablet by mouth daily  Qty: 90 tablet, Refills: 3      ticagrelor (BRILINTA) 90 MG TABS tablet Take 1 tablet by mouth 2 times daily  Qty: 60 tablet, Refills: 5      nitroGLYCERIN (NITROSTAT) 0.4 MG SL tablet up to max of 3 total doses. If no relief after 1 dose, call 911.   Qty: 25 tablet, Refills: 3      EPINEPHrine (EPIPEN) 0.3 MG/0.3ML SOAJ injection Inject 0.3 mg into the muscle as needed      nicotine (NICODERM CQ) 21 MG/24HR Place 1 patch onto the skin daily  Qty: 42 patch, Refills: 0    Associated Diagnoses: Smoking      pantoprazole (PROTONIX) 40 MG tablet Take 1 tablet by mouth every morning (before breakfast)  Qty: 30 tablet, Refills: 3      magnesium oxide (MAG-OX) 400 (240 Mg) MG tablet Take 1 tablet by mouth 2 times daily  Qty: 30 tablet, Refills: 3      mupirocin (BACTROBAN) 2 % ointment Apply topically 2 times daily Apply topically twice daily inside of nose      ipratropium-albuterol (DUONEB) 0.5-2.5 (3) MG/3ML SOLN nebulizer solution Inhale 3 mLs into the lungs 3 times daily as needed for Shortness of Breath  Qty: 270 mL, Refills: 0         STOP taking these medications       dilTIAZem (CARDIZEM CD) 120 MG extended release capsule Comments:   Reason for Stopping:             Activity: activity as tolerated  Diet: cardiac diet  Wound Care: none needed    Follow-up with Navarro Levin PA-C  in 1 week.     DC time 35 minutes    Signed:  Electronically signed by Francisco Javier Olivares DO on 5/20/2021 at 12:52 PM

## 2021-05-20 NOTE — PROGRESS NOTES
[facility] EMERGENCY [facility] AT [place] Respiratory Therapy Evaluation   Current Order:  Albuterol q4 prn    Home Regimen: prn    Ordering Physician:  KAMILLE Karimi CNP  Re-evaluation Date:  5/23    Diagnosis: chest pain     Patient Status: Stable / Unstable + Physician notified    The following MDI Criteria must be met in order to convert aerosol to MDI with spacer.  If unable to meet, MDI will be converted to aerosol:  []  Patient able to demonstrate the ability to use MDI effectively  []  Patient alert and cooperative  []  Patient able to take deep breath with 5-10 second hold  []  Medication(s) available in this delivery method   []  Peak flow greater than or equal to 200 ml/min            Current Order Substituted To  (same drug, same frequency)   Aerosol to MDI [] Albuterol Sulfate 0.083% unit dose by aerosol Albuterol Sulfate MDI 2 puffs by inhalation with spacer    [] Levalbuterol 1.25 mg unit dose by aerosol Levalbuterol MDI 2 puffs by inhalation with spacer    [] Levalbuterol 0.63 mg unit dose by aerosol Levalbuterol MDI 2 puffs by inhalation with spacer    [] Ipratropium Bromide 0.02% unit dose by aerosol Ipratropium Bromide MDI 2 puffs by inhalation with spacer    [] Duoneb (Ipratropium + Albuterol) unit dose by aerosol Ipratropium MDI + Albuterol MDI 2 puffs by inhalation w/spacer   MDI to Aerosol [] Albuterol Sulfate MDI Albuterol Sulfate 0.083% unit dose by aerosol    [] Levalbuterol MDI 2 puffs by inhalation Levalbuterol 1.25 mg unit dose by aerosol    [] Ipratropium Bromide MDI by inhalation Ipratropium Bromide 0.02% unit dose by aerosol    [] Combivent (Ipratropium + Albuterol) MDI by inhalation Duoneb (Ipratropium + Albuterol) unit dose by aerosol       Treatment Assessment [Frequency/Schedule]:  Change frequency to: ____________________Freq Q4prn______________________________per Protocol, P&T, MEC      Points 0 1 2 3 4   Pulmonary Status  Non-Smoker  []   Smoking history   < 20 pack years  []   Smoking history  ?  20 pack years  []   Pulmonary Disorder  (acute or chronic)  [x]   Severe or Chronic w/ Exacerbation  []     Surgical Status No [x]   Surgeries     General []   Surgery Lower []   Abdominal Thoracic or []   Upper Abdominal Thoracic with  PulmonaryDisorder  []     Chest X-ray Clear/Not  Ordered     []  Chronic Changes  Results Pending  [x]  Infiltrates, atelectasis, pleural effusion, or edema  []  Infiltrates in more than one lobe []  Infiltrate + Atelectasis, &/or pleural effusion  []    Respiratory Pattern Regular,  RR = 12-20 [x]  Increased,  RR = 21-25 []  KC, irregular,  or RR = 26-30 []  Decreased FEV1  or RR = 31-35 []  Severe SOB, use  of accessory muscles, or RR ? 35  []    Mental Status Alert, oriented,  Cooperative [x]  Confused but Follows commands []  Lethargic or unable to follow commands []  Obtunded  []  Comatose  []    Breath Sounds Clear to  auscultation  [x]  Decreased unilaterally or  in bases only []  Decreased  bilaterally  []  Crackles or intermittent wheezes []  Wheezes []    Cough Strong, Spontan., & nonproductive [x]  Strong,  spontaneous, &  productive []  Weak,  Nonproductive []  Weak, productive or  with wheezes []  No spontaneous  cough or may require suctioning []    Level of Activity Ambulatory []  Ambulatory w/ Assist  [x]  Non-ambulatory []  Paraplegic []  Quadriplegic []    Total    Score:___5____     Triage Score:_____5___      Tri       Triage:     1. (>20) Freq: Q3    2. (16-20) Freq: Q4   3. (11-15) Freq: QID & Albuterol Q2 PRN    4. (6-10) Freq: TID & Albuterol Q2 PRN    5. (0-5) Freq Q4prn

## 2021-05-20 NOTE — ED NOTES
Bed: 04  Expected date:   Expected time:   Means of arrival:   Comments:  50F CP, 3 nitro      [facility] [place] [place]  05/20/21 0622

## 2021-05-20 NOTE — ED PROVIDER NOTES
[facility] ED  eMERGENCY dEPARTMENT eNCOUnter      Pt Name: Landon Ho  MRN: [id number]  [place] 1970  Date of evaluation: 5/20/2021  Provider: GIFTY Arellano      HISTORY OF PRESENT ILLNESS    Landon Ho is a 44 y.o. female with PMHx of diabetes, asthma, Hodgkin disease, hypertension CAD, COPD, hyperlipidemia presents to the emergency department with chest pain. Patient states 4 to 5 hours ago while at rest she started with left-sided stabbing chest pains and palpitations which were coming and going, she thinks they were worse with ambulation. She took 1 nitro with relief. Chest pain then returned and she took a 2nd nitro with relief. When she stood up however after taking a second nitroglycerin, she got lightheaded and fainted, hitting her head on the kitchen floor. She think she lost consciousness for a few seconds but cannot remember. When she woke up she was disoriented with generalized weakness. stabbing sensations are gone, she now has left anterior chest pressure. She does have occasional productive cough. She denies fevers, chills, shortness of breath, abdominal pain, nausea, vomiting. No drugs or alcohol. No stress or anxiety. Patient is on Brilinta at home. Aspirin given via ambulance. HPI    Nursing Notes were reviewed. REVIEW OF SYSTEMS       Review of Systems   Constitutional: Negative for appetite change, chills and fever. HENT: Negative for congestion, rhinorrhea and sore throat. Respiratory: Positive for cough. Negative for shortness of breath. Cardiovascular: Positive for chest pain and palpitations. Gastrointestinal: Negative for abdominal pain, blood in stool, diarrhea, nausea and vomiting. Genitourinary: Negative for difficulty urinating. Musculoskeletal: Negative for neck stiffness. Skin: Negative for color change and rash. Neurological: Positive for syncope and weakness.  Negative for dizziness, light-headedness, numbness and headaches. All other systems reviewed and are negative.             PAST MEDICAL HISTORY     Past Medical History:   Diagnosis Date    Anticoagulant long-term use     Asthma 2004    CAD (coronary artery disease)     [facility]     Diabetes mellitus ([facility] 2014    Hodgkin disease ([facility] 1999    Hyperlipidemia     Hypertension 1984    Migraines 1989    Sleep apnea     recently tested     Type 2 diabetes mellitus without complication ([facility]     type II         SURGICAL HISTORY       Past Surgical History:   Procedure Laterality Date    TUBAL LIGATION           CURRENT MEDICATIONS       Previous Medications    ALBUTEROL SULFATE HFA (VENTOLIN HFA) 108 (90 BASE) MCG/ACT INHALER    Inhale 2 puffs into the lungs 4 times daily as needed for Wheezing    ASPIRIN 81 MG EC TABLET    Take 1 tablet by mouth daily    ATORVASTATIN (LIPITOR) 40 MG TABLET    TAKE 1 TABLET BY MOUTH ONCE DAILY AT BEDTIME    BUDESONIDE-FORMOTEROL (SYMBICORT) 160-4.5 MCG/ACT AERO    Inhale 2 puffs into the lungs 2 times daily    DILTIAZEM (CARDIZEM CD) 120 MG EXTENDED RELEASE CAPSULE    Take 1 capsule by mouth 2 times daily    DILTIAZEM (TIAZAC) 180 MG EXTENDED RELEASE CAPSULE    Take 180 mg by mouth 2 times daily    EMPAGLIFLOZIN (JARDIANCE) 10 MG TABLET    Take 10 mg by mouth daily    EPINEPHRINE (EPIPEN) 0.3 MG/0.3ML SOAJ INJECTION    Inject 0.3 mg into the muscle as needed    ESOMEPRAZOLE MAGNESIUM (NEXIUM 24HR) 20 MG TBEC    Take 20 mg by mouth daily as needed    FUROSEMIDE (LASIX) 20 MG TABLET    Take 20 mg by mouth daily    IPRATROPIUM-ALBUTEROL (DUONEB) 0.5-2.5 (3) MG/3ML SOLN NEBULIZER SOLUTION    Inhale 3 mLs into the lungs 3 times daily as needed for Shortness of Breath    LISINOPRIL (PRINIVIL;ZESTRIL) 5 MG TABLET    Take 5 mg by mouth daily    MAGNESIUM OXIDE (MAG-OX) 400 (240 MG) MG TABLET    Take 1 tablet by mouth 2 times daily    MAGNESIUM OXIDE (MAG-OX) 400 MG TABLET    Take 400 mg by mouth 2 times daily    METFORMIN (GLUCOPHAGE) 500 MG TABLET    Take 1,000 mg by mouth 2 times daily (with meals)    MOMETASONE (ELOCON) 0.1 % CREAM    Apply topically daily Apply topically daily inside of both ears    MUPIROCIN (BACTROBAN) 2 % OINTMENT    Apply topically 2 times daily Apply topically twice daily inside of nose    NICOTINE (NICODERM CQ) 21 MG/24HR    Place 1 patch onto the skin daily    NITROGLYCERIN (NITROSTAT) 0.4 MG SL TABLET    up to max of 3 total doses. If no relief after 1 dose, call 911. PANTOPRAZOLE (PROTONIX) 40 MG TABLET    Take 1 tablet by mouth every morning (before breakfast)    POTASSIUM CHLORIDE (KLOR-CON M) 10 MEQ EXTENDED RELEASE TABLET    Take 10 mEq by mouth daily    TICAGRELOR (BRILINTA) 90 MG TABS TABLET    Take 1 tablet by mouth 2 times daily    TIOTROPIUM (SPIRIVA RESPIMAT) 2.5 MCG/ACT AERS INHALER    Inhale 2 puffs into the lungs daily    TRIAMCINOLONE (NASACORT) 55 MCG/ACT NASAL INHALER    USE 2 SPRAYS INTO THE NOSE ONCE DAILY       ALLERGIES     Shellfish-derived products, Cumin oil, and Piper    FAMILY HISTORY     No family history on file.        SOCIAL HISTORY       Social History     Socioeconomic History    Marital status: Single     Spouse name: Not on file    Number of children: Not on file    Years of education: Not on file    Highest education level: Not on file   Occupational History    Not on file   Tobacco Use    Smoking status: Former Smoker     Packs/day: 1.00     Years: 37.00     Pack years: 37.00     Types: Cigarettes     Quit date: 2020     Years since quittin.6    Smokeless tobacco: Never Used   Vaping Use    Vaping Use: Never used   Substance and Sexual Activity    Alcohol use: Not Currently    Drug use: Never    Sexual activity: Yes     Partners: Male   Other Topics Concern    Not on file   Social History Narrative    Not on file     Social Determinants of Health     Financial Resource Strain:     Difficulty of Paying Living Expenses: Food Insecurity:     Worried About Running Out of Food in the Last Year:     [address] in the Last Year:    Transportation Needs:     Lack of Transportation (Medical):  Lack of Transportation (Non-Medical):    Physical Activity:     Days of Exercise per Week:     Minutes of Exercise per Session:    Stress:     Feeling of Stress :    Social Connections:     Frequency of Communication with Friends and Family:     Frequency of Social Gatherings with Friends and Family:     Attends Church Services:     Active Member of Clubs or Organizations:     Attends Club or Organization Meetings:     Marital Status:    Intimate Partner Violence:     Fear of Current or Ex-Partner:     Emotionally Abused:     Physically Abused:     Sexually Abused:          PHYSICAL EXAM         ED Triage Vitals [05/20/21 0154]   BP Temp Temp Source Pulse Resp SpO2 Height Weight   (!) 167/89 98.2 °F (36.8 °C) Oral 81 20 98 % -- 189 lb (85.7 kg)       Physical Exam  Constitutional:       Appearance: She is well-developed. HENT:      Head: Normocephalic and atraumatic. Eyes:      Conjunctiva/sclera: Conjunctivae normal.      Pupils: Pupils are equal, round, and reactive to light. Neck:      Trachea: No tracheal deviation. Comments: No midline C, T, L spinous process tenderness, no paraspinal tenderness, no signs of trauma  Cardiovascular:      Heart sounds: Normal heart sounds. Pulmonary:      Effort: Pulmonary effort is normal. No respiratory distress. Breath sounds: Normal breath sounds. No stridor. Comments: Mild left anterior chest tenderness to palpation. Chest:      Chest wall: Tenderness present. Abdominal:      General: Bowel sounds are normal. There is no distension. Palpations: Abdomen is soft. There is no mass. Tenderness: There is no abdominal tenderness. There is no guarding or rebound. Musculoskeletal:         General: Normal range of motion.       Cervical back: Normal range of motion and neck supple. No tenderness. Skin:     General: Skin is warm and dry. Capillary Refill: Capillary refill takes less than 2 seconds. Findings: No rash. Neurological:      Mental Status: She is alert and oriented to person, place, and time. Deep Tendon Reflexes: Reflexes are normal and symmetric. Psychiatric:         Behavior: Behavior normal.         Thought Content: Thought content normal.         Judgment: Judgment normal.         DIAGNOSTIC RESULTS     EKG:All EKG's are interpreted by the Emergency Department Physician who either signs or Co-signs this chart in the absence of a cardiologist.    Sinus rhythm with first-degree AV block, OH interval 274, normal intervals, normal axis, no ST segment changes    RADIOLOGY:   Non-plain film images such as CT, Ultrasound and MRI are read by theradiologist. Plain radiographic images are visualized and preliminarily interpreted by the emergency physician with the below findings:    Interpretation per theRadiologist below, if available at the time of this note:    XR CHEST PORTABLE    (Results Pending)   CT Head WO Contrast    (Results Pending)           LABS:  Labs Reviewed   COMPREHENSIVE METABOLIC PANEL - Abnormal; Notable for the following components:       Result Value    Potassium 5.2 (*)     All other components within normal limits   CBC WITH AUTO DIFFERENTIAL - Abnormal; Notable for the following components:    WBC 11.1 (*)     Neutrophils Absolute 7.6 (*)     All other components within normal limits   TROPONIN   MAGNESIUM       All other labs were within normal range or not returned as of this dictation.     EMERGENCY DEPARTMENT COURSE and DIFFERENTIAL DIAGNOSIS/MDM:   Vitals:    Vitals:    05/20/21 0154 05/20/21 0200 05/20/21 0230   BP: (!) 167/89 (!) 143/80 136/73   Pulse: 81 81 77   Resp: 20 28 23   Temp: 98.2 °F (36.8 °C)     TempSrc: Oral     SpO2: 98% 95% 98%   Weight: 189 lb (85.7 kg)           MDM    8/31/20: Cardiac

## 2021-07-12 LAB
ANION GAP SERPL CALCULATED.3IONS-SCNC: 14 MEQ/L (ref 9–15)
BUN BLDV-MCNC: 9 MG/DL (ref 6–20)
CALCIUM SERPL-MCNC: 9.4 MG/DL (ref 8.5–9.9)
CHLORIDE BLD-SCNC: 106 MEQ/L (ref 95–107)
CO2: 23 MEQ/L (ref 20–31)
CREAT SERPL-MCNC: 0.74 MG/DL (ref 0.5–0.9)
GFR AFRICAN AMERICAN: >60
GFR NON-AFRICAN AMERICAN: >60
GLUCOSE BLD-MCNC: 124 MG/DL (ref 70–99)
HCT VFR BLD CALC: 44.2 % (ref 37–47)
HEMOGLOBIN: 14.9 G/DL (ref 12–16)
MCH RBC QN AUTO: 29.6 PG (ref 27–31.3)
MCHC RBC AUTO-ENTMCNC: 33.6 % (ref 33–37)
MCV RBC AUTO: 88 FL (ref 82–100)
PDW BLD-RTO: 14.4 % (ref 11.5–14.5)
PLATELET # BLD: 233 K/UL (ref 130–400)
POTASSIUM SERPL-SCNC: 4.4 MEQ/L (ref 3.4–4.9)
RBC # BLD: 5.03 M/UL (ref 4.2–5.4)
SODIUM BLD-SCNC: 143 MEQ/L (ref 135–144)
WBC # BLD: 7 K/UL (ref 4.8–10.8)

## 2021-09-20 ENCOUNTER — OFFICE VISIT (OUTPATIENT)
Dept: PULMONOLOGY | Age: 51
End: 2021-09-20
Payer: COMMERCIAL

## 2021-09-20 VITALS
SYSTOLIC BLOOD PRESSURE: 154 MMHG | HEIGHT: 62 IN | BODY MASS INDEX: 35.41 KG/M2 | WEIGHT: 192.4 LBS | TEMPERATURE: 98 F | HEART RATE: 106 BPM | OXYGEN SATURATION: 98 % | RESPIRATION RATE: 18 BRPM | DIASTOLIC BLOOD PRESSURE: 88 MMHG

## 2021-09-20 DIAGNOSIS — E66.09 CLASS 2 OBESITY DUE TO EXCESS CALORIES WITHOUT SERIOUS COMORBIDITY WITH BODY MASS INDEX (BMI) OF 37.0 TO 37.9 IN ADULT: ICD-10-CM

## 2021-09-20 DIAGNOSIS — F17.200 SMOKING: ICD-10-CM

## 2021-09-20 DIAGNOSIS — Z87.891 PERSONAL HISTORY OF TOBACCO USE: ICD-10-CM

## 2021-09-20 DIAGNOSIS — J45.40 MODERATE PERSISTENT ASTHMA WITHOUT COMPLICATION: Primary | ICD-10-CM

## 2021-09-20 DIAGNOSIS — K21.9 GASTROESOPHAGEAL REFLUX DISEASE WITHOUT ESOPHAGITIS: ICD-10-CM

## 2021-09-20 PROCEDURE — G0296 VISIT TO DETERM LDCT ELIG: HCPCS | Performed by: INTERNAL MEDICINE

## 2021-09-20 PROCEDURE — G8427 DOCREV CUR MEDS BY ELIG CLIN: HCPCS | Performed by: INTERNAL MEDICINE

## 2021-09-20 PROCEDURE — G8417 CALC BMI ABV UP PARAM F/U: HCPCS | Performed by: INTERNAL MEDICINE

## 2021-09-20 PROCEDURE — 3017F COLORECTAL CA SCREEN DOC REV: CPT | Performed by: INTERNAL MEDICINE

## 2021-09-20 PROCEDURE — 4004F PT TOBACCO SCREEN RCVD TLK: CPT | Performed by: INTERNAL MEDICINE

## 2021-09-20 PROCEDURE — 99213 OFFICE O/P EST LOW 20 MIN: CPT | Performed by: INTERNAL MEDICINE

## 2021-09-20 RX ORDER — CLOPIDOGREL BISULFATE 75 MG/1
75 TABLET ORAL DAILY
COMMUNITY

## 2021-09-20 RX ORDER — MONTELUKAST SODIUM 10 MG/1
10 TABLET ORAL NIGHTLY
COMMUNITY
Start: 2021-06-09

## 2021-09-20 RX ORDER — ICOSAPENT ETHYL 1000 MG/1
1 CAPSULE ORAL 2 TIMES DAILY WITH MEALS
COMMUNITY

## 2021-09-20 RX ORDER — IPRATROPIUM BROMIDE AND ALBUTEROL SULFATE 2.5; .5 MG/3ML; MG/3ML
1 SOLUTION RESPIRATORY (INHALATION) 3 TIMES DAILY PRN
Qty: 270 ML | Refills: 0 | Status: SHIPPED | OUTPATIENT
Start: 2021-09-20 | End: 2022-02-08

## 2021-09-20 RX ORDER — BUDESONIDE AND FORMOTEROL FUMARATE DIHYDRATE 160; 4.5 UG/1; UG/1
2 AEROSOL RESPIRATORY (INHALATION) 2 TIMES DAILY
Qty: 1 EACH | Refills: 3 | Status: SHIPPED | OUTPATIENT
Start: 2021-09-20 | End: 2022-01-13

## 2021-09-20 NOTE — PROGRESS NOTES
Subjective:     Issac Vu is a 46 y.o. female who complains today of:     Chief Complaint   Patient presents with    Follow-up     6 Month F/U for Moderate persistent asthma     Cough     Congestion for 3 days    Shortness of Breath       HPI  Patient presents for COPD    She is doing okay still has dyspnea on exertion, cough productive of yellow/Green phlegm, she has been smoking heavily recently up to 4 packs/day, currently cut down to 1 and half pack per day, she is under a lot of stress due to her daughter going through divorce, and her son visiting from [facility] causing troubles at home. No lower extremity edema, no fever no chills, no nasal congestion or postnasal drip, no lower extremity swelling. Denies heartburn, sleeps okay, no snoring, no choking while asleep. Allergies:  Shellfish-derived products, Cumin oil, and Piper  Past Medical History:   Diagnosis Date    Anticoagulant long-term use     Asthma     CAD (coronary artery disease)     COPD (chronic obstructive pulmonary disease) (HCC)     Diabetes mellitus ([facility]     [facility]     Hyperlipidemia     Hypertension 1984    Migraines     Sleep apnea     recently tested     Type 2 diabetes mellitus without complication ([facility]     type II     Past Surgical History:   Procedure Laterality Date    TUBAL LIGATION       History reviewed. No pertinent family history.   Social History     Socioeconomic History    Marital status: Single     Spouse name: Not on file    Number of children: Not on file    Years of education: Not on file    Highest education level: Not on file   Occupational History    Not on file   Tobacco Use    Smoking status: Current Every Day Smoker     Packs/day: 1.00     Years: 37.00     Pack years: 37.00     Types: Cigarettes     Last attempt to quit: 2020     Years since quittin.9    Smokeless tobacco: Never Used   Vaping Use    Vaping Use: Never used   Substance and Sexual Activity    Alcohol use: Not Currently    Drug use: Never    Sexual activity: Yes     Partners: Male   Other Topics Concern    Not on file   Social History Narrative    Not on file     Social Determinants of Health     Financial Resource Strain:     Difficulty of Paying Living Expenses:    Food Insecurity:     Worried About Running Out of Food in the Last Year:     [address] in the Last Year:    Transportation Needs:     Lack of Transportation (Medical):  Lack of Transportation (Non-Medical):    Physical Activity:     Days of Exercise per Week:     Minutes of Exercise per Session:    Stress:     Feeling of Stress :    Social Connections:     Frequency of Communication with Friends and Family:     Frequency of Social Gatherings with Friends and Family:     Attends [employer] Services:     Active Member of Clubs or Organizations:     Attends Club or Organization Meetings:     Marital Status:    Intimate Partner Violence:     Fear of Current or Ex-Partner:     Emotionally Abused:     Physically Abused:     Sexually Abused:          Review of Systems      ROS: 10 organs review of system is done including general, psychological, ENT, hematological, endocrine, respiratory, cardiovascular, gastrointestinal,musculoskeletal, neurological,  allergy and Immunology is done and is otherwise negative.     Current Outpatient Medications   Medication Sig Dispense Refill    clopidogrel (PLAVIX) 75 MG tablet Take 75 mg by mouth daily      Icosapent Ethyl (VASCEPA) 1 g CAPS capsule Take 1 g by mouth 2 times daily (with meals)      montelukast (SINGULAIR) 10 MG tablet Take 10 mg by mouth nightly      budesonide-formoterol (SYMBICORT) 160-4.5 MCG/ACT AERO Inhale 2 puffs into the lungs 2 times daily 1 each 3    ipratropium-albuterol (DUONEB) 0.5-2.5 (3) MG/3ML SOLN nebulizer solution Inhale 3 mLs into the lungs 3 times daily as needed for Shortness of Breath 270 mL 0    azelastine (ASTELIN) 0.1 % nasal spray 2 sprays by Nasal route 2 times daily Use in each nostril as directed      tiotropium (SPIRIVA RESPIMAT) 2.5 MCG/ACT AERS inhaler Inhale 2 puffs into the lungs daily 1 Inhaler 5    dilTIAZem (TIAZAC) 180 MG extended release capsule Take 240 mg by mouth 2 times daily       EPINEPHrine (EPIPEN) 0.3 MG/0.3ML SOAJ injection Inject 0.3 mg into the muscle as needed      Esomeprazole Magnesium (NEXIUM 24HR) 20 MG TBEC Take 20 mg by mouth daily as needed      magnesium oxide (MAG-OX) 400 MG tablet Take 400 mg by mouth 2 times daily      triamcinolone (NASACORT) 55 MCG/ACT nasal inhaler USE 2 SPRAYS INTO THE NOSE ONCE DAILY      atorvastatin (LIPITOR) 40 MG tablet 80 mg       albuterol sulfate HFA (VENTOLIN HFA) 108 (90 Base) MCG/ACT inhaler Inhale 2 puffs into the lungs 4 times daily as needed for Wheezing 1 Inhaler 5    pantoprazole (PROTONIX) 40 MG tablet Take 1 tablet by mouth every morning (before breakfast) 30 tablet 3    magnesium oxide (MAG-OX) 400 (240 Mg) MG tablet Take 1 tablet by mouth 2 times daily 30 tablet 3    metFORMIN (GLUCOPHAGE) 500 MG tablet Take 1,000 mg by mouth 2 times daily (with meals)      empagliflozin (JARDIANCE) 10 MG tablet Take 10 mg by mouth daily      mupirocin (BACTROBAN) 2 % ointment Apply topically 2 times daily Apply topically twice daily inside of nose      mometasone (ELOCON) 0.1 % cream Apply topically daily Apply topically daily inside of both ears      furosemide (LASIX) 20 MG tablet Take 20 mg by mouth daily      potassium chloride (KLOR-CON M) 10 MEQ extended release tablet Take 10 mEq by mouth daily      lisinopril (PRINIVIL;ZESTRIL) 5 MG tablet Take 10 mg by mouth daily       aspirin 81 MG EC tablet Take 1 tablet by mouth daily 90 tablet 3    nitroGLYCERIN (NITROSTAT) 0.4 MG SL tablet up to max of 3 total doses.  If no relief after 1 dose, call 911. 25 tablet 3    nicotine (NICODERM CQ) 21 MG/24HR Place 1 patch onto the skin daily 42 patch 0  ticagrelor (BRILINTA) 90 MG TABS tablet Take 1 tablet by mouth 2 times daily (Patient not taking: Reported on 9/20/2021) 60 tablet 5     No current facility-administered medications for this visit. Objective:     Vitals:    09/20/21 1340 09/20/21 1350   BP: (!) 156/91 (!) 154/88   Site: Left Upper Arm Left Upper Arm   Position: Sitting Sitting   Cuff Size: Large Adult Large Adult   Pulse: 106    Resp: 18    Temp: 98 °F (36.7 °C)    TempSrc: Tympanic    SpO2: 98%    Weight: 192 lb 6.4 oz (87.3 kg)    Height: 5' 2\" (1.575 m)          Physical Exam  Constitutional:       General: She is not in acute distress. Appearance: She is well-developed. She is not diaphoretic. HENT:      Head: Normocephalic and atraumatic. Eyes:      Conjunctiva/sclera: Conjunctivae normal.      Pupils: Pupils are equal, round, and reactive to light. Cardiovascular:      Rate and Rhythm: Normal rate and regular rhythm. Heart sounds: No murmur heard. No friction rub. No gallop. Pulmonary:      Effort: Pulmonary effort is normal. No respiratory distress. Breath sounds: Normal breath sounds. No wheezing or rales. Chest:      Chest wall: No tenderness. Abdominal:      General: There is no distension. Palpations: Abdomen is soft. Tenderness: There is no abdominal tenderness. There is no rebound. Musculoskeletal:         General: No tenderness. Cervical back: Normal range of motion and neck supple. Right lower leg: No edema. Left lower leg: No edema. Lymphadenopathy:      Cervical: No cervical adenopathy. Skin:     General: Skin is warm and dry. Findings: No erythema. Neurological:      Mental Status: She is alert and oriented to person, place, and time.    Psychiatric:         Judgment: Judgment normal.         Imaging studies reviewed by me high-resolution CT scan September 2020, no mass or nodule, multiple groundglass infiltrates    Lab results reviewed in chart  PFT August , shows FEV1 91%, FEV1/FVC 0.81  ECHO: 2020, EF 60%  Sleep study 2020 is negative  Assessment and Plan       Diagnosis Orders   1. Moderate persistent asthma without complication  budesonide-formoterol (SYMBICORT) 160-4.5 MCG/ACT AERO   2. Personal history of tobacco use  OH VISIT TO DISCUSS LUNG CA SCREEN W LDCT    CT Lung Screen (Annual)   3. Smoking     4. Class 2 obesity due to excess calories without serious comorbidity with body mass index (BMI) of 37.0 to 37.9 in adult     5. Gastroesophageal reflux disease without esophagitis       · Asthma symptoms better controlled, however she continues to have cough mainly due to smoking, continue Symbicort and Spiriva, yearly flu shot  · Smoking, will obtain CT lung cancer screening, smoking station counseling done today  · Weight loss is recommended  · GERD symptoms controlled      Orders Placed This Encounter   Procedures    CT Lung Screen (Annual)     Age: Patient is 46 y.o. Smoking History: Social History    Tobacco Use      Smoking status: Current Every Day Smoker        Packs/day: 1.00        Years: 37.00        Pack years: 37        Types: Cigarettes        Quit date: 2020        Years since quittin.9      Smokeless tobacco: Never Used    Vaping Use      Vaping Use: Never used    Alcohol use: Not Currently    Drug use: Never   Pack years: 40    Date of last lung cancer screening: No previous lung cancer screening exam     Standing Status:   Future     Standing Expiration Date:   3/20/2023     Order Specific Question:   Is there documentation of shared decision making? Answer:   Yes     Order Specific Question:   Is this a low dose CT or a routine CT? Answer:   Low Dose CT [1]     Order Specific Question:   Is this the first (baseline) CT or an annual exam?     Answer: Annual [2]     Order Specific Question:   Does the patient show any signs or symptoms of lung cancer?      Answer:   No     Order Specific Question: Smoking Status? Answer:   Current Every Day Smoker [1]     Order Specific Question:   Smoking packs per day? Answer:   1     Order Specific Question:   Years smoking? Answer:   40    [place] VISIT TO DISCUSS LUNG CA SCREEN W LDCT     Orders Placed This Encounter   Medications    budesonide-formoterol (SYMBICORT) 160-4.5 MCG/ACT AERO     Sig: Inhale 2 puffs into the lungs 2 times daily     Dispense:  1 each     Refill:  3    ipratropium-albuterol (DUONEB) 0.5-2.5 (3) MG/3ML SOLN nebulizer solution     Sig: Inhale 3 mLs into the lungs 3 times daily as needed for Shortness of Breath     Dispense:  270 mL     Refill:  0            Discussed with patient the importance of exercise and weight control and  overall health and well-being. Reviewed with the patient: current clinical status, medications, activities and diet. Side effects, adverse effects of the medication prescribed today, as well as treatment plan and result expectations have been discussed with the patient who expresses understanding and desires to proceed. Return in about 4 months (around 1/20/2022). Taco Goss MD    Low Dose CT (LDCT) Lung Screening criteria met   Age 50-69   Pack year smoking >30   Still smoking or less than 15 year since quit   No sign or symptoms of lung cancer   > 11 months since last LDCT     Risks and benefits of lung cancer screening with LDCT scans discussed:    Significance of positive screen - False-positive LDCT results often occur. 95% of all positive results do not lead to a diagnosis of cancer. Usually further imaging can resolve most false-positive results; however, some patients may require invasive procedures. Over diagnosis risk - 10% to 12% of screen-detected lung cancer cases are over diagnosedthat is, the cancer would not have been detected in the patient's lifetime without the screening.     Need for follow up screens annually to continue lung cancer screening effectiveness

## 2021-09-24 ENCOUNTER — TELEPHONE (OUTPATIENT)
Dept: CASE MANAGEMENT | Age: 51
End: 2021-09-24

## 2021-09-24 NOTE — TELEPHONE ENCOUNTER
According to CMS Guidelines, the patient does not meet the requirements for a CT Lung Screening. Waiting for insurance authorization to see if patient will be covered for CT Lung Screening. Patient is under age 52.     Ins approved scan 9/24/21
no anemia, no easy bruising, no jaundice, no swollen lymph nodes.

## 2021-10-04 ENCOUNTER — HOSPITAL ENCOUNTER (OUTPATIENT)
Dept: CT IMAGING | Age: 51
Discharge: HOME | End: 2021-10-06
Payer: COMMERCIAL

## 2021-10-04 DIAGNOSIS — Z87.891 PERSONAL HISTORY OF TOBACCO USE: ICD-10-CM

## 2021-10-04 PROCEDURE — 71271 CT THORAX LUNG CANCER SCR C-: CPT

## 2021-11-11 DIAGNOSIS — J45.40 MODERATE PERSISTENT ASTHMA WITHOUT COMPLICATION: ICD-10-CM

## 2021-11-11 DIAGNOSIS — J44.9 ASTHMA-COPD OVERLAP SYNDROME (HCC): ICD-10-CM

## 2021-11-11 RX ORDER — ALBUTEROL SULFATE 90 UG/1
2 AEROSOL, METERED RESPIRATORY (INHALATION) 4 TIMES DAILY PRN
Qty: 1 EACH | Refills: 3 | Status: SHIPPED | OUTPATIENT
Start: 2021-11-11 | End: 2022-03-28

## 2021-12-20 ENCOUNTER — HOSPITAL ENCOUNTER (EMERGENCY)
Age: 51
Discharge: HOME | End: 2021-12-20
Payer: COMMERCIAL

## 2021-12-20 ENCOUNTER — APPOINTMENT (OUTPATIENT)
Dept: GENERAL RADIOLOGY | Age: 51
End: 2021-12-20
Payer: COMMERCIAL

## 2021-12-20 VITALS
TEMPERATURE: 97.4 F | DIASTOLIC BLOOD PRESSURE: 84 MMHG | OXYGEN SATURATION: 99 % | HEART RATE: 92 BPM | SYSTOLIC BLOOD PRESSURE: 131 MMHG | WEIGHT: 192 LBS | BODY MASS INDEX: 35.12 KG/M2 | RESPIRATION RATE: 18 BRPM

## 2021-12-20 DIAGNOSIS — R07.9 CHEST PAIN, UNSPECIFIED TYPE: Primary | ICD-10-CM

## 2021-12-20 DIAGNOSIS — M62.838 SPASM OF MUSCLE: ICD-10-CM

## 2021-12-20 LAB
ALBUMIN SERPL-MCNC: 4.7 G/DL (ref 3.5–4.6)
ALP BLD-CCNC: 137 U/L (ref 40–130)
ALT SERPL-CCNC: 17 U/L (ref 0–33)
ANION GAP SERPL CALCULATED.3IONS-SCNC: 13 MEQ/L (ref 9–15)
AST SERPL-CCNC: 12 U/L (ref 0–35)
BASOPHILS ABSOLUTE: 0.1 K/UL (ref 0–0.2)
BASOPHILS RELATIVE PERCENT: 0.7 %
BILIRUB SERPL-MCNC: 0.3 MG/DL (ref 0.2–0.7)
BUN BLDV-MCNC: 11 MG/DL (ref 6–20)
CALCIUM SERPL-MCNC: 9.5 MG/DL (ref 8.5–9.9)
CHLORIDE BLD-SCNC: 100 MEQ/L (ref 95–107)
CO2: 25 MEQ/L (ref 20–31)
CREAT SERPL-MCNC: 0.8 MG/DL (ref 0.5–0.9)
EOSINOPHILS ABSOLUTE: 0.6 K/UL (ref 0–0.7)
EOSINOPHILS RELATIVE PERCENT: 8 %
GFR AFRICAN AMERICAN: >60
GFR NON-AFRICAN AMERICAN: >60
GLOBULIN: 3.5 G/DL (ref 2.3–3.5)
GLUCOSE BLD-MCNC: 115 MG/DL (ref 70–99)
HCT VFR BLD CALC: 44.5 % (ref 37–47)
HEMOGLOBIN: 14.8 G/DL (ref 12–16)
LYMPHOCYTES ABSOLUTE: 1.6 K/UL (ref 1–4.8)
LYMPHOCYTES RELATIVE PERCENT: 22.6 %
MCH RBC QN AUTO: 29.7 PG (ref 27–31.3)
MCHC RBC AUTO-ENTMCNC: 33.3 % (ref 33–37)
MCV RBC AUTO: 89.3 FL (ref 82–100)
MONOCYTES ABSOLUTE: 0.3 K/UL (ref 0.2–0.8)
MONOCYTES RELATIVE PERCENT: 4.1 %
NEUTROPHILS ABSOLUTE: 4.7 K/UL (ref 1.4–6.5)
NEUTROPHILS RELATIVE PERCENT: 64.6 %
PDW BLD-RTO: 14.1 % (ref 11.5–14.5)
PLATELET # BLD: 202 K/UL (ref 130–400)
POTASSIUM SERPL-SCNC: 4 MEQ/L (ref 3.4–4.9)
RBC # BLD: 4.99 M/UL (ref 4.2–5.4)
SARS-COV-2, NAAT: NOT DETECTED
SODIUM BLD-SCNC: 138 MEQ/L (ref 135–144)
TOTAL CK: 77 U/L (ref 0–170)
TOTAL PROTEIN: 8.2 G/DL (ref 6.3–8)
TROPONIN: <0.01 NG/ML (ref 0–0.01)
WBC # BLD: 7.3 K/UL (ref 4.8–10.8)

## 2021-12-20 PROCEDURE — 36415 COLL VENOUS BLD VENIPUNCTURE: CPT

## 2021-12-20 PROCEDURE — 99284 EMERGENCY DEPT VISIT MOD MDM: CPT

## 2021-12-20 PROCEDURE — 80053 COMPREHEN METABOLIC PANEL: CPT

## 2021-12-20 PROCEDURE — 87635 SARS-COV-2 COVID-19 AMP PRB: CPT

## 2021-12-20 PROCEDURE — 82550 ASSAY OF CK (CPK): CPT

## 2021-12-20 PROCEDURE — 93005 ELECTROCARDIOGRAM TRACING: CPT | Performed by: PHYSICIAN ASSISTANT

## 2021-12-20 PROCEDURE — 85025 COMPLETE CBC W/AUTO DIFF WBC: CPT

## 2021-12-20 PROCEDURE — 71046 X-RAY EXAM CHEST 2 VIEWS: CPT

## 2021-12-20 PROCEDURE — 84484 ASSAY OF TROPONIN QUANT: CPT

## 2021-12-20 RX ORDER — IBUPROFEN 600 MG/1
600 TABLET ORAL EVERY 8 HOURS PRN
Qty: 20 TABLET | Refills: 0 | Status: SHIPPED | OUTPATIENT
Start: 2021-12-20

## 2021-12-20 RX ORDER — CYCLOBENZAPRINE HCL 10 MG
10 TABLET ORAL 3 TIMES DAILY PRN
Qty: 21 TABLET | Refills: 0 | Status: SHIPPED | OUTPATIENT
Start: 2021-12-20 | End: 2021-12-30

## 2021-12-20 ASSESSMENT — ENCOUNTER SYMPTOMS
ABDOMINAL DISTENTION: 0
SHORTNESS OF BREATH: 0
VOMITING: 0
EYE DISCHARGE: 0
NAUSEA: 0
VOICE CHANGE: 0
APNEA: 0
ANAL BLEEDING: 0

## 2021-12-20 ASSESSMENT — HEART SCORE: ECG: 0

## 2021-12-20 ASSESSMENT — PAIN DESCRIPTION - PAIN TYPE: TYPE: ACUTE PAIN

## 2021-12-20 ASSESSMENT — PAIN DESCRIPTION - LOCATION: LOCATION: CHEST

## 2021-12-20 ASSESSMENT — PAIN SCALES - GENERAL: PAINLEVEL_OUTOF10: 7

## 2021-12-20 NOTE — ED PROVIDER NOTES
[address] [facility] ED  eMERGENCY dEPARTMENT eNCOUnter      Pt Name: Rodnye Sahu  MRN: [id number]  [place] 1970  Date of evaluation: 12/20/2021  Provider: Romana Valverde PA-C    CHIEF COMPLAINT       Chief Complaint   Patient presents with    Chest Pain     ON AND OFF SINCE  SATURDAY  STAES THAT HER LEFT ZARM  WARM AND CHILL NUMB          HISTORY OF PRESENT ILLNESS   (Location/Symptom, Timing/Onset,Context/Setting, Quality, Duration, Modifying Factors, Severity)  Note limiting factors. Rodney Sahu is a 46 y.o. female who presents to the emergency department pt sts cp lt neck pain and arm tingling. She is under added stress to pass exams for work. She \denies n/v/d/sob. Was sent from cardiology office prior to being seen. symptoms moderate in severity. HPI    NursingNotes were reviewed. REVIEW OF SYSTEMS    (2-9 systems for level 4, 10 or more for level 5)     Review of Systems   Constitutional: Negative for activity change, appetite change and unexpected weight change. HENT: Negative for ear discharge, nosebleeds and voice change. Eyes: Negative for discharge. Respiratory: Negative for apnea and shortness of breath. Cardiovascular: Positive for chest pain. Negative for leg swelling. Gastrointestinal: Negative for abdominal distention, anal bleeding, nausea and vomiting. Musculoskeletal: Positive for neck pain. Negative for joint swelling. Skin: Negative for pallor. Neurological: Negative for weakness. Psychiatric/Behavioral: Negative for behavioral problems, self-injury and sleep disturbance. All other systems reviewed and are negative. Except as noted above the remainder of the review of systems was reviewed and negative.        PAST MEDICAL HISTORY     Past Medical History:   Diagnosis Date    Anticoagulant long-term use     Asthma 2004    CAD (coronary artery disease)     [facility]     Diabetes mellitus ([facility] 2014    Hodgkin disease ([facility] 1999    Hyperlipidemia     Hypertension 1984    Migraines 1989    Sleep apnea     recently tested     Type 2 diabetes mellitus without complication ([facility]     type II         SURGICALHISTORY       Past Surgical History:   Procedure Laterality Date    TUBAL LIGATION           CURRENT MEDICATIONS       Previous Medications    ALBUTEROL SULFATE HFA (VENTOLIN HFA) 108 (90 BASE) MCG/ACT INHALER    Inhale 2 puffs into the lungs 4 times daily as needed for Wheezing    ASPIRIN 81 MG EC TABLET    Take 1 tablet by mouth daily    ATORVASTATIN (LIPITOR) 40 MG TABLET    80 mg     AZELASTINE (ASTELIN) 0.1 % NASAL SPRAY    2 sprays by Nasal route 2 times daily Use in each nostril as directed    BUDESONIDE-FORMOTEROL (SYMBICORT) 160-4.5 MCG/ACT AERO    Inhale 2 puffs into the lungs 2 times daily    CLOPIDOGREL (PLAVIX) 75 MG TABLET    Take 75 mg by mouth daily    DILTIAZEM (TIAZAC) 180 MG EXTENDED RELEASE CAPSULE    Take 240 mg by mouth 2 times daily     EMPAGLIFLOZIN (JARDIANCE) 10 MG TABLET    Take 10 mg by mouth daily    EPINEPHRINE (EPIPEN) 0.3 MG/0.3ML SOAJ INJECTION    Inject 0.3 mg into the muscle as needed    ESOMEPRAZOLE MAGNESIUM (NEXIUM 24HR) 20 MG TBEC    Take 20 mg by mouth daily as needed    FUROSEMIDE (LASIX) 20 MG TABLET    Take 20 mg by mouth daily    ICOSAPENT ETHYL (VASCEPA) 1 G CAPS CAPSULE    Take 1 g by mouth 2 times daily (with meals)    IPRATROPIUM-ALBUTEROL (DUONEB) 0.5-2.5 (3) MG/3ML SOLN NEBULIZER SOLUTION    Inhale 3 mLs into the lungs 3 times daily as needed for Shortness of Breath    LISINOPRIL (PRINIVIL;ZESTRIL) 5 MG TABLET    Take 10 mg by mouth daily     MAGNESIUM OXIDE (MAG-OX) 400 (240 MG) MG TABLET    Take 1 tablet by mouth 2 times daily    MAGNESIUM OXIDE (MAG-OX) 400 MG TABLET    Take 400 mg by mouth 2 times daily    METFORMIN (GLUCOPHAGE) 500 MG TABLET    Take 1,000 mg by mouth 2 times daily (with meals)    MOMETASONE (ELOCON) 0.1 % CREAM    Apply topically daily Apply topically daily inside of both ears    MONTELUKAST (SINGULAIR) 10 MG TABLET    Take 10 mg by mouth nightly    MUPIROCIN (BACTROBAN) 2 % OINTMENT    Apply topically 2 times daily Apply topically twice daily inside of nose    NICOTINE (NICODERM CQ) 21 MG/24HR    Place 1 patch onto the skin daily    NITROGLYCERIN (NITROSTAT) 0.4 MG SL TABLET    up to max of 3 total doses. If no relief after 1 dose, call 911. PANTOPRAZOLE (PROTONIX) 40 MG TABLET    Take 1 tablet by mouth every morning (before breakfast)    POTASSIUM CHLORIDE (KLOR-CON M) 10 MEQ EXTENDED RELEASE TABLET    Take 10 mEq by mouth daily    TICAGRELOR (BRILINTA) 90 MG TABS TABLET    Take 1 tablet by mouth 2 times daily    TIOTROPIUM (SPIRIVA RESPIMAT) 2.5 MCG/ACT AERS INHALER    Inhale 2 puffs into the lungs daily    TRIAMCINOLONE (NASACORT) 55 MCG/ACT NASAL INHALER    USE 2 SPRAYS INTO THE NOSE ONCE DAILY            Shellfish-derived products, Cumin oil, and Piper    FAMILY HISTORY     History reviewed. No pertinent family history.        SOCIAL HISTORY       Social History     Socioeconomic History    Marital status: Single     Spouse name: None    Number of children: None    Years of education: None    Highest education level: None   Occupational History    None   Tobacco Use    Smoking status: Current Every Day Smoker     Packs/day: 1.00     Years: 37.00     Pack years: 37.00     Types: Cigarettes     Last attempt to quit: 2020     Years since quittin.2    Smokeless tobacco: Never Used   Vaping Use    Vaping Use: Never used   Substance and Sexual Activity    Alcohol use: Not Currently    Drug use: Never    Sexual activity: Yes     Partners: Male   Other Topics Concern    None   Social History Narrative    None     Social Determinants of Health     Financial Resource Strain:     Difficulty of Paying Living Expenses: Not on file   Food Insecurity:     Worried About Running Out of Food in the Last Year: Not on file   [facility] Ran Out of Food in the Last Year: Not on file   Transportation Needs:     Lack of Transportation (Medical): Not on file    Lack of Transportation (Non-Medical): Not on file   Physical Activity:     Days of Exercise per Week: Not on file    Minutes of Exercise per Session: Not on file   Stress:     Feeling of Stress : Not on file   Social Connections:     Frequency of Communication with Friends and Family: Not on file    Frequency of Social Gatherings with Friends and Family: Not on file    Attends Mormonism Services: Not on file    Active Member of [address] [place] [address] [employer] or Organizations: Not on file    Attends Club or Organization Meetings: Not on file    Marital Status: Not on file   Intimate Partner Violence:     Fear of Current or Ex-Partner: Not on file    Emotionally Abused: Not on file    Physically Abused: Not on file    Sexually Abused: Not on file   Housing Stability:     Unable to Pay for Housing in the Last Year: Not on file    Number of [place] in the Last Year: Not on file    Unstable Housing in the Last Year: Not on file       SCREENINGS   [place] Coma Scale  Eye Opening: Spontaneous  Best Verbal Response: Oriented  Best Motor Response: Obeys commands  Cat Coma Scale Score: 15  Heart Score for chest pain patients  History: Slightly Suspicious  ECG: Normal  Patient Age: > 39 and < 65 years  *Risk factors for Atherosclerotic disease: Diabetes Mellitus  Risk Factors: > 3 Risk factors or history of atherosclerotic disease*  Troponin: < 1X normal limit  Heart Score Total: 3  Ebola Virus Disease (EVD) Screening   Temp: 97.4 °F (36.3 °C)  CIWA Assessment  BP: 131/84  Pulse: 92    PHYSICAL EXAM    (up to 7 for level 4, 8 or more for level 5)     ED Triage Vitals [12/20/21 1629]   BP Temp Temp src Pulse Resp SpO2 Height Weight   (!) 147/85 -- -- 100 18 99 % -- --       Physical Exam  Vitals and nursing note reviewed. Constitutional:       General: She is not in acute distress.      Appearance: She is well-developed. HENT:      Head: Normocephalic and atraumatic. Right Ear: External ear normal.      Left Ear: External ear normal.      Nose: Nose normal.      Mouth/Throat:      Mouth: Mucous membranes are moist.   Eyes:      General:         Right eye: No discharge. Left eye: No discharge. Pupils: Pupils are equal, round, and reactive to light. Cardiovascular:      Rate and Rhythm: Normal rate and regular rhythm. Pulses: Normal pulses. Heart sounds: Normal heart sounds. Pulmonary:      Effort: Pulmonary effort is normal. No respiratory distress. Breath sounds: Normal breath sounds. No stridor. No wheezing or rhonchi. Chest:      Chest wall: Tenderness present. Abdominal:      General: Bowel sounds are normal. There is no distension. Palpations: Abdomen is soft. Musculoskeletal:         General: Tenderness present. Normal range of motion. Cervical back: Normal range of motion and neck supple. Back:       Right lower leg: No edema. Left lower leg: No edema. Skin:     General: Skin is warm. Capillary Refill: Capillary refill takes less than 2 seconds. Findings: No erythema. Neurological:      Mental Status: She is alert and oriented to person, place, and time. Motor: No weakness.       Coordination: Coordination normal.      Gait: Gait normal.   Psychiatric:         Mood and Affect: Mood normal.         RESULTS     EKG: All EKG's are interpreted by the Emergency Department Physician who either signs or Co-signsthis chart in the absence of a cardiologist.    ekg nsr rate87 neg st elevation pr 200 ms qrs 72 ms [id number] prt axes positive    RADIOLOGY:   Non-plain filmimages such as CT, Ultrasound and MRI are read by the radiologist. Plain radiographic images are visualized and preliminarily interpreted by the emergency physician with the below findings:         Interpretation per the Radiologist below, if available at the time ofthis note:    XR CHEST (2 VW)   Final Result   NO ACUTE CARDIOPULMONARY ABNORMALITY. NO CHANGE. ED BEDSIDE ULTRASOUND:   Performed by ED Physician - none    LABS:  Labs Reviewed   COMPREHENSIVE METABOLIC PANEL - Abnormal; Notable for the following components:       Result Value    Glucose 115 (*)     Total Protein 8.2 (*)     Albumin 4.7 (*)     Alkaline Phosphatase 137 (*)     All other components within normal limits   COVID-19, RAPID   CBC WITH AUTO DIFFERENTIAL   CK   TROPONIN       All other labs were within normal range or not returned as of this dictation. EMERGENCY DEPARTMENT COURSE and DIFFERENTIAL DIAGNOSIS/MDM:   Vitals:    Vitals:    12/20/21 1629 12/20/21 1734 12/20/21 1825   BP: (!) 147/85  131/84   Pulse: 100  92   Resp: 18     Temp:  97.4 °F (36.3 °C)    TempSrc:  Oral    SpO2: 99%  99%   Weight:  192 lb (87.1 kg)             MDM  Number of Diagnoses or Management Options  Chest pain, unspecified type  Spasm of muscle  Diagnosis management comments: Pt is reading and studying at hew kitchen table and upper extremity symptoms are worsened by this activity she has to switch arms when writing to improve sx. She has reproducible pain in er. Labs/ekg and xray neg. Pt to adjust her study positions and follow up with PMD and cardiology. Return to Er if any symptoms worsen or new symptoms develop. Ok dc per Dr Varinder Joe       Amount and/or Complexity of Data Reviewed  Clinical lab tests: reviewed and ordered  Tests in the radiology section of CPT®: ordered and reviewed  Discuss the patient with other providers: yes        CRITICAL CARE TIME       CONSULTS:  None    PROCEDURES:  Unless otherwise noted below, none     Procedures    FINAL IMPRESSION      1. Chest pain, unspecified type    2.  Spasm of muscle          DISPOSITION/PLAN   DISPOSITION        PATIENT REFERRED TO:  Jackie Oro PA-C  [address] [place] [id number]  [phone]    Call in 1 day      Enoc Mcclain MD [facility]  [address] [address]  [phone]    Call in 1 day      [facility]) ED  Jaiden Haskins  [phone]  Go to   If symptoms worsen      DISCHARGE MEDICATIONS:  New Prescriptions    CYCLOBENZAPRINE (FLEXERIL) 10 MG TABLET    Take 1 tablet by mouth 3 times daily as needed for Muscle spasms    IBUPROFEN (IBU) 600 MG TABLET    Take 1 tablet by mouth every 8 hours as needed for Pain          (Please note that portions of this note were completed with a voice recognition program.  Efforts were made to edit the dictations but occasionally words are mis-transcribed.)    Marcia Decker PA-C (electronically signed)  Attending Emergency Physician       Marcia Decker PA-C  12/20/21 1569

## 2021-12-20 NOTE — ED NOTES
Attempted twice to place IV line without success. Patient reports that she is currently taking two exams that is required for her to get her work visa. Patient states that this has caused her to have added stress. Patient states that she will not be able to keep her job without passing these exams. Patient states that she believes her chest pain is due to this added stress.       Ramses Whitehead RN  12/20/21 4680

## 2021-12-21 NOTE — ED NOTES
Discharge instructions explained to patient with verbalization of understanding.       Fara Graham RN  12/20/21 4157

## 2021-12-22 LAB
EKG ATRIAL RATE: 87 BPM
EKG P AXIS: 55 DEGREES
EKG P-R INTERVAL: 200 MS
EKG Q-T INTERVAL: 358 MS
EKG QRS DURATION: 72 MS
EKG QTC CALCULATION (BAZETT): 430 MS
EKG R AXIS: 36 DEGREES
EKG T AXIS: 60 DEGREES
EKG VENTRICULAR RATE: 87 BPM

## 2021-12-22 PROCEDURE — 93010 ELECTROCARDIOGRAM REPORT: CPT | Performed by: INTERNAL MEDICINE

## 2022-01-12 DIAGNOSIS — J45.40 MODERATE PERSISTENT ASTHMA WITHOUT COMPLICATION: ICD-10-CM

## 2022-01-13 RX ORDER — BUDESONIDE AND FORMOTEROL FUMARATE DIHYDRATE 160; 4.5 UG/1; UG/1
AEROSOL RESPIRATORY (INHALATION)
Qty: 1 EACH | Refills: 3 | Status: SHIPPED | OUTPATIENT
Start: 2022-01-13 | End: 2022-08-02 | Stop reason: SDUPTHER

## 2022-01-24 ENCOUNTER — OFFICE VISIT (OUTPATIENT)
Dept: PULMONOLOGY | Age: 52
End: 2022-01-24
Payer: COMMERCIAL

## 2022-01-24 ENCOUNTER — OFFICE VISIT (OUTPATIENT)
Dept: FAMILY MEDICINE CLINIC | Age: 52
End: 2022-01-24
Payer: COMMERCIAL

## 2022-01-24 ENCOUNTER — HOSPITAL ENCOUNTER (OUTPATIENT)
Dept: GENERAL RADIOLOGY | Age: 52
Discharge: HOME | End: 2022-01-26
Payer: COMMERCIAL

## 2022-01-24 VITALS
SYSTOLIC BLOOD PRESSURE: 134 MMHG | OXYGEN SATURATION: 97 % | DIASTOLIC BLOOD PRESSURE: 78 MMHG | WEIGHT: 204 LBS | HEART RATE: 92 BPM | BODY MASS INDEX: 37.54 KG/M2 | TEMPERATURE: 97.7 F | HEIGHT: 62 IN

## 2022-01-24 VITALS
HEART RATE: 108 BPM | BODY MASS INDEX: 37.54 KG/M2 | HEIGHT: 62 IN | OXYGEN SATURATION: 96 % | WEIGHT: 204 LBS | TEMPERATURE: 97.9 F | SYSTOLIC BLOOD PRESSURE: 138 MMHG | RESPIRATION RATE: 18 BRPM | DIASTOLIC BLOOD PRESSURE: 84 MMHG

## 2022-01-24 DIAGNOSIS — E66.09 CLASS 2 OBESITY DUE TO EXCESS CALORIES WITHOUT SERIOUS COMORBIDITY WITH BODY MASS INDEX (BMI) OF 37.0 TO 37.9 IN ADULT: ICD-10-CM

## 2022-01-24 DIAGNOSIS — F17.200 SMOKING: ICD-10-CM

## 2022-01-24 DIAGNOSIS — J20.9 ACUTE BRONCHITIS, UNSPECIFIED ORGANISM: ICD-10-CM

## 2022-01-24 DIAGNOSIS — R06.02 SOB (SHORTNESS OF BREATH): ICD-10-CM

## 2022-01-24 DIAGNOSIS — B34.9 VIRAL ILLNESS: Primary | ICD-10-CM

## 2022-01-24 DIAGNOSIS — K21.9 GASTROESOPHAGEAL REFLUX DISEASE WITHOUT ESOPHAGITIS: ICD-10-CM

## 2022-01-24 DIAGNOSIS — J44.9 ASTHMA-COPD OVERLAP SYNDROME (HCC): ICD-10-CM

## 2022-01-24 DIAGNOSIS — R06.02 SOB (SHORTNESS OF BREATH): Primary | ICD-10-CM

## 2022-01-24 PROCEDURE — 4004F PT TOBACCO SCREEN RCVD TLK: CPT | Performed by: PHYSICIAN ASSISTANT

## 2022-01-24 PROCEDURE — 99214 OFFICE O/P EST MOD 30 MIN: CPT | Performed by: INTERNAL MEDICINE

## 2022-01-24 PROCEDURE — 3017F COLORECTAL CA SCREEN DOC REV: CPT | Performed by: INTERNAL MEDICINE

## 2022-01-24 PROCEDURE — G8484 FLU IMMUNIZE NO ADMIN: HCPCS | Performed by: INTERNAL MEDICINE

## 2022-01-24 PROCEDURE — G8427 DOCREV CUR MEDS BY ELIG CLIN: HCPCS | Performed by: PHYSICIAN ASSISTANT

## 2022-01-24 PROCEDURE — G8417 CALC BMI ABV UP PARAM F/U: HCPCS | Performed by: PHYSICIAN ASSISTANT

## 2022-01-24 PROCEDURE — 4004F PT TOBACCO SCREEN RCVD TLK: CPT | Performed by: INTERNAL MEDICINE

## 2022-01-24 PROCEDURE — G8417 CALC BMI ABV UP PARAM F/U: HCPCS | Performed by: INTERNAL MEDICINE

## 2022-01-24 PROCEDURE — 99212 OFFICE O/P EST SF 10 MIN: CPT | Performed by: PHYSICIAN ASSISTANT

## 2022-01-24 PROCEDURE — 3017F COLORECTAL CA SCREEN DOC REV: CPT | Performed by: PHYSICIAN ASSISTANT

## 2022-01-24 PROCEDURE — G8427 DOCREV CUR MEDS BY ELIG CLIN: HCPCS | Performed by: INTERNAL MEDICINE

## 2022-01-24 PROCEDURE — 71046 X-RAY EXAM CHEST 2 VIEWS: CPT

## 2022-01-24 PROCEDURE — 3023F SPIROM DOC REV: CPT | Performed by: INTERNAL MEDICINE

## 2022-01-24 PROCEDURE — G8484 FLU IMMUNIZE NO ADMIN: HCPCS | Performed by: PHYSICIAN ASSISTANT

## 2022-01-24 RX ORDER — AZITHROMYCIN 250 MG/1
250 TABLET, FILM COATED ORAL SEE ADMIN INSTRUCTIONS
Qty: 6 TABLET | Refills: 0 | Status: SHIPPED | OUTPATIENT
Start: 2022-01-24 | End: 2022-01-29

## 2022-01-24 RX ORDER — PREDNISONE 10 MG/1
40 TABLET ORAL DAILY
Qty: 20 TABLET | Refills: 0 | Status: SHIPPED | OUTPATIENT
Start: 2022-01-24 | End: 2022-01-29

## 2022-01-24 SDOH — ECONOMIC STABILITY: FOOD INSECURITY: WITHIN THE PAST 12 MONTHS, YOU WORRIED THAT YOUR FOOD WOULD RUN OUT BEFORE YOU GOT MONEY TO BUY MORE.: NEVER TRUE

## 2022-01-24 SDOH — ECONOMIC STABILITY: FOOD INSECURITY: WITHIN THE PAST 12 MONTHS, THE FOOD YOU BOUGHT JUST DIDN'T LAST AND YOU DIDN'T HAVE MONEY TO GET MORE.: NEVER TRUE

## 2022-01-24 ASSESSMENT — PATIENT HEALTH QUESTIONNAIRE - PHQ9
SUM OF ALL RESPONSES TO PHQ QUESTIONS 1-9: 0
2. FEELING DOWN, DEPRESSED OR HOPELESS: 0
SUM OF ALL RESPONSES TO PHQ9 QUESTIONS 1 & 2: 0
SUM OF ALL RESPONSES TO PHQ QUESTIONS 1-9: 0
1. LITTLE INTEREST OR PLEASURE IN DOING THINGS: 0

## 2022-01-24 ASSESSMENT — SOCIAL DETERMINANTS OF HEALTH (SDOH): HOW HARD IS IT FOR YOU TO PAY FOR THE VERY BASICS LIKE FOOD, HOUSING, MEDICAL CARE, AND HEATING?: NOT VERY HARD

## 2022-01-24 ASSESSMENT — ENCOUNTER SYMPTOMS
SINUS PAIN: 0
VOMITING: 0
RHINORRHEA: 0
SHORTNESS OF BREATH: 1
SPUTUM PRODUCTION: 0
HEMOPTYSIS: 0
SWOLLEN GLANDS: 0
DIARRHEA: 0
NAUSEA: 0
ABDOMINAL PAIN: 0
COUGH: 1
SORE THROAT: 1
WHEEZING: 1
ORTHOPNEA: 0

## 2022-01-24 ASSESSMENT — VISUAL ACUITY: OU: 1

## 2022-01-24 NOTE — PROGRESS NOTES
[address] Encounter  CHIEF COMPLAINT       Chief Complaint   Patient presents with    Shortness of Breath     x 1 day not vaccinated     Pharyngitis    Cough    Chest Pain       HISTORY OF PRESENT ILLNESS   Chrissie Singh is a 46 y.o. female who presents with:  Shortness of Breath  This is a new problem. The current episode started yesterday. The problem occurs constantly. The problem has been unchanged. Associated symptoms include a sore throat and wheezing. Pertinent negatives include no abdominal pain, chest pain, claudication, coryza, ear pain, fever, headaches, hemoptysis, leg pain, leg swelling, neck pain, orthopnea, rash, rhinorrhea, sputum production, swollen glands, syncope or vomiting. The symptoms are aggravated by URIs and smoke. She has tried beta agonist inhalers for the symptoms. Her past medical history is significant for COPD and pneumonia. There is no history of allergies, aspirin allergies, asthma, bronchiolitis, CAD, DVT, a heart failure, PE or a recent surgery. URI   This is a new problem. The current episode started yesterday. There has been no fever. Associated symptoms include congestion, coughing, a sore throat and wheezing. Pertinent negatives include no abdominal pain, chest pain, diarrhea, dysuria, ear pain, headaches, joint pain, joint swelling, nausea, neck pain, plugged ear sensation, rash, rhinorrhea, sinus pain, sneezing, swollen glands or vomiting. She has tried nothing for the symptoms. The treatment provided mild relief. REVIEW OF SYSTEMS     Review of Systems   Constitutional: Negative for fever. HENT: Positive for congestion and sore throat. Negative for ear pain, rhinorrhea, sinus pain and sneezing. Respiratory: Positive for cough, shortness of breath and wheezing. Negative for hemoptysis and sputum production. Cardiovascular: Negative for chest pain, orthopnea, claudication, leg swelling and syncope. Gastrointestinal: Negative for abdominal pain, diarrhea, nausea and vomiting. Genitourinary: Negative for dysuria. Musculoskeletal: Negative for joint pain and neck pain. Skin: Negative for rash. Neurological: Negative for headaches. PAST MEDICAL HISTORY         Diagnosis Date    Anticoagulant long-term use     Asthma 2004    CAD (coronary artery disease)     COPD (chronic obstructive pulmonary disease) (HCC)     Diabetes mellitus ([facility] 2014    Hodgkin disease ([facility] 1999    Hyperlipidemia     Hypertension 1984    Migraines 1989    Sleep apnea     recently tested     Type 2 diabetes mellitus without complication ([facility]     type II     SURGICAL HISTORY     Patient  has a past surgical history that includes Tubal ligation.   CURRENT MEDICATIONS       Previous Medications    ALBUTEROL SULFATE HFA (VENTOLIN HFA) 108 (90 BASE) MCG/ACT INHALER    Inhale 2 puffs into the lungs 4 times daily as needed for Wheezing    ASPIRIN 81 MG EC TABLET    Take 1 tablet by mouth daily    ATORVASTATIN (LIPITOR) 40 MG TABLET    80 mg     AZELASTINE (ASTELIN) 0.1 % NASAL SPRAY    2 sprays by Nasal route 2 times daily Use in each nostril as directed    AZITHROMYCIN (ZITHROMAX) 250 MG TABLET    Take 1 tablet by mouth See Admin Instructions for 5 days 500mg on day 1 followed by 250mg on days 2 - 5    CLOPIDOGREL (PLAVIX) 75 MG TABLET    Take 75 mg by mouth daily    DILTIAZEM (TIAZAC) 180 MG EXTENDED RELEASE CAPSULE    Take 240 mg by mouth 2 times daily     EMPAGLIFLOZIN (JARDIANCE) 10 MG TABLET    Take 10 mg by mouth daily    EPINEPHRINE (EPIPEN) 0.3 MG/0.3ML SOAJ INJECTION    Inject 0.3 mg into the muscle as needed    ESOMEPRAZOLE MAGNESIUM (NEXIUM 24HR) 20 MG TBEC    Take 20 mg by mouth daily as needed    FUROSEMIDE (LASIX) 20 MG TABLET    Take 20 mg by mouth daily    IBUPROFEN (IBU) 600 MG TABLET    Take 1 tablet by mouth every 8 hours as needed for Pain    ICOSAPENT ETHYL (VASCEPA) 1 G CAPS CAPSULE    Take 1 g by mouth 2 times daily (with meals)    IPRATROPIUM-ALBUTEROL (DUONEB) 0.5-2.5 (3) MG/3ML SOLN NEBULIZER SOLUTION    Inhale 3 mLs into the lungs 3 times daily as needed for Shortness of Breath    LISINOPRIL (PRINIVIL;ZESTRIL) 5 MG TABLET    Take 10 mg by mouth daily     MAGNESIUM OXIDE (MAG-OX) 400 (240 MG) MG TABLET    Take 1 tablet by mouth 2 times daily    MAGNESIUM OXIDE (MAG-OX) 400 MG TABLET    Take 400 mg by mouth 2 times daily    METFORMIN (GLUCOPHAGE) 500 MG TABLET    Take 1,000 mg by mouth 2 times daily (with meals)    MOMETASONE (ELOCON) 0.1 % CREAM    Apply topically daily Apply topically daily inside of both ears    MONTELUKAST (SINGULAIR) 10 MG TABLET    Take 10 mg by mouth nightly    MUPIROCIN (BACTROBAN) 2 % OINTMENT    Apply topically 2 times daily Apply topically twice daily inside of nose    NICOTINE (NICODERM CQ) 21 MG/24HR    Place 1 patch onto the skin daily    NITROGLYCERIN (NITROSTAT) 0.4 MG SL TABLET    up to max of 3 total doses. If no relief after 1 dose, call 911. PANTOPRAZOLE (PROTONIX) 40 MG TABLET    Take 1 tablet by mouth every morning (before breakfast)    POTASSIUM CHLORIDE (KLOR-CON M) 10 MEQ EXTENDED RELEASE TABLET    Take 10 mEq by mouth daily    PREDNISONE (DELTASONE) 10 MG TABLET    Take 4 tablets by mouth daily for 5 days    SYMBICORT 160-4.5 MCG/ACT AERO    Inhale 2 puffs by mouth twice daily    TICAGRELOR (BRILINTA) 90 MG TABS TABLET    Take 1 tablet by mouth 2 times daily    TIOTROPIUM (SPIRIVA RESPIMAT) 2.5 MCG/ACT AERS INHALER    Inhale 2 puffs into the lungs daily    TRIAMCINOLONE (NASACORT) 55 MCG/ACT NASAL INHALER    USE 2 SPRAYS INTO THE NOSE ONCE DAILY     ALLERGIES     Patient is is allergic to shellfish-derived products, cumin oil, and piper. FAMILY HISTORY     Patient'sfamily history is not on file. SOCIAL HISTORY     Patient  reports that she has been smoking cigarettes. She has a 37.00 pack-year smoking history.  She has never used smokeless tobacco. She reports previous alcohol use. She reports that she does not use drugs. PHYSICAL EXAM     VITALS  BP: 134/78, Temp: 97.7 °F (36.5 °C), Pulse: 92,  , SpO2: 97 %  Physical Exam  Vitals and nursing note reviewed. Constitutional:       General: She is awake. She is not in acute distress. Appearance: Normal appearance. She is well-developed. She is not ill-appearing, toxic-appearing or diaphoretic. HENT:      Head: Normocephalic and atraumatic. Right Ear: Hearing and external ear normal.      Left Ear: Hearing and external ear normal.      Nose: Congestion present. Eyes:      General: Lids are normal. Vision grossly intact. Gaze aligned appropriately. Conjunctiva/sclera: Conjunctivae normal.   Cardiovascular:      Rate and Rhythm: Normal rate and regular rhythm. Pulses: Normal pulses. Heart sounds: Normal heart sounds, S1 normal and S2 normal.   Pulmonary:      Effort: Pulmonary effort is normal.      Breath sounds: Normal air entry. Wheezing present. No decreased breath sounds, rhonchi or rales. Musculoskeletal:      Cervical back: Normal range of motion. Right lower leg: No edema. Left lower leg: No edema. Skin:     General: Skin is warm. Capillary Refill: Capillary refill takes less than 2 seconds. Neurological:      Mental Status: She is alert and oriented to person, place, and time. Gait: Gait is intact. Psychiatric:         Attention and Perception: Attention normal.         Mood and Affect: Mood normal.         Speech: Speech normal.         Behavior: Behavior normal. Behavior is cooperative. READY CARE COURSE   Labs:  No results found for this visit on 01/24/22. IMAGING:  No orders to display     Scheduled Meds:  Continuous Infusions:  PRN Meds:. PROCEDURES:  FINAL IMPRESSION      1. Viral illness      DISPOSITION/PLAN     1. The patient is presenting today with CC of Viral symptoms. Patient was seen by pulmonology. Prescribed Zpack and steroid and CXR. Continue supportive measures. Discussed signs and symptoms which require immediate follow-up in ED/call to 911. Patient verbalized understanding. There was confusion when patient checked in. Staff was not informed of order for COVID-19 until I opened the patient's chart. Patient was swabbed. Informed she will receive call in 2-3 days by ordering physician's office. PATIENT REFERRED TO:  Return if symptoms worsen or fail to improve. DISCHARGE MEDICATIONS:  New Prescriptions    No medications on file     Cannot display discharge medications since this is not an admission.        Bin Pulse, [phone]

## 2022-01-24 NOTE — PROGRESS NOTES
Subjective:     Keyanna Conteh is a 46 y.o. female who complains today of:     Chief Complaint   Patient presents with    Follow-up     4 Month F/U for Moderate persistent asthma     Results     CT Lung Screening    Shortness of Breath    Nausea & Vomiting    Cough       HPI  Patient presents for acute bronchitis      Patient started having worsening cough and shortness of breath since yesterday, cough is clear, no chest pain, no fever, she does have epigastric discomfort when she coughs, no pleuritic pain, no worsening lower extremity edema, no sick contact, no nasal congestion or postnasal drip, no heartburn or sore throat, she is not vaccinated, she continues to smoke but trying to quit, she is currently on Spiriva and Symbicort. She has not been checking her oxygen at home her O2 sat here at rest 96%. Allergies:  Shellfish-derived products, Cumin oil, and Piper  Past Medical History:   Diagnosis Date    Anticoagulant long-term use     Asthma     CAD (coronary artery disease)     COPD (chronic obstructive pulmonary disease) (HCC)     [facility]     Hod[facility]kin disease ([facility]     Hyperlipidemia     Hypertension     Migraines     Sleep apnea     recently tested     Type 2 diabetes mellitus without complication ([facility]     type II     Past Surgical History:   Procedure Laterality Date    TUBAL LIGATION       History reviewed. No pertinent family history.   Social History     Socioeconomic History    Marital status: Single     Spouse name: Not on file    Number of children: Not on file    Years of education: Not on file    Highest education level: Not on file   Occupational History    Not on file   Tobacco Use    Smoking status: Current Every Day Smoker     Packs/day: 1.00     Years: 37.00     Pack years: 37.00     Types: Cigarettes     Last attempt to quit: 2020     Years since quittin.3    Smokeless tobacco: Never Used   Vaping Use    Vaping Use: Never used   Substance and Sexual Activity    Alcohol use: Not Currently    Drug use: Never    Sexual activity: Yes     Partners: Male   Other Topics Concern    Not on file   Social History Narrative    Not on file     Social Determinants of Health     Financial Resource Strain:     Difficulty of Paying Living Expenses: Not on file   Food Insecurity:     Worried About Running Out of Food in the Last Year: Not on file    Oral of Food in the Last Year: Not on file   Transportation Needs:     Lack of Transportation (Medical): Not on file    Lack of Transportation (Non-Medical): Not on file   Physical Activity:     Days of Exercise per Week: Not on file    Minutes of Exercise per Session: Not on file   Stress:     Feeling of Stress : Not on file   Social Connections:     Frequency of Communication with Friends and Family: Not on file    Frequency of Social Gatherings with Friends and Family: Not on file    Attends Quaker Services: Not on file    Active Member of [address] [place] [address] or Organizations: Not on file    Attends Club or Organization Meetings: Not on file    Marital Status: Not on file   Intimate Partner Violence:     Fear of Current or Ex-Partner: Not on file    Emotionally Abused: Not on file    Physically Abused: Not on file    Sexually Abused: Not on file   Housing Stability:     Unable to Pay for Housing in the Last Year: Not on file    Number of [place] in the Last Year: Not on file    Unstable Housing in the Last Year: Not on file         Review of Systems      ROS: 10 organs review of system is done including general, psychological, ENT, hematological, endocrine, respiratory, cardiovascular, gastrointestinal,musculoskeletal, neurological,  allergy and Immunology is done and is otherwise negative.     Current Outpatient Medications   Medication Sig Dispense Refill    predniSONE (DELTASONE) 10 MG tablet Take 4 tablets by mouth daily for 5 days 20 tablet 0    azithromycin (ZITHROMAX) 250 MG tablet Take 1 tablet by mouth See Admin Instructions for 5 days 500mg on day 1 followed by 250mg on days 2 - 5 6 tablet 0    SYMBICORT 160-4.5 MCG/ACT AERO Inhale 2 puffs by mouth twice daily 1 each 3    ibuprofen (IBU) 600 MG tablet Take 1 tablet by mouth every 8 hours as needed for Pain 20 tablet 0    albuterol sulfate HFA (VENTOLIN HFA) 108 (90 Base) MCG/ACT inhaler Inhale 2 puffs into the lungs 4 times daily as needed for Wheezing 1 each 3    tiotropium (SPIRIVA RESPIMAT) 2.5 MCG/ACT AERS inhaler Inhale 2 puffs into the lungs daily 1 each 3    clopidogrel (PLAVIX) 75 MG tablet Take 75 mg by mouth daily      Icosapent Ethyl (VASCEPA) 1 g CAPS capsule Take 1 g by mouth 2 times daily (with meals)      montelukast (SINGULAIR) 10 MG tablet Take 10 mg by mouth nightly      azelastine (ASTELIN) 0.1 % nasal spray 2 sprays by Nasal route 2 times daily Use in each nostril as directed      dilTIAZem (TIAZAC) 180 MG extended release capsule Take 240 mg by mouth 2 times daily       EPINEPHrine (EPIPEN) 0.3 MG/0.3ML SOAJ injection Inject 0.3 mg into the muscle as needed      Esomeprazole Magnesium (NEXIUM 24HR) 20 MG TBEC Take 20 mg by mouth daily as needed      magnesium oxide (MAG-OX) 400 MG tablet Take 400 mg by mouth 2 times daily      triamcinolone (NASACORT) 55 MCG/ACT nasal inhaler USE 2 SPRAYS INTO THE NOSE ONCE DAILY      atorvastatin (LIPITOR) 40 MG tablet 80 mg       pantoprazole (PROTONIX) 40 MG tablet Take 1 tablet by mouth every morning (before breakfast) 30 tablet 3    magnesium oxide (MAG-OX) 400 (240 Mg) MG tablet Take 1 tablet by mouth 2 times daily 30 tablet 3    metFORMIN (GLUCOPHAGE) 500 MG tablet Take 1,000 mg by mouth 2 times daily (with meals)      empagliflozin (JARDIANCE) 10 MG tablet Take 10 mg by mouth daily      mupirocin (BACTROBAN) 2 % ointment Apply topically 2 times daily Apply topically twice daily inside of nose      mometasone (ELOCON) 0.1 % cream Apply topically daily Apply topically daily inside of both ears      furosemide (LASIX) 20 MG tablet Take 20 mg by mouth daily      potassium chloride (KLOR-CON M) 10 MEQ extended release tablet Take 10 mEq by mouth daily      lisinopril (PRINIVIL;ZESTRIL) 5 MG tablet Take 10 mg by mouth daily       aspirin 81 MG EC tablet Take 1 tablet by mouth daily 90 tablet 3    ticagrelor (BRILINTA) 90 MG TABS tablet Take 1 tablet by mouth 2 times daily 60 tablet 5    nitroGLYCERIN (NITROSTAT) 0.4 MG SL tablet up to max of 3 total doses. If no relief after 1 dose, call 911. 25 tablet 3    ipratropium-albuterol (DUONEB) 0.5-2.5 (3) MG/3ML SOLN nebulizer solution Inhale 3 mLs into the lungs 3 times daily as needed for Shortness of Breath 270 mL 0    nicotine (NICODERM CQ) 21 MG/24HR Place 1 patch onto the skin daily 42 patch 0     No current facility-administered medications for this visit. Objective:     Vitals:    01/24/22 1449   BP: 138/84   Site: Left Upper Arm   Position: Sitting   Cuff Size: Large Adult   Pulse: 108   Resp: 18   Temp: 97.9 °F (36.6 °C)   TempSrc: Infrared   SpO2: 96%   Weight: 204 lb (92.5 kg)   Height: 5' 2\" (1.575 m)         Physical Exam  Constitutional:       General: She is not in acute distress. Appearance: She is well-developed. She is not diaphoretic. HENT:      Head: Normocephalic and atraumatic. Eyes:      Conjunctiva/sclera: Conjunctivae normal.      Pupils: Pupils are equal, round, and reactive to light. Cardiovascular:      Rate and Rhythm: Normal rate and regular rhythm. Heart sounds: No murmur heard. No friction rub. No gallop. Pulmonary:      Effort: Pulmonary effort is normal. No respiratory distress. Breath sounds: Normal breath sounds. No wheezing or rales. Chest:      Chest wall: No tenderness. Abdominal:      General: There is no distension. Palpations: Abdomen is soft. Tenderness: There is no abdominal tenderness. There is no rebound. Musculoskeletal:         General: No tenderness. Cervical back: Normal range of motion and neck supple. Right lower leg: No edema. Left lower leg: No edema. Lymphadenopathy:      Cervical: No cervical adenopathy. Skin:     General: Skin is warm and dry. Findings: No erythema. Neurological:      Mental Status: She is alert and oriented to person, place, and time. Psychiatric:         Judgment: Judgment normal.         Imaging studies reviewed by me chest x-ray December 2021, no infiltrate CT lung cancer screening  October 2021, no mass or nodule  Lab results reviewed in chart  PFT August 2020, FEV1 91%, FEV1/FVC 0.81  ECHO: August 2020, EF 60%  Sleep study October 2020 is negative  Assessment and Plan       Diagnosis Orders   1. SOB (shortness of breath)  Covid-19 Ambulatory    XR CHEST (2 VW)   2. Acute bronchitis, unspecified organism  Covid-19 Ambulatory    predniSONE (DELTASONE) 10 MG tablet    azithromycin (ZITHROMAX) 250 MG tablet   3. Asthma-COPD overlap syndrome (HCC)     4. Smoking     5. Class 2 obesity due to excess calories without serious comorbidity with body mass index (BMI) of 37.0 to 37.9 in adult     6. Gastroesophageal reflux disease without esophagitis       · Acute bronchitis with worsening shortness of breath, likely due to COPD with acute exacerbation, rule out COVID-19 infection, will obtain chest x-ray, COVID-19 testing, will start patient on prednisone 40 mg daily for 5 days and Z-Rogers course. Patient advised to call me or seek medical attention if worsening symptoms over the next 1 to 2 days. We will keep patient off work until Monday next week.   · Smoking cessation counseling done again today  · Asthma COPD overlap syndrome, smoking session strongly recommended, continue Symbicort and Spiriva, continue Singulair  · Weight loss recommended  · GERD symptoms controlled, currently on PPI      Orders Placed This Encounter   Procedures    XR CHEST (2 VW) Standing Status:   Future     Standing Expiration Date:   1/24/2023    Covid-19 Ambulatory     Standing Status:   Future     Standing Expiration Date:   1/24/2023     Scheduling Instructions:      Saline media preferred given current shortage of viral transport media but both acceptable     Order Specific Question:   Is this test for diagnosis or screening? Answer:   Diagnosis of ill patient     Order Specific Question:   Symptomatic for COVID-19 as defined by CDC? Answer:   Yes     Order Specific Question:   Date of Symptom Onset     Answer:   1/23/2022     Order Specific Question:   Hospitalized for COVID-19? Answer:   No     Order Specific Question:   Admitted to ICU for COVID-19? Answer:   No     Order Specific Question:   Employed in healthcare setting? Answer:   No     Order Specific Question:   Resident in a congregate (group) care setting? Answer:   No     Order Specific Question:   Pregnant? Answer:   No     Order Specific Question:   Previously tested for COVID-19? Answer:   Yes     Orders Placed This Encounter   Medications    predniSONE (DELTASONE) 10 MG tablet     Sig: Take 4 tablets by mouth daily for 5 days     Dispense:  20 tablet     Refill:  0    azithromycin (ZITHROMAX) 250 MG tablet     Sig: Take 1 tablet by mouth See Admin Instructions for 5 days 500mg on day 1 followed by 250mg on days 2 - 5     Dispense:  6 tablet     Refill:  0            Discussed with patient the importance of exercise and weight control and  overall health and well-being. Reviewed with the patient: current clinical status, medications, activities and diet. Side effects, adverse effects of the medication prescribed today, as well as treatment plan and result expectations have been discussed with the patient who expresses understanding and desires to proceed. Return in about 2 weeks (around 2/7/2022).       Navjot Jones MD

## 2022-01-26 LAB
SARS-COV-2: NOT DETECTED
SOURCE: NORMAL

## 2022-02-08 ENCOUNTER — TELEPHONE (OUTPATIENT)
Dept: PULMONOLOGY | Age: 52
End: 2022-02-08

## 2022-02-08 ENCOUNTER — OFFICE VISIT (OUTPATIENT)
Dept: PULMONOLOGY | Age: 52
End: 2022-02-08
Payer: COMMERCIAL

## 2022-02-08 VITALS
SYSTOLIC BLOOD PRESSURE: 136 MMHG | DIASTOLIC BLOOD PRESSURE: 82 MMHG | HEIGHT: 62 IN | HEART RATE: 104 BPM | WEIGHT: 200.2 LBS | BODY MASS INDEX: 36.84 KG/M2 | OXYGEN SATURATION: 99 % | RESPIRATION RATE: 17 BRPM

## 2022-02-08 DIAGNOSIS — E66.09 CLASS 2 OBESITY DUE TO EXCESS CALORIES WITHOUT SERIOUS COMORBIDITY WITH BODY MASS INDEX (BMI) OF 37.0 TO 37.9 IN ADULT: ICD-10-CM

## 2022-02-08 DIAGNOSIS — J45.40 MODERATE PERSISTENT ASTHMA WITHOUT COMPLICATION: Primary | ICD-10-CM

## 2022-02-08 DIAGNOSIS — K21.9 GASTROESOPHAGEAL REFLUX DISEASE WITHOUT ESOPHAGITIS: ICD-10-CM

## 2022-02-08 DIAGNOSIS — F17.200 SMOKING: ICD-10-CM

## 2022-02-08 DIAGNOSIS — J44.9 ASTHMA-COPD OVERLAP SYNDROME: ICD-10-CM

## 2022-02-08 PROCEDURE — G8427 DOCREV CUR MEDS BY ELIG CLIN: HCPCS | Performed by: INTERNAL MEDICINE

## 2022-02-08 PROCEDURE — G8417 CALC BMI ABV UP PARAM F/U: HCPCS | Performed by: INTERNAL MEDICINE

## 2022-02-08 PROCEDURE — 4004F PT TOBACCO SCREEN RCVD TLK: CPT | Performed by: INTERNAL MEDICINE

## 2022-02-08 PROCEDURE — 3017F COLORECTAL CA SCREEN DOC REV: CPT | Performed by: INTERNAL MEDICINE

## 2022-02-08 PROCEDURE — G8484 FLU IMMUNIZE NO ADMIN: HCPCS | Performed by: INTERNAL MEDICINE

## 2022-02-08 PROCEDURE — 99214 OFFICE O/P EST MOD 30 MIN: CPT | Performed by: INTERNAL MEDICINE

## 2022-02-08 RX ORDER — VARENICLINE TARTRATE
KIT
Qty: 1 BOX | Refills: 0 | Status: SHIPPED | OUTPATIENT
Start: 2022-02-08 | End: 2022-02-15 | Stop reason: SDUPTHER

## 2022-02-08 NOTE — PROGRESS NOTES
Vaping Use    Vaping Use: Never used   Substance and Sexual Activity    Alcohol use: Not Currently    Drug use: Never    Sexual activity: Yes     Partners: Male   Other Topics Concern    Not on file   Social History Narrative    Not on file     Social Determinants of Health     Financial Resource Strain: Low Risk     Difficulty of Paying Living Expenses: Not very hard   Food Insecurity: No Food Insecurity    Worried About Running Out of Food in the Last Year: Never true    Oral of Food in the Last Year: Never true   Transportation Needs:     Lack of Transportation (Medical): Not on file    Lack of Transportation (Non-Medical): Not on file   Physical Activity:     Days of Exercise per Week: Not on file    Minutes of Exercise per Session: Not on file   Stress:     Feeling of Stress : Not on file   Social Connections:     Frequency of Communication with Friends and Family: Not on file    Frequency of Social Gatherings with Friends and Family: Not on file    Attends Gnosticism Services: Not on file    Active Member of [address] [place] [address] or Organizations: Not on file    Attends Club or Organization Meetings: Not on file    Marital Status: Not on file   Intimate Partner Violence:     Fear of Current or Ex-Partner: Not on file    Emotionally Abused: Not on file    Physically Abused: Not on file    Sexually Abused: Not on file   Housing Stability:     Unable to Pay for Housing in the Last Year: Not on file    Number of [place] in the Last Year: Not on file    Unstable Housing in the Last Year: Not on file         Review of Systems      ROS: 10 organs review of system is done including general, psychological, ENT, hematological, endocrine, respiratory, cardiovascular, gastrointestinal,musculoskeletal, neurological,  allergy and Immunology is done and is otherwise negative.     Current Outpatient Medications   Medication Sig Dispense Refill    varenicline (CHANTIX STARTING MONTH PAK) 0.5 MG X 11 & 1 MG X 42 tablet Take by mouth.  1 box 0    SYMBICORT 160-4.5 MCG/ACT AERO Inhale 2 puffs by mouth twice daily 1 each 3    ibuprofen (IBU) 600 MG tablet Take 1 tablet by mouth every 8 hours as needed for Pain 20 tablet 0    albuterol sulfate HFA (VENTOLIN HFA) 108 (90 Base) MCG/ACT inhaler Inhale 2 puffs into the lungs 4 times daily as needed for Wheezing 1 each 3    tiotropium (SPIRIVA RESPIMAT) 2.5 MCG/ACT AERS inhaler Inhale 2 puffs into the lungs daily 1 each 3    clopidogrel (PLAVIX) 75 MG tablet Take 75 mg by mouth daily      Icosapent Ethyl (VASCEPA) 1 g CAPS capsule Take 1 g by mouth 2 times daily (with meals)      montelukast (SINGULAIR) 10 MG tablet Take 10 mg by mouth nightly      ipratropium-albuterol (DUONEB) 0.5-2.5 (3) MG/3ML SOLN nebulizer solution Inhale 3 mLs into the lungs 3 times daily as needed for Shortness of Breath 270 mL 0    azelastine (ASTELIN) 0.1 % nasal spray 2 sprays by Nasal route 2 times daily Use in each nostril as directed      dilTIAZem (TIAZAC) 180 MG extended release capsule Take 240 mg by mouth 2 times daily       EPINEPHrine (EPIPEN) 0.3 MG/0.3ML SOAJ injection Inject 0.3 mg into the muscle as needed      Esomeprazole Magnesium (NEXIUM 24HR) 20 MG TBEC Take 20 mg by mouth daily as needed      magnesium oxide (MAG-OX) 400 MG tablet Take 400 mg by mouth 2 times daily      triamcinolone (NASACORT) 55 MCG/ACT nasal inhaler USE 2 SPRAYS INTO THE NOSE ONCE DAILY      atorvastatin (LIPITOR) 40 MG tablet 80 mg       nicotine (NICODERM CQ) 21 MG/24HR Place 1 patch onto the skin daily 42 patch 0    pantoprazole (PROTONIX) 40 MG tablet Take 1 tablet by mouth every morning (before breakfast) 30 tablet 3    metFORMIN (GLUCOPHAGE) 500 MG tablet Take 1,000 mg by mouth 2 times daily (with meals)      empagliflozin (JARDIANCE) 10 MG tablet Take 10 mg by mouth daily      mupirocin (BACTROBAN) 2 % ointment Apply topically 2 times daily Apply topically twice daily inside of nose  mometasone (ELOCON) 0.1 % cream Apply topically daily Apply topically daily inside of both ears      furosemide (LASIX) 20 MG tablet Take 20 mg by mouth daily      potassium chloride (KLOR-CON M) 10 MEQ extended release tablet Take 10 mEq by mouth daily      lisinopril (PRINIVIL;ZESTRIL) 5 MG tablet Take 10 mg by mouth daily       aspirin 81 MG EC tablet Take 1 tablet by mouth daily 90 tablet 3    ticagrelor (BRILINTA) 90 MG TABS tablet Take 1 tablet by mouth 2 times daily 60 tablet 5    nitroGLYCERIN (NITROSTAT) 0.4 MG SL tablet up to max of 3 total doses. If no relief after 1 dose, call 911. 25 tablet 3     No current facility-administered medications for this visit. Objective:     Vitals:    02/08/22 1011   BP: 136/82   Site: Left Upper Arm   Position: Sitting   Cuff Size: Medium Adult   Pulse: 104   Resp: 17   SpO2: 99%   Weight: 200 lb 3.2 oz (90.8 kg)   Height: 5' 2\" (1.575 m)         Physical Exam  Constitutional:       General: She is not in acute distress. Appearance: She is well-developed. She is not diaphoretic. HENT:      Head: Normocephalic and atraumatic. Eyes:      Conjunctiva/sclera: Conjunctivae normal.      Pupils: Pupils are equal, round, and reactive to light. Cardiovascular:      Rate and Rhythm: Normal rate and regular rhythm. Heart sounds: No murmur heard. No friction rub. No gallop. Pulmonary:      Effort: Pulmonary effort is normal. No respiratory distress. Breath sounds: Normal breath sounds. No wheezing or rales. Chest:      Chest wall: No tenderness. Abdominal:      General: There is no distension. Palpations: Abdomen is soft. Tenderness: There is no abdominal tenderness. There is no rebound. Musculoskeletal:         General: No tenderness. Cervical back: Normal range of motion and neck supple. Right lower leg: No edema. Left lower leg: No edema. Lymphadenopathy:      Cervical: No cervical adenopathy.    Skin: General: Skin is warm and dry. Findings: No erythema. Neurological:      Mental Status: She is alert and oriented to person, place, and time. Psychiatric:         Judgment: Judgment normal.         Imaging studies reviewed by me CT lung cancer screening October 2021, no mass or nodule chest x-ray January 24, 2022 is unremarkable  Lab results reviewed in chart  PFT August 2020, FEV1 91%, FEV1/FVC 0.81  ECHO: August 2020, EF 60%  Sleep study October 2020 no KEITH   Assessment and Plan       Diagnosis Orders   1. Moderate persistent asthma without complication  Allergen, Respiratory, Region 5 Panel   2. Smoking  varenicline (CHANTIX STARTING MONTH OMARI) 0.5 MG X 11 & 1 MG X 42 tablet   3. Class 2 obesity due to excess calories without serious comorbidity with body mass index (BMI) of 37.0 to 37.9 in adult     4. Gastroesophageal reflux disease without esophagitis       · Asthma, symptoms relatively controlled, continue Spiriva, Singulair and Symbicort, patient needs to quit smoking, I discussed with her again today. · Smoking, smoking cessation counseling done again today for 5 minutes, reviewed with the patient, START steps, nicotine replacement therapy, and Chantix, reviewed with the patient risk of suicidal ideation and vivid dreams along with common side effect of nausea vomiting and diarrhea patient instructed to stop Chantix if this occurred and call me. She is agreeable to start treatment, discussed with the patient recent report of carcinogen and some of Chantix packaging she will discuss with pharmacy, in any way smoking carries high risk. CT lung cancer screening October of this year.   · Weight loss recommended  · GERD symptoms controlled, currently on Nexium      Orders Placed This Encounter   Procedures    Allergen, Respiratory, Region 5 Panel     Standing Status:   Future     Standing Expiration Date:   2/8/2023     Orders Placed This Encounter   Medications    varenicline (CHANTIX STARTING MONTH OMARI) 0.5 MG X 11 & 1 MG X 42 tablet     Sig: Take by mouth. Dispense:  1 box     Refill:  0            Discussed with patient the importance of exercise and weight control and  overall health and well-being. Reviewed with the patient: current clinical status, medications, activities and diet. Side effects, adverse effects of the medication prescribed today, as well as treatment plan and result expectations have been discussed with the patient who expresses understanding and desires to proceed. Return in about 4 months (around 6/8/2022).       Ju Banks MD

## 2022-02-08 NOTE — TELEPHONE ENCOUNTER
PHARMACY STATES THE CHANTIX IS ON BACK ORDER FOR ALL THE PACKS NOT JUST THE STARTER PACKS. THEY DO NOT HAVE A DATE WHEN THEY WILL GET THEM IN .  PLEASE ADVISE WHAT YOU WOULD LIKE PATIENT TO DO.

## 2022-02-10 LAB
ALLERGEN BERMUDA GRASS IGE: 0.57 KU/L (ref 0–0.34)
ALLERGEN BIRCH IGE: 0.56 KU/L (ref 0–0.34)
ALLERGEN DOG DANDER IGE: 32.7 KU/L (ref 0–0.34)
ALLERGEN GERMAN COCKROACH IGE: 2.05 KU/L (ref 0–0.34)
ALLERGEN HORMODENDRUM IGE: <0.1 KUL/L (ref 0–0.34)
ALLERGEN MOUSE EPITHELIA IGE: 0.44 KU/L (ref 0–0.34)
ALLERGEN OAK TREE IGE: 0.95 KU/L (ref 0–0.34)
ALLERGEN PECAN TREE IGE: 0.61 KU/L (ref 0–0.34)
ALLERGEN PIGWEED ROUGH IGE: 0.47 KU/L (ref 0–0.34)
ALLERGEN SHEEP SORREL (W18) IGE: 0.5 KU/L (ref 0–0.34)
ALLERGEN TREE SYCAMORE: 0.93 KU/L (ref 0–0.34)
ALLERGEN WALNUT TREE IGE: 0.83 KU/L (ref 0–0.34)
ALLERGEN WHITE MULBERRY TREE, IGE: 0.52 KU/L (ref 0–0.34)
ALLERGEN, TREE, WHITE ASH IGE: 0.82 KU/L (ref 0–0.34)
ALTERNARIA ALTERNATA: <0.1 KU/L (ref 0–0.34)
ASPERGILLUS FUMIGATUS: <0.1 KU/L (ref 0–0.34)
CAT DANDER ANTIBODY: 92 KU/L (ref 0–0.34)
COTTONWOOD TREE: 0.75 KU/L (ref 0–0.34)
D. FARINAE: 17.8 KU/L (ref 0–0.34)
D. PTERONYSSINUS: 23.7 KU/L (ref 0–0.34)
ELM TREE: 0.71 KU/L (ref 0–0.34)
IGE: 2477 IU/ML
MAPLE/BOXELDER TREE: 7.04 KU/L (ref 0–0.34)
MOUNTAIN CEDAR TREE: 0.83 KU/L (ref 0–0.34)
MUCOR RACEMOSUS: 0.15 KU/L (ref 0–0.34)
P. NOTATUM: <0.1 KU/L (ref 0–0.34)
RUSSIAN THISTLE: 0.41 KU/L (ref 0–0.34)
SHORT RAGWD(A ARTEMIS.) IGE: 0.58 KU/L (ref 0–0.34)
TIMOTHY GRASS: 0.78 KU/L (ref 0–0.34)

## 2022-02-15 RX ORDER — VARENICLINE TARTRATE 1 MG/1
1 TABLET, FILM COATED ORAL 2 TIMES DAILY
Qty: 154 TABLET | Refills: 0 | Status: SHIPPED | OUTPATIENT
Start: 2022-02-15 | End: 2022-06-15 | Stop reason: ALTCHOICE

## 2022-02-15 RX ORDER — VARENICLINE TARTRATE 1 MG/1
TABLET, FILM COATED ORAL
Qty: 11 TABLET | Refills: 0 | Status: SHIPPED | OUTPATIENT
Start: 2022-02-15 | End: 2022-06-15 | Stop reason: ALTCHOICE

## 2022-02-15 NOTE — TELEPHONE ENCOUNTER
Chantix does not have a starter pack no longer separate rx for . 5 and 1mg needs sent       Will need to adjust dosages/usage

## 2022-03-26 DIAGNOSIS — J45.40 MODERATE PERSISTENT ASTHMA WITHOUT COMPLICATION: Primary | ICD-10-CM

## 2022-03-28 NOTE — TELEPHONE ENCOUNTER
Rx requested:  Requested Prescriptions     Pending Prescriptions Disp Refills    PROAIR  (90 Base) MCG/ACT inhaler [Pharmacy Med Name: ProAir  (90 Base) MCG/ACT Inhalation Aerosol Solution] 9 g 0     Sig: INHALE 2 PUFFS BY MOUTH 4 TIMES DAILY AS NEEDED FOR WHEEZING       Last Office Visit:   Visit date not found      Next Visit Date:  Future Appointments   Date Time Provider Bhavin Tao   6/15/2022  9:45 AM Lita Vizcaino MD [facility]

## 2022-04-01 DIAGNOSIS — J44.9 ASTHMA-COPD OVERLAP SYNDROME (HCC): ICD-10-CM

## 2022-04-01 NOTE — TELEPHONE ENCOUNTER
Rx requested:  Requested Prescriptions     Pending Prescriptions Disp Refills    tiotropium (SPIRIVA RESPIMAT) 2.5 MCG/ACT AERS inhaler 1 each 3     Sig: Inhale 2 puffs into the lungs daily       Last Office Visit:   2/8/2022      Next Visit Date:  Future Appointments   Date Time Provider Bhavin Tao   6/15/2022  9:45 AM Corey Mcmahan MD [facility]

## 2022-04-15 ENCOUNTER — HOSPITAL ENCOUNTER (EMERGENCY)
Age: 52
Discharge: HOME | End: 2022-04-15
Payer: COMMERCIAL

## 2022-04-15 ENCOUNTER — APPOINTMENT (OUTPATIENT)
Dept: GENERAL RADIOLOGY | Age: 52
End: 2022-04-15
Payer: COMMERCIAL

## 2022-04-15 VITALS
DIASTOLIC BLOOD PRESSURE: 63 MMHG | WEIGHT: 218 LBS | OXYGEN SATURATION: 98 % | SYSTOLIC BLOOD PRESSURE: 114 MMHG | HEIGHT: 62 IN | RESPIRATION RATE: 22 BRPM | BODY MASS INDEX: 40.12 KG/M2 | TEMPERATURE: 97.9 F | HEART RATE: 83 BPM

## 2022-04-15 DIAGNOSIS — J44.1 COPD EXACERBATION (HCC): ICD-10-CM

## 2022-04-15 DIAGNOSIS — R05.9 COUGH: Primary | ICD-10-CM

## 2022-04-15 LAB
ALBUMIN SERPL-MCNC: 4.1 G/DL (ref 3.5–4.6)
ALP BLD-CCNC: 115 U/L (ref 40–130)
ALT SERPL-CCNC: 14 U/L (ref 0–33)
ANION GAP SERPL CALCULATED.3IONS-SCNC: 12 MEQ/L (ref 9–15)
AST SERPL-CCNC: 12 U/L (ref 0–35)
BASOPHILS ABSOLUTE: 0.1 K/UL (ref 0–0.2)
BASOPHILS RELATIVE PERCENT: 0.8 %
BILIRUB SERPL-MCNC: 0.4 MG/DL (ref 0.2–0.7)
BUN BLDV-MCNC: 12 MG/DL (ref 6–20)
CALCIUM SERPL-MCNC: 9.1 MG/DL (ref 8.5–9.9)
CHLORIDE BLD-SCNC: 102 MEQ/L (ref 95–107)
CO2: 20 MEQ/L (ref 20–31)
CREAT SERPL-MCNC: 0.6 MG/DL (ref 0.5–0.9)
EKG ATRIAL RATE: 103 BPM
EKG P AXIS: 61 DEGREES
EKG P-R INTERVAL: 160 MS
EKG Q-T INTERVAL: 330 MS
EKG QRS DURATION: 74 MS
EKG QTC CALCULATION (BAZETT): 432 MS
EKG R AXIS: 50 DEGREES
EKG T AXIS: 66 DEGREES
EKG VENTRICULAR RATE: 103 BPM
EOSINOPHILS ABSOLUTE: 0.3 K/UL (ref 0–0.7)
EOSINOPHILS RELATIVE PERCENT: 4.6 %
GFR AFRICAN AMERICAN: >60
GFR NON-AFRICAN AMERICAN: >60
GLOBULIN: 3.1 G/DL (ref 2.3–3.5)
GLUCOSE BLD-MCNC: 162 MG/DL (ref 70–99)
HCT VFR BLD CALC: 44 % (ref 37–47)
HEMOGLOBIN: 14.5 G/DL (ref 12–16)
INFLUENZA A BY PCR: NEGATIVE
INFLUENZA B BY PCR: NEGATIVE
LYMPHOCYTES ABSOLUTE: 1.4 K/UL (ref 1–4.8)
LYMPHOCYTES RELATIVE PERCENT: 21.1 %
MAGNESIUM: 2.2 MG/DL (ref 1.7–2.4)
MCH RBC QN AUTO: 29.7 PG (ref 27–31.3)
MCHC RBC AUTO-ENTMCNC: 32.9 % (ref 33–37)
MCV RBC AUTO: 90.3 FL (ref 82–100)
MONOCYTES ABSOLUTE: 0.3 K/UL (ref 0.2–0.8)
MONOCYTES RELATIVE PERCENT: 4.9 %
NEUTROPHILS ABSOLUTE: 4.4 K/UL (ref 1.4–6.5)
NEUTROPHILS RELATIVE PERCENT: 68.6 %
PDW BLD-RTO: 13.4 % (ref 11.5–14.5)
PLATELET # BLD: 195 K/UL (ref 130–400)
POTASSIUM SERPL-SCNC: 4 MEQ/L (ref 3.4–4.9)
RBC # BLD: 4.87 M/UL (ref 4.2–5.4)
SARS-COV-2, NAAT: NOT DETECTED
SODIUM BLD-SCNC: 134 MEQ/L (ref 135–144)
TOTAL PROTEIN: 7.2 G/DL (ref 6.3–8)
TROPONIN: <0.01 NG/ML (ref 0–0.01)
WBC # BLD: 6.4 K/UL (ref 4.8–10.8)

## 2022-04-15 PROCEDURE — 99283 EMERGENCY DEPT VISIT LOW MDM: CPT

## 2022-04-15 PROCEDURE — 94761 N-INVAS EAR/PLS OXIMETRY MLT: CPT

## 2022-04-15 PROCEDURE — 80053 COMPREHEN METABOLIC PANEL: CPT

## 2022-04-15 PROCEDURE — 87635 SARS-COV-2 COVID-19 AMP PRB: CPT

## 2022-04-15 PROCEDURE — 96368 THER/DIAG CONCURRENT INF: CPT

## 2022-04-15 PROCEDURE — 85025 COMPLETE CBC W/AUTO DIFF WBC: CPT

## 2022-04-15 PROCEDURE — 6370000000 HC RX 637 (ALT 250 FOR IP): Performed by: STUDENT IN AN ORGANIZED HEALTH CARE EDUCATION/TRAINING PROGRAM

## 2022-04-15 PROCEDURE — 2580000003 HC RX 258: Performed by: STUDENT IN AN ORGANIZED HEALTH CARE EDUCATION/TRAINING PROGRAM

## 2022-04-15 PROCEDURE — 6360000002 HC RX W HCPCS: Performed by: STUDENT IN AN ORGANIZED HEALTH CARE EDUCATION/TRAINING PROGRAM

## 2022-04-15 PROCEDURE — 83735 ASSAY OF MAGNESIUM: CPT

## 2022-04-15 PROCEDURE — 96365 THER/PROPH/DIAG IV INF INIT: CPT

## 2022-04-15 PROCEDURE — 71045 X-RAY EXAM CHEST 1 VIEW: CPT

## 2022-04-15 PROCEDURE — 87502 INFLUENZA DNA AMP PROBE: CPT

## 2022-04-15 PROCEDURE — 96375 TX/PRO/DX INJ NEW DRUG ADDON: CPT

## 2022-04-15 PROCEDURE — 94640 AIRWAY INHALATION TREATMENT: CPT

## 2022-04-15 PROCEDURE — 36415 COLL VENOUS BLD VENIPUNCTURE: CPT

## 2022-04-15 PROCEDURE — 84484 ASSAY OF TROPONIN QUANT: CPT

## 2022-04-15 PROCEDURE — 93005 ELECTROCARDIOGRAM TRACING: CPT | Performed by: STUDENT IN AN ORGANIZED HEALTH CARE EDUCATION/TRAINING PROGRAM

## 2022-04-15 RX ORDER — IPRATROPIUM BROMIDE AND ALBUTEROL SULFATE 2.5; .5 MG/3ML; MG/3ML
1 SOLUTION RESPIRATORY (INHALATION) ONCE
Status: COMPLETED | OUTPATIENT
Start: 2022-04-15 | End: 2022-04-15

## 2022-04-15 RX ORDER — MAGNESIUM SULFATE IN WATER 40 MG/ML
2000 INJECTION, SOLUTION INTRAVENOUS ONCE
Status: COMPLETED | OUTPATIENT
Start: 2022-04-15 | End: 2022-04-15

## 2022-04-15 RX ORDER — ALBUTEROL SULFATE 2.5 MG/3ML
2.5 SOLUTION RESPIRATORY (INHALATION) EVERY 4 HOURS PRN
Qty: 120 EACH | Refills: 1 | Status: SHIPPED | OUTPATIENT
Start: 2022-04-15

## 2022-04-15 RX ORDER — METHYLPREDNISOLONE SODIUM SUCCINATE 125 MG/2ML
125 INJECTION, POWDER, LYOPHILIZED, FOR SOLUTION INTRAMUSCULAR; INTRAVENOUS ONCE
Status: COMPLETED | OUTPATIENT
Start: 2022-04-15 | End: 2022-04-15

## 2022-04-15 RX ORDER — GUAIFENESIN 600 MG/1
600 TABLET, EXTENDED RELEASE ORAL 2 TIMES DAILY
Qty: 30 TABLET | Refills: 0 | Status: SHIPPED | OUTPATIENT
Start: 2022-04-15 | End: 2022-04-30

## 2022-04-15 RX ORDER — AZITHROMYCIN 250 MG/1
250 TABLET, FILM COATED ORAL SEE ADMIN INSTRUCTIONS
Qty: 6 TABLET | Refills: 0 | Status: SHIPPED | OUTPATIENT
Start: 2022-04-15 | End: 2022-04-20

## 2022-04-15 RX ORDER — PREDNISONE 50 MG/1
50 TABLET ORAL DAILY
Qty: 5 TABLET | Refills: 0 | Status: SHIPPED | OUTPATIENT
Start: 2022-04-15 | End: 2022-04-20

## 2022-04-15 RX ORDER — KETOROLAC TROMETHAMINE 30 MG/ML
30 INJECTION, SOLUTION INTRAMUSCULAR; INTRAVENOUS ONCE
Status: COMPLETED | OUTPATIENT
Start: 2022-04-15 | End: 2022-04-15

## 2022-04-15 RX ORDER — BENZONATATE 100 MG/1
100-200 CAPSULE ORAL 3 TIMES DAILY PRN
Qty: 60 CAPSULE | Refills: 0 | Status: SHIPPED | OUTPATIENT
Start: 2022-04-15 | End: 2022-04-25

## 2022-04-15 RX ORDER — 0.9 % SODIUM CHLORIDE 0.9 %
1000 INTRAVENOUS SOLUTION INTRAVENOUS ONCE
Status: COMPLETED | OUTPATIENT
Start: 2022-04-15 | End: 2022-04-15

## 2022-04-15 RX ADMIN — SODIUM CHLORIDE 1000 ML: 9 INJECTION, SOLUTION INTRAVENOUS at 09:50

## 2022-04-15 RX ADMIN — IPRATROPIUM BROMIDE AND ALBUTEROL SULFATE 1 AMPULE: .5; 2.5 SOLUTION RESPIRATORY (INHALATION) at 10:45

## 2022-04-15 RX ADMIN — DEXTROSE MONOHYDRATE 500 MG: 50 INJECTION, SOLUTION INTRAVENOUS at 09:50

## 2022-04-15 RX ADMIN — MAGNESIUM SULFATE HEPTAHYDRATE 2000 MG: 40 INJECTION, SOLUTION INTRAVENOUS at 10:03

## 2022-04-15 RX ADMIN — METHYLPREDNISOLONE SODIUM SUCCINATE 125 MG: 125 INJECTION, POWDER, FOR SOLUTION INTRAMUSCULAR; INTRAVENOUS at 09:47

## 2022-04-15 RX ADMIN — KETOROLAC TROMETHAMINE 30 MG: 30 INJECTION, SOLUTION INTRAMUSCULAR at 10:43

## 2022-04-15 ASSESSMENT — PAIN DESCRIPTION - LOCATION: LOCATION: HEAD

## 2022-04-15 ASSESSMENT — PAIN DESCRIPTION - PAIN TYPE: TYPE: ACUTE PAIN

## 2022-04-15 ASSESSMENT — PAIN SCALES - GENERAL
PAINLEVEL_OUTOF10: 7
PAINLEVEL_OUTOF10: 7
PAINLEVEL_OUTOF10: 4

## 2022-04-15 ASSESSMENT — ENCOUNTER SYMPTOMS
ABDOMINAL DISTENTION: 0
DIARRHEA: 0
PHOTOPHOBIA: 0
ABDOMINAL PAIN: 0
NAUSEA: 0
SHORTNESS OF BREATH: 1
CONSTIPATION: 0
WHEEZING: 0
VOMITING: 0
COUGH: 1

## 2022-04-15 ASSESSMENT — PAIN DESCRIPTION - FREQUENCY: FREQUENCY: INTERMITTENT

## 2022-04-15 NOTE — Clinical Note
Dagoberto Atkinson was seen and treated in our emergency department on 4/15/2022. She may return to work on 04/18/2022. If you have any questions or concerns, please don't hesitate to call.       Argelia Galdamez PA-C

## 2022-04-15 NOTE — ED PROVIDER NOTES
weakness (generalized) and headaches. Negative for dizziness, syncope, facial asymmetry, speech difficulty, light-headedness and numbness. All other systems reviewed and are negative. Except as noted above the remainder of the review of systems was reviewed and negative.        PAST MEDICAL HISTORY     Past Medical History:   Diagnosis Date    Anticoagulant long-term use     Asthma 2004    CAD (coronary artery disease)     COPD [facility]ch[facility] disease) ([facility]     Diabetes mellitus ([facility] 2014    Hodgkin disease ([facility] 1999    Hyperlipidemia     Hypertension 1984    Migraines 1989    Sleep apnea     recently tested     Type 2 diabetes mellitus without complication ([facility]     type II         SURGICAL HISTORY       Past Surgical History:   Procedure Laterality Date    TUBAL LIGATION           CURRENT MEDICATIONS       Previous Medications    ASPIRIN 81 MG EC TABLET    Take 1 tablet by mouth daily    ATORVASTATIN (LIPITOR) 40 MG TABLET    80 mg     AZELASTINE (ASTELIN) 0.1 % NASAL SPRAY    2 sprays by Nasal route 2 times daily Use in each nostril as directed    CLOPIDOGREL (PLAVIX) 75 MG TABLET    Take 75 mg by mouth daily    DILTIAZEM (TIAZAC) 180 MG EXTENDED RELEASE CAPSULE    Take 240 mg by mouth 2 times daily     EMPAGLIFLOZIN (JARDIANCE) 10 MG TABLET    Take 10 mg by mouth daily    EPINEPHRINE (EPIPEN) 0.3 MG/0.3ML SOAJ INJECTION    Inject 0.3 mg into the muscle as needed    ESOMEPRAZOLE MAGNESIUM (NEXIUM 24HR) 20 MG TBEC    Take 20 mg by mouth daily as needed    FUROSEMIDE (LASIX) 20 MG TABLET    Take 20 mg by mouth daily    IBUPROFEN (IBU) 600 MG TABLET    Take 1 tablet by mouth every 8 hours as needed for Pain    ICOSAPENT ETHYL (VASCEPA) 1 G CAPS CAPSULE    Take 1 g by mouth 2 times daily (with meals)    IPRATROPIUM-ALBUTEROL (DUONEB) 0.5-2.5 (3) MG/3ML SOLN NEBULIZER SOLUTION    Inhale 3 mLs into the lungs 3 times daily as needed for Shortness of Breath    LISINOPRIL (PRINIVIL;ZESTRIL) 5 MG TABLET    Take 10 mg by mouth daily     MAGNESIUM OXIDE (MAG-OX) 400 MG TABLET    Take 400 mg by mouth 2 times daily    METFORMIN (GLUCOPHAGE) 500 MG TABLET    Take 1,000 mg by mouth 2 times daily (with meals)    MOMETASONE (ELOCON) 0.1 % CREAM    Apply topically daily Apply topically daily inside of both ears    MONTELUKAST (SINGULAIR) 10 MG TABLET    Take 10 mg by mouth nightly    MUPIROCIN (BACTROBAN) 2 % OINTMENT    Apply topically 2 times daily Apply topically twice daily inside of nose    NICOTINE (NICODERM CQ) 21 MG/24HR    Place 1 patch onto the skin daily    NITROGLYCERIN (NITROSTAT) 0.4 MG SL TABLET    up to max of 3 total doses. If no relief after 1 dose, call 911. PANTOPRAZOLE (PROTONIX) 40 MG TABLET    Take 1 tablet by mouth every morning (before breakfast)    POTASSIUM CHLORIDE (KLOR-CON M) 10 MEQ EXTENDED RELEASE TABLET    Take 10 mEq by mouth daily    PROAIR  (90 BASE) MCG/ACT INHALER    INHALE 2 PUFFS BY MOUTH 4 TIMES DAILY AS NEEDED FOR WHEEZING    SYMBICORT 160-4.5 MCG/ACT AERO    Inhale 2 puffs by mouth twice daily    TICAGRELOR (BRILINTA) 90 MG TABS TABLET    Take 1 tablet by mouth 2 times daily    TIOTROPIUM (SPIRIVA RESPIMAT) 2.5 MCG/ACT AERS INHALER    Inhale 2 puffs into the lungs daily    TRIAMCINOLONE (NASACORT) 55 MCG/ACT NASAL INHALER    USE 2 SPRAYS INTO THE NOSE ONCE DAILY    VARENICLINE (CHANTIX) 1 MG TABLET    0.5 mg p.o. daily for 3 days, then 0.5 mg p.o. twice daily for 4 days then start the prescription of 1 mg p.o. twice daily    VARENICLINE (CHANTIX) 1 MG TABLET    Take 1 tablet by mouth 2 times daily       ALLERGIES     Shellfish-derived products, Cumin oil, and Piper    FAMILY HISTORY     No family history on file.        SOCIAL HISTORY       Social History     Socioeconomic History    Marital status: Single     Spouse name: Not on file    Number of children: Not on file    Years of education: Not on file    Highest education level: Not on file   Occupational History    Not on file   Tobacco Use    Smoking status: Current Every Day Smoker     Packs/day: 1.00     Years: 37.00     Pack years: 37.00     Types: Cigarettes     Last attempt to quit: 2020     Years since quittin.5    Smokeless tobacco: Never Used   Vaping Use    Vaping Use: Never used   Substance and Sexual Activity    Alcohol use: Not Currently    Drug use: Never    Sexual activity: Yes     Partners: Male   Other Topics Concern    Not on file   Social History Narrative    Not on file     Social Determinants of Health     Financial Resource Strain: Low Risk     Difficulty of Paying Living Expenses: Not very hard   Food Insecurity: No Food Insecurity    Worried About Running Out of Food in the Last Year: Never true    Oral of Food in the Last Year: Never true   Transportation Needs:     Lack of Transportation (Medical): Not on file    Lack of Transportation (Non-Medical):  Not on file   Physical Activity:     Days of Exercise per Week: Not on file    Minutes of Exercise per Session: Not on file   Stress:     Feeling of Stress : Not on file   Social Connections:     Frequency of Communication with Friends and Family: Not on file    Frequency of Social Gatherings with Friends and Family: Not on file    Attends Synagogue Services: Not on file    Active Member of [address] [place] [address] [employer] or Organizations: Not on file    Attends Club or Organization Meetings: Not on file    Marital Status: Not on file   Intimate Partner Violence:     Fear of Current or Ex-Partner: Not on file    Emotionally Abused: Not on file    Physically Abused: Not on file    Sexually Abused: Not on file   Housing Stability:     Unable to Pay for Housing in the Last Year: Not on file    Number of [place] in the Last Year: Not on file    Unstable Housing in the Last Year: Not on file       SCREENINGS         Phelps Coma Scale  Eye Opening: Spontaneous  Best Verbal Response: Oriented  Best Motor Response: Obeys commands  Cat Coma Scale Score: 15                     [facility] Assessment  BP: (!) 179/109  Pulse: 81                 PHYSICAL EXAM    (up to 7 for level 4, 8 or more for level 5)     ED Triage Vitals [04/15/22 0901]   BP Temp Temp src Pulse Resp SpO2 Height Weight   (!) 179/109 97.9 °F (36.6 °C) -- 81 22 99 % 5' 2\" (1.575 m) 218 lb (98.9 kg)       Physical Exam  Constitutional:       General: She is not in acute distress. Appearance: She is well-developed. She is not toxic-appearing. HENT:      Head: Normocephalic and atraumatic. Nose: Nose normal.      Mouth/Throat:      Mouth: Mucous membranes are moist.   Eyes:      Pupils: Pupils are equal, round, and reactive to light. Cardiovascular:      Rate and Rhythm: Normal rate and regular rhythm. Heart sounds: No murmur heard. No friction rub. No gallop. Pulmonary:      Effort: Pulmonary effort is normal.      Breath sounds: Wheezing (diffuse, inspiratory and expiratory, moderate) present. No rhonchi or rales. Comments: Mild conversational dyspnea   Abdominal:      General: Bowel sounds are normal. There is no distension. Palpations: Abdomen is soft. Tenderness: There is no abdominal tenderness. There is no guarding or rebound. Musculoskeletal:         General: No swelling. Cervical back: Normal range of motion. Right lower leg: No edema. Left lower leg: No edema. Skin:     General: Skin is warm and dry. Capillary Refill: Capillary refill takes less than 2 seconds. Findings: No rash. Neurological:      General: No focal deficit present. Mental Status: She is alert and oriented to person, place, and time. DIAGNOSTIC RESULTS     EKG: All EKG's are interpreted by the Emergency Department Physician who either signs or Co-signs this chart in the absence of a cardiologist.    EKG shows sinus tach with , normal axis, normal intervals, no ST changes.         RADIOLOGY: Non-plain film images such as CT, Ultrasound and MRI are read by the radiologist. Plain radiographic images are visualized and preliminarily interpreted by the emergency physician with the below findings:      Interpretation per the Radiologist below, if available at the time of this note:    XR CHEST PORTABLE   Final Result      No radiographic evidence of acute intrathoracic process. ED BEDSIDE ULTRASOUND:   Performed by ED Physician - none    LABS:  Labs Reviewed   COMPREHENSIVE METABOLIC PANEL - Abnormal; Notable for the following components:       Result Value    Sodium 134 (*)     Glucose 162 (*)     All other components within normal limits   CBC WITH AUTO DIFFERENTIAL - Abnormal; Notable for the following components:    MCHC 32.9 (*)     All other components within normal limits   RAPID INFLUENZA A/B ANTIGENS   COVID-19, RAPID   MAGNESIUM   TROPONIN       All other labs were within normal range or not returned as of this dictation. EMERGENCY DEPARTMENT COURSE and DIFFERENTIAL DIAGNOSIS/MDM:   Vitals:    Vitals:    04/15/22 0901   BP: (!) 179/109   Pulse: 81   Resp: 22   Temp: 97.9 °F (36.6 °C)   SpO2: 99%   Weight: 218 lb (98.9 kg)   Height: 5' 2\" (1.575 m)     MDM     Pt is a 45 yo F who presents to the ED for evaluation of cough, sob, cp with coughing only. Afebrile, HD stable. 99% on room air. Mild conversational dyspnea. Moderate inspiratory and expiratory wheezing diffusely. Patient was given 1 L IV normal saline IV Solu-Medrol IV magnesium IV azithromycin duo nebs in the ED. EKG shows sinus tach with , normal axis, normal intervals, no ST changes. Labs unremarkable. COVID and flu negative. Troponin within normal limits. Chest x-ray shows no acute intrathoracic process. Suspect bronchitis with asthma/COPD exacerbation. Less concern for ACS at this time. Less concern for PE, PERC negative. Patient reassessed with complete resolution of her symptoms.   Wheezing improved. Ambulatory in the ED without any respiratory distress. Remains 99% on room air. Patient nontoxic-appearing stable vital stable for discharge. Given prescription for prednisone Tessalon Mucinex albuterol nebulizer solution and azithromycin. Follow-up with primary care 1 to 2 days return to the ED for worsening symptoms given warning signs for which she should return. Patient understands agrees plan. REASSESSMENT          CRITICAL CARE TIME   Total Critical Care time was 0 minutes, excluding separately reportable procedures. There was a high probability of clinically significant/life threatening deterioration in the patient's condition which required my urgent intervention. CONSULTS:  None    PROCEDURES:  Unless otherwise noted below, none     Procedures        FINAL IMPRESSION      1. Cough    2.  COPD exacerbation [facility])          DISPOSITION/PLAN   DISPOSITION Decision To Discharge 04/15/2022 10:49:09 AM      PATIENT REFERRED TO:  Cecilia Rehman PA-C  [address] [address]  [phone]    Schedule an appointment as soon as possible for a visit in 1 day      [facility] ED  [address] [facility]  [phone]  Go to   As needed, If symptoms worsen      DISCHARGE MEDICATIONS:  New Prescriptions    ALBUTEROL (PROVENTIL) (2.5 MG/3ML) 0.083% NEBULIZER SOLUTION    Take 3 mLs by nebulization every 4 hours as needed for Wheezing or Shortness of Breath    AZITHROMYCIN (ZITHROMAX) 250 MG TABLET    Take 1 tablet by mouth See Admin Instructions for 5 days 500mg on day 1 followed by 250mg on days 2 - 5    BENZONATATE (TESSALON) 100 MG CAPSULE    Take 1-2 capsules by mouth 3 times daily as needed for Cough    GUAIFENESIN (MUCINEX) 600 MG EXTENDED RELEASE TABLET    Take 1 tablet by mouth 2 times daily for 15 days    PREDNISONE (DELTASONE) 50 MG TABLET    Take 1 tablet by mouth daily for 5 days     Controlled Substances Monitoring:     No flowsheet data found.    (Please note that portions of this note were completed with a voice recognition program.  Efforts were made to edit the dictations but occasionally words are mis-transcribed.)    Despina Siemens, PA-C (electronically signed)             Despina Siemens, PA-C  04/15/22 1108

## 2022-06-15 ENCOUNTER — OFFICE VISIT (OUTPATIENT)
Dept: PULMONOLOGY | Age: 52
End: 2022-06-15
Payer: COMMERCIAL

## 2022-06-15 VITALS
BODY MASS INDEX: 37.36 KG/M2 | HEART RATE: 91 BPM | DIASTOLIC BLOOD PRESSURE: 80 MMHG | RESPIRATION RATE: 18 BRPM | WEIGHT: 203 LBS | TEMPERATURE: 97.1 F | HEIGHT: 62 IN | SYSTOLIC BLOOD PRESSURE: 124 MMHG | OXYGEN SATURATION: 95 %

## 2022-06-15 DIAGNOSIS — J45.51 SEVERE PERSISTENT ASTHMA WITH EXACERBATION: Primary | ICD-10-CM

## 2022-06-15 DIAGNOSIS — J44.9 ASTHMA-COPD OVERLAP SYNDROME (HCC): ICD-10-CM

## 2022-06-15 DIAGNOSIS — E66.09 CLASS 2 OBESITY DUE TO EXCESS CALORIES WITHOUT SERIOUS COMORBIDITY WITH BODY MASS INDEX (BMI) OF 37.0 TO 37.9 IN ADULT: ICD-10-CM

## 2022-06-15 DIAGNOSIS — K21.9 GASTROESOPHAGEAL REFLUX DISEASE WITHOUT ESOPHAGITIS: ICD-10-CM

## 2022-06-15 DIAGNOSIS — F17.200 SMOKING: ICD-10-CM

## 2022-06-15 PROCEDURE — 99215 OFFICE O/P EST HI 40 MIN: CPT | Performed by: INTERNAL MEDICINE

## 2022-06-15 PROCEDURE — 3017F COLORECTAL CA SCREEN DOC REV: CPT | Performed by: INTERNAL MEDICINE

## 2022-06-15 PROCEDURE — 1036F TOBACCO NON-USER: CPT | Performed by: INTERNAL MEDICINE

## 2022-06-15 PROCEDURE — G8417 CALC BMI ABV UP PARAM F/U: HCPCS | Performed by: INTERNAL MEDICINE

## 2022-06-15 PROCEDURE — G8427 DOCREV CUR MEDS BY ELIG CLIN: HCPCS | Performed by: INTERNAL MEDICINE

## 2022-06-15 PROCEDURE — 3023F SPIROM DOC REV: CPT | Performed by: INTERNAL MEDICINE

## 2022-06-15 RX ORDER — PREDNISONE 10 MG/1
TABLET ORAL
Qty: 40 TABLET | Refills: 0 | Status: SHIPPED | OUTPATIENT
Start: 2022-06-15 | End: 2022-09-08 | Stop reason: ALTCHOICE

## 2022-06-15 NOTE — PROGRESS NOTES
Subjective:     Aiyana Gibbons is a 46 y.o. female who complains today of:     Chief Complaint   Patient presents with    Follow-up     4 Month F/U for Moderate persistent asthma        HPI  Patient presents for asthma    Patient presents for asthma exacerbation, has been feeling more short of breath over the last 2 to 3 days, coughing, productive of clear/brown phlegm, no chest pain, no fever no chills, no sick contact, she has been using her rescue inhaler frequently, symptoms started after hot weather, pollen, and cutting grass season. No lower extremity edema, no chest pain, no fever no chills, no nasal congestion or postnasal drip, and no heartburn. She is compliant with her current treatment        Allergies:  Shellfish-derived products, Cumin oil, and Piper  Past Medical History:   Diagnosis Date    Anticoagulant long-term use     Asthma     CAD (coronary artery disease)     [facility]     Diabetes [facility] ([facility]     Hodgkin disease ([facility]     Hyperlipidemia     Hypertension     Migraines     Sleep apnea     recently tested     Type 2 diabetes mellitus without complication ([facility]     type II     Past Surgical History:   Procedure Laterality Date    TUBAL LIGATION       History reviewed. No pertinent family history.   Social History     Socioeconomic History    Marital status: Single     Spouse name: Not on file    Number of children: Not on file    Years of education: Not on file    Highest education level: Not on file   Occupational History    Not on file   Tobacco Use    Smoking status: Former Smoker     Packs/day: 1.00     Years: 37.00     Pack years: 37.00     Types: Cigarettes     Quit date: 2022     Years since quittin.0    Smokeless tobacco: Never Used   Vaping Use    Vaping Use: Never used   Substance and Sexual Activity    Alcohol use: Not Currently    Drug use: Never    Sexual activity: Yes     Partners: Male   Other Topics Concern    Not on file   Social History Narrative    Not on file     Social Determinants of Health     Financial Resource Strain: Low Risk     Difficulty of Paying Living Expenses: Not very hard   Food Insecurity: No Food Insecurity    Worried About Running Out of Food in the Last Year: Never true    Oral of Food in the Last Year: Never true   Transportation Needs:     Lack of Transportation (Medical): Not on file    Lack of Transportation (Non-Medical): Not on file   Physical Activity:     Days of Exercise per Week: Not on file    Minutes of Exercise per Session: Not on file   Stress:     Feeling of Stress : Not on file   Social Connections:     Frequency of Communication with Friends and Family: Not on file    Frequency of Social Gatherings with Friends and Family: Not on file    Attends Scientologist Services: Not on file    Active Member of [address] [place] [address] [employer] or Organizations: Not on file    Attends Club or Organization Meetings: Not on file    Marital Status: Not on file   Intimate Partner Violence:     Fear of Current or Ex-Partner: Not on file    Emotionally Abused: Not on file    Physically Abused: Not on file    Sexually Abused: Not on file   Housing Stability:     Unable to Pay for Housing in the Last Year: Not on file    Number of [place] in the Last Year: Not on file    Unstable Housing in the Last Year: Not on file         Review of Systems      ROS: 10 organs review of system is done including general, psychological, ENT, hematological, endocrine, respiratory, cardiovascular, gastrointestinal,musculoskeletal, neurological,  allergy and Immunology is done and is otherwise negative.     Current Outpatient Medications   Medication Sig Dispense Refill    predniSONE (DELTASONE) 10 MG tablet Take 4 tablet daily X 4 days then 3 tablets daily x 4 days then 2 tablets daily x 4 days then 1 tablet daily x 4 days then stop 40 tablet 0    albuterol (PROVENTIL) (2.5 MG/3ML) 0.083% nebulizer solution Take 3 mLs by nebulization every 4 hours as needed for Wheezing or Shortness of Breath 120 each 1    tiotropium (SPIRIVA RESPIMAT) 2.5 MCG/ACT AERS inhaler Inhale 2 puffs into the lungs daily 1 each 3    PROAIR  (90 Base) MCG/ACT inhaler INHALE 2 PUFFS BY MOUTH 4 TIMES DAILY AS NEEDED FOR WHEEZING 9 g 0    SYMBICORT 160-4.5 MCG/ACT AERO Inhale 2 puffs by mouth twice daily 1 each 3    ibuprofen (IBU) 600 MG tablet Take 1 tablet by mouth every 8 hours as needed for Pain 20 tablet 0    clopidogrel (PLAVIX) 75 MG tablet Take 75 mg by mouth daily      Icosapent Ethyl (VASCEPA) 1 g CAPS capsule Take 1 g by mouth 2 times daily (with meals)      montelukast (SINGULAIR) 10 MG tablet Take 10 mg by mouth nightly      azelastine (ASTELIN) 0.1 % nasal spray 2 sprays by Nasal route 2 times daily Use in each nostril as directed      dilTIAZem (TIAZAC) 180 MG extended release capsule Take 240 mg by mouth 2 times daily       EPINEPHrine (EPIPEN) 0.3 MG/0.3ML SOAJ injection Inject 0.3 mg into the muscle as needed      Esomeprazole Magnesium (NEXIUM 24HR) 20 MG TBEC Take 20 mg by mouth daily as needed      magnesium oxide (MAG-OX) 400 MG tablet Take 400 mg by mouth 2 times daily      triamcinolone (NASACORT) 55 MCG/ACT nasal inhaler USE 2 SPRAYS INTO THE NOSE ONCE DAILY      atorvastatin (LIPITOR) 40 MG tablet 80 mg       pantoprazole (PROTONIX) 40 MG tablet Take 1 tablet by mouth every morning (before breakfast) 30 tablet 3    metFORMIN (GLUCOPHAGE) 500 MG tablet Take 1,000 mg by mouth 2 times daily (with meals)      empagliflozin (JARDIANCE) 10 MG tablet Take 10 mg by mouth daily      mupirocin (BACTROBAN) 2 % ointment Apply topically 2 times daily Apply topically twice daily inside of nose      mometasone (ELOCON) 0.1 % cream Apply topically daily Apply topically daily inside of both ears      furosemide (LASIX) 20 MG tablet Take 20 mg by mouth daily      potassium chloride (KLOR-CON M) 10 MEQ extended release tablet Take 10 mEq by mouth daily      lisinopril (PRINIVIL;ZESTRIL) 5 MG tablet Take 10 mg by mouth daily       aspirin 81 MG EC tablet Take 1 tablet by mouth daily 90 tablet 3    ticagrelor (BRILINTA) 90 MG TABS tablet Take 1 tablet by mouth 2 times daily 60 tablet 5    nitroGLYCERIN (NITROSTAT) 0.4 MG SL tablet up to max of 3 total doses. If no relief after 1 dose, call 911. 25 tablet 3    ipratropium-albuterol (DUONEB) 0.5-2.5 (3) MG/3ML SOLN nebulizer solution Inhale 3 mLs into the lungs 3 times daily as needed for Shortness of Breath 270 mL 0    nicotine (NICODERM CQ) 21 MG/24HR Place 1 patch onto the skin daily 42 patch 0     No current facility-administered medications for this visit. Objective:     Vitals:    06/15/22 0959   BP: 124/80   Site: Right Upper Arm   Position: Sitting   Cuff Size: Large Adult   Pulse: 91   Resp: 18   Temp: 97.1 °F (36.2 °C)   TempSrc: Infrared   SpO2: 95%   Weight: 203 lb (92.1 kg)   Height: 5' 2\" (1.575 m)         Physical Exam  Constitutional:       General: She is not in acute distress. Appearance: She is well-developed. She is not diaphoretic. HENT:      Head: Normocephalic and atraumatic. Eyes:      Conjunctiva/sclera: Conjunctivae normal.      Pupils: Pupils are equal, round, and reactive to light. Cardiovascular:      Rate and Rhythm: Normal rate and regular rhythm. Heart sounds: No murmur heard. No friction rub. No gallop. Pulmonary:      Effort: Pulmonary effort is normal. No respiratory distress. Breath sounds: Wheezing present. No rales. Comments: Tight  Chest:      Chest wall: No tenderness. Abdominal:      General: There is no distension. Palpations: Abdomen is soft. Tenderness: There is no abdominal tenderness. There is no rebound. Musculoskeletal:         General: No tenderness. Cervical back: Normal range of motion and neck supple. Right lower leg: No edema.       Left lower leg: No edema. Lymphadenopathy:      Cervical: No cervical adenopathy. Skin:     General: Skin is warm and dry. Findings: No erythema. Neurological:      Mental Status: She is alert and oriented to person, place, and time. Psychiatric:         Judgment: Judgment normal.         Imaging studies reviewed by me chest x-ray April 15, no infiltrate  CT lung cancer screening October 2021, shows no mass or nodule  Lab results reviewed in chart  PFT August 2020, shows FEV1 91%, FEV1/FVC 0.81  ECHO: August 2020, shows EF 60%  Sleep study October 2020, no KEITH a  Assessment and Plan       Diagnosis Orders   1. Severe persistent asthma with exacerbation  predniSONE (DELTASONE) 10 MG tablet   2. Asthma-COPD overlap syndrome (HCC)     3. Smoking     4. Class 2 obesity due to excess calories without serious comorbidity with body mass index (BMI) of 37.0 to 37.9 in adult     5. Gastroesophageal reflux disease without esophagitis       · Asthma with acute exacerbation, at risk of severe disease and hospitalization, will start prednisone taper dose,  · Continue Spiriva and Symbicort  · Continue Singulair  · Will consider starting biologic agent on follow-up visit  · Weight loss is recommended  · Patient quit smoking and congratulated  · We will plan follow-up CT lung cancer screening in October of this year  · Continue Protonix      No orders of the defined types were placed in this encounter. Orders Placed This Encounter   Medications    predniSONE (DELTASONE) 10 MG tablet     Sig: Take 4 tablet daily X 4 days then 3 tablets daily x 4 days then 2 tablets daily x 4 days then 1 tablet daily x 4 days then stop     Dispense:  40 tablet     Refill:  0     Patient advised to call or come to emergency room if symptoms does not improve in 24 to 48 hours      Discussed with patient the importance of exercise and weight control and  overall health and well-being.      Reviewed with the patient: current clinical status, medications, activities and diet. Side effects, adverse effects of the medication prescribed today, as well as treatment plan and result expectations have been discussed with the patient who expresses understanding and desires to proceed. Return in about 3 weeks (around 7/6/2022). I spent 40 min with this patient, greater the 50% of this time was spent in counseling and/or coordinating of care.       Coery Mcmahan MD

## 2022-08-02 DIAGNOSIS — J45.40 MODERATE PERSISTENT ASTHMA WITHOUT COMPLICATION: ICD-10-CM

## 2022-08-02 DIAGNOSIS — J44.9 ASTHMA-COPD OVERLAP SYNDROME (HCC): ICD-10-CM

## 2022-08-02 RX ORDER — BUDESONIDE AND FORMOTEROL FUMARATE DIHYDRATE 160; 4.5 UG/1; UG/1
AEROSOL RESPIRATORY (INHALATION)
Qty: 1 EACH | Refills: 1 | Status: SHIPPED | OUTPATIENT
Start: 2022-08-02 | End: 2022-08-08 | Stop reason: SDUPTHER

## 2022-08-02 NOTE — TELEPHONE ENCOUNTER
Rx requested:  Requested Prescriptions      No prescriptions requested or ordered in this encounter       Last Office Visit:   6/15/2022      Next Visit Date:  Future Appointments   Date Time Provider Bhavin Tao   9/8/2022  8:45 AM Juan Miguel Dozier MD [facility]

## 2022-08-05 DIAGNOSIS — J45.40 MODERATE PERSISTENT ASTHMA WITHOUT COMPLICATION: ICD-10-CM

## 2022-08-08 RX ORDER — BUDESONIDE AND FORMOTEROL FUMARATE DIHYDRATE 160; 4.5 UG/1; UG/1
AEROSOL RESPIRATORY (INHALATION)
Qty: 1 EACH | Refills: 3 | Status: SHIPPED | OUTPATIENT
Start: 2022-08-08 | End: 2022-09-08 | Stop reason: SDUPTHER

## 2022-09-08 ENCOUNTER — OFFICE VISIT (OUTPATIENT)
Dept: PULMONOLOGY | Age: 52
End: 2022-09-08
Payer: COMMERCIAL

## 2022-09-08 VITALS
BODY MASS INDEX: 36.73 KG/M2 | OXYGEN SATURATION: 97 % | TEMPERATURE: 96.9 F | WEIGHT: 199.6 LBS | HEART RATE: 94 BPM | RESPIRATION RATE: 18 BRPM | SYSTOLIC BLOOD PRESSURE: 118 MMHG | DIASTOLIC BLOOD PRESSURE: 64 MMHG | HEIGHT: 62 IN

## 2022-09-08 DIAGNOSIS — Z87.891 PERSONAL HISTORY OF TOBACCO USE: ICD-10-CM

## 2022-09-08 DIAGNOSIS — J45.50 SEVERE PERSISTENT ASTHMA WITHOUT COMPLICATION: Primary | ICD-10-CM

## 2022-09-08 DIAGNOSIS — K21.9 GASTROESOPHAGEAL REFLUX DISEASE WITHOUT ESOPHAGITIS: ICD-10-CM

## 2022-09-08 DIAGNOSIS — J44.9 ASTHMA-COPD OVERLAP SYNDROME (HCC): ICD-10-CM

## 2022-09-08 DIAGNOSIS — E66.09 CLASS 2 OBESITY DUE TO EXCESS CALORIES WITHOUT SERIOUS COMORBIDITY WITH BODY MASS INDEX (BMI) OF 37.0 TO 37.9 IN ADULT: ICD-10-CM

## 2022-09-08 PROCEDURE — 1036F TOBACCO NON-USER: CPT | Performed by: INTERNAL MEDICINE

## 2022-09-08 PROCEDURE — G0296 VISIT TO DETERM LDCT ELIG: HCPCS | Performed by: INTERNAL MEDICINE

## 2022-09-08 PROCEDURE — 3017F COLORECTAL CA SCREEN DOC REV: CPT | Performed by: INTERNAL MEDICINE

## 2022-09-08 PROCEDURE — G8417 CALC BMI ABV UP PARAM F/U: HCPCS | Performed by: INTERNAL MEDICINE

## 2022-09-08 PROCEDURE — G8427 DOCREV CUR MEDS BY ELIG CLIN: HCPCS | Performed by: INTERNAL MEDICINE

## 2022-09-08 PROCEDURE — 99214 OFFICE O/P EST MOD 30 MIN: CPT | Performed by: INTERNAL MEDICINE

## 2022-09-08 PROCEDURE — 3023F SPIROM DOC REV: CPT | Performed by: INTERNAL MEDICINE

## 2022-09-08 RX ORDER — BUDESONIDE AND FORMOTEROL FUMARATE DIHYDRATE 160; 4.5 UG/1; UG/1
AEROSOL RESPIRATORY (INHALATION)
Qty: 1 EACH | Refills: 3 | Status: SHIPPED | OUTPATIENT
Start: 2022-09-08 | End: 2022-11-03 | Stop reason: SDUPTHER

## 2022-09-08 RX ORDER — BUDESONIDE 0.5 MG/2ML
500 INHALANT ORAL 2 TIMES DAILY
Qty: 60 EACH | Refills: 3 | Status: SHIPPED | OUTPATIENT
Start: 2022-09-08

## 2022-09-08 RX ORDER — BUDESONIDE AND FORMOTEROL FUMARATE DIHYDRATE 160; 4.5 UG/1; UG/1
AEROSOL RESPIRATORY (INHALATION)
Qty: 1 EACH | Refills: 3 | Status: SHIPPED | OUTPATIENT
Start: 2022-09-08 | End: 2022-09-08

## 2022-09-08 NOTE — PROGRESS NOTES
Subjective:     Meera Blanc is a 45 y.o. female who complains today of:     Chief Complaint   Patient presents with    Follow-up     3 Month F/U for Severe persistent asthma        HPI  Patient presents for asthma      She received COVID-19 vaccine yesterday, she started having chest tightness, increased cough productive of clear phlegm, and increased shortness of breath, she feels slightly better today, she also ran out of her Spiriva 5 days ago, no chest pain, no fever or chills, no worsening lower extremity edema, her weight is stable, no heart racing or palpitation. She feels better after starting DuoNeb. Allergies:  Shellfish-derived products, Cumin oil, and Piper  Past Medical History:   Diagnosis Date    Anticoagulant long-term use     Asthma     CAD (coronary artery disease)     COPD (chronic obstructive pulmonary disease) ([facility]     Diabetes mellitus ([facility]     Hodg[facility]     Hyperlipidemia     Hypertension     Migraines     Sleep apnea     recently tested     Type 2 diabetes mellitus without complication ([facility]     type II     Past Surgical History:   Procedure Laterality Date    TUBAL LIGATION       History reviewed. No pertinent family history.   Social History     Socioeconomic History    Marital status: Single     Spouse name: Not on file    Number of children: Not on file    Years of education: Not on file    Highest education level: Not on file   Occupational History    Not on file   Tobacco Use    Smoking status: Former     Packs/day: 1.00     Years: 37.00     Pack years: 37.00     Types: Cigarettes     Quit date: 2022     Years since quittin.3    Smokeless tobacco: Never   Vaping Use    Vaping Use: Never used   Substance and Sexual Activity    Alcohol use: Not Currently    Drug use: Never    Sexual activity: Yes     Partners: Male   Other Topics Concern    Not on file   Social History Narrative    Not on file     Social Determinants of Health Financial Resource Strain: Low Risk     Difficulty of Paying Living Expenses: Not very hard   Food Insecurity: No Food Insecurity    Worried About Running Out of Food in the Last Year: Never true    Ran Out of Food in the Last Year: Never true   Transportation Needs: Not on file   Physical Activity: Not on file   Stress: Not on file   Social Connections: Not on file   Intimate Partner Violence: Not on file   Housing Stability: Not on file         Review of Systems      ROS: 10 organs review of system is done including general, psychological, ENT, hematological, endocrine, respiratory, cardiovascular, gastrointestinal,musculoskeletal, neurological,  allergy and Immunology is done and is otherwise negative.     Current Outpatient Medications   Medication Sig Dispense Refill    budesonide (PULMICORT) 0.5 MG/2ML nebulizer suspension Take 2 mLs by nebulization 2 times daily 60 each 3    albuterol (PROVENTIL) (2.5 MG/3ML) 0.083% nebulizer solution Take 3 mLs by nebulization every 4 hours as needed for Wheezing or Shortness of Breath 120 each 1    PROAIR  (90 Base) MCG/ACT inhaler INHALE 2 PUFFS BY MOUTH 4 TIMES DAILY AS NEEDED FOR WHEEZING 9 g 0    ibuprofen (IBU) 600 MG tablet Take 1 tablet by mouth every 8 hours as needed for Pain 20 tablet 0    clopidogrel (PLAVIX) 75 MG tablet Take 75 mg by mouth daily      Icosapent Ethyl (VASCEPA) 1 g CAPS capsule Take 1 g by mouth 2 times daily (with meals)      montelukast (SINGULAIR) 10 MG tablet Take 10 mg by mouth nightly      azelastine (ASTELIN) 0.1 % nasal spray 2 sprays by Nasal route 2 times daily Use in each nostril as directed      dilTIAZem (TIAZAC) 180 MG extended release capsule Take 240 mg by mouth 2 times daily       EPINEPHrine (EPIPEN) 0.3 MG/0.3ML SOAJ injection Inject 0.3 mg into the muscle as needed      Esomeprazole Magnesium (NEXIUM 24HR) 20 MG TBEC Take 20 mg by mouth daily as needed      magnesium oxide (MAG-OX) 400 MG tablet Take 400 mg by mouth 2 times daily      triamcinolone (NASACORT) 55 MCG/ACT nasal inhaler USE 2 SPRAYS INTO THE NOSE ONCE DAILY      atorvastatin (LIPITOR) 40 MG tablet 80 mg       pantoprazole (PROTONIX) 40 MG tablet Take 1 tablet by mouth every morning (before breakfast) 30 tablet 3    metFORMIN (GLUCOPHAGE) 500 MG tablet Take 1,000 mg by mouth 2 times daily (with meals)      empagliflozin (JARDIANCE) 10 MG tablet Take 25 mg by mouth daily      mupirocin (BACTROBAN) 2 % ointment Apply topically 2 times daily Apply topically twice daily inside of nose      mometasone (ELOCON) 0.1 % cream Apply topically daily Apply topically daily inside of both ears      furosemide (LASIX) 20 MG tablet Take 20 mg by mouth daily      potassium chloride (KLOR-CON M) 10 MEQ extended release tablet Take 10 mEq by mouth daily      lisinopril (PRINIVIL;ZESTRIL) 5 MG tablet Take 10 mg by mouth daily       aspirin 81 MG EC tablet Take 1 tablet by mouth daily 90 tablet 3    nitroGLYCERIN (NITROSTAT) 0.4 MG SL tablet up to max of 3 total doses. If no relief after 1 dose, call 911. 25 tablet 3    SYMBICORT 160-4.5 MCG/ACT AERO Inhale 2 puffs by mouth twice daily 1 each 3    tiotropium (SPIRIVA RESPIMAT) 2.5 MCG/ACT AERS inhaler Inhale 2 puffs into the lungs daily 1 each 1    ipratropium-albuterol (DUONEB) 0.5-2.5 (3) MG/3ML SOLN nebulizer solution Inhale 3 mLs into the lungs 3 times daily as needed for Shortness of Breath 270 mL 0    nicotine (NICODERM CQ) 21 MG/24HR Place 1 patch onto the skin daily 42 patch 0    ticagrelor (BRILINTA) 90 MG TABS tablet Take 1 tablet by mouth 2 times daily (Patient not taking: Reported on 9/8/2022) 60 tablet 5     No current facility-administered medications for this visit.        Objective:     Vitals:    09/08/22 0901   BP: 118/64   Site: Right Upper Arm   Position: Sitting   Cuff Size: Large Adult   Pulse: 94   Resp: 18   Temp: 96.9 °F (36.1 °C)   TempSrc: Infrared   SpO2: 97%   Weight: 199 lb 9.6 oz (90.5 kg)   Height: 5' 2\" (1.575 m)         Physical Exam  Constitutional:       General: She is not in acute distress. Appearance: She is well-developed. She is not diaphoretic. HENT:      Head: Normocephalic and atraumatic. Eyes:      Conjunctiva/sclera: Conjunctivae normal.      Pupils: Pupils are equal, round, and reactive to light. Cardiovascular:      Rate and Rhythm: Normal rate and regular rhythm. Heart sounds: No murmur heard. No friction rub. No gallop. Pulmonary:      Effort: Pulmonary effort is normal. No respiratory distress. Breath sounds: Normal breath sounds. No wheezing or rales. Chest:      Chest wall: No tenderness. Abdominal:      General: There is no distension. Palpations: Abdomen is soft. Tenderness: There is no abdominal tenderness. There is no rebound. Musculoskeletal:         General: No tenderness. Cervical back: Normal range of motion and neck supple. Right lower leg: No edema. Left lower leg: No edema. Lymphadenopathy:      Cervical: No cervical adenopathy. Skin:     General: Skin is warm and dry. Findings: No erythema. Neurological:      Mental Status: She is alert and oriented to person, place, and time. Psychiatric:         Judgment: Judgment normal.       Imaging studies reviewed by me CT lung cancer screening October 2021, no mass or nodule  Lab results reviewed in chart  PFT August 2020, FEV1 91%, FEV1/FVC 0.81  ECHO: August 2020, EF 60%  Sleep study October 2020, no KEITH  Assessment and Plan       Diagnosis Orders   1. Severe persistent asthma without complication  budesonide (PULMICORT) 0.5 MG/2ML nebulizer suspension    SYMBICORT 160-4.5 MCG/ACT AERO      2. Personal history of tobacco use  CO VISIT TO DISCUSS LUNG CA SCREEN W LDCT    CT Lung Screen (Annual)      3. Asthma-COPD overlap syndrome (HCC)  tiotropium (SPIRIVA RESPIMAT) 2.5 MCG/ACT AERS inhaler      4.  Class 2 obesity due to excess calories without serious comorbidity with body mass index (BMI) of 37.0 to 37.9 in adult        5. Gastroesophageal reflux disease without esophagitis          Severe asthma, symptoms not controlled, at risk of exacerbation, received COVID-19 vaccine yesterday which might have triggered the symptoms also she ran out of her Spiriva. We will resume Spiriva and Symbicort, will add budesonide nebs, continue DuoNeb, she will call me if she gets worse, at that point we will consider prednisone. We will try to avoid prednisone as much as possible since she just received her vaccine to allow adequate immune boost\  Will consider starting biologic agent after patient finishes her vaccination protocol, I will see her back in 8 weeks  Will repeat CT lung cancer screening October of this year  Weight loss is recommended  GERD symptoms controlled      Orders Placed This Encounter   Procedures    CT Lung Screen (Annual)     Age: Patient is 45 y.o. Smoking History: Social History    Tobacco Use      Smoking status: Former        Packs/day: 1.00        Years: 37.00        Pack years: 40        Types: Cigarettes        Quit date: 2022        Years since quittin.3      Smokeless tobacco: Never    Vaping Use      Vaping Use: Never used    Alcohol use: Not Currently    Drug use: Never   Pack years: 40    Date of last lung cancer screening: 10/5/2021     Standing Status:   Future     Standing Expiration Date:   3/8/2024     Order Specific Question:   Is there documentation of shared decision making? Answer:   Yes     Order Specific Question:   Is this a low dose CT or a routine CT? Answer:   Low Dose CT [1]     Order Specific Question:   Is this the first (baseline) CT or an annual exam?     Answer: Annual [2]     Order Specific Question:   Does the patient show any signs or symptoms of lung cancer? Answer:   No     Order Specific Question:   Smoking Status? Answer:    Former [4]     Order Specific Question:   Date quit smoking? (must be within 13 years)     Answer:   5/20/2022     Order Specific Question:   Smoking packs per day? Answer:   1     Order Specific Question:   Years smoking? Answer:   40    IA VISIT TO DISCUSS LUNG CA SCREEN W LDCT     Orders Placed This Encounter   Medications    budesonide (PULMICORT) 0.5 MG/2ML nebulizer suspension     Sig: Take 2 mLs by nebulization 2 times daily     Dispense:  60 each     Refill:  3    SYMBICORT 160-4.5 MCG/ACT AERO     Sig: Inhale 2 puffs by mouth twice daily     Dispense:  1 each     Refill:  3    tiotropium (SPIRIVA RESPIMAT) 2.5 MCG/ACT AERS inhaler     Sig: Inhale 2 puffs into the lungs daily     Dispense:  1 each     Refill:  1            Discussed with patient the importance of exercise and weight control and  overall health and well-being. Reviewed with the patient: current clinical status, medications, activities and diet. Side effects, adverse effects of the medication prescribed today, as well as treatment plan and result expectations have been discussed with the patient who expresses understanding and desires to proceed. Return in about 8 weeks (around 11/3/2022). Marlen Madsen MD      Low Dose CT (LDCT) Lung Screening criteria met:     Age 50-77(Medicare) or 50-80 (USPST)   Pack year smoking >20   Still smoking or less than 15 year since quit   No sign or symptoms of lung cancer   > 11 months since last LDCT     Risks and benefits of lung cancer screening with LDCT scans discussed:    Significance of positive screen - False-positive LDCT results often occur. 95% of all positive results do not lead to a diagnosis of cancer. Usually further imaging can resolve most false-positive results; however, some patients may require invasive procedures. Over diagnosis risk - 10% to 12% of screen-detected lung cancer cases are over diagnosed--that is, the cancer would not have been detected in the patient's lifetime without the screening.     Need for follow up screens annually to continue lung cancer screening effectiveness     Risks associated with radiation from annual LDCT- Radiation exposure is about the same as for a mammogram, which is about 1/3 of the annual background radiation exposure from everyday life. Starting screening at age 54 is not likely to increase cancer risk from radiation exposure. Patients with comorbidities resulting in life expectancy of < 10 years, or that would preclude treatment of an abnormality identified on CT, should not be screened due to lack of benefit.     To obtain maximal benefit from this screening, smoking cessation and long-term abstinence from smoking is critical

## 2022-09-09 DIAGNOSIS — J45.50 SEVERE PERSISTENT ASTHMA WITHOUT COMPLICATION: ICD-10-CM

## 2022-09-09 RX ORDER — BUDESONIDE 0.5 MG/2ML
500 INHALANT ORAL 2 TIMES DAILY
Qty: 60 EACH | Refills: 3 | Status: CANCELLED | OUTPATIENT
Start: 2022-09-09

## 2022-09-09 NOTE — TELEPHONE ENCOUNTER
PT NEEDS RX TO BE SENT TO [facility] INSTEAD OF [facility]. PLEASE RESEND.        Rx requested:  Requested Prescriptions     Pending Prescriptions Disp Refills    budesonide (PULMICORT) 0.5 MG/2ML nebulizer suspension 60 each 3     Sig: Take 2 mLs by nebulization 2 times daily       Last Office Visit:   9/8/2022      Next Visit Date:  Future Appointments   Date Time Provider Bhavin Tao   11/3/2022  9:15 AM Klaudia Dumont MD [address] [place] [address]

## 2022-10-31 ENCOUNTER — TELEPHONE (OUTPATIENT)
Dept: CARDIOTHORACIC SURGERY | Age: 52
End: 2022-10-31

## 2022-10-31 NOTE — TELEPHONE ENCOUNTER
Physician documentation on smoking history and CT Lung Screening reviewed. All required documentation complete. Patient is a former smoker (2022) with a 37 pack year history ( 1 ppd x 37 years) per physician documentation.

## 2022-11-01 ENCOUNTER — HOSPITAL ENCOUNTER (OUTPATIENT)
Dept: CT IMAGING | Age: 52
Discharge: HOME | End: 2022-11-03
Payer: COMMERCIAL

## 2022-11-01 DIAGNOSIS — Z87.891 PERSONAL HISTORY OF TOBACCO USE: ICD-10-CM

## 2022-11-01 PROCEDURE — 71271 CT THORAX LUNG CANCER SCR C-: CPT

## 2022-11-03 ENCOUNTER — OFFICE VISIT (OUTPATIENT)
Dept: PULMONOLOGY | Age: 52
End: 2022-11-03
Payer: COMMERCIAL

## 2022-11-03 VITALS
RESPIRATION RATE: 16 BRPM | WEIGHT: 196.8 LBS | SYSTOLIC BLOOD PRESSURE: 118 MMHG | OXYGEN SATURATION: 95 % | HEIGHT: 62 IN | DIASTOLIC BLOOD PRESSURE: 66 MMHG | BODY MASS INDEX: 36.22 KG/M2 | TEMPERATURE: 97 F | HEART RATE: 100 BPM

## 2022-11-03 DIAGNOSIS — E66.09 CLASS 2 OBESITY DUE TO EXCESS CALORIES WITHOUT SERIOUS COMORBIDITY WITH BODY MASS INDEX (BMI) OF 37.0 TO 37.9 IN ADULT: ICD-10-CM

## 2022-11-03 DIAGNOSIS — J45.50 SEVERE PERSISTENT ASTHMA WITHOUT COMPLICATION: Primary | ICD-10-CM

## 2022-11-03 DIAGNOSIS — K21.9 GASTROESOPHAGEAL REFLUX DISEASE WITHOUT ESOPHAGITIS: ICD-10-CM

## 2022-11-03 DIAGNOSIS — Z87.891 PERSONAL HISTORY OF TOBACCO USE: ICD-10-CM

## 2022-11-03 PROCEDURE — G8417 CALC BMI ABV UP PARAM F/U: HCPCS | Performed by: INTERNAL MEDICINE

## 2022-11-03 PROCEDURE — 3017F COLORECTAL CA SCREEN DOC REV: CPT | Performed by: INTERNAL MEDICINE

## 2022-11-03 PROCEDURE — G8484 FLU IMMUNIZE NO ADMIN: HCPCS | Performed by: INTERNAL MEDICINE

## 2022-11-03 PROCEDURE — 1036F TOBACCO NON-USER: CPT | Performed by: INTERNAL MEDICINE

## 2022-11-03 PROCEDURE — G8427 DOCREV CUR MEDS BY ELIG CLIN: HCPCS | Performed by: INTERNAL MEDICINE

## 2022-11-03 PROCEDURE — 99214 OFFICE O/P EST MOD 30 MIN: CPT | Performed by: INTERNAL MEDICINE

## 2022-11-03 RX ORDER — ROSUVASTATIN CALCIUM 40 MG/1
TABLET, COATED ORAL
COMMUNITY
Start: 2022-10-24

## 2022-11-03 RX ORDER — BUDESONIDE AND FORMOTEROL FUMARATE DIHYDRATE 160; 4.5 UG/1; UG/1
AEROSOL RESPIRATORY (INHALATION)
Qty: 1 EACH | Refills: 3 | Status: SHIPPED | OUTPATIENT
Start: 2022-11-03

## 2022-11-03 NOTE — PROGRESS NOTES
Subjective:     Kaylee Mchugh is a 45 y.o. female who complains today of:     Chief Complaint   Patient presents with    Follow-up     2 Month F/U for Severe persistent asthma and Personal history of tobacco use     Cough       HPI  Patient presents for asthma,     Patient still symptomatic despite maximum treatment, she is currently on Spiriva, Symbicort, budesonide nebs, and Singulair. She continues to have minor exacerbations, she had 2 last week, frequent use of rescue inhaler, she has cough worse at night, productive of clear phlegm, she has nasal congestion and allergic rhinitis, no skin rash, no joint swelling stiffness or pain, no chest pain, no heartburn, her weight is stable. Allergies:  Shellfish-derived products, Cumin oil, and Piper  Past Medical History:   Diagnosis Date    Anticoagulant long-term use     Asthma     CAD (coronary artery disease)     COPD [facility]     Diabetes mellitus [facility]     Hodgkin disease ([facility]     Hyperlipidemia     Hypertension     Migraines     Sleep apnea     recently tested     Type 2 diabetes mellitus without complication ([facility]     type II     Past Surgical History:   Procedure Laterality Date    TUBAL LIGATION       History reviewed. No pertinent family history.   Social History     Socioeconomic History    Marital status: Single     Spouse name: Not on file    Number of children: Not on file    Years of education: Not on file    Highest education level: Not on file   Occupational History    Not on file   Tobacco Use    Smoking status: Former     Packs/day: 1.00     Years: 37.00     Pack years: 37.00     Types: Cigarettes     Quit date: 2022     Years since quittin.4    Smokeless tobacco: Never   Vaping Use    Vaping Use: Never used   Substance and Sexual Activity    Alcohol use: Not Currently    Drug use: Never    Sexual activity: Yes     Partners: Male   Other Topics Concern    Not on file   Social History Narrative    Not on file     Social Determinants of Health     Financial Resource Strain: Low Risk     Difficulty of Paying Living Expenses: Not very hard   Food Insecurity: No Food Insecurity    Worried About Running Out of Food in the Last Year: Never true    Ran Out of Food in the Last Year: Never true   Transportation Needs: Not on file   Physical Activity: Not on file   Stress: Not on file   Social Connections: Not on file   Intimate Partner Violence: Not on file   Housing Stability: Not on file         Review of Systems      ROS: 10 organs review of system is done including general, psychological, ENT, hematological, endocrine, respiratory, cardiovascular, gastrointestinal,musculoskeletal, neurological,  allergy and Immunology is done and is otherwise negative.     Current Outpatient Medications   Medication Sig Dispense Refill    rosuvastatin (CRESTOR) 40 MG tablet TAKE 1 TABLET BY MOUTH ONCE DAILY      benralizumab (FASENRA) 30 MG/ML SOSY injection Inject 1 mL into the skin once for 1 dose 1 mL 0    SYMBICORT 160-4.5 MCG/ACT AERO Inhale 2 puffs by mouth twice daily 1 each 3    tiotropium (SPIRIVA RESPIMAT) 2.5 MCG/ACT AERS inhaler Inhale 2 puffs into the lungs daily 1 each 1    albuterol (PROVENTIL) (2.5 MG/3ML) 0.083% nebulizer solution Take 3 mLs by nebulization every 4 hours as needed for Wheezing or Shortness of Breath 120 each 1    PROAIR  (90 Base) MCG/ACT inhaler INHALE 2 PUFFS BY MOUTH 4 TIMES DAILY AS NEEDED FOR WHEEZING 9 g 0    ibuprofen (IBU) 600 MG tablet Take 1 tablet by mouth every 8 hours as needed for Pain 20 tablet 0    clopidogrel (PLAVIX) 75 MG tablet Take 75 mg by mouth daily      Icosapent Ethyl (VASCEPA) 1 g CAPS capsule Take 1 g by mouth 2 times daily (with meals)      montelukast (SINGULAIR) 10 MG tablet Take 10 mg by mouth nightly      azelastine (ASTELIN) 0.1 % nasal spray 2 sprays by Nasal route 2 times daily Use in each nostril as directed      bijuTIAZenuha HAYS [facility]) 180 MG extended release capsule Take 240 mg by mouth 2 times daily       EPINEPHrine (EPIPEN) 0.3 MG/0.3ML SOAJ injection Inject 0.3 mg into the muscle as needed      Esomeprazole Magnesium (NEXIUM 24HR) 20 MG TBEC Take 20 mg by mouth daily as needed      magnesium oxide (MAG-OX) 400 MG tablet Take 400 mg by mouth 2 times daily      triamcinolone (NASACORT) 55 MCG/ACT nasal inhaler USE 2 SPRAYS INTO THE NOSE ONCE DAILY      pantoprazole (PROTONIX) 40 MG tablet Take 1 tablet by mouth every morning (before breakfast) 30 tablet 3    metFORMIN (GLUCOPHAGE) 500 MG tablet Take 1,000 mg by mouth 2 times daily (with meals)      empagliflozin (JARDIANCE) 10 MG tablet Take 25 mg by mouth daily      mupirocin (BACTROBAN) 2 % ointment Apply topically 2 times daily Apply topically twice daily inside of nose      mometasone (ELOCON) 0.1 % cream Apply topically daily Apply topically daily inside of both ears      furosemide (LASIX) 20 MG tablet Take 20 mg by mouth daily      potassium chloride (KLOR-CON M) 10 MEQ extended release tablet Take 10 mEq by mouth daily      lisinopril (PRINIVIL;ZESTRIL) 5 MG tablet Take 10 mg by mouth daily       aspirin 81 MG EC tablet Take 1 tablet by mouth daily 90 tablet 3    nitroGLYCERIN (NITROSTAT) 0.4 MG SL tablet up to max of 3 total doses.  If no relief after 1 dose, call 911. 25 tablet 3    budesonide (PULMICORT) 0.5 MG/2ML nebulizer suspension Take 2 mLs by nebulization 2 times daily (Patient not taking: Reported on 11/3/2022) 60 each 3    ipratropium-albuterol (DUONEB) 0.5-2.5 (3) MG/3ML SOLN nebulizer solution Inhale 3 mLs into the lungs 3 times daily as needed for Shortness of Breath 270 mL 0    atorvastatin (LIPITOR) 40 MG tablet 80 mg  (Patient not taking: Reported on 11/3/2022)      nicotine (NICODERM CQ) 21 MG/24HR Place 1 patch onto the skin daily 42 patch 0    ticagrelor (BRILINTA) 90 MG TABS tablet Take 1 tablet by mouth 2 times daily (Patient not taking: No sig reported) 60 tablet 5     No current facility-administered medications for this visit. Objective:     Vitals:    11/03/22 0903   BP: 118/66   Site: Right Upper Arm   Position: Sitting   Cuff Size: Large Adult   Pulse: 100   Resp: 16   Temp: 97 °F (36.1 °C)   TempSrc: Infrared   SpO2: 95%   Weight: 196 lb 12.8 oz (89.3 kg)   Height: 5' 2\" (1.575 m)         Physical Exam  Constitutional:       General: She is not in acute distress. Appearance: She is well-developed. She is not diaphoretic. HENT:      Head: Normocephalic and atraumatic. Eyes:      Conjunctiva/sclera: Conjunctivae normal.      Pupils: Pupils are equal, round, and reactive to light. Cardiovascular:      Rate and Rhythm: Normal rate and regular rhythm. Heart sounds: No murmur heard. No friction rub. No gallop. Pulmonary:      Effort: Pulmonary effort is normal. No respiratory distress. Breath sounds: Wheezing present. No rales. Chest:      Chest wall: No tenderness. Abdominal:      General: There is no distension. Palpations: Abdomen is soft. Tenderness: There is no abdominal tenderness. There is no rebound. Musculoskeletal:         General: No tenderness. Cervical back: Normal range of motion and neck supple. Right lower leg: No edema. Left lower leg: No edema. Lymphadenopathy:      Cervical: No cervical adenopathy. Skin:     General: Skin is warm and dry. Findings: No erythema. Neurological:      Mental Status: She is alert and oriented to person, place, and time. Psychiatric:         Judgment: Judgment normal.       Imaging studies reviewed by me CT chest done yesterday reviewed and interpreted by me, no new mass or nodule, essentially stable  Lab results reviewed in chart  PFT August 2020, shows FEV1 91%, FEV1/FVC 0.81  ECHO: August 2020 EF 60%  Sleep study October 2020 no KEITH  Assessment and Plan       Diagnosis Orders   1.  Severe persistent asthma without complication  benralizumab (FASENRA) 30 MG/ML SOSY injection    SYMBICORT 160-4.5 MCG/ACT AERO      2. Class 2 obesity due to excess calories without serious comorbidity with body mass index (BMI) of 37.0 to 37.9 in adult        3. Personal history of tobacco use        4. Gastroesophageal reflux disease without esophagitis          Symptoms not controlled despite maximal treatment, will start Trish Adkins, discussed with the patient risk of allergic reactions, and potential prolonged period to recover from side effect. We will give patient 1 dose of Fasenra today  Continue Symbicort, Spiriva, budesonide nebs, Singulair  Yearly flu shot   will plan yearly CT lung cancer screening  GERD symptoms controlled  COVID-19 vaccinated      No orders of the defined types were placed in this encounter. Orders Placed This Encounter   Medications    benralizumab (FASENRA) 30 MG/ML SOSY injection     Sig: Inject 1 mL into the skin once for 1 dose     Dispense:  1 mL     Refill:  0    SYMBICORT 160-4.5 MCG/ACT AERO     Sig: Inhale 2 puffs by mouth twice daily     Dispense:  1 each     Refill:  3            Discussed with patient the importance of exercise and weight control and  overall health and well-being. Reviewed with the patient: current clinical status, medications, activities and diet. Side effects, adverse effects of the medication prescribed today, as well as treatment plan and result expectations have been discussed with the patient who expresses understanding and desires to proceed. Return in about 4 weeks (around 12/1/2022).       Maribel Byers MD

## 2022-11-16 DIAGNOSIS — J44.9 ASTHMA-COPD OVERLAP SYNDROME (HCC): ICD-10-CM

## 2022-11-16 NOTE — TELEPHONE ENCOUNTER
Rx requested:  Requested Prescriptions     Pending Prescriptions Disp Refills    tiotropium (SPIRIVA RESPIMAT) 2.5 MCG/ACT AERS inhaler 1 each 1     Sig: Inhale 2 puffs into the lungs daily       Last Office Visit:   11/3/2022      Next Visit Date:  Future Appointments   Date Time Provider Bhavin Tao   12/1/2022  9:15 AM Deangelo Oswald MD [facility]

## 2022-12-01 ENCOUNTER — OFFICE VISIT (OUTPATIENT)
Dept: PULMONOLOGY | Age: 52
End: 2022-12-01
Payer: COMMERCIAL

## 2022-12-01 VITALS
HEART RATE: 92 BPM | RESPIRATION RATE: 18 BRPM | DIASTOLIC BLOOD PRESSURE: 64 MMHG | WEIGHT: 196.8 LBS | HEIGHT: 62 IN | SYSTOLIC BLOOD PRESSURE: 124 MMHG | BODY MASS INDEX: 36.22 KG/M2 | OXYGEN SATURATION: 96 % | TEMPERATURE: 96.9 F

## 2022-12-01 DIAGNOSIS — J45.50 SEVERE PERSISTENT ASTHMA WITHOUT COMPLICATION: ICD-10-CM

## 2022-12-01 DIAGNOSIS — E66.09 CLASS 2 OBESITY DUE TO EXCESS CALORIES WITHOUT SERIOUS COMORBIDITY WITH BODY MASS INDEX (BMI) OF 37.0 TO 37.9 IN ADULT: Primary | ICD-10-CM

## 2022-12-01 DIAGNOSIS — K21.9 GASTROESOPHAGEAL REFLUX DISEASE WITHOUT ESOPHAGITIS: ICD-10-CM

## 2022-12-01 DIAGNOSIS — Z87.891 PERSONAL HISTORY OF TOBACCO USE: ICD-10-CM

## 2022-12-01 PROCEDURE — 1036F TOBACCO NON-USER: CPT | Performed by: INTERNAL MEDICINE

## 2022-12-01 PROCEDURE — 99215 OFFICE O/P EST HI 40 MIN: CPT | Performed by: INTERNAL MEDICINE

## 2022-12-01 PROCEDURE — G8417 CALC BMI ABV UP PARAM F/U: HCPCS | Performed by: INTERNAL MEDICINE

## 2022-12-01 PROCEDURE — 3017F COLORECTAL CA SCREEN DOC REV: CPT | Performed by: INTERNAL MEDICINE

## 2022-12-01 PROCEDURE — G8484 FLU IMMUNIZE NO ADMIN: HCPCS | Performed by: INTERNAL MEDICINE

## 2022-12-01 PROCEDURE — G8427 DOCREV CUR MEDS BY ELIG CLIN: HCPCS | Performed by: INTERNAL MEDICINE

## 2022-12-01 RX ORDER — BUDESONIDE 0.5 MG/2ML
500 INHALANT ORAL 2 TIMES DAILY
Qty: 60 EACH | Refills: 3 | Status: SHIPPED | OUTPATIENT
Start: 2022-12-01

## 2022-12-01 NOTE — PROGRESS NOTES
Subjective:     Marlee Hollis is a 45 y.o. female who complains today of:     Chief Complaint   Patient presents with    Follow-up     4 Week F/U for Severe persistent asthma        HPI  Patient presents for asthma      Chest has been doing good for the last week, she has not been able to get her Spiriva, budesonide was not filled by pharmacy due to\" duplicate medication \", she has cough, worse at night, clear phlegm, no fever no chills, no lower extremity edema, no chest pain, weight is stable, no heartburn. She received 1 dose of Fasenra 4 weeks ago. Allergies:  Shellfish-derived products, Cumin oil, and Piper  Past Medical History:   Diagnosis Date    Anticoagulant long-term use     Asthma     CAD (coronary artery disease)     COPD (chronic obstructive pulmonary disease) ([facility]     Diabetes mellitus ([facility]     Hodgkin disease ([facility]     Hyperlipidemia     Hypertension     Migraines     Sleep apnea     recently tested     Type 2 diabetes mellitus without complication [facility]     type II     Past Surgical History:   Procedure Laterality Date    TUBAL LIGATION       No family history on file.   Social History     Socioeconomic History    Marital status: Single     Spouse name: Not on file    Number of children: Not on file    Years of education: Not on file    Highest education level: Not on file   Occupational History    Not on file   Tobacco Use    Smoking status: Former     Packs/day: 1.00     Years: 37.00     Pack years: 37.00     Types: Cigarettes     Quit date: 2022     Years since quittin.5    Smokeless tobacco: Never   Vaping Use    Vaping Use: Never used   Substance and Sexual Activity    Alcohol use: Not Currently    Drug use: Never    Sexual activity: Yes     Partners: Male   Other Topics Concern    Not on file   Social History Narrative    Not on file     Social Determinants of Health     Financial Resource Strain: Low Risk     Difficulty of Paying Living Expenses: Not very hard   Food Insecurity: No Food Insecurity    Worried About Running Out of Food in the Last Year: Never true    Ran Out of Food in the Last Year: Never true   Transportation Needs: Not on file   Physical Activity: Not on file   Stress: Not on file   Social Connections: Not on file   Intimate Partner Violence: Not on file   Housing Stability: Not on file         Review of Systems      ROS: 10 organs review of system is done including general, psychological, ENT, hematological, endocrine, respiratory, cardiovascular, gastrointestinal,musculoskeletal, neurological,  allergy and Immunology is done and is otherwise negative.     Current Outpatient Medications   Medication Sig Dispense Refill    budesonide (PULMICORT) 0.5 MG/2ML nebulizer suspension Take 2 mLs by nebulization 2 times daily 60 each 3    tiotropium (SPIRIVA RESPIMAT) 2.5 MCG/ACT AERS inhaler Inhale 2 puffs into the lungs daily 1 each 2    rosuvastatin (CRESTOR) 40 MG tablet TAKE 1 TABLET BY MOUTH ONCE DAILY      SYMBICORT 160-4.5 MCG/ACT AERO Inhale 2 puffs by mouth twice daily 1 each 3    albuterol (PROVENTIL) (2.5 MG/3ML) 0.083% nebulizer solution Take 3 mLs by nebulization every 4 hours as needed for Wheezing or Shortness of Breath 120 each 1    PROAIR  (90 Base) MCG/ACT inhaler INHALE 2 PUFFS BY MOUTH 4 TIMES DAILY AS NEEDED FOR WHEEZING 9 g 0    ibuprofen (IBU) 600 MG tablet Take 1 tablet by mouth every 8 hours as needed for Pain 20 tablet 0    clopidogrel (PLAVIX) 75 MG tablet Take 75 mg by mouth daily      Icosapent Ethyl (VASCEPA) 1 g CAPS capsule Take 1 g by mouth 2 times daily (with meals)      montelukast (SINGULAIR) 10 MG tablet Take 10 mg by mouth nightly      azelastine (ASTELIN) 0.1 % nasal spray 2 sprays by Nasal route 2 times daily Use in each nostril as directed      dilTIAZem (TIAZAC) 180 MG extended release capsule Take 240 mg by mouth 2 times daily       EPINEPHrine (EPIPEN) 0.3 MG/0.3ML SOAJ injection Inject 0.3 mg into the muscle as needed      Esomeprazole Magnesium (NEXIUM 24HR) 20 MG TBEC Take 20 mg by mouth daily as needed      magnesium oxide (MAG-OX) 400 MG tablet Take 400 mg by mouth 2 times daily      triamcinolone (NASACORT) 55 MCG/ACT nasal inhaler USE 2 SPRAYS INTO THE NOSE ONCE DAILY      pantoprazole (PROTONIX) 40 MG tablet Take 1 tablet by mouth every morning (before breakfast) 30 tablet 3    metFORMIN (GLUCOPHAGE) 500 MG tablet Take 1,000 mg by mouth 2 times daily (with meals)      empagliflozin (JARDIANCE) 10 MG tablet Take 25 mg by mouth daily      mupirocin (BACTROBAN) 2 % ointment Apply topically 2 times daily Apply topically twice daily inside of nose      mometasone (ELOCON) 0.1 % cream Apply topically daily Apply topically daily inside of both ears      furosemide (LASIX) 20 MG tablet Take 20 mg by mouth daily      potassium chloride (KLOR-CON M) 10 MEQ extended release tablet Take 10 mEq by mouth daily      lisinopril (PRINIVIL;ZESTRIL) 5 MG tablet Take 10 mg by mouth daily       aspirin 81 MG EC tablet Take 1 tablet by mouth daily 90 tablet 3    nitroGLYCERIN (NITROSTAT) 0.4 MG SL tablet up to max of 3 total doses. If no relief after 1 dose, call 911. 25 tablet 3    ipratropium-albuterol (DUONEB) 0.5-2.5 (3) MG/3ML SOLN nebulizer solution Inhale 3 mLs into the lungs 3 times daily as needed for Shortness of Breath 270 mL 0    atorvastatin (LIPITOR) 40 MG tablet 80 mg  (Patient not taking: No sig reported)      nicotine (NICODERM CQ) 21 MG/24HR Place 1 patch onto the skin daily 42 patch 0    ticagrelor (BRILINTA) 90 MG TABS tablet Take 1 tablet by mouth 2 times daily (Patient not taking: No sig reported) 60 tablet 5     No current facility-administered medications for this visit.        Objective:     Vitals:    12/01/22 0905   BP: 124/64   Site: Right Upper Arm   Position: Sitting   Cuff Size: Large Adult   Pulse: 92   Resp: 18   Temp: 96.9 °F (36.1 °C)   TempSrc: Infrared   SpO2: 96%   Weight: 196 lb 12.8 oz (89.3 kg)   Height: 5' 2\" (1.575 m)         Physical Exam  Constitutional:       General: She is not in acute distress. Appearance: She is well-developed. She is not diaphoretic. HENT:      Head: Normocephalic and atraumatic. Eyes:      Conjunctiva/sclera: Conjunctivae normal.      Pupils: Pupils are equal, round, and reactive to light. Cardiovascular:      Rate and Rhythm: Normal rate and regular rhythm. Heart sounds: No murmur heard. No friction rub. No gallop. Pulmonary:      Effort: Pulmonary effort is normal. No respiratory distress. Breath sounds: Wheezing present. No rales. Chest:      Chest wall: No tenderness. Abdominal:      General: There is no distension. Palpations: Abdomen is soft. Tenderness: There is no abdominal tenderness. There is no rebound. Musculoskeletal:         General: No tenderness. Cervical back: Normal range of motion and neck supple. Right lower leg: No edema. Left lower leg: No edema. Lymphadenopathy:      Cervical: No cervical adenopathy. Skin:     General: Skin is warm and dry. Findings: No erythema. Neurological:      Mental Status: She is alert and oriented to person, place, and time. Psychiatric:         Judgment: Judgment normal.       Imaging studies reviewed and interpreted by me CT lung cancer screening November 2022, no mass, no acute nodule, essentially stable  Lab results reviewed in chart  PFT August 2020, shows FEV1 91%, FEV1/FVC 0.81  ECHO: August 2020 EF 60%  Sleep study October 2020 no KEITH  Assessment and Plan       Diagnosis Orders   1. Class 2 obesity due to excess calories without serious comorbidity with body mass index (BMI) of 37.0 to 37.9 in adult        2. Severe persistent asthma without complication  budesonide (PULMICORT) 0.5 MG/2ML nebulizer suspension      3. Personal history of tobacco use        4.  Gastroesophageal reflux disease without esophagitis          Symptoms not controlled, at risk of acute exacerbation  She has not been able to get her inhalers recently  She currently has only Symbicort  I reordered budesonide nebs and Spiriva  Gave her [address], patient received 1 dose today in office pending insurance approval  Continue Singulair  Patient instructed to call me or come to emergency room if she does not improve in the next 1 to 2 days. GERD symptoms controlled, continue PPI  We will plan CT lung cancer screening on yearly basis        No orders of the defined types were placed in this encounter. Orders Placed This Encounter   Medications    budesonide (PULMICORT) 0.5 MG/2ML nebulizer suspension     Sig: Take 2 mLs by nebulization 2 times daily     Dispense:  60 each     Refill:  3            Discussed with patient the importance of exercise and weight control and  overall health and well-being. Reviewed with the patient: current clinical status, medications, activities and diet. Side effects, adverse effects of the medication prescribed today, as well as treatment plan and result expectations have been discussed with the patient who expresses understanding and desires to proceed. Return in about 4 weeks (around 12/29/2022). I spent 40 min with this patient, greater the 50% of this time was spent in counseling and/or coordinating of care.       Madhavi Franz MD

## 2022-12-22 ENCOUNTER — APPOINTMENT (OUTPATIENT)
Dept: CT IMAGING | Age: 52
DRG: 141 | End: 2022-12-22
Payer: COMMERCIAL

## 2022-12-22 ENCOUNTER — APPOINTMENT (OUTPATIENT)
Dept: GENERAL RADIOLOGY | Age: 52
DRG: 141 | End: 2022-12-22
Payer: COMMERCIAL

## 2022-12-22 ENCOUNTER — HOSPITAL ENCOUNTER (INPATIENT)
Age: 52
LOS: 6 days | Discharge: HOME HEALTH | DRG: 141 | End: 2022-12-28
Attending: STUDENT IN AN ORGANIZED HEALTH CARE EDUCATION/TRAINING PROGRAM | Admitting: INTERNAL MEDICINE
Payer: COMMERCIAL

## 2022-12-22 DIAGNOSIS — J44.9 ASTHMA-COPD OVERLAP SYNDROME (HCC): ICD-10-CM

## 2022-12-22 DIAGNOSIS — E11.65 TYPE 2 DIABETES MELLITUS WITH HYPERGLYCEMIA, WITHOUT LONG-TERM CURRENT USE OF INSULIN (HCC): ICD-10-CM

## 2022-12-22 DIAGNOSIS — R06.03 ACUTE RESPIRATORY DISTRESS: Primary | ICD-10-CM

## 2022-12-22 DIAGNOSIS — R77.8 ELEVATED TROPONIN: ICD-10-CM

## 2022-12-22 DIAGNOSIS — Z87.891 FORMER CIGARETTE SMOKER: ICD-10-CM

## 2022-12-22 DIAGNOSIS — R00.0 SINUS TACHYCARDIA: ICD-10-CM

## 2022-12-22 DIAGNOSIS — R55 SYNCOPE AND COLLAPSE: ICD-10-CM

## 2022-12-22 DIAGNOSIS — R09.02 HYPOXEMIA: ICD-10-CM

## 2022-12-22 DIAGNOSIS — J45.40 MODERATE PERSISTENT ASTHMA WITHOUT COMPLICATION: ICD-10-CM

## 2022-12-22 LAB
ADENOVIRUS BY PCR: NOT DETECTED
ALBUMIN SERPL-MCNC: 4.3 G/DL (ref 3.5–4.6)
ALP BLD-CCNC: 102 U/L (ref 40–130)
ALT SERPL-CCNC: 31 U/L (ref 0–33)
ANION GAP SERPL CALCULATED.3IONS-SCNC: 14 MEQ/L (ref 9–15)
APTT: 27.6 SEC (ref 24.4–36.8)
AST SERPL-CCNC: 29 U/L (ref 0–35)
BASE EXCESS ARTERIAL: -1 (ref -3–3)
BASOPHILS ABSOLUTE: 0 K/UL (ref 0–0.2)
BASOPHILS RELATIVE PERCENT: 0.1 %
BILIRUB SERPL-MCNC: 0.3 MG/DL (ref 0.2–0.7)
BORDETELLA PARAPERTUSSIS BY PCR: NOT DETECTED
BORDETELLA PERTUSSIS BY PCR: NOT DETECTED
BUN BLDV-MCNC: 12 MG/DL (ref 6–20)
C-REACTIVE PROTEIN: 20.8 MG/L (ref 0–5)
CALCIUM IONIZED: 1.23 MMOL/L (ref 1.12–1.32)
CALCIUM SERPL-MCNC: 8.9 MG/DL (ref 8.5–9.9)
CHLAMYDOPHILIA PNEUMONIAE BY PCR: NOT DETECTED
CHLORIDE BLD-SCNC: 102 MEQ/L (ref 95–107)
CO2: 22 MEQ/L (ref 20–31)
CORONAVIRUS 229E BY PCR: NOT DETECTED
CORONAVIRUS HKU1 BY PCR: NOT DETECTED
CORONAVIRUS NL63 BY PCR: NOT DETECTED
CORONAVIRUS OC43 BY PCR: NOT DETECTED
CREAT SERPL-MCNC: 0.57 MG/DL (ref 0.5–0.9)
EOSINOPHILS ABSOLUTE: 0 K/UL (ref 0–0.7)
EOSINOPHILS RELATIVE PERCENT: 0 %
GFR SERPL CREATININE-BSD FRML MDRD: >60 ML/MIN/{1.73_M2}
GFR SERPL CREATININE-BSD FRML MDRD: >60 ML/MIN/{1.73_M2}
GLOBULIN: 2.6 G/DL (ref 2.3–3.5)
GLUCOSE BLD-MCNC: 154 MG/DL (ref 70–99)
GLUCOSE BLD-MCNC: 180 MG/DL (ref 70–99)
HCO3 ARTERIAL: 24.9 MMOL/L (ref 21–29)
HCT VFR BLD CALC: 42.9 % (ref 37–47)
HEMOGLOBIN: 13.9 G/DL (ref 12–16)
HEMOGLOBIN: 15.9 GM/DL (ref 12–16)
HUMAN METAPNEUMOVIRUS BY PCR: NOT DETECTED
HUMAN RHINOVIRUS/ENTEROVIRUS BY PCR: NOT DETECTED
INFLUENZA A BY PCR: NEGATIVE
INFLUENZA A BY PCR: NOT DETECTED
INFLUENZA B BY PCR: NEGATIVE
INFLUENZA B BY PCR: NOT DETECTED
INR BLD: 1
LACTATE: 0.82 MMOL/L (ref 0.4–2)
LACTIC ACID: 1 MMOL/L (ref 0.5–2.2)
LYMPHOCYTES ABSOLUTE: 1.1 K/UL (ref 1–4.8)
LYMPHOCYTES RELATIVE PERCENT: 13 %
MAGNESIUM: 2 MG/DL (ref 1.7–2.4)
MCH RBC QN AUTO: 29.3 PG (ref 27–31.3)
MCHC RBC AUTO-ENTMCNC: 32.5 % (ref 33–37)
MCV RBC AUTO: 90.1 FL (ref 79.4–94.8)
MONOCYTES ABSOLUTE: 0.4 K/UL (ref 0.2–0.8)
MONOCYTES RELATIVE PERCENT: 4.5 %
MYCOPLASMA PNEUMONIAE BY PCR: NOT DETECTED
NEUTROPHILS ABSOLUTE: 7.2 K/UL (ref 1.4–6.5)
NEUTROPHILS RELATIVE PERCENT: 82.4 %
O2 SAT, ARTERIAL: 100 % (ref 93–100)
PARAINFLUENZA VIRUS 1 BY PCR: NOT DETECTED
PARAINFLUENZA VIRUS 2 BY PCR: NOT DETECTED
PARAINFLUENZA VIRUS 3 BY PCR: NOT DETECTED
PARAINFLUENZA VIRUS 4 BY PCR: NOT DETECTED
PCO2 ARTERIAL: 44 MM HG (ref 35–45)
PDW BLD-RTO: 13.5 % (ref 11.5–14.5)
PERFORMED ON: ABNORMAL
PH ARTERIAL: 7.37 (ref 7.35–7.45)
PLATELET # BLD: 201 K/UL (ref 130–400)
PO2 ARTERIAL: 170 MM HG (ref 75–108)
POC CHLORIDE: 108 MEQ/L (ref 99–110)
POC CREATININE WHOLE BLOOD: 0.7
POC CREATININE: 0.7 MG/DL (ref 0.6–1.2)
POC FIO2: 50
POC HEMATOCRIT: 47 % (ref 36–48)
POC POTASSIUM: 3.9 MEQ/L (ref 3.5–5.1)
POC SAMPLE TYPE: ABNORMAL
POC SODIUM: 142 MEQ/L (ref 136–145)
POTASSIUM SERPL-SCNC: 4.2 MEQ/L (ref 3.4–4.9)
PRO-BNP: 12 PG/ML
PROCALCITONIN: 0.05 NG/ML (ref 0–0.15)
PROTHROMBIN TIME: 13.5 SEC (ref 12.3–14.9)
RBC # BLD: 4.76 M/UL (ref 4.2–5.4)
RESPIRATORY SYNCYTIAL VIRUS BY PCR: NOT DETECTED
SARS-COV-2, NAAT: NOT DETECTED
SARS-COV-2, PCR: NOT DETECTED
SODIUM BLD-SCNC: 138 MEQ/L (ref 135–144)
TCO2 ARTERIAL: 26 MMOL/L (ref 21–32)
TOTAL CK: 232 U/L (ref 0–170)
TOTAL PROTEIN: 6.9 G/DL (ref 6.3–8)
TROPONIN: 0.02 NG/ML (ref 0–0.01)
WBC # BLD: 8.7 K/UL (ref 4.8–10.8)

## 2022-12-22 PROCEDURE — 82435 ASSAY OF BLOOD CHLORIDE: CPT

## 2022-12-22 PROCEDURE — 82330 ASSAY OF CALCIUM: CPT

## 2022-12-22 PROCEDURE — 85014 HEMATOCRIT: CPT

## 2022-12-22 PROCEDURE — 83880 ASSAY OF NATRIURETIC PEPTIDE: CPT

## 2022-12-22 PROCEDURE — 36600 WITHDRAWAL OF ARTERIAL BLOOD: CPT

## 2022-12-22 PROCEDURE — 0202U NFCT DS 22 TRGT SARS-COV-2: CPT

## 2022-12-22 PROCEDURE — 84295 ASSAY OF SERUM SODIUM: CPT

## 2022-12-22 PROCEDURE — 85610 PROTHROMBIN TIME: CPT

## 2022-12-22 PROCEDURE — 71045 X-RAY EXAM CHEST 1 VIEW: CPT

## 2022-12-22 PROCEDURE — 83735 ASSAY OF MAGNESIUM: CPT

## 2022-12-22 PROCEDURE — 96375 TX/PRO/DX INJ NEW DRUG ADDON: CPT

## 2022-12-22 PROCEDURE — 87635 SARS-COV-2 COVID-19 AMP PRB: CPT

## 2022-12-22 PROCEDURE — 86140 C-REACTIVE PROTEIN: CPT

## 2022-12-22 PROCEDURE — 99285 EMERGENCY DEPT VISIT HI MDM: CPT

## 2022-12-22 PROCEDURE — 82565 ASSAY OF CREATININE: CPT

## 2022-12-22 PROCEDURE — 85025 COMPLETE CBC W/AUTO DIFF WBC: CPT

## 2022-12-22 PROCEDURE — 6360000002 HC RX W HCPCS: Performed by: STUDENT IN AN ORGANIZED HEALTH CARE EDUCATION/TRAINING PROGRAM

## 2022-12-22 PROCEDURE — 83605 ASSAY OF LACTIC ACID: CPT

## 2022-12-22 PROCEDURE — 1210000000 HC MED SURG R&B

## 2022-12-22 PROCEDURE — 82803 BLOOD GASES ANY COMBINATION: CPT

## 2022-12-22 PROCEDURE — 6370000000 HC RX 637 (ALT 250 FOR IP): Performed by: STUDENT IN AN ORGANIZED HEALTH CARE EDUCATION/TRAINING PROGRAM

## 2022-12-22 PROCEDURE — 71275 CT ANGIOGRAPHY CHEST: CPT

## 2022-12-22 PROCEDURE — 80053 COMPREHEN METABOLIC PANEL: CPT

## 2022-12-22 PROCEDURE — 36415 COLL VENOUS BLD VENIPUNCTURE: CPT

## 2022-12-22 PROCEDURE — 84132 ASSAY OF SERUM POTASSIUM: CPT

## 2022-12-22 PROCEDURE — 85730 THROMBOPLASTIN TIME PARTIAL: CPT

## 2022-12-22 PROCEDURE — 84484 ASSAY OF TROPONIN QUANT: CPT

## 2022-12-22 PROCEDURE — 82550 ASSAY OF CK (CPK): CPT

## 2022-12-22 PROCEDURE — 96374 THER/PROPH/DIAG INJ IV PUSH: CPT

## 2022-12-22 PROCEDURE — 84145 PROCALCITONIN (PCT): CPT

## 2022-12-22 PROCEDURE — 94660 CPAP INITIATION&MGMT: CPT

## 2022-12-22 PROCEDURE — 87502 INFLUENZA DNA AMP PROBE: CPT

## 2022-12-22 PROCEDURE — 6360000004 HC RX CONTRAST MEDICATION: Performed by: STUDENT IN AN ORGANIZED HEALTH CARE EDUCATION/TRAINING PROGRAM

## 2022-12-22 PROCEDURE — 87040 BLOOD CULTURE FOR BACTERIA: CPT

## 2022-12-22 RX ORDER — DEXAMETHASONE SODIUM PHOSPHATE 4 MG/ML
10 INJECTION, SOLUTION INTRA-ARTICULAR; INTRALESIONAL; INTRAMUSCULAR; INTRAVENOUS; SOFT TISSUE ONCE
Status: COMPLETED | OUTPATIENT
Start: 2022-12-22 | End: 2022-12-22

## 2022-12-22 RX ORDER — FAMOTIDINE 20 MG/1
20 TABLET, FILM COATED ORAL ONCE
Status: COMPLETED | OUTPATIENT
Start: 2022-12-22 | End: 2022-12-22

## 2022-12-22 RX ORDER — DIPHENHYDRAMINE HYDROCHLORIDE 50 MG/ML
50 INJECTION INTRAMUSCULAR; INTRAVENOUS ONCE
Status: COMPLETED | OUTPATIENT
Start: 2022-12-22 | End: 2022-12-22

## 2022-12-22 RX ORDER — FUROSEMIDE 10 MG/ML
40 INJECTION INTRAMUSCULAR; INTRAVENOUS ONCE
Status: COMPLETED | OUTPATIENT
Start: 2022-12-22 | End: 2022-12-22

## 2022-12-22 RX ADMIN — FUROSEMIDE 40 MG: 10 INJECTION, SOLUTION INTRAMUSCULAR; INTRAVENOUS at 19:14

## 2022-12-22 RX ADMIN — FAMOTIDINE 20 MG: 20 TABLET ORAL at 21:49

## 2022-12-22 RX ADMIN — DEXAMETHASONE SODIUM PHOSPHATE 10 MG: 4 INJECTION, SOLUTION INTRAMUSCULAR; INTRAVENOUS at 19:13

## 2022-12-22 RX ADMIN — DIPHENHYDRAMINE HYDROCHLORIDE 50 MG: 50 INJECTION, SOLUTION INTRAMUSCULAR; INTRAVENOUS at 19:13

## 2022-12-22 RX ADMIN — IOPAMIDOL 75 ML: 612 INJECTION, SOLUTION INTRAVENOUS at 20:33

## 2022-12-22 ASSESSMENT — PAIN SCALES - GENERAL: PAINLEVEL_OUTOF10: 10

## 2022-12-22 ASSESSMENT — ENCOUNTER SYMPTOMS
VOMITING: 0
COLOR CHANGE: 1
COUGH: 1
DIARRHEA: 0
SHORTNESS OF BREATH: 1

## 2022-12-22 ASSESSMENT — PAIN DESCRIPTION - PAIN TYPE: TYPE: ACUTE PAIN

## 2022-12-22 ASSESSMENT — PAIN DESCRIPTION - ORIENTATION: ORIENTATION: MID

## 2022-12-22 ASSESSMENT — PAIN - FUNCTIONAL ASSESSMENT: PAIN_FUNCTIONAL_ASSESSMENT: 0-10

## 2022-12-22 ASSESSMENT — PAIN DESCRIPTION - LOCATION: LOCATION: CHEST

## 2022-12-22 NOTE — LETTER
12/28/22   9:59 AM       To Whom it May Concern:    Valentina Griffith need to have parking accomodations to help with her asthma symptoms. She is requiring O2 therapy.     Please call [phone] [address] at [phone] for more info if needed    [facility], [facility]

## 2022-12-22 NOTE — ED TRIAGE NOTES
Pt presents to ER via EMS from home. Family called for pt due to unresponsiveness. On arrival at hospital, Conscious, Aox4, NRB 15lpm 100%. EMS crew advised initial SpO2 85% on RA. Pt has hx of COPD and heart disease, EMS crew advised pt is known to be non-compliant with medications.

## 2022-12-23 LAB
ANION GAP SERPL CALCULATED.3IONS-SCNC: 14 MEQ/L (ref 9–15)
BUN BLDV-MCNC: 14 MG/DL (ref 6–20)
CALCIUM SERPL-MCNC: 9.4 MG/DL (ref 8.5–9.9)
CHLORIDE BLD-SCNC: 104 MEQ/L (ref 95–107)
CO2: 21 MEQ/L (ref 20–31)
CREAT SERPL-MCNC: 0.65 MG/DL (ref 0.5–0.9)
GFR SERPL CREATININE-BSD FRML MDRD: >60 ML/MIN/{1.73_M2}
GFR SERPL CREATININE-BSD FRML MDRD: >60 ML/MIN/{1.73_M2}
GLUCOSE BLD-MCNC: 132 MG/DL (ref 70–99)
GLUCOSE BLD-MCNC: 146 MG/DL (ref 70–99)
GLUCOSE BLD-MCNC: 157 MG/DL (ref 70–99)
GLUCOSE BLD-MCNC: 168 MG/DL (ref 70–99)
GLUCOSE BLD-MCNC: 171 MG/DL (ref 70–99)
HBA1C MFR BLD: 6.8 % (ref 4.8–5.9)
MAGNESIUM: 2.3 MG/DL (ref 1.7–2.4)
PERFORMED ON: ABNORMAL
PERFORMED ON: NORMAL
POC CREATININE: 0.7 MG/DL (ref 0.6–1.2)
POC SAMPLE TYPE: NORMAL
POTASSIUM SERPL-SCNC: 4.6 MEQ/L (ref 3.4–4.9)
SODIUM BLD-SCNC: 139 MEQ/L (ref 135–144)
TROPONIN: 0.06 NG/ML (ref 0–0.01)
TROPONIN: 0.08 NG/ML (ref 0–0.01)
TROPONIN: 0.11 NG/ML (ref 0–0.01)

## 2022-12-23 PROCEDURE — 83735 ASSAY OF MAGNESIUM: CPT

## 2022-12-23 PROCEDURE — 36415 COLL VENOUS BLD VENIPUNCTURE: CPT

## 2022-12-23 PROCEDURE — C8929 TTE W OR WO FOL WCON,DOPPLER: HCPCS

## 2022-12-23 PROCEDURE — 80048 BASIC METABOLIC PNL TOTAL CA: CPT

## 2022-12-23 PROCEDURE — 2700000000 HC OXYGEN THERAPY PER DAY

## 2022-12-23 PROCEDURE — 94150 VITAL CAPACITY TEST: CPT

## 2022-12-23 PROCEDURE — 83036 HEMOGLOBIN GLYCOSYLATED A1C: CPT

## 2022-12-23 PROCEDURE — 6360000002 HC RX W HCPCS: Performed by: INTERNAL MEDICINE

## 2022-12-23 PROCEDURE — 99254 IP/OBS CNSLTJ NEW/EST MOD 60: CPT | Performed by: INTERNAL MEDICINE

## 2022-12-23 PROCEDURE — 94660 CPAP INITIATION&MGMT: CPT

## 2022-12-23 PROCEDURE — 6370000000 HC RX 637 (ALT 250 FOR IP): Performed by: INTERNAL MEDICINE

## 2022-12-23 PROCEDURE — 84484 ASSAY OF TROPONIN QUANT: CPT

## 2022-12-23 PROCEDURE — 94761 N-INVAS EAR/PLS OXIMETRY MLT: CPT

## 2022-12-23 PROCEDURE — 94640 AIRWAY INHALATION TREATMENT: CPT

## 2022-12-23 PROCEDURE — 94667 MNPJ CHEST WALL 1ST: CPT

## 2022-12-23 PROCEDURE — 1210000000 HC MED SURG R&B

## 2022-12-23 PROCEDURE — 94664 DEMO&/EVAL PT USE INHALER: CPT

## 2022-12-23 RX ORDER — FUROSEMIDE 10 MG/ML
40 INJECTION INTRAMUSCULAR; INTRAVENOUS DAILY
Status: DISCONTINUED | OUTPATIENT
Start: 2022-12-24 | End: 2022-12-27

## 2022-12-23 RX ORDER — ALBUTEROL SULFATE 2.5 MG/3ML
2.5 SOLUTION RESPIRATORY (INHALATION)
Status: DISCONTINUED | OUTPATIENT
Start: 2022-12-23 | End: 2022-12-28 | Stop reason: HOSPADM

## 2022-12-23 RX ORDER — INSULIN LISPRO 100 [IU]/ML
0-4 INJECTION, SOLUTION INTRAVENOUS; SUBCUTANEOUS
Status: DISCONTINUED | OUTPATIENT
Start: 2022-12-23 | End: 2022-12-28 | Stop reason: HOSPADM

## 2022-12-23 RX ORDER — IPRATROPIUM BROMIDE AND ALBUTEROL SULFATE 2.5; .5 MG/3ML; MG/3ML
1 SOLUTION RESPIRATORY (INHALATION) EVERY 4 HOURS PRN
Status: DISCONTINUED | OUTPATIENT
Start: 2022-12-23 | End: 2022-12-23

## 2022-12-23 RX ORDER — M-VIT,TX,IRON,MINS/CALC/FOLIC 27MG-0.4MG
1 TABLET ORAL DAILY
COMMUNITY

## 2022-12-23 RX ORDER — POTASSIUM CHLORIDE 20 MEQ/1
40 TABLET, EXTENDED RELEASE ORAL PRN
Status: DISCONTINUED | OUTPATIENT
Start: 2022-12-23 | End: 2022-12-28 | Stop reason: HOSPADM

## 2022-12-23 RX ORDER — ONDANSETRON 2 MG/ML
4 INJECTION INTRAMUSCULAR; INTRAVENOUS EVERY 6 HOURS PRN
Status: DISCONTINUED | OUTPATIENT
Start: 2022-12-23 | End: 2022-12-28 | Stop reason: HOSPADM

## 2022-12-23 RX ORDER — GUAIFENESIN 600 MG/1
600 TABLET, EXTENDED RELEASE ORAL 2 TIMES DAILY
Status: DISCONTINUED | OUTPATIENT
Start: 2022-12-23 | End: 2022-12-28 | Stop reason: HOSPADM

## 2022-12-23 RX ORDER — ACETAMINOPHEN 650 MG/1
650 SUPPOSITORY RECTAL EVERY 6 HOURS PRN
Status: DISCONTINUED | OUTPATIENT
Start: 2022-12-23 | End: 2022-12-28 | Stop reason: HOSPADM

## 2022-12-23 RX ORDER — ONDANSETRON 4 MG/1
4 TABLET, ORALLY DISINTEGRATING ORAL EVERY 8 HOURS PRN
Status: DISCONTINUED | OUTPATIENT
Start: 2022-12-23 | End: 2022-12-23

## 2022-12-23 RX ORDER — POTASSIUM CHLORIDE 7.45 MG/ML
10 INJECTION INTRAVENOUS PRN
Status: DISCONTINUED | OUTPATIENT
Start: 2022-12-23 | End: 2022-12-28 | Stop reason: HOSPADM

## 2022-12-23 RX ORDER — ASPIRIN 81 MG/1
81 TABLET ORAL DAILY
Status: DISCONTINUED | OUTPATIENT
Start: 2022-12-23 | End: 2022-12-28 | Stop reason: HOSPADM

## 2022-12-23 RX ORDER — ENOXAPARIN SODIUM 100 MG/ML
40 INJECTION SUBCUTANEOUS DAILY
Status: DISCONTINUED | OUTPATIENT
Start: 2022-12-23 | End: 2022-12-27

## 2022-12-23 RX ORDER — ROSUVASTATIN CALCIUM 40 MG/1
40 TABLET, COATED ORAL DAILY
Status: DISCONTINUED | OUTPATIENT
Start: 2022-12-23 | End: 2022-12-28 | Stop reason: HOSPADM

## 2022-12-23 RX ORDER — ACETAMINOPHEN 325 MG/1
650 TABLET ORAL EVERY 6 HOURS PRN
Status: DISCONTINUED | OUTPATIENT
Start: 2022-12-23 | End: 2022-12-27

## 2022-12-23 RX ORDER — DEXTROSE MONOHYDRATE 100 MG/ML
INJECTION, SOLUTION INTRAVENOUS CONTINUOUS PRN
Status: DISCONTINUED | OUTPATIENT
Start: 2022-12-23 | End: 2022-12-28 | Stop reason: HOSPADM

## 2022-12-23 RX ORDER — ALBUTEROL SULFATE 2.5 MG/3ML
2.5 SOLUTION RESPIRATORY (INHALATION) EVERY 4 HOURS PRN
Status: DISCONTINUED | OUTPATIENT
Start: 2022-12-23 | End: 2022-12-23

## 2022-12-23 RX ORDER — DILTIAZEM HYDROCHLORIDE 180 MG/1
180 CAPSULE, COATED, EXTENDED RELEASE ORAL 2 TIMES DAILY
Status: DISCONTINUED | OUTPATIENT
Start: 2022-12-23 | End: 2022-12-28 | Stop reason: HOSPADM

## 2022-12-23 RX ORDER — PREDNISONE 20 MG/1
40 TABLET ORAL DAILY
Status: DISCONTINUED | OUTPATIENT
Start: 2022-12-23 | End: 2022-12-24

## 2022-12-23 RX ORDER — FUROSEMIDE 10 MG/ML
40 INJECTION INTRAMUSCULAR; INTRAVENOUS 2 TIMES DAILY
Status: DISCONTINUED | OUTPATIENT
Start: 2022-12-23 | End: 2022-12-23

## 2022-12-23 RX ORDER — CLOPIDOGREL BISULFATE 75 MG/1
75 TABLET ORAL DAILY
Status: DISCONTINUED | OUTPATIENT
Start: 2022-12-23 | End: 2022-12-28 | Stop reason: HOSPADM

## 2022-12-23 RX ORDER — IPRATROPIUM BROMIDE AND ALBUTEROL SULFATE 2.5; .5 MG/3ML; MG/3ML
1 SOLUTION RESPIRATORY (INHALATION) 3 TIMES DAILY
Status: DISCONTINUED | OUTPATIENT
Start: 2022-12-23 | End: 2022-12-28 | Stop reason: HOSPADM

## 2022-12-23 RX ORDER — ONDANSETRON 2 MG/ML
4 INJECTION INTRAMUSCULAR; INTRAVENOUS EVERY 6 HOURS PRN
Status: DISCONTINUED | OUTPATIENT
Start: 2022-12-23 | End: 2022-12-23 | Stop reason: SDUPTHER

## 2022-12-23 RX ORDER — LANOLIN ALCOHOL/MO/W.PET/CERES
400 CREAM (GRAM) TOPICAL 2 TIMES DAILY
Status: DISCONTINUED | OUTPATIENT
Start: 2022-12-23 | End: 2022-12-28 | Stop reason: HOSPADM

## 2022-12-23 RX ORDER — MAGNESIUM SULFATE IN WATER 40 MG/ML
2000 INJECTION, SOLUTION INTRAVENOUS PRN
Status: DISCONTINUED | OUTPATIENT
Start: 2022-12-23 | End: 2022-12-27

## 2022-12-23 RX ORDER — ONDANSETRON 4 MG/1
4 TABLET, ORALLY DISINTEGRATING ORAL EVERY 8 HOURS PRN
Status: DISCONTINUED | OUTPATIENT
Start: 2022-12-23 | End: 2022-12-28 | Stop reason: HOSPADM

## 2022-12-23 RX ORDER — LISINOPRIL 10 MG/1
10 TABLET ORAL DAILY
Status: DISCONTINUED | OUTPATIENT
Start: 2022-12-23 | End: 2022-12-27

## 2022-12-23 RX ORDER — BENZONATATE 100 MG/1
100 CAPSULE ORAL 3 TIMES DAILY PRN
Status: DISCONTINUED | OUTPATIENT
Start: 2022-12-23 | End: 2022-12-28 | Stop reason: HOSPADM

## 2022-12-23 RX ORDER — INSULIN LISPRO 100 [IU]/ML
0-4 INJECTION, SOLUTION INTRAVENOUS; SUBCUTANEOUS NIGHTLY
Status: DISCONTINUED | OUTPATIENT
Start: 2022-12-23 | End: 2022-12-28 | Stop reason: HOSPADM

## 2022-12-23 RX ORDER — MONTELUKAST SODIUM 10 MG/1
10 TABLET ORAL NIGHTLY
Status: DISCONTINUED | OUTPATIENT
Start: 2022-12-23 | End: 2022-12-28 | Stop reason: HOSPADM

## 2022-12-23 RX ADMIN — Medication 400 MG: at 09:44

## 2022-12-23 RX ADMIN — DILTIAZEM HYDROCHLORIDE 180 MG: 180 CAPSULE, EXTENDED RELEASE ORAL at 21:50

## 2022-12-23 RX ADMIN — Medication 400 MG: at 02:19

## 2022-12-23 RX ADMIN — GUAIFENESIN 600 MG: 600 TABLET, EXTENDED RELEASE ORAL at 09:43

## 2022-12-23 RX ADMIN — IPRATROPIUM BROMIDE AND ALBUTEROL SULFATE 3 ML: .5; 2.5 SOLUTION RESPIRATORY (INHALATION) at 13:25

## 2022-12-23 RX ADMIN — ENOXAPARIN SODIUM 40 MG: 100 INJECTION SUBCUTANEOUS at 09:51

## 2022-12-23 RX ADMIN — DILTIAZEM HYDROCHLORIDE 180 MG: 180 CAPSULE, EXTENDED RELEASE ORAL at 09:43

## 2022-12-23 RX ADMIN — CLOPIDOGREL BISULFATE 75 MG: 75 TABLET ORAL at 09:43

## 2022-12-23 RX ADMIN — MONTELUKAST SODIUM 10 MG: 10 TABLET, FILM COATED ORAL at 21:50

## 2022-12-23 RX ADMIN — ACETAMINOPHEN 650 MG: 325 TABLET ORAL at 09:53

## 2022-12-23 RX ADMIN — GUAIFENESIN 600 MG: 600 TABLET, EXTENDED RELEASE ORAL at 02:19

## 2022-12-23 RX ADMIN — PREDNISONE 40 MG: 20 TABLET ORAL at 09:42

## 2022-12-23 RX ADMIN — Medication 400 MG: at 21:50

## 2022-12-23 RX ADMIN — IPRATROPIUM BROMIDE AND ALBUTEROL SULFATE 3 ML: .5; 2.5 SOLUTION RESPIRATORY (INHALATION) at 07:17

## 2022-12-23 RX ADMIN — ASPIRIN 81 MG: 81 TABLET, COATED ORAL at 09:43

## 2022-12-23 RX ADMIN — LISINOPRIL 10 MG: 10 TABLET ORAL at 09:44

## 2022-12-23 RX ADMIN — DILTIAZEM HYDROCHLORIDE 180 MG: 180 CAPSULE, EXTENDED RELEASE ORAL at 02:19

## 2022-12-23 RX ADMIN — IPRATROPIUM BROMIDE AND ALBUTEROL SULFATE 3 ML: .5; 2.5 SOLUTION RESPIRATORY (INHALATION) at 21:15

## 2022-12-23 RX ADMIN — MONTELUKAST SODIUM 10 MG: 10 TABLET, FILM COATED ORAL at 02:19

## 2022-12-23 RX ADMIN — GUAIFENESIN 600 MG: 600 TABLET, EXTENDED RELEASE ORAL at 21:51

## 2022-12-23 ASSESSMENT — PAIN DESCRIPTION - LOCATION: LOCATION: HEAD

## 2022-12-23 ASSESSMENT — PAIN - FUNCTIONAL ASSESSMENT: PAIN_FUNCTIONAL_ASSESSMENT: ACTIVITIES ARE NOT PREVENTED

## 2022-12-23 ASSESSMENT — PAIN DESCRIPTION - DESCRIPTORS: DESCRIPTORS: ACHING

## 2022-12-23 ASSESSMENT — PAIN DESCRIPTION - ORIENTATION: ORIENTATION: POSTERIOR;MID

## 2022-12-23 ASSESSMENT — PAIN SCALES - GENERAL: PAINLEVEL_OUTOF10: 0

## 2022-12-23 NOTE — CARE COORDINATION
[facility] [place] Case Management Initial Discharge Assessment    Met with Patient , DTR to discuss discharge plan. PCP: Luigi Trotter PA-C                                Date of Last Visit: LAST MONTH     [facility] Patient: No        [facility] Notified: no    If no PCP, list provided? N/A    Discharge Planning    Living Arrangements: independently at home    Who do you live with? 15 Y/O SON    Who helps you with your care:  family, DTR, BOYFRIEND, SON     If lives at home:     Do you have any barriers navigating in your home? yes - 14 STEPS TO [facility]     Patient can perform ADL? Yes. NEEDS ASSIST W/ IADLs, HOUSE KEEPING, BATHING     Current Services (outpatient and in home) :  None    Dialysis: No    Is transportation available to get to your appointments? Yes    DME Equipment:  no    Respiratory equipment: Nebulizer    Respiratory provider:  yes - MEDICAL SERVICE      Pharmacy:  yes - Katherine Macario with Medication Assistance Program?  No      Patient agreeable to [facility]? HAS LIST. WILL FOLLOW FOR CHOICES IF INDICATED     Patient agreeable to SNF/Rehab? Declined    Other discharge needs identified? N/A    Does Patient Have a High-Risk for Readmission Diagnosis (CHF, PN, MI, COPD)? Yes, see care coordinator assessment    If Yes,    Consult with pulmonologist? Yes  Consult with cardiologist? Yes  Cardiac Rehab referral if EF <35%? Yes  Consult with Pharmacy for medication assessment prior to discharge? Yes  Consult with Behavioral health to aid in depression, anxiety, or coping issues? No  Palliative Care Consult? No  Pulmonary Rehab order for COPD, PN, and CHF (if EF > 35%)? Yes   Does patient have a reliable scale and know how to read it (for CHF)? N/A  Nutrition consult for CHF? No  Respiratory therapy consult that includes bedside instruction on administration of nebulizers and/or inhalers, and assessment of oxygen and equipment needs in the home? Yes    Initial Discharge Plan? MET W/PT AND DTR TO ASSESS NEEDS AND DISCUSS DISCHARGE PLAN. PT MAY BENEFIT FROM HHC IF INDICATED PENDING PT/OT EVALS. LIST PROVIDED. FOC OFFERED. CM/LSW TO FOLLOW CLINICAL COURSE AND ASSESS NEEDS.  (Note: please see concurrent daily documentation for any updates after initial note).         Readmission Risk              Risk of Unplanned Readmission:  10         Electronically signed by Griselda Victor RN on 12/23/2022 at 9:17 AM

## 2022-12-23 NOTE — ED PROVIDER NOTES
[address] [facility] ED  eMERGENCY dEPARTMENT eNCOUnter      Pt Name: Lon Erickson  MRN: [id number]  [place] 1970  Date of evaluation: 12/22/2022  Provider: Adelina Marin, [address] [place] [address]       Chief Complaint   Patient presents with    Shortness of Breath         HISTORY OF PRESENT ILLNESS   (Location/Symptom, Timing/Onset,Context/Setting, Quality, Duration, Modifying Factors, Severity)  Note limiting factors. Lon Erickson is a 45 y.o. female who presents to the emergency department with c/o SOB. SPo2 85% and patient was lethagic at home. Unsure if the patient had passed out. The sooner she got supplemental oxygen she immediately became more responsive. Rales diffusely on exam bilaterally all lung fields. Patient put on NRB by  EMS and immediately improved. Conversational dyspnea. Patient does not on home oxygen. The history is provided by the patient and the EMS personnel. NursingNotes were reviewed. REVIEW OF SYSTEMS    (2-9 systems for level 4, 10 or more for level 5)     Review of Systems   Unable to perform ROS: Acuity of condition   Constitutional:  Positive for activity change, diaphoresis and fatigue. Respiratory:  Positive for cough and shortness of breath. Cardiovascular:  Negative for chest pain. Gastrointestinal:  Negative for diarrhea and vomiting. Skin:  Positive for color change. Except as noted above the remainder of the review of systems was reviewed and negative.        PAST MEDICAL HISTORY     Past Medical History:   Diagnosis Date    Anticoagulant long-term use     Asthma 2004    CAD (coronary artery disease)     COPD (chronic obstructive pulmonary disease) ([address]     Diabetes mellitus ([address] 2014    Hodgkin disease ([address] 1999    Hyperlipidemia     Hypertension 1984    Migraines 1989    Sleep apnea     recently tested     Type 2 diabetes mellitus without complication ([address]     type II         SURGICALHISTORY       Past Surgical History: Procedure Laterality Date    TUBAL LIGATION           CURRENT MEDICATIONS       Previous Medications    ALBUTEROL (PROVENTIL) (2.5 MG/3ML) 0.083% NEBULIZER SOLUTION    Take 3 mLs by nebulization every 4 hours as needed for Wheezing or Shortness of Breath    ASPIRIN 81 MG EC TABLET    Take 1 tablet by mouth daily    ATORVASTATIN (LIPITOR) 40 MG TABLET    80 mg     AZELASTINE (ASTELIN) 0.1 % NASAL SPRAY    2 sprays by Nasal route 2 times daily Use in each nostril as directed    BUDESONIDE (PULMICORT) 0.5 MG/2ML NEBULIZER SUSPENSION    Take 2 mLs by nebulization 2 times daily    CLOPIDOGREL (PLAVIX) 75 MG TABLET    Take 75 mg by mouth daily    DILTIAZEM (TIAZAC) 180 MG EXTENDED RELEASE CAPSULE    Take 240 mg by mouth 2 times daily     EMPAGLIFLOZIN (JARDIANCE) 10 MG TABLET    Take 25 mg by mouth daily    EPINEPHRINE (EPIPEN) 0.3 MG/0.3ML SOAJ INJECTION    Inject 0.3 mg into the muscle as needed    ESOMEPRAZOLE MAGNESIUM (NEXIUM 24HR) 20 MG TBEC    Take 20 mg by mouth daily as needed    FUROSEMIDE (LASIX) 20 MG TABLET    Take 20 mg by mouth daily    IBUPROFEN (IBU) 600 MG TABLET    Take 1 tablet by mouth every 8 hours as needed for Pain    ICOSAPENT ETHYL (VASCEPA) 1 G CAPS CAPSULE    Take 1 g by mouth 2 times daily (with meals)    IPRATROPIUM-ALBUTEROL (DUONEB) 0.5-2.5 (3) MG/3ML SOLN NEBULIZER SOLUTION    Inhale 3 mLs into the lungs 3 times daily as needed for Shortness of Breath    LISINOPRIL (PRINIVIL;ZESTRIL) 5 MG TABLET    Take 10 mg by mouth daily     MAGNESIUM OXIDE (MAG-OX) 400 MG TABLET    Take 400 mg by mouth 2 times daily    METFORMIN (GLUCOPHAGE) 500 MG TABLET    Take 1,000 mg by mouth 2 times daily (with meals)    MOMETASONE (ELOCON) 0.1 % CREAM    Apply topically daily Apply topically daily inside of both ears    MONTELUKAST (SINGULAIR) 10 MG TABLET    Take 10 mg by mouth nightly    MUPIROCIN (BACTROBAN) 2 % OINTMENT    Apply topically 2 times daily Apply topically twice daily inside of nose    NICOTINE (NICODERM CQ) 21 MG/24HR    Place 1 patch onto the skin daily    NITROGLYCERIN (NITROSTAT) 0.4 MG SL TABLET    up to max of 3 total doses. If no relief after 1 dose, call 911. PANTOPRAZOLE (PROTONIX) 40 MG TABLET    Take 1 tablet by mouth every morning (before breakfast)    POTASSIUM CHLORIDE (KLOR-CON M) 10 MEQ EXTENDED RELEASE TABLET    Take 10 mEq by mouth daily    PROAIR  (90 BASE) MCG/ACT INHALER    INHALE 2 PUFFS BY MOUTH 4 TIMES DAILY AS NEEDED FOR WHEEZING    ROSUVASTATIN (CRESTOR) 40 MG TABLET    TAKE 1 TABLET BY MOUTH ONCE DAILY    SYMBICORT 160-4.5 MCG/ACT AERO    Inhale 2 puffs by mouth twice daily    TICAGRELOR (BRILINTA) 90 MG TABS TABLET    Take 1 tablet by mouth 2 times daily    TIOTROPIUM (SPIRIVA RESPIMAT) 2.5 MCG/ACT AERS INHALER    Inhale 2 puffs into the lungs daily    TRIAMCINOLONE (NASACORT) 55 MCG/ACT NASAL INHALER    USE 2 SPRAYS INTO THE NOSE ONCE DAILY       ALLERGIES     Shellfish-derived products, Cumin oil, and Piper    FAMILY HISTORY     No family history on file.        SOCIAL HISTORY       Social History     Socioeconomic History    Marital status: Single   Tobacco Use    Smoking status: Former     Packs/day: 1.00     Years: 37.00     Pack years: 37.00     Types: Cigarettes     Quit date: 2022     Years since quittin.5    Smokeless tobacco: Never   Vaping Use    Vaping Use: Never used   Substance and Sexual Activity    Alcohol use: Not Currently    Drug use: Never    Sexual activity: Yes     Partners: Male     Social Determinants of Health     Financial Resource Strain: Low Risk     Difficulty of Paying Living Expenses: Not very hard   Food Insecurity: No Food Insecurity    Worried About Running Out of Food in the Last Year: Never true    Ran Out of Food in the Last Year: Never true       SCREENINGS    Cat Coma Scale  Eye Opening: Spontaneous  Best Verbal Response: Oriented  Best Motor Response: Obeys commands  Cat Coma Scale Score: 15 @FLOW([id number])@      PHYSICAL EXAM    (up to 7 for level 4, 8 or more for level 5)     ED Triage Vitals [12/22/22 1829]   BP Temp Temp Source Heart Rate Resp SpO2 Height Weight   (!) 159/103 97.8 °F (36.6 °C) Temporal (!) 131 30 94 % 5' 2\" (1.575 m) 195 lb (88.5 kg)       Physical Exam  Vitals and nursing note reviewed. Constitutional:       General: She is awake. She is in acute distress. Appearance: Normal appearance. She is well-developed and normal weight. She is ill-appearing and diaphoretic. She is not toxic-appearing. Comments: No photophobia. No phonophobia. HENT:      Head: Normocephalic and atraumatic. No Esteves's sign. Right Ear: External ear normal.      Left Ear: External ear normal.      Nose: Nose normal. No congestion or rhinorrhea. Mouth/Throat:      Mouth: Mucous membranes are moist.      Pharynx: Oropharynx is clear. No oropharyngeal exudate or posterior oropharyngeal erythema. Eyes:      General: No scleral icterus. Right eye: No foreign body or discharge. Left eye: No discharge. Extraocular Movements: Extraocular movements intact. Conjunctiva/sclera: Conjunctivae normal.      Left eye: No exudate. Pupils: Pupils are equal, round, and reactive to light. Neck:      Vascular: No JVD. Trachea: No tracheal deviation. Comments: No meningismus. Cardiovascular:      Rate and Rhythm: Regular rhythm. Tachycardia present. Pulses: Normal pulses. Heart sounds: Normal heart sounds. Heart sounds not distant. No murmur heard. No friction rub. No gallop. Pulmonary:      Effort: Pulmonary effort is normal. No respiratory distress. Breath sounds: No stridor. Examination of the right-upper field reveals rales. Examination of the left-upper field reveals rales. Examination of the right-middle field reveals rales. Examination of the left-middle field reveals rales. Examination of the right-lower field reveals rales.  Examination of the left-lower field reveals rales. Rales present. No wheezing or rhonchi. Chest:      Chest wall: No tenderness. Abdominal:      General: Abdomen is flat. Bowel sounds are normal. There is no distension. Palpations: Abdomen is soft. There is no mass. Tenderness: There is no abdominal tenderness. There is no right CVA tenderness, left CVA tenderness, guarding or rebound. Hernia: No hernia is present. Musculoskeletal:         General: No swelling, tenderness, deformity or signs of injury. Normal range of motion. Cervical back: Normal range of motion and neck supple. No rigidity. Lymphadenopathy:      Head:      Right side of head: No submental adenopathy. Left side of head: No submental adenopathy. Skin:     General: Skin is warm. Capillary Refill: Capillary refill takes less than 2 seconds. Coloration: Skin is not cyanotic, jaundiced or pale. Findings: No bruising, ecchymosis, erythema, lesion or rash. Neurological:      General: No focal deficit present. Mental Status: She is alert and oriented to person, place, and time. Mental status is at baseline. Cranial Nerves: No cranial nerve deficit. Sensory: No sensory deficit. Motor: No weakness. Coordination: Coordination normal.      Deep Tendon Reflexes: Reflexes are normal and symmetric. Psychiatric:         Mood and Affect: Mood normal.         Behavior: Behavior normal. Behavior is cooperative. Thought Content: Thought content normal.         Judgment: Judgment normal.       DIAGNOSTIC RESULTS     EKG: All EKG's are interpreted by the Emergency Department Physician who either signs or Co-signsthis chart in the absence of a cardiologist.    EKG: Sinus tachycardia at 127 bpm.  Prominent P wave consistent with atrial enlargement. The QT interval is 304 ms. Diffuse low voltage. QS waves in leads V1 and V2. There are no PVCs.     RADIOLOGY:   Non-plain filmimages such as CT, Ultrasound and MRI are read by the radiologist. Plain radiographic images are visualized and preliminarily interpreted by the emergency physician with the below findings:        Interpretation per the Radiologist below, if available at the time ofthis note:    XR CHEST PORTABLE   Final Result   No acute process. CTA CHEST W WO CONTRAST    (Results Pending)     Stat rad CTA of the chest: No evidence of pulmonary embolism. Scattered subtle areas of groundglass opacity particular involving the central aspect of the right lung and left upper lobe. These are new from CT exam dated 11/1/2022. Consider bacterial pneumonia to include atypical bacterial viral process. Bronchial wall thickening most notable in the lower lobes may be reactive or represent a component of infectious bronchitis. No pleural effusion or pneumothorax. No other significant alteration from previous exam.  Radiologist: Jesus Haines MD      ED BEDSIDE ULTRASOUND:   Performed by ED Physician - none    LABS:  Labs Reviewed   CBC WITH AUTO DIFFERENTIAL - Abnormal; Notable for the following components:       Result Value    MCHC 32.5 (*)     Neutrophils Absolute 7.2 (*)     All other components within normal limits   COMPREHENSIVE METABOLIC PANEL - Abnormal; Notable for the following components:    Glucose 180 (*)     All other components within normal limits   TROPONIN - Abnormal; Notable for the following components:    Troponin 0.018 (*)     All other components within normal limits    Narrative:     Michaelfredrick Fabiola tel. [phone],  TROP results called to and read back by [id number], 12/22/2022 20:06, by  [id number] - Abnormal; Notable for the following components:    CRP 20.8 (*)     All other components within normal limits   CK - Abnormal; Notable for the following components:     Total  (*)     All other components within normal limits   POCT ARTERIAL - Abnormal; Notable for the following components:    POC Glucose 154 (*)     pO2, Arterial 170 (*)     O2 Sat, Arterial 100 (*)     All other components within normal limits   POCT CREATININE - Normal   COVID-19, RAPID   RAPID INFLUENZA A/B ANTIGENS   CULTURE, BLOOD 1   CULTURE, BLOOD 1   RESPIRATORY PANEL, MOLECULAR, WITH COVID-19   LACTIC ACID   MAGNESIUM   PROTIME-INR   APTT   PROCALCITONIN    Narrative:     Dicie Marking tel. [phone],  TROP results called to and read back by Noreen Arizmendi, 12/22/2022 20:06, by  [address]    Narrative:     add on       All other labs were within normal range or not returned as of this dictation. EMERGENCY DEPARTMENT COURSE and DIFFERENTIAL DIAGNOSIS/MDM:   Vitals:    Vitals:    12/22/22 1829 12/22/22 1914   BP: (!) 159/103 (!) 143/86   Pulse: (!) 131    Resp: 30    Temp: 97.8 °F (36.6 °C)    TempSrc: Temporal    SpO2: 94%    Weight: 195 lb (88.5 kg)    Height: 5' 2\" (1.575 m)            MDM  Patient was initially on a nonrebreather mask and had good access saturation. The ED attending asked respiratory to take her off the oxygen and the patient seemed initially to be doing well then she began to decompensate became tachypneic with a respiratory rate of 44 and was struggling to breathe and her sat was 91%. The nurse put the patient back on oxygen. An arterial blood gas was obtained. Patient was started on BiPAP. Patient has rales throughout her lungs the initial impression is that this patient would be in pulmonary edema. Patient was given Lasix IV. Heart rate is coming down. CT scan was obtained to see if the patient has a PE and there is none. There is these groundglass opacities. Procalcitonin is negative which goes against a bacterial infectious process such as pneumonia. Blood cultures x2 have been obtained. BNP was added. Case was discussed with Dr. Fernando Carter and Zosyn was canceled.   A molecular respiratory panel has been ordered out of concern that these groundglass opacities are likely to be some type of viral infectious process to the determine the etiology of her illness. Recommendations for hospitalization. IV, O2, cardiac monitor continuous pulse ox are in place. Patient's work of breathing has improved. Troponin is likely elevated secondary to the hypoxia that the patient had experienced. CRITICAL CARE TIME   Total Critical Care time was 53 minutes, excluding separately reportableprocedures. There was a high probability of clinicallysignificant/life threatening deterioration in the patient's condition which required my urgent intervention. CONSULTS:  None    PROCEDURES:  Unless otherwise noted below, none     Procedures    FINAL IMPRESSION      1. Acute respiratory distress    2. Hypoxemia    3. Sinus tachycardia    4. Type 2 diabetes mellitus with hyperglycemia, without long-term current use of insulin (HCC)    5. Former cigarette smoker    6. Elevated troponin    7. Syncope and collapse          DISPOSITION/PLAN   DISPOSITION Admitted 12/22/2022 09:25:15 PM      PATIENT REFERRED TO:  No follow-up provider specified.     DISCHARGE MEDICATIONS:  New Prescriptions    No medications on file          (Please note that portions of this note were completed with a voice recognition program.  Efforts were made to edit the dictations but occasionally words are mis-transcribed.)    Malinda Jimenez DO (electronically signed)  Attending Emergency Physician          Malinda Jimenez DO  12/22/22 [id number]

## 2022-12-23 NOTE — PROGRESS NOTES
12/23/22    From: HOME W/16 Y/O SON    Admit:HYPOXIA, WORSENING SHORTNESS OF BREATH, SPO2 85%, LETHARGY, SYNCOPE     PMH:COPD, DM, FORMER TOBACCO     Anticipated Discharge Disposition: HOME [facility]. HAS LIST.  FOLLOW ON CHOICE     Patient Mobility or PT/OT ordered:  Consults: PULM, [address] [facility]     Clinical:  CT GROUND GLASS OPACITIES, NO PLEURAL EFFUSION OR PNEUMOTHORAX, TROP 0.018--0.109    Barriers to Discharge: CONSULTS, O2 4L NC, [place], LOV, IV LASIX, PREDNISONE     Assessments: CMI DONE

## 2022-12-23 NOTE — ED NOTES
pts urine container was changed and pt was cleaned and given new linens. pts belongings were placed in a bag at this time.       Anca Zayas RN  12/22/22 [id number]

## 2022-12-23 NOTE — CONSULTS
[facility] Cardiology Consult Note        Date of Consult:   2022    Patient:    Joey Ahuja    :    1970  CSN:    [id number]    Consulting Cardiologist: Vito Johnson MD     Primary Cardiologist: Marcus Farrar  [id number]    Requesting Physician:  Michelle Arambula DO      Reason for Consult:  SOB, Tachycardia, CAD, Elevated Tropoinins. Assessment:    SOB / Hypoxia  Bilateral pneumonia, by CT chest, negative procalcitonin, probable viral.  COPD / Asthma, exacerbation  Tachycardia, sinus, reactive  Chest Pain, chronic, atypical, bilateral pneumonia. Muscular Skeletal.  Elevated Troponins, low flat pattern, rule out ACS or MI. CAD post right coronary drug-eluting stent angioplasty 2020, known 50% left circumflex. Long-term Brilinta antiplatelet therapy. Negative Myoview Stress  and 2021. Preserved left ventricular function, LVEF 65%. Hypertension  Hyperlipidemia  Diabetes  Nocturnal snoring / insomnia, negative prior sleep study for sleep apnea. Hx Hodgkin's disease. Obesity  Ongoing tobacco abuse  Hemorrhoidal bleeding. Otherwise as below and prior     Plan:    Cardiac Conservative Supportive Care  Pulmonary and Medicine Care   Check Echocardiogram.  Serial troponins and telemetry. Continue current cardiac medications at this time, adjust as needed. OK to DC home from CV point once improved and ok with others. Follow up with [facility] upon discharge. See Orders      HISTORY OF PRESENT ILLNESS:      Joey Ahuja is a pleasant 45 y.o. female with a history of CAD status post stent, COPD, Asthma diabetes, hypertension, hyperlipidemia, presents with several days of increasing SOB. .    Patient presented to the emergency room and was noted to have hypoxia and lethargy. No true syncope. Occasional chest pain which she has had chronically. No edema. Was found to have negative CT for pulmonary embolism, but probable pneumonia.   Procalcitonin was negative and was felt to most likely be viral in etiology. Patient was noted to have tachycardia and cardiac enzymes were elevated mildly. Given patient's cardiovascular history and current symptomatology patient was admitted for further treatment. Cardiology asked to see in consultation. Patient Follows with Dr Taya Nolen, [facility]. See the last office note attached below. Patient History and Records, EMR reviewed. Patient Interviewed and examined. Good historian. Currently feels better. Less short of breath. No significant chest discomfort symptoms. Denies LH, Dizziness, TIA or CVA Symptoms. No Orthopnea, Edema or CHF symptoms. No Palpitations. No Syncope. No Fever, Chills or Cold symptoms. No GI,  or Bleeding complaints. Cardiac and general ROS otherwise negative. [address] otherwise negative other than noted. Past Medical History:   Diagnosis Date    Anticoagulant long-term use     Asthma 2004    CAD (coronary artery disease)     COPD (chronic obstructive pulmonary disease) ([facility]     Diabetes mellitus ([facility] [facility] 2014    Hodgkin disease ([facility] [address] [facility] 1999    Hyperlipidemia     Hypertension 1984    Migraines 1989    Sleep apnea     recently tested     Type 2 diabetes mellitus without complication ([facility] [address]     type II       Past Surgical History:   Procedure Laterality Date    TUBAL LIGATION         Prior to Admission medications    Medication Sig Start Date End Date Taking?  Authorizing Provider   Multiple Vitamins-Minerals (THERAPEUTIC MULTIVITAMIN-MINERALS) tablet Take 1 tablet by mouth daily   Yes Historical Provider, MD   budesonide (PULMICORT) 0.5 MG/2ML nebulizer suspension Take 2 mLs by nebulization 2 times daily  Patient not taking: Reported on 12/22/2022 12/1/22   Juan Ball MD   tiotropium (SPIRIVA RESPIMAT) 2.5 MCG/ACT AERS inhaler Inhale 2 puffs into the lungs daily 11/17/22   Juan Ball MD   rosuvastatin (CRESTOR) 40 MG tablet TAKE 1 TABLET BY MOUTH ONCE DAILY 10/24/22   Historical Provider, MD   SYMBICORT 160-4.5 MCG/ACT AERO Inhale 2 puffs by mouth twice daily 11/3/22   Jorge Rutherford MD   albuterol (PROVENTIL) (2.5 MG/3ML) 0.083% nebulizer solution Take 3 mLs by nebulization every 4 hours as needed for Wheezing or Shortness of Breath 4/15/22   Elidia Ruiz PA-C   PROAIR  (14 Base) MCG/ACT inhaler INHALE 2 PUFFS BY MOUTH 4 TIMES DAILY AS NEEDED FOR WHEEZING  Patient not taking: Reported on 12/22/2022 3/28/22   Moni Huntley APRN - CNP   ibuprofen (IBU) 600 MG tablet Take 1 tablet by mouth every 8 hours as needed for Pain 12/20/21   Allen Petty PA-C   clopidogrel (PLAVIX) 75 MG tablet Take 75 mg by mouth daily    Historical Provider, MD   Icosapent Ethyl (VASCEPA) 1 g CAPS capsule Take 1 g by mouth 2 times daily (with meals)    Historical Provider, MD   montelukast (SINGULAIR) 10 MG tablet Take 10 mg by mouth nightly 6/9/21   Historical Provider, MD   ipratropium-albuterol (DUONEB) 0.5-2.5 (3) MG/3ML SOLN nebulizer solution Inhale 3 mLs into the lungs 3 times daily as needed for Shortness of Breath 9/20/21 2/8/22  Jorge Rutherford MD   azelastine (ASTELIN) 0.1 % nasal spray 2 sprays by Nasal route 2 times daily Use in each nostril as directed  Patient not taking: Reported on 12/22/2022    Historical Provider, MD   dilTIAZem HAYS [facility]) 180 MG extended release capsule Take 240 mg by mouth 2 times daily     Historical Provider, MD   EPINEPHrine (EPIPEN) 0.3 MG/0.3ML SOAJ injection Inject 0.3 mg into the muscle as needed 12/21/20   Historical Provider, MD   Esomeprazole Magnesium (NEXIUM 24HR) 20 MG TBEC Take 20 mg by mouth daily as needed    Historical Provider, MD   magnesium oxide (MAG-OX) 400 MG tablet Take 400 mg by mouth 2 times daily 11/13/20   Historical Provider, MD   triamcinolone (NASACORT) 55 MCG/ACT nasal inhaler USE 2 SPRAYS INTO THE NOSE ONCE DAILY  Patient not taking: Reported on 12/22/2022 12/21/20   Historical Provider, MD   atorvastatin (LIPITOR) 40 MG tablet 80 mg   Patient not taking: No sig reported 2/10/21   Historical Provider, MD   nicotine (NICODERM CQ) 21 MG/24HR Place 1 patch onto the skin daily 3/17/21 2/8/22  Candace Richards MD   pantoprazole (PROTONIX) 40 MG tablet Take 1 tablet by mouth every morning (before breakfast)  Patient not taking: Reported on 12/23/2022 11/14/20   Fausto Morales MD   metFORMIN (GLUCOPHAGE) 500 MG tablet Take 1,000 mg by mouth 2 times daily (with meals)    Historical Provider, MD   empagliflozin (JARDIANCE) 10 MG tablet Take 25 mg by mouth daily    Historical Provider, MD   mupirocin (BACTROBAN) 2 % ointment Apply topically 2 times daily Apply topically twice daily inside of nose  Patient not taking: Reported on 12/22/2022    Historical Provider, MD   mometasone (ELOCON) 0.1 % cream Apply topically daily Apply topically daily inside of both ears  Patient not taking: Reported on 12/22/2022    Historical Provider, MD   furosemide (LASIX) 20 MG tablet Take 20 mg by mouth daily    Historical Provider, MD   potassium chloride (KLOR-CON M) 10 MEQ extended release tablet Take 10 mEq by mouth daily    Historical Provider, MD   lisinopril (PRINIVIL;ZESTRIL) 5 MG tablet Take 10 mg by mouth daily     Historical Provider, MD   aspirin 81 MG EC tablet Take 1 tablet by mouth daily 9/1/20   Priyanka Mock MD   ticagrelor (BRILINTA) 90 MG TABS tablet Take 1 tablet by mouth 2 times daily  Patient not taking: No sig reported 8/31/20   Priyanka Mock MD   nitroGLYCERIN (NITROSTAT) 0.4 MG SL tablet up to max of 3 total doses.  If no relief after 1 dose, call 911. 7/23/20   Kaci Nevarez MD       Scheduled Meds:   aspirin  81 mg Oral Daily    clopidogrel  75 mg Oral Daily    rosuvastatin  40 mg Oral Daily    montelukast  10 mg Oral Nightly    magnesium oxide  400 mg Oral BID    lisinopril  10 mg Oral Daily    dilTIAZem  180 mg Oral BID    enoxaparin  40 mg SubCUTAneous Daily    insulin lispro  0-4 Units SubCUTAneous TID WC insulin lispro  0-4 Units SubCUTAneous Nightly    predniSONE  40 mg Oral Daily    guaiFENesin  600 mg Oral BID    [START ON 2022] furosemide  40 mg IntraVENous Daily    ipratropium-albuterol  1 vial Inhalation TID     Continuous Infusions:   dextrose       PRN Meds:acetaminophen **OR** acetaminophen, potassium chloride **OR** potassium alternative oral replacement **OR** potassium chloride, magnesium sulfate, ondansetron **OR** ondansetron, glucose, dextrose bolus **OR** dextrose bolus, glucagon (rDNA), dextrose, albuterol    Allergies   Allergen Reactions    Shellfish-Derived Products Anaphylaxis    Cumin Oil Itching    Piper Itching       Social History     Socioeconomic History    Marital status: Single     Spouse name: Not on file    Number of children: Not on file    Years of education: Not on file    Highest education level: Not on file   Occupational History    Not on file   Tobacco Use    Smoking status: Former     Packs/day: 1.00     Years: 37.00     Pack years: 37.00     Types: Cigarettes     Quit date: 2022     Years since quittin.5    Smokeless tobacco: Never   Vaping Use    Vaping Use: Never used   Substance and Sexual Activity    Alcohol use: Not Currently    Drug use: Never    Sexual activity: Yes     Partners: Male   Other Topics Concern    Not on file   Social History Narrative    Not on file     Social Determinants of Health     Financial Resource Strain: Low Risk     Difficulty of Paying Living Expenses: Not very hard   Food Insecurity: No Food Insecurity    Worried About Running Out of Food in the Last Year: Never true    Ran Out of Food in the Last Year: Never true   Transportation Needs: Not on file   Physical Activity: Not on file   Stress: Not on file   Social Connections: Not on file   Intimate Partner Violence: Not on file   Housing Stability: Not on file       History reviewed. No pertinent family history.     Review Of Systems:    14 point ROS negative other than mentioned. Physical Exam:    CURRENT VITALS: /77   Pulse (!) 102   Temp 98.1 °F (36.7 °C) (Oral)   Resp 17   Ht 5' 2\" (1.575 m)   Wt 195 lb (88.5 kg)   SpO2 97%   BMI 35.67 kg/m²     CONSTITUTIONAL:  awake, alert, cooperative, no apparent distress,   ENT:  Normocephalic, without obvious abnormality, atraumatic, sinuses nontender on palpation, external ears without lesions,  NECK:  Supple, symmetrical, trachea midline, no adenopathy, thyroid symmetric, not enlarged and no tenderness, skin normal, No bruits. LUNGS:  Normal work of breathing, positive wheezing / congestion, very Decreased air exchange, CTA and P.  CARDIOVASCULAR:  Normal apical impulse, regular rate and rhythm, normal S1 and S2,  1/6 Systolic murmur noted. Reproducible Chest discomfort with palpation. ABDOMEN:  Obese, normal bowel sounds, soft, non-distended, non-tender, no masses palpated, no hepatosplenomegally  EXTREMETIES: Trace edema, Pulses Strong Thruout. No ulcers. NEUROLOGIC:  Awake, alert, oriented to name, place and time. Following all commands and moving all extremties.   SKIN:  no bruising or bleeding, normal skin color, texture, turgor and no rashes    Labs:  Recent Results (from the past 24 hour(s))   Lactic Acid    Collection Time: 12/22/22  6:30 PM   Result Value Ref Range    Lactic Acid 1.0 0.5 - 2.2 mmol/L   EKG 12 Lead    Collection Time: 12/22/22  6:31 PM   Result Value Ref Range    Ventricular Rate 127 BPM    Atrial Rate 127 BPM    P-R Interval 160 ms    QRS Duration 72 ms    Q-T Interval 304 ms    QTc Calculation (Bazett) 441 ms    P Axis 67 degrees    R Axis 44 degrees    T Axis 63 degrees   CBC with Auto Differential    Collection Time: 12/22/22  6:45 PM   Result Value Ref Range    WBC 8.7 4.8 - 10.8 K/uL    RBC 4.76 4.20 - 5.40 M/uL    Hemoglobin 13.9 12.0 - 16.0 g/dL    Hematocrit 42.9 37.0 - 47.0 %    MCV 90.1 79.4 - 94.8 fL    MCH 29.3 27.0 - 31.3 pg    MCHC 32.5 (L) 33.0 - 37.0 %    RDW 13.5 11.5 - 14.5 %    Platelets 602 496 - 326 K/uL    Neutrophils % 82.4 %    Lymphocytes % 13.0 %    Monocytes % 4.5 %    Eosinophils % 0.0 %    Basophils % 0.1 %    Neutrophils Absolute 7.2 (H) 1.4 - 6.5 K/uL    Lymphocytes Absolute 1.1 1.0 - 4.8 K/uL    Monocytes Absolute 0.4 0.2 - 0.8 K/uL    Eosinophils Absolute 0.0 0.0 - 0.7 K/uL    Basophils Absolute 0.0 0.0 - 0.2 K/uL   CMP    Collection Time: 12/22/22  6:45 PM   Result Value Ref Range    Sodium 138 135 - 144 mEq/L    Potassium 4.2 3.4 - 4.9 mEq/L    Chloride 102 95 - 107 mEq/L    CO2 22 20 - 31 mEq/L    Anion Gap 14 9 - 15 mEq/L    Glucose 180 (H) 70 - 99 mg/dL    BUN 12 6 - 20 mg/dL    Creatinine 0.57 0.50 - 0.90 mg/dL    Est, Glom Filt Rate >60.0 >60    Calcium 8.9 8.5 - 9.9 mg/dL    Total Protein 6.9 6.3 - 8.0 g/dL    Albumin 4.3 3.5 - 4.6 g/dL    Total Bilirubin 0.3 0.2 - 0.7 mg/dL    Alkaline Phosphatase 102 40 - 130 U/L    ALT 31 0 - 33 U/L    AST 29 0 - 35 U/L    Globulin 2.6 2.3 - 3.5 g/dL   Magnesium    Collection Time: 12/22/22  6:45 PM   Result Value Ref Range    Magnesium 2.0 1.7 - 2.4 mg/dL   Troponin    Collection Time: 12/22/22  6:45 PM   Result Value Ref Range    Troponin 0.018 (HH) 0.000 - 0.010 ng/mL   Protime-INR    Collection Time: 12/22/22  6:45 PM   Result Value Ref Range    Protime 13.5 12.3 - 14.9 sec    INR 1.0    APTT    Collection Time: 12/22/22  6:45 PM   Result Value Ref Range    aPTT 27.6 24.4 - 36.8 sec   Procalcitonin    Collection Time: 12/22/22  6:45 PM   Result Value Ref Range    Procalcitonin 0.05 0.00 - 0.15 ng/mL   C-Reactive Protein    Collection Time: 12/22/22  6:45 PM   Result Value Ref Range    CRP 20.8 (H) 0.0 - 5.0 mg/L   CK    Collection Time: 12/22/22  6:45 PM   Result Value Ref Range    Total  (H) 0 - 170 U/L   COVID-19, Rapid    Collection Time: 12/22/22  6:54 PM    Specimen: Nasopharyngeal Swab   Result Value Ref Range    SARS-CoV-2, NAAT Not Detected Not Detected   Rapid Influenza A/B Antigens    Collection Time: 12/22/22  6:54 PM    Specimen: Nasopharyngeal   Result Value Ref Range    Influenza A by PCR Negative     Influenza B by PCR Negative    POCT CREATININE    Collection Time: 12/22/22  7:18 PM   Result Value Ref Range    POC CREATININE WHOLE BLOOD 0.7    POCT Venous    Collection Time: 12/22/22  7:18 PM   Result Value Ref Range    POC Creatinine 0.7 0.6 - 1.2 mg/dL    Est, Glom Filt Rate >60 >60    Sample Type JULIENNE     Performed on SEE BELOW    Brain Natriuretic Peptide    Collection Time: 12/22/22  7:23 PM   Result Value Ref Range    Pro-BNP 12 pg/mL   POCT Arterial    Collection Time: 12/22/22  7:33 PM   Result Value Ref Range    POC Sodium 142 136 - 145 mEq/L    POC Potassium 3.9 3.5 - 5.1 mEq/L    POC Chloride 108 99 - 110 mEq/L    POC Glucose 154 (H) 70 - 99 mg/dl    POC Creatinine 0.7 0.6 - 1.2 mg/dL    Est, Glom Filt Rate >60 >60    Calcium, Ionized 1.23 1.12 - 1.32 mmol/L    pH, Arterial 7.365 7.350 - 7.450    pCO2, Arterial 44 35 - 45 mm Hg    pO2, Arterial 170 (HH) 75 - 108 mm Hg    HCO3, Arterial 24.9 21.0 - 29.0 mmol/L    Base Excess, Arterial -1 -3 - 3    O2 Sat, Arterial 100 (HH) 93 - 100 %    TCO2, Arterial 26 21 - 32 mmol/L    Lactate 0.82 0.40 - 2.00 mmol/L    POC Hematocrit 47 36 - 48 %    Hemoglobin 15.9 12.0 - 16.0 gm/dL    FIO2 50.000     Sample Type ART     Performed on SEE BELOW    Respiratory Panel, Molecular, with COVID-19 (Restricted: peds pts or suitable admitted adults)    Collection Time: 12/22/22 10:43 PM    Specimen: Nasopharyngeal   Result Value Ref Range    Adenovirus by PCR Not Detected Not Detected    Bordetella parapertussis by PCR Not Detected Not Detected    Bordetella pertussis by PCR Not Detected Not Detected    Chlamydophilia pneumoniae by PCR Not Detected Not Detected    Coronavirus 229E by PCR Not Detected Not Detected    Coronavirus HKU1 by PCR Not Detected Not Detected    Coronavirus NL63 by PCR Not Detected Not Detected    Coronavirus OC43 by PCR Not Detected Not Detected SARS-CoV-2, PCR Not Detected Not Detected    Human Metapneumovirus by PCR Not Detected Not Detected    Human Rhinovirus/Enterovirus by PCR Not Detected Not Detected    Influenza A by PCR Not Detected Not Detected    Influenza B by PCR Not Detected Not Detected    Mycoplasma pneumoniae by PCR Not Detected Not Detected    Parainfluenza Virus 1 by PCR Not Detected Not Detected    Parainfluenza Virus 2 by PCR Not Detected Not Detected    Parainfluenza Virus 3 by PCR Not Detected Not Detected    Parainfluenza Virus 4 by PCR Not Detected Not Detected    Respiratory Syncytial Virus by PCR Not Detected Not Detected   POCT Glucose    Collection Time: 12/23/22 12:19 AM   Result Value Ref Range    POC Glucose 171 (H) 70 - 99 mg/dl    Performed on [employer]    Basic Metabolic Panel    Collection Time: 12/23/22  6:16 AM   Result Value Ref Range    Sodium 139 135 - 144 mEq/L    Potassium 4.6 3.4 - 4.9 mEq/L    Chloride 104 95 - 107 mEq/L    CO2 21 20 - 31 mEq/L    Anion Gap 14 9 - 15 mEq/L    Glucose 157 (H) 70 - 99 mg/dL    BUN 14 6 - 20 mg/dL    Creatinine 0.65 0.50 - 0.90 mg/dL    Est, Glom Filt Rate >60.0 >60    Calcium 9.4 8.5 - 9.9 mg/dL   Magnesium    Collection Time: 12/23/22  6:16 AM   Result Value Ref Range    Magnesium 2.3 1.7 - 2.4 mg/dL   Troponin    Collection Time: 12/23/22  6:16 AM   Result Value Ref Range    Troponin 0.109 (HH) 0.000 - 0.010 ng/mL   Hemoglobin A1c    Collection Time: 12/23/22  6:16 AM   Result Value Ref Range    Hemoglobin A1C 6.8 (H) 4.8 - 5.9 %   POCT Glucose    Collection Time: 12/23/22 11:06 AM   Result Value Ref Range    POC Glucose 132 (H) 70 - 99 mg/dl    Performed on ACCU-CHEK      CT CHEST:  No evidence of pulmonary embolism. Findings suggesting bilateral pneumonia including atypical bacterial and   viral processes with also a component of bronchiolitis with bronchial wall   thickening centrally extending into the lower lobes bilaterally.      ECG:     Sinus tachycardia , HR 127  Septal infarct , age undetermined     TELEMETRY:  ST, 108     ECHO:    Pending        Nate Briones MD  [address] [place] [address] Cardiologist      Electronically signed on 12/23/22 at 12:05 PM EST      -----

## 2022-12-23 NOTE — PROGRESS NOTES
PULM CONSULT CALLED TO DR [address] PERFECT SERVE Electronically signed by Juanita Godwin on 12/23/2022 at 8:38 AM  CARDIOLOGY CONSULT CALLED TO Agustín Gates Electronically signed by Juanita Godwin on 12/23/2022 at 9:08 AM

## 2022-12-23 NOTE — ED NOTES
Pt's bed re-assigned to 4W. Awaiting new room assignment to give pt report.       Micheline Fabry, RN  12/22/22 [id number]

## 2022-12-23 NOTE — PROGRESS NOTES
[facility] Respiratory Therapy Evaluation   Current Order:  DUONEB  Q4 PRN, ALBUTEROL Q4 PRN     Home Regimen: TID PER PT      Ordering Physician: RIA  Re-evaluation Date:  12/26     Diagnosis: RESP DISTRESS      Patient Status: Stable / Unstable + Physician notified    The following MDI Criteria must be met in order to convert aerosol to MDI with spacer.  If unable to meet, MDI will be converted to aerosol:  []  Patient able to demonstrate the ability to use MDI effectively  []  Patient alert and cooperative  []  Patient able to take deep breath with 5-10 second hold  []  Medication(s) available in this delivery method   []  Peak flow greater than or equal to 200 ml/min            Current Order Substituted To  (same drug, same frequency)   Aerosol to MDI [] Albuterol Sulfate 0.083% unit dose by aerosol Albuterol Sulfate MDI 2 puffs by inhalation with spacer    [] Levalbuterol 1.25 mg unit dose by aerosol Levalbuterol MDI 2 puffs by inhalation with spacer    [] Levalbuterol 0.63 mg unit dose by aerosol Levalbuterol MDI 2 puffs by inhalation with spacer    [] Ipratropium Bromide 0.02% unit dose by aerosol Ipratropium Bromide MDI 2 puffs by inhalation with spacer    [] Duoneb (Ipratropium + Albuterol) unit dose by aerosol Ipratropium MDI + Albuterol MDI 2 puffs by inhalation w/spacer   MDI to Aerosol [] Albuterol Sulfate MDI Albuterol Sulfate 0.083% unit dose by aerosol    [] Levalbuterol MDI 2 puffs by inhalation Levalbuterol 1.25 mg unit dose by aerosol    [] Ipratropium Bromide MDI by inhalation Ipratropium Bromide 0.02% unit dose by aerosol    [] Combivent (Ipratropium + Albuterol) MDI by inhalation Duoneb (Ipratropium + Albuterol) unit dose by aerosol       Treatment Assessment [Frequency/Schedule]:  Change frequency to: _______DUONEB TID AND ALBUTEROL Q2 PRN___________________________________________per Protocol, P&T, [facility]      Points 0 1 2 3 4   Pulmonary Status  Non-Smoker  []   Smoking history   < 20 pack years  []   Smoking history  ?  20 pack years  []   Pulmonary Disorder  (acute or chronic)  [x]   Severe or Chronic w/ Exacerbation  []     Surgical Status No [x]   Surgeries     General []   Surgery Lower []   Abdominal Thoracic or []   Upper Abdominal Thoracic with  PulmonaryDisorder  []     Chest X-ray Clear/Not  Ordered     [x]  Chronic Changes  Results Pending  []  Infiltrates, atelectasis, pleural effusion, or edema  []  Infiltrates in more than one lobe []  Infiltrate + Atelectasis, &/or pleural effusion  []    Respiratory Pattern Regular,  RR = 12-20 [x]  Increased,  RR = 21-25 []  KC, irregular,  or RR = 26-30 []  Decreased FEV1  or RR = 31-35 []  Severe SOB, use  of accessory muscles, or RR ? 35  []    Mental Status Alert, oriented,  Cooperative [x]  Confused but Follows commands []  Lethargic or unable to follow commands []  Obtunded  []  Comatose  []    Breath Sounds Clear to  auscultation  []  Decreased unilaterally or  in bases only []  Decreased  bilaterally  []  Crackles or intermittent wheezes [x]  Wheezes []    Cough Strong, Spontan., & nonproductive [x]  Strong,  spontaneous, &  productive []  Weak,  Nonproductive []  Weak, productive or  with wheezes []  No spontaneous  cough or may require suctioning []    Level of Activity Ambulatory []  Ambulatory w/ Assist  [x]  Non-ambulatory []  Paraplegic []  Quadriplegic []    Total    Score:___7____     Triage Score:___4_____      Tri       Triage:     1. (>20) Freq: Q3    2. (16-20) Freq: Q4   3. (11-15) Freq: QID & Albuterol Q2 PRN    4. (6-10) Freq: TID & Albuterol Q2 PRN    5. (0-5) Freq Q4prn

## 2022-12-23 NOTE — PLAN OF CARE
Problem: Discharge Planning  Goal: Discharge to home or other facility with appropriate resources  Outcome: Progressing  Flowsheets (Taken 12/23/2022 [id number])  Discharge to home or other facility with appropriate resources:   Identify barriers to discharge with patient and caregiver   Arrange for needed discharge resources and transportation as appropriate   Identify discharge learning needs (meds, wound care, etc)     Problem: Safety - Adult  Goal: Free from fall injury  Outcome: Progressing

## 2022-12-23 NOTE — PROGRESS NOTES
Assessment complete. Rhonchi t/o lung field. BS active x 4 quads. LBM 12/22. Pt states has chronic constipation \"since birth\"; voices stools are \"hard\". Pt inc B &B; pt states she does not know when she has to go. Pt c/o HA 4-5/10  posterior, mid, and achy and given Tylenol 650 mg. Pt has own glucose meter in located in mid lower abd. No edema noted. Dtr at bedside. Call light and bedside table within reach. Falls precautions in place.

## 2022-12-23 NOTE — ED NOTES
Change of shift report received from Kirsten Amaya [facility]. This RN taking over care for pt at this time.       Chelly Fox RN  12/22/22 2000

## 2022-12-23 NOTE — PROGRESS NOTES
tablet 81 mg  81 mg Oral Daily Delon Aguilar Sedar, DO   81 mg at 12/23/22 8335    clopidogrel (PLAVIX) tablet 75 mg  75 mg Oral Daily Delon Aguilar Sedar, DO   75 mg at 12/23/22 2508    rosuvastatin (CRESTOR) tablet 40 mg  40 mg Oral Daily [place] Jennifer BROWN Sedar, DO        montelukast (SINGULAIR) tablet 10 mg  10 mg Oral Nightly Sergo KEVIN Sedar, DO   10 mg at 12/23/22 4927    magnesium oxide (MAG-OX) tablet 400 mg  400 mg Oral BID Delon Aguilar Sedar, DO   400 mg at 12/23/22 0944    lisinopril (PRINIVIL;ZESTRIL) tablet 10 mg  10 mg Oral Daily Delon Aguilar Sedar, DO   10 mg at 12/23/22 0944    dilTIAZem (CARDIZEM CD) extended release capsule 180 mg  180 mg Oral BID Delon Eliasar, DO   180 mg at 12/23/22 2750    acetaminophen (TYLENOL) tablet 650 mg  650 mg Oral Q6H PRN Delon Aguilar Sedar, DO   650 mg at 12/23/22 7693    Or    acetaminophen (TYLENOL) suppository 650 mg  650 mg Rectal Q6H PRN Delon Aguilar Sedar, DO        potassium chloride (KLOR-CON M) extended release tablet 40 mEq  40 mEq Oral PRN Delon Eliasar, DO        Or    potassium bicarb-citric acid (EFFER-K) effervescent tablet 40 mEq  40 mEq Oral PRN Delon Aguilar Sedar, DO        Or    potassium chloride 10 mEq/100 mL IVPB (Peripheral Line)  10 mEq IntraVENous PRN Delon Eliasar, DO        magnesium sulfate 2000 mg in 50 mL IVPB premix  2,000 mg IntraVENous PRN Delon Aguilar Sedar, DO        ondansetron (ZOFRAN-ODT) disintegrating tablet 4 mg  4 mg Oral Q8H PRN Delon Aguilar Sedar, DO        Or    ondansetron (ZOFRAN) injection 4 mg  4 mg IntraVENous Q6H PRN Delon Eliasar, DO        enoxaparin (LOVENOX) injection 40 mg  40 mg SubCUTAneous Daily [place] Jennifer BROWN Sedar, DO   40 mg at 12/23/22 0951    glucose chewable tablet 16 g  4 tablet Oral PRN Angelica BROWN Sedar, DO        dextrose bolus 10% 125 mL  125 mL IntraVENous PRN Gillie Georgia Sedar, DO        Or    dextrose bolus 10% 250 mL  250 mL IntraVENous PRN Delon Aguilar Sedar, DO        glucagon (rDNA) injection 1 mg  1 mg SubCUTAneous PRN Sergo BROWN Sedar, DO        dextrose 10 % infusion   IntraVENous Continuous PRN Silver Bent Sedar, DO        insulin lispro (HUMALOG) injection vial 0-4 Units  0-4 Units SubCUTAneous TID WC Sergo BROWN Sedar, DO        insulin lispro (HUMALOG) injection vial 0-4 Units  0-4 Units SubCUTAneous Nightly Mary Christiana D Sedar, DO        predniSONE (DELTASONE) tablet 40 mg  40 mg Oral Daily Sergo KEVIN Sedar, DO   40 mg at 12/23/22 9178    guaiFENesin (MUCINEX) extended release tablet 600 mg  600 mg Oral BID Silver Bent Sedar, DO   600 mg at 12/23/22 0943    [START ON 12/24/2022] furosemide (LASIX) injection 40 mg  40 mg IntraVENous Daily Sergo BROWN Sedar, DO        albuterol (PROVENTIL) nebulizer solution 2.5 mg  2.5 mg Nebulization Q2H PRN Silver Bent Sedar, DO        ipratropium-albuterol (DUONEB) nebulizer solution 3 mL  1 vial Inhalation TID Silver Tramaine Sedar, DO   3 mL at 12/23/22 7646       Additional work up or/and treatment plan may be added today or then after based on clinical progression. I am managing a portion of pt care. Some medical issues are handled by other specialists. Additional work up and treatment should be done in out pt setting by pt PCP and other out pt providers. In addition to examining and evaluating pt, I spent additional time explaining care, normaland abnormal findings, and treatment plan. All of pt questions were answered. Counseling, diet and education were provided. Case will be discussed with nursing staff when appropriate. Family will be updated if and when appropriate.        Electronically signed by Linnette Brady DO on 12/23/2022 at 12:54 PM

## 2022-12-23 NOTE — H&P
Hospital Medicine  History and Physical    Patient:  Dominique Calderon  MRN: [id number]    CHIEF COMPLAINT:    Chief Complaint   Patient presents with    Shortness of Breath       History Obtained From:  patient, electronic medical record  Primary Care Physician: Lelo Escamilla PA-C    HISTORY OF PRESENT ILLNESS:   The patient is a 45 y.o. female who presents with acute hypoxic respiratory failure from home. Patient is a 49-year-old female she has a history of asthma, and tobacco abuse with questionable COPD overlap syndrome, coronary disease status post stenting x2, Type 2 diabetes, KEITH, hypertension, hyperlipidemia. Patient is presenting with several weeks of progressively worsening respiratory failure unable to ambulate very far at home Wednesday 10 feet or so without stopping to catch her breath she has severe dyspnea both at rest and with exertion she came to the hospital for evaluation. Was found to be in respiratory distress requiring noninvasive ventilation. She was placed on BiPAP 12/5 with improvement in her saturations and respiratory distress. infectious work up in ED including procal, CBC, CTA chest negative for any acute process. resp viral panel done in the ED also likewise negative but ABG does show acute respiratory distress with a PF ratio of 200 on BiPAP.   In ED troponin is mildly elevated 0.018 with a CRP of 20.8    Past Medical History:      Diagnosis Date    Anticoagulant long-term use     Asthma 2004    CAD (coronary artery disease)     COPD (chronic obstructive pulmonary disease) ([address]     Diabetes mellitus ([facility] [address] 2014    Hodgkin disease ([facility] [address] 1999    Hyperlipidemia     Hypertension 1984    Migraines 1989    Sleep apnea     recently tested     Type 2 diabetes mellitus without complication ([facility] [address]     type II       Past Surgical History:      Procedure Laterality Date    TUBAL LIGATION         Medications Prior to Admission:    Prior to Admission medications    Medication Sig Start Date End Date Taking?  Authorizing Provider   Multiple Vitamins-Minerals (THERAPEUTIC MULTIVITAMIN-MINERALS) tablet Take 1 tablet by mouth daily   Yes Historical Provider, MD   budesonide (PULMICORT) 0.5 MG/2ML nebulizer suspension Take 2 mLs by nebulization 2 times daily  Patient not taking: Reported on 12/22/2022 12/1/22   Juan Ball MD   tiotropium (SPIRIVA RESPIMAT) 2.5 MCG/ACT AERS inhaler Inhale 2 puffs into the lungs daily 11/17/22   Juan Ball MD   rosuvastatin (CRESTOR) 40 MG tablet TAKE 1 TABLET BY MOUTH ONCE DAILY 10/24/22   Historical Provider, MD   SYMBICORT 160-4.5 MCG/ACT AERO Inhale 2 puffs by mouth twice daily 11/3/22   Juan Ball MD   albuterol (PROVENTIL) (2.5 MG/3ML) 0.083% nebulizer solution Take 3 mLs by nebulization every 4 hours as needed for Wheezing or Shortness of Breath 4/15/22   Elidia Ruiz PA-C   PROAIR  (05 Base) MCG/ACT inhaler INHALE 2 PUFFS BY MOUTH 4 TIMES DAILY AS NEEDED FOR WHEEZING  Patient not taking: Reported on 12/22/2022 3/28/22   KAMILLE Norman - CNP   ibuprofen (IBU) 600 MG tablet Take 1 tablet by mouth every 8 hours as needed for Pain 12/20/21   Gisela Moody PA-C   clopidogrel (PLAVIX) 75 MG tablet Take 75 mg by mouth daily    Historical Provider, MD   Icosapent Ethyl (VASCEPA) 1 g CAPS capsule Take 1 g by mouth 2 times daily (with meals)    Historical Provider, MD   montelukast (SINGULAIR) 10 MG tablet Take 10 mg by mouth nightly 6/9/21   Historical Provider, MD   ipratropium-albuterol (DUONEB) 0.5-2.5 (3) MG/3ML SOLN nebulizer solution Inhale 3 mLs into the lungs 3 times daily as needed for Shortness of Breath 9/20/21 2/8/22  Juan Ball MD   azelastine (ASTELIN) 0.1 % nasal spray 2 sprays by Nasal route 2 times daily Use in each nostril as directed  Patient not taking: Reported on 12/22/2022    Historical Provider, MD Mendes[facility]em [facility]) 180 MG extended release capsule Take 240 mg by mouth 2 times daily     Historical Provider, MD EPINEPHrine (EPIPEN) 0.3 MG/0.3ML SOAJ injection Inject 0.3 mg into the muscle as needed 12/21/20   Historical Provider, MD   Esomeprazole Magnesium (NEXIUM 24HR) 20 MG TBEC Take 20 mg by mouth daily as needed    Historical Provider, MD   magnesium oxide (MAG-OX) 400 MG tablet Take 400 mg by mouth 2 times daily 11/13/20   Historical Provider, MD   triamcinolone (NASACORT) 55 MCG/ACT nasal inhaler USE 2 SPRAYS INTO THE NOSE ONCE DAILY  Patient not taking: Reported on 12/22/2022 12/21/20   Historical Provider, MD   atorvastatin (LIPITOR) 40 MG tablet 80 mg   Patient not taking: No sig reported 2/10/21   Historical Provider, MD   nicotine (NICODERM CQ) 21 MG/24HR Place 1 patch onto the skin daily 3/17/21 2/8/22  Candace Richards MD   pantoprazole (PROTONIX) 40 MG tablet Take 1 tablet by mouth every morning (before breakfast)  Patient not taking: Reported on 12/23/2022 11/14/20   America York MD   metFORMIN (GLUCOPHAGE) 500 MG tablet Take 1,000 mg by mouth 2 times daily (with meals)    Historical Provider, MD   empagliflozin (JARDIANCE) 10 MG tablet Take 25 mg by mouth daily    Historical Provider, MD   mupirocin (BACTROBAN) 2 % ointment Apply topically 2 times daily Apply topically twice daily inside of nose  Patient not taking: Reported on 12/22/2022    Historical Provider, MD   mometasone (ELOCON) 0.1 % cream Apply topically daily Apply topically daily inside of both ears  Patient not taking: Reported on 12/22/2022    Historical Provider, MD   furosemide (LASIX) 20 MG tablet Take 20 mg by mouth daily    Historical Provider, MD   potassium chloride (KLOR-CON M) 10 MEQ extended release tablet Take 10 mEq by mouth daily    Historical Provider, MD   lisinopril (PRINIVIL;ZESTRIL) 5 MG tablet Take 10 mg by mouth daily     Historical Provider, MD   aspirin 81 MG EC tablet Take 1 tablet by mouth daily 9/1/20   Priyanka Mock MD   ticagrelor (BRILINTA) 90 MG TABS tablet Take 1 tablet by mouth 2 times daily  Patient not taking: No sig reported 8/31/20   Maged Muhammad MD   nitroGLYCERIN (NITROSTAT) 0.4 MG SL tablet up to max of 3 total doses. If no relief after 1 dose, call 911. 7/23/20   Jorge A Lepe MD       Allergies:  Shellfish-derived products, Cumin oil, and Piper    Social History:   TOBACCO:   reports that she quit smoking about 7 months ago. Her smoking use included cigarettes. She has a 37.00 pack-year smoking history. She has never used smokeless tobacco.  ETOH:   reports that she does not currently use alcohol. OCCUPATION: Recently stopped working as an     Family History:   History reviewed. No pertinent family history. REVIEW OF SYSTEMS:  Ten systems reviewed and negative except for as above. Physical Exam:    Vitals: /81   Pulse (!) 113   Temp 98.4 °F (36.9 °C) (Oral)   Resp 25   Ht 5' 2\" (1.575 m)   Wt 195 lb (88.5 kg)   SpO2 99%   BMI 35.67 kg/m²   Constitutional: alert, appears stated age and cooperative  Skin: Skin color, texture, turgor normal. No rashes or lesions  Eyes:Eye: Normal external eye, conjunctiva, DEVONTE. ENT: Head: Normocephalic, no lesions, without obvious abnormality. Neck: no adenopathy, no carotid bruit, no JVD, supple, symmetrical, trachea midline and thyroid not enlarged, symmetric, no tenderness/mass/nodules  Respiratory: Decreased breath sounds with minimal air movement no wheezes rales or rhonchi  Cardiovascular: regular rate and rhythm, S1, S2 normal, no murmur, click, rub or gallop  Gastrointestinal: soft, non-tender; bowel sounds normal; no masses,  no organomegaly  Genitourinary: Deferred  Musculoskeletal:extremities normal, atraumatic, no cyanosis or edema  Neurologic: Mental status AAOx3 No facial asymmetry or droop. Normal muscle strength b/l. Psychiatric: Appropriate mood and affect.  Good insight and judgement  Hematologic: No obvious bruising or bleeding    Recent Labs     12/22/22  1845 12/22/22 1933   WBC 8.7  --    HGB 13.9 15.9    --      Recent Labs     12/22/22 1845 12/22/22  1933     --    K 4.2  --      --    CO2 22  --    BUN 12  --    CREATININE 0.57 0.7   GLUCOSE 180*  --    AST 29  --    ALT 31  --    BILITOT 0.3  --    ALKPHOS 102  --      Troponin T:   Recent Labs     12/22/22 1845   TROPONINI 0.018*     ABGs:   Lab Results   Component Value Date/Time    PHART 7.365 12/22/2022 07:33 PM    PO2ART 170 12/22/2022 07:33 PM    VRJ2ZHN 44 12/22/2022 07:33 PM     INR:   Recent Labs     12/22/22 1845   INR 1.0     URINALYSIS:No results for input(s): NITRITE, COLORU, PHUR, LABCAST, WBCUA, RBCUA, MUCUS, TRICHOMONAS, YEAST, BACTERIA, CLARITYU, SPECGRAV, LEUKOCYTESUR, UROBILINOGEN, BILIRUBINUR, BLOODU, GLUCOSEU, AMORPHOUS in the last 72 hours. Invalid input(s): Noris Short  -----------------------------------------------------------------   XR CHEST PORTABLE    Result Date: 12/22/2022  EXAMINATION: ONE XRAY VIEW OF THE CHEST 12/22/2022 6:43 pm COMPARISON: None. HISTORY: ORDERING SYSTEM PROVIDED HISTORY: SOB TECHNOLOGIST PROVIDED HISTORY: Reason for exam:->SOB Is the patient pregnant?->No What reading provider will be dictating this exam?->CRC FINDINGS: The lungs are without acute focal process. There is no effusion or pneumothorax. The cardiomediastinal silhouette is without acute process. The osseous structures are without acute process. No acute process. EKG: Sinus Tachycardia    Assessment and Plan   Acute hypoxic respiratory failure: Likely asthma exacerbation possible COPD overlap syndrome. Prednisone 40mg daily. Consult pulmonary. Mucinex Acapella incentive spirometry and as needed DuoNebs treatments. Continue Singulair  Coronary disease status post stenting: Continue dual antiplatelet therapy with high potency statin. Type 2 diabetes: Sinus counseling coverage hold oral agents patient  Troponin elevation: Likely not MI troponin elevation we will trend troponin consult cardiology for further recommendations. Defer any further echocardiographic work-up to cardiology  DVT prophylaxis with Lovenox    Patient Active Problem List   Diagnosis Code    Hypercholesteremia E78.00    Prediabetes R73.03    Acute severe exacerbation of asthma J45.901    Diabetes mellitus ([facility] E11.9    Angina at rest [facility]) I20.8    Abnormal exercise myocardial perfusion study R94.39    Acute respiratory failure with hypoxia ([facility] [address] J96.01    Chest pain R07.9    CAD I25.10    COPD  J44.9    Hypoxia R09.02       Emanuel Dandy, DO  Admitting Hospitalist    Emergency Contact:

## 2022-12-23 NOTE — CONSULTS
Pulmonary Medicine  Consult Note      Reason for consultation: Acute respiratory failure, COPD exacerbation    Consulting physician: Dr. Delgado Even:      This is 57-year-old male with past medical history significant for asthma, COPD, tobacco abuse, coronary artery disease status post stent, type 2 diabetes mellitus, or possible sleep apnea, hypertension, hyperlipidemia was presented with progressive worsening shortness of breath for last few weeks but worsening last few days. She since last Wednesday she was not able to walk more than 10 feet without stopping to catch her breath. She was presented to ER and found to be in respiratory distress requiring BiPAP therapy 12/5 which should help her breathing and O2 saturation. CT chest shows no evidence of pulmonary embolism finding suggestive Bilateral pneumonia, likely atypical pneumonia or viral pneumonia with also component of bronchiolitis and bronchial wall thickening extending to the both lower lobe. She said she is feeling much better since admission. She is on 4 L O2 via nasal cannula and O2 saturation 98%. She is afebrile. She is having cough which is mostly dry. No chest pain. No nausea vomiting diarrhea. No fever or chills. .    Past Medical History:        Diagnosis Date    Anticoagulant long-term use     Asthma 2004    CAD (coronary artery disease)     COPD (chronic obstructive pulmonary disease) ([facility] [address]     Diabetes mellitus ([facility] [address] 2014    Hodgkin disease ([facility] [address] 1999    Hyperlipidemia     Hypertension 1984    Migraines 1989    Sleep apnea     recently tested     Type 2 diabetes mellitus without complication ([facility] [address]     type II       Past Surgical History:        Procedure Laterality Date    TUBAL LIGATION         Social History:     reports that she quit smoking about 7 months ago. Her smoking use included cigarettes. She has a 37.00 pack-year smoking history.  She has never used smokeless tobacco. She reports that she does not currently use alcohol. She reports that she does not use drugs. Family History:   History reviewed. No pertinent family history. Allergies:  Shellfish-derived products, Cumin oil, and Piper        MEDICATIONS during current hospitalization:    Continuous Infusions:   dextrose         Scheduled Meds:   aspirin  81 mg Oral Daily    clopidogrel  75 mg Oral Daily    rosuvastatin  40 mg Oral Daily    montelukast  10 mg Oral Nightly    magnesium oxide  400 mg Oral BID    lisinopril  10 mg Oral Daily    dilTIAZem  180 mg Oral BID    enoxaparin  40 mg SubCUTAneous Daily    insulin lispro  0-4 Units SubCUTAneous TID WC    insulin lispro  0-4 Units SubCUTAneous Nightly    predniSONE  40 mg Oral Daily    guaiFENesin  600 mg Oral BID    [START ON 12/24/2022] furosemide  40 mg IntraVENous Daily    ipratropium-albuterol  1 vial Inhalation TID       PRN Meds:acetaminophen **OR** acetaminophen, potassium chloride **OR** potassium alternative oral replacement **OR** potassium chloride, magnesium sulfate, ondansetron **OR** ondansetron, glucose, dextrose bolus **OR** dextrose bolus, glucagon (rDNA), dextrose, albuterol    REVIEW OF SYSTEMS:  As in history of present illness  Other 14 point review of system is negative. PHYSICAL EXAM:    Vitals:  /77   Pulse (!) 102   Temp 98.1 °F (36.7 °C) (Oral)   Resp 17   Ht 5' 2\" (1.575 m)   Wt 195 lb (88.5 kg)   SpO2 98%   BMI 35.67 kg/m²   General:   Alert, awake, Oriented x3  .comfortable in bed, No distress. Head: Atraumatic , Normocephalic   Eyes: PERRL. No sclera icterus. No conjunctival injection. No discharge   ENT: No nasal  discharge. Pharynx clear. Neck:  Trachea midline. No thyromegaly, no JVD, No cervical adenopathy. Chest : Bilaterally symmetrical ,Normal effort,  No accessory muscle use  Lung : Diminished BS bilateraly. No Rales. Bilateral scattered wheezing. No rhonchi. Heart[de-identified] Normal  rate. Regular rhythm. No mumur ,  Rub or gallop  ABD: Non-tender. Non-distended. No masses. No organmegaly. Normal bowel sounds. No hernia. Musculoskeleton: normal range of motion in all extremites, strength and tone Extremities: No pitting in both lower leg , No Cyanosis ,No clubbing  Neuro: no cranial nerve abnormality, normal reflex and sensation, no focal weakness   Skin: Warm and dry. No erythema rash on exposed extremities. Data Review  Recent Labs     12/22/22 1845 12/22/22 1933   WBC 8.7  --    HGB 13.9 15.9   HCT 42.9  --      --       Recent Labs     12/22/22 1845 12/22/22 1918 12/22/22 1933 12/23/22  0616     --   --  139   K 4.2  --   --  4.6     --   --  104   CO2 22  --   --  21   BUN 12  --   --  14   CREATININE 0.57 0.7 0.7 0.65   GLUCOSE 180*  --   --  157*       MV Settings: ABGs:   Recent Labs     12/22/22 1933   PHART 7.365   OXP1ATE 44   PO2ART 170*   XKH3ZUW 24.9   BEART -1   [id number] 100*   KXL2MOG 26     O2 Device: Nasal cannula  O2 Flow Rate (L/min): 4 L/min  Lab Results   Component Value Date/Time    LACTA 1.0 12/22/2022 06:30 PM       Radiology  CTA CHEST W WO CONTRAST    Result Date: 12/23/2022  EXAMINATION: CTA OF THE CHEST WITH AND WITHOUT CONTRAST 12/22/2022 8:31 pm TECHNIQUE: CTA of the chest was performed before and after the administration of intravenous contrast.  Multiplanar reformatted images are provided for review. MIP images are provided for review. Automated exposure control, iterative reconstruction, and/or weight based adjustment of the mA/kV was utilized to reduce the radiation dose to as low as reasonably achievable. COMPARISON: None.  HISTORY: ORDERING SYSTEM PROVIDED HISTORY: SOB,  tachypnea, hypoxia; CHF vs PE vs CHF PE pneumonia or other combo there of TECHNOLOGIST PROVIDED HISTORY: Reason for exam:->SOB,  tachypnea, hypoxia; CHF vs PE vs CHF PE pneumonia or other combo there of Decision Support Exception - unselect if not a suspected or confirmed emergency medical condition->Emergency Medical Condition (MA) What reading provider will be dictating this exam?->CRC FINDINGS: Pulmonary Arteries: Pulmonary arteries are adequately opacified for evaluation. No evidence of intraluminal filling defect to suggest pulmonary embolism. Main pulmonary artery is normal in caliber. Mediastinum: No evidence of mediastinal lymphadenopathy. The heart and pericardium demonstrate no acute abnormality. There is no acute abnormality of the thoracic aorta. Thyroid is homogeneous in appearance. There is prominence of the hilar lymph nodes bilaterally possibly reactive. Reactive lymph nodes seen scattered throughout the mediastinum. Coronary artery calcification. No pericardial effusion. Lungs/pleura: Scattered subtle areas of ground-glass opacity identified involving the central aspect of the right and lung and left upper lobe. Findings favoring bilateral pneumonia. Underlying chronic changes seen within lung fields with bronchial wall thickening in the perihilar regions. Bronchiolitis cannot be excluded. Upper Abdomen: Limited images of the upper abdomen are unremarkable. Soft Tissues/Bones: No acute bone or soft tissue abnormality. Degenerative changes seen within the spine. No acute chest wall abnormality. No evidence of pulmonary embolism. Findings suggesting bilateral pneumonia including atypical bacterial and viral processes with also a component of bronchiolitis with bronchial wall thickening centrally extending into the lower lobes bilaterally. XR CHEST PORTABLE    Result Date: 12/22/2022  EXAMINATION: ONE XRAY VIEW OF THE CHEST 12/22/2022 6:43 pm COMPARISON: None. HISTORY: ORDERING SYSTEM PROVIDED HISTORY: SOB TECHNOLOGIST PROVIDED HISTORY: Reason for exam:->SOB Is the patient pregnant?->No What reading provider will be dictating this exam?->CRC FINDINGS: The lungs are without acute focal process. There is no effusion or pneumothorax.  The cardiomediastinal silhouette is without acute process. The osseous structures are without acute process. No acute process. Assessment and  plan:     1. Acute respiratory failure with hypoxia on BiPAP  2. Asthma, COPD exacerbation  3. Coronary artery disease status post stent  4. Diabetes mellitus  5. Elevated troponin likely demand ischemia  6. Obesity rule out KEITH    Patient with presented to ER with acute respiratory distress requiring BiPAP therapy. She likely has asthma exacerbation. Also possible underlying COPD with exacerbation. She has a history of smoking which she quit 7 months ago. CT chest shows no pulmonary embolism findings suggestive of bilateral pneumonia including atypical bacterial and viral process also a component of bronchiolitis. Work-up is negative for viral panel including COVID is negative., WBC is within normal range, procalcitonin is not elevated. Rapid influenza a and B is negative. At this time continue nebulizer with DuoNeb 3 times daily prednisone 40 mg daily. Singular 10 mg daily. Mucinex 600 mg twice daily. Lasix 40 mg daily. DVT prophylaxis. Follow-up chest x-ray in a.m. BiPAP therapy during sleep as tolerated. She is following with Dr. Sid Beltran as outpatient. will follow    Thank you for consult       NOTE: This report was transcribed using voice recognition software. Every effort was made to ensure accuracy; however, inadvertent computerized transcription errors may be present.     Electronically signed by Lon Gonzalez MD, [facility] on 12/23/2022 at 3:22 PM

## 2022-12-23 NOTE — ED NOTES
Pt report called to 4W RN at this time. Pt transported to 463 by transporter in stable condition.       Ace Dumont RN  12/22/22 6005

## 2022-12-24 LAB
ANION GAP SERPL CALCULATED.3IONS-SCNC: 15 MEQ/L (ref 9–15)
BASOPHILS ABSOLUTE: 0 K/UL (ref 0–0.2)
BASOPHILS RELATIVE PERCENT: 0.1 %
BUN BLDV-MCNC: 20 MG/DL (ref 6–20)
CALCIUM SERPL-MCNC: 8.9 MG/DL (ref 8.5–9.9)
CHLORIDE BLD-SCNC: 101 MEQ/L (ref 95–107)
CHOLESTEROL, TOTAL: 175 MG/DL (ref 0–199)
CO2: 22 MEQ/L (ref 20–31)
CREAT SERPL-MCNC: 0.61 MG/DL (ref 0.5–0.9)
EOSINOPHILS ABSOLUTE: 0 K/UL (ref 0–0.7)
EOSINOPHILS RELATIVE PERCENT: 0 %
GFR SERPL CREATININE-BSD FRML MDRD: >60 ML/MIN/{1.73_M2}
GLUCOSE BLD-MCNC: 135 MG/DL (ref 70–99)
GLUCOSE BLD-MCNC: 144 MG/DL (ref 70–99)
GLUCOSE BLD-MCNC: 174 MG/DL (ref 70–99)
GLUCOSE BLD-MCNC: 183 MG/DL (ref 70–99)
GLUCOSE BLD-MCNC: 227 MG/DL (ref 70–99)
GLUCOSE BLD-MCNC: 248 MG/DL (ref 70–99)
HCT VFR BLD CALC: 41.8 % (ref 37–47)
HDLC SERPL-MCNC: 36 MG/DL (ref 40–59)
HEMOGLOBIN: 13.7 G/DL (ref 12–16)
LDL CHOLESTEROL CALCULATED: 99 MG/DL (ref 0–129)
LYMPHOCYTES ABSOLUTE: 1.6 K/UL (ref 1–4.8)
LYMPHOCYTES RELATIVE PERCENT: 15.8 %
MAGNESIUM: 2.4 MG/DL (ref 1.7–2.4)
MCH RBC QN AUTO: 29.9 PG (ref 27–31.3)
MCHC RBC AUTO-ENTMCNC: 32.8 % (ref 33–37)
MCV RBC AUTO: 91 FL (ref 79.4–94.8)
MONOCYTES ABSOLUTE: 0.7 K/UL (ref 0.2–0.8)
MONOCYTES RELATIVE PERCENT: 6.5 %
NEUTROPHILS ABSOLUTE: 8.1 K/UL (ref 1.4–6.5)
NEUTROPHILS RELATIVE PERCENT: 77.6 %
PDW BLD-RTO: 13.5 % (ref 11.5–14.5)
PERFORMED ON: ABNORMAL
PLATELET # BLD: 204 K/UL (ref 130–400)
POTASSIUM SERPL-SCNC: 4.4 MEQ/L (ref 3.4–4.9)
RBC # BLD: 4.6 M/UL (ref 4.2–5.4)
SODIUM BLD-SCNC: 138 MEQ/L (ref 135–144)
TRIGL SERPL-MCNC: 200 MG/DL (ref 0–150)
WBC # BLD: 10.4 K/UL (ref 4.8–10.8)

## 2022-12-24 PROCEDURE — 2700000000 HC OXYGEN THERAPY PER DAY

## 2022-12-24 PROCEDURE — 80061 LIPID PANEL: CPT

## 2022-12-24 PROCEDURE — 6360000002 HC RX W HCPCS: Performed by: INTERNAL MEDICINE

## 2022-12-24 PROCEDURE — 94640 AIRWAY INHALATION TREATMENT: CPT

## 2022-12-24 PROCEDURE — 99233 SBSQ HOSP IP/OBS HIGH 50: CPT | Performed by: INTERNAL MEDICINE

## 2022-12-24 PROCEDURE — 6370000000 HC RX 637 (ALT 250 FOR IP)

## 2022-12-24 PROCEDURE — 83735 ASSAY OF MAGNESIUM: CPT

## 2022-12-24 PROCEDURE — 80048 BASIC METABOLIC PNL TOTAL CA: CPT

## 2022-12-24 PROCEDURE — 94761 N-INVAS EAR/PLS OXIMETRY MLT: CPT

## 2022-12-24 PROCEDURE — 36415 COLL VENOUS BLD VENIPUNCTURE: CPT

## 2022-12-24 PROCEDURE — 94660 CPAP INITIATION&MGMT: CPT

## 2022-12-24 PROCEDURE — 6370000000 HC RX 637 (ALT 250 FOR IP): Performed by: INTERNAL MEDICINE

## 2022-12-24 PROCEDURE — 1210000000 HC MED SURG R&B

## 2022-12-24 PROCEDURE — 85025 COMPLETE CBC W/AUTO DIFF WBC: CPT

## 2022-12-24 RX ORDER — METHYLPREDNISOLONE SODIUM SUCCINATE 40 MG/ML
40 INJECTION, POWDER, LYOPHILIZED, FOR SOLUTION INTRAMUSCULAR; INTRAVENOUS EVERY 8 HOURS
Status: DISCONTINUED | OUTPATIENT
Start: 2022-12-24 | End: 2022-12-25

## 2022-12-24 RX ORDER — BUDESONIDE 0.5 MG/2ML
0.5 INHALANT ORAL 2 TIMES DAILY
Status: DISCONTINUED | OUTPATIENT
Start: 2022-12-24 | End: 2022-12-28 | Stop reason: HOSPADM

## 2022-12-24 RX ORDER — LEVOFLOXACIN 5 MG/ML
750 INJECTION, SOLUTION INTRAVENOUS EVERY 24 HOURS
Status: DISCONTINUED | OUTPATIENT
Start: 2022-12-24 | End: 2022-12-28 | Stop reason: HOSPADM

## 2022-12-24 RX ADMIN — ALBUTEROL SULFATE 2.5 MG: 2.5 SOLUTION RESPIRATORY (INHALATION) at 02:43

## 2022-12-24 RX ADMIN — CLOPIDOGREL BISULFATE 75 MG: 75 TABLET ORAL at 08:24

## 2022-12-24 RX ADMIN — ENOXAPARIN SODIUM 40 MG: 100 INJECTION SUBCUTANEOUS at 08:25

## 2022-12-24 RX ADMIN — ASPIRIN 81 MG: 81 TABLET, COATED ORAL at 08:23

## 2022-12-24 RX ADMIN — METHYLPREDNISOLONE SODIUM SUCCINATE 40 MG: 40 INJECTION, POWDER, FOR SOLUTION INTRAMUSCULAR; INTRAVENOUS at 21:44

## 2022-12-24 RX ADMIN — BENZONATATE 100 MG: 100 CAPSULE ORAL at 08:24

## 2022-12-24 RX ADMIN — GUAIFENESIN 600 MG: 600 TABLET, EXTENDED RELEASE ORAL at 21:37

## 2022-12-24 RX ADMIN — Medication 400 MG: at 21:38

## 2022-12-24 RX ADMIN — ROSUVASTATIN CALCIUM 40 MG: 40 TABLET, FILM COATED ORAL at 08:24

## 2022-12-24 RX ADMIN — DILTIAZEM HYDROCHLORIDE 180 MG: 180 CAPSULE, EXTENDED RELEASE ORAL at 21:37

## 2022-12-24 RX ADMIN — GUAIFENESIN 600 MG: 600 TABLET, EXTENDED RELEASE ORAL at 08:24

## 2022-12-24 RX ADMIN — IPRATROPIUM BROMIDE AND ALBUTEROL SULFATE 3 ML: .5; 2.5 SOLUTION RESPIRATORY (INHALATION) at 19:21

## 2022-12-24 RX ADMIN — LISINOPRIL 10 MG: 10 TABLET ORAL at 08:24

## 2022-12-24 RX ADMIN — IPRATROPIUM BROMIDE AND ALBUTEROL SULFATE 3 ML: .5; 2.5 SOLUTION RESPIRATORY (INHALATION) at 07:18

## 2022-12-24 RX ADMIN — ACETAMINOPHEN 650 MG: 325 TABLET ORAL at 16:50

## 2022-12-24 RX ADMIN — IPRATROPIUM BROMIDE AND ALBUTEROL SULFATE 3 ML: .5; 2.5 SOLUTION RESPIRATORY (INHALATION) at 13:49

## 2022-12-24 RX ADMIN — FUROSEMIDE 40 MG: 10 INJECTION, SOLUTION INTRAMUSCULAR; INTRAVENOUS at 08:24

## 2022-12-24 RX ADMIN — DILTIAZEM HYDROCHLORIDE 180 MG: 180 CAPSULE, EXTENDED RELEASE ORAL at 08:24

## 2022-12-24 RX ADMIN — PREDNISONE 40 MG: 20 TABLET ORAL at 08:24

## 2022-12-24 RX ADMIN — BUDESONIDE 500 MCG: 0.5 SUSPENSION RESPIRATORY (INHALATION) at 19:21

## 2022-12-24 RX ADMIN — BENZONATATE 100 MG: 100 CAPSULE ORAL at 16:50

## 2022-12-24 RX ADMIN — BENZONATATE 100 MG: 100 CAPSULE ORAL at 00:29

## 2022-12-24 RX ADMIN — Medication 400 MG: at 08:25

## 2022-12-24 RX ADMIN — METHYLPREDNISOLONE SODIUM SUCCINATE 40 MG: 40 INJECTION, POWDER, FOR SOLUTION INTRAMUSCULAR; INTRAVENOUS at 12:38

## 2022-12-24 RX ADMIN — MONTELUKAST SODIUM 10 MG: 10 TABLET, FILM COATED ORAL at 21:38

## 2022-12-24 RX ADMIN — LEVOFLOXACIN 750 MG: 5 INJECTION, SOLUTION INTRAVENOUS at 12:44

## 2022-12-24 ASSESSMENT — PAIN DESCRIPTION - ORIENTATION: ORIENTATION: MID

## 2022-12-24 ASSESSMENT — PAIN - FUNCTIONAL ASSESSMENT: PAIN_FUNCTIONAL_ASSESSMENT: PREVENTS OR INTERFERES SOME ACTIVE ACTIVITIES AND ADLS

## 2022-12-24 ASSESSMENT — PAIN SCALES - GENERAL
PAINLEVEL_OUTOF10: 3
PAINLEVEL_OUTOF10: 2

## 2022-12-24 ASSESSMENT — PAIN DESCRIPTION - LOCATION: LOCATION: CHEST

## 2022-12-24 ASSESSMENT — PAIN DESCRIPTION - DESCRIPTORS: DESCRIPTORS: DISCOMFORT

## 2022-12-24 NOTE — FLOWSHEET NOTE
Registered Nurse Shift Summary  22   Patient Name: Siddhartha Carroll  Attending Provider: China Quintero DO      Admit Date: 2022  MRN: [id number]                           : 1970  Full Code      0715: Assumed Care of Patient. Documentation initiated as per policy / SOP. Report Received from [facility].    1630: Patient requested Blood Glucose check. BG reading was 248. Patient indicated that she felt like it was a lot higher than that. No coverage given at this time because patient had just finished eating dinner. Patient is scheduled to have glucose rechecked at 2100 by night shift RN. Assessment Complete, please see flow sheets. Labs, orders, plan of care and meds reviewed. Labs:   Recent Labs     226   WBC 8.7  --  10.4   HGB 13.9 15.9 13.7   HCT 42.9  --  41.8     --  204     Recent Labs     22  0616 225     --   --  139 138   K 4.2  --   --  4.6 4.4     --   --  104 101   CO2 22  --   --  21 22   BUN 12  --   --  14 20   CREATININE 0.57   < > 0.7 0.65 0.61   CALCIUM 8.9  --   --  9.4 8.9    < > = values in this interval not displayed.      Recent Labs     22   AST 29   ALT 31   BILITOT 0.3   ALKPHOS 102         Last set of vitals:  Enc Vitals  BP: 123/61 (22)  Heart Rate: 91 (22)  Resp: 20 (22)  Temp: 98.6 °F (37 °C) (22)  Temp Source: Oral (22)  SpO2: 97 % (22)  Weight: 195 lb (88.5 kg) (22)  Height: 5' 2\" (157.5 cm) (22)     I/O    Intake/Output Summary (Last 24 hours) at 2022 0810  Last data filed at 2022 0602  Gross per 24 hour   Intake 480 ml   Output --   Net 480 ml         GTT's   dextrose            Lab Results   Component Value Date/Time    PHART 7.365 2022 07:33 PM    PO2ART 170 (HH) 2022 07:33 PM    ERT6CDS 44 2022 07:33 PM MGF1HSJ 24.9 12/22/2022 07:33 PM    I8IYCOHH 100 (HH) 12/22/2022 07:33 PM

## 2022-12-24 NOTE — PROGRESS NOTES
[facility] OF THE [facility] Heart Progress Note      Patient:  Autry Sicard  YOB: 1970  MRN: [id number]   Acct: [de-identified]  Admit Date:  12/22/2022  Primary Cardiologist:Dr. Mary Alvarez      [facility] Cardiologist: Steph Nava MD      Impression/Plan:     Type I, NSTEMI: Although current symptoms are clearly pleuritic and related to her pulmonary issues just prior to admission she had angina. ECG does not show acute changes but echo shows new wall motion abnormality in the anteroseptal wall that was not previously although last assessment was over a year ago. Troponin pattern is also suggestive of a relatively recent event. Timing of this event is not at all clear. As there are no acute ST changes and she currently has no angina would not fully anticoagulate but would continue dual antiplatelet therapy, statin and calcium channel blockade and ACE inhibitor. For now would avoid beta-blocker as EF is low normal, she has no dysrhythmia and is actively wheezing. Continue to follow enzymes and ECG. Will consider coronary angiography in the not too distant future timing dependent upon clinical course  Pneumonia/COPD exacerbation: Per pulmonary medicine service  Hypertension: Controlled  Tobacco abuse: Discussed the connection between her COPD exacerbation as well as MI    Chief Complaint/Active Symptoms: Feels a bit less short of breath still with coughing clear-cut wheezing. She stopped smoking cigarettes 4 days ago previously a pack a day. Describes in the last few days only pleuritic chest pain associated with coughing. At home however she will go up the stairs feel more short of breath and have chest pain some of which was when she would cough but I am concerned that some of the chest pressure occurred before she was coughing. Indeed less than a week prior to her admission she had had chest pain for which she took a nitroglycerin with some improvement.   She has had no angina in the last several days here in the hospital.  No acute ST-T changes at this time. Heart rate now is in the 70s with pneumonia better treated. Admitted 12/22/2022 with bilateral pneumonia probably viral COPD exacerbation sinus tachycardia. Had progressive shortness of breath. Troponin elevated with a rise and fall pattern    Review of Systems:   Primary remarkable for being short of breath at this time no bleeding issues    CARDIAC HISTORY:  Coronary artery disease: Right coronary ERMIAS August 2020 8/20/2021 normal perfusion study  Hypertension  Hyperlipidemia    Past Medical History:   Diagnosis Date    Anticoagulant long-term use     Asthma 2004    CAD (coronary artery disease)     COPD (chronic obstructive pulmonary disease) ([facility] [address]     Diabetes mellitus ([facility] [address] 2014    Hodgkin disease ([facility] [address] 1999    Hyperlipidemia     Hypertension 1984    Migraines 1989    Sleep apnea     recently tested     Type 2 diabetes mellitus without complication ([facility] [address]     type II         Objective:   Vitals:    12/23/22 2115 12/24/22 0243 12/24/22 0709 12/24/22 0721   BP:   114/62    Pulse:   82 91   Resp:   18    Temp:   98.4 °F (36.9 °C)    TempSrc:   Oral    SpO2: 98% 96% 100% 97%   Weight:       Height:          Wt Readings from Last 3 Encounters:   12/22/22 195 lb (88.5 kg)   12/01/22 196 lb 12.8 oz (89.3 kg)   11/03/22 196 lb 12.8 oz (89.3 kg)       TELEMETRY: normal sinus                             Rhythm Strip: Occasionally as high as 120 in sinus tachycardia no dysrhythmias    Physical Exam:  General Appearance: alert and oriented to person, place and time, stable until she starts to cough  Cardiovascular: normal rate, regular rhythm, normal S1 and S2, no murmurs, rubs, clicks, or gallops,  no JVD  Pulmonary/Chest: Diffuse expiratory rhonchi. Improved with cough.   Intermittent wheezing  Abdomen: soft, non-tender, non-distended, normal bowel sounds, no masses   Extremities: no cyanosis, clubbing or edema  Skin: warm and dry, no rash or erythema  Eyes: EOMI  Neck: supple and non-tender without mass, no thyromegaly   Neurological: alert, oriented, normal speech, no focal findings or movement disorder noted    Allergies: Allergies   Allergen Reactions    Shellfish-Derived Products Anaphylaxis    Cumin Oil Itching    Piper Itching        Medications:    aspirin  81 mg Oral Daily    clopidogrel  75 mg Oral Daily    rosuvastatin  40 mg Oral Daily    montelukast  10 mg Oral Nightly    magnesium oxide  400 mg Oral BID    lisinopril  10 mg Oral Daily    dilTIAZem  180 mg Oral BID    enoxaparin  40 mg SubCUTAneous Daily    insulin lispro  0-4 Units SubCUTAneous TID WC    insulin lispro  0-4 Units SubCUTAneous Nightly    predniSONE  40 mg Oral Daily    guaiFENesin  600 mg Oral BID    furosemide  40 mg IntraVENous Daily    ipratropium-albuterol  1 vial Inhalation TID      dextrose       acetaminophen, 650 mg, Q6H PRN   Or  acetaminophen, 650 mg, Q6H PRN  potassium chloride, 40 mEq, PRN   Or  potassium alternative oral replacement, 40 mEq, PRN   Or  potassium chloride, 10 mEq, PRN  magnesium sulfate, 2,000 mg, PRN  ondansetron, 4 mg, Q8H PRN   Or  ondansetron, 4 mg, Q6H PRN  glucose, 4 tablet, PRN  dextrose bolus, 125 mL, PRN   Or  dextrose bolus, 250 mL, PRN  glucagon (rDNA), 1 mg, PRN  dextrose, , Continuous PRN  albuterol, 2.5 mg, Q2H PRN  sodium chloride, 1 spray, Q2H PRN  benzonatate, 100 mg, TID PRN        Diagnostics:  EK2022 sinus tachycardia otherwise normal    Echo: 2022 preliminary report: EF 45-50% with mid to distal anteroseptal hypokinesis particularly prominent in the distal septum is nearly akinetic    CXR:               Portable: Results for orders placed during the hospital encounter of 22    XR CHEST PORTABLE    Narrative  EXAMINATION:  ONE XRAY VIEW OF THE CHEST    2022 6:43 pm    COMPARISON:  None.     HISTORY:  ORDERING SYSTEM PROVIDED HISTORY: SOB  TECHNOLOGIST PROVIDED HISTORY:  Reason for exam:->SOB  Is the patient pregnant?->No  What reading provider will be dictating this exam?->CRC    FINDINGS:  The lungs are without acute focal process. There is no effusion or  pneumothorax. The cardiomediastinal silhouette is without acute process. The  osseous structures are without acute process. Impression  No acute process. Lab Data:    Cardiac Enzymes:  Recent Labs     12/22/22 1845 12/23/22  0616 12/23/22  1343 12/23/22  1859   CKTOTAL 232*  --   --   --    TROPONINI 0.018* 0.109* 0.079* 0.061*     ProBNP:   Lab Results   Component Value Date/Time    PROBNP 12 12/22/2022 07:23 PM       CBC:   Recent Labs     12/22/22 1845 12/22/22  1933 12/24/22  0456   WBC 8.7  --  10.4   RBC 4.76  --  4.60   HGB 13.9 15.9 13.7   HCT 42.9  --  41.8     --  204       CMP:    Recent Labs     12/22/22 1845 12/22/22  1918 12/22/22  1933 12/23/22  0616 12/24/22  0455     --   --  139 138   K 4.2  --   --  4.6 4.4     --   --  104 101   CO2 22  --   --  21 22   BUN 12  --   --  14 20   CREATININE 0.57   < > 0.7 0.65 0.61   LABGLOM >60.0   < > >60 >60.0 >60.0   GLUCOSE 180*  --   --  157* 144*   CALCIUM 8.9  --   --  9.4 8.9    < > = values in this interval not displayed. Hepatic Function Panel:    Recent Labs     12/22/22 1845   ALKPHOS 102   ALT 31   AST 29   PROT 6.9   BILITOT 0.3   LABALBU 4.3       Magnesium:    Recent Labs     12/22/22 1845 12/23/22 0616 12/24/22  0455   MG 2.0 2.3 2.4       PT/INR:    Recent Labs     12/22/22 1845   PROTIME 13.5   INR 1.0       Lipids:    Recent Labs     12/24/22 0455   CHOL 175   TRIG 200*   HDL 36*   LDLCALC 99       Principal Problem:    Hypoxia  Active Problems:    Exacerbation of persistent asthma  Resolved Problems:    * No resolved hospital problems.  *           Electronically signed by Aravind Rocha MD on 12/24/2022 at 9:21 AM

## 2022-12-24 NOTE — PROGRESS NOTES
INPATIENT PROGRESS NOTES    PATIENT NAME: Marisa Love  MRN: [id number]  SERVICE DATE:  2022   SERVICE TIME:  11:48 AM      PRIMARY SERVICE: Pulmonary Disease    CHIEF COMPLAINTS: COPD exacerbation    INTERVAL HPI: Patient seen and examined at bedside, Interval Notes, orders reviewed. Nursing notes noted        New information updated in the note today, rest of the examination did not change compared to yesterday. Patient report still short of breath, with dry cough, wheezing and tight, she is on 3 L O2 saturation 97%, no fever, no nausea no vomiting, no chest pain. Review of system:     GI Abdominal pain No  Skin Rash No    Social History     Tobacco Use    Smoking status: Former     Packs/day: 1.00     Years: 37.00     Pack years: 37.00     Types: Cigarettes     Quit date: 2022     Years since quittin.5    Smokeless tobacco: Never   Substance Use Topics    Alcohol use: Not Currently     History reviewed. No pertinent family history. OBJECTIVE    Body mass index is 35.67 kg/m². PHYSICAL EXAM:  Vitals:  /62   Pulse 91   Temp 98.4 °F (36.9 °C) (Oral)   Resp 18   Ht 5' 2\" (1.575 m)   Wt 195 lb (88.5 kg)   SpO2 97%   BMI 35.67 kg/m²     General: alert, cooperative, no distress  Head: normocephalic, atraumatic  Eyes:No gross abnormalities. ENT:  MMM no lesions  Neck:  supple and no masses  Chest : Tight with wheezing, no rales, nontender, tympanic  Heart[de-identified] Heart sounds are normal.  Regular rate and rhythm without murmur, gallop or rub. ABD:  symmetric, soft, non-tender  Musculoskeletal : no cyanosis, no clubbing, and no edema  Neuro:  Grossly normal  Skin: No rashes or nodules noted.   Lymph node:  no cervical nodes  Urology: No Ruiz   Psychiatric: appropriate    DATA:   Recent Labs     22  1845 22  1933 22  0456   WBC 8.7  --  10.4   HGB 13.9 15.9 13.7   HCT 42.9  --  41.8   MCV 90.1  --  91.0     --  204     Recent Labs     22  1845 12/22/22 1918 12/23/22  0616 12/24/22  0455     --  139 138   K 4.2  --  4.6 4.4     --  104 101   CO2 22  --  21 22   BUN 12  --  14 20   CREATININE 0.57   < > 0.65 0.61   GLUCOSE 180*  --  157* 144*   CALCIUM 8.9  --  9.4 8.9   PROT 6.9  --   --   --    LABALBU 4.3  --   --   --    BILITOT 0.3  --   --   --    ALKPHOS 102  --   --   --    AST 29  --   --   --    ALT 31  --   --   --    LABGLOM >60.0   < > >60.0 >60.0   GLOB 2.6  --   --   --     < > = values in this interval not displayed. MV Settings:     FiO2 : 40 %    Recent Labs     12/22/22 1933   PHART 7.365   TPK6MEY 44   PO2ART 170*   BIY6TWL 24.9   BEART -1   [id number] 100*       O2 Device: Nasal cannula  O2 Flow Rate (L/min): (S) 3 L/min    ADULT DIET; Regular; 3 carb choices (45 gm/meal); Low Fat/Low Chol/High Fiber/2 gm Na;  Low Sodium (2 gm); 1800 ml     MEDICATIONS during current hospitalization:    Continuous Infusions:   dextrose         Scheduled Meds:   aspirin  81 mg Oral Daily    clopidogrel  75 mg Oral Daily    rosuvastatin  40 mg Oral Daily    montelukast  10 mg Oral Nightly    magnesium oxide  400 mg Oral BID    lisinopril  10 mg Oral Daily    dilTIAZem  180 mg Oral BID    enoxaparin  40 mg SubCUTAneous Daily    insulin lispro  0-4 Units SubCUTAneous TID     insulin lispro  0-4 Units SubCUTAneous Nightly    predniSONE  40 mg Oral Daily    guaiFENesin  600 mg Oral BID    furosemide  40 mg IntraVENous Daily    ipratropium-albuterol  1 vial Inhalation TID       PRN Meds:acetaminophen **OR** acetaminophen, potassium chloride **OR** potassium alternative oral replacement **OR** potassium chloride, magnesium sulfate, ondansetron **OR** ondansetron, glucose, dextrose bolus **OR** dextrose bolus, glucagon (rDNA), dextrose, albuterol, sodium chloride, benzonatate    Radiology  CTA CHEST W WO CONTRAST    Result Date: 12/23/2022  EXAMINATION: CTA OF THE CHEST WITH AND WITHOUT CONTRAST 12/22/2022 8:31 pm TECHNIQUE: CTA of the chest was performed before and after the administration of intravenous contrast.  Multiplanar reformatted images are provided for review. MIP images are provided for review. Automated exposure control, iterative reconstruction, and/or weight based adjustment of the mA/kV was utilized to reduce the radiation dose to as low as reasonably achievable. COMPARISON: None. HISTORY: ORDERING SYSTEM PROVIDED HISTORY: SOB,  tachypnea, hypoxia; CHF vs PE vs CHF PE pneumonia or other combo there of TECHNOLOGIST PROVIDED HISTORY: Reason for exam:->SOB,  tachypnea, hypoxia; CHF vs PE vs CHF PE pneumonia or other combo there of Decision Support Exception - unselect if not a suspected or confirmed emergency medical condition->Emergency Medical Condition (MA) What reading provider will be dictating this exam?->CRC FINDINGS: Pulmonary Arteries: Pulmonary arteries are adequately opacified for evaluation. No evidence of intraluminal filling defect to suggest pulmonary embolism. Main pulmonary artery is normal in caliber. Mediastinum: No evidence of mediastinal lymphadenopathy. The heart and pericardium demonstrate no acute abnormality. There is no acute abnormality of the thoracic aorta. Thyroid is homogeneous in appearance. There is prominence of the hilar lymph nodes bilaterally possibly reactive. Reactive lymph nodes seen scattered throughout the mediastinum. Coronary artery calcification. No pericardial effusion. Lungs/pleura: Scattered subtle areas of ground-glass opacity identified involving the central aspect of the right and lung and left upper lobe. Findings favoring bilateral pneumonia. Underlying chronic changes seen within lung fields with bronchial wall thickening in the perihilar regions. Bronchiolitis cannot be excluded. Upper Abdomen: Limited images of the upper abdomen are unremarkable. Soft Tissues/Bones: No acute bone or soft tissue abnormality. Degenerative changes seen within the spine.   No acute chest wall abnormality. No evidence of pulmonary embolism. Findings suggesting bilateral pneumonia including atypical bacterial and viral processes with also a component of bronchiolitis with bronchial wall thickening centrally extending into the lower lobes bilaterally. XR CHEST PORTABLE    Result Date: 12/22/2022  EXAMINATION: ONE XRAY VIEW OF THE CHEST 12/22/2022 6:43 pm COMPARISON: None. HISTORY: ORDERING SYSTEM PROVIDED HISTORY: SOB TECHNOLOGIST PROVIDED HISTORY: Reason for exam:->SOB Is the patient pregnant?->No What reading provider will be dictating this exam?->CRC FINDINGS: The lungs are without acute focal process. There is no effusion or pneumothorax. The cardiomediastinal silhouette is without acute process. The osseous structures are without acute process. No acute process. IMPRESSION AND SUGGESTION:  Patient is at risk due to   Asthma with acute exacerbation  Multilobar pneumonia likely atypical pneumonia  Smoking, she quit at the beginning of this episode  Non-ST elevation MI  Obesity, sleep study - October 2020    Recommendation  O2 to keep sat 90 to 92%  Solu-Medrol 40 mg IV 3 times daily  Levofloxacin  Budesonide nebs twice daily  DuoNeb every 6 hours while awake  Pulmonary hygiene  BiPAP as needed  Smoking cessation counseling done    I spent 35 min with this patient, greater the 50% of this time was spent in counseling and/or coordinating of care.     Electronically signed by Corrine Trejo MD,  FCCP   on 12/24/2022 at 11:48 AM

## 2022-12-25 LAB
ANION GAP SERPL CALCULATED.3IONS-SCNC: 17 MEQ/L (ref 9–15)
BASOPHILS ABSOLUTE: 0 K/UL (ref 0–0.2)
BASOPHILS RELATIVE PERCENT: 0.2 %
BUN BLDV-MCNC: 22 MG/DL (ref 6–20)
CALCIUM SERPL-MCNC: 9.8 MG/DL (ref 8.5–9.9)
CHLORIDE BLD-SCNC: 97 MEQ/L (ref 95–107)
CO2: 25 MEQ/L (ref 20–31)
CREAT SERPL-MCNC: 0.78 MG/DL (ref 0.5–0.9)
EOSINOPHILS ABSOLUTE: 0 K/UL (ref 0–0.7)
EOSINOPHILS RELATIVE PERCENT: 0 %
GFR SERPL CREATININE-BSD FRML MDRD: >60 ML/MIN/{1.73_M2}
GLUCOSE BLD-MCNC: 185 MG/DL (ref 70–99)
GLUCOSE BLD-MCNC: 187 MG/DL (ref 70–99)
GLUCOSE BLD-MCNC: 210 MG/DL (ref 70–99)
GLUCOSE BLD-MCNC: 246 MG/DL (ref 70–99)
GLUCOSE BLD-MCNC: 254 MG/DL (ref 70–99)
HCT VFR BLD CALC: 42.7 % (ref 37–47)
HEMOGLOBIN: 14.2 G/DL (ref 12–16)
LYMPHOCYTES ABSOLUTE: 0.6 K/UL (ref 1–4.8)
LYMPHOCYTES RELATIVE PERCENT: 8.2 %
MAGNESIUM: 2.6 MG/DL (ref 1.7–2.4)
MCH RBC QN AUTO: 30.2 PG (ref 27–31.3)
MCHC RBC AUTO-ENTMCNC: 33.3 % (ref 33–37)
MCV RBC AUTO: 90.6 FL (ref 79.4–94.8)
MONOCYTES ABSOLUTE: 0.1 K/UL (ref 0.2–0.8)
MONOCYTES RELATIVE PERCENT: 1.1 %
NEUTROPHILS ABSOLUTE: 7 K/UL (ref 1.4–6.5)
NEUTROPHILS RELATIVE PERCENT: 90.5 %
PDW BLD-RTO: 13.4 % (ref 11.5–14.5)
PERFORMED ON: ABNORMAL
PLATELET # BLD: 221 K/UL (ref 130–400)
POTASSIUM SERPL-SCNC: 5.1 MEQ/L (ref 3.4–4.9)
RBC # BLD: 4.71 M/UL (ref 4.2–5.4)
SODIUM BLD-SCNC: 139 MEQ/L (ref 135–144)
WBC # BLD: 7.7 K/UL (ref 4.8–10.8)

## 2022-12-25 PROCEDURE — 1210000000 HC MED SURG R&B

## 2022-12-25 PROCEDURE — 2700000000 HC OXYGEN THERAPY PER DAY

## 2022-12-25 PROCEDURE — 94640 AIRWAY INHALATION TREATMENT: CPT

## 2022-12-25 PROCEDURE — 36415 COLL VENOUS BLD VENIPUNCTURE: CPT

## 2022-12-25 PROCEDURE — 6360000002 HC RX W HCPCS: Performed by: INTERNAL MEDICINE

## 2022-12-25 PROCEDURE — 80048 BASIC METABOLIC PNL TOTAL CA: CPT

## 2022-12-25 PROCEDURE — 6370000000 HC RX 637 (ALT 250 FOR IP)

## 2022-12-25 PROCEDURE — 6370000000 HC RX 637 (ALT 250 FOR IP): Performed by: INTERNAL MEDICINE

## 2022-12-25 PROCEDURE — 94761 N-INVAS EAR/PLS OXIMETRY MLT: CPT

## 2022-12-25 PROCEDURE — 99232 SBSQ HOSP IP/OBS MODERATE 35: CPT | Performed by: INTERNAL MEDICINE

## 2022-12-25 PROCEDURE — 85025 COMPLETE CBC W/AUTO DIFF WBC: CPT

## 2022-12-25 PROCEDURE — 83735 ASSAY OF MAGNESIUM: CPT

## 2022-12-25 RX ORDER — METHYLPREDNISOLONE SODIUM SUCCINATE 40 MG/ML
40 INJECTION, POWDER, LYOPHILIZED, FOR SOLUTION INTRAMUSCULAR; INTRAVENOUS EVERY 12 HOURS
Status: DISCONTINUED | OUTPATIENT
Start: 2022-12-26 | End: 2022-12-27

## 2022-12-25 RX ADMIN — INSULIN LISPRO 1 UNITS: 100 INJECTION, SOLUTION INTRAVENOUS; SUBCUTANEOUS at 12:02

## 2022-12-25 RX ADMIN — CLOPIDOGREL BISULFATE 75 MG: 75 TABLET ORAL at 08:18

## 2022-12-25 RX ADMIN — ASPIRIN 81 MG: 81 TABLET, COATED ORAL at 08:18

## 2022-12-25 RX ADMIN — LEVOFLOXACIN 750 MG: 5 INJECTION, SOLUTION INTRAVENOUS at 12:06

## 2022-12-25 RX ADMIN — BUDESONIDE 500 MCG: 0.5 SUSPENSION RESPIRATORY (INHALATION) at 19:21

## 2022-12-25 RX ADMIN — Medication 400 MG: at 08:18

## 2022-12-25 RX ADMIN — LISINOPRIL 10 MG: 10 TABLET ORAL at 08:18

## 2022-12-25 RX ADMIN — GUAIFENESIN 600 MG: 600 TABLET, EXTENDED RELEASE ORAL at 08:18

## 2022-12-25 RX ADMIN — BUDESONIDE 500 MCG: 0.5 SUSPENSION RESPIRATORY (INHALATION) at 07:29

## 2022-12-25 RX ADMIN — IPRATROPIUM BROMIDE AND ALBUTEROL SULFATE 3 ML: .5; 2.5 SOLUTION RESPIRATORY (INHALATION) at 13:21

## 2022-12-25 RX ADMIN — METHYLPREDNISOLONE SODIUM SUCCINATE 40 MG: 40 INJECTION, POWDER, FOR SOLUTION INTRAMUSCULAR; INTRAVENOUS at 12:02

## 2022-12-25 RX ADMIN — GUAIFENESIN 600 MG: 600 TABLET, EXTENDED RELEASE ORAL at 20:29

## 2022-12-25 RX ADMIN — ENOXAPARIN SODIUM 40 MG: 100 INJECTION SUBCUTANEOUS at 08:18

## 2022-12-25 RX ADMIN — ROSUVASTATIN CALCIUM 40 MG: 40 TABLET, FILM COATED ORAL at 08:18

## 2022-12-25 RX ADMIN — FUROSEMIDE 40 MG: 10 INJECTION, SOLUTION INTRAMUSCULAR; INTRAVENOUS at 08:18

## 2022-12-25 RX ADMIN — IPRATROPIUM BROMIDE AND ALBUTEROL SULFATE 3 ML: .5; 2.5 SOLUTION RESPIRATORY (INHALATION) at 07:29

## 2022-12-25 RX ADMIN — DILTIAZEM HYDROCHLORIDE 180 MG: 180 CAPSULE, EXTENDED RELEASE ORAL at 20:29

## 2022-12-25 RX ADMIN — IPRATROPIUM BROMIDE AND ALBUTEROL SULFATE 3 ML: .5; 2.5 SOLUTION RESPIRATORY (INHALATION) at 19:20

## 2022-12-25 RX ADMIN — METHYLPREDNISOLONE SODIUM SUCCINATE 40 MG: 40 INJECTION, POWDER, FOR SOLUTION INTRAMUSCULAR; INTRAVENOUS at 04:49

## 2022-12-25 RX ADMIN — INSULIN LISPRO 1 UNITS: 100 INJECTION, SOLUTION INTRAVENOUS; SUBCUTANEOUS at 16:47

## 2022-12-25 RX ADMIN — MONTELUKAST SODIUM 10 MG: 10 TABLET, FILM COATED ORAL at 20:29

## 2022-12-25 RX ADMIN — DILTIAZEM HYDROCHLORIDE 180 MG: 180 CAPSULE, EXTENDED RELEASE ORAL at 08:17

## 2022-12-25 RX ADMIN — ACETAMINOPHEN 650 MG: 325 TABLET ORAL at 04:52

## 2022-12-25 RX ADMIN — BENZONATATE 100 MG: 100 CAPSULE ORAL at 20:29

## 2022-12-25 NOTE — PROGRESS NOTES
Internal Medicine   Hospitalist   Progress Note    2022   1:48 PM    Name:  Leann Wilhelm  MRN:    [id number]     IP Day: 3     Admit Date: 2022  6:23 PM  PCP: Yvonne Stout PA-C    Code Status:  Full Code    Assessment and Plan: Active Problems/ diagnosis:     Asthma exac- resp failure - nebs,steroids, pulm consult. Neg procal. On 4l/,in o2, no O2 at home. Pulm consulted  ? PNA- levaquin   NSTEMI- CAD s/p stents- elevated troponin- as per cardiologist    DM2- SSI, monitor   DVT PPx  7 pm- 7 am, please contact on call Hospitalist for any needs     Subjective:      no new events, dyspnea is improving. No chest pain.      Physical Examination:      Vitals:  /66   Pulse 79   Temp 97.3 °F (36.3 °C) (Oral)   Resp 20   Ht 5' 2\" (1.575 m)   Wt 196 lb 5 oz (89 kg)   SpO2 97%   BMI 35.91 kg/m²   Temp (24hrs), Av.8 °F (36.6 °C), Min:97.3 °F (36.3 °C), Max:98.2 °F (36.8 °C)      General appearance: alert, cooperative and no distress  Mental Status: oriented to person, place and time and normal affect  Lungs: clear to auscultation bilaterally, normal effort  Heart: regular rate and rhythm, no murmur  Abdomen: soft, nontender, nondistended, bowel sounds present, no masses  Extremities: no edema, redness, tenderness in the calves  Skin: no gross lesions, rashes    Data:     Labs:  Recent Labs     22  0456 22  0452   WBC 10.4 7.7   HGB 13.7 14.2    221     Recent Labs     22  0455 22  0452    139   K 4.4 5.1*    97   CO2 22 25   BUN 20 22*   CREATININE 0.61 0.78   GLUCOSE 144* 185*     Recent Labs     22  1845   AST 29   ALT 31   BILITOT 0.3   ALKPHOS 102       Current Facility-Administered Medications   Medication Dose Route Frequency Provider Last Rate Last Admin    methylPREDNISolone sodium (SOLU-MEDROL) injection 40 mg  40 mg IntraVENous Q8H Soham Valenzuela MD   40 mg at 22 1202    budesonide (PULMICORT) nebulizer suspension 500 mcg 0.5 mg Nebulization BID Dena Dove MD   500 mcg at 12/25/22 0729    levoFLOXacin (LEVAQUIN) 750 MG/150ML infusion 750 mg  750 mg IntraVENous Q24H Dena Dove  mL/hr at 12/25/22 1206 750 mg at 12/25/22 1206    aspirin EC tablet 81 mg  81 mg Oral Daily Creta Ramona Sedar, DO   81 mg at 12/25/22 0818    clopidogrel (PLAVIX) tablet 75 mg  75 mg Oral Daily Creta Ramona Sedar, DO   75 mg at 12/25/22 0818    rosuvastatin (CRESTOR) tablet 40 mg  40 mg Oral Daily Sergo D Sedar, DO   40 mg at 12/25/22 0818    montelukast (SINGULAIR) tablet 10 mg  10 mg Oral Nightly Creta Ramona Sedar, DO   10 mg at 12/24/22 2138    magnesium oxide (MAG-OX) tablet 400 mg  400 mg Oral BID Creta Ramona Sedar, DO   400 mg at 12/25/22 0818    lisinopril (PRINIVIL;ZESTRIL) tablet 10 mg  10 mg Oral Daily Creta Ramona Sedar, DO   10 mg at 12/25/22 0818    dilTIAZem (CARDIZEM CD) extended release capsule 180 mg  180 mg Oral BID Creta Ramona Sedar, DO   180 mg at 12/25/22 4851    acetaminophen (TYLENOL) tablet 650 mg  650 mg Oral Q6H PRN Creta Ramona Sedar, DO   650 mg at 12/25/22 6653    Or    acetaminophen (TYLENOL) suppository 650 mg  650 mg Rectal Q6H PRN Creta Ramona Sedar, DO        potassium chloride (KLOR-CON M) extended release tablet 40 mEq  40 mEq Oral PRN Creta Ramona Sedar, DO        Or    potassium bicarb-citric acid (EFFER-K) effervescent tablet 40 mEq  40 mEq Oral PRN Creta Ramona Sedar, DO        Or    potassium chloride 10 mEq/100 mL IVPB (Peripheral Line)  10 mEq IntraVENous PRN Creta Ramona Sedar, DO        magnesium sulfate 2000 mg in 50 mL IVPB premix  2,000 mg IntraVENous PRN Creta Ramona Sedar, DO        ondansetron (ZOFRAN-ODT) disintegrating tablet 4 mg  4 mg Oral Q8H PRN Creta Ramona Sedar, DO        Or    ondansetron (ZOFRAN) injection 4 mg  4 mg IntraVENous Q6H PRN Annemarie Greene Sedar, DO        enoxaparin (LOVENOX) injection 40 mg  40 mg SubCUTAneous Daily Nadya Ruby, DO   40 mg at 12/25/22 0818    glucose chewable tablet 16 g  4 tablet Oral PRN Nadya BROWN Sedar, DO        dextrose bolus 10% 125 mL  125 mL IntraVENous PRN [facility] Sedar, DO        Or    dextrose bolus 10% 250 mL  250 mL IntraVENous PRN [facility] Sedar, DO        glucagon (rDNA) injection 1 mg  1 mg SubCUTAneous PRN [facility] Sedar, DO        dextrose 10 % infusion   IntraVENous Continuous PRN [facility] Sedar, DO        insulin lispro (HUMALOG) injection vial 0-4 Units  0-4 Units SubCUTAneous TID WC [facility] Sedar, DO   1 Units at 12/25/22 1202    insulin lispro (HUMALOG) injection vial 0-4 Units  0-4 Units SubCUTAneous Nightly [facility] Sedar, DO        guaiFENesin [facility]) extended release tablet 600 mg  600 mg Oral BID [facility] Sedar, DO   600 mg at 12/25/22 0818    furosemide (LASIX) injection 40 mg  40 mg IntraVENous Daily [facility] Sedar, DO   40 mg at 12/25/22 0818    albuterol (PROVENTIL) nebulizer solution 2.5 mg  2.5 mg Nebulization Q2H PRN [facility] Sedar, DO   2.5 mg at 12/24/22 0243    ipratropium-albuterol (DUONEB) nebulizer solution 3 mL  1 vial Inhalation TID [facility] Sedar, DO   3 mL at 12/25/22 1321    sodium chloride (OCEAN, BABY AYR) 0.65 % nasal spray 1 spray  1 spray Each Nostril Q2H PRN Fartun Duran,         benzonatate (TESSALON) capsule 100 mg  100 mg Oral TID PRN KAMILLE Sutherland - CNP   100 mg at 12/24/22 1650       Additional work up or/and treatment plan may be added today or then after based on clinical progression. I am managing a portion of pt care. Some medical issues are handled by other specialists. Additional work up and treatment should be done in out pt setting by pt PCP and other out pt providers. In addition to examining and evaluating pt, I spent additional time explaining care, normaland abnormal findings, and treatment plan. All of pt questions were answered. Counseling, diet and education were provided. Case will be discussed with nursing staff when appropriate. Family will be updated if and when appropriate.        Electronically signed by Jinny Cuadra DO on 12/25/2022 at 1:48 PM

## 2022-12-25 NOTE — PROGRESS NOTES
INPATIENT PROGRESS NOTES    PATIENT NAME: Joey Ahuja  MRN: [id number]  SERVICE DATE:  2022   SERVICE TIME:  2:04 PM      PRIMARY SERVICE: Pulmonary Disease    CHIEF COMPLAINTS: COPD exacerbation    INTERVAL HPI: Patient seen and examined at bedside, Interval Notes, orders reviewed. Nursing notes noted        Feeling better, shortness of breath improved, no chest pain, no coughing, she is on 3 L O2 saturation 97%, no fever, no nausea or vomiting. She used BiPAP overnight and tolerated well. Review of system:     GI Abdominal pain No  Skin Rash No    Social History     Tobacco Use    Smoking status: Former     Packs/day: 1.00     Years: 37.00     Pack years: 37.00     Types: Cigarettes     Quit date: 2022     Years since quittin.6    Smokeless tobacco: Never   Substance Use Topics    Alcohol use: Not Currently     History reviewed. No pertinent family history. OBJECTIVE    Body mass index is 35.91 kg/m². PHYSICAL EXAM:  Vitals:  /66   Pulse 79   Temp 97.3 °F (36.3 °C) (Oral)   Resp 20   Ht 5' 2\" (1.575 m)   Wt 196 lb 5 oz (89 kg)   SpO2 97%   BMI 35.91 kg/m²     General: alert, cooperative, no distress  Head: normocephalic, atraumatic  Eyes:No gross abnormalities. ENT:  MMM no lesions  Neck:  supple and no masses  Chest : Improved air movement, minimal end expiratory wheezing, no rales, nontender, tympanic  Heart[de-identified] Heart sounds are normal.  Regular rate and rhythm without murmur, gallop or rub. ABD:  symmetric, soft, non-tender  Musculoskeletal : no cyanosis, no clubbing, and no edema  Neuro:  Grossly normal  Skin: No rashes or nodules noted.   Lymph node:  no cervical nodes  Urology: No Ruiz   Psychiatric: appropriate    DATA:   Recent Labs     22  0456 22  045   WBC 10.4 7.7   HGB 13.7 14.2   HCT 41.8 42.7   MCV 91.0 90.6    221       Recent Labs     22  1845 22  1918 22  0455 22  0452      < > 138 139   K 4.2   < > 4.4 5.1*      < > 101 97   CO2 22   < > 22 25   BUN 12   < > 20 22*   CREATININE 0.57   < > 0.61 0.78   GLUCOSE 180*   < > 144* 185*   CALCIUM 8.9   < > 8.9 9.8   PROT 6.9  --   --   --    LABALBU 4.3  --   --   --    BILITOT 0.3  --   --   --    ALKPHOS 102  --   --   --    AST 29  --   --   --    ALT 31  --   --   --    LABGLOM >60.0   < > >60.0 >60.0   GLOB 2.6  --   --   --     < > = values in this interval not displayed. MV Settings:     FiO2 : 40 %    Recent Labs     12/22/22 1933   PHART 7.365   [id number] 44   PO2ART 170*   XCF0LGY 24.9   BEART -1   [id number] 100*         O2 Device: Nasal cannula  O2 Flow Rate (L/min): 3 L/min    ADULT DIET; Regular; 3 carb choices (45 gm/meal); Low Fat/Low Chol/High Fiber/2 gm Na;  Low Sodium (2 gm); 1800 ml     MEDICATIONS during current hospitalization:    Continuous Infusions:   dextrose         Scheduled Meds:   methylPREDNISolone  40 mg IntraVENous Q8H    budesonide  0.5 mg Nebulization BID    levofloxacin  750 mg IntraVENous Q24H    aspirin  81 mg Oral Daily    clopidogrel  75 mg Oral Daily    rosuvastatin  40 mg Oral Daily    montelukast  10 mg Oral Nightly    magnesium oxide  400 mg Oral BID    lisinopril  10 mg Oral Daily    dilTIAZem  180 mg Oral BID    enoxaparin  40 mg SubCUTAneous Daily    insulin lispro  0-4 Units SubCUTAneous TID     insulin lispro  0-4 Units SubCUTAneous Nightly    guaiFENesin  600 mg Oral BID    furosemide  40 mg IntraVENous Daily    ipratropium-albuterol  1 vial Inhalation TID       PRN Meds:acetaminophen **OR** acetaminophen, potassium chloride **OR** potassium alternative oral replacement **OR** potassium chloride, magnesium sulfate, ondansetron **OR** ondansetron, glucose, dextrose bolus **OR** dextrose bolus, glucagon (rDNA), dextrose, albuterol, sodium chloride, benzonatate    Radiology  CTA CHEST W WO CONTRAST    Result Date: 12/23/2022  EXAMINATION: CTA OF THE CHEST WITH AND WITHOUT CONTRAST 12/22/2022 8:31 pm TECHNIQUE: CTA of the chest was performed before and after the administration of intravenous contrast.  Multiplanar reformatted images are provided for review. MIP images are provided for review. Automated exposure control, iterative reconstruction, and/or weight based adjustment of the mA/kV was utilized to reduce the radiation dose to as low as reasonably achievable. COMPARISON: None. HISTORY: ORDERING SYSTEM PROVIDED HISTORY: SOB,  tachypnea, hypoxia; CHF vs PE vs CHF PE pneumonia or other combo there of TECHNOLOGIST PROVIDED HISTORY: Reason for exam:->SOB,  tachypnea, hypoxia; CHF vs PE vs CHF PE pneumonia or other combo there of Decision Support Exception - unselect if not a suspected or confirmed emergency medical condition->Emergency Medical Condition (MA) What reading provider will be dictating this exam?->CRC FINDINGS: Pulmonary Arteries: Pulmonary arteries are adequately opacified for evaluation. No evidence of intraluminal filling defect to suggest pulmonary embolism. Main pulmonary artery is normal in caliber. Mediastinum: No evidence of mediastinal lymphadenopathy. The heart and pericardium demonstrate no acute abnormality. There is no acute abnormality of the thoracic aorta. Thyroid is homogeneous in appearance. There is prominence of the hilar lymph nodes bilaterally possibly reactive. Reactive lymph nodes seen scattered throughout the mediastinum. Coronary artery calcification. No pericardial effusion. Lungs/pleura: Scattered subtle areas of ground-glass opacity identified involving the central aspect of the right and lung and left upper lobe. Findings favoring bilateral pneumonia. Underlying chronic changes seen within lung fields with bronchial wall thickening in the perihilar regions. Bronchiolitis cannot be excluded. Upper Abdomen: Limited images of the upper abdomen are unremarkable. Soft Tissues/Bones: No acute bone or soft tissue abnormality. Degenerative changes seen within the spine.   No acute chest wall abnormality. No evidence of pulmonary embolism. Findings suggesting bilateral pneumonia including atypical bacterial and viral processes with also a component of bronchiolitis with bronchial wall thickening centrally extending into the lower lobes bilaterally. XR CHEST PORTABLE    Result Date: 12/22/2022  EXAMINATION: ONE XRAY VIEW OF THE CHEST 12/22/2022 6:43 pm COMPARISON: None. HISTORY: ORDERING SYSTEM PROVIDED HISTORY: SOB TECHNOLOGIST PROVIDED HISTORY: Reason for exam:->SOB Is the patient pregnant?->No What reading provider will be dictating this exam?->CRC FINDINGS: The lungs are without acute focal process. There is no effusion or pneumothorax. The cardiomediastinal silhouette is without acute process. The osseous structures are without acute process. No acute process.              IMPRESSION AND SUGGESTION:  Patient is at risk due to   Asthma with acute exacerbation  Multilobar pneumonia likely atypical pneumonia  Smoking, she quit at the beginning of this episode  Non-ST elevation MI  Obesity, sleep study - October 2020    Recommendation  O2 to keep sat 90 to 92%  Change Solu-Medrol to twice daily  Continue levofloxacin  Budesonide nebs twice daily  DuoNeb every 6 hours while awake  Pulmonary hygiene  BiPAP as needed  Smoking cessation strongly recommended       Electronically signed by Elina Mills MD,  FCCP   on 12/25/2022 at 2:04 PM

## 2022-12-25 NOTE — PROGRESS NOTES
[facility] OF [facility] Heart Progress Note      Patient:  Jose J Orantes  YOB: 1970  MRN: [id number]   Acct: [de-identified]  Admit Date:  12/22/2022  Primary Cardiologist:Dr. Christina Moreira      [facility] Cardiologist: Juan José Elizalde MD      Impression/Plan:     Type I, NSTEMI: As described yesterday prior to admission she had had angina. She has not had angina in the last 2 days. No evidence of congestive heart failure on exam.  May consider coronary angiography at a later date no urgent indication at this point in time. Continue current cardiac medications as ordered   pneumonia/COPD exacerbation: Per pulmonary medicine service clinically is improving  Hypertension: Controlled  Tobacco abuse: Discussed the connection between her COPD exacerbation as well as MI    Chief Complaint/Active Symptoms: Feels much better today. Less coughing. No pleuritic chest pain any longer. Still gets short of breath talking but is able to talk longer than she could yesterday. No dizziness or lightheaded is. No palpitation      Admitted 12/22/2022 with bilateral pneumonia probably viral COPD exacerbation sinus tachycardia. Had progressive shortness of breath.   Troponin elevated with a rise and fall pattern    Review of Systems:   Primary remarkable for being short of breath at this time no bleeding issues    CARDIAC HISTORY:  Coronary artery disease: Right coronary ERMIAS August 2020 8/20/2021 normal perfusion study  Hypertension  Hyperlipidemia    Past Medical History:   Diagnosis Date    Anticoagulant long-term use     Asthma 2004    CAD (coronary artery disease)     COPD (chronic obstructive pulmonary disease) ([address]     Diabetes mellitus ([address] 2014    Hodgkin disease ([address] 1999    Hyperlipidemia     Hypertension 1984    Migraines 1989    Sleep apnea     recently tested     Type 2 diabetes mellitus without complication ([address]     type II         Objective:   Vitals:    12/25/22 0454 12/25/22 0723 12/25/22 0729 12/25/22 0817   BP:  136/66  136/66   Pulse:  79     Resp:  20     Temp:  97.3 °F (36.3 °C)     TempSrc:  Oral     SpO2:  97% 97%    Weight: 196 lb 5 oz (89 kg)      Height:          Wt Readings from Last 3 Encounters:   12/25/22 196 lb 5 oz (89 kg)   12/01/22 196 lb 12.8 oz (89.3 kg)   11/03/22 196 lb 12.8 oz (89.3 kg)       TELEMETRY: normal sinus                             Rhythm Strip: No svt or sinus tachy    Physical Exam:  General Appearance: alert and oriented to person, place and time, stable until she starts to cough  Cardiovascular: normal rate, regular rhythm, normal S1 and S2, no murmurs, rubs, clicks, or gallops,  no JVD  Pulmonary/Chest: Diffuse expiratory rhonchi. Improved with cough. Intermittent wheezing  Abdomen: soft, non-tender, non-distended, normal bowel sounds, no masses   Extremities: no cyanosis, clubbing or edema  Skin: warm and dry, no rash or erythema  Eyes: EOMI  Neck: supple and non-tender without mass, no thyromegaly   Neurological: alert, oriented, normal speech, no focal findings or movement disorder noted    Allergies:   Allergies   Allergen Reactions    Shellfish-Derived Products Anaphylaxis    Cumin Oil Itching    Piper Itching        Medications:    methylPREDNISolone  40 mg IntraVENous Q8H    budesonide  0.5 mg Nebulization BID    levofloxacin  750 mg IntraVENous Q24H    aspirin  81 mg Oral Daily    clopidogrel  75 mg Oral Daily    rosuvastatin  40 mg Oral Daily    montelukast  10 mg Oral Nightly    magnesium oxide  400 mg Oral BID    lisinopril  10 mg Oral Daily    dilTIAZem  180 mg Oral BID    enoxaparin  40 mg SubCUTAneous Daily    insulin lispro  0-4 Units SubCUTAneous TID     insulin lispro  0-4 Units SubCUTAneous Nightly    guaiFENesin  600 mg Oral BID    furosemide  40 mg IntraVENous Daily    ipratropium-albuterol  1 vial Inhalation TID      dextrose       acetaminophen, 650 mg, Q6H PRN   Or  acetaminophen, 650 mg, Q6H PRN  potassium chloride, 40 mEq, PRN   Or  potassium alternative oral replacement, 40 mEq, PRN   Or  potassium chloride, 10 mEq, PRN  magnesium sulfate, 2,000 mg, PRN  ondansetron, 4 mg, Q8H PRN   Or  ondansetron, 4 mg, Q6H PRN  glucose, 4 tablet, PRN  dextrose bolus, 125 mL, PRN   Or  dextrose bolus, 250 mL, PRN  glucagon (rDNA), 1 mg, PRN  dextrose, , Continuous PRN  albuterol, 2.5 mg, Q2H PRN  sodium chloride, 1 spray, Q2H PRN  benzonatate, 100 mg, TID PRN      Diagnostics:  EK2022 sinus tachycardia otherwise normal    Echo: 2022  Left ventricular ejection fraction is visually estimated at 45-50%. Severe hypokinesis of distal 1/2 septum, anteroseptal walls. Anterolateral   wall normal.   Wall motion abnormalities new since study 2020   Normal right ventricle structure and function. RVSP could not be calculated   Normal valvular structure and function. No significant regurgitant or stenotic valvular lesions. MAC noted    CXR:               Portable: Results for orders placed during the hospital encounter of 22    XR CHEST PORTABLE    Narrative  EXAMINATION:  ONE XRAY VIEW OF THE CHEST    2022 6:43 pm    COMPARISON:  None. HISTORY:  ORDERING SYSTEM PROVIDED HISTORY: SOB  TECHNOLOGIST PROVIDED HISTORY:  Reason for exam:->SOB  Is the patient pregnant?->No  What reading provider will be dictating this exam?->CRC    FINDINGS:  The lungs are without acute focal process. There is no effusion or  pneumothorax. The cardiomediastinal silhouette is without acute process. The  osseous structures are without acute process. Impression  No acute process.       Lab Data:    Cardiac Enzymes:  Recent Labs     22  1845 22  0616 22  1343 22  1859   CKTOTAL 232*  --   --   --    TROPONINI 0.018* 0.109* 0.079* 0.061*     ProBNP:   Lab Results   Component Value Date/Time    PROBNP 12 2022 07:23 PM       CBC:   Recent Labs     22  1845 22  1933 22  0456 22  0452   WBC 8.7  --  10.4 7.7   RBC 4.76  --  4.60 4.71   HGB 13.9 15.9 13.7 14.2   HCT 42.9  --  41.8 42.7     --  204 221       CMP:    Recent Labs     12/23/22  0616 12/24/22  0455 12/25/22  0452    138 139   K 4.6 4.4 5.1*    101 97   CO2 21 22 25   BUN 14 20 22*   CREATININE 0.65 0.61 0.78   LABGLOM >60.0 >60.0 >60.0   GLUCOSE 157* 144* 185*   CALCIUM 9.4 8.9 9.8       Hepatic Function Panel:    Recent Labs     12/22/22  1845   ALKPHOS 102   ALT 31   AST 29   PROT 6.9   BILITOT 0.3   LABALBU 4.3       Magnesium:    Recent Labs     12/23/22  0616 12/24/22  0455 12/25/22 0452   MG 2.3 2.4 2.6*       PT/INR:    Recent Labs     12/22/22  1845   PROTIME 13.5   INR 1.0       Lipids:    Recent Labs     12/24/22  0455   CHOL 175   TRIG 200*   HDL 36*   LDLCALC 99       Principal Problem:    Hypoxia  Active Problems:    Exacerbation of persistent asthma  Resolved Problems:    * No resolved hospital problems.  *           Electronically signed by Lanetta Buerger, MD on 12/25/2022 at 10:36 AM

## 2022-12-25 NOTE — PROGRESS NOTES
Patient resting in bed. Daughter at beside. Mild complaints of pain and discomfort. Medicated per MAR. Respiratory to put Bipap on. NO further needs. Call light in reach.

## 2022-12-25 NOTE — PROGRESS NOTES
Assessment documented. Vital signs stable. Meds taken well. Lungs diminished. Denies any discomfort or pain. Call light within reach.      /66   Pulse 79   Temp 97.3 °F (36.3 °C) (Oral)   Resp 20   Ht 5' 2\" (1.575 m)   Wt 196 lb 5 oz (89 kg)   SpO2 97%   BMI 35.91 kg/m²

## 2022-12-25 NOTE — PROGRESS NOTES
Internal Medicine   Hospitalist   Progress Note    2022   9:20 AM    Name:  Eulogio Fregoso  MRN:    [id number]     IP Day: 3     Admit Date: 2022  6:23 PM  PCP: Adrián Jacobsen PA-C    Code Status:  Full Code    Assessment and Plan: Active Problems/ diagnosis:     Asthma exac- resp failure - nebs,steroids, pulm consult. Neg procal. On 4l/,in o2, no O2 at home. Pulm consulted  ? PNA- levaquin   NSTEMI- CAD s/p stents- elevated troponin- as per cardiologist    DM2- SSI, monitor   DVT PPx  7 pm- 7 am, please contact on call Hospitalist for any needs     Subjective:      no new events, dyspnea is improving. No chest pain.      Physical Examination:      Vitals:  /66   Pulse 79   Temp 97.3 °F (36.3 °C) (Oral)   Resp 20   Ht 5' 2\" (1.575 m)   Wt 196 lb 5 oz (89 kg)   SpO2 97%   BMI 35.91 kg/m²   Temp (24hrs), Av.8 °F (36.6 °C), Min:97.3 °F (36.3 °C), Max:98.2 °F (36.8 °C)      General appearance: alert, cooperative and no distress  Mental Status: oriented to person, place and time and normal affect  Lungs: clear to auscultation bilaterally, normal effort  Heart: regular rate and rhythm, no murmur  Abdomen: soft, nontender, nondistended, bowel sounds present, no masses  Extremities: no edema, redness, tenderness in the calves  Skin: no gross lesions, rashes    Data:     Labs:  Recent Labs     22  0456 22  0452   WBC 10.4 7.7   HGB 13.7 14.2    221     Recent Labs     22  0455 22  0452    139   K 4.4 5.1*    97   CO2 22 25   BUN 20 22*   CREATININE 0.61 0.78   GLUCOSE 144* 185*     Recent Labs     22  1845   AST 29   ALT 31   BILITOT 0.3   ALKPHOS 102       Current Facility-Administered Medications   Medication Dose Route Frequency Provider Last Rate Last Admin    methylPREDNISolone sodium (SOLU-MEDROL) injection 40 mg  40 mg IntraVENous Q8H Arely Laird MD   40 mg at 22 0449    budesonide (PULMICORT) nebulizer suspension 500 mcg 0.5 mg Nebulization BID Alia Amezquita MD   500 mcg at 12/25/22 0729    levoFLOXacin (LEVAQUIN) 750 MG/150ML infusion 750 mg  750 mg IntraVENous Q24H Alia Amezquita MD   Stopped at 12/24/22 1426    aspirin EC tablet 81 mg  81 mg Oral Daily Tacosrol Aamir Sedar, DO   81 mg at 12/25/22 0818    clopidogrel (PLAVIX) tablet 75 mg  75 mg Oral Daily Tacosrol Aamir Sedar, DO   75 mg at 12/25/22 0818    rosuvastatin (CRESTOR) tablet 40 mg  40 mg Oral Daily Lieutenant Berna KEVIN Sedar, DO   40 mg at 12/25/22 0818    montelukast (SINGULAIR) tablet 10 mg  10 mg Oral Nightly Jerrol Aamir Sedar, DO   10 mg at 12/24/22 2138    magnesium oxide (MAG-OX) tablet 400 mg  400 mg Oral BID Tacosrol Aamir Sedar, DO   400 mg at 12/25/22 0818    lisinopril (PRINIVIL;ZESTRIL) tablet 10 mg  10 mg Oral Daily Tacosrol Aamir Sedar, DO   10 mg at 12/25/22 0818    dilTIAZem (CARDIZEM CD) extended release capsule 180 mg  180 mg Oral BID Tacosrol Aamir Sedar, DO   180 mg at 12/25/22 7595    acetaminophen (TYLENOL) tablet 650 mg  650 mg Oral Q6H PRN Tacosrol Aamir Sedar, DO   650 mg at 12/25/22 3104    Or    acetaminophen (TYLENOL) suppository 650 mg  650 mg Rectal Q6H PRN Tacosrol Aamir Sedar, DO        potassium chloride (KLOR-CON M) extended release tablet 40 mEq  40 mEq Oral PRN Jerrol Aamir Sedar, DO        Or    potassium bicarb-citric acid (EFFER-K) effervescent tablet 40 mEq  40 mEq Oral PRN Jerrol Aamir Sedar, DO        Or    potassium chloride 10 mEq/100 mL IVPB (Peripheral Line)  10 mEq IntraVENous PRN Jerrol Aamir Sedar, DO        magnesium sulfate 2000 mg in 50 mL IVPB premix  2,000 mg IntraVENous PRN Jerrol Aamir Sedar, DO        ondansetron (ZOFRAN-ODT) disintegrating tablet 4 mg  4 mg Oral Q8H PRN Jerrol Aamir Sedar, DO        Or    ondansetron (ZOFRAN) injection 4 mg  4 mg IntraVENous Q6H PRN Celia Ruby, DO        enoxaparin (LOVENOX) injection 40 mg  40 mg SubCUTAneous Daily Lieutenant Berna Ruby, DO   40 mg at 12/25/22 0818    glucose chewable tablet 16 g  4 tablet Oral PRN Lieutenant Berna Ruby, DO        dextrose bolus 10% 125 mL  125 mL IntraVENous PRN Stuart Public Sedar, DO        Or    dextrose bolus 10% 250 mL  250 mL IntraVENous PRN Stuart Public Sedar, DO        glucagon (rDNA) injection 1 mg  1 mg SubCUTAneous PRN Stuart Public Sedar, DO        dextrose 10 % infusion   IntraVENous Continuous PRN Stuart Public Sedar, DO        insulin lispro (HUMALOG) injection vial 0-4 Units  0-4 Units SubCUTAneous TID WC Sergo D Sedar, DO        insulin lispro (HUMALOG) injection vial 0-4 Units  0-4 Units SubCUTAneous Nightly Stuart Public Sedar, DO        guaiFENesin [facility]) extended release tablet 600 mg  600 mg Oral BID Stuart Public Sedar, DO   600 mg at 12/25/22 0818    furosemide (LASIX) injection 40 mg  40 mg IntraVENous Daily Stuart Public Sedar, DO   40 mg at 12/25/22 0818    albuterol (PROVENTIL) nebulizer solution 2.5 mg  2.5 mg Nebulization Q2H PRN Stuart Public Sedar, DO   2.5 mg at 12/24/22 0243    ipratropium-albuterol (DUONEB) nebulizer solution 3 mL  1 vial Inhalation TID Stuart Public Sedar, DO   3 mL at 12/25/22 0729    sodium chloride (OCEAN, BABY AYR) 0.65 % nasal spray 1 spray  1 spray Each Nostril Q2H PRN Yin Duran, DO        benzonatate (TESSALON) capsule 100 mg  100 mg Oral TID PRN Ardelle Cos, APRN - CNP   100 mg at 12/24/22 1650       Additional work up or/and treatment plan may be added today or then after based on clinical progression. I am managing a portion of pt care. Some medical issues are handled by other specialists. Additional work up and treatment should be done in out pt setting by pt PCP and other out pt providers. In addition to examining and evaluating pt, I spent additional time explaining care, normaland abnormal findings, and treatment plan. All of pt questions were answered. Counseling, diet and education were provided. Case will be discussed with nursing staff when appropriate. Family will be updated if and when appropriate.        Electronically signed by Elinor Luke DO on 12/25/2022 at 9:20 AM

## 2022-12-26 ENCOUNTER — APPOINTMENT (OUTPATIENT)
Dept: GENERAL RADIOLOGY | Age: 52
DRG: 141 | End: 2022-12-26
Payer: COMMERCIAL

## 2022-12-26 LAB
ANION GAP SERPL CALCULATED.3IONS-SCNC: 14 MEQ/L (ref 9–15)
BASOPHILS ABSOLUTE: 0 K/UL (ref 0–0.2)
BASOPHILS RELATIVE PERCENT: 0.1 %
BUN BLDV-MCNC: 27 MG/DL (ref 6–20)
CALCIUM SERPL-MCNC: 9.2 MG/DL (ref 8.5–9.9)
CHLORIDE BLD-SCNC: 97 MEQ/L (ref 95–107)
CO2: 23 MEQ/L (ref 20–31)
CREAT SERPL-MCNC: 0.69 MG/DL (ref 0.5–0.9)
EOSINOPHILS ABSOLUTE: 0 K/UL (ref 0–0.7)
EOSINOPHILS RELATIVE PERCENT: 0 %
GFR SERPL CREATININE-BSD FRML MDRD: >60 ML/MIN/{1.73_M2}
GLUCOSE BLD-MCNC: 223 MG/DL (ref 70–99)
GLUCOSE BLD-MCNC: 227 MG/DL (ref 70–99)
GLUCOSE BLD-MCNC: 238 MG/DL (ref 70–99)
GLUCOSE BLD-MCNC: 246 MG/DL (ref 70–99)
GLUCOSE BLD-MCNC: 251 MG/DL (ref 70–99)
HCT VFR BLD CALC: 41.3 % (ref 37–47)
HEMOGLOBIN: 14 G/DL (ref 12–16)
LYMPHOCYTES ABSOLUTE: 0.5 K/UL (ref 1–4.8)
LYMPHOCYTES RELATIVE PERCENT: 5.4 %
MAGNESIUM: 2.3 MG/DL (ref 1.7–2.4)
MCH RBC QN AUTO: 30.4 PG (ref 27–31.3)
MCHC RBC AUTO-ENTMCNC: 33.9 % (ref 33–37)
MCV RBC AUTO: 89.9 FL (ref 79.4–94.8)
MONOCYTES ABSOLUTE: 0.3 K/UL (ref 0.2–0.8)
MONOCYTES RELATIVE PERCENT: 2.8 %
NEUTROPHILS ABSOLUTE: 8.5 K/UL (ref 1.4–6.5)
NEUTROPHILS RELATIVE PERCENT: 91.7 %
PDW BLD-RTO: 13.5 % (ref 11.5–14.5)
PERFORMED ON: ABNORMAL
PLATELET # BLD: 229 K/UL (ref 130–400)
POTASSIUM SERPL-SCNC: 4.3 MEQ/L (ref 3.4–4.9)
RBC # BLD: 4.59 M/UL (ref 4.2–5.4)
SODIUM BLD-SCNC: 134 MEQ/L (ref 135–144)
WBC # BLD: 9.2 K/UL (ref 4.8–10.8)

## 2022-12-26 PROCEDURE — 6360000002 HC RX W HCPCS: Performed by: INTERNAL MEDICINE

## 2022-12-26 PROCEDURE — 99232 SBSQ HOSP IP/OBS MODERATE 35: CPT | Performed by: INTERNAL MEDICINE

## 2022-12-26 PROCEDURE — 80048 BASIC METABOLIC PNL TOTAL CA: CPT

## 2022-12-26 PROCEDURE — 36415 COLL VENOUS BLD VENIPUNCTURE: CPT

## 2022-12-26 PROCEDURE — 94660 CPAP INITIATION&MGMT: CPT

## 2022-12-26 PROCEDURE — 6370000000 HC RX 637 (ALT 250 FOR IP)

## 2022-12-26 PROCEDURE — 94640 AIRWAY INHALATION TREATMENT: CPT

## 2022-12-26 PROCEDURE — 94664 DEMO&/EVAL PT USE INHALER: CPT

## 2022-12-26 PROCEDURE — 6370000000 HC RX 637 (ALT 250 FOR IP): Performed by: INTERNAL MEDICINE

## 2022-12-26 PROCEDURE — 2060000000 HC ICU INTERMEDIATE R&B

## 2022-12-26 PROCEDURE — 94761 N-INVAS EAR/PLS OXIMETRY MLT: CPT

## 2022-12-26 PROCEDURE — 83735 ASSAY OF MAGNESIUM: CPT

## 2022-12-26 PROCEDURE — 71046 X-RAY EXAM CHEST 2 VIEWS: CPT

## 2022-12-26 PROCEDURE — 85025 COMPLETE CBC W/AUTO DIFF WBC: CPT

## 2022-12-26 PROCEDURE — 2700000000 HC OXYGEN THERAPY PER DAY

## 2022-12-26 PROCEDURE — 94760 N-INVAS EAR/PLS OXIMETRY 1: CPT

## 2022-12-26 RX ORDER — POLYETHYLENE GLYCOL 3350 17 G/17G
17 POWDER, FOR SOLUTION ORAL DAILY PRN
Status: DISCONTINUED | OUTPATIENT
Start: 2022-12-26 | End: 2022-12-28 | Stop reason: HOSPADM

## 2022-12-26 RX ADMIN — INSULIN LISPRO 1 UNITS: 100 INJECTION, SOLUTION INTRAVENOUS; SUBCUTANEOUS at 08:39

## 2022-12-26 RX ADMIN — BENZONATATE 100 MG: 100 CAPSULE ORAL at 22:00

## 2022-12-26 RX ADMIN — MONTELUKAST SODIUM 10 MG: 10 TABLET, FILM COATED ORAL at 22:00

## 2022-12-26 RX ADMIN — ENOXAPARIN SODIUM 40 MG: 100 INJECTION SUBCUTANEOUS at 08:37

## 2022-12-26 RX ADMIN — METHYLPREDNISOLONE SODIUM SUCCINATE 40 MG: 40 INJECTION, POWDER, FOR SOLUTION INTRAMUSCULAR; INTRAVENOUS at 12:11

## 2022-12-26 RX ADMIN — BUDESONIDE 500 MCG: 0.5 SUSPENSION RESPIRATORY (INHALATION) at 20:21

## 2022-12-26 RX ADMIN — ACETAMINOPHEN 650 MG: 325 TABLET ORAL at 11:28

## 2022-12-26 RX ADMIN — BUDESONIDE 500 MCG: 0.5 SUSPENSION RESPIRATORY (INHALATION) at 07:09

## 2022-12-26 RX ADMIN — LEVOFLOXACIN 750 MG: 5 INJECTION, SOLUTION INTRAVENOUS at 12:15

## 2022-12-26 RX ADMIN — BENZONATATE 100 MG: 100 CAPSULE ORAL at 08:36

## 2022-12-26 RX ADMIN — ACETAMINOPHEN 650 MG: 325 TABLET ORAL at 22:00

## 2022-12-26 RX ADMIN — POLYETHYLENE GLYCOL 3350 17 G: 17 POWDER, FOR SOLUTION ORAL at 08:37

## 2022-12-26 RX ADMIN — INSULIN LISPRO 2 UNITS: 100 INJECTION, SOLUTION INTRAVENOUS; SUBCUTANEOUS at 18:41

## 2022-12-26 RX ADMIN — METHYLPREDNISOLONE SODIUM SUCCINATE 40 MG: 40 INJECTION, POWDER, FOR SOLUTION INTRAMUSCULAR; INTRAVENOUS at 00:06

## 2022-12-26 RX ADMIN — FUROSEMIDE 40 MG: 10 INJECTION, SOLUTION INTRAMUSCULAR; INTRAVENOUS at 08:37

## 2022-12-26 RX ADMIN — Medication 400 MG: at 00:06

## 2022-12-26 RX ADMIN — IPRATROPIUM BROMIDE AND ALBUTEROL SULFATE 3 ML: .5; 2.5 SOLUTION RESPIRATORY (INHALATION) at 07:09

## 2022-12-26 RX ADMIN — DILTIAZEM HYDROCHLORIDE 180 MG: 180 CAPSULE, EXTENDED RELEASE ORAL at 08:36

## 2022-12-26 RX ADMIN — LISINOPRIL 10 MG: 10 TABLET ORAL at 08:36

## 2022-12-26 RX ADMIN — GUAIFENESIN 600 MG: 600 TABLET, EXTENDED RELEASE ORAL at 08:36

## 2022-12-26 RX ADMIN — Medication 400 MG: at 08:37

## 2022-12-26 RX ADMIN — IPRATROPIUM BROMIDE AND ALBUTEROL SULFATE 3 ML: .5; 2.5 SOLUTION RESPIRATORY (INHALATION) at 20:21

## 2022-12-26 RX ADMIN — ROSUVASTATIN CALCIUM 40 MG: 40 TABLET, FILM COATED ORAL at 08:36

## 2022-12-26 RX ADMIN — Medication 400 MG: at 21:55

## 2022-12-26 RX ADMIN — INSULIN LISPRO 2 UNITS: 100 INJECTION, SOLUTION INTRAVENOUS; SUBCUTANEOUS at 12:11

## 2022-12-26 RX ADMIN — GUAIFENESIN 600 MG: 600 TABLET, EXTENDED RELEASE ORAL at 21:54

## 2022-12-26 RX ADMIN — ASPIRIN 81 MG: 81 TABLET, COATED ORAL at 08:36

## 2022-12-26 RX ADMIN — CLOPIDOGREL BISULFATE 75 MG: 75 TABLET ORAL at 08:36

## 2022-12-26 RX ADMIN — DILTIAZEM HYDROCHLORIDE 180 MG: 180 CAPSULE, EXTENDED RELEASE ORAL at 21:54

## 2022-12-26 ASSESSMENT — PAIN SCALES - GENERAL
PAINLEVEL_OUTOF10: 0
PAINLEVEL_OUTOF10: 6
PAINLEVEL_OUTOF10: 6

## 2022-12-26 ASSESSMENT — PAIN DESCRIPTION - LOCATION: LOCATION: HEAD

## 2022-12-26 NOTE — CARE COORDINATION
This Care Transition Nurse provided AMI /COPD/Pneumonia booklet and zones sheets and reviewed. AMI: Stressed importance of phoning cardiologist for symptoms in the yellow zone and ems for symptoms in the red zone. Discussed symptoms of MI and risk factors. Stressed importance of smoking cessation and avoiding second hand smoke. Discussed using the [employer] as a resource for smoking cessation. Discussed importance of controlling cholesterol levels, managing hypertension and diabetes, achieving a healthy weight, regular physical activity approved by cardiologist, managing stress and emotions. Reviewed signs and symptoms of depression and to seek treatment promptly if experiencing these symptoms. Discussed the importance of following a heart health diet low in fat, cholesterol, and sodium. Instructed how to read food labels for saturated fat, trans fat, and sodium content. Instructed to avoid eating any foods with trans fats and to choose lean meats, beans, fruits and vegetables, whole grains, low fat or non-fat dairy products. Discussed importance of taking medications as ordered. Take medications consistently and do not skip doses. Advised using weekly pill organizer. Discussed how and when to take nitroglycerin if ordered. Discussed signs and symptoms of bleeding associated with taking blood thinners if ordered and to avoid injury. Discussed benefits of Cardiac Rehab. Pneumonia: Discussed causes of pneumonia, symptoms, treatment, tests that may be ordered. Discussed importance of taking antibiotics until gone, drinking plenty of fluids, especially water, coughing and deep breathing, continue to use incentive spirometer and flutter valve device at home. Get adequate rest and eat a well balanced diet. Importance of infection prevention: good handwashing, disposing of used tissues in the proper receptacle, masking. Avoid drinking alcoholic beverages. COPD: Discussed cause of COPD, how lungs work, treatment. Discussed avoiding respiratory infection by good handwashing/hand sanitizing, masking to protect airway, avoiding ill contacts, keeping nebulizer setup and other respiratory equipment clean. Discussed common inhaled irritants to avoid. Discussed pursed lip breathing and abdominal breathing. Discussed positions  to reduce shortness of breath, energy conservation, COPD medications, importance of taking antibiotics and prednisone as ordered until gone, importance of keeping updated with vaccines. Discussed importance of nutrition, limiting sodium, drinking plenty of fluids especially water. Discussed benefits of Pulmonary Rehab. Patient states she has decreased the amount of cigarettes that she is smoking. She intends to quit \"cold turkey\". Patient states she had many of the symptoms of MI. She knows how to take nitro and knows bleeding precautions and s/s of bleeding associated with taking blood thinners. She has many risk factors for MI including smoking, diabetes, hypertension, stress. She follows a low fat, low salt, low sugar diet. She states she attended Cardiac Rehab in the past and follows the Pritikin diet. She eats plenty of fruits and vegetables, chicken, fish. She avoids beef. She only eats small amounts of meat. Patient states she drinks plenty of water. She drinks 2 cups of coffee in the am the water the rest of the day. She is aware to take antibiotics until gone. She states she will continue to use incentive spirometer and flutter valve device at home. She keeps a very clean home. She has many allergies to foods, animals, and the environment. She has had anaphylaxis in the past related to eating shellfish. She has an epi-pen which is . Advised that she obtain a prescription for a new one and keep it on her person at all times. She is leery of taking vaccines due to so many allergies. She is aware of her triggers and avoids them.  She rinses her nebulizer set up with hot water with each use. Advised to clean daily with mild soap, warm water and rinse well. Allow to air dry. She states she was never able to fill the prescription for Pulmicort because the pharmacy told her she was already taking Symbicort and would not fill the prescription. Advised to discuss this with her pulmonologist. Discussed the importance of physician (PCP) follow up within one week of discharge and follow up with cardiologist /pulmonologist as ordered.

## 2022-12-26 NOTE — CARE COORDINATION
Met with patient, freedom of choice offered. States plan is to return home with family and hhc when stable.  Workup in progress,new ekg changes, plan to transfer to [facility] today, possible cath in am

## 2022-12-26 NOTE — PROGRESS NOTES
Assessment documented. Vital signs stable. Meds taken well. Lungs diminished. Denies any discomfort or pain. Call light within reach. /70   Pulse 82   Temp 97.7 °F (36.5 °C)   Resp 20   Ht 5' 2\" (1.575 m)   Wt 196 lb 5 oz (89 kg)   SpO2 96%   BMI 35.91 kg/m²     [facility].

## 2022-12-26 NOTE — PROGRESS NOTES
[facility] AT [place] Respiratory Therapy Evaluation   Current Order:  Duoneb TID      Home Regimen: TID      Ordering Physician: Flori  Re-evaluation Date:  12/29     Diagnosis: Hypoxia     Patient Status: Stable / Unstable + Physician notified    The following MDI Criteria must be met in order to convert aerosol to MDI with spacer. If unable to meet, MDI will be converted to aerosol:  []  Patient able to demonstrate the ability to use MDI effectively  []  Patient alert and cooperative  []  Patient able to take deep breath with 5-10 second hold  []  Medication(s) available in this delivery method   []  Peak flow greater than or equal to 200 ml/min            Current Order Substituted To  (same drug, same frequency)   Aerosol to MDI [] Albuterol Sulfate 0.083% unit dose by aerosol Albuterol Sulfate MDI 2 puffs by inhalation with spacer    [] Levalbuterol 1.25 mg unit dose by aerosol Levalbuterol MDI 2 puffs by inhalation with spacer    [] Levalbuterol 0.63 mg unit dose by aerosol Levalbuterol MDI 2 puffs by inhalation with spacer    [] Ipratropium Bromide 0.02% unit dose by aerosol Ipratropium Bromide MDI 2 puffs by inhalation with spacer    [] Duoneb (Ipratropium + Albuterol) unit dose by aerosol Ipratropium MDI + Albuterol MDI 2 puffs by inhalation w/spacer   MDI to Aerosol [] Albuterol Sulfate MDI Albuterol Sulfate 0.083% unit dose by aerosol    [] Levalbuterol MDI 2 puffs by inhalation Levalbuterol 1.25 mg unit dose by aerosol    [] Ipratropium Bromide MDI by inhalation Ipratropium Bromide 0.02% unit dose by aerosol    [] Combivent (Ipratropium + Albuterol) MDI by inhalation Duoneb (Ipratropium + Albuterol) unit dose by aerosol       Treatment Assessment [Frequency/Schedule]:  Change frequency to: _____________no changes_____________________________________per Protocol, P&T, MEC      Points 0 1 2 3 4   Pulmonary Status  Non-Smoker  []   Smoking history   < 20 pack years  []   Smoking history  ?  20 pack years  [] Pulmonary Disorder  (acute or chronic)  [x]   Severe or Chronic w/ Exacerbation  []     Surgical Status No [x]   Surgeries     General []   Surgery Lower []   Abdominal Thoracic or []   Upper Abdominal Thoracic with  PulmonaryDisorder  []     Chest X-ray Clear/Not  Ordered     [x]  Chronic Changes  Results Pending  []  Infiltrates, atelectasis, pleural effusion, or edema  []  Infiltrates in more than one lobe []  Infiltrate + Atelectasis, &/or pleural effusion  []    Respiratory Pattern Regular,  RR = 12-20 [x]  Increased,  RR = 21-25 []  KC, irregular,  or RR = 26-30 []  Decreased FEV1  or RR = 31-35 []  Severe SOB, use  of accessory muscles, or RR ? 35  []    Mental Status Alert, oriented,  Cooperative [x]  Confused but Follows commands []  Lethargic or unable to follow commands []  Obtunded  []  Comatose  []    Breath Sounds Clear to  auscultation  []  Decreased unilaterally or  in bases only []  Decreased  bilaterally  []  Crackles or intermittent wheezes [x]  Wheezes []    Cough Strong, Spontan., & nonproductive [x]  Strong,  spontaneous, &  productive []  Weak,  Nonproductive []  Weak, productive or  with wheezes []  No spontaneous  cough or may require suctioning []    Level of Activity Ambulatory []  Ambulatory w/ Assist  [x]  Non-ambulatory []  Paraplegic []  Quadriplegic []    Total    Score:___7____     Triage Score:___3_____      Tri       Triage:     1. (>20) Freq: Q3    2. (16-20) Freq: Q4   3. (11-15) Freq: QID & Albuterol Q2 PRN    4. (6-10) Freq: TID & Albuterol Q2 PRN    5. (0-5) Freq Q4prn

## 2022-12-26 NOTE — PROGRESS NOTES
INPATIENT PROGRESS NOTES    PATIENT NAME: Evangelist Giraldo  MRN: [id number]  SERVICE DATE:  December 26, 2022   SERVICE TIME:  12:09 PM      PRIMARY SERVICE: Pulmonary Disease    CHIEF COMPLAIN: COPD exacerbation      INTERVAL HPI: Patient seen and examined at bedside, Interval Notes, orders reviewed. Nursing notes noted  Patient is transferred to [facility]. She is going for cardiac cath tomorrow , she said overall she is feeling better. Denies having any chest pain. No fever or chills. Shortness of breath improved. She is not using BiPAP. Currently on 3 L O2 via nasal cannula O2 saturation 96%. No nausea vomiting diarrhea abdominal pain. OBJECTIVE    Body mass index is 35.91 kg/m². PHYSICAL EXAM:  Vitals:  /73   Pulse 79   Temp 98.4 °F (36.9 °C) (Oral)   Resp 20   Ht 5' 2\" (1.575 m)   Wt 196 lb 5 oz (89 kg)   SpO2 96%   BMI 35.91 kg/m²   General: Alert, awake . comfortable in bed, No distress. Head: Atraumatic , Normocephalic   Eyes: PERRL. No sclera icterus. No conjunctival injection. No discharge   ENT: No nasal  discharge. Pharynx clear. Neck:  Trachea midline. No thyromegaly, no JVD, No cervical adenopathy. Chest : Bilaterally symmetrical ,Normal effort,  No accessory muscle use  Lung : . Fair BS bilateral, decreased BS at bases. No Rales. No wheezing. No rhonchi. Heart[de-identified] Normal  rate. Regular rhythm. No mumur ,  Rub or gallop  ABD: Non-tender. Non-distended. No masses. No organmegaly. Normal bowel sounds. No hernia.   Ext : No Pitting both leg , No Cyanosis No clubbing  Neuro: no focal weakness          DATA:   Recent Labs     12/25/22 0452 12/26/22 0526   WBC 7.7 9.2   HGB 14.2 14.0   HCT 42.7 41.3   MCV 90.6 89.9    229     Recent Labs     12/25/22 0452 12/26/22 0526    134*   K 5.1* 4.3   CL 97 97   CO2 25 23   BUN 22* 27*   CREATININE 0.78 0.69   GLUCOSE 185* 238*   CALCIUM 9.8 9.2   LABGLOM >60.0 >60.0       MV Settings:     FiO2 : 40 %    No results for input(s): PHART, SPW0GOP, PO2ART, PAS5NHJ, BEART, H4NJWSJC in the last 72 hours. O2 Device: Nasal cannula  O2 Flow Rate (L/min): 3 L/min    ADULT DIET; Regular; 3 carb choices (45 gm/meal); Low Fat/Low Chol/High Fiber/2 gm Na; Low Sodium (2 gm); 1800 ml     MEDICATIONS during current hospitalization:    Continuous Infusions:   dextrose         Scheduled Meds:   methylPREDNISolone  40 mg IntraVENous Q12H    budesonide  0.5 mg Nebulization BID    levofloxacin  750 mg IntraVENous Q24H    aspirin  81 mg Oral Daily    clopidogrel  75 mg Oral Daily    rosuvastatin  40 mg Oral Daily    montelukast  10 mg Oral Nightly    magnesium oxide  400 mg Oral BID    lisinopril  10 mg Oral Daily    dilTIAZem  180 mg Oral BID    enoxaparin  40 mg SubCUTAneous Daily    insulin lispro  0-4 Units SubCUTAneous TID WC    insulin lispro  0-4 Units SubCUTAneous Nightly    guaiFENesin  600 mg Oral BID    furosemide  40 mg IntraVENous Daily    ipratropium-albuterol  1 vial Inhalation TID       PRN Meds:polyethylene glycol, acetaminophen **OR** acetaminophen, potassium chloride **OR** potassium alternative oral replacement **OR** potassium chloride, magnesium sulfate, ondansetron **OR** ondansetron, glucose, dextrose bolus **OR** dextrose bolus, glucagon (rDNA), dextrose, albuterol, sodium chloride, benzonatate    Radiology  CTA CHEST W WO CONTRAST    Result Date: 12/23/2022  EXAMINATION: CTA OF THE CHEST WITH AND WITHOUT CONTRAST 12/22/2022 8:31 pm TECHNIQUE: CTA of the chest was performed before and after the administration of intravenous contrast.  Multiplanar reformatted images are provided for review. MIP images are provided for review. Automated exposure control, iterative reconstruction, and/or weight based adjustment of the mA/kV was utilized to reduce the radiation dose to as low as reasonably achievable. COMPARISON: None.  HISTORY: ORDERING SYSTEM PROVIDED HISTORY: SOB,  tachypnea, hypoxia; CHF vs PE vs CHF PE pneumonia or other combo there of TECHNOLOGIST PROVIDED HISTORY: Reason for exam:->SOB,  tachypnea, hypoxia; CHF vs PE vs CHF PE pneumonia or other combo there of Decision Support Exception - unselect if not a suspected or confirmed emergency medical condition->Emergency Medical Condition (MA) What reading provider will be dictating this exam?->CRC FINDINGS: Pulmonary Arteries: Pulmonary arteries are adequately opacified for evaluation. No evidence of intraluminal filling defect to suggest pulmonary embolism. Main pulmonary artery is normal in caliber. Mediastinum: No evidence of mediastinal lymphadenopathy. The heart and pericardium demonstrate no acute abnormality. There is no acute abnormality of the thoracic aorta. Thyroid is homogeneous in appearance. There is prominence of the hilar lymph nodes bilaterally possibly reactive. Reactive lymph nodes seen scattered throughout the mediastinum. Coronary artery calcification. No pericardial effusion. Lungs/pleura: Scattered subtle areas of ground-glass opacity identified involving the central aspect of the right and lung and left upper lobe. Findings favoring bilateral pneumonia. Underlying chronic changes seen within lung fields with bronchial wall thickening in the perihilar regions. Bronchiolitis cannot be excluded. Upper Abdomen: Limited images of the upper abdomen are unremarkable. Soft Tissues/Bones: No acute bone or soft tissue abnormality. Degenerative changes seen within the spine. No acute chest wall abnormality. No evidence of pulmonary embolism. Findings suggesting bilateral pneumonia including atypical bacterial and viral processes with also a component of bronchiolitis with bronchial wall thickening centrally extending into the lower lobes bilaterally. XR CHEST PORTABLE    Result Date: 12/22/2022  EXAMINATION: ONE XRAY VIEW OF THE CHEST 12/22/2022 6:43 pm COMPARISON: None.  HISTORY: ORDERING SYSTEM PROVIDED HISTORY: SOB TECHNOLOGIST PROVIDED HISTORY: Reason for exam:->SOB Is the patient pregnant?->No What reading provider will be dictating this exam?->CRC FINDINGS: The lungs are without acute focal process. There is no effusion or pneumothorax. The cardiomediastinal silhouette is without acute process. The osseous structures are without acute process. No acute process. IMPRESSION AND SUGGESTION:  Asthma with acute exacerbation  Multilobar pneumonia likely atypical pneumonia  Smoking, tobacco abuse  Non-ST elevation MI  Obesity, sleep study - October 2020    Continue O2 to keep saturation 90% or above. Change Solu-Medrol twice a day. Continue Levaquin. Nebulizer with DuoNeb and Pulmicort has been requested. BiPAP as needed. Recommend smoking cessation. Going for cardiac cath tomorrow. NOTE: This report was transcribed using voice recognition software. Every effort was made to ensure accuracy; however, inadvertent computerized transcription errors may be present.       Electronically signed by Lon Gonzalez MD, FCCP on 12/26/2022 at 12:09 PM

## 2022-12-26 NOTE — PROGRESS NOTES
[facility] OF THE [facility] Heart Progress Note      Patient:  Mercedez Nelson  YOB: 1970  MRN: [id number]   Acct: [de-identified]  Admit Date:  12/22/2022  Primary Cardiologist:Dr. [facility] Cardiologist: Kimo Forde MD      Impression/Plan:     Type I, NSTEMI: As described yesterday prior to admission she had had angina. She has not had angina in the last 3 days. No evidence of congestive heart failure on exam.  As wheezing has improved and wall motion abnormality seen on echo or new I think it reasonable to proceed with coronary angiography and possible intervention tomorrow. She has tolerated radiocontrast media in the past without reaction as recently as on admission. ECG today with new T wave inversion high lateral leads. Continue statin dual antiplatelet therapy. Full anticoagulation has not been pursued secondary to questionable timing of event. Risk and benefits of coronary angiography possible angioplasty discussed in detail with the patient she understands and agrees to proceed. These include myocardial infarction, stroke, contrast reaction, renal insufficiency, arterial injury. J Point elevation with slight WI elevation in aVR suggesting pericarditis but no pericardial symptoms at this time. As no symptoms of pericarditis or pericardial rub not clearly acute pericarditis. Given the potential of pericardial effusion with pericarditis post infarct we will obtain echocardiogram in the morning. As no angina and potential of pericarditis will not fully anticoagulate at this time  Discussed case with Dr. Yandy Shaikh who will pursue coronary angiography possible angioplasty tomorrow  Pneumonia/COPD exacerbation: Per pulmonary medicine service clinically is improving  Hypertension: Controlled  Tobacco abuse: Discussed the connection between her COPD exacerbation as well as MI  Transfer to [facility] for monitor    Chief Complaint/Active Symptoms: Is tired but overall feels a bit better. Still with significant coughing rare wheezing. No angina. Some chest wall discomfort still from prior coughing. No dizziness or palpitations. No pleuritic discomfort at this time. Breathing comfortably    Admitted 12/22/2022 with bilateral pneumonia probably viral COPD exacerbation sinus tachycardia. Had progressive shortness of breath. Troponin elevated with a rise and fall pattern    Review of Systems:   Primary remarkable for being short of breath at this time no bleeding issues    CARDIAC HISTORY:  Coronary artery disease: Right coronary ERMIAS August 2020 at that time LM 20%, LAD 30% proximal.  60% mid.  8/20/2021 normal perfusion study  Hypertension  Hyperlipidemia    Past Medical History:   Diagnosis Date    Anticoagulant long-term use     Asthma 2004    CAD (coronary artery disease)     COPD (chronic obstructive pulmonary disease) ([facility] [address]     Diabetes mellitus ([facility] [address] 2014    Hodgkin disease ([facility] [address] 1999    Hyperlipidemia     Hypertension 1984    Migraines 1989    Sleep apnea     recently tested     Type 2 diabetes mellitus without complication ([facility] [address]     type II         Objective:   Vitals:    12/26/22 0711 12/26/22 0716 12/26/22 0750 12/26/22 0836   BP:  137/70  137/70   Pulse:  82     Resp:  20     Temp:  97.6 °F (36.4 °C) 97.7 °F (36.5 °C)    TempSrc:  Oral     SpO2: 96% 96%     Weight:       Height:          Wt Readings from Last 3 Encounters:   12/25/22 196 lb 5 oz (89 kg)   12/01/22 196 lb 12.8 oz (89.3 kg)   11/03/22 196 lb 12.8 oz (89.3 kg)       TELEMETRY: normal sinus                             Rhythm Strip: No svt or sinus tachy;  Haroldine Levo heart rate today 12/26   92 beats a minute    Physical Exam:  General Appearance: alert and oriented to person, place and time, stable until she starts to cough  Cardiovascular: normal rate, regular rhythm, normal S1 and S2, no murmurs, rubs, clicks, or gallops,  no JVD  Pulmonary/Chest: Moves air better today still occasional expiratory wheezing on the left posterior with occasional rhonchi clear with cough  Abdomen: soft, non-tender, non-distended, normal bowel sounds, no masses   Extremities: no cyanosis, clubbing or edema  Skin: warm and dry, no rash or erythema  Eyes: EOMI  Neck: supple and non-tender without mass, no thyromegaly   Neurological: alert, oriented, normal speech, no focal findings or movement disorder noted    Allergies: Allergies   Allergen Reactions    Shellfish-Derived Products Anaphylaxis    Cumin Oil Itching    Piper Itching        Medications:    methylPREDNISolone  40 mg IntraVENous Q12H    budesonide  0.5 mg Nebulization BID    levofloxacin  750 mg IntraVENous Q24H    aspirin  81 mg Oral Daily    clopidogrel  75 mg Oral Daily    rosuvastatin  40 mg Oral Daily    montelukast  10 mg Oral Nightly    magnesium oxide  400 mg Oral BID    lisinopril  10 mg Oral Daily    dilTIAZem  180 mg Oral BID    enoxaparin  40 mg SubCUTAneous Daily    insulin lispro  0-4 Units SubCUTAneous TID WC    insulin lispro  0-4 Units SubCUTAneous Nightly    guaiFENesin  600 mg Oral BID    furosemide  40 mg IntraVENous Daily    ipratropium-albuterol  1 vial Inhalation TID      dextrose       polyethylene glycol, 17 g, Daily PRN  acetaminophen, 650 mg, Q6H PRN   Or  acetaminophen, 650 mg, Q6H PRN  potassium chloride, 40 mEq, PRN   Or  potassium alternative oral replacement, 40 mEq, PRN   Or  potassium chloride, 10 mEq, PRN  magnesium sulfate, 2,000 mg, PRN  ondansetron, 4 mg, Q8H PRN   Or  ondansetron, 4 mg, Q6H PRN  glucose, 4 tablet, PRN  dextrose bolus, 125 mL, PRN   Or  dextrose bolus, 250 mL, PRN  glucagon (rDNA), 1 mg, PRN  dextrose, , Continuous PRN  albuterol, 2.5 mg, Q2H PRN  sodium chloride, 1 spray, Q2H PRN  benzonatate, 100 mg, TID PRN      Diagnostics:  EK2022: Sinus rhythm new T inversion high lateral leads. Diffuse J-point elevation suggestive of pericarditis.   2022 sinus tachycardia otherwise normal    Echo: 2022  Left ventricular ejection fraction is visually estimated at 45-50%. Severe hypokinesis of distal 1/2 septum, anteroseptal walls. Anterolateral   wall normal.   Wall motion abnormalities new since study 9/2020   Normal right ventricle structure and function. RVSP could not be calculated   Normal valvular structure and function. No significant regurgitant or stenotic valvular lesions. MAC noted    CXR:               Portable: Results for orders placed during the hospital encounter of 12/22/22    XR CHEST PORTABLE    Narrative  EXAMINATION:  ONE XRAY VIEW OF THE CHEST    12/22/2022 6:43 pm    COMPARISON:  None. HISTORY:  ORDERING SYSTEM PROVIDED HISTORY: SOB  TECHNOLOGIST PROVIDED HISTORY:  Reason for exam:->SOB  Is the patient pregnant?->No  What reading provider will be dictating this exam?->CRC    FINDINGS:  The lungs are without acute focal process. There is no effusion or  pneumothorax. The cardiomediastinal silhouette is without acute process. The  osseous structures are without acute process. Impression  No acute process. Lab Data:    Cardiac Enzymes:  Recent Labs     12/23/22  1343 12/23/22  1859   TROPONINI 0.079* 0.061*     ProBNP:   Lab Results   Component Value Date/Time    PROBNP 12 12/22/2022 07:23 PM       CBC:   Recent Labs     12/24/22  0456 12/25/22  0452 12/26/22  0526   WBC 10.4 7.7 9.2   RBC 4.60 4.71 4.59   HGB 13.7 14.2 14.0   HCT 41.8 42.7 41.3    221 229       CMP:    Recent Labs     12/24/22  0455 12/25/22  0452 12/26/22  0526    139 134*   K 4.4 5.1* 4.3    97 97   CO2 22 25 23   BUN 20 22* 27*   CREATININE 0.61 0.78 0.69   LABGLOM >60.0 >60.0 >60.0   GLUCOSE 144* 185* 238*   CALCIUM 8.9 9.8 9.2       Hepatic Function Panel:    No results for input(s): ALKPHOS, ALT, AST, PROT, BILITOT, BILIDIR, IBILI, LABALBU in the last 72 hours.       Magnesium:    Recent Labs     12/24/22  0455 12/25/22  0452 12/26/22  0526   MG 2.4 2.6* 2.3       PT/INR:    No results for input(s): PROTIME, INR in the last 72 hours. Lipids:    Recent Labs     12/24/22  0455   CHOL 175   TRIG 200*   HDL 36*   LDLCALC 99       Principal Problem:    Hypoxia  Active Problems:    Exacerbation of persistent asthma  Resolved Problems:    * No resolved hospital problems.  *           Electronically signed by Gilford Climes, MD on 12/26/2022 at 9:54 AM

## 2022-12-26 NOTE — PROGRESS NOTES
Internal Medicine   Hospitalist   Progress Note    2022   2:03 PM    Name:  Evangelist Giraldo  MRN:    [id number]     [address] [facility] Day: 4     Admit Date: 2022  6:23 PM  PCP: Corry Acevedo PA-C    Code Status:  Full Code    Assessment and Plan: Active Problems/ diagnosis:     Asthma exac- resp failure - nebs,steroids, pulm consult. Neg procal. On 4l/,in o2, no O2 at home. Pulm consulted  PNA- levaquin   NSTEMI- CAD s/p stents- elevated troponin- as per cardiologist    DM2- SSI, monitor   DVT PPx  7 pm- 7 am, please contact on call Hospitalist for any needs     Subjective:      no new events, dyspnea is improving. No chest pain. Physical Examination:      Vitals:  /73   Pulse 79   Temp 98.4 °F (36.9 °C) (Oral)   Resp 20   Ht 5' 2\" (1.575 m)   Wt 196 lb 5 oz (89 kg)   SpO2 96%   BMI 35.91 kg/m²   Temp (24hrs), Av °F (36.7 °C), Min:97.6 °F (36.4 °C), Max:98.4 °F (36.9 °C)      General appearance: alert, cooperative and no distress  Mental Status: oriented to person, place and time and normal affect  Lungs: clear to auscultation bilaterally, normal effort  Heart: regular rate and rhythm, no murmur  Abdomen: soft, nontender, nondistended, bowel sounds present, no masses  Extremities: no edema, redness, tenderness in the calves  Skin: no gross lesions, rashes    Data:     Labs:  Recent Labs     22  0452 22  0526   WBC 7.7 9.2   HGB 14.2 14.0    229     Recent Labs     22  0452 22  0526    134*   K 5.1* 4.3   CL 97 97   CO2 25 23   BUN 22* 27*   CREATININE 0.78 0.69   GLUCOSE 185* 238*     No results for input(s): AST, ALT, ALB, BILITOT, ALKPHOS in the last 72 hours.       Current Facility-Administered Medications   Medication Dose Route Frequency Provider Last Rate Last Admin    polyethylene glycol (GLYCOLAX) packet 17 g  17 g Oral Daily PRN Laban Bridges, APRN - CNP   17 g at 22 0837    methylPREDNISolone sodium (SOLU-MEDROL) injection 40 mg  40 mg IntraVENous Q12H Alia Amezquita MD   40 mg at 12/26/22 1211    budesonide (PULMICORT) nebulizer suspension 500 mcg  0.5 mg Nebulization BID Alia Amezquita MD   500 mcg at 12/26/22 0709    levoFLOXacin (LEVAQUIN) 750 MG/150ML infusion 750 mg  750 mg IntraVENous Q24H Alia Amezquita MD   Stopped at 12/26/22 1357    aspirin EC tablet 81 mg  81 mg Oral Daily Celia Rutledge Sedar, DO   81 mg at 12/26/22 0836    clopidogrel (PLAVIX) tablet 75 mg  75 mg Oral Daily Sergo BROWN Sedar, DO   75 mg at 12/26/22 0836    rosuvastatin (CRESTOR) tablet 40 mg  40 mg Oral Daily Sergo BROWN Sedar, DO   40 mg at 12/26/22 0836    montelukast (SINGULAIR) tablet 10 mg  10 mg Oral Nightly Sergo BROWN Sedar, DO   10 mg at 12/25/22 2029    magnesium oxide (MAG-OX) tablet 400 mg  400 mg Oral BID Celia Rutledge Sedar, DO   400 mg at 12/26/22 0837    lisinopril (PRINIVIL;ZESTRIL) tablet 10 mg  10 mg Oral Daily Celia Rutledge Sedar, DO   10 mg at 12/26/22 0836    dilTIAZem (CARDIZEM CD) extended release capsule 180 mg  180 mg Oral BID Celia Rutledge Sedar, DO   180 mg at 12/26/22 2505    acetaminophen (TYLENOL) tablet 650 mg  650 mg Oral Q6H PRN Tacosrol Aamir Sedar, DO   650 mg at 12/26/22 1128    Or    acetaminophen (TYLENOL) suppository 650 mg  650 mg Rectal Q6H PRN Jerrol Aamir Sedar, DO        potassium chloride (KLOR-CON M) extended release tablet 40 mEq  40 mEq Oral PRN Jerrol Aamir Sedar, DO        Or    potassium bicarb-citric acid (EFFER-K) effervescent tablet 40 mEq  40 mEq Oral PRN Jerrol Aamir Sedar, DO        Or    potassium chloride 10 mEq/100 mL IVPB (Peripheral Line)  10 mEq IntraVENous PRN Jerrol Aamir Sedar, DO        magnesium sulfate 2000 mg in 50 mL IVPB premix  2,000 mg IntraVENous PRN Jerrol Aamir Sedar, DO        ondansetron (ZOFRAN-ODT) disintegrating tablet 4 mg  4 mg Oral Q8H PRN Celia Rutledge Sedar, DO        Or    ondansetron (ZOFRAN) injection 4 mg  4 mg IntraVENous Q6H PRN Celia Rutledge Sedar, DO        enoxaparin (LOVENOX) injection 40 mg  40 mg SubCUTAneous Daily Sergo KEVIN Ruby, DO   40 mg at 12/26/22 2024 glucose chewable tablet 16 g  4 tablet Oral PRN Adrienne Metcalfe D Sedar, DO        dextrose bolus 10% 125 mL  125 mL IntraVENous PRN Adrienne Metcalfe D Sedar, DO        Or    dextrose bolus 10% 250 mL  250 mL IntraVENous PRN Braden Cowboy Sedar, DO        glucagon (rDNA) injection 1 mg  1 mg SubCUTAneous PRN Adrienne Metcalfe D Sedar, DO        dextrose 10 % infusion   IntraVENous Continuous PRN Braden Cowboy Sedar, DO        insulin lispro (HUMALOG) injection vial 0-4 Units  0-4 Units SubCUTAneous TID WC Braden Cowboy Sedar, DO   2 Units at 12/26/22 1211    insulin lispro (HUMALOG) injection vial 0-4 Units  0-4 Units SubCUTAneous Nightly Braden Cowboy Sedar, DO        guaiFENesin [facility]) extended release tablet 600 mg  600 mg Oral BID Braden Cowboy Sedar, DO   600 mg at 12/26/22 0836    furosemide (LASIX) injection 40 mg  40 mg IntraVENous Daily Braden Cowboy Sedar, DO   40 mg at 12/26/22 0837    albuterol (PROVENTIL) nebulizer solution 2.5 mg  2.5 mg Nebulization Q2H PRN Braden Cowboy Sedar, DO   2.5 mg at 12/24/22 0243    ipratropium-albuterol (DUONEB) nebulizer solution 3 mL  1 vial Inhalation TID Braden Dianaboy Sedar, DO   3 mL at 12/26/22 0709    sodium chloride (OCEAN, BABY AYR) 0.65 % nasal spray 1 spray  1 spray Each Nostril Q2H PRN Darline Ríosin, DO        benzonatate (TESSALON) capsule 100 mg  100 mg Oral TID PRN Mallissa Flair, APRN - CNP   100 mg at 12/26/22 8116       Additional work up or/and treatment plan may be added today or then after based on clinical progression. I am managing a portion of pt care. Some medical issues are handled by other specialists. Additional work up and treatment should be done in out pt setting by pt PCP and other out pt providers. In addition to examining and evaluating pt, I spent additional time explaining care, normaland abnormal findings, and treatment plan. All of pt questions were answered. Counseling, diet and education were provided. Case will be discussed with nursing staff when appropriate. Family will be updated if and when appropriate. Electronically signed by Michelle Arambula DO on 12/26/2022 at 2:03 PM

## 2022-12-27 ENCOUNTER — APPOINTMENT (OUTPATIENT)
Dept: CARDIAC CATH/INVASIVE PROCEDURES | Age: 52
DRG: 141 | End: 2022-12-27
Payer: COMMERCIAL

## 2022-12-27 LAB
ANION GAP SERPL CALCULATED.3IONS-SCNC: 11 MEQ/L (ref 9–15)
ANION GAP SERPL CALCULATED.3IONS-SCNC: 12 MEQ/L (ref 9–15)
BASOPHILS ABSOLUTE: 0 K/UL (ref 0–0.2)
BASOPHILS RELATIVE PERCENT: 0.1 %
BUN BLDV-MCNC: 24 MG/DL (ref 6–20)
BUN BLDV-MCNC: 28 MG/DL (ref 6–20)
CALCIUM SERPL-MCNC: 9.2 MG/DL (ref 8.5–9.9)
CALCIUM SERPL-MCNC: 9.4 MG/DL (ref 8.5–9.9)
CHLORIDE BLD-SCNC: 95 MEQ/L (ref 95–107)
CHLORIDE BLD-SCNC: 96 MEQ/L (ref 95–107)
CO2: 26 MEQ/L (ref 20–31)
CO2: 29 MEQ/L (ref 20–31)
CREAT SERPL-MCNC: 0.66 MG/DL (ref 0.5–0.9)
CREAT SERPL-MCNC: 0.72 MG/DL (ref 0.5–0.9)
EKG ATRIAL RATE: 127 BPM
EKG P AXIS: 67 DEGREES
EKG P-R INTERVAL: 160 MS
EKG Q-T INTERVAL: 304 MS
EKG QRS DURATION: 72 MS
EKG QTC CALCULATION (BAZETT): 441 MS
EKG R AXIS: 44 DEGREES
EKG T AXIS: 63 DEGREES
EKG VENTRICULAR RATE: 127 BPM
EOSINOPHILS ABSOLUTE: 0 K/UL (ref 0–0.7)
EOSINOPHILS RELATIVE PERCENT: 0.1 %
GFR SERPL CREATININE-BSD FRML MDRD: >60 ML/MIN/{1.73_M2}
GFR SERPL CREATININE-BSD FRML MDRD: >60 ML/MIN/{1.73_M2}
GLUCOSE BLD-MCNC: 230 MG/DL (ref 70–99)
GLUCOSE BLD-MCNC: 240 MG/DL (ref 70–99)
GLUCOSE BLD-MCNC: 265 MG/DL (ref 70–99)
GLUCOSE BLD-MCNC: 268 MG/DL (ref 70–99)
GLUCOSE BLD-MCNC: 329 MG/DL (ref 70–99)
HCT VFR BLD CALC: 44.7 % (ref 37–47)
HCT VFR BLD CALC: 46.1 % (ref 37–47)
HEMOGLOBIN: 14.8 G/DL (ref 12–16)
HEMOGLOBIN: 15 G/DL (ref 12–16)
LYMPHOCYTES ABSOLUTE: 0.5 K/UL (ref 1–4.8)
LYMPHOCYTES RELATIVE PERCENT: 5.9 %
MAGNESIUM: 2.4 MG/DL (ref 1.7–2.4)
MCH RBC QN AUTO: 29.5 PG (ref 27–31.3)
MCH RBC QN AUTO: 29.7 PG (ref 27–31.3)
MCHC RBC AUTO-ENTMCNC: 32.6 % (ref 33–37)
MCHC RBC AUTO-ENTMCNC: 33 % (ref 33–37)
MCV RBC AUTO: 89.3 FL (ref 79.4–94.8)
MCV RBC AUTO: 90.9 FL (ref 79.4–94.8)
MONOCYTES ABSOLUTE: 0.2 K/UL (ref 0.2–0.8)
MONOCYTES RELATIVE PERCENT: 3 %
NEUTROPHILS ABSOLUTE: 7.3 K/UL (ref 1.4–6.5)
NEUTROPHILS RELATIVE PERCENT: 90.9 %
PDW BLD-RTO: 13.2 % (ref 11.5–14.5)
PDW BLD-RTO: 13.5 % (ref 11.5–14.5)
PERFORMED ON: ABNORMAL
PLATELET # BLD: 228 K/UL (ref 130–400)
PLATELET # BLD: 242 K/UL (ref 130–400)
POTASSIUM SERPL-SCNC: 4.3 MEQ/L (ref 3.4–4.9)
POTASSIUM SERPL-SCNC: 4.7 MEQ/L (ref 3.4–4.9)
RBC # BLD: 5.01 M/UL (ref 4.2–5.4)
RBC # BLD: 5.06 M/UL (ref 4.2–5.4)
SODIUM BLD-SCNC: 133 MEQ/L (ref 135–144)
SODIUM BLD-SCNC: 136 MEQ/L (ref 135–144)
WBC # BLD: 7 K/UL (ref 4.8–10.8)
WBC # BLD: 8.1 K/UL (ref 4.8–10.8)

## 2022-12-27 PROCEDURE — 94761 N-INVAS EAR/PLS OXIMETRY MLT: CPT

## 2022-12-27 PROCEDURE — 2500000003 HC RX 250 WO HCPCS: Performed by: INTERNAL MEDICINE

## 2022-12-27 PROCEDURE — 80048 BASIC METABOLIC PNL TOTAL CA: CPT

## 2022-12-27 PROCEDURE — 6370000000 HC RX 637 (ALT 250 FOR IP): Performed by: INTERNAL MEDICINE

## 2022-12-27 PROCEDURE — 85025 COMPLETE CBC W/AUTO DIFF WBC: CPT

## 2022-12-27 PROCEDURE — 4A023N7 MEASUREMENT OF CARDIAC SAMPLING AND PRESSURE, LEFT HEART, PERCUTANEOUS APPROACH: ICD-10-PCS | Performed by: INTERNAL MEDICINE

## 2022-12-27 PROCEDURE — 6360000002 HC RX W HCPCS: Performed by: INTERNAL MEDICINE

## 2022-12-27 PROCEDURE — 2500000003 HC RX 250 WO HCPCS

## 2022-12-27 PROCEDURE — 83735 ASSAY OF MAGNESIUM: CPT

## 2022-12-27 PROCEDURE — 93308 TTE F-UP OR LMTD: CPT

## 2022-12-27 PROCEDURE — 94640 AIRWAY INHALATION TREATMENT: CPT

## 2022-12-27 PROCEDURE — B2111ZZ FLUOROSCOPY OF MULTIPLE CORONARY ARTERIES USING LOW OSMOLAR CONTRAST: ICD-10-PCS | Performed by: INTERNAL MEDICINE

## 2022-12-27 PROCEDURE — 2060000000 HC ICU INTERMEDIATE R&B

## 2022-12-27 PROCEDURE — 36415 COLL VENOUS BLD VENIPUNCTURE: CPT

## 2022-12-27 PROCEDURE — 85027 COMPLETE CBC AUTOMATED: CPT

## 2022-12-27 PROCEDURE — 2580000003 HC RX 258: Performed by: INTERNAL MEDICINE

## 2022-12-27 PROCEDURE — 6360000002 HC RX W HCPCS

## 2022-12-27 PROCEDURE — C1894 INTRO/SHEATH, NON-LASER: HCPCS

## 2022-12-27 PROCEDURE — B2151ZZ FLUOROSCOPY OF LEFT HEART USING LOW OSMOLAR CONTRAST: ICD-10-PCS | Performed by: INTERNAL MEDICINE

## 2022-12-27 PROCEDURE — 93458 L HRT ARTERY/VENTRICLE ANGIO: CPT

## 2022-12-27 PROCEDURE — 2700000000 HC OXYGEN THERAPY PER DAY

## 2022-12-27 PROCEDURE — 99233 SBSQ HOSP IP/OBS HIGH 50: CPT | Performed by: INTERNAL MEDICINE

## 2022-12-27 PROCEDURE — 6360000004 HC RX CONTRAST MEDICATION: Performed by: INTERNAL MEDICINE

## 2022-12-27 PROCEDURE — C1769 GUIDE WIRE: HCPCS

## 2022-12-27 PROCEDURE — 2709999900 HC NON-CHARGEABLE SUPPLY

## 2022-12-27 RX ORDER — LOSARTAN POTASSIUM 25 MG/1
25 TABLET ORAL DAILY
Status: DISCONTINUED | OUTPATIENT
Start: 2022-12-28 | End: 2022-12-28 | Stop reason: HOSPADM

## 2022-12-27 RX ORDER — SODIUM CHLORIDE 9 MG/ML
INJECTION, SOLUTION INTRAVENOUS PRN
Status: DISCONTINUED | OUTPATIENT
Start: 2022-12-27 | End: 2022-12-28 | Stop reason: HOSPADM

## 2022-12-27 RX ORDER — SODIUM CHLORIDE 0.9 % (FLUSH) 0.9 %
5-40 SYRINGE (ML) INJECTION EVERY 12 HOURS SCHEDULED
Status: DISCONTINUED | OUTPATIENT
Start: 2022-12-27 | End: 2022-12-28 | Stop reason: HOSPADM

## 2022-12-27 RX ORDER — SODIUM CHLORIDE 0.9 % (FLUSH) 0.9 %
5-40 SYRINGE (ML) INJECTION PRN
Status: DISCONTINUED | OUTPATIENT
Start: 2022-12-27 | End: 2022-12-28 | Stop reason: HOSPADM

## 2022-12-27 RX ORDER — ACETAMINOPHEN 325 MG/1
650 TABLET ORAL EVERY 4 HOURS PRN
Status: DISCONTINUED | OUTPATIENT
Start: 2022-12-27 | End: 2022-12-28 | Stop reason: HOSPADM

## 2022-12-27 RX ORDER — DIPHENHYDRAMINE HYDROCHLORIDE 50 MG/ML
50 INJECTION INTRAMUSCULAR; INTRAVENOUS ONCE
Status: COMPLETED | OUTPATIENT
Start: 2022-12-27 | End: 2022-12-27

## 2022-12-27 RX ORDER — FUROSEMIDE 20 MG/1
20 TABLET ORAL DAILY
Status: DISCONTINUED | OUTPATIENT
Start: 2022-12-28 | End: 2022-12-28 | Stop reason: HOSPADM

## 2022-12-27 RX ADMIN — IOPAMIDOL 70 ML: 612 INJECTION, SOLUTION INTRAVENOUS at 13:09

## 2022-12-27 RX ADMIN — MONTELUKAST SODIUM 10 MG: 10 TABLET, FILM COATED ORAL at 21:06

## 2022-12-27 RX ADMIN — GUAIFENESIN 600 MG: 600 TABLET, EXTENDED RELEASE ORAL at 08:59

## 2022-12-27 RX ADMIN — Medication 10 ML: at 09:33

## 2022-12-27 RX ADMIN — BUDESONIDE 500 MCG: 0.5 SUSPENSION RESPIRATORY (INHALATION) at 07:39

## 2022-12-27 RX ADMIN — IPRATROPIUM BROMIDE AND ALBUTEROL SULFATE 3 ML: .5; 2.5 SOLUTION RESPIRATORY (INHALATION) at 19:57

## 2022-12-27 RX ADMIN — GUAIFENESIN 600 MG: 600 TABLET, EXTENDED RELEASE ORAL at 21:06

## 2022-12-27 RX ADMIN — CLOPIDOGREL BISULFATE 75 MG: 75 TABLET ORAL at 08:58

## 2022-12-27 RX ADMIN — LISINOPRIL 10 MG: 10 TABLET ORAL at 08:59

## 2022-12-27 RX ADMIN — DILTIAZEM HYDROCHLORIDE 180 MG: 180 CAPSULE, EXTENDED RELEASE ORAL at 21:06

## 2022-12-27 RX ADMIN — INSULIN LISPRO 4 UNITS: 100 INJECTION, SOLUTION INTRAVENOUS; SUBCUTANEOUS at 23:57

## 2022-12-27 RX ADMIN — METHYLPREDNISOLONE SODIUM SUCCINATE 40 MG: 40 INJECTION, POWDER, FOR SOLUTION INTRAMUSCULAR; INTRAVENOUS at 14:46

## 2022-12-27 RX ADMIN — LEVOFLOXACIN 750 MG: 5 INJECTION, SOLUTION INTRAVENOUS at 14:51

## 2022-12-27 RX ADMIN — DIPHENHYDRAMINE HYDROCHLORIDE 50 MG: 50 INJECTION, SOLUTION INTRAMUSCULAR; INTRAVENOUS at 10:24

## 2022-12-27 RX ADMIN — IPRATROPIUM BROMIDE AND ALBUTEROL SULFATE 3 ML: .5; 2.5 SOLUTION RESPIRATORY (INHALATION) at 07:39

## 2022-12-27 RX ADMIN — SODIUM CHLORIDE: 9 INJECTION, SOLUTION INTRAVENOUS at 10:28

## 2022-12-27 RX ADMIN — Medication 10 ML: at 21:06

## 2022-12-27 RX ADMIN — DILTIAZEM HYDROCHLORIDE 180 MG: 180 CAPSULE, EXTENDED RELEASE ORAL at 08:59

## 2022-12-27 RX ADMIN — Medication 400 MG: at 21:06

## 2022-12-27 RX ADMIN — BUDESONIDE 500 MCG: 0.5 SUSPENSION RESPIRATORY (INHALATION) at 19:57

## 2022-12-27 RX ADMIN — METHYLPREDNISOLONE SODIUM SUCCINATE 40 MG: 40 INJECTION, POWDER, FOR SOLUTION INTRAMUSCULAR; INTRAVENOUS at 00:46

## 2022-12-27 RX ADMIN — INSULIN LISPRO 2 UNITS: 100 INJECTION, SOLUTION INTRAVENOUS; SUBCUTANEOUS at 16:00

## 2022-12-27 RX ADMIN — ASPIRIN 81 MG: 81 TABLET, COATED ORAL at 08:59

## 2022-12-27 RX ADMIN — HYDROCORTISONE SODIUM SUCCINATE 200 MG: 100 INJECTION, POWDER, FOR SOLUTION INTRAMUSCULAR; INTRAVENOUS at 10:28

## 2022-12-27 RX ADMIN — ROSUVASTATIN CALCIUM 40 MG: 40 TABLET, FILM COATED ORAL at 08:59

## 2022-12-27 RX ADMIN — Medication 20 MG: at 10:26

## 2022-12-27 RX ADMIN — FUROSEMIDE 40 MG: 10 INJECTION, SOLUTION INTRAMUSCULAR; INTRAVENOUS at 08:59

## 2022-12-27 RX ADMIN — Medication 400 MG: at 08:59

## 2022-12-27 ASSESSMENT — PAIN SCALES - GENERAL: PAINLEVEL_OUTOF10: 0

## 2022-12-27 NOTE — PROGRESS NOTES
Internal Medicine   Hospitalist   Progress Note    2022   2:09 PM    Name:  Kaylee Mchugh  MRN:    [id number]     [address] [facility] Day: 5     Admit Date: 2022  6:23 PM  PCP: Josr Pitts PA-C    Code Status:  Full Code    Assessment and Plan: Active Problems/ diagnosis:     Asthma exac- resp failure - nebs,steroids, pulm consult. Neg procal. On 3L/,in o2, no O2 at home. PNA- levaquin   NSTEMI- CAD s/p stents- s/p LHC, no PCI done. Discussed with Dr Dudley Soriano. Ok to [employer] from cardiology standpoint  DM2- SSI, monitor   DVT PPx    7 pm- 7 am, please contact on call Hospitalist for any needs     Subjective:      no new events, dyspnea is improving. No chest pain. Physical Examination:      Vitals:  /71   Pulse 74   Temp 97.7 °F (36.5 °C) (Oral)   Resp 23   Ht 5' 2\" (1.575 m)   Wt 196 lb 5 oz (89 kg)   SpO2 100%   BMI 35.91 kg/m²   Temp (24hrs), Av.8 °F (36.6 °C), Min:97.7 °F (36.5 °C), Max:98.1 °F (36.7 °C)      General appearance: alert, cooperative and no distress  Mental Status: oriented to person, place and time and normal affect  Lungs: clear to auscultation bilaterally, normal effort  Heart: regular rate and rhythm, no murmur  Abdomen: soft, nontender, nondistended, bowel sounds present, no masses  Extremities: no edema, redness, tenderness in the calves  Skin: no gross lesions, rashes    Data:     Labs:  Recent Labs     22   WBC 9.2 8.1   HGB 14.0 14.8    228     Recent Labs     22   * 133*   K 4.3 4.7   CL 97 96   CO2 23 26   BUN 27* 28*   CREATININE 0.69 0.72   GLUCOSE 238* 230*     No results for input(s): AST, ALT, ALB, BILITOT, ALKPHOS in the last 72 hours.       Current Facility-Administered Medications   Medication Dose Route Frequency Provider Last Rate Last Admin    sodium chloride flush 0.9 % injection 5-40 mL  5-40 mL IntraVENous 2 times per day Yuli Dejesus MD   10 mL at 22 0908    sodium chloride flush 0.9 % injection 5-40 mL  5-40 mL IntraVENous PRN Ailin Butler MD        0.9 % sodium chloride infusion   IntraVENous PRN Ailin Butler MD 75 mL/hr at 12/27/22 1028 New Bag at 12/27/22 1028    [START ON 12/28/2022] furosemide (LASIX) tablet 20 mg  20 mg Oral Daily Ailin Butler MD        [START ON 12/28/2022] losartan (COZAAR) tablet 25 mg  25 mg Oral Daily Ailin Butler MD        polyethylene glycol (GLYCOLAX) packet 17 g  17 g Oral Daily PRN KAMILLE Davison CNP   17 g at 12/26/22 0837    methylPREDNISolone sodium (SOLU-MEDROL) injection 40 mg  40 mg IntraVENous Q12H Analia Love MD   40 mg at 12/27/22 0046    budesonide (PULMICORT) nebulizer suspension 500 mcg  0.5 mg Nebulization BID Analia Love MD   500 mcg at 12/27/22 0739    levoFLOXacin (LEVAQUIN) 750 MG/150ML infusion 750 mg  750 mg IntraVENous Q24H Analia Love MD   Stopped at 12/26/22 1357    aspirin EC tablet 81 mg  81 mg Oral Daily Sergo Ruby, DO   81 mg at 12/27/22 0859    clopidogrel (PLAVIX) tablet 75 mg  75 mg Oral Daily Sergo Ruby, DO   75 mg at 12/27/22 0858    rosuvastatin (CRESTOR) tablet 40 mg  40 mg Oral Daily Sergo BROWN Sedar, DO   40 mg at 12/27/22 0859    montelukast (SINGULAIR) tablet 10 mg  10 mg Oral Nightly Sergo BROWN Sedar, DO   10 mg at 12/26/22 2200    magnesium oxide (MAG-OX) tablet 400 mg  400 mg Oral BID Dagoberto Truman Sedar, DO   400 mg at 12/27/22 0859    dilTIAZem (CARDIZEM CD) extended release capsule 180 mg  180 mg Oral BID Dagoberto Truman Sedar, DO   180 mg at 12/27/22 8227    acetaminophen (TYLENOL) tablet 650 mg  650 mg Oral Q6H PRN Dagoberto Truman Sedar, DO   650 mg at 12/26/22 2200    Or    acetaminophen (TYLENOL) suppository 650 mg  650 mg Rectal Q6H PRN Dagoberto Truman Sedar, DO        potassium chloride (KLOR-CON M) extended release tablet 40 mEq  40 mEq Oral PRN Dagoberto Truman Sedar, DO        Or    potassium bicarb-citric acid (EFFER-K) effervescent tablet 40 mEq  40 mEq Oral PRN Dagoberto Truman Sedar, DO        Or    potassium chloride 10 mEq/100 mL IVPB (Peripheral Line)  10 mEq IntraVENous PRN London Esters Sedar, DO        magnesium sulfate 2000 mg in 50 mL IVPB premix  2,000 mg IntraVENous PRN London Esters Sedar, DO        ondansetron (ZOFRAN-ODT) disintegrating tablet 4 mg  4 mg Oral Q8H PRN London Esters Sedar, DO        Or    ondansetron (ZOFRAN) injection 4 mg  4 mg IntraVENous Q6H PRN London Esters Sedar, DO        enoxaparin (LOVENOX) injection 40 mg  40 mg SubCUTAneous Daily Simone Og D Sedar, DO   40 mg at 12/26/22 5881    glucose chewable tablet 16 g  4 tablet Oral PRN Simone Og D Sedar, DO        dextrose bolus 10% 125 mL  125 mL IntraVENous PRN London Esters Sedar, DO        Or    dextrose bolus 10% 250 mL  250 mL IntraVENous PRN London Esters Sedar, DO        glucagon (rDNA) injection 1 mg  1 mg SubCUTAneous PRN London Esters Sedar, DO        dextrose 10 % infusion   IntraVENous Continuous PRN London Esters Sedar, DO        insulin lispro (HUMALOG) injection vial 0-4 Units  0-4 Units SubCUTAneous TID WC London Esters Sedar, DO   2 Units at 12/26/22 1841    insulin lispro (HUMALOG) injection vial 0-4 Units  0-4 Units SubCUTAneous Nightly London Esters Sedar, DO        guaiFENesin [facility]) extended release tablet 600 mg  600 mg Oral BID London Esters Sedar, DO   600 mg at 12/27/22 0859    albuterol (PROVENTIL) nebulizer solution 2.5 mg  2.5 mg Nebulization Q2H PRN London Esters Sedar, DO   2.5 mg at 12/24/22 0243    ipratropium-albuterol (DUONEB) nebulizer solution 3 mL  1 vial Inhalation TID London Esters Sedar, DO   3 mL at 12/27/22 0739    sodium chloride (OCEAN, BABY AYR) 0.65 % nasal spray 1 spray  1 spray Each Nostril Q2H PRN Blanca Duran, DO        benzonatate (TESSALON) capsule 100 mg  100 mg Oral TID PRN KAMILLE Daly - CNP   100 mg at 12/26/22 2200       Additional work up or/and treatment plan may be added today or then after based on clinical progression. I am managing a portion of pt care. Some medical issues are handled by other specialists.  Additional work up and treatment should be done in out pt setting by pt PCP and other out pt providers. In addition to examining and evaluating pt, I spent additional time explaining care, normaland abnormal findings, and treatment plan. All of pt questions were answered. Counseling, diet and education were provided. Case will be discussed with nursing staff when appropriate. Family will be updated if and when appropriate.        Electronically signed by Niraj Rogers DO on 12/27/2022 at 2:09 PM

## 2022-12-27 NOTE — PROGRESS NOTES
1320: Pt returned from cath lab.  R wrist was access site- site has no obvious swelling or discoloration

## 2022-12-27 NOTE — BRIEF OP NOTE
Patient underwent left heart catheterization selective coronary angiography left ventriculography measurement of left ventricular end-diastolic pressure LV ao pullback via right radial approach    Clinical indication:    Acute coronary syndrome    Estimated blood loss less than 10 cc  No immediate complications  Right dominant system widely patent ostial proximal RCA stent RCA angiographically normal except for presence of stent in the proximal vessel left main LAD ostial proximal 30 to 40% smooth narrowing rest of the vessel is normal left circumflex has about 40% disease in the midsegment , first diagonal has ostial proximal 40 to 50% smooth stenosis. LVEDP 5 to 8 mmHg no systolic gradient across the aortic valve LVEF 60% no regional wall motion abnormality appreciated  Medical therapy and risk factor modification, discharge planning per Dr. Brittany Baird.

## 2022-12-27 NOTE — PROGRESS NOTES
1330: Pt having small snack, tolerating well. 1350: Pt resting quietly, opens eyes to voice. Site remains clean and dry. 1415: Report to Nurse ORLÉANS on 9D  4173: Outer band removed, Jason Ace remains.  No obvious swelling or deformity noted

## 2022-12-27 NOTE — PROGRESS NOTES
[facility] OF THE [facility] Heart Progress Note      Patient:  Kaylee Mchugh  YOB: 1970  MRN: [id number]   Tahiride: [de-identified]  Admit Date:  12/22/2022  Primary Cardiologist:Dr. Jolly [facility] Cardiologist: Dr. Antonietta PEOPLES    Feels well this morning still with some noncardiac type chest pain related to her significant coughing from her pneumonia previously. No clear-cut angina at this time breathing is better he is not as short of breath. Interestingly, echocardiogram shows resolution of the distal anterior anteroseptal septal hypokinesis on echocardiogram just 4 days ago. Also no evidence of pericardial effusion. Monitoring showed no dysrhythmias last evening. Impression  1.  J-point elevation on ECG: No evidence of pericarditis clinically and no pericardial effusion may have had mild pericarditis  2. Non-ST elevation MI: Interestingly wall motion normality noted on 12/23/2022 no longer evident. May have reperfused and LAD lesion  3. ECG today with sinus rhythm T inversion aVL T inversion in lead I improved. Still with diffuse mild J-point elevation. This also has improved. 4.  For coronary angiography today. She has no questions or concerns agrees to procedure risk and benefits had been discussed yesterday. 5.  She now tells me her significant cough was not just with pneumonia but has been present for several months and likely ACE inhibitor induced we will switch to losartan    Yuli Dejesus MD      Chief Complaint/Active Symptoms:   Frederick Macdonald was seen and examined at bedside     She is currently sitting up in bed talking on the telephone. States that she continues to be short of breath if she talks to much and has some chest discomfort on and off.          Impression/Plan:     Type I, NSTEMI: Planned coronary angiography today with Dr. Crzu Sour elevation with slight CA elevation in aVR suggesting pericarditis but no pericardial symptoms at this time. As no symptoms of pericarditis or pericardial rub not clearly acute pericarditis. Pending ECHO. As no angina and potential of pericarditis will not fully anticoagulate at this time  Pneumonia/COPD exacerbation: Per pulmonary medicine service clinically is improving  Hypertension: Controlled  Further recommendations pending coronary anglography. Further recommendations per Dr. Booker Fajardo         Admitted 12/22/2022 with bilateral pneumonia probably viral COPD exacerbation sinus tachycardia. Had progressive shortness of breath. Troponin elevated with a rise and fall pattern      CARDIAC HISTORY:  Coronary artery disease: Right coronary ERMIAS August 2020 at that time LM 20%, LAD 30% proximal.  60% mid.  8/20/2021 normal perfusion study  Hypertension  Hyperlipidemia    Past Medical History:   Diagnosis Date    Anticoagulant long-term use     Asthma 2004    CAD (coronary artery disease)     COPD (chronic obstructive pulmonary disease) ([facility] [address]     Diabetes mellitus ([facility] [address] 2014    Hodgkin disease ([facility] [address] 1999    Hyperlipidemia     Hypertension 1984    Migraines 1989    Sleep apnea     recently tested     Type 2 diabetes mellitus without complication ([facility] [address]     type II         Objective:   Vitals:    12/27/22 0213 12/27/22 0321 12/27/22 0740 12/27/22 0837   BP: 136/82   132/66   Pulse: 82   74   Resp: 20 22 18 18   Temp:    97.7 °F (36.5 °C)   TempSrc:    Oral   SpO2: 100%  93% 96%   Weight:       Height:          Wt Readings from Last 3 Encounters:   12/25/22 196 lb 5 oz (89 kg)   12/01/22 196 lb 12.8 oz (89.3 kg)   11/03/22 196 lb 12.8 oz (89.3 kg)       TELEMETRY: normal sinus                                   Physical Exam:  General Appearance: alert and oriented to person, place and time, stable until she starts to cough  Cardiovascular: normal rate, regular rhythm, normal S1 and S2, no murmurs, rubs, clicks, or gallops,  no JVD  Pulmonary/Chest: inspiratory wheezes right lobes, diminished throughout.    Abdomen: soft, non-tender, non-distended, normal bowel sounds, no masses   Extremities: no cyanosis, clubbing or edema  Skin: warm and dry, no rash or erythema  Eyes: EOMI  Neck: supple and non-tender without mass, no thyromegaly   Neurological: alert, oriented, normal speech, no focal findings or movement disorder noted    Allergies: Allergies   Allergen Reactions    Shellfish-Derived Products Anaphylaxis    Cumin Oil Itching    Piper Itching        Medications:    sodium chloride flush  5-40 mL IntraVENous 2 times per day    methylPREDNISolone  40 mg IntraVENous Q12H    budesonide  0.5 mg Nebulization BID    levofloxacin  750 mg IntraVENous Q24H    aspirin  81 mg Oral Daily    clopidogrel  75 mg Oral Daily    rosuvastatin  40 mg Oral Daily    montelukast  10 mg Oral Nightly    magnesium oxide  400 mg Oral BID    lisinopril  10 mg Oral Daily    dilTIAZem  180 mg Oral BID    enoxaparin  40 mg SubCUTAneous Daily    insulin lispro  0-4 Units SubCUTAneous TID WC    insulin lispro  0-4 Units SubCUTAneous Nightly    guaiFENesin  600 mg Oral BID    furosemide  40 mg IntraVENous Daily    ipratropium-albuterol  1 vial Inhalation TID      sodium chloride      dextrose       sodium chloride flush, 5-40 mL, PRN  sodium chloride, , PRN  polyethylene glycol, 17 g, Daily PRN  acetaminophen, 650 mg, Q6H PRN   Or  acetaminophen, 650 mg, Q6H PRN  potassium chloride, 40 mEq, PRN   Or  potassium alternative oral replacement, 40 mEq, PRN   Or  potassium chloride, 10 mEq, PRN  magnesium sulfate, 2,000 mg, PRN  ondansetron, 4 mg, Q8H PRN   Or  ondansetron, 4 mg, Q6H PRN  glucose, 4 tablet, PRN  dextrose bolus, 125 mL, PRN   Or  dextrose bolus, 250 mL, PRN  glucagon (rDNA), 1 mg, PRN  dextrose, , Continuous PRN  albuterol, 2.5 mg, Q2H PRN  sodium chloride, 1 spray, Q2H PRN  benzonatate, 100 mg, TID PRN      Diagnostics:  EK2022: Sinus rhythm new T inversion high lateral leads.   Diffuse J-point elevation suggestive of pericarditis. 12/22/2022 sinus tachycardia otherwise normal    Echo: 12/23/2022  Left ventricular ejection fraction is visually estimated at 45-50%. Severe hypokinesis of distal 1/2 septum, anteroseptal walls. Anterolateral   wall normal.   Wall motion abnormalities new since study 9/2020   Normal right ventricle structure and function. RVSP could not be calculated   Normal valvular structure and function. No significant regurgitant or stenotic valvular lesions. MAC noted    CXR:               Portable: Results for orders placed during the hospital encounter of 12/22/22    XR CHEST PORTABLE    Narrative  EXAMINATION:  ONE XRAY VIEW OF THE CHEST    12/22/2022 6:43 pm    COMPARISON:  None. HISTORY:  ORDERING SYSTEM PROVIDED HISTORY: SOB  TECHNOLOGIST PROVIDED HISTORY:  Reason for exam:->SOB  Is the patient pregnant?->No  What reading provider will be dictating this exam?->CRC    FINDINGS:  The lungs are without acute focal process. There is no effusion or  pneumothorax. The cardiomediastinal silhouette is without acute process. The  osseous structures are without acute process. Impression  No acute process. Lab Data:    Cardiac Enzymes:  No results for input(s): CKTOTAL, CKMB, CKMBINDEX, TROPONINI in the last 72 hours. ProBNP:   Lab Results   Component Value Date/Time    PROBNP 12 12/22/2022 07:23 PM       CBC:   Recent Labs     12/25/22 0452 12/26/22  0526 12/27/22  0519   WBC 7.7 9.2 8.1   RBC 4.71 4.59 5.01   HGB 14.2 14.0 14.8   HCT 42.7 41.3 44.7    229 228       CMP:    Recent Labs     12/25/22 0452 12/26/22  0526 12/27/22  0519    134* 133*   K 5.1* 4.3 4.7   CL 97 97 96   CO2 25 23 26   BUN 22* 27* 28*   CREATININE 0.78 0.69 0.72   LABGLOM >60.0 >60.0 >60.0   GLUCOSE 185* 238* 230*   CALCIUM 9.8 9.2 9.2       Hepatic Function Panel:    No results for input(s): ALKPHOS, ALT, AST, PROT, BILITOT, BILIDIR, IBILI, LABALBU in the last 72 hours.       Magnesium:    Recent Labs 12/25/22  0452 12/26/22  0526 12/27/22  0519   MG 2.6* 2.3 2.4       PT/INR:    No results for input(s): PROTIME, INR in the last 72 hours. Lipids:    No results for input(s): CHOL, TRIG, HDL, LDLCALC, LABVLDL in the last 72 hours. Principal Problem:    Hypoxia  Active Problems:    Exacerbation of persistent asthma  Resolved Problems:    * No resolved hospital problems.  *           Electronically signed by KAMILLE Hu CNP on 12/27/2022 at 9:36 AM

## 2022-12-27 NOTE — CARE COORDINATION
MET WITH PATIENT, PLANNING FOR PDC TOMORROW. MAY NEED HOME O2 EVAL. PT WOULD LIKE [facility], FREEDOM OF CHOICE GIVEN. REFERRAL CALLED TO [place] [facility].

## 2022-12-27 NOTE — PROGRESS NOTES
1030: 12 lead EKG obtained, shown to dr Ni Tejada.   1100: Pt resting comfortably with eyes closed, waiting for procedure,  1130: Pt continues to rest comfortably, no issues or concerns,  1200: No changes  1219: Pt to cath lab

## 2022-12-27 NOTE — DISCHARGE INSTR - COC
Continuity of Care Form    Patient Name: Noris Saavedra   :  1970  MRN:  [id number]    Admit date:  2022  Discharge date:  2022    Code Status Order: Full Code   Advance Directives:     Admitting Physician:  Glenn Castañeda DO  PCP: Latosha Rolon PA-C    Discharging Nurse: Adonay Crisostomo RN    [address] Unit/Room#: J544/Q269-15  Discharging Unit Phone Number: [phone]      Emergency Contact:   Extended Emergency Contact Information  Primary Emergency Contact: Belia Mendoza Phone: [phone]  Mobile Phone: [phone]  Relation: Other   needed?  No  Secondary Emergency Contact: Heaven Villanueva  Phone: [phone]  Relation: Child    Past Surgical History:  Past Surgical History:   Procedure Laterality Date    TUBAL LIGATION         Immunization History:   Immunization History   Administered Date(s) Administered    COVID-19, PFIZER GRAY top, DO NOT Dilute, (age 15 y+), IM, 27 mcg/0.3 mL 2022, 10/07/2022       Active Problems:  Patient Active Problem List   Diagnosis Code    Hypercholesteremia E78.00    Prediabetes R73.03    Exacerbation of persistent asthma J45.901    Diabetes mellitus ([address] E11.9    Angina at rest [facility]) I20.8    Abnormal exercise myocardial perfusion study R94.39    Acute respiratory failure with hypoxia ([address] J96.01    Chest pain R07.9    CAD I25.10    COPD  J44.9    Hypoxia R09.02       Isolation/Infection:   Isolation            No Isolation          Patient Infection Status       Infection Onset Added Last Indicated Last Indicated By Review Planned Expiration Resolved Resolved By    None active    Resolved    COVID-19 (Rule Out) 22 Respiratory Panel, Molecular, with COVID-19 (Restricted: peds pts or suitable admitted adults) (Ordered)   22 Rule-Out Test Resulted    COVID-19 (Rule Out) 22 COVID-19, Rapid (Ordered)   22 Rule-Out Test Resulted    COVID-19 (Rule Out) 04/15/22 04/15/22 04/15/22 COVID-19, Rapid (Ordered)   04/15/22 Rule-Out Test Resulted    COVID-19 (Rule Out) 01/24/22 01/24/22 01/24/22 Covid-19 Ambulatory (Ordered)   01/26/22 Rule-Out Test Resulted    COVID-19 (Rule Out) 12/20/21 12/20/21 12/20/21 COVID-19, Rapid (Ordered)   12/20/21 Rule-Out Test Resulted    COVID-19 (Rule Out) 09/30/20 09/30/20 09/30/20 COVID-19 (Ordered)   09/30/20 Rule-Out Test Resulted    COVID-19 (Rule Out) 09/29/20 09/29/20 09/29/20 COVID-19 (Ordered)   09/29/20 Rule-Out Test Resulted    COVID-19 (Rule Out) 07/22/20 07/22/20 07/22/20 COVID-19 (Ordered)   07/22/20 Rule-Out Test Resulted    COVID-19 (Rule Out) 07/21/20 07/21/20 07/21/20 COVID-19 (Ordered)   07/21/20 Rule-Out Test Resulted            Nurse Assessment:  Last Vital Signs: BP (!) 111/59   Pulse 74   Temp 97.7 °F (36.5 °C) (Oral)   Resp 19   Ht 5' 2\" (1.575 m)   Wt 196 lb 5 oz (89 kg)   SpO2 100%   BMI 35.91 kg/m²     Last documented pain score (0-10 scale): Pain Level: 0  Last Weight:   Wt Readings from Last 1 Encounters:   12/25/22 196 lb 5 oz (89 kg)     Mental Status:  oriented, alert, coherent, logical, thought processes intact, and able to concentrate and follow conversation    IV Access:  - None    Nursing Mobility/ADLs:  Walking   Independent  Transfer  Independent  Bathing  Independent  Dressing  Assisted  Toileting  Independent  Feeding  Independent  Med [address]  Independent  Med Delivery   whole    Wound Care Documentation and Therapy:        Elimination:  Continence: Bowel: Yes  Bladder: Yes  Urinary Catheter: None   Colostomy/Ileostomy/Ileal Conduit: No       Date of Last BM: 12/27/2022  No intake or output data in the 24 hours ending 12/27/22 1502  I/O last 3 completed shifts: In: 480 [P.O.:480]  Out: -     Safety Concerns:     None    Impairments/Disabilities:      Dyspnea on exertion    Nutrition Therapy:  Current Nutrition Therapy:   - Oral Diet:  General    Routes of Feeding: Oral  Liquids:  Thin Liquids  Daily Fluid Restriction: no  Last Modified Barium Swallow with Video (Video Swallowing Test): not done    Treatments at the Time of Hospital Discharge:   Respiratory Treatments: duoneb  Oxygen Therapy:  is on oxygen at 3 L/min per nasal cannula. Ventilator:    - No ventilator support    Rehab Therapies: Physical Therapy and Occupational Therapy  Weight Bearing Status/Restrictions: No weight bearing restrictions  Other Medical Equipment (for information only, NOT a DME order):  bedside commode  Other Treatments: none      Patient's personal belongings (please select all that are sent with patient):  Clothes, cell phone    RN SIGNATURE:  Electronically signed by Gurjit Bess RN on 12/28/22 at 12:41 PM EST    CASE MANAGEMENT/SOCIAL WORK SECTION    Inpatient Status Date: ***    Readmission Risk Assessment Score:  Readmission Risk              Risk of Unplanned Readmission:  14           Discharging to Facility/ Agency   Name:  [facility]  Address:  Phone:  [phone]  Fax:        / signature: {Esignature:[id number]}    PHYSICIAN SECTION    Prognosis: {Prognosis:5655150171}    Condition at Discharge: [address] [place] [address] Patient Condition:[id number]}    Rehab Potential (if transferring to Rehab): {Prognosis:4598381725}    Recommended Labs or Other Treatments After Discharge: ***    Physician Certification: I certify the above information and transfer of Vane Modi  is necessary for the continuing treatment of the diagnosis listed and that she requires {Admit to Appropriate Level of Care:59606} for {GREATER/LESS:450500608} 30 days.      Update Admission H&P: {CHP DME Changes in NFXMP:479815763}    PHYSICIAN SIGNATURE:  {Esignature:[id number]}

## 2022-12-27 NOTE — FLOWSHEET NOTE
2145- HS assessment completed. Pts vitals stable on 3L NC. Denies CP. Reports \"mild\" SOB. Discussed cardiac cath for tomorrow. Pt denies questions. Consents signed. Denies any other needs at this time. Call light in reach. Team to continue to monitor.  Electronically signed by Brayan Payne RN on 12/27/2022 at 3:17 AM

## 2022-12-27 NOTE — PROGRESS NOTES
INPATIENT PROGRESS NOTES    PATIENT NAME: Noris Saavedra  MRN: [id number]  SERVICE DATE:  December 27, 2022   SERVICE TIME:  6:27 PM      PRIMARY SERVICE: Pulmonary Disease    CHIEF COMPLAIN: COPD exacerbation      INTERVAL HPI: Patient seen and examined at bedside, Interval Notes, orders reviewed. Nursing notes noted  Patient is feeling better. She used BiPAP last night. Shortness of breath improved. Currently on 3 L O2 via nasal cannula O2 saturation 93%. No nausea vomiting diarrhea abdominal pain. Denies having any chest pain. No fever or chills. OBJECTIVE    Body mass index is 35.91 kg/m². PHYSICAL EXAM:  Vitals:  /61   Pulse 77   Temp 98.2 °F (36.8 °C) (Oral)   Resp 18   Ht 5' 2\" (1.575 m)   Wt 196 lb 5 oz (89 kg)   SpO2 93%   BMI 35.91 kg/m²   General: Alert, awake . comfortable in bed, No distress. Head: Atraumatic , Normocephalic   Eyes: PERRL. No sclera icterus. No conjunctival injection. No discharge   ENT: No nasal  discharge. Pharynx clear. Neck:  Trachea midline. No thyromegaly, no JVD, No cervical adenopathy. Chest : Bilaterally symmetrical ,Normal effort,  No accessory muscle use  Lung : . Fair BS bilateral, decreased BS at bases. No Rales. No wheezing. No rhonchi. Heart[de-identified] Normal  rate. Regular rhythm. No mumur ,  Rub or gallop  ABD: Non-tender. Non-distended. No masses. No organmegaly. Normal bowel sounds. No hernia. Ext : No Pitting both leg , No Cyanosis No clubbing  Neuro: no focal weakness          DATA:   Recent Labs     12/26/22 0526 12/27/22 0519   WBC 9.2 8.1   HGB 14.0 14.8   HCT 41.3 44.7   MCV 89.9 89.3    228     Recent Labs     12/26/22 0526 12/27/22 0519   * 133*   K 4.3 4.7   CL 97 96   CO2 23 26   BUN 27* 28*   CREATININE 0.69 0.72   GLUCOSE 238* 230*   CALCIUM 9.2 9.2   LABGLOM >60.0 >60.0       MV Settings:     FiO2 : 40 %    No results for input(s): PHART, YZW4AWS, PO2ART, YIN7LCW, BEART, O5SXMUNZ in the last 72 hours.     O2 Device: None (Room air)  O2 Flow Rate (L/min): 3 L/min    ADULT DIET; Regular; Low Fat/Low Chol/High Fiber/2 gm Na     MEDICATIONS during current hospitalization:    Continuous Infusions:   sodium chloride 75 mL/hr at 12/27/22 1028    sodium chloride      dextrose         Scheduled Meds:   sodium chloride flush  5-40 mL IntraVENous 2 times per day    [START ON 12/28/2022] furosemide  20 mg Oral Daily    [START ON 12/28/2022] losartan  25 mg Oral Daily    sodium chloride flush  5-40 mL IntraVENous 2 times per day    budesonide  0.5 mg Nebulization BID    levofloxacin  750 mg IntraVENous Q24H    aspirin  81 mg Oral Daily    clopidogrel  75 mg Oral Daily    rosuvastatin  40 mg Oral Daily    montelukast  10 mg Oral Nightly    magnesium oxide  400 mg Oral BID    dilTIAZem  180 mg Oral BID    insulin lispro  0-4 Units SubCUTAneous TID WC    insulin lispro  0-4 Units SubCUTAneous Nightly    guaiFENesin  600 mg Oral BID    ipratropium-albuterol  1 vial Inhalation TID       PRN Meds:sodium chloride flush, sodium chloride, sodium chloride flush, sodium chloride, acetaminophen, polyethylene glycol, [DISCONTINUED] acetaminophen **OR** acetaminophen, potassium chloride **OR** potassium alternative oral replacement **OR** potassium chloride, ondansetron **OR** ondansetron, glucose, dextrose bolus **OR** dextrose bolus, glucagon (rDNA), dextrose, albuterol, sodium chloride, benzonatate    Radiology  CTA CHEST W WO CONTRAST    Result Date: 12/23/2022  EXAMINATION: CTA OF THE CHEST WITH AND WITHOUT CONTRAST 12/22/2022 8:31 pm TECHNIQUE: CTA of the chest was performed before and after the administration of intravenous contrast.  Multiplanar reformatted images are provided for review. MIP images are provided for review. Automated exposure control, iterative reconstruction, and/or weight based adjustment of the mA/kV was utilized to reduce the radiation dose to as low as reasonably achievable. COMPARISON: None.  HISTORY: ORDERING SYSTEM PROVIDED HISTORY: SOB,  tachypnea, hypoxia; CHF vs PE vs CHF PE pneumonia or other combo there of TECHNOLOGIST PROVIDED HISTORY: Reason for exam:->SOB,  tachypnea, hypoxia; CHF vs PE vs CHF PE pneumonia or other combo there of Decision Support Exception - unselect if not a suspected or confirmed emergency medical condition->Emergency Medical Condition (MA) What reading provider will be dictating this exam?->CRC FINDINGS: Pulmonary Arteries: Pulmonary arteries are adequately opacified for evaluation. No evidence of intraluminal filling defect to suggest pulmonary embolism. Main pulmonary artery is normal in caliber. Mediastinum: No evidence of mediastinal lymphadenopathy. The heart and pericardium demonstrate no acute abnormality. There is no acute abnormality of the thoracic aorta. Thyroid is homogeneous in appearance. There is prominence of the hilar lymph nodes bilaterally possibly reactive. Reactive lymph nodes seen scattered throughout the mediastinum. Coronary artery calcification. No pericardial effusion. Lungs/pleura: Scattered subtle areas of ground-glass opacity identified involving the central aspect of the right and lung and left upper lobe. Findings favoring bilateral pneumonia. Underlying chronic changes seen within lung fields with bronchial wall thickening in the perihilar regions. Bronchiolitis cannot be excluded. Upper Abdomen: Limited images of the upper abdomen are unremarkable. Soft Tissues/Bones: No acute bone or soft tissue abnormality. Degenerative changes seen within the spine. No acute chest wall abnormality. No evidence of pulmonary embolism. Findings suggesting bilateral pneumonia including atypical bacterial and viral processes with also a component of bronchiolitis with bronchial wall thickening centrally extending into the lower lobes bilaterally. XR CHEST PORTABLE    Result Date: 12/22/2022  EXAMINATION: ONE XRAY VIEW OF THE CHEST 12/22/2022 6:43 pm COMPARISON: None. HISTORY: ORDERING SYSTEM PROVIDED HISTORY: SOB TECHNOLOGIST PROVIDED HISTORY: Reason for exam:->SOB Is the patient pregnant?->No What reading provider will be dictating this exam?->CRC FINDINGS: The lungs are without acute focal process. There is no effusion or pneumothorax. The cardiomediastinal silhouette is without acute process. The osseous structures are without acute process. No acute process. IMPRESSION AND SUGGESTION:  Asthma with acute exacerbation  Multilobar pneumonia likely atypical pneumonia  Smoking, tobacco abuse  Non-ST elevation MI  Obesity, sleep study - October 2020    Continue O2 to keep saturation 90% or above. Change Solu-Medrol twice a day. Continue Levaquin. Nebulizer with DuoNeb and Pulmicort has been requested. BiPAP as needed. Recommend smoking cessation. She had cardiac cath today. Recommend medical therapy and risk factor modification. Continue present treatment plan    NOTE: This report was transcribed using voice recognition software. Every effort was made to ensure accuracy; however, inadvertent computerized transcription errors may be present.       Electronically signed by Enrique Garland MD, FCCP on 12/27/2022 at 6:27 PM

## 2022-12-28 VITALS
DIASTOLIC BLOOD PRESSURE: 63 MMHG | BODY MASS INDEX: 36.12 KG/M2 | OXYGEN SATURATION: 96 % | RESPIRATION RATE: 16 BRPM | HEART RATE: 81 BPM | WEIGHT: 196.31 LBS | SYSTOLIC BLOOD PRESSURE: 130 MMHG | HEIGHT: 62 IN | TEMPERATURE: 98.2 F

## 2022-12-28 LAB
BASOPHILS ABSOLUTE: 0.1 K/UL (ref 0–0.2)
BASOPHILS RELATIVE PERCENT: 0.6 %
BLOOD CULTURE, ROUTINE: NORMAL
BLOOD CULTURE, ROUTINE: NORMAL
EKG ATRIAL RATE: 66 BPM
EKG ATRIAL RATE: 76 BPM
EKG P AXIS: 55 DEGREES
EKG P AXIS: 59 DEGREES
EKG P-R INTERVAL: 230 MS
EKG P-R INTERVAL: 244 MS
EKG Q-T INTERVAL: 394 MS
EKG Q-T INTERVAL: 412 MS
EKG QRS DURATION: 92 MS
EKG QRS DURATION: 94 MS
EKG QTC CALCULATION (BAZETT): 431 MS
EKG QTC CALCULATION (BAZETT): 443 MS
EKG R AXIS: 36 DEGREES
EKG R AXIS: 49 DEGREES
EKG T AXIS: 79 DEGREES
EKG T AXIS: 87 DEGREES
EKG VENTRICULAR RATE: 66 BPM
EKG VENTRICULAR RATE: 76 BPM
EOSINOPHILS ABSOLUTE: 0 K/UL (ref 0–0.7)
EOSINOPHILS RELATIVE PERCENT: 0.1 %
GLUCOSE BLD-MCNC: 229 MG/DL (ref 70–99)
GLUCOSE BLD-MCNC: 316 MG/DL (ref 70–99)
HCT VFR BLD CALC: 42.7 % (ref 37–47)
HEMOGLOBIN: 14.2 G/DL (ref 12–16)
LYMPHOCYTES ABSOLUTE: 0.7 K/UL (ref 1–4.8)
LYMPHOCYTES RELATIVE PERCENT: 8 %
MCH RBC QN AUTO: 30.1 PG (ref 27–31.3)
MCHC RBC AUTO-ENTMCNC: 33.1 % (ref 33–37)
MCV RBC AUTO: 91 FL (ref 79.4–94.8)
MONOCYTES ABSOLUTE: 0.6 K/UL (ref 0.2–0.8)
MONOCYTES RELATIVE PERCENT: 7.3 %
NEUTROPHILS ABSOLUTE: 7.1 K/UL (ref 1.4–6.5)
NEUTROPHILS RELATIVE PERCENT: 84 %
PDW BLD-RTO: 13.7 % (ref 11.5–14.5)
PERFORMED ON: ABNORMAL
PERFORMED ON: ABNORMAL
PLATELET # BLD: 219 K/UL (ref 130–400)
RBC # BLD: 4.7 M/UL (ref 4.2–5.4)
WBC # BLD: 8.4 K/UL (ref 4.8–10.8)

## 2022-12-28 PROCEDURE — 99232 SBSQ HOSP IP/OBS MODERATE 35: CPT | Performed by: INTERNAL MEDICINE

## 2022-12-28 PROCEDURE — 6370000000 HC RX 637 (ALT 250 FOR IP): Performed by: INTERNAL MEDICINE

## 2022-12-28 PROCEDURE — 6360000002 HC RX W HCPCS: Performed by: INTERNAL MEDICINE

## 2022-12-28 PROCEDURE — 2580000003 HC RX 258: Performed by: INTERNAL MEDICINE

## 2022-12-28 PROCEDURE — 36415 COLL VENOUS BLD VENIPUNCTURE: CPT

## 2022-12-28 PROCEDURE — 85025 COMPLETE CBC W/AUTO DIFF WBC: CPT

## 2022-12-28 PROCEDURE — 94640 AIRWAY INHALATION TREATMENT: CPT

## 2022-12-28 PROCEDURE — 94761 N-INVAS EAR/PLS OXIMETRY MLT: CPT

## 2022-12-28 RX ORDER — LEVOFLOXACIN 750 MG/1
750 TABLET ORAL DAILY
Qty: 5 TABLET | Refills: 0 | Status: SHIPPED | OUTPATIENT
Start: 2022-12-28 | End: 2023-01-02

## 2022-12-28 RX ORDER — OXYCODONE HYDROCHLORIDE AND ACETAMINOPHEN 5; 325 MG/1; MG/1
1 TABLET ORAL ONCE
Status: COMPLETED | OUTPATIENT
Start: 2022-12-28 | End: 2022-12-28

## 2022-12-28 RX ORDER — PREDNISONE 10 MG/1
TABLET ORAL
Qty: 30 TABLET | Refills: 0 | Status: SHIPPED | OUTPATIENT
Start: 2022-12-28

## 2022-12-28 RX ORDER — PANTOPRAZOLE SODIUM 40 MG/1
40 TABLET, DELAYED RELEASE ORAL
Status: DISCONTINUED | OUTPATIENT
Start: 2022-12-28 | End: 2022-12-28 | Stop reason: HOSPADM

## 2022-12-28 RX ORDER — PREDNISONE 10 MG/1
40 TABLET ORAL DAILY
Status: DISCONTINUED | OUTPATIENT
Start: 2022-12-28 | End: 2022-12-28 | Stop reason: HOSPADM

## 2022-12-28 RX ORDER — LOSARTAN POTASSIUM 25 MG/1
25 TABLET ORAL DAILY
Qty: 30 TABLET | Refills: 3 | Status: SHIPPED | OUTPATIENT
Start: 2022-12-29

## 2022-12-28 RX ORDER — GUAIFENESIN 600 MG/1
600 TABLET, EXTENDED RELEASE ORAL 2 TIMES DAILY
Qty: 20 TABLET | Refills: 0 | Status: SHIPPED | OUTPATIENT
Start: 2022-12-28 | End: 2023-01-07

## 2022-12-28 RX ORDER — DILTIAZEM HYDROCHLORIDE 180 MG/1
180 CAPSULE, COATED, EXTENDED RELEASE ORAL 2 TIMES DAILY
Qty: 30 CAPSULE | Refills: 3 | Status: SHIPPED | OUTPATIENT
Start: 2022-12-28

## 2022-12-28 RX ADMIN — LOSARTAN POTASSIUM 25 MG: 25 TABLET, FILM COATED ORAL at 09:41

## 2022-12-28 RX ADMIN — PREDNISONE 40 MG: 10 TABLET ORAL at 09:41

## 2022-12-28 RX ADMIN — DILTIAZEM HYDROCHLORIDE 180 MG: 180 CAPSULE, EXTENDED RELEASE ORAL at 09:41

## 2022-12-28 RX ADMIN — INSULIN LISPRO 1 UNITS: 100 INJECTION, SOLUTION INTRAVENOUS; SUBCUTANEOUS at 09:42

## 2022-12-28 RX ADMIN — Medication 400 MG: at 09:48

## 2022-12-28 RX ADMIN — OXYCODONE AND ACETAMINOPHEN 1 TABLET: 5; 325 TABLET ORAL at 06:56

## 2022-12-28 RX ADMIN — GUAIFENESIN 600 MG: 600 TABLET, EXTENDED RELEASE ORAL at 09:41

## 2022-12-28 RX ADMIN — FUROSEMIDE 20 MG: 20 TABLET ORAL at 09:41

## 2022-12-28 RX ADMIN — Medication 10 ML: at 07:55

## 2022-12-28 RX ADMIN — CLOPIDOGREL BISULFATE 75 MG: 75 TABLET ORAL at 09:41

## 2022-12-28 RX ADMIN — IPRATROPIUM BROMIDE AND ALBUTEROL SULFATE 3 ML: .5; 2.5 SOLUTION RESPIRATORY (INHALATION) at 09:23

## 2022-12-28 RX ADMIN — ASPIRIN 81 MG: 81 TABLET, COATED ORAL at 09:40

## 2022-12-28 RX ADMIN — BUDESONIDE 500 MCG: 0.5 SUSPENSION RESPIRATORY (INHALATION) at 09:23

## 2022-12-28 RX ADMIN — ROSUVASTATIN CALCIUM 40 MG: 40 TABLET, FILM COATED ORAL at 09:41

## 2022-12-28 ASSESSMENT — PAIN SCALES - GENERAL: PAINLEVEL_OUTOF10: 6

## 2022-12-28 NOTE — PROGRESS NOTES
INPATIENT PROGRESS NOTES    PATIENT NAME: Saurabh Michele  MRN: [id number]  SERVICE DATE:  2022   SERVICE TIME:  8:26 AM      PRIMARY SERVICE: Pulmonary Disease    CHIEF COMPLAINTS: COPD exacerbation    INTERVAL HPI: Patient seen and examined at bedside, Interval Notes, orders reviewed. Nursing notes noted    Improving, complaint of atypical chest pain today on the left side radiates to the back and associated with heartburn, otherwise shortness of breath improved, no fever, no coughing, she underwent left heart catheterization yesterday, no acute pathology was found. She has no nausea or vomiting, she is on room air saturation 91%. Review of system:     GI Abdominal pain No  Skin Rash No    Social History     Tobacco Use    Smoking status: Former     Packs/day: 1.00     Years: 37.00     Pack years: 37.00     Types: Cigarettes     Quit date: 2022     Years since quittin.6    Smokeless tobacco: Never   Substance Use Topics    Alcohol use: Not Currently     History reviewed. No pertinent family history. OBJECTIVE    Body mass index is 35.91 kg/m². PHYSICAL EXAM:  Vitals:  /71   Pulse 71   Temp 98.1 °F (36.7 °C) (Oral)   Resp 16   Ht 5' 2\" (1.575 m)   Wt 196 lb 5 oz (89 kg)   SpO2 91%   BMI 35.91 kg/m²     General: alert, cooperative, no distress  Head: normocephalic, atraumatic  Eyes:No gross abnormalities. ENT:  MMM no lesions  Neck:  supple and no masses  Chest : Improved air movement, no wheezing, no rales, nontender, tympanic  Heart[de-identified] Heart sounds are normal.  Regular rate and rhythm without murmur, gallop or rub. ABD:  symmetric, soft, non-tender  Musculoskeletal : no cyanosis, no clubbing, and no edema  Neuro:  Grossly normal  Skin: No rashes or nodules noted.   Lymph node:  no cervical nodes  Urology: No Ruiz   Psychiatric: appropriate    DATA:   Recent Labs     22  1732 22  0506   WBC 7.0 8.4   HGB 15.0 14.2   HCT 46.1 42.7   MCV 90.9 91.0    219       Recent Labs     12/27/22  0519 12/27/22  1732   * 136   K 4.7 4.3   CL 96 95   CO2 26 29   BUN 28* 24*   CREATININE 0.72 0.66   GLUCOSE 230* 265*   CALCIUM 9.2 9.4   LABGLOM >60.0 >60.0         MV Settings:     FiO2 : 40 %    No results for input(s): PHART, RRX3RZF, PO2ART, KIK3VGF, BEART, A7QHQTDE in the last 72 hours. O2 Device: None (Room air)  O2 Flow Rate (L/min): 3 L/min    ADULT DIET;  Regular; Low Fat/Low Chol/High Fiber/2 gm Na     MEDICATIONS during current hospitalization:    Continuous Infusions:   sodium chloride 75 mL/hr at 12/27/22 1028    sodium chloride      dextrose         Scheduled Meds:   sodium chloride flush  5-40 mL IntraVENous 2 times per day    furosemide  20 mg Oral Daily    losartan  25 mg Oral Daily    sodium chloride flush  5-40 mL IntraVENous 2 times per day    budesonide  0.5 mg Nebulization BID    levofloxacin  750 mg IntraVENous Q24H    aspirin  81 mg Oral Daily    clopidogrel  75 mg Oral Daily    rosuvastatin  40 mg Oral Daily    montelukast  10 mg Oral Nightly    magnesium oxide  400 mg Oral BID    dilTIAZem  180 mg Oral BID    insulin lispro  0-4 Units SubCUTAneous TID WC    insulin lispro  0-4 Units SubCUTAneous Nightly    guaiFENesin  600 mg Oral BID    ipratropium-albuterol  1 vial Inhalation TID       PRN Meds:sodium chloride flush, sodium chloride, sodium chloride flush, sodium chloride, acetaminophen, polyethylene glycol, [DISCONTINUED] acetaminophen **OR** acetaminophen, potassium chloride **OR** potassium alternative oral replacement **OR** potassium chloride, ondansetron **OR** ondansetron, glucose, dextrose bolus **OR** dextrose bolus, glucagon (rDNA), dextrose, albuterol, sodium chloride, benzonatate    Radiology  CTA CHEST W WO CONTRAST    Result Date: 12/23/2022  EXAMINATION: CTA OF THE CHEST WITH AND WITHOUT CONTRAST 12/22/2022 8:31 pm TECHNIQUE: CTA of the chest was performed before and after the administration of intravenous contrast. Multiplanar reformatted images are provided for review. MIP images are provided for review. Automated exposure control, iterative reconstruction, and/or weight based adjustment of the mA/kV was utilized to reduce the radiation dose to as low as reasonably achievable. COMPARISON: None. HISTORY: ORDERING SYSTEM PROVIDED HISTORY: SOB,  tachypnea, hypoxia; CHF vs PE vs CHF PE pneumonia or other combo there of TECHNOLOGIST PROVIDED HISTORY: Reason for exam:->SOB,  tachypnea, hypoxia; CHF vs PE vs CHF PE pneumonia or other combo there of Decision Support Exception - unselect if not a suspected or confirmed emergency medical condition->Emergency Medical Condition (MA) What reading provider will be dictating this exam?->CRC FINDINGS: Pulmonary Arteries: Pulmonary arteries are adequately opacified for evaluation. No evidence of intraluminal filling defect to suggest pulmonary embolism. Main pulmonary artery is normal in caliber. Mediastinum: No evidence of mediastinal lymphadenopathy. The heart and pericardium demonstrate no acute abnormality. There is no acute abnormality of the thoracic aorta. Thyroid is homogeneous in appearance. There is prominence of the hilar lymph nodes bilaterally possibly reactive. Reactive lymph nodes seen scattered throughout the mediastinum. Coronary artery calcification. No pericardial effusion. Lungs/pleura: Scattered subtle areas of ground-glass opacity identified involving the central aspect of the right and lung and left upper lobe. Findings favoring bilateral pneumonia. Underlying chronic changes seen within lung fields with bronchial wall thickening in the perihilar regions. Bronchiolitis cannot be excluded. Upper Abdomen: Limited images of the upper abdomen are unremarkable. Soft Tissues/Bones: No acute bone or soft tissue abnormality. Degenerative changes seen within the spine. No acute chest wall abnormality. No evidence of pulmonary embolism.  Findings suggesting bilateral pneumonia including atypical bacterial and viral processes with also a component of bronchiolitis with bronchial wall thickening centrally extending into the lower lobes bilaterally. XR CHEST PORTABLE    Result Date: 12/22/2022  EXAMINATION: ONE XRAY VIEW OF THE CHEST 12/22/2022 6:43 pm COMPARISON: None. HISTORY: ORDERING SYSTEM PROVIDED HISTORY: SOB TECHNOLOGIST PROVIDED HISTORY: Reason for exam:->SOB Is the patient pregnant?->No What reading provider will be dictating this exam?->CRC FINDINGS: The lungs are without acute focal process. There is no effusion or pneumothorax. The cardiomediastinal silhouette is without acute process. The osseous structures are without acute process. No acute process.              IMPRESSION AND SUGGESTION:  Patient is at risk due to   Asthma with acute exacerbation  Multilobar pneumonia likely atypical pneumonia  Chest pain atypical, likely musculoskeletal, could be due to GERD and esophageal spasm  Smoking, she quit at the beginning of this episode  Non-ST elevation MI  Obesity, sleep study - October 2020    Recommendation  O2 to keep sat 90 to 92%  Change Solu-Medrol to prednisone 40 mg p.o. daily  Add PPI  Continue levofloxacin  Budesonide nebs twice daily  DuoNeb every 6 hours while awake  Pulmonary hygiene  BiPAP as needed  Smoking cessation strongly recommended       Electronically signed by Arely Laird MD,  FCCP   on 12/28/2022 at 8:26 AM

## 2022-12-28 NOTE — DISCHARGE SUMMARY
Hospital Medicine Discharge Summary    Dajuan Amezquita  :  1970  MRN:  [id number]    Admit date:  2022  Discharge date:  2022    Admitting Physician:  Rene Garnica DO  Primary Care Physician:  Placido Strong PA-C      Discharge Diagnoses:      Asthma exacerbation  Pneumonia  NSTEMI status post left heart cath-no PCI done this admission  Type 2 diabetes  Obesity    Chief Complaint   Patient presents with    Shortness of Breath     Hospital Course:     Patient is a 53-year-old  female who presented to hospital with dyspnea. She was treated for asthma exacerbation, pneumonia and NSTEMI. Cardiology and pulm medicine were consulted. Patient underwent left heart cath which was done by Dr. Tod Harding on , no PCI was done, patient was medically managed. Her cardiac medication were optimized per cardiologist.  She was treated with inhaled treatments, nebulizer and steroids. Antibiotics were also added. She was discharged home in a stable condition, she required oxygen on discharge. She is to follow-up with cardiology and pulmonary medicine as outpatient. She was given a course of steroids and antibiotic on discharge also. Cardiac medication were adjusted by her cardiology team.    Exam on discharge:   /71   Pulse 71   Temp 98.1 °F (36.7 °C) (Oral)   Resp 16   Ht 5' 2\" (1.575 m)   Wt 196 lb 5 oz (89 kg)   SpO2 92%   BMI 35.91 kg/m²   General appearance: No apparent distress, appears stated age and cooperative. HEENT: Pupils equal, round, and reactive to light. Conjunctivae/corneas clear. Neck: Supple, with full range of motion. No jugular venous distention. Trachea midline. Respiratory:  Normal respiratory effort. Clear to auscultation, bilaterally without Rales/Wheezes/Rhonchi. Cardiovascular: Regular rate and rhythm with normal S1/S2 without murmurs, rubs or gallops. Abdomen: Soft, non-tender, non-distended with normal bowel sounds.   Musculoskeletal: No clubbing, cyanosis or edema bilaterally. Full range of motion without deformity. Skin: Skin color, texture, turgor normal.  No rashes or lesions. Neuro: Non Focal. Symetrical motor and tone. Nl Comprehension, Alert,awake and oriented. NL CN. Symetrical tone and reflexes. Psychiatric: Alert and oriented, thought content appropriate, normal insight  Capillary Refill: Brisk,< 3 seconds   Peripheral Pulses: +2 palpable, equal bilaterally     Patient was seen by the following consultants   Consults:  IP CONSULT TO PULMONOLOGY  IP CONSULT TO CARDIOLOGY    Significant Diagnostic Studies:    Refer to chart     Please refer to chart if no studies are shown here    CTA CHEST W WO CONTRAST    Result Date: 12/23/2022  EXAMINATION: CTA OF THE CHEST WITH AND WITHOUT CONTRAST 12/22/2022 8:31 pm TECHNIQUE: CTA of the chest was performed before and after the administration of intravenous contrast.  Multiplanar reformatted images are provided for review. MIP images are provided for review. Automated exposure control, iterative reconstruction, and/or weight based adjustment of the mA/kV was utilized to reduce the radiation dose to as low as reasonably achievable. COMPARISON: None. HISTORY: ORDERING SYSTEM PROVIDED HISTORY: SOB,  tachypnea, hypoxia; CHF vs PE vs CHF PE pneumonia or other combo there of TECHNOLOGIST PROVIDED HISTORY: Reason for exam:->SOB,  tachypnea, hypoxia; CHF vs PE vs CHF PE pneumonia or other combo there of Decision Support Exception - unselect if not a suspected or confirmed emergency medical condition->Emergency Medical Condition (MA) What reading provider will be dictating this exam?->CRC FINDINGS: Pulmonary Arteries: Pulmonary arteries are adequately opacified for evaluation. No evidence of intraluminal filling defect to suggest pulmonary embolism. Main pulmonary artery is normal in caliber. Mediastinum: No evidence of mediastinal lymphadenopathy. The heart and pericardium demonstrate no acute abnormality. There is no acute abnormality of the thoracic aorta. Thyroid is homogeneous in appearance. There is prominence of the hilar lymph nodes bilaterally possibly reactive. Reactive lymph nodes seen scattered throughout the mediastinum. Coronary artery calcification. No pericardial effusion. Lungs/pleura: Scattered subtle areas of ground-glass opacity identified involving the central aspect of the right and lung and left upper lobe. Findings favoring bilateral pneumonia. Underlying chronic changes seen within lung fields with bronchial wall thickening in the perihilar regions. Bronchiolitis cannot be excluded. Upper Abdomen: Limited images of the upper abdomen are unremarkable. Soft Tissues/Bones: No acute bone or soft tissue abnormality. Degenerative changes seen within the spine. No acute chest wall abnormality. No evidence of pulmonary embolism. Findings suggesting bilateral pneumonia including atypical bacterial and viral processes with also a component of bronchiolitis with bronchial wall thickening centrally extending into the lower lobes bilaterally. XR CHEST PORTABLE    Result Date: 12/22/2022  EXAMINATION: ONE XRAY VIEW OF THE CHEST 12/22/2022 6:43 pm COMPARISON: None. HISTORY: ORDERING SYSTEM PROVIDED HISTORY: SOB TECHNOLOGIST PROVIDED HISTORY: Reason for exam:->SOB Is the patient pregnant?->No What reading provider will be dictating this exam?->CRC FINDINGS: The lungs are without acute focal process. There is no effusion or pneumothorax. The cardiomediastinal silhouette is without acute process. The osseous structures are without acute process. No acute process.        Discharge Medications:         Medication List        START taking these medications      dilTIAZem 180 MG extended release capsule  Commonly known as: CARDIZEM CD  Take 1 capsule by mouth 2 times daily  Replaces: dilTIAZem 180 MG extended release capsule     guaiFENesin 600 MG extended release tablet  Commonly known as: MUCINEX  Take 1 tablet by mouth 2 times daily for 10 days     levoFLOXacin 750 MG tablet  Commonly known as: Levaquin  Take 1 tablet by mouth daily for 5 days     losartan 25 MG tablet  Commonly known as: COZAAR  Take 1 tablet by mouth daily  Start taking on: December 29, 2022     predniSONE 10 MG tablet  Commonly known as: DELTASONE  40 mg x3 days then, 30 mg x3 days then, 20 mg x3 days then, 10 mg x3 days            CHANGE how you take these medications      * albuterol (2.5 MG/3ML) 0.083% nebulizer solution  Commonly known as: PROVENTIL  Take 3 mLs by nebulization every 4 hours as needed for Wheezing or Shortness of Breath  What changed: Another medication with the same name was changed. Make sure you understand how and when to take each. * ProAir  (90 Base) MCG/ACT inhaler  Generic drug: albuterol sulfate HFA  Inhale 1 puff into the lungs every 4 hours as needed for Wheezing  What changed: See the new instructions. * This list has 2 medication(s) that are the same as other medications prescribed for you. Read the directions carefully, and ask your doctor or other care provider to review them with you.                 CONTINUE taking these medications      aspirin 81 MG EC tablet  Take 1 tablet by mouth daily     clopidogrel 75 MG tablet  Commonly known as: PLAVIX     EPINEPHrine 0.3 MG/0.3ML Soaj injection  Commonly known as: EPIPEN     furosemide 20 MG tablet  Commonly known as: LASIX     Icosapent Ethyl 1 g Caps capsule  Commonly known as: VASCEPA     ipratropium-albuterol 0.5-2.5 (3) MG/3ML Soln nebulizer solution  Commonly known as: DUONEB  Inhale 3 mLs into the lungs 3 times daily as needed for Shortness of Breath     Jardiance 10 MG tablet  Generic drug: empagliflozin     magnesium oxide 400 MG tablet  Commonly known as: MAG-OX     metFORMIN 500 MG tablet  Commonly known as: GLUCOPHAGE     montelukast 10 MG tablet  Commonly known as: SINGULAIR     NexIUM 24HR 20 MG Tbec  Generic drug: Esomeprazole Magnesium     nicotine 21 MG/24HR  Commonly known as: NICODERM CQ  Place 1 patch onto the skin daily     nitroGLYCERIN 0.4 MG SL tablet  Commonly known as: NITROSTAT  up to max of 3 total doses. If no relief after 1 dose, call 911.      potassium chloride 10 MEQ extended release tablet  Commonly known as: KLOR-CON M     rosuvastatin 40 MG tablet  Commonly known as: CRESTOR     Symbicort 160-4.5 MCG/ACT Aero  Generic drug: budesonide-formoterol  Inhale 2 puffs by mouth twice daily     therapeutic multivitamin-minerals tablet     tiotropium 2.5 MCG/ACT Aers inhaler  Commonly known as: Spiriva Respimat  Inhale 2 puffs into the lungs daily            STOP taking these medications      atorvastatin 40 MG tablet  Commonly known as: LIPITOR     azelastine 0.1 % nasal spray  Commonly known as: ASTELIN     dilTIAZem 180 MG extended release capsule  Commonly known as: TIAZAC  Replaced by: dilTIAZem 180 MG extended release capsule     ibuprofen 600 MG tablet  Commonly known as: IBU     lisinopril 5 MG tablet  Commonly known as: PRINIVIL;ZESTRIL     mometasone 0.1 % cream  Commonly known as: ELOCON     mupirocin 2 % ointment  Commonly known as: BACTROBAN     ticagrelor 90 MG Tabs tablet  Commonly known as: Brilinta     triamcinolone 55 MCG/ACT nasal inhaler  Commonly known as: NASACORT            ASK your doctor about these medications      budesonide 0.5 MG/2ML nebulizer suspension  Commonly known as: Pulmicort  Take 2 mLs by nebulization 2 times daily     pantoprazole 40 MG tablet  Commonly known as: PROTONIX  Take 1 tablet by mouth every morning (before breakfast)               Where to Get Your Medications        These medications were sent to [address] [place] [address], [address] [place] [address]      Phone: [phone]   dilTIAZem 180 MG extended release capsule  guaiFENesin 600 MG extended release tablet  levoFLOXacin 750 MG tablet  losartan 25 MG tablet  predniSONE 10 MG tablet  ProAir  (90 Base) MCG/ACT inhaler  tiotropium 2.5 MCG/ACT Aers inhaler         Disposition:   If discharged to Home, Any [facility] needs that were indicated and/or required as been addressed and set up by Social Work. Condition at discharge: good     Activity: activity as tolerated    Total time taken for discharging this patient: 40 minutes. Greater than 70% of time was spent focused exclusively on this patient. Time was taken to review chart, discuss plans with consultants, reconciling medications, discussing plan answering questions with patient.      Taylor Hale DO  12/28/2022, 9:56 AM  ----------------------------------------------------------------------------------------------------------------------    Kulwinder Patel

## 2022-12-28 NOTE — PROGRESS NOTES
[facility] OF THE [facility] Heart Progress Note      Patient:  Merissa Haji  YOB: 1970  MRN: [id number]   Tahiride: [de-identified]  Admit Date:  12/22/2022  Primary Cardiologist:Dr. Hassan [facility] Cardiologist: Dr. Connie Arce APRN-CNP    I have personally performed a complete face to face history and physical on this patient. I have reviewed the notations as entered by JONA Arce I have personally  formulated the impression and plan on this patient and amended as necessary. Liliana Sweeney  [address] much better today no complications cardiac catheterizations. Interesting in that she did have wall motion abnormalities and rise and fall of troponin all consistent with infarct yet wall motion abnormalities improved and coronary angiography shows modest disease only. I suspect she had spasm related to her prior tobacco abuse as well as the hypoxia upon her presentation. He is encouraged not to smoke cigarettes any further. Continue current cardiac medications otherwise she has follow-up arranged with Dr. Wilbert Elizabeth in the near future. Currently her breathing is much better lungs are clear she still is oxygen dependent. Being discharged today as described below    Roderick Templeton MD      S/P Left heart cath 12/27/2022  with Dr. Shmuel Stacy: Right dominant system widely patent ostial proximal RCA stent RCA angiographically normal except for presence of stent in the proximal vessel left main LAD ostial proximal 30 to 40% smooth narrowing rest of the vessel is normal left circumflex has about 40% disease in the midsegment , first diagonal has ostial proximal 40 to 50% smooth stenosis. LVEDP 5 to 8 mmHg no systolic gradient across the aortic valve LVEF 60% no regional wall motion abnormality appreciated      Chief Complaint/Active Symptoms:   Lizzie Diaz was seen and examined at bedside     She is currently resting comfortably in bed without any specific complaints.      States that her shortness of breath has improved, continues with a cough but that has also improved and she is now coughing up some thick sputum. She states that she had some chest pain last evening that has totally resolved with a percocet that she was administered. Right wrist dressing D&I without redness, ecchymosis, hematoma noted. Impression/Plan:     Type I, NSTEMI:  coronary angiography 12/27/2022 with Dr. Tre Clements showed widely patent ostial proximal RCA, minimal disease LVEF 60%. Medical therapy and risk factor modification recommended. J Point elevation with slight AL elevation in aVR suggesting pericarditis but no pericardial symptoms at this time. ECHO 12/27/2022 showed no evidence of pericardial effusion, normal left ventricle structure and function, LVEF 65%, distal anterior and septal hypokinesis has improved since 12/23/2022 echo. Pneumonia/COPD exacerbation: Per pulmonary medicine service clinically is improving  Hypertension: Controlled  Cough: States that she has had a cough for some time, possible secondary to Lisinopril. Lisinopril discontinued and Losartan started yesterday. States cough has improved and she is coughing up some thick sputum. Cardiology to sign off, with follow up arranged with Dr. Sara Grayson in [facility] office  Further recommendations per Dr. Rufino Healy. Admitted 12/22/2022 with bilateral pneumonia probably viral COPD exacerbation sinus tachycardia. Had progressive shortness of breath.   Troponin elevated with a rise and fall pattern      CARDIAC HISTORY:  Coronary artery disease: Right coronary ERMIAS August 2020 at that time LM 20%, LAD 30% proximal.  60% mid.  8/20/2021 normal perfusion study  Hypertension  Hyperlipidemia      Past Medical History:   Diagnosis Date    Anticoagulant long-term use     Asthma 2004    CAD (coronary artery disease)     COPD (chronic obstructive pulmonary disease) ([address]     Diabetes mellitus ([facility] 2014    Hodgkin disease ([facility] 1999 Hyperlipidemia     Hypertension 1984    Migraines 1989    Sleep apnea     recently tested     Type 2 diabetes mellitus without complication [facility]     type II         Objective:   Vitals:    12/27/22 2103 12/28/22 0215 12/28/22 0615 12/28/22 0744   BP: (!) 144/71 125/62 136/68 121/71   Pulse: 85 72 66 71   Resp: 18 16     Temp: 98.5 °F (36.9 °C)   98.1 °F (36.7 °C)   TempSrc: Oral   Oral   SpO2: 96% 95% 95% 91%   Weight:       Height:          Wt Readings from Last 3 Encounters:   12/25/22 196 lb 5 oz (89 kg)   12/01/22 196 lb 12.8 oz (89.3 kg)   11/03/22 196 lb 12.8 oz (89.3 kg)       TELEMETRY: normal sinus                                   Physical Exam:  General Appearance: alert and oriented to person, place and time, stable until she starts to cough  Cardiovascular: normal rate, regular rhythm, normal S1 and S2, no murmurs, rubs, clicks, or gallops,  no JVD  Pulmonary/Chest: idiminished throughout. Abdomen: soft, non-tender, non-distended, normal bowel sounds, no masses   Extremities: no cyanosis, clubbing or edema  Skin: warm and dry, no rash or erythema  Eyes: EOMI  Neck: supple and non-tender without mass, no thyromegaly   Neurological: alert, oriented, normal speech, no focal findings or movement disorder noted    Allergies:   Allergies   Allergen Reactions    Shellfish-Derived Products Anaphylaxis    Cumin Oil Itching    Piper Itching        Medications:    sodium chloride flush  5-40 mL IntraVENous 2 times per day    furosemide  20 mg Oral Daily    losartan  25 mg Oral Daily    sodium chloride flush  5-40 mL IntraVENous 2 times per day    budesonide  0.5 mg Nebulization BID    levofloxacin  750 mg IntraVENous Q24H    aspirin  81 mg Oral Daily    clopidogrel  75 mg Oral Daily    rosuvastatin  40 mg Oral Daily    montelukast  10 mg Oral Nightly    magnesium oxide  400 mg Oral BID    dilTIAZem  180 mg Oral BID    insulin lispro  0-4 Units SubCUTAneous TID     insulin lispro  0-4 Units SubCUTAneous Nightly guaiFENesin  600 mg Oral BID    ipratropium-albuterol  1 vial Inhalation TID      sodium chloride 75 mL/hr at 22 1028    sodium chloride      dextrose       sodium chloride flush, 5-40 mL, PRN  sodium chloride, , PRN  sodium chloride flush, 5-40 mL, PRN  sodium chloride, , PRN  acetaminophen, 650 mg, Q4H PRN  polyethylene glycol, 17 g, Daily PRN  acetaminophen, 650 mg, Q6H PRN  potassium chloride, 40 mEq, PRN   Or  potassium alternative oral replacement, 40 mEq, PRN   Or  potassium chloride, 10 mEq, PRN  ondansetron, 4 mg, Q8H PRN   Or  ondansetron, 4 mg, Q6H PRN  glucose, 4 tablet, PRN  dextrose bolus, 125 mL, PRN   Or  dextrose bolus, 250 mL, PRN  glucagon (rDNA), 1 mg, PRN  dextrose, , Continuous PRN  albuterol, 2.5 mg, Q2H PRN  sodium chloride, 1 spray, Q2H PRN  benzonatate, 100 mg, TID PRN      Diagnostics:  EK2022: Sinus rhythm new T inversion high lateral leads. Diffuse J-point elevation suggestive of pericarditis. 2022 sinus tachycardia otherwise normal    Echo: 2022  Left ventricular ejection fraction is visually estimated at 45-50%. Severe hypokinesis of distal 1/2 septum, anteroseptal walls. Anterolateral   wall normal.   Wall motion abnormalities new since study 2020   Normal right ventricle structure and function. RVSP could not be calculated   Normal valvular structure and function. No significant regurgitant or stenotic valvular lesions. MAC noted      ECHO 2022  Summary   No evidence of pericardial effusion. Normal left ventricle structure and function. Left ventricular ejection fraction is visually estimated at 65%. In comparison to study of , distal anterior and septal hypokinesis   has improved. Mild LVH   Normal right ventricle structure and function.    No significant regurgitant or stenotic valvular lesions      CXR:               Portable: Results for orders placed during the hospital encounter of 22    XR CHEST PORTABLE    Narrative  EXAMINATION:  ONE XRAY VIEW OF THE CHEST    12/22/2022 6:43 pm    COMPARISON:  None. HISTORY:  ORDERING SYSTEM PROVIDED HISTORY: SOB  TECHNOLOGIST PROVIDED HISTORY:  Reason for exam:->SOB  Is the patient pregnant?->No  What reading provider will be dictating this exam?->CRC    FINDINGS:  The lungs are without acute focal process. There is no effusion or  pneumothorax. The cardiomediastinal silhouette is without acute process. The  osseous structures are without acute process. Impression  No acute process. Lab Data:    Cardiac Enzymes:  No results for input(s): CKTOTAL, CKMB, CKMBINDEX, TROPONINI in the last 72 hours. ProBNP:   Lab Results   Component Value Date/Time    PROBNP 12 12/22/2022 07:23 PM       CBC:   Recent Labs     12/27/22  0519 12/27/22  1732 12/28/22  0506   WBC 8.1 7.0 8.4   RBC 5.01 5.06 4.70   HGB 14.8 15.0 14.2   HCT 44.7 46.1 42.7    242 219       CMP:    Recent Labs     12/26/22  0526 12/27/22  0519 12/27/22 1732   * 133* 136   K 4.3 4.7 4.3   CL 97 96 95   CO2 23 26 29   BUN 27* 28* 24*   CREATININE 0.69 0.72 0.66   LABGLOM >60.0 >60.0 >60.0   GLUCOSE 238* 230* 265*   CALCIUM 9.2 9.2 9.4       Hepatic Function Panel:    No results for input(s): ALKPHOS, ALT, AST, PROT, BILITOT, BILIDIR, IBILI, LABALBU in the last 72 hours. Magnesium:    Recent Labs     12/26/22  0526 12/27/22  0519   MG 2.3 2.4       PT/INR:    No results for input(s): PROTIME, INR in the last 72 hours. Lipids:    No results for input(s): CHOL, TRIG, HDL, LDLCALC, LABVLDL in the last 72 hours. Principal Problem:    Hypoxia  Active Problems:    Exacerbation of persistent asthma  Resolved Problems:    * No resolved hospital problems.  *           Electronically signed by KAMILLE Helton CNP on 12/28/2022 at 8:30 AM

## 2022-12-28 NOTE — PROGRESS NOTES
2050-HS assessment completed. Vitals stable on RA- SpO2 96%. R radial site free of complications. Plan of care discussed. Pt denies any needs at this time. Call light in reach. Team to continue to monitor. Electronically signed by Toby Cote RN on 12/27/2022 at 9:26 PM     5724- Pt reports 6 of 10 chest pain. Reports it goes from her chest through to her back. Describes it as \"constant pain. \" Vitals WNL on RA. EKG completed. Page to [facility]. EKG read to Dr. Key Lozano with \"[facility], consider early repolarization, pericarditis, or injury\" and above information relayed. He reported that because the cath was negative yesterday that \"this is not a cardiology issue\" and to contact the hospitalitis. He reports there is no need for nitro at this time. 0630-Perfectserve to Dr. Lauri Garrett with above info. Order received. RN to initiate.

## 2022-12-28 NOTE — PROGRESS NOTES
CLINICAL PHARMACY NOTE: MEDS TO BEDS    Total # of Prescriptions Filled: 6   The following medications were delivered to the patient:  Losartan 25 mg Tab  Albuterol Hfa  Mucus Relief 600 mg Tab  Prednisone 10 mg Tab  Levofloxacin 750 mg Tab  Diltiazem Er 180 mg Cap    Additional Documentation:

## 2022-12-28 NOTE — PROGRESS NOTES
12/28/22 0934   Resting (Room Air)   SpO2 92   HR 98   Resting (On O2)   SpO2 98      O2 Device Nasal cannula   O2 Flow Rate (l/min) 3 l/min   During Walk (Room Air)   SpO2 87      Walk/Assistance Device Ambulation   During Walk (On O2)   SpO2 96      O2 Device Nasal cannula   O2 Flow Rate (l/min) 3 l/min   Need Additional O2 Flow Rate Rows No   After Walk   Does the Patient Qualify for Home O2 Yes   Liter Flow at Rest 3   Liter Flow on Exertion 3   Does the Patient Need Portable Oxygen Tanks Yes

## 2022-12-28 NOTE — DISCHARGE INSTR - DIET
Good nutrition is important when healing from an illness, injury, or surgery. Follow any nutrition recommendations given to you during your hospital stay. If you were given an oral nutrition supplement while in the hospital, continue to take this supplement at home. You can take it with meals, in-between meals, and/or before bedtime. These supplements can be purchased at most local grocery stores, pharmacies, and chain [employer]-stores. If you have any questions about your diet or nutrition, call the hospital and ask for the dietitian. Diabetic. Low fat, low salt, heart healthy diet.

## 2022-12-29 ENCOUNTER — TELEPHONE (OUTPATIENT)
Dept: FAMILY MEDICINE CLINIC | Age: 52
End: 2022-12-29

## 2022-12-29 NOTE — TELEPHONE ENCOUNTER
Care Transitions Initial Follow Up Call    Outreach made within 2 business days of discharge: Yes    Patient: Merissa Haji Patient : 1970   MRN: [id number]  Reason for Admission: There are no discharge diagnoses documented for the most recent discharge. Discharge Date: 22       Spoke with: Patient    Discharge department/facility: [facility]     TCM Interactive Patient Contact:  Was patient able to fill all prescriptions: Yes  Was patient instructed to bring all medications to the follow-up visit: Yes  Is patient taking all medications as directed in the discharge summary?  Yes  Does patient understand their discharge instructions: Yes  Does patient have questions or concerns that need addressed prior to 7-14 day follow up office visit: no    Scheduled appointment with PCP within 7-14 days    Follow Up  Future Appointments   Date Time Provider Bhavin Tao   2022  3:00 PM Hugo Cervantes [facility]   2022  3:30 PM Hugo Cervantes [facility]   2023  3:30 PM Cynthia Miller  [address], [address] [facility] [place]

## 2022-12-29 NOTE — OP NOTE
INDICATIONS:  The patient is a 45 y.o. female with history of atherosclerotic native vessel coronary artery disease, prior PCI and stenting of the RCA, was evaluated by my colleague Dr. Antonietta Hadley, with a clinical picture of non-ST segment elevation myocardial infarction chest discomfort shortness of breath and dynamic ST-T abnormality, coronary angiography and possible intervention were recommended. Alma Melgar PROCEDURE:  Left heart catheterization, coronary angiography, left ventriculography ,LV/Ao pull back was performed. Right radial approach was used. Benefits, alternatives and risks of the procedure were explained to the patient to include but not limited to MI, Stroke, Death, Allergic reaction to dye, bleeding, risk of kidney injury, and possible need for emergent coronary artery bypass grafting and informed consent was obtained. The patient was brought to the cath lab and was prepped and draped in the normal sterile technique. IV conscious sedation was maintained. 1% lidocaine was used for local anesthesia. Patient has contrast allergy, and was premedicated appropriately in the pre and post cardiac catheterization laboratory area, and had no untoward effects. DESCRIPTION OF PROCEDURE: Under local anesthesia, a 5/6-Argentine short sheath was placed in the right radial artery, intra-arterial nitroglycerin and nicardipine was administered per protocol, intravenous heparin was administered per protocol. All catheter exchanges were made over a 0.035 guidewire. A 5 Western Terri JL 3.5 diagnostic catheter was advanced and the left main coronary artery was selectively engaged and angiography of the left system was performed in multiple orthogonal views. The catheter was removed. A 5 Western Terri AR mod diagnostic catheter was then advanced and the right coronary artery was selectively engaged and angiography was performed in multiple orthogonal views. The catheter was removed.   A 5 Argentine angled pigtail catheter was advanced across the aortic valve into the left ventricle, left ventricular end-diastolic pressure was measured, left ventriculography was performed in about 30 degree MORIN view and a slow manual pullback was performed across the aortic valve. At the conclusion of the procedure the right radial artery sheath was removed and hemostasis was achieved. There were no immediate complications. HEMODYNAMIC / ANGIOGRAPHIC DATA:    Left ventricular end diastolic pressure was 5  to 8  mmHg. No systolic gradient across the aortic valve. Left ventriculography revealed an EF around 65%. There was no regional wall motion abnormality or appreciable mitral regurgitation in the MORIN view. Left main coronary artery arises from the left sinus of Valsalva and has 0% stenosis. It trifurcates into the left anterior descending artery the left circumflex artery and the ramus intermedius branch. The left circumflex artery is nondominant and continues as a major obtuse marginal branch. First obtuse marginal branch arises very proximally, measures about 2.25 mm. The mid AV groove left circumflex artery has 40% smooth stenosis unchanged from prior angiography. Ramus intermedius branch measures about 2.5 mm, and has about 30% luminal narrowing. Left anterior descending artery has about 30% smooth tubular ostial proximal narrowing, mid and distal left anterior descending artery has 0% stenosis. Left anterior descending artery curves around the left ventricular apex. First major diagonal branch measures about 2.25 mm and has about 40 to 50% smooth stenosis  The right coronary artery is dominant with widely patent ostial proximal stent. The rest of the vessel is angiographically normal.    RECOMMENDATIONS:  Post-procedure care will focus on prevention of any ischemic events and congestive complications. Aggressive risk factor modification and medical therapy are recommended.   Alvaro De La Cruz MD

## 2022-12-30 ENCOUNTER — OFFICE VISIT (OUTPATIENT)
Dept: FAMILY MEDICINE CLINIC | Age: 52
End: 2022-12-30

## 2022-12-30 VITALS
HEART RATE: 81 BPM | SYSTOLIC BLOOD PRESSURE: 126 MMHG | OXYGEN SATURATION: 98 % | DIASTOLIC BLOOD PRESSURE: 74 MMHG | WEIGHT: 191 LBS | HEIGHT: 62 IN | TEMPERATURE: 96.8 F | BODY MASS INDEX: 35.15 KG/M2

## 2022-12-30 DIAGNOSIS — K59.00 CONSTIPATION, UNSPECIFIED CONSTIPATION TYPE: ICD-10-CM

## 2022-12-30 DIAGNOSIS — Z74.09 SLIGHTLY LIMITED MOBILITY: ICD-10-CM

## 2022-12-30 DIAGNOSIS — E11.9 TYPE 2 DIABETES MELLITUS WITHOUT COMPLICATION, WITHOUT LONG-TERM CURRENT USE OF INSULIN (HCC): Primary | ICD-10-CM

## 2022-12-30 DIAGNOSIS — I25.10 CORONARY ARTERY DISEASE INVOLVING NATIVE CORONARY ARTERY OF NATIVE HEART WITHOUT ANGINA PECTORIS: Primary | ICD-10-CM

## 2022-12-30 DIAGNOSIS — E78.00 HYPERCHOLESTEROLEMIA: ICD-10-CM

## 2022-12-30 DIAGNOSIS — Z09 HOSPITAL DISCHARGE FOLLOW-UP: ICD-10-CM

## 2022-12-30 DIAGNOSIS — Z99.81 DEPENDENCE ON SUPPLEMENTAL OXYGEN WHEN AMBULATING: ICD-10-CM

## 2022-12-30 DIAGNOSIS — R30.0 DYSURIA: ICD-10-CM

## 2022-12-30 DIAGNOSIS — E11.65 TYPE 2 DIABETES MELLITUS WITH HYPERGLYCEMIA, WITHOUT LONG-TERM CURRENT USE OF INSULIN (HCC): ICD-10-CM

## 2022-12-30 DIAGNOSIS — J96.01 ACUTE RESPIRATORY FAILURE WITH HYPOXIA (HCC): ICD-10-CM

## 2022-12-30 DIAGNOSIS — J41.0 SIMPLE CHRONIC BRONCHITIS (HCC): ICD-10-CM

## 2022-12-30 DIAGNOSIS — I10 ESSENTIAL (PRIMARY) HYPERTENSION: ICD-10-CM

## 2022-12-30 LAB
BILIRUBIN, POC: NORMAL
BLOOD URINE, POC: NORMAL
CHP ED QC CHECK: NORMAL
CLARITY, POC: CLEAR
COLOR, POC: YELLOW
GLUCOSE BLD-MCNC: 321 MG/DL
GLUCOSE URINE, POC: NORMAL
KETONES, POC: NORMAL
LEUKOCYTE EST, POC: NORMAL
NITRITE, POC: NORMAL
PH, POC: 6
PROTEIN, POC: NORMAL
SPECIFIC GRAVITY, POC: 1.01
UROBILINOGEN, POC: NORMAL

## 2022-12-30 RX ORDER — BLOOD SUGAR DIAGNOSTIC
STRIP MISCELLANEOUS
COMMUNITY
Start: 2022-12-15

## 2022-12-30 RX ORDER — POTASSIUM CHLORIDE 750 MG/1
TABLET, FILM COATED, EXTENDED RELEASE ORAL
COMMUNITY
Start: 2022-11-25

## 2022-12-30 RX ORDER — DOCUSATE SODIUM 100 MG/1
100 CAPSULE, LIQUID FILLED ORAL 2 TIMES DAILY PRN
Qty: 60 CAPSULE | Refills: 0 | Status: SHIPPED | OUTPATIENT
Start: 2022-12-30

## 2022-12-30 RX ORDER — EMPAGLIFLOZIN 25 MG/1
25 TABLET, FILM COATED ORAL ONCE
COMMUNITY
Start: 2022-11-27

## 2022-12-30 RX ORDER — LISINOPRIL 10 MG/1
10 TABLET ORAL DAILY
COMMUNITY
End: 2022-12-30

## 2022-12-30 RX ORDER — PSYLLIUM SEED (WITH DEXTROSE)
1 POWDER (GRAM) ORAL 2 TIMES DAILY
Qty: 60 PACKET | Refills: 1 | Status: SHIPPED | OUTPATIENT
Start: 2022-12-30 | End: 2023-01-29

## 2022-12-30 RX ORDER — BLOOD-GLUCOSE SENSOR
EACH MISCELLANEOUS
COMMUNITY
Start: 2022-12-15

## 2022-12-30 ASSESSMENT — PATIENT HEALTH QUESTIONNAIRE - PHQ9
5. POOR APPETITE OR OVEREATING: 0
1. LITTLE INTEREST OR PLEASURE IN DOING THINGS: 0
SUM OF ALL RESPONSES TO PHQ QUESTIONS 1-9: 0
6. FEELING BAD ABOUT YOURSELF - OR THAT YOU ARE A FAILURE OR HAVE LET YOURSELF OR YOUR FAMILY DOWN: 0
4. FEELING TIRED OR HAVING LITTLE ENERGY: 0
SUM OF ALL RESPONSES TO PHQ9 QUESTIONS 1 & 2: 0
2. FEELING DOWN, DEPRESSED OR HOPELESS: 0
7. TROUBLE CONCENTRATING ON THINGS, SUCH AS READING THE NEWSPAPER OR WATCHING TELEVISION: 0
SUM OF ALL RESPONSES TO PHQ QUESTIONS 1-9: 0
9. THOUGHTS THAT YOU WOULD BE BETTER OFF DEAD, OR OF HURTING YOURSELF: 0
8. MOVING OR SPEAKING SO SLOWLY THAT OTHER PEOPLE COULD HAVE NOTICED. OR THE OPPOSITE, BEING SO FIGETY OR RESTLESS THAT YOU HAVE BEEN MOVING AROUND A LOT MORE THAN USUAL: 0
10. IF YOU CHECKED OFF ANY PROBLEMS, HOW DIFFICULT HAVE THESE PROBLEMS MADE IT FOR YOU TO DO YOUR WORK, TAKE CARE OF THINGS AT HOME, OR GET ALONG WITH OTHER PEOPLE: 0
3. TROUBLE FALLING OR STAYING ASLEEP: 0
SUM OF ALL RESPONSES TO PHQ QUESTIONS 1-9: 0
SUM OF ALL RESPONSES TO PHQ QUESTIONS 1-9: 0

## 2022-12-30 ASSESSMENT — ENCOUNTER SYMPTOMS
DIARRHEA: 0
VOMITING: 0
FLATUS: 1
BACK PAIN: 0
ABDOMINAL PAIN: 0
RECTAL PAIN: 0
BLOATING: 1
NAUSEA: 0
HEMATOCHEZIA: 0
CONSTIPATION: 1

## 2022-12-30 NOTE — PROGRESS NOTES
Post-Discharge Transitional Care Follow Up      Osker Headings   YOB: 1970    Date of Office Visit:  12/30/2022  Date of Hospital Admission: 12/22/22  Date of Hospital Discharge: 12/28/22  Readmission Risk Score (high >=14%. Medium >=10%):Readmission Risk Score: 10.3      Care management risk score Rising risk (score 2-5) and Complex Care (Scores >=6): No Risk Score On File     Non face to face  following discharge, date last encounter closed (first attempt may have been earlier): 12/29/2022     Call initiated 2 business days of discharge: Yes     Type 2 diabetes mellitus without complication, without long-term current use of insulin ([facility] [address]  -     metFORMIN (GLUCOPHAGE) 1000 MG tablet; TAKE 1 TABLET BY MOUTH TWICE DAILY, Disp-60 tablet, R-3Normal  Hospital discharge follow-up  -     NC DISCHARGE MEDS RECONCILED W/ CURRENT OUTPATIENT MED LIST    Medical Decision Making: high complexity  Return in 3 months (on 3/30/2023). Subjective:   HPI    Inpatient course: Discharge summary reviewed- see chart. Patient was seen for asthma exacerbation, pneumonia, and NSTEMI. Patient underwent PCI and was given Levaquin. Interval history/Current status:      Patient is presenting today with daughter after being hospitalized for asthma exacerbation, pneumonia, and NSTEMI. Patient is currently taking both lisinopril and losartan. Chart review shows that losartan should be continued and lisinopril discontinued. Just started oxygen 4L . Taking all medications as prescribed. Dr. Hobbs Patch scheduled on 01/4/23. Stopped smoking after being hospitalized. Smoking 5 ppd for 5 years and then 1 ppd for 34 years. No longer using nicotine patch.       Patient Active Problem List   Diagnosis    Prediabetes    Exacerbation of persistent asthma    Angina at rest [facility])    Abnormal exercise myocardial perfusion study    Acute respiratory failure with hypoxia (HCC)    Chest pain    CAD    COPD     Hypoxia Essential (primary) hypertension    Coronary artery disease involving native coronary artery of native heart without angina pectoris    Type 2 diabetes mellitus ([facility] [address]    Hypercholesterolemia       Medications listed as ordered at the time of discharge from hospital     Medication List            Accurate as of December 30, 2022 11:59 PM. If you have any questions, ask your nurse or doctor. START taking these medications      docusate sodium 100 MG capsule  Commonly known as: COLACE  Take 1 capsule by mouth 2 times daily as needed for Constipation  Started by: GIFTY Arnold     Handicap Placard Misc  by Does not apply route Expiration date:12/30/27  Started by: GIFTY Arnold     Metamucil 28 % packet  Generic drug: psyllium  Take 1 packet by mouth 2 times daily Take with full glass of h20 or juice  Started by: GIFTY Arnold            CHANGE how you take these medications      Jardiance 25 MG tablet  Generic drug: empagliflozin  What changed: Another medication with the same name was removed. Continue taking this medication, and follow the directions you see here.   Changed by: GIFTY Arnold            CONTINUE taking these medications      * albuterol (2.5 MG/3ML) 0.083% nebulizer solution  Commonly known as: PROVENTIL  Take 3 mLs by nebulization every 4 hours as needed for Wheezing or Shortness of Breath     * ProAir  (90 Base) MCG/ACT inhaler  Generic drug: albuterol sulfate HFA  Inhale 1 puff into the lungs every 4 hours as needed for Wheezing     aspirin 81 MG EC tablet  Take 1 tablet by mouth daily     budesonide 0.5 MG/2ML nebulizer suspension  Commonly known as: Pulmicort  Take 2 mLs by nebulization 2 times daily     clopidogrel 75 MG tablet  Commonly known as: PLAVIX     Dexcom G6 Sensor Misc     dilTIAZem 180 MG extended release capsule  Commonly known as: CARDIZEM CD  Take 1 capsule by mouth 2 times daily     EPINEPHrine 0.3 MG/0.3ML Soaj injection  Commonly known as: EPIPEN     furosemide 20 MG tablet  Commonly known as: LASIX     guaiFENesin 600 MG extended release tablet  Commonly known as: MUCINEX  Take 1 tablet by mouth 2 times daily for 10 days     Icosapent Ethyl 1 g Caps capsule  Commonly known as: VASCEPA     ipratropium-albuterol 0.5-2.5 (3) MG/3ML Soln nebulizer solution  Commonly known as: DUONEB  Inhale 3 mLs into the lungs 3 times daily as needed for Shortness of Breath     levoFLOXacin 750 MG tablet  Commonly known as: Levaquin  Take 1 tablet by mouth daily for 5 days     losartan 25 MG tablet  Commonly known as: COZAAR  Take 1 tablet by mouth daily     magnesium oxide 400 MG tablet  Commonly known as: MAG-OX     metFORMIN 1000 MG tablet  Commonly known as: GLUCOPHAGE  TAKE 1 TABLET BY MOUTH TWICE DAILY     montelukast 10 MG tablet  Commonly known as: SINGULAIR     NexIUM 24HR 20 MG Tbec  Generic drug: Esomeprazole Magnesium     nicotine 21 MG/24HR  Commonly known as: NICODERM CQ  Place 1 patch onto the skin daily     nitroGLYCERIN 0.4 MG SL tablet  Commonly known as: NITROSTAT  up to max of 3 total doses. If no relief after 1 dose, call 911.      OneTouch Ultra strip  Generic drug: blood glucose test strips     pantoprazole 40 MG tablet  Commonly known as: PROTONIX  Take 1 tablet by mouth every morning (before breakfast)     potassium chloride 10 MEQ extended release tablet  Commonly known as: KLOR-CON M     potassium chloride 10 MEQ extended release tablet  Commonly known as: KLOR-CON     predniSONE 10 MG tablet  Commonly known as: DELTASONE  40 mg x3 days then, 30 mg x3 days then, 20 mg x3 days then, 10 mg x3 days     rosuvastatin 40 MG tablet  Commonly known as: CRESTOR     Symbicort 160-4.5 MCG/ACT Aero  Generic drug: budesonide-formoterol  Inhale 2 puffs by mouth twice daily     therapeutic multivitamin-minerals tablet     tiotropium 2.5 MCG/ACT Aers inhaler  Commonly known as: Spiriva Respimat  Inhale 2 puffs into the lungs daily           * This list has 2 medication(s) that are the same as other medications prescribed for you. Read the directions carefully, and ask your doctor or other care provider to review them with you. Where to Get Your Medications        These medications were sent to [facility] [address] [place] [address] [phone]  [address], [facility] [address]      Phone: [phone]   docusate sodium 100 MG capsule  Metamucil 28 % packet  metFORMIN 1000 MG tablet       You can get these medications from any pharmacy    Bring a paper prescription for each of these medications  [facility]          Medications marked \"taking\" at this time  Outpatient Medications Marked as Taking for the 12/30/22 encounter (Office Visit) with GIFTY Mars   Medication Sig Dispense Refill    metFORMIN (GLUCOPHAGE) 1000 MG tablet TAKE 1 TABLET BY MOUTH TWICE DAILY 60 tablet 3        Medications patient taking as of now reconciled against medications ordered at time of hospital discharge: Yes    Review of Systems   Constitutional:  Negative for activity change, appetite change, chills and fever. HENT:  Negative for congestion, drooling, sinus pressure, sinus pain and sore throat. Eyes:  Negative for visual disturbance. Respiratory:  Positive for shortness of breath and wheezing. Negative for cough and chest tightness. Cardiovascular:  Negative for chest pain. Gastrointestinal:  Negative for abdominal pain, diarrhea, nausea and vomiting. Endocrine: Negative for cold intolerance. Genitourinary:  Negative for dysuria, flank pain, frequency and hematuria. Musculoskeletal:  Negative for arthralgias and back pain. Skin:  Negative for rash. Allergic/Immunologic: Negative for food allergies. Neurological:  Negative for weakness, light-headedness, numbness and headaches. Hematological:  Does not bruise/bleed easily. Objective: There were no vitals taken for this visit.   Physical Exam  Vitals and nursing note reviewed. Constitutional:       General: She is awake. She is not in acute distress. Appearance: Normal appearance. She is well-developed. She is not ill-appearing, toxic-appearing or diaphoretic. Interventions: Nasal cannula in place. Comments: On oxygen concentration 4 L. HENT:      Head: Normocephalic and atraumatic. Right Ear: Hearing and external ear normal.      Left Ear: Hearing and external ear normal.      Nose: Nose normal.   Eyes:      General: Lids are normal. Vision grossly intact. Gaze aligned appropriately. Conjunctiva/sclera: Conjunctivae normal.      Pupils: Pupils are equal, round, and reactive to light. Cardiovascular:      Rate and Rhythm: Normal rate and regular rhythm. Pulses: Normal pulses. Heart sounds: Normal heart sounds, S1 normal and S2 normal.   Pulmonary:      Effort: Pulmonary effort is normal.      Breath sounds: Normal breath sounds and air entry. Musculoskeletal:      Cervical back: Normal range of motion. Skin:     General: Skin is warm. Capillary Refill: Capillary refill takes less than 2 seconds. Neurological:      Mental Status: She is alert and oriented to person, place, and time. Gait: Gait is intact. Psychiatric:         Attention and Perception: Attention normal.         Mood and Affect: Mood normal.         Speech: Speech normal.         Behavior: Behavior normal. Behavior is cooperative. An electronic signature was used to authenticate this note.   --GIFTY Trevino

## 2022-12-31 ENCOUNTER — HOSPITAL ENCOUNTER (OUTPATIENT)
Dept: GENERAL RADIOLOGY | Age: 52
End: 2022-12-31
Payer: COMMERCIAL

## 2022-12-31 DIAGNOSIS — K59.00 CONSTIPATION, UNSPECIFIED CONSTIPATION TYPE: ICD-10-CM

## 2022-12-31 LAB — URINE CULTURE, ROUTINE: NORMAL

## 2022-12-31 PROCEDURE — 74018 RADEX ABDOMEN 1 VIEW: CPT

## 2023-01-03 DIAGNOSIS — S30.851A METAL FOREIGN BODY IN ABDOMEN: Primary | ICD-10-CM

## 2023-01-03 ASSESSMENT — ENCOUNTER SYMPTOMS
SORE THROAT: 0
SHORTNESS OF BREATH: 1
COUGH: 0
WHEEZING: 1
NAUSEA: 0
ABDOMINAL PAIN: 0
CHEST TIGHTNESS: 0
SINUS PRESSURE: 0
BACK PAIN: 0
SINUS PAIN: 0
DIARRHEA: 0
VOMITING: 0

## 2023-01-03 ASSESSMENT — VISUAL ACUITY
OU: 1
OU: 1

## 2023-01-04 ENCOUNTER — OFFICE VISIT (OUTPATIENT)
Dept: PULMONOLOGY | Age: 53
End: 2023-01-04
Payer: COMMERCIAL

## 2023-01-04 VITALS
SYSTOLIC BLOOD PRESSURE: 124 MMHG | DIASTOLIC BLOOD PRESSURE: 70 MMHG | TEMPERATURE: 96.8 F | BODY MASS INDEX: 34.71 KG/M2 | OXYGEN SATURATION: 99 % | HEIGHT: 62 IN | WEIGHT: 188.6 LBS | RESPIRATION RATE: 16 BRPM | HEART RATE: 78 BPM

## 2023-01-04 DIAGNOSIS — R93.89 ABNORMAL CT OF THE CHEST: ICD-10-CM

## 2023-01-04 DIAGNOSIS — K21.9 GASTROESOPHAGEAL REFLUX DISEASE WITHOUT ESOPHAGITIS: ICD-10-CM

## 2023-01-04 DIAGNOSIS — Z09 HOSPITAL DISCHARGE FOLLOW-UP: Primary | ICD-10-CM

## 2023-01-04 DIAGNOSIS — J45.50 SEVERE PERSISTENT ASTHMA WITHOUT COMPLICATION: ICD-10-CM

## 2023-01-04 DIAGNOSIS — E66.09 CLASS 2 OBESITY DUE TO EXCESS CALORIES WITHOUT SERIOUS COMORBIDITY WITH BODY MASS INDEX (BMI) OF 37.0 TO 37.9 IN ADULT: ICD-10-CM

## 2023-01-04 DIAGNOSIS — J44.9 ASTHMA-COPD OVERLAP SYNDROME (HCC): ICD-10-CM

## 2023-01-04 PROCEDURE — 1111F DSCHRG MED/CURRENT MED MERGE: CPT | Performed by: INTERNAL MEDICINE

## 2023-01-04 PROCEDURE — G8417 CALC BMI ABV UP PARAM F/U: HCPCS | Performed by: INTERNAL MEDICINE

## 2023-01-04 PROCEDURE — G8484 FLU IMMUNIZE NO ADMIN: HCPCS | Performed by: INTERNAL MEDICINE

## 2023-01-04 PROCEDURE — 3074F SYST BP LT 130 MM HG: CPT | Performed by: INTERNAL MEDICINE

## 2023-01-04 PROCEDURE — 3078F DIAST BP <80 MM HG: CPT | Performed by: INTERNAL MEDICINE

## 2023-01-04 PROCEDURE — 1036F TOBACCO NON-USER: CPT | Performed by: INTERNAL MEDICINE

## 2023-01-04 PROCEDURE — 3017F COLORECTAL CA SCREEN DOC REV: CPT | Performed by: INTERNAL MEDICINE

## 2023-01-04 PROCEDURE — 3023F SPIROM DOC REV: CPT | Performed by: INTERNAL MEDICINE

## 2023-01-04 PROCEDURE — G8427 DOCREV CUR MEDS BY ELIG CLIN: HCPCS | Performed by: INTERNAL MEDICINE

## 2023-01-04 PROCEDURE — 99214 OFFICE O/P EST MOD 30 MIN: CPT | Performed by: INTERNAL MEDICINE

## 2023-01-04 RX ORDER — BUDESONIDE AND FORMOTEROL FUMARATE DIHYDRATE 160; 4.5 UG/1; UG/1
AEROSOL RESPIRATORY (INHALATION)
Qty: 1 EACH | Refills: 3 | Status: SHIPPED | OUTPATIENT
Start: 2023-01-04

## 2023-01-04 NOTE — PROGRESS NOTES
Subjective:     Nathan De La O is a 45 y.o. female who complains today of:     Chief Complaint   Patient presents with    Follow-Up from Hospital     Asthma with acute exacerbation       HPI  Patient presents for asthma exacerbation and pneumonia post hospital discharge    Is for follow-up, she is doing better, no chest pain, no shortness of breath, she is currently on oxygen 3 to 4 L with exertion patient is and while asleep, no lower extremity edema, no fever no chills, weight is stable, and no chest pain. Room air saturation at rest 98%. Allergies:  Shellfish-derived products, Cumin oil, and Guggulipid-black pepper  Past Medical History:   Diagnosis Date    Anticoagulant long-term use     Asthma     CAD (coronary artery disease)     COPD (chronic obstructive pulmonary disease) ([facility] [address]     [facility]     Hodgkin disease ([facility]     Hyperlipidemia     Hypertension 1984    Migraines     Sleep apnea     recently tested     Type 2 diabetes mellitus without complication ([facility]     type II     Past Surgical History:   Procedure Laterality Date    TUBAL LIGATION       History reviewed. No pertinent family history.   Social History     Socioeconomic History    Marital status: Single     Spouse name: Not on file    Number of children: Not on file    Years of education: Not on file    Highest education level: Not on file   Occupational History    Not on file   Tobacco Use    Smoking status: Former     Packs/day: 1.00     Years: 37.00     Pack years: 37.00     Types: Cigarettes     Quit date: 2022     Years since quittin.6    Smokeless tobacco: Never   Vaping Use    Vaping Use: Never used   Substance and Sexual Activity    Alcohol use: Not Currently    Drug use: Never    Sexual activity: Yes     Partners: Male   Other Topics Concern    Not on file   Social History Narrative    Not on file     Social Determinants of Health     Financial Resource Strain: Low Risk     Difficulty of Paying Living Expenses: Not very hard   Food Insecurity: No Food Insecurity    Worried About Running Out of Food in the Last Year: Never true    Ran Out of Food in the Last Year: Never true   Transportation Needs: Not on file   Physical Activity: Not on file   Stress: Not on file   Social Connections: Not on file   Intimate Partner Violence: Not on file   Housing Stability: Not on file         Review of Systems      ROS: 10 organs review of system is done including general, psychological, ENT, hematological, endocrine, respiratory, cardiovascular, gastrointestinal,musculoskeletal, neurological,  allergy and Immunology is done and is otherwise negative.     Current Outpatient Medications   Medication Sig Dispense Refill    SYMBICORT 160-4.5 MCG/ACT AERO Inhale 2 puffs by mouth twice daily 1 each 3    Continuous Blood Gluc Sensor (DEXCOM G6 SENSOR) MISC APPLY NEW SENSOR EVERY 10 DAYS TO ABDOMEN      potassium chloride (KLOR-CON) 10 MEQ extended release tablet       ONETOUCH ULTRA strip USE AS INSTRUCTED TO CHECK BLOOD SUGAR TWICE A DAY      JARDIANCE 25 MG tablet Take 25 mg by mouth once      metFORMIN (GLUCOPHAGE) 1000 MG tablet TAKE 1 TABLET BY MOUTH TWICE DAILY 60 tablet 3    psyllium (METAMUCIL) 28 % packet Take 1 packet by mouth 2 times daily Take with full glass of h20 or juice 60 packet 1    docusate sodium (COLACE) 100 MG capsule Take 1 capsule by mouth 2 times daily as needed for Constipation 60 capsule 0    Handicap Placard MISC by Does not apply route Expiration date:12/30/27 1 each 0    losartan (COZAAR) 25 MG tablet Take 1 tablet by mouth daily 30 tablet 3    dilTIAZem (CARDIZEM CD) 180 MG extended release capsule Take 1 capsule by mouth 2 times daily 30 capsule 3    predniSONE (DELTASONE) 10 MG tablet 40 mg x3 days then, 30 mg x3 days then, 20 mg x3 days then, 10 mg x3 days 30 tablet 0    guaiFENesin (MUCINEX) 600 MG extended release tablet Take 1 tablet by mouth 2 times daily for 10 days 20 tablet 0    PROAIR  (90 Base) MCG/ACT inhaler Inhale 1 puff into the lungs every 4 hours as needed for Wheezing 9 g 0    tiotropium (SPIRIVA RESPIMAT) 2.5 MCG/ACT AERS inhaler Inhale 2 puffs into the lungs daily 1 each 0    Multiple Vitamins-Minerals (THERAPEUTIC MULTIVITAMIN-MINERALS) tablet Take 1 tablet by mouth daily      rosuvastatin (CRESTOR) 40 MG tablet TAKE 1 TABLET BY MOUTH ONCE DAILY      clopidogrel (PLAVIX) 75 MG tablet Take 75 mg by mouth daily      Icosapent Ethyl (VASCEPA) 1 g CAPS capsule Take 1 g by mouth 2 times daily (with meals)      montelukast (SINGULAIR) 10 MG tablet Take 10 mg by mouth nightly      Esomeprazole Magnesium (NEXIUM 24HR) 20 MG TBEC Take 20 mg by mouth daily as needed      magnesium oxide (MAG-OX) 400 MG tablet Take 400 mg by mouth 2 times daily      pantoprazole (PROTONIX) 40 MG tablet Take 1 tablet by mouth every morning (before breakfast) 30 tablet 3    potassium chloride (KLOR-CON M) 10 MEQ extended release tablet Take 10 mEq by mouth daily      aspirin 81 MG EC tablet Take 1 tablet by mouth daily 90 tablet 3    nitroGLYCERIN (NITROSTAT) 0.4 MG SL tablet up to max of 3 total doses.  If no relief after 1 dose, call 911. 25 tablet 3    budesonide (PULMICORT) 0.5 MG/2ML nebulizer suspension Take 2 mLs by nebulization 2 times daily (Patient not taking: No sig reported) 60 each 3    albuterol (PROVENTIL) (2.5 MG/3ML) 0.083% nebulizer solution Take 3 mLs by nebulization every 4 hours as needed for Wheezing or Shortness of Breath (Patient not taking: Reported on 1/4/2023) 120 each 1    ipratropium-albuterol (DUONEB) 0.5-2.5 (3) MG/3ML SOLN nebulizer solution Inhale 3 mLs into the lungs 3 times daily as needed for Shortness of Breath 270 mL 0    EPINEPHrine (EPIPEN) 0.3 MG/0.3ML SOAJ injection Inject 0.3 mg into the muscle as needed (Patient not taking: No sig reported)      nicotine (NICODERM CQ) 21 MG/24HR Place 1 patch onto the skin daily 42 patch 0    furosemide (LASIX) 20 MG tablet Take 20 mg by mouth daily (Patient not taking: No sig reported)       No current facility-administered medications for this visit. Objective:     Vitals:    01/04/23 1519   BP: 124/70   Site: Right Upper Arm   Position: Sitting   Cuff Size: Large Adult   Pulse: 78   Resp: 16   Temp: 96.8 °F (36 °C)   TempSrc: Infrared   SpO2: 99%   Weight: 188 lb 9.6 oz (85.5 kg)   Height: 5' 2\" (1.575 m)         Physical Exam  Constitutional:       General: She is not in acute distress. Appearance: She is well-developed. She is not diaphoretic. HENT:      Head: Normocephalic and atraumatic. Eyes:      Conjunctiva/sclera: Conjunctivae normal.      Pupils: Pupils are equal, round, and reactive to light. Cardiovascular:      Rate and Rhythm: Normal rate and regular rhythm. Heart sounds: No murmur heard. No friction rub. No gallop. Pulmonary:      Effort: Pulmonary effort is normal. No respiratory distress. Breath sounds: Normal breath sounds. No wheezing or rales. Chest:      Chest wall: No tenderness. Abdominal:      General: There is no distension. Palpations: Abdomen is soft. Tenderness: There is no abdominal tenderness. There is no rebound. Musculoskeletal:         General: No tenderness. Cervical back: Normal range of motion and neck supple. Right lower leg: No edema. Left lower leg: No edema. Lymphadenopathy:      Cervical: No cervical adenopathy. Skin:     General: Skin is warm and dry. Findings: No erythema. Neurological:      Mental Status: She is alert and oriented to person, place, and time. Psychiatric:         Judgment: Judgment normal.       Imaging studies reviewed and interpreted by me CT chest December 2022, multilobar faint groundglass infiltrate consistent with pneumonitis, however right upper lobe at the level of the aortic arch, remained stable.   Lab results reviewed in chart  PFT August 2020, shows FEV1 91%, FEV1/FVC 0.81  ECHO: August 2020 EF 60%  Sleep study October 2020 no KEITH      Assessment and Plan       Diagnosis Orders   1. Hospital discharge follow-up  [place] DISCHARGE MEDS RECONCILED W/ CURRENT OUTPATIENT MED LIST      2. Abnormal CT of the chest  CT CHEST WO CONTRAST      3. Severe persistent asthma without complication  SYMBICORT 909-0.0 MCG/ACT AERO      4. Class 2 obesity due to excess calories without serious comorbidity with body mass index (BMI) of 37.0 to 37.9 in adult        5. Asthma-COPD overlap syndrome ([facility]        6. Gastroesophageal reflux disease without esophagitis          Patient improved post hospital discharge, currently on tapered dose prednisone, room air saturation at rest 98%, walk test done today on room air and she did not desaturate below 97%, will DC portable O2, will obtain nocturnal pulse oximetry evaluate if patient still needs oxygen while asleep  Asthma, symptoms better controlled, continue Symbicort and Spiriva, currently on tapered dose prednisone, can use budesonide nebs if needed, and continue Fasenra. Yearly flu shot, COVID-19 vaccination and pneumonia vaccine recommended  GERD symptoms controlled continue PPI  Abnormal CT chest, groundglass nodule like lesion right upper lobe, at the level of the aortic arch. Will obtain CT chest in March for 8 weeks follow-up to confirm resolution. Orders Placed This Encounter   Procedures    CT CHEST WO CONTRAST     Standing Status:   Future     Standing Expiration Date:   1/4/2024    [place] DISCHARGE MEDS RECONCILED W/ CURRENT OUTPATIENT MED LIST     Orders Placed This Encounter   Medications    SYMBICORT 160-4.5 MCG/ACT AERO     Sig: Inhale 2 puffs by mouth twice daily     Dispense:  1 each     Refill:  3            Discussed with patient the importance of exercise and weight control and  overall health and well-being. Reviewed with the patient: current clinical status, medications, activities and diet.       Side effects, adverse effects of the medication prescribed today, as well as treatment plan and result expectations have been discussed with the patient who expresses understanding and desires to proceed. Return in about 10 weeks (around 3/15/2023).       Sharon Prince MD

## 2023-01-10 ENCOUNTER — TELEPHONE (OUTPATIENT)
Dept: FAMILY MEDICINE CLINIC | Age: 53
End: 2023-01-10

## 2023-01-10 RX ORDER — FUROSEMIDE 20 MG/1
20 TABLET ORAL DAILY
Qty: 60 TABLET | Status: CANCELLED | OUTPATIENT
Start: 2023-01-10

## 2023-01-10 RX ORDER — ICOSAPENT ETHYL 1000 MG/1
1000 CAPSULE ORAL 2 TIMES DAILY WITH MEALS
Qty: 60 CAPSULE | Status: CANCELLED | OUTPATIENT
Start: 2023-01-10

## 2023-01-10 NOTE — TELEPHONE ENCOUNTER
Patient states that she need a referral for Dr. Mariela Allen. Patient would like a call back [phone].

## 2023-01-10 NOTE — TELEPHONE ENCOUNTER
Patient is calling to see if the paperwork she needed filled out for housing/parking space accomodation is done/faxed.

## 2023-01-19 ENCOUNTER — HOSPITAL ENCOUNTER (OUTPATIENT)
Dept: CT IMAGING | Age: 53
Discharge: HOME | End: 2023-01-21
Payer: COMMERCIAL

## 2023-01-19 DIAGNOSIS — S30.851A METAL FOREIGN BODY IN ABDOMEN: ICD-10-CM

## 2023-01-19 PROCEDURE — 74150 CT ABDOMEN W/O CONTRAST: CPT

## 2023-01-30 ENCOUNTER — OFFICE VISIT (OUTPATIENT)
Dept: FAMILY MEDICINE CLINIC | Age: 53
End: 2023-01-30
Payer: COMMERCIAL

## 2023-01-30 ENCOUNTER — OFFICE VISIT (OUTPATIENT)
Dept: CARDIOLOGY CLINIC | Age: 53
End: 2023-01-30
Payer: COMMERCIAL

## 2023-01-30 VITALS
DIASTOLIC BLOOD PRESSURE: 82 MMHG | HEIGHT: 62 IN | RESPIRATION RATE: 18 BRPM | WEIGHT: 201 LBS | OXYGEN SATURATION: 98 % | BODY MASS INDEX: 36.99 KG/M2 | TEMPERATURE: 97.1 F | HEART RATE: 89 BPM | SYSTOLIC BLOOD PRESSURE: 138 MMHG

## 2023-01-30 VITALS
OXYGEN SATURATION: 97 % | HEART RATE: 80 BPM | DIASTOLIC BLOOD PRESSURE: 72 MMHG | BODY MASS INDEX: 36.76 KG/M2 | SYSTOLIC BLOOD PRESSURE: 122 MMHG | WEIGHT: 201 LBS

## 2023-01-30 DIAGNOSIS — I10 HYPERTENSION, UNSPECIFIED TYPE: Primary | ICD-10-CM

## 2023-01-30 DIAGNOSIS — Z00.01 ENCOUNTER FOR GENERAL ADULT MEDICAL EXAMINATION WITH ABNORMAL FINDINGS: ICD-10-CM

## 2023-01-30 DIAGNOSIS — I25.10 CORONARY ARTERY DISEASE DUE TO LIPID RICH PLAQUE: ICD-10-CM

## 2023-01-30 DIAGNOSIS — I10 ESSENTIAL (PRIMARY) HYPERTENSION: ICD-10-CM

## 2023-01-30 DIAGNOSIS — E78.00 HYPERCHOLESTEROLEMIA: ICD-10-CM

## 2023-01-30 DIAGNOSIS — E11.9 TYPE 2 DIABETES MELLITUS WITHOUT COMPLICATION, WITHOUT LONG-TERM CURRENT USE OF INSULIN (HCC): ICD-10-CM

## 2023-01-30 DIAGNOSIS — B37.2 CANDIDAL INTERTRIGO: ICD-10-CM

## 2023-01-30 DIAGNOSIS — E78.5 DYSLIPIDEMIA: ICD-10-CM

## 2023-01-30 DIAGNOSIS — K64.1 GRADE II HEMORRHOIDS: ICD-10-CM

## 2023-01-30 DIAGNOSIS — Z12.31 ENCOUNTER FOR SCREENING MAMMOGRAM FOR BREAST CANCER: Primary | ICD-10-CM

## 2023-01-30 DIAGNOSIS — F17.200 SMOKER: ICD-10-CM

## 2023-01-30 DIAGNOSIS — I25.83 CORONARY ARTERY DISEASE DUE TO LIPID RICH PLAQUE: ICD-10-CM

## 2023-01-30 DIAGNOSIS — I25.10 CORONARY ARTERY DISEASE INVOLVING NATIVE CORONARY ARTERY OF NATIVE HEART WITHOUT ANGINA PECTORIS: ICD-10-CM

## 2023-01-30 DIAGNOSIS — R09.89 BILATERAL CAROTID BRUITS: ICD-10-CM

## 2023-01-30 DIAGNOSIS — J45.50 SEVERE PERSISTENT ASTHMA WITHOUT COMPLICATION: ICD-10-CM

## 2023-01-30 DIAGNOSIS — N95.2 ATROPHIC VAGINITIS: ICD-10-CM

## 2023-01-30 PROCEDURE — 3078F DIAST BP <80 MM HG: CPT | Performed by: PHYSICIAN ASSISTANT

## 2023-01-30 PROCEDURE — 3017F COLORECTAL CA SCREEN DOC REV: CPT | Performed by: INTERNAL MEDICINE

## 2023-01-30 PROCEDURE — 3078F DIAST BP <80 MM HG: CPT | Performed by: INTERNAL MEDICINE

## 2023-01-30 PROCEDURE — 1036F TOBACCO NON-USER: CPT | Performed by: INTERNAL MEDICINE

## 2023-01-30 PROCEDURE — G8484 FLU IMMUNIZE NO ADMIN: HCPCS | Performed by: PHYSICIAN ASSISTANT

## 2023-01-30 PROCEDURE — G8417 CALC BMI ABV UP PARAM F/U: HCPCS | Performed by: INTERNAL MEDICINE

## 2023-01-30 PROCEDURE — G8484 FLU IMMUNIZE NO ADMIN: HCPCS | Performed by: INTERNAL MEDICINE

## 2023-01-30 PROCEDURE — G8427 DOCREV CUR MEDS BY ELIG CLIN: HCPCS | Performed by: INTERNAL MEDICINE

## 2023-01-30 PROCEDURE — 3074F SYST BP LT 130 MM HG: CPT | Performed by: INTERNAL MEDICINE

## 2023-01-30 PROCEDURE — 3074F SYST BP LT 130 MM HG: CPT | Performed by: PHYSICIAN ASSISTANT

## 2023-01-30 PROCEDURE — 99204 OFFICE O/P NEW MOD 45 MIN: CPT | Performed by: INTERNAL MEDICINE

## 2023-01-30 PROCEDURE — 99396 PREV VISIT EST AGE 40-64: CPT | Performed by: PHYSICIAN ASSISTANT

## 2023-01-30 RX ORDER — FLUCONAZOLE 150 MG/1
150 TABLET ORAL
Qty: 2 TABLET | Refills: 0 | Status: SHIPPED | OUTPATIENT
Start: 2023-01-30 | End: 2023-02-05

## 2023-01-30 RX ORDER — BENRALIZUMAB 30 MG/ML
30 INJECTION, SOLUTION SUBCUTANEOUS
COMMUNITY

## 2023-01-30 RX ORDER — HYDROCORTISONE ACETATE 25 MG/1
25 SUPPOSITORY RECTAL 2 TIMES DAILY PRN
Qty: 12 SUPPOSITORY | Refills: 0 | Status: SHIPPED | OUTPATIENT
Start: 2023-01-30

## 2023-01-30 RX ORDER — NYSTATIN 100000 [USP'U]/G
POWDER TOPICAL
Qty: 60 G | Refills: 1 | Status: SHIPPED | OUTPATIENT
Start: 2023-01-30

## 2023-01-30 RX ORDER — GUAIFENESIN 600 MG/1
1200 TABLET, EXTENDED RELEASE ORAL 2 TIMES DAILY
COMMUNITY

## 2023-01-30 RX ORDER — FUROSEMIDE 20 MG/1
20 TABLET ORAL DAILY
Qty: 90 TABLET | Refills: 3 | Status: SHIPPED | OUTPATIENT
Start: 2023-01-30

## 2023-01-30 RX ORDER — ICOSAPENT ETHYL 1000 MG/1
2 CAPSULE ORAL 2 TIMES DAILY
Qty: 180 CAPSULE | Refills: 3 | Status: SHIPPED | OUTPATIENT
Start: 2023-01-30 | End: 2023-02-01 | Stop reason: SDUPTHER

## 2023-01-30 RX ORDER — METOPROLOL SUCCINATE 50 MG/1
50 TABLET, EXTENDED RELEASE ORAL DAILY
Qty: 180 TABLET | Refills: 3 | Status: SHIPPED | OUTPATIENT
Start: 2023-01-30

## 2023-01-30 SDOH — ECONOMIC STABILITY: TRANSPORTATION INSECURITY
IN THE PAST 12 MONTHS, HAS LACK OF TRANSPORTATION KEPT YOU FROM MEETINGS, WORK, OR FROM GETTING THINGS NEEDED FOR DAILY LIVING?: NO

## 2023-01-30 SDOH — ECONOMIC STABILITY: TRANSPORTATION INSECURITY
IN THE PAST 12 MONTHS, HAS THE LACK OF TRANSPORTATION KEPT YOU FROM MEDICAL APPOINTMENTS OR FROM GETTING MEDICATIONS?: NO

## 2023-01-30 SDOH — ECONOMIC STABILITY: FOOD INSECURITY: WITHIN THE PAST 12 MONTHS, THE FOOD YOU BOUGHT JUST DIDN'T LAST AND YOU DIDN'T HAVE MONEY TO GET MORE.: NEVER TRUE

## 2023-01-30 SDOH — ECONOMIC STABILITY: FOOD INSECURITY: WITHIN THE PAST 12 MONTHS, YOU WORRIED THAT YOUR FOOD WOULD RUN OUT BEFORE YOU GOT MONEY TO BUY MORE.: NEVER TRUE

## 2023-01-30 ASSESSMENT — PATIENT HEALTH QUESTIONNAIRE - PHQ9
SUM OF ALL RESPONSES TO PHQ QUESTIONS 1-9: 0
SUM OF ALL RESPONSES TO PHQ9 QUESTIONS 1 & 2: 0
1. LITTLE INTEREST OR PLEASURE IN DOING THINGS: 0
3. TROUBLE FALLING OR STAYING ASLEEP: 0
5. POOR APPETITE OR OVEREATING: 0
SUM OF ALL RESPONSES TO PHQ QUESTIONS 1-9: 0
9. THOUGHTS THAT YOU WOULD BE BETTER OFF DEAD, OR OF HURTING YOURSELF: 0
SUM OF ALL RESPONSES TO PHQ QUESTIONS 1-9: 0
SUM OF ALL RESPONSES TO PHQ QUESTIONS 1-9: 0
4. FEELING TIRED OR HAVING LITTLE ENERGY: 0
7. TROUBLE CONCENTRATING ON THINGS, SUCH AS READING THE NEWSPAPER OR WATCHING TELEVISION: 0
2. FEELING DOWN, DEPRESSED OR HOPELESS: 0
6. FEELING BAD ABOUT YOURSELF - OR THAT YOU ARE A FAILURE OR HAVE LET YOURSELF OR YOUR FAMILY DOWN: 0
10. IF YOU CHECKED OFF ANY PROBLEMS, HOW DIFFICULT HAVE THESE PROBLEMS MADE IT FOR YOU TO DO YOUR WORK, TAKE CARE OF THINGS AT HOME, OR GET ALONG WITH OTHER PEOPLE: 0
8. MOVING OR SPEAKING SO SLOWLY THAT OTHER PEOPLE COULD HAVE NOTICED. OR THE OPPOSITE, BEING SO FIGETY OR RESTLESS THAT YOU HAVE BEEN MOVING AROUND A LOT MORE THAN USUAL: 0

## 2023-01-30 ASSESSMENT — ENCOUNTER SYMPTOMS
EYES NEGATIVE: 1
RESPIRATORY NEGATIVE: 1
COUGH: 0
SHORTNESS OF BREATH: 0
WHEEZING: 0
GASTROINTESTINAL NEGATIVE: 1
BLOOD IN STOOL: 0
NAUSEA: 0
CHEST TIGHTNESS: 0
STRIDOR: 0

## 2023-01-30 ASSESSMENT — SOCIAL DETERMINANTS OF HEALTH (SDOH): HOW HARD IS IT FOR YOU TO PAY FOR THE VERY BASICS LIKE FOOD, HOUSING, MEDICAL CARE, AND HEATING?: NOT HARD AT ALL

## 2023-01-30 NOTE — PROGRESS NOTES
NEW PATIENT        Patient: Isabel Donovan  YOB: 1970  MRN: [id number]    Chief Complaint:  Chief Complaint   Patient presents with    New Patient     Referred by GIFTY Payne for CAD; HTN       CV Data:  22 Cath EF 65  Mild CX 40%  LAD 40-50% RCA 2 prior Stents patent.  Echo EF 65%    Subjective/HPI  pt changing from [facility]. she has premature CAD. No cp no sob no falls no bleed. EKG: SR 66    +++FH  Former smoker  No etoh  Lives w boyfriend and son. Work - . Past Medical History:   Diagnosis Date    Anticoagulant long-term use     Asthma     CAD (coronary artery disease)     COPD (chronic obstructive pulmonary disease) ([facility] [address]     Diabetes mellitus ([facility] [address]     Hodgkin disease ([facility] [address]     Hyperlipidemia     Hypertension     Migraines     Sleep apnea     recently tested     Type 2 diabetes mellitus without complication ([facility] [address]     type II       Past Surgical History:   Procedure Laterality Date    TUBAL LIGATION         No family history on file.     Social History     Socioeconomic History    Marital status: Single     Spouse name: None    Number of children: None    Years of education: None    Highest education level: None   Tobacco Use    Smoking status: Former     Packs/day: 1.00     Years: 37.00     Pack years: 37.00     Types: Cigarettes     Quit date: 2022     Years since quittin.6    Smokeless tobacco: Never   Vaping Use    Vaping Use: Never used   Substance and Sexual Activity    Alcohol use: Not Currently    Drug use: Never    Sexual activity: Yes     Partners: Male       Allergies   Allergen Reactions    Shellfish-Derived Products Anaphylaxis    Cumin Oil Itching     Cumin powder     Guggulipid-Black Pepper      Ground pepper        Current Outpatient Medications   Medication Sig Dispense Refill    benralizumab (FASENRA) 30 MG/ML SOSY injection Inject 30 mg into the skin      guaiFENesin (MUCINEX) 600 MG extended release tablet Take 1,200 mg by mouth 2 times daily      metoprolol succinate (TOPROL XL) 50 MG extended release tablet Take 1 tablet by mouth daily 180 tablet 3    furosemide (LASIX) 20 MG tablet Take 1 tablet by mouth daily 90 tablet 3    Icosapent Ethyl (VASCEPA) 1 g CAPS capsule Take 2 capsules by mouth 2 times daily 180 capsule 3    SYMBICORT 160-4.5 MCG/ACT AERO Inhale 2 puffs by mouth twice daily 1 each 3    Continuous Blood Gluc Sensor (DEXCOM G6 SENSOR) MISC APPLY NEW SENSOR EVERY 10 DAYS TO ABDOMEN      potassium chloride (KLOR-CON) 10 MEQ extended release tablet       ONETOUCH ULTRA strip USE AS INSTRUCTED TO CHECK BLOOD SUGAR TWICE A DAY      JARDIANCE 25 MG tablet Take 25 mg by mouth once      metFORMIN (GLUCOPHAGE) 1000 MG tablet TAKE 1 TABLET BY MOUTH TWICE DAILY 60 tablet 3    docusate sodium (COLACE) 100 MG capsule Take 1 capsule by mouth 2 times daily as needed for Constipation 60 capsule 0    Handicap Placard MISC by Does not apply route Expiration date:12/30/27 1 each 0    losartan (COZAAR) 25 MG tablet Take 1 tablet by mouth daily 30 tablet 3    predniSONE (DELTASONE) 10 MG tablet 40 mg x3 days then, 30 mg x3 days then, 20 mg x3 days then, 10 mg x3 days 30 tablet 0    PROAIR  (90 Base) MCG/ACT inhaler Inhale 1 puff into the lungs every 4 hours as needed for Wheezing 9 g 0    tiotropium (SPIRIVA RESPIMAT) 2.5 MCG/ACT AERS inhaler Inhale 2 puffs into the lungs daily 1 each 0    Multiple Vitamins-Minerals (THERAPEUTIC MULTIVITAMIN-MINERALS) tablet Take 1 tablet by mouth daily      budesonide (PULMICORT) 0.5 MG/2ML nebulizer suspension Take 2 mLs by nebulization 2 times daily 60 each 3    rosuvastatin (CRESTOR) 40 MG tablet TAKE 1 TABLET BY MOUTH ONCE DAILY      albuterol (PROVENTIL) (2.5 MG/3ML) 0.083% nebulizer solution Take 3 mLs by nebulization every 4 hours as needed for Wheezing or Shortness of Breath 120 each 1    clopidogrel (PLAVIX) 75 MG tablet Take 75 mg by mouth daily      Icosapent Ethyl (VASCEPA) 1 g CAPS capsule Take 1 g by mouth 2 times daily (with meals)      montelukast (SINGULAIR) 10 MG tablet Take 10 mg by mouth nightly      EPINEPHrine (EPIPEN) 0.3 MG/0.3ML SOAJ injection Inject 0.3 mg into the muscle as needed      Esomeprazole Magnesium (NEXIUM 24HR) 20 MG TBEC Take 20 mg by mouth daily as needed      magnesium oxide (MAG-OX) 400 MG tablet Take 400 mg by mouth 2 times daily      pantoprazole (PROTONIX) 40 MG tablet Take 1 tablet by mouth every morning (before breakfast) 30 tablet 3    potassium chloride (KLOR-CON M) 10 MEQ extended release tablet Take 10 mEq by mouth daily      aspirin 81 MG EC tablet Take 1 tablet by mouth daily 90 tablet 3    nitroGLYCERIN (NITROSTAT) 0.4 MG SL tablet up to max of 3 total doses. If no relief after 1 dose, call 911. 25 tablet 3    ipratropium-albuterol (DUONEB) 0.5-2.5 (3) MG/3ML SOLN nebulizer solution Inhale 3 mLs into the lungs 3 times daily as needed for Shortness of Breath 270 mL 0    nicotine (NICODERM CQ) 21 MG/24HR Place 1 patch onto the skin daily 42 patch 0     No current facility-administered medications for this visit.       Review of Systems:   Review of Systems   Constitutional: Negative.  Negative for diaphoresis and fatigue.   HENT: Negative.     Eyes: Negative.    Respiratory: Negative.  Negative for cough, chest tightness, shortness of breath, wheezing and stridor.    Cardiovascular: Negative.  Negative for chest pain, palpitations and leg swelling.   Gastrointestinal: Negative.  Negative for blood in stool and nausea.   Genitourinary: Negative.    Musculoskeletal: Negative.    Skin: Negative.    Neurological: Negative.  Negative for dizziness, syncope, weakness and light-headedness.   Hematological: Negative.    Psychiatric/Behavioral: Negative.         Physical Examination:    /72 (Site: Right Upper Arm, Position: Sitting, Cuff Size: Large Adult)   Pulse 80   Wt 201 lb (91.2 kg)   SpO2 97%   BMI 36.76 kg/m²    Physical Exam  Constitutional: She appears healthy. No distress. HENT:   Normal cephalic and Atraumatic   Eyes: Pupils are equal, round, and reactive to light. Neck: Thyroid normal. No JVD present. No neck adenopathy. No thyromegaly present. Cardiovascular: Normal rate, regular rhythm, normal heart sounds, intact distal pulses and normal pulses. Pulses:       Carotid pulses are  on the right side with bruit and  on the left side with bruit. Pulmonary/Chest: Effort normal and breath sounds normal. She has no wheezes. She has no rales. She exhibits no tenderness. Abdominal: Soft. Bowel sounds are normal. There is no abdominal tenderness. Musculoskeletal:         General: No tenderness or edema. Normal range of motion. Cervical back: Normal range of motion and neck supple. Neurological: She is alert and oriented to person, place, and time. Skin: Skin is warm. No cyanosis. Nails show no clubbing.      LABS:  CBC:   Lab Results   Component Value Date/Time    WBC 8.4 12/28/2022 05:06 AM    RBC 4.70 12/28/2022 05:06 AM    HGB 14.2 12/28/2022 05:06 AM    HCT 42.7 12/28/2022 05:06 AM    MCV 91.0 12/28/2022 05:06 AM    MCH 30.1 12/28/2022 05:06 AM    MCHC 33.1 12/28/2022 05:06 AM    RDW 13.7 12/28/2022 05:06 AM     12/28/2022 05:06 AM     Lipids:  Lab Results   Component Value Date    CHOL 175 12/24/2022    CHOL 171 05/11/2021    CHOL 142 08/28/2020     Lab Results   Component Value Date    TRIG 200 (H) 12/24/2022    TRIG 219 (H) 05/11/2021    TRIG 318 (H) 08/28/2020     Lab Results   Component Value Date    HDL 36 (L) 12/24/2022    HDL 36 (L) 05/11/2021    HDL 27 (L) 08/28/2020     Lab Results   Component Value Date    LDLCALC 99 12/24/2022    LDLCALC 91 05/11/2021    LDLCALC 51 08/28/2020     No results found for: LABVLDL, VLDL  No results found for: CHOLHDLRATIO  CMP:    Lab Results   Component Value Date/Time     12/27/2022 05:32 PM    K 4.3 12/27/2022 05:32 PM    K 3.9 10/02/2020 06:23 AM    CL 95 12/27/2022 05:32 PM    CO2 29 12/27/2022 05:32 PM    BUN 24 12/27/2022 05:32 PM    CREATININE 0.66 12/27/2022 05:32 PM    GFRAA >60.0 04/15/2022 09:15 AM    LABGLOM >60.0 12/27/2022 05:32 PM    GLUCOSE 321 12/30/2022 04:12 PM    PROT 6.9 12/22/2022 06:45 PM    LABALBU 4.3 12/22/2022 06:45 PM    CALCIUM 9.4 12/27/2022 05:32 PM    BILITOT 0.3 12/22/2022 06:45 PM    ALKPHOS 102 12/22/2022 06:45 PM    AST 29 12/22/2022 06:45 PM    ALT 31 12/22/2022 06:45 PM     BMP:    Lab Results   Component Value Date/Time     12/27/2022 05:32 PM    K 4.3 12/27/2022 05:32 PM    K 3.9 10/02/2020 06:23 AM    CL 95 12/27/2022 05:32 PM    CO2 29 12/27/2022 05:32 PM    BUN 24 12/27/2022 05:32 PM    LABALBU 4.3 12/22/2022 06:45 PM    CREATININE 0.66 12/27/2022 05:32 PM    CALCIUM 9.4 12/27/2022 05:32 PM    GFRAA >60.0 04/15/2022 09:15 AM    LABGLOM >60.0 12/27/2022 05:32 PM    GLUCOSE 321 12/30/2022 04:12 PM     Magnesium:    Lab Results   Component Value Date/Time    MG 2.4 12/27/2022 05:19 AM     TSH:  Lab Results   Component Value Date    TSH 1.630 05/11/2021             Patient Active Problem List   Diagnosis    Prediabetes    Exacerbation of persistent asthma    Angina at rest [facility])    Abnormal exercise myocardial perfusion study    Acute respiratory failure with hypoxia (HCC)    Chest pain    CAD    COPD     Hypoxia    Essential (primary) hypertension    Coronary artery disease involving native coronary artery of native heart without angina pectoris    Type 2 diabetes mellitus (HCC)    Hypercholesterolemia       Medications Discontinued During This Encounter   Medication Reason    dilTIAZem (CARDIZEM CD) 180 MG extended release capsule     furosemide (LASIX) 20 MG tablet REORDER       Modified Medications    Modified Medication Previous Medication    FUROSEMIDE (LASIX) 20 MG TABLET furosemide (LASIX) 20 MG tablet       Take 1 tablet by mouth daily    Take 20 mg by mouth daily       Orders Placed This Encounter   Medications metoprolol succinate (TOPROL XL) 50 MG extended release tablet     Sig: Take 1 tablet by mouth daily     Dispense:  180 tablet     Refill:  3    furosemide (LASIX) 20 MG tablet     Sig: Take 1 tablet by mouth daily     Dispense:  90 tablet     Refill:  3    Icosapent Ethyl (VASCEPA) 1 g CAPS capsule     Sig: Take 2 capsules by mouth 2 times daily     Dispense:  180 capsule     Refill:  3       Assessment/Plan:    1. Hypertension, unspecified type  Contineu meds. Low salt diet   - US SCREENING FOR AAA; Future    2. Smoker     - US SCREENING FOR AAA; Future    3. Dyslipidemia  Statin low fat diet f/u labs     4. Coronary artery disease due to lipid rich plaque   Dc CCB and add Toprol XL 50 bid     5. Bilateral carotid bruits     - US CAROTID ARTERY BILATERAL; Future     Counseling:  Heart Healthy Lifestyle, Improve BMI, Low Salt Diet, Take Precautions to Prevent Falls, and Regular Exercise    Return in about 4 months (around 5/30/2023).       Electronically signed by Lenan Billy MD on 1/30/2023 at 3:41 PM

## 2023-01-30 NOTE — PATIENT INSTRUCTIONS
Starting a Weight Loss Plan: Care Instructions  Overview     If you're thinking about losing weight, it can be hard to know where to start. Your doctor can help you set up a weight loss plan that best meets your needs. You may want to take a class on nutrition or exercise, or you could join a weight loss support group. If you have questions about how to make changes to your eating or exercise habits, ask your doctor about seeing a registered dietitian or an exercise specialist.  It can be a big challenge to lose weight. But you don't have to make huge changes at once. Make small changes, and stick with them. When those changes become habit, add a few more changes. If you don't think you're ready to make changes right now, try to pick a date in the future. Make an appointment to see your doctor to discuss whether the time is right for you to start a plan. Follow-up care is a key part of your treatment and safety. Be sure to make and go to all appointments, and call your doctor if you are having problems. It's also a good idea to know your test results and keep a list of the medicines you take. How can you care for yourself at home? · Set realistic goals. Many people expect to lose much more weight than is likely. A weight loss of 5% to 10% of your body weight may be enough to improve your health. · Get family and friends involved to provide support. Talk to them about why you are trying to lose weight, and ask them to help. They can help by participating in exercise and having meals with you, even if they may be eating something different. · Find what works best for you. If you do not have time or do not like to cook, a program that offers meal replacement bars or shakes may be better for you. Or if you like to prepare meals, finding a plan that includes daily menus and recipes may be best.  · Ask your doctor about other health professionals who can help you achieve your weight loss goals.   ? A dietitian can help you make healthy changes in your diet. ? An exercise specialist or  can help you develop a safe and effective exercise program.  ? A counselor or psychiatrist can help you cope with issues such as depression, anxiety, or family problems that can make it hard to focus on weight loss. · Consider joining a support group for people who are trying to lose weight. Your doctor can suggest groups in your area. Where can you learn more? Go to http://www.woods.com/ and enter U357 to learn more about \"Starting a Weight Loss Plan: Care Instructions. \"  Current as of: August 25, 2022               Content Version: 13.5  © 7279-8876 [employer]. Care instructions adapted under license by [address] If you have questions about a medical condition or this instruction, always ask your healthcare professional. [address] any warranty or liability for your use of this information. Body Mass Index: Care Instructions  Your Care Instructions     Body mass index (BMI) can help you see if your weight is raising your risk for health problems. It uses a formula to compare how much you weigh with how tall you are. · A BMI lower than 18.5 is considered underweight. · A BMI between 18.5 and 24.9 is considered healthy. · A BMI between 25 and 29.9 is considered overweight. A BMI of 30 or higher is considered obese. If your BMI is in the normal range, it means that you have a lower risk for weight-related health problems. If your BMI is in the overweight or obese range, you may be at increased risk for weight-related health problems, such as high blood pressure, heart disease, stroke, arthritis or joint pain, and diabetes. If your BMI is in the underweight range, you may be at increased risk for health problems such as fatigue, lower protection (immunity) against illness, muscle loss, bone loss, hair loss, and hormone problems.   BMI is just one measure of your risk for weight-related health problems. You may be at higher risk for health problems if you are not active, you eat an unhealthy diet, or you drink too much alcohol or use tobacco products. Follow-up care is a key part of your treatment and safety. Be sure to make and go to all appointments, and call your doctor if you are having problems. It's also a good idea to know your test results and keep a list of the medicines you take. How can you care for yourself at home? · Practice healthy eating habits. This includes eating plenty of fruits, vegetables, whole grains, lean protein, and low-fat dairy. · If your doctor recommends it, get more exercise. Walking is a good choice. Bit by bit, increase the amount you walk every day. Try for at least 30 minutes on most days of the week. · Do not smoke. Smoking can increase your risk for health problems. If you need help quitting, talk to your doctor about stop-smoking programs and medicines. These can increase your chances of quitting for good. · Limit alcohol to 2 drinks a day for men and 1 drink a day for women. Too much alcohol can cause health problems. If you have a BMI higher than 25  · Your doctor may do other tests to check your risk for weight-related health problems. This may include measuring the distance around your waist. A waist measurement of more than 40 inches in men or 35 inches in women can increase the risk of weight-related health problems. · Talk with your doctor about steps you can take to stay healthy or improve your health. You may need to make lifestyle changes to lose weight and stay healthy, such as changing your diet and getting regular exercise. If you have a BMI lower than 18.5  · Your doctor may do other tests to check your risk for health problems. · Talk with your doctor about steps you can take to stay healthy or improve your health.  You may need to make lifestyle changes to gain or maintain weight and stay healthy, such as getting more healthy foods in your diet and doing exercises to build muscle. Where can you learn more? Go to http://www.woods.com/ and enter S176 to learn more about \"Body Mass Index: Care Instructions. \"  Current as of: August 25, 2022               Content Version: 13.5  © 2006-2022 [employer]. Care instructions adapted under license by [facility] ([facility]). If you have questions about a medical condition or this instruction, always ask your healthcare professional. [address] any warranty or liability for your use of this information. DASH Diet: Care Instructions  Your Care Instructions     The DASH diet is an eating plan that can help lower your blood pressure. DASH stands for Dietary Approaches to Stop Hypertension. Hypertension is high blood pressure. The DASH diet focuses on eating foods that are high in calcium, potassium, and magnesium. These nutrients can lower blood pressure. The foods that are highest in these nutrients are fruits, vegetables, low-fat dairy products, nuts, seeds, and legumes. But taking calcium, potassium, and magnesium supplements instead of eating foods that are high in those nutrients does not have the same effect. The DASH diet also includes whole grains, fish, and poultry. The DASH diet is one of several lifestyle changes your doctor may recommend to lower your high blood pressure. Your doctor may also want you to decrease the amount of sodium in your diet. Lowering sodium while following the DASH diet can lower blood pressure even further than just the DASH diet alone. Follow-up care is a key part of your treatment and safety. Be sure to make and go to all appointments, and call your doctor if you are having problems. It's also a good idea to know your test results and keep a list of the medicines you take. How can you care for yourself at home?   Following the DASH diet  · Eat 4 to 5 servings of fruit each day. A serving is 1 medium-sized piece of fruit, ½ cup chopped or canned fruit, 1/4 cup dried fruit, or 4 ounces (½ cup) of fruit juice. Choose fruit more often than fruit juice. · Eat 4 to 5 servings of vegetables each day. A serving is 1 cup of lettuce or raw leafy vegetables, ½ cup of chopped or cooked vegetables, or 4 ounces (½ cup) of vegetable juice. Choose vegetables more often than vegetable juice. · Get 2 to 3 servings of low-fat and fat-free dairy each day. A serving is 8 ounces of milk, 1 cup of yogurt, or 1 ½ ounces of cheese. · Eat 6 to 8 servings of grains each day. A serving is 1 slice of bread, 1 ounce of dry cereal, or ½ cup of cooked rice, pasta, or cooked cereal. Try to choose whole-grain products as much as possible. · Limit lean meat, poultry, and fish to 2 servings each day. A serving is 3 ounces, about the size of a deck of cards. · Eat 4 to 5 servings of nuts, seeds, and legumes (cooked dried beans, lentils, and split peas) each week. A serving is 1/3 cup of nuts, 2 tablespoons of seeds, or ½ cup of cooked beans or peas. · Limit fats and oils to 2 to 3 servings each day. A serving is 1 teaspoon of vegetable oil or 2 tablespoons of salad dressing. · Limit sweets and added sugars to 5 servings or less a week. A serving is 1 tablespoon jelly or jam, ½ cup sorbet, or 1 cup of lemonade. · Eat less than 2,300 milligrams (mg) of sodium a day. If you limit your sodium to 1,500 mg a day, you can lower your blood pressure even more. · Be aware that all of these are the suggested number of servings for people who eat 1,800 to 2,000 calories a day. Your recommended number of servings may be different if you need more or fewer calories. Tips for success  · Start small. Do not try to make dramatic changes to your diet all at once. You might feel that you are missing out on your favorite foods and then be more likely to not follow the plan. Make small changes, and stick with them.  Once those changes become habit, add a few more changes. · Try some of the following:  ? Make it a goal to eat a fruit or vegetable at every meal and at snacks. This will make it easy to get the recommended amount of fruits and vegetables each day. ? Try yogurt topped with fruit and nuts for a snack or healthy dessert. ? Add lettuce, tomato, cucumber, and onion to sandwiches. ? Combine a ready-made pizza crust with low-fat mozzarella cheese and lots of vegetable toppings. Try using tomatoes, squash, spinach, broccoli, carrots, cauliflower, and onions. ? Have a variety of cut-up vegetables with a low-fat dip as an appetizer instead of chips and dip. ? Sprinkle sunflower seeds or chopped almonds over salads. Or try adding chopped walnuts or almonds to cooked vegetables. ? Try some vegetarian meals using beans and peas. Add garbanzo or kidney beans to salads. Make burritos and tacos with mashed wiley beans or black beans. Where can you learn more? Go to http://www.woods.com/ and enter H967 to learn more about \"DASH Diet: Care Instructions. \"  Current as of: September 7, 2022               Content Version: 13.5  © 6914-9004 [employer]. Care instructions adapted under license by [facility] ([facility]). If you have questions about a medical condition or this instruction, always ask your healthcare professional. [place] [address] any warranty or liability for your use of this information. Well Visit, Women 48 to 72: Care Instructions  Overview     Well visits can help you stay healthy. Your doctor has checked your overall health and may have suggested ways to take good care of yourself. Your doctor also may have recommended tests. At home, you can help prevent illness with healthy eating, regular exercise, and other steps. Follow-up care is a key part of your treatment and safety. Be sure to make and go to all appointments, and call your doctor if you are having problems.  It's also a good idea to know your test results and keep a list of the medicines you take. How can you care for yourself at home? · Get screening tests that you and your doctor decide on. Screening helps find diseases before any symptoms appear. · Eat healthy foods. Choose fruits, vegetables, whole grains, protein, and low-fat dairy foods. Limit fat, especially saturated fat. Reduce salt in your diet. · Limit alcohol. Have no more than 1 drink a day or 7 drinks a week. · Get at least 30 minutes of exercise on most days of the week. Walking is a good choice. You also may want to do other activities, such as running, swimming, cycling, or playing tennis or team sports. · Reach and stay at a healthy weight. This will lower your risk for many problems, such as obesity, diabetes, heart disease, and high blood pressure. · Do not smoke. Smoking can make health problems worse. If you need help quitting, talk to your doctor about stop-smoking programs and medicines. These can increase your chances of quitting for good. · Care for your mental health. It is easy to get weighed down by worry and stress. Learn strategies to manage stress, like deep breathing and mindfulness, and stay connected with your family and community. If you find you often feel sad or hopeless, talk with your doctor. Treatment can help. · Talk to your doctor about whether you have any risk factors for sexually transmitted infections (STIs). You can help prevent STIs if you wait to have sex with a new partner (or partners) until you've each been tested for STIs. It also helps if you use condoms (male or female condoms) and if you limit your sex partners to one person who only has sex with you. Vaccines are available for some STIs. · If you think you may have a problem with alcohol or drug use, talk to your doctor. This includes prescription medicines (such as amphetamines and opioids) and illegal drugs (such as cocaine and methamphetamine).  Your doctor can help you figure out what type of treatment is best for you. · Protect your skin from too much sun. When you're outdoors from 10 a.m. to 4 p.m., stay in the shade or cover up with clothing and a hat with a wide brim. Wear sunglasses that block UV rays. Even when it's cloudy, put broad-spectrum sunscreen (SPF 30 or higher) on any exposed skin. · See a dentist one or two times a year for checkups and to have your teeth cleaned. · Wear a seat belt in the car. When should you call for help? Watch closely for changes in your health, and be sure to contact your doctor if you have any problems or symptoms that concern you. Where can you learn more? Go to https://[employer]MediaLink. org and sign in to your [employer] account. Enter S433 in the [facility] box to learn more about \"Well Visit, Women 50 to 72: Care Instructions. \"     If you do not have an account, please click on the \"Sign Up Now\" link. Current as of: June 6, 2022               Content Version: 13.4  © 8484-2893 [employer]. Care instructions adapted under license by [facility] ([facility]). If you have questions about a medical condition or this instruction, always ask your healthcare professional. [address] any warranty or liability for your use of this information. High-Fiber Diet     What Is Fiber? Dietary fiber is a form of carbohydrate found in plants that cannot be digested by humans. All plants contain fiber, including fruits, vegetables, grains, and legumes. Fiber is often classified into two categories: soluble and insoluble. · Soluble fiber draws water into the bowel and can help slow digestion. Examples of foods that are high in soluble fiber include oatmeal, oat bran, barley, legumes (eg, beans and peas), apples, and strawberries. · Insoluble fiber speeds digestion and can add bulk to the stool.  Examples of foods that are high in insoluble fiber include whole-wheat products, wheat bran, cauliflower, green beans, and potatoes. Why Follow a High-Fiber Diet? A high-fiber diet is often recommended to prevent and treat constipation , hemorrhoids , diverticulitis , and irritable bowel syndrome . Eating a high-fiber diet can also help improve your cholesterol levels, lower your risk of coronary heart disease , reduce your risk of type 2 diabetes , and lower your weight. For people with type 1 or 2 diabetes, a high-fiber diet can also help stabilize blood sugar levels. How Much Fiber Should I Eat? A high-fiber diet should contain  20-35 grams  of fiber a day. This is actually the amount recommended for the general adult population; however, most Americans eat only 15 grams of fiber per day. Digestion of Fiber   Eating a higher fiber diet than usual can take some getting used to by your body's digestive system. To avoid the side effects of sudden increases in dietary fiber (eg, gas, cramping, bloating, and diarrhea), increase fiber gradually and be sure to drink plenty of fluids every day. Tips for Increasing Fiber Intake   · Whenever possible, choose whole grains over refined grains (eg, brown rice instead of white rice, whole-wheat bread instead of white bread). · Include a variety of grains in your diet, such as wheat, rye, barley, oats, quinoa, and bulgur. · Eat more vegetarian-based meals. Here are some ideas: black bean burgers, eggplant lasagna, and veggie tofu stir-pike. · Choose high-fiber snacks, such as fruits, popcorn, whole-grain crackers, and nuts. · Make whole-grain cereal or whole-grain toast part of your daily breakfast regime. · When eating out, whether ordering a sandwich or dinner, ask for extra vegetables. · When baking, replace part of the white flour with whole-wheat flour. Whole-wheat flour is particularly easy to incorporate into a recipe.     High-Fiber Diet Eating Guide   Food Category   Foods Recommended   Notes   Grains Whole-grain breads, muffins, bagels, or raghavendra bread Rye bread Whole-wheat crackers or crisp breads Whole-grain or bran cereals Oatmeal, oat bran, or grits Wheat germ Whole-wheat pasta and brown rice   Read the ingredients list on food labels. Look for products that list \"whole\" as the first ingredient (eg, whole-wheat, whole oats). Choose cereals with at least 2 grams of fiber per serving. Vegetables   All vegetables, especially asparagus, bean sprouts, broccoli, [place] sprouts, cabbage, carrots, cauliflower, celery, corn, greens, green beans, green pepper, onions, peas, potatoes (with skin), snow peas, spinach, squash, sweet potatoes, tomatoes, zucchini   For maximum fiber intake, eat the peels of fruits and vegetablesjust be sure to wash them well first.   Fruits   All fruits, especially apples, berries, grapefruits, mangoes, nectarines, oranges, peaches, pears, dried fruits (figs, dates, prunes, raisins)   Choose raw fruits and vegetables over juice, cooked, or cannedraw fruit has more fiber. Dried fruit is also a good source of fiber. Milk   With the exception of yogurt containing inulin (a type of fiber), dairy foods provide little fiber. Add more fiber by topping your yogurt or cottage cheese with fresh fruit, whole grain or bran cereals, nuts, or seeds. Meats and Beans   All beans and peas, especially Garbanzo beans, kidney beans, lentils, lima beans, split peas, and wiley beans All nuts and seeds, especially almonds, peanuts, [place] nuts, cashews, peanut butter, walnuts, sesame and sunflower seeds All meat, poultry, fish, and eggs   Increase fiber in meat dishes by adding wiley beans, kidney beans, black-eyed peas, bran, or oatmeal. If you are following a low-fat diet, use nuts and seeds only in moderation.    Fats and Oils   All in moderation   Fats and oils do not provide fiber   Snacks, Sweets, and Condiments   Fruit Nuts Popcorn, whole-wheat pretzels, or trail mix made with dried fruits, nuts, and seeds Cakes, breads, and cookies made with oatmeal or whole-wheat flour   Most snack foods do not provide much fiber. Choose snacks with at least 2 grams of fiber per serving.      Last Reviewed: March 2011 Rosa Aguayo MS, MPH, RD   Updated: 3/29/2011

## 2023-01-30 NOTE — PROGRESS NOTES
Well Adult Note  Name: Sol Rogers Date: 2023   MRN: [id number] Sex: Female   Age: 45 y.o. Ethnicity:  / Yasmine Briones   : 1970 Race: Other      [place] is here for well adult exam.  History:  Vaginal Irritation  -itching and dryness   - does not notice discharge.  - having irritation around the groin. Burning and causes dysuria. No urgency or frequency. Hemorrhoids  - 25 years. - Has tried otc medications, aloe, hydrocortisone,    - drinking 8 12 oz bottles. - patient is eating oatmeal and prunes everyday. - will see [facility] scan showed stool burdens in colon. Patient states she was has been increasing water, fiber, and using stool softener.  - Has appointment at [facility] to speak with surgeon to remove hemorrhoids.   - Previous history of ligation      Pap smear  - no completed. Will refer to GYN    DM  - last A1C on 22 6.8%. Patient is compliant with medication. - currently on Metformin 1000 mg BID and Jardiance 25 mg once daily. - no hypo/hyperglycemic events. No polydipsia or polyuria. Asthma  See specailist. Dr. Lilly Prophet   - oxygen 3L while sleeping.   - Lyndia Frankel last month  - On LABA/ICS and prn albuterol - less than 3 times a week. . mild baseline shortness of breath, occasional wheezing, mild, occasional chest tightness.   - last exacerbation was 1 month ago. Patient was hospitalized. CAD/HTN  NSTEMI- left heart carth but no PCI. Followed by Dr. Wong Haines. Switched from CCB to Toprol XL 50 BID. Review of Systems   Constitutional:  Negative for activity change, appetite change, chills and fever. HENT:  Negative for congestion, drooling, sinus pressure, sinus pain and sore throat. Eyes:  Negative for visual disturbance. Respiratory:  Negative for cough, chest tightness and shortness of breath. Cardiovascular:  Negative for chest pain. Gastrointestinal:  Negative for abdominal pain, diarrhea, nausea and vomiting.    Endocrine: Negative for cold intolerance. Genitourinary:  Negative for dysuria, flank pain, frequency and hematuria. Musculoskeletal:  Negative for arthralgias and back pain. Skin:  Negative for rash. Allergic/Immunologic: Negative for food allergies. Neurological:  Negative for weakness, light-headedness, numbness and headaches. Hematological:  Does not bruise/bleed easily. Allergies   Allergen Reactions    Shellfish-Derived Products Anaphylaxis    Cumin Oil Itching     Cumin powder     Guggulipid-Black Pepper      Ground pepper          Prior to Visit Medications    Medication Sig Taking? Authorizing Provider   benralizumab (FASENRA) 30 MG/ML SOSY injection Inject 30 mg into the skin Yes Historical Provider, MD   metoprolol succinate (TOPROL XL) 50 MG extended release tablet Take 1 tablet by mouth daily Yes Chery Cormier MD   furosemide (LASIX) 20 MG tablet Take 1 tablet by mouth daily Yes Chery Cormier MD   Icosapent Ethyl (VASCEPA) 1 g CAPS capsule Take 2 capsules by mouth 2 times daily Yes Chery Cormier MD   hydrocortisone (ANUSOL-HC) 25 MG suppository Place 1 suppository rectally 2 times daily as needed for Hemorrhoids Yes GIFTY Constantino   fluconazole (DIFLUCAN) 150 MG tablet Take 1 tablet by mouth every 72 hours for 6 days Yes GIFTY Constantino   nystatin (MYCOSTATIN) 125976 UNIT/GM powder Apply 3 times daily.  Yes GIFTY Constantino   SYMBICORT 160-4.5 MCG/ACT AERO Inhale 2 puffs by mouth twice daily Yes Dena Dove MD   Continuous Blood Gluc Sensor (DEXCOM G6 SENSOR) MISC APPLY NEW SENSOR EVERY 10 DAYS TO ABDOMEN Yes Historical Provider, MD   potassium chloride (KLOR-CON) 10 MEQ extended release tablet  Yes Historical Provider, MD Antwan Yeh strip USE AS INSTRUCTED TO CHECK BLOOD SUGAR TWICE A DAY Yes Historical Provider, MD   JARDIANCE 25 MG tablet Take 25 mg by mouth once Yes Historical Provider, MD   metFORMIN (GLUCOPHAGE) 1000 MG tablet TAKE 1 TABLET BY MOUTH TWICE DAILY Yes GIFTY Constantino docusate sodium (COLACE) 100 MG capsule Take 1 capsule by mouth 2 times daily as needed for Constipation Yes GIFTY Jacinto   Handicap Placard MISC by Does not apply route Expiration date:12/30/27 Yes GIFTY Jacinto   losartan (COZAAR) 25 MG tablet Take 1 tablet by mouth daily Yes KAMILLE Muniz CNP   PROAIR  (90 Base) MCG/ACT inhaler Inhale 1 puff into the lungs every 4 hours as needed for Wheezing Yes Sandhya Duran, DO   tiotropium (SPIRIVA RESPIMAT) 2.5 MCG/ACT AERS inhaler Inhale 2 puffs into the lungs daily Yes Lizette Fu, DO   Multiple Vitamins-Minerals (THERAPEUTIC MULTIVITAMIN-MINERALS) tablet Take 1 tablet by mouth daily Yes Historical Provider, MD   budesonide (PULMICORT) 0.5 MG/2ML nebulizer suspension Take 2 mLs by nebulization 2 times daily Yes Juanis Morejon MD   rosuvastatin (CRESTOR) 40 MG tablet TAKE 1 TABLET BY MOUTH ONCE DAILY Yes Historical Provider, MD   albuterol (PROVENTIL) (2.5 MG/3ML) 0.083% nebulizer solution Take 3 mLs by nebulization every 4 hours as needed for Wheezing or Shortness of Breath Yes Baron Trina PA-C   clopidogrel (PLAVIX) 75 MG tablet Take 75 mg by mouth daily Yes Historical Provider, MD   montelukast (SINGULAIR) 10 MG tablet Take 10 mg by mouth nightly Yes Historical Provider, MD   EPINEPHrine (EPIPEN) 0.3 MG/0.3ML SOAJ injection Inject 0.3 mg into the muscle as needed Yes Historical Provider, MD   Esomeprazole Magnesium (NEXIUM 24HR) 20 MG TBEC Take 20 mg by mouth daily as needed Yes Historical Provider, MD   magnesium oxide (MAG-OX) 400 MG tablet Take 400 mg by mouth 2 times daily Yes Historical Provider, MD   potassium chloride (KLOR-CON M) 10 MEQ extended release tablet Take 10 mEq by mouth daily Yes Historical Provider, MD   aspirin 81 MG EC tablet Take 1 tablet by mouth daily Yes Kylah Marquez MD   nitroGLYCERIN (NITROSTAT) 0.4 MG SL tablet up to max of 3 total doses. If no relief after 1 dose, call 911.  Yes Jose Rafael Bailey MD guaiFENesin (MUCINEX) 600 MG extended release tablet Take 1,200 mg by mouth 2 times daily  Patient not taking: Reported on 2023  Historical Provider, MD   predniSONE (DELTASONE) 10 MG tablet 40 mg x3 days then, 30 mg x3 days then, 20 mg x3 days then, 10 mg x3 days  Patient not taking: Reported on 2023  Bin Toney DO   Icosapent Ethyl (VASCEPA) 1 g CAPS capsule Take 1 g by mouth 2 times daily (with meals)  Patient not taking: Reported on 2023  Historical Provider, MD   ipratropium-albuterol ([place] Fass) 0.5-2.5 (3) MG/3ML SOLN nebulizer solution Inhale 3 mLs into the lungs 3 times daily as needed for Shortness of Breath  Juan Ball MD   nicotine ([place] Blotter) 21 MG/24HR Place 1 patch onto the skin daily  Juan Ball MD         Past Medical History:   Diagnosis Date    Anticoagulant long-term use     Asthma     CAD (coronary artery disease)     COPD (chronic obstructive pulmonary disease) ([facility]     Diabetes mellitus ([facility]     Hodgkin disease ([facility]     Hyperlipidemia     Hypertension     Migraines     Sleep apnea     recently tested     Type 2 diabetes mellitus without complication [facility]     type II       Past Surgical History:   Procedure Laterality Date    TUBAL LIGATION         History reviewed. No pertinent family history.     Social History     Tobacco Use    Smoking status: Former     Packs/day: 1.00     Years: 37.00     Pack years: 37.00     Types: Cigarettes     Quit date: 2022     Years since quittin.7    Smokeless tobacco: Never   Vaping Use    Vaping Use: Never used   Substance Use Topics    Alcohol use: Not Currently    Drug use: Never       Objective   /82 (Site: Right Upper Arm, Position: Sitting, Cuff Size: Medium Adult)   Pulse 89   Temp 97.1 °F (36.2 °C) (Temporal)   Resp 18   Ht 5' 2\" (1.575 m)   Wt 201 lb (91.2 kg)   SpO2 98%   BMI 36.76 kg/m²   Wt Readings from Last 3 Encounters:   23 201 lb (91.2 kg)   23 201 lb (91.2 kg)   01/04/23 188 lb 9.6 oz (85.5 kg)     There were no vitals filed for this visit. Physical Exam  Vitals and nursing note reviewed. Exam conducted with a chaperone present. Constitutional:       General: She is awake. She is not in acute distress. Appearance: Normal appearance. She is well-developed. She is not ill-appearing, toxic-appearing or diaphoretic. HENT:      Head: Normocephalic and atraumatic. Right Ear: Hearing and external ear normal.      Left Ear: Hearing and external ear normal.      Nose: Nose normal.   Eyes:      General: Lids are normal. Vision grossly intact. Gaze aligned appropriately. Conjunctiva/sclera: Conjunctivae normal.      Pupils: Pupils are equal, round, and reactive to light. Cardiovascular:      Rate and Rhythm: Normal rate and regular rhythm. Pulses: Normal pulses. Heart sounds: Normal heart sounds, S1 normal and S2 normal.   Pulmonary:      Effort: Pulmonary effort is normal.      Breath sounds: Normal breath sounds and air entry. Abdominal:      Hernia: There is no hernia in the left inguinal area or right inguinal area. Genitourinary:     Exam position: Lithotomy position. Labia:         Right: Rash and tenderness present. Left: Rash and tenderness present. Urethra: No prolapse, urethral pain, urethral swelling or urethral lesion. Rectum: External hemorrhoid present. Musculoskeletal:      Cervical back: Normal range of motion. Lymphadenopathy:      Lower Body: No right inguinal adenopathy. No left inguinal adenopathy. Skin:     General: Skin is warm. Capillary Refill: Capillary refill takes less than 2 seconds. Neurological:      Mental Status: She is alert and oriented to person, place, and time. Gait: Gait is intact.    Psychiatric:         Attention and Perception: Attention normal.         Mood and Affect: Mood normal.         Speech: Speech normal.         Behavior: Behavior normal. Behavior is cooperative. Assessment   Plan   1. Encounter for screening mammogram for breast cancer  -     [facility] ROSLYN DIGITAL SCREEN BILATERAL; Future  2. [place] intertrigo  -     fluconazole (DIFLUCAN) 150 MG tablet; Take 1 tablet by mouth every 72 hours for 6 days, Disp-2 tablet, R-0Normal  -     nystatin (MYCOSTATIN) 615724 UNIT/GM powder; Apply 3 times daily. , Disp-60 g, R-1, Normal  3. Grade II hemorrhoids  -     hydrocortisone (ANUSOL-HC) 25 MG suppository; Place 1 suppository rectally 2 times daily as needed for Hemorrhoids, Disp-12 suppository, R-0Normal  4. Atrophic vaginitis  -     [facility]  5. Encounter for general adult medical examination with abnormal findings  6. Hypercholesterolemia  Assessment & Plan:   Uncontrolled, continue current medications  7. Type 2 diabetes mellitus without complication, without long-term current use of insulin ([facility]  Assessment & Plan:   At goal, continue current medications  8. Essential (primary) hypertension  Assessment & Plan:   Well-controlled, continue current medications  9. Coronary artery disease involving native coronary artery of native heart without angina pectoris  Assessment & Plan:   Monitored by specialist- no acute findings meriting change in the plan  10.  Severe persistent asthma without complication  Assessment & Plan:   Monitored by specialist- no acute findings meriting change in the plan         Personalized Preventive Plan   Current Health Maintenance Status  Immunization History   Administered Date(s) Administered    COVID-19, PFIZER GRAY top, DO NOT Dilute, (age 15 y+), IM, 30 mcg/0.3 mL 09/07/2022, 10/07/2022        Health Maintenance   Topic Date Due    Diabetic foot exam  Never done    Diabetic retinal exam  Never done    Hepatitis B vaccine (1 of 3 - Risk 3-dose series) Never done    Cervical cancer screen  Never done    Colorectal Cancer Screen  Never done    Shingles vaccine (1 of 2) Never done    COVID-19 Vaccine (3 - Booster for Pfizer series) 12/02/2022    Breast cancer screen  01/11/2023    Diabetic Alb to Cr ratio (uACR) test  08/08/2023    Low dose CT lung screening  11/01/2023    A1C test (Diabetic or Prediabetic)  12/23/2023    Lipids  12/24/2023    GFR test (Diabetes, CKD 3-4, OR last GFR 15-59)  12/27/2023    Depression Screen  01/30/2024    DTaP/Tdap/Td vaccine (2 - Td or Tdap) 11/02/2030    Flu vaccine  Completed    Pneumococcal 0-64 years Vaccine  Completed    Hepatitis C screen  Completed    HIV screen  Completed    Hepatitis A vaccine  Aged Out    Hib vaccine  Aged Out    Meningococcal (ACWY) vaccine  Aged Out     Recommendations for [facility] Due: see orders and patient instructions/AVS.    Return in 3 months (on 4/30/2023). Obesity Counseling: Assessed behavioral health risks and factors affecting choice of behavior. Suggested weight control approaches, including dietary changes behavioral modification and follow up plan. Provided educational and support documentation.   Time spent (minutes): 5

## 2023-01-30 NOTE — PROGRESS NOTES
Chaperone for Intimate Exam  Chaperone was offered and accepted as part of the rooming process.   Chaperone: Steve Silver CMA

## 2023-01-31 PROBLEM — J45.50 SEVERE PERSISTENT ASTHMA WITHOUT COMPLICATION: Status: ACTIVE | Noted: 2021-06-09

## 2023-01-31 ASSESSMENT — ENCOUNTER SYMPTOMS
SINUS PAIN: 0
SINUS PRESSURE: 0
ABDOMINAL PAIN: 0
SHORTNESS OF BREATH: 0
BACK PAIN: 0
CHEST TIGHTNESS: 0
COUGH: 0
NAUSEA: 0
SORE THROAT: 0
DIARRHEA: 0
VOMITING: 0

## 2023-01-31 ASSESSMENT — VISUAL ACUITY: OU: 1

## 2023-02-01 RX ORDER — ICOSAPENT ETHYL 1000 MG/1
2 CAPSULE ORAL DAILY
Qty: 180 CAPSULE | Refills: 3 | Status: SHIPPED | OUTPATIENT
Start: 2023-02-01

## 2023-02-01 NOTE — TELEPHONE ENCOUNTER
Requesting medication refill. Please approve or deny this request.    Rx requested:  Requested Prescriptions     Pending Prescriptions Disp Refills    Icosapent Ethyl (VASCEPA) 1 g CAPS capsule 180 capsule 3     Sig: Take 2 capsules by mouth daily         Last Office Visit:   1/30/2023      Next Visit Date:  Future Appointments   Date Time Provider Bhavin Tao   2/2/2023  3:40 PM [place] [facility] ROOM 2 [address] [facility]   2/28/2023  8:45 AM Darren Cortes DO [facility] [address]   3/6/2023  8:00 AM [place] CT ROOM 1 [facility] Fac RAD   3/20/2023  9:00 AM Alia Amezquita MD [facility]   5/1/2023  4:00 PM [place], [address] [facility]   5/31/2023 12:15 PM Allegra Santos MD [facility]               Please approve or deny.

## 2023-02-01 NOTE — ASSESSMENT & PLAN NOTE
At goal, continue current medications
Monitored by specialist- no acute findings meriting change in the plan
Monitored by specialist- no acute findings meriting change in the plan
Uncontrolled, continue current medications
Well-controlled, continue current medications
138

## 2023-02-02 ENCOUNTER — HOSPITAL ENCOUNTER (OUTPATIENT)
Dept: WOMENS IMAGING | Age: 53
Discharge: HOME | End: 2023-02-04
Payer: COMMERCIAL

## 2023-02-02 DIAGNOSIS — Z12.31 ENCOUNTER FOR SCREENING MAMMOGRAM FOR BREAST CANCER: ICD-10-CM

## 2023-02-02 PROCEDURE — 77067 SCR MAMMO BI INCL CAD: CPT

## 2023-02-28 ENCOUNTER — HOSPITAL ENCOUNTER (INPATIENT)
Age: 53
LOS: 3 days | Discharge: HOME HEALTH | End: 2023-03-04
Attending: EMERGENCY MEDICINE | Admitting: INTERNAL MEDICINE
Payer: COMMERCIAL

## 2023-02-28 ENCOUNTER — APPOINTMENT (OUTPATIENT)
Dept: GENERAL RADIOLOGY | Age: 53
End: 2023-02-28
Payer: COMMERCIAL

## 2023-02-28 DIAGNOSIS — R07.89 ATYPICAL CHEST PAIN: ICD-10-CM

## 2023-02-28 DIAGNOSIS — J96.10 CHRONIC RESPIRATORY FAILURE, UNSPECIFIED WHETHER WITH HYPOXIA OR HYPERCAPNIA (HCC): ICD-10-CM

## 2023-02-28 DIAGNOSIS — J44.1 COPD EXACERBATION (HCC): Primary | ICD-10-CM

## 2023-02-28 PROCEDURE — 93005 ELECTROCARDIOGRAM TRACING: CPT | Performed by: EMERGENCY MEDICINE

## 2023-02-28 PROCEDURE — 96375 TX/PRO/DX INJ NEW DRUG ADDON: CPT

## 2023-02-28 PROCEDURE — 99285 EMERGENCY DEPT VISIT HI MDM: CPT

## 2023-02-28 PROCEDURE — 71045 X-RAY EXAM CHEST 1 VIEW: CPT

## 2023-02-28 RX ORDER — METHYLPREDNISOLONE SODIUM SUCCINATE 125 MG/2ML
125 INJECTION, POWDER, LYOPHILIZED, FOR SOLUTION INTRAMUSCULAR; INTRAVENOUS ONCE
Status: COMPLETED | OUTPATIENT
Start: 2023-03-01 | End: 2023-03-01

## 2023-02-28 RX ORDER — NITROGLYCERIN 0.4 MG/1
0.4 TABLET SUBLINGUAL EVERY 5 MIN PRN
Status: DISCONTINUED | OUTPATIENT
Start: 2023-02-28 | End: 2023-02-28

## 2023-02-28 RX ORDER — IPRATROPIUM BROMIDE AND ALBUTEROL SULFATE 2.5; .5 MG/3ML; MG/3ML
1 SOLUTION RESPIRATORY (INHALATION) ONCE
Status: COMPLETED | OUTPATIENT
Start: 2023-03-01 | End: 2023-03-01

## 2023-02-28 RX ORDER — MAGNESIUM SULFATE IN WATER 40 MG/ML
2000 INJECTION, SOLUTION INTRAVENOUS ONCE
Status: COMPLETED | OUTPATIENT
Start: 2023-03-01 | End: 2023-03-01

## 2023-02-28 RX ORDER — ASPIRIN 81 MG/1
324 TABLET, CHEWABLE ORAL ONCE
Status: COMPLETED | OUTPATIENT
Start: 2023-02-28 | End: 2023-03-01

## 2023-02-28 ASSESSMENT — LIFESTYLE VARIABLES: HOW OFTEN DO YOU HAVE A DRINK CONTAINING ALCOHOL: PATIENT DECLINED

## 2023-02-28 ASSESSMENT — PAIN SCALES - GENERAL: PAINLEVEL_OUTOF10: 7

## 2023-02-28 ASSESSMENT — PAIN - FUNCTIONAL ASSESSMENT: PAIN_FUNCTIONAL_ASSESSMENT: 0-10

## 2023-03-01 ENCOUNTER — APPOINTMENT (OUTPATIENT)
Dept: CT IMAGING | Age: 53
End: 2023-03-01
Payer: COMMERCIAL

## 2023-03-01 PROBLEM — R06.02 SOB (SHORTNESS OF BREATH): Status: ACTIVE | Noted: 2023-03-01

## 2023-03-01 LAB
ADENOVIRUS BY PCR: NOT DETECTED
ALBUMIN SERPL-MCNC: 4.3 G/DL (ref 3.5–4.6)
ALP BLD-CCNC: 119 U/L (ref 40–130)
ALT SERPL-CCNC: 14 U/L (ref 0–33)
AMPHETAMINE SCREEN, URINE: NORMAL
ANION GAP SERPL CALCULATED.3IONS-SCNC: 11 MEQ/L (ref 9–15)
ANION GAP SERPL CALCULATED.3IONS-SCNC: 12 MEQ/L (ref 9–15)
APTT: 28.3 SEC (ref 24.4–36.8)
AST SERPL-CCNC: 12 U/L (ref 0–35)
BARBITURATE SCREEN URINE: NORMAL
BASOPHILS ABSOLUTE: 0 K/UL (ref 0–0.2)
BASOPHILS ABSOLUTE: 0 K/UL (ref 0–0.2)
BASOPHILS RELATIVE PERCENT: 0.2 %
BASOPHILS RELATIVE PERCENT: 0.4 %
BENZODIAZEPINE SCREEN, URINE: NORMAL
BILIRUB SERPL-MCNC: <0.2 MG/DL (ref 0.2–0.7)
BORDETELLA PARAPERTUSSIS BY PCR: NOT DETECTED
BORDETELLA PERTUSSIS BY PCR: NOT DETECTED
BUN BLDV-MCNC: 12 MG/DL (ref 6–20)
BUN BLDV-MCNC: 13 MG/DL (ref 6–20)
CALCIUM SERPL-MCNC: 9.1 MG/DL (ref 8.5–9.9)
CALCIUM SERPL-MCNC: 9.2 MG/DL (ref 8.5–9.9)
CANNABINOID SCREEN URINE: NORMAL
CHLAMYDOPHILIA PNEUMONIAE BY PCR: NOT DETECTED
CHLORIDE BLD-SCNC: 102 MEQ/L (ref 95–107)
CHLORIDE BLD-SCNC: 103 MEQ/L (ref 95–107)
CO2: 21 MEQ/L (ref 20–31)
CO2: 25 MEQ/L (ref 20–31)
COCAINE METABOLITE SCREEN URINE: NORMAL
CORONAVIRUS 229E BY PCR: NOT DETECTED
CORONAVIRUS HKU1 BY PCR: NOT DETECTED
CORONAVIRUS NL63 BY PCR: NOT DETECTED
CORONAVIRUS OC43 BY PCR: NOT DETECTED
CREAT SERPL-MCNC: 0.52 MG/DL (ref 0.5–0.9)
CREAT SERPL-MCNC: 0.58 MG/DL (ref 0.5–0.9)
EOSINOPHILS ABSOLUTE: 0 K/UL (ref 0–0.7)
EOSINOPHILS ABSOLUTE: 0 K/UL (ref 0–0.7)
EOSINOPHILS RELATIVE PERCENT: 0 %
EOSINOPHILS RELATIVE PERCENT: 0.1 %
ETHANOL PERCENT: NORMAL G/DL
ETHANOL: <10 MG/DL (ref 0–0.08)
FENTANYL SCREEN, URINE: NORMAL
GFR SERPL CREATININE-BSD FRML MDRD: >60 ML/MIN/{1.73_M2}
GFR SERPL CREATININE-BSD FRML MDRD: >60 ML/MIN/{1.73_M2}
GLOBULIN: 2.9 G/DL (ref 2.3–3.5)
GLUCOSE BLD-MCNC: 130 MG/DL (ref 70–99)
GLUCOSE BLD-MCNC: 155 MG/DL (ref 70–99)
GLUCOSE BLD-MCNC: 160 MG/DL (ref 70–99)
GLUCOSE BLD-MCNC: 165 MG/DL (ref 70–99)
GLUCOSE BLD-MCNC: 172 MG/DL (ref 70–99)
GLUCOSE BLD-MCNC: 174 MG/DL (ref 70–99)
GLUCOSE BLD-MCNC: 225 MG/DL (ref 70–99)
HCT VFR BLD CALC: 43.8 % (ref 37–47)
HCT VFR BLD CALC: 44.4 % (ref 37–47)
HEMOGLOBIN: 14.8 G/DL (ref 12–16)
HEMOGLOBIN: 15 G/DL (ref 12–16)
HUMAN METAPNEUMOVIRUS BY PCR: NOT DETECTED
HUMAN RHINOVIRUS/ENTEROVIRUS BY PCR: NOT DETECTED
INFLUENZA A BY PCR: NOT DETECTED
INFLUENZA B BY PCR: NOT DETECTED
INR BLD: 1
LIPASE: 24 U/L (ref 12–95)
LYMPHOCYTES ABSOLUTE: 0.5 K/UL (ref 1–4.8)
LYMPHOCYTES ABSOLUTE: 1.9 K/UL (ref 1–4.8)
LYMPHOCYTES RELATIVE PERCENT: 28.1 %
LYMPHOCYTES RELATIVE PERCENT: 5.5 %
Lab: NORMAL
MAGNESIUM: 1.9 MG/DL (ref 1.7–2.4)
MCH RBC QN AUTO: 30.3 PG (ref 27–31.3)
MCH RBC QN AUTO: 30.5 PG (ref 27–31.3)
MCHC RBC AUTO-ENTMCNC: 33.7 % (ref 33–37)
MCHC RBC AUTO-ENTMCNC: 33.8 % (ref 33–37)
MCV RBC AUTO: 89.7 FL (ref 79.4–94.8)
MCV RBC AUTO: 90.1 FL (ref 79.4–94.8)
METHADONE SCREEN, URINE: NORMAL
MONOCYTES ABSOLUTE: 0.1 K/UL (ref 0.2–0.8)
MONOCYTES ABSOLUTE: 0.4 K/UL (ref 0.2–0.8)
MONOCYTES RELATIVE PERCENT: 0.9 %
MONOCYTES RELATIVE PERCENT: 5.9 %
MYCOPLASMA PNEUMONIAE BY PCR: NOT DETECTED
NEUTROPHILS ABSOLUTE: 4.5 K/UL (ref 1.4–6.5)
NEUTROPHILS ABSOLUTE: 8.6 K/UL (ref 1.4–6.5)
NEUTROPHILS RELATIVE PERCENT: 65.6 %
NEUTROPHILS RELATIVE PERCENT: 93.3 %
OPIATE SCREEN URINE: NORMAL
OXYCODONE URINE: NORMAL
PARAINFLUENZA VIRUS 1 BY PCR: NOT DETECTED
PARAINFLUENZA VIRUS 2 BY PCR: NOT DETECTED
PARAINFLUENZA VIRUS 3 BY PCR: NOT DETECTED
PARAINFLUENZA VIRUS 4 BY PCR: NOT DETECTED
PDW BLD-RTO: 14.2 % (ref 11.5–14.5)
PDW BLD-RTO: 14.2 % (ref 11.5–14.5)
PERFORMED ON: ABNORMAL
PHENCYCLIDINE SCREEN URINE: NORMAL
PLATELET # BLD: 194 K/UL (ref 130–400)
PLATELET # BLD: 196 K/UL (ref 130–400)
POC CREATININE WHOLE BLOOD: 0.6
POTASSIUM REFLEX MAGNESIUM: 4.2 MEQ/L (ref 3.4–4.9)
POTASSIUM SERPL-SCNC: 3.9 MEQ/L (ref 3.4–4.9)
PRO-BNP: <5 PG/ML
PROCALCITONIN: 0.05 NG/ML (ref 0–0.15)
PROPOXYPHENE SCREEN: NORMAL
PROTHROMBIN TIME: 12.8 SEC (ref 12.3–14.9)
RBC # BLD: 4.86 M/UL (ref 4.2–5.4)
RBC # BLD: 4.95 M/UL (ref 4.2–5.4)
RESPIRATORY SYNCYTIAL VIRUS BY PCR: NOT DETECTED
SARS-COV-2, PCR: NOT DETECTED
SODIUM BLD-SCNC: 136 MEQ/L (ref 135–144)
SODIUM BLD-SCNC: 138 MEQ/L (ref 135–144)
TOTAL PROTEIN: 7.2 G/DL (ref 6.3–8)
TROPONIN: <0.01 NG/ML (ref 0–0.01)
TROPONIN: <0.01 NG/ML (ref 0–0.01)
WBC # BLD: 6.8 K/UL (ref 4.8–10.8)
WBC # BLD: 9.2 K/UL (ref 4.8–10.8)

## 2023-03-01 PROCEDURE — 82077 ASSAY SPEC XCP UR&BREATH IA: CPT

## 2023-03-01 PROCEDURE — 6360000002 HC RX W HCPCS: Performed by: EMERGENCY MEDICINE

## 2023-03-01 PROCEDURE — 71250 CT THORAX DX C-: CPT

## 2023-03-01 PROCEDURE — 99254 IP/OBS CNSLTJ NEW/EST MOD 60: CPT | Performed by: INTERNAL MEDICINE

## 2023-03-01 PROCEDURE — 85025 COMPLETE CBC W/AUTO DIFF WBC: CPT

## 2023-03-01 PROCEDURE — 84145 PROCALCITONIN (PCT): CPT

## 2023-03-01 PROCEDURE — 96365 THER/PROPH/DIAG IV INF INIT: CPT

## 2023-03-01 PROCEDURE — 6360000002 HC RX W HCPCS

## 2023-03-01 PROCEDURE — 6370000000 HC RX 637 (ALT 250 FOR IP): Performed by: EMERGENCY MEDICINE

## 2023-03-01 PROCEDURE — 36415 COLL VENOUS BLD VENIPUNCTURE: CPT

## 2023-03-01 PROCEDURE — 96375 TX/PRO/DX INJ NEW DRUG ADDON: CPT

## 2023-03-01 PROCEDURE — 94640 AIRWAY INHALATION TREATMENT: CPT

## 2023-03-01 PROCEDURE — 94664 DEMO&/EVAL PT USE INHALER: CPT

## 2023-03-01 PROCEDURE — 80053 COMPREHEN METABOLIC PANEL: CPT

## 2023-03-01 PROCEDURE — 99223 1ST HOSP IP/OBS HIGH 75: CPT | Performed by: INTERNAL MEDICINE

## 2023-03-01 PROCEDURE — 2060000000 HC ICU INTERMEDIATE R&B

## 2023-03-01 PROCEDURE — 93005 ELECTROCARDIOGRAM TRACING: CPT

## 2023-03-01 PROCEDURE — 6370000000 HC RX 637 (ALT 250 FOR IP)

## 2023-03-01 PROCEDURE — 2580000003 HC RX 258: Performed by: INTERNAL MEDICINE

## 2023-03-01 PROCEDURE — 94761 N-INVAS EAR/PLS OXIMETRY MLT: CPT

## 2023-03-01 PROCEDURE — 93005 ELECTROCARDIOGRAM TRACING: CPT | Performed by: EMERGENCY MEDICINE

## 2023-03-01 PROCEDURE — 84484 ASSAY OF TROPONIN QUANT: CPT

## 2023-03-01 PROCEDURE — 6360000002 HC RX W HCPCS: Performed by: INTERNAL MEDICINE

## 2023-03-01 PROCEDURE — 85730 THROMBOPLASTIN TIME PARTIAL: CPT

## 2023-03-01 PROCEDURE — 83735 ASSAY OF MAGNESIUM: CPT

## 2023-03-01 PROCEDURE — 83880 ASSAY OF NATRIURETIC PEPTIDE: CPT

## 2023-03-01 PROCEDURE — 0202U NFCT DS 22 TRGT SARS-COV-2: CPT

## 2023-03-01 PROCEDURE — 85610 PROTHROMBIN TIME: CPT

## 2023-03-01 PROCEDURE — 6370000000 HC RX 637 (ALT 250 FOR IP): Performed by: INTERNAL MEDICINE

## 2023-03-01 PROCEDURE — 83690 ASSAY OF LIPASE: CPT

## 2023-03-01 PROCEDURE — 2700000000 HC OXYGEN THERAPY PER DAY

## 2023-03-01 PROCEDURE — 80307 DRUG TEST PRSMV CHEM ANLYZR: CPT

## 2023-03-01 RX ORDER — METOPROLOL SUCCINATE 50 MG/1
50 TABLET, EXTENDED RELEASE ORAL DAILY
Status: DISCONTINUED | OUTPATIENT
Start: 2023-03-01 | End: 2023-03-04 | Stop reason: HOSPADM

## 2023-03-01 RX ORDER — ENOXAPARIN SODIUM 100 MG/ML
40 INJECTION SUBCUTANEOUS DAILY
Status: DISCONTINUED | OUTPATIENT
Start: 2023-03-01 | End: 2023-03-04 | Stop reason: HOSPADM

## 2023-03-01 RX ORDER — ACETAMINOPHEN 650 MG/1
650 SUPPOSITORY RECTAL EVERY 6 HOURS PRN
Status: DISCONTINUED | OUTPATIENT
Start: 2023-03-01 | End: 2023-03-04 | Stop reason: HOSPADM

## 2023-03-01 RX ORDER — ASPIRIN 81 MG/1
81 TABLET ORAL DAILY
Status: DISCONTINUED | OUTPATIENT
Start: 2023-03-01 | End: 2023-03-04 | Stop reason: HOSPADM

## 2023-03-01 RX ORDER — MONTELUKAST SODIUM 10 MG/1
10 TABLET ORAL NIGHTLY
Status: DISCONTINUED | OUTPATIENT
Start: 2023-03-01 | End: 2023-03-04 | Stop reason: HOSPADM

## 2023-03-01 RX ORDER — BUDESONIDE AND FORMOTEROL FUMARATE DIHYDRATE 160; 4.5 UG/1; UG/1
2 AEROSOL RESPIRATORY (INHALATION) 2 TIMES DAILY
Status: DISCONTINUED | OUTPATIENT
Start: 2023-03-01 | End: 2023-03-04 | Stop reason: HOSPADM

## 2023-03-01 RX ORDER — METHYLPREDNISOLONE SODIUM SUCCINATE 40 MG/ML
40 INJECTION, POWDER, LYOPHILIZED, FOR SOLUTION INTRAMUSCULAR; INTRAVENOUS EVERY 12 HOURS
Status: DISCONTINUED | OUTPATIENT
Start: 2023-03-01 | End: 2023-03-04

## 2023-03-01 RX ORDER — ACETAMINOPHEN 325 MG/1
650 TABLET ORAL EVERY 6 HOURS PRN
Status: DISCONTINUED | OUTPATIENT
Start: 2023-03-01 | End: 2023-03-04 | Stop reason: HOSPADM

## 2023-03-01 RX ORDER — INSULIN LISPRO 100 [IU]/ML
0-4 INJECTION, SOLUTION INTRAVENOUS; SUBCUTANEOUS NIGHTLY
Status: DISCONTINUED | OUTPATIENT
Start: 2023-03-01 | End: 2023-03-04 | Stop reason: HOSPADM

## 2023-03-01 RX ORDER — IPRATROPIUM BROMIDE AND ALBUTEROL SULFATE 2.5; .5 MG/3ML; MG/3ML
1 SOLUTION RESPIRATORY (INHALATION)
Status: DISCONTINUED | OUTPATIENT
Start: 2023-03-01 | End: 2023-03-01

## 2023-03-01 RX ORDER — NICOTINE 21 MG/24HR
1 PATCH, TRANSDERMAL 24 HOURS TRANSDERMAL DAILY
Status: DISCONTINUED | OUTPATIENT
Start: 2023-03-01 | End: 2023-03-04 | Stop reason: HOSPADM

## 2023-03-01 RX ORDER — FUROSEMIDE 20 MG/1
20 TABLET ORAL DAILY
Status: DISCONTINUED | OUTPATIENT
Start: 2023-03-01 | End: 2023-03-04 | Stop reason: HOSPADM

## 2023-03-01 RX ORDER — ROSUVASTATIN CALCIUM 40 MG/1
40 TABLET, COATED ORAL DAILY
Status: DISCONTINUED | OUTPATIENT
Start: 2023-03-01 | End: 2023-03-04 | Stop reason: HOSPADM

## 2023-03-01 RX ORDER — INSULIN LISPRO 100 [IU]/ML
0-8 INJECTION, SOLUTION INTRAVENOUS; SUBCUTANEOUS
Status: DISCONTINUED | OUTPATIENT
Start: 2023-03-01 | End: 2023-03-04 | Stop reason: HOSPADM

## 2023-03-01 RX ORDER — LANOLIN ALCOHOL/MO/W.PET/CERES
400 CREAM (GRAM) TOPICAL 2 TIMES DAILY
Status: DISCONTINUED | OUTPATIENT
Start: 2023-03-01 | End: 2023-03-04 | Stop reason: HOSPADM

## 2023-03-01 RX ORDER — ALBUTEROL SULFATE 2.5 MG/3ML
2.5 SOLUTION RESPIRATORY (INHALATION)
Status: DISCONTINUED | OUTPATIENT
Start: 2023-03-01 | End: 2023-03-04 | Stop reason: HOSPADM

## 2023-03-01 RX ORDER — INSULIN GLARGINE 100 [IU]/ML
4 INJECTION, SOLUTION SUBCUTANEOUS NIGHTLY
Status: DISCONTINUED | OUTPATIENT
Start: 2023-03-01 | End: 2023-03-02

## 2023-03-01 RX ORDER — CLOPIDOGREL BISULFATE 75 MG/1
75 TABLET ORAL DAILY
Status: DISCONTINUED | OUTPATIENT
Start: 2023-03-01 | End: 2023-03-04 | Stop reason: HOSPADM

## 2023-03-01 RX ORDER — ONDANSETRON 4 MG/1
4 TABLET, ORALLY DISINTEGRATING ORAL EVERY 8 HOURS PRN
Status: DISCONTINUED | OUTPATIENT
Start: 2023-03-01 | End: 2023-03-04 | Stop reason: HOSPADM

## 2023-03-01 RX ORDER — POLYETHYLENE GLYCOL 3350 17 G/17G
17 POWDER, FOR SOLUTION ORAL DAILY PRN
Status: DISCONTINUED | OUTPATIENT
Start: 2023-03-01 | End: 2023-03-04 | Stop reason: HOSPADM

## 2023-03-01 RX ORDER — ONDANSETRON 2 MG/ML
4 INJECTION INTRAMUSCULAR; INTRAVENOUS EVERY 6 HOURS PRN
Status: DISCONTINUED | OUTPATIENT
Start: 2023-03-01 | End: 2023-03-04 | Stop reason: HOSPADM

## 2023-03-01 RX ORDER — GUAIFENESIN 600 MG/1
600 TABLET, EXTENDED RELEASE ORAL 2 TIMES DAILY
Status: DISCONTINUED | OUTPATIENT
Start: 2023-03-01 | End: 2023-03-04 | Stop reason: HOSPADM

## 2023-03-01 RX ORDER — IPRATROPIUM BROMIDE AND ALBUTEROL SULFATE 2.5; .5 MG/3ML; MG/3ML
1 SOLUTION RESPIRATORY (INHALATION) 3 TIMES DAILY
Status: DISCONTINUED | OUTPATIENT
Start: 2023-03-01 | End: 2023-03-04 | Stop reason: HOSPADM

## 2023-03-01 RX ORDER — POTASSIUM CHLORIDE 750 MG/1
10 TABLET, FILM COATED, EXTENDED RELEASE ORAL DAILY
Status: DISCONTINUED | OUTPATIENT
Start: 2023-03-01 | End: 2023-03-04 | Stop reason: HOSPADM

## 2023-03-01 RX ORDER — LOSARTAN POTASSIUM 25 MG/1
25 TABLET ORAL DAILY
Status: DISCONTINUED | OUTPATIENT
Start: 2023-03-01 | End: 2023-03-04 | Stop reason: HOSPADM

## 2023-03-01 RX ORDER — DEXTROSE MONOHYDRATE 100 MG/ML
INJECTION, SOLUTION INTRAVENOUS CONTINUOUS PRN
Status: DISCONTINUED | OUTPATIENT
Start: 2023-03-01 | End: 2023-03-04 | Stop reason: HOSPADM

## 2023-03-01 RX ADMIN — METHYLPREDNISOLONE SODIUM SUCCINATE 40 MG: 40 INJECTION, POWDER, FOR SOLUTION INTRAMUSCULAR; INTRAVENOUS at 11:53

## 2023-03-01 RX ADMIN — CLOPIDOGREL BISULFATE 75 MG: 75 TABLET ORAL at 09:07

## 2023-03-01 RX ADMIN — POTASSIUM CHLORIDE 10 MEQ: 750 TABLET, FILM COATED, EXTENDED RELEASE ORAL at 09:07

## 2023-03-01 RX ADMIN — INSULIN LISPRO 2 UNITS: 100 INJECTION, SOLUTION INTRAVENOUS; SUBCUTANEOUS at 11:53

## 2023-03-01 RX ADMIN — ENOXAPARIN SODIUM 40 MG: 100 INJECTION SUBCUTANEOUS at 09:07

## 2023-03-01 RX ADMIN — METHYLPREDNISOLONE SODIUM SUCCINATE 125 MG: 125 INJECTION, POWDER, FOR SOLUTION INTRAMUSCULAR; INTRAVENOUS at 00:08

## 2023-03-01 RX ADMIN — GUAIFENESIN 600 MG: 600 TABLET ORAL at 09:07

## 2023-03-01 RX ADMIN — IPRATROPIUM BROMIDE AND ALBUTEROL SULFATE 1 AMPULE: .5; 2.5 SOLUTION RESPIRATORY (INHALATION) at 19:32

## 2023-03-01 RX ADMIN — BUDESONIDE AND FORMOTEROL FUMARATE DIHYDRATE 2 PUFF: 160; 4.5 AEROSOL RESPIRATORY (INHALATION) at 19:32

## 2023-03-01 RX ADMIN — ROSUVASTATIN CALCIUM 40 MG: 40 TABLET, FILM COATED ORAL at 09:07

## 2023-03-01 RX ADMIN — Medication 400 MG: at 20:24

## 2023-03-01 RX ADMIN — BUDESONIDE AND FORMOTEROL FUMARATE DIHYDRATE 2 PUFF: 160; 4.5 AEROSOL RESPIRATORY (INHALATION) at 07:40

## 2023-03-01 RX ADMIN — METOPROLOL SUCCINATE 50 MG: 50 TABLET, EXTENDED RELEASE ORAL at 09:07

## 2023-03-01 RX ADMIN — GUAIFENESIN 600 MG: 600 TABLET ORAL at 20:24

## 2023-03-01 RX ADMIN — MONTELUKAST 10 MG: 10 TABLET, FILM COATED ORAL at 20:24

## 2023-03-01 RX ADMIN — IPRATROPIUM BROMIDE AND ALBUTEROL SULFATE 1 AMPULE: .5; 2.5 SOLUTION RESPIRATORY (INHALATION) at 00:27

## 2023-03-01 RX ADMIN — LOSARTAN POTASSIUM 25 MG: 25 TABLET, FILM COATED ORAL at 09:07

## 2023-03-01 RX ADMIN — AZITHROMYCIN MONOHYDRATE 250 MG: 500 INJECTION, POWDER, LYOPHILIZED, FOR SOLUTION INTRAVENOUS at 17:07

## 2023-03-01 RX ADMIN — ASPIRIN 81 MG 324 MG: 81 TABLET ORAL at 00:08

## 2023-03-01 RX ADMIN — MAGNESIUM SULFATE HEPTAHYDRATE 2000 MG: 40 INJECTION, SOLUTION INTRAVENOUS at 00:10

## 2023-03-01 RX ADMIN — IPRATROPIUM BROMIDE AND ALBUTEROL SULFATE 1 AMPULE: .5; 2.5 SOLUTION RESPIRATORY (INHALATION) at 07:40

## 2023-03-01 RX ADMIN — Medication 400 MG: at 09:07

## 2023-03-01 RX ADMIN — INSULIN GLARGINE 4 UNITS: 100 INJECTION, SOLUTION SUBCUTANEOUS at 20:24

## 2023-03-01 RX ADMIN — FUROSEMIDE 20 MG: 20 TABLET ORAL at 09:07

## 2023-03-01 RX ADMIN — IPRATROPIUM BROMIDE AND ALBUTEROL SULFATE 1 AMPULE: .5; 2.5 SOLUTION RESPIRATORY (INHALATION) at 12:14

## 2023-03-01 RX ADMIN — ASPIRIN 81 MG: 81 TABLET, COATED ORAL at 09:07

## 2023-03-01 ASSESSMENT — ENCOUNTER SYMPTOMS
BACK PAIN: 0
NAUSEA: 0
SORE THROAT: 0
WHEEZING: 1
VOMITING: 0
CHEST TIGHTNESS: 1
TROUBLE SWALLOWING: 0
CONSTIPATION: 0
RHINORRHEA: 1
COUGH: 1
COLOR CHANGE: 0
PHOTOPHOBIA: 0
SHORTNESS OF BREATH: 1
DIARRHEA: 0
ABDOMINAL PAIN: 0
ABDOMINAL DISTENTION: 0

## 2023-03-01 ASSESSMENT — PAIN - FUNCTIONAL ASSESSMENT
PAIN_FUNCTIONAL_ASSESSMENT: 0-10
PAIN_FUNCTIONAL_ASSESSMENT: 0-10

## 2023-03-01 ASSESSMENT — PAIN SCALES - GENERAL
PAINLEVEL_OUTOF10: 3
PAINLEVEL_OUTOF10: 7

## 2023-03-01 NOTE — ED PROVIDER NOTES
[address] [facility] ED  EMERGENCY DEPARTMENT ENCOUNTER      Pt Name: Edwin Miranda  MRN: [id number]  [place] 1970  Date of evaluation: 2/28/2023  Provider: Kiara Negrete, [facility]       Chief Complaint   Patient presents with    Shortness of Breath         HISTORY OF PRESENT ILLNESS   (Location/Symptom, Timing/Onset, Context/Setting, Quality, Duration, Modifying Factors, Severity)  Note limiting factors. Edwin Miranda is a 45 y.o. female who presents to the emergency department for evaluation of shortness of breath. History of hypertension, CAD on aspirin/Plavix, diabetes, hyperlipidemia, asthma/COPD home O2NC dependence, Lasix use. She reports over the past couple of days she was short of breath, coughing, wheezing with chest tightness, only had chest discomfort when she would cough. This worsened today. Presents via EMS, not given any medication prior to arrival.  Patient diaphoretic on initial evaluation and dyspneic. She does not endorse fever, neck or jaw pain, exertional chest pain, radiating chest pain, vomiting, abdominal pain, back pain, calf pain. HPI  Chart review shows EF 65% 2022  Chart review shows 2 months ago she had angiography with Dr. Amelia Mitchell showing stent and proximal LAD with 40% narrowing  Patient had CT angiogram of the chest at that time ruling out pulmonary embolism patient has had recent  Nursing Notes were reviewed. REVIEW OF SYSTEMS    (2-9 systems for level 4, 10 or more for level 5)     Review of Systems   Constitutional:  Positive for diaphoresis. Negative for fever. Respiratory:  Positive for cough, chest tightness (With cough), shortness of breath and wheezing. Gastrointestinal:  Negative for abdominal pain and vomiting. Neurological:  Negative for syncope. All other systems reviewed and are negative. Except as noted above the remainder of the review of systems was reviewed and negative.        PAST MEDICAL HISTORY     Past Medical History:   Diagnosis Date    Anticoagulant long-term use     Asthma 2004    CAD (coronary artery disease)     COPD [facility]chronic obstructive pulmonary disease) ([facility]     Diabetes mellitus ([facility] 2014    Hodgkin disease ([facility] 1999    Hyperlipidemia     Hypertension 1984    Migraines 1989    Sleep apnea     recently tested     Type 2 diabetes mellitus without complication ([facility]     type II         SURGICAL HISTORY       Past Surgical History:   Procedure Laterality Date    TUBAL LIGATION           CURRENT MEDICATIONS       Previous Medications    ALBUTEROL (PROVENTIL) (2.5 MG/3ML) 0.083% NEBULIZER SOLUTION    Take 3 mLs by nebulization every 4 hours as needed for Wheezing or Shortness of Breath    ASPIRIN 81 MG EC TABLET    Take 1 tablet by mouth daily    BENRALIZUMAB (FASENRA) 30 MG/ML SOSY INJECTION    Inject 30 mg into the skin    BUDESONIDE (PULMICORT) 0.5 MG/2ML NEBULIZER SUSPENSION    Take 2 mLs by nebulization 2 times daily    CLOPIDOGREL (PLAVIX) 75 MG TABLET    Take 75 mg by mouth daily    CONTINUOUS BLOOD GLUC SENSOR (DEXCOM G6 SENSOR) MISC    APPLY NEW SENSOR EVERY 10 DAYS TO ABDOMEN    DOCUSATE SODIUM (COLACE) 100 MG CAPSULE    Take 1 capsule by mouth 2 times daily as needed for Constipation    EPINEPHRINE (EPIPEN) 0.3 MG/0.3ML SOAJ INJECTION    Inject 0.3 mg into the muscle as needed    ESOMEPRAZOLE MAGNESIUM (NEXIUM 24HR) 20 MG TBEC    Take 20 mg by mouth daily as needed    FUROSEMIDE (LASIX) 20 MG TABLET    Take 1 tablet by mouth daily    GUAIFENESIN (MUCINEX) 600 MG EXTENDED RELEASE TABLET    Take 1,200 mg by mouth 2 times daily    HANDICAP PLACARD MISC    by Does not apply route Expiration date:12/30/27    HYDROCORTISONE (ANUSOL-HC) 25 MG SUPPOSITORY    Place 1 suppository rectally 2 times daily as needed for Hemorrhoids    ICOSAPENT ETHYL (VASCEPA) 1 G CAPS CAPSULE    Take 1 g by mouth 2 times daily (with meals)    ICOSAPENT ETHYL (VASCEPA) 1 G CAPS CAPSULE    Take 2 capsules by mouth daily IPRATROPIUM-ALBUTEROL (DUONEB) 0.5-2.5 (3) MG/3ML SOLN NEBULIZER SOLUTION    Inhale 3 mLs into the lungs 3 times daily as needed for Shortness of Breath    JARDIANCE 25 MG TABLET    Take 25 mg by mouth once    LOSARTAN (COZAAR) 25 MG TABLET    Take 1 tablet by mouth daily    MAGNESIUM OXIDE (MAG-OX) 400 MG TABLET    Take 400 mg by mouth 2 times daily    METFORMIN (GLUCOPHAGE) 1000 MG TABLET    TAKE 1 TABLET BY MOUTH TWICE DAILY    METOPROLOL SUCCINATE (TOPROL XL) 50 MG EXTENDED RELEASE TABLET    Take 1 tablet by mouth daily    MONTELUKAST (SINGULAIR) 10 MG TABLET    Take 10 mg by mouth nightly    MULTIPLE VITAMINS-MINERALS (THERAPEUTIC MULTIVITAMIN-MINERALS) TABLET    Take 1 tablet by mouth daily    NICOTINE (NICODERM CQ) 21 MG/24HR    Place 1 patch onto the skin daily    NITROGLYCERIN (NITROSTAT) 0.4 MG SL TABLET    up to max of 3 total doses. If no relief after 1 dose, call 911. NYSTATIN (MYCOSTATIN) 113766 UNIT/GM POWDER    Apply 3 times daily. ONETOUCH ULTRA STRIP    USE AS INSTRUCTED TO CHECK BLOOD SUGAR TWICE A DAY    POTASSIUM CHLORIDE (KLOR-CON M) 10 MEQ EXTENDED RELEASE TABLET    Take 10 mEq by mouth daily    POTASSIUM CHLORIDE (KLOR-CON) 10 MEQ EXTENDED RELEASE TABLET        PREDNISONE (DELTASONE) 10 MG TABLET    40 mg x3 days then, 30 mg x3 days then, 20 mg x3 days then, 10 mg x3 days    PROAIR  (90 BASE) MCG/ACT INHALER    Inhale 1 puff into the lungs every 4 hours as needed for Wheezing    ROSUVASTATIN (CRESTOR) 40 MG TABLET    TAKE 1 TABLET BY MOUTH ONCE DAILY    SYMBICORT 160-4.5 MCG/ACT AERO    Inhale 2 puffs by mouth twice daily    TIOTROPIUM (SPIRIVA RESPIMAT) 2.5 MCG/ACT AERS INHALER    Inhale 2 puffs into the lungs daily       ALLERGIES     Shellfish-derived products, Cumin oil, and Guggulipid-black pepper    FAMILY HISTORY     History reviewed. No pertinent family history.        SOCIAL HISTORY       Social History     Socioeconomic History    Marital status: Single     Spouse name: None    Number of children: None    Years of education: None    Highest education level: None   Tobacco Use    Smoking status: Former     Packs/day: 1.00     Years: 37.00     Pack years: 37.00     Types: Cigarettes     Quit date: 2022     Years since quittin.7    Smokeless tobacco: Never   Vaping Use    Vaping Use: Never used   Substance and Sexual Activity    Alcohol use: Not Currently    Drug use: Never    Sexual activity: Yes     Partners: Male     Social Determinants of Health     Financial Resource Strain: Low Risk     Difficulty of Paying Living Expenses: Not hard at all   Food Insecurity: No Food Insecurity    Worried About Running Out of Food in the Last Year: Never true    [address] in the Last Year: Never true   Transportation Needs: No Transportation Needs    Lack of Transportation (Medical): No    Lack of Transportation (Non-Medical): No       SCREENINGS         Joaquin Coma Scale  Eye Opening: Spontaneous  Best Verbal Response: Oriented  Best Motor Response: Obeys commands  Cat Coma Scale Score: 15                     CIWA Assessment  BP: 134/74  Heart Rate: 98                 PHYSICAL EXAM    (up to 7 for level 4, 8 or more for level 5)     ED Triage Vitals   BP Temp Temp src Pulse Resp SpO2 Height Weight   -- -- -- -- -- -- -- --       Physical Exam  Vitals and nursing note reviewed. Constitutional:       Appearance: She is ill-appearing and diaphoretic. HENT:      Head: Normocephalic and atraumatic. Nose: Nose normal.      Mouth/Throat:      Mouth: Mucous membranes are moist.   Eyes:      General: No scleral icterus. Extraocular Movements: Extraocular movements intact. Pupils: Pupils are equal, round, and reactive to light. Neck:      Comments: No JVD  Cardiovascular:      Rate and Rhythm: Normal rate and regular rhythm. Pulses: Normal pulses. Heart sounds: No friction rub. Pulmonary:      Effort: Tachypnea present. No retractions.       Breath sounds: No stridor or decreased air movement. Wheezing present. Abdominal:      Tenderness: There is no abdominal tenderness. There is no guarding or rebound. Musculoskeletal:         General: No tenderness. Skin:     Findings: No rash. Neurological:      Mental Status: She is alert and oriented to person, place, and time. Psychiatric:         Mood and Affect: Mood normal.       DIAGNOSTIC RESULTS     EKG: All EKG's are interpreted by the Emergency Department Physician who either signs or Co-signs this chart in the absence of a cardiologist.    Normal sinus rhythm, rate 96, normal axis, QTc 432, there is diffuse minimal J-point elevation questionable benign early repolarization, does not meet STEMI criteria    Normal sinus rhythm, rate 99, normal axis, QTc 431, nonspecific diffuse ST abnormality, does not meet STEMI criteria    RADIOLOGY:   Non-plain film images such as CT, Ultrasound and MRI are read by the radiologist. Plain radiographic images are visualized and preliminarily interpreted by the emergency physician with the below findings:    No PNA- my interpretation/visualization    Interpretation per the Radiologist below, if available at the time of this note:    XR CHEST PORTABLE   Final Result   No acute process. ED BEDSIDE ULTRASOUND:   Performed by ED Physician - none    LABS:  Labs Reviewed   COMPREHENSIVE METABOLIC PANEL - Abnormal; Notable for the following components:       Result Value    Glucose 130 (*)     All other components within normal limits   POCT CREATININE - Normal   CBC WITH AUTO DIFFERENTIAL   LIPASE   MAGNESIUM   PROTIME-INR   APTT   TROPONIN   BRAIN NATRIURETIC PEPTIDE   ETHANOL   URINE DRUG SCREEN       All other labs were within normal range or not returned as of this dictation.     EMERGENCY DEPARTMENT COURSE and DIFFERENTIAL DIAGNOSIS/MDM:   Vitals:    Vitals:    02/28/23 2341 03/01/23 0015 03/01/23 0027   BP: (!) 201/106 134/74    Pulse: (!) 106 98    Resp: 30 23    Temp: 98.7 °F (37.1 °C)     TempSrc: Oral     SpO2: 96% 94% 94%   Weight: 200 lb (90.7 kg)     Height: 5' 2\" (1.575 m)           No leukocytosis, no significant electrolyte derangement. Troponin within normal limits. Doubt ACS. BNP within normal limits, doubt CHF exacerbation. Medical Decision Making  Amount and/or Complexity of Data Reviewed  Labs: ordered. Radiology: ordered. ECG/medicine tests: ordered. Risk  OTC drugs. Prescription drug management. Decision regarding hospitalization. Patient presents emergency department with an atypical presentation for ACS. Recent shortness of breath, coughing, wheezing, history of COPD. Cardiopulmonary work-up pursued. Given EKG changes, consulted cardiology, images transmitted and reviewed by Dr. Twanda Snellen, findings today were noted on previous EKG, this is reassuring, recent Left heart cath and recent CT angiogram of the chest, presenting that she is currently chest pain-free with no dynamic changes on repeat EKG ,and had improvement of symptoms after breathing treatments, steroids, magnesium I suspect pulmonary etiology-no indication for emergent cardiac intervention  I am not highly suspicious of pulmonary embolism      REASSESSMENT      Patient feels much better, still has scattered wheezing and tachypnea. No indication for BiPAP. I think given her comorbidities she is most appropriate for admission, monitoring for repeat treatments, cardiology consultation given her initial presentation of atypical chest discomfort and diaphoresis. Dr Marcela Sparrow accepts admission    CONSULTS:  None    PROCEDURES:  Unless otherwise noted below, none     Procedures      FINAL IMPRESSION      1. COPD exacerbation ([address]    2. Chronic respiratory failure, unspecified whether with hypoxia or hypercapnia ([address]    3.  Atypical chest pain          DISPOSITION/PLAN   DISPOSITION Decision To Admit 03/01/2023 12:00:28 AM      PATIENT REFERRED TO:  No follow-up provider specified. DISCHARGE MEDICATIONS:  New Prescriptions    No medications on file     Controlled Substances Monitoring:     No flowsheet data found.     (Please note that portions of this note were completed with a voice recognition program.  Efforts were made to edit the dictations but occasionally words are mis-transcribed.)    Radha Perez MD (electronically signed)  Attending Emergency Physician            Radha Perez MD  03/01/23 0478

## 2023-03-01 NOTE — CONSULTS
Inpatient consult to Pulmonology  Consult performed by: Tucker Rush MD  Consult ordered by: KAMILLE Gil - SU          Admit Date: 2/28/2023    PCP:  GIFTY Rowe    CHIEF COMPLAINT: Shortness of breath    HPI:  The patient is a 45 y.o. female with significant past medical history of severe persistent asthma and asthma/COPD overlap syndrome who presented with worsening shortness of breath. This has been going on for few days. Has been feeling more congested and coughing up more. Had a runny nose as well. Her cough is productive of clear sputum. Underwent a chest x-ray in ER which showed no significant abnormalities. Respiratory viral panel was negative. She was started on steroids and admitted to the hospital.  She feels slightly better since admission. Her cough and shortness of breath are better.       Past Medical History:      Diagnosis Date    Anticoagulant long-term use     Asthma 2004    CAD (coronary artery disease)     COPD (chronic obstructive pulmonary disease) (HCC)     Diabetes [facility] 2014    [facility] 1999    Hyperlipidemia     Hypertension 1984    Migraines 1989    Sleep apnea     recently tested     Type 2 diabetes mellitus without complication (HCC)     type II        Past Surgical History:        Procedure Laterality Date    TUBAL LIGATION         Current Medications:     enoxaparin  40 mg SubCUTAneous Daily    nicotine  1 patch TransDERmal Daily    guaiFENesin  600 mg Oral BID    insulin lispro  0-8 Units SubCUTAneous TID     insulin lispro  0-4 Units SubCUTAneous Nightly    methylPREDNISolone  40 mg IntraVENous Q12H    aspirin  81 mg Oral Daily    clopidogrel  75 mg Oral Daily    furosemide  20 mg Oral Daily    magnesium oxide  400 mg Oral BID    losartan  25 mg Oral Daily    metoprolol succinate  50 mg Oral Daily    montelukast  10 mg Oral Nightly    potassium chloride  10 mEq Oral Daily    rosuvastatin  40 mg Oral Daily    budesonide-formoterol  2 puff Inhalation BID    ipratropium-albuterol  1 ampule Inhalation TID     Home Meds:  Prior to Admission medications    Medication Sig Start Date End Date Taking? Authorizing Provider   Icosapent Ethyl (VASCEPA) 1 g CAPS capsule Take 2 capsules by mouth daily  Patient not taking: Reported on 3/1/2023 2/1/23   Andrzej Gunn MD   benralizumab (FASENRA) 30 MG/ML SOSY injection Inject 30 mg into the skin    Historical Provider, MD   guaiFENesin (MUCINEX) 600 MG extended release tablet Take 1,200 mg by mouth 2 times daily  Patient not taking: No sig reported    Historical Provider, MD   metoprolol succinate (TOPROL XL) 50 MG extended release tablet Take 1 tablet by mouth daily 1/30/23   Andrzej Gunn MD   furosemide (LASIX) 20 MG tablet Take 1 tablet by mouth daily 1/30/23   Andrzej Gunn MD   hydrocortisone (ANUSOL-HC) 25 MG suppository Place 1 suppository rectally 2 times daily as needed for Hemorrhoids  Patient not taking: Reported on 3/1/2023 1/30/23   GIFTY Gomze   nystatin (MYCOSTATIN) 126393 UNIT/GM powder Apply 3 times daily.  Patient not taking: Reported on 3/1/2023 1/30/23   GIFTY Gomez   SYMBICORT 160-4.5 MCG/ACT AERO Inhale 2 puffs by mouth twice daily 1/4/23   Issa Spangler MD   Continuous Blood Gluc Sensor (DEXCOM G6 SENSOR) MISC APPLY NEW SENSOR EVERY 10 DAYS TO ABDOMEN  Patient not taking: Reported on 3/1/2023 12/15/22   Historical Provider, MD   potassium chloride (KLOR-CON) 10 MEQ extended release tablet  11/25/22   Historical Provider, MD   ONETOUCH ULTRA strip USE AS INSTRUCTED TO CHECK BLOOD SUGAR TWICE A DAY 12/15/22   Historical Provider, MD   JARDIANCE 25 MG tablet Take 25 mg by mouth once 11/27/22   Historical Provider, MD   metFORMIN (GLUCOPHAGE) 1000 MG tablet TAKE 1 TABLET BY MOUTH TWICE DAILY 12/30/22   GIFTY Gomez   docusate sodium (COLACE) 100 MG capsule Take 1 capsule by mouth 2 times daily as needed for Constipation  Patient not taking: Reported on 3/1/2023 12/30/22    GIFTY Sheikh   Handicap Placard MISC by Does not apply route Expiration date:12/30/27 12/30/22   GIFTY Sheikh   losartan (COZAAR) 25 MG tablet Take 1 tablet by mouth daily 12/29/22   KAMILLE Mcelroy CNP   predniSONE (DELTASONE) 10 MG tablet 40 mg x3 days then, 30 mg x3 days then, 20 mg x3 days then, 10 mg x3 days  Patient not taking: No sig reported 12/28/22   Taylor Kraft,    PROAIR  (29 Base) MCG/ACT inhaler Inhale 1 puff into the lungs every 4 hours as needed for Wheezing 12/28/22   Taylor Kraft DO   tiotropium (SPIRIVA RESPIMAT) 2.5 MCG/ACT AERS inhaler Inhale 2 puffs into the lungs daily 12/28/22   Taylor Kraft, DO   Multiple Vitamins-Minerals (THERAPEUTIC MULTIVITAMIN-MINERALS) tablet Take 1 tablet by mouth daily    Historical Provider, MD   budesonide (PULMICORT) 0.5 MG/2ML nebulizer suspension Take 2 mLs by nebulization 2 times daily  Patient not taking: Reported on 3/1/2023 12/1/22   Bernardino Mehta MD   rosuvastatin (CRESTOR) 40 MG tablet TAKE 1 TABLET BY MOUTH ONCE DAILY 10/24/22   Historical Provider, MD   albuterol (PROVENTIL) (2.5 MG/3ML) 0.083% nebulizer solution Take 3 mLs by nebulization every 4 hours as needed for Wheezing or Shortness of Breath 4/15/22   Vila Scheuermann, PA-C   clopidogrel (PLAVIX) 75 MG tablet Take 75 mg by mouth daily    Historical Provider, MD   Icosapent Ethyl (VASCEPA) 1 g CAPS capsule Take 1 g by mouth 2 times daily (with meals)  Patient not taking: No sig reported    Historical Provider, MD   montelukast (SINGULAIR) 10 MG tablet Take 10 mg by mouth nightly 6/9/21   Historical Provider, MD   ipratropium-albuterol (DUONEB) 0.5-2.5 (3) MG/3ML SOLN nebulizer solution Inhale 3 mLs into the lungs 3 times daily as needed for Shortness of Breath 9/20/21 2/8/22  Bernardino Mehta MD   EPINEPHrine (EPIPEN) 0.3 MG/0.3ML SOAJ injection Inject 0.3 mg into the muscle as needed 12/21/20   Historical Provider, MD   Esomeprazole Magnesium (NEXIUM 24HR) 20 MG TBEC Take 20 mg by mouth daily as needed  Patient not taking: Reported on 3/1/2023    Historical Provider, MD   magnesium oxide (MAG-OX) 400 MG tablet Take 400 mg by mouth 2 times daily 20   Historical Provider, MD   nicotine (NICODERM CQ) 21 MG/24HR Place 1 patch onto the skin daily 3/17/21 2/8/22  Brent Oleary MD   potassium chloride (KLOR-CON M) 10 MEQ extended release tablet Take 10 mEq by mouth daily    Historical Provider, MD   aspirin 81 MG EC tablet Take 1 tablet by mouth daily 20   Chel Spears MD   nitroGLYCERIN (NITROSTAT) 0.4 MG SL tablet up to max of 3 total doses. If no relief after 1 dose, call 911. 20   Neris Solares MD       Allergies:  Shellfish-derived products, Cumin oil, and Guggulipid-black pepper     Social History:      reports that she quit smoking about 9 months ago. Her smoking use included cigarettes. She has a 37.00 pack-year smoking history. She has never used smokeless tobacco. She reports that she does not currently use alcohol. She reports that she does not use drugs. TOBACCO:   reports that she quit smoking about 9 months ago. Her smoking use included cigarettes. She has a 37.00 pack-year smoking history. She has never used smokeless tobacco.     ETOH:   reports that she does not currently use alcohol. Family History:   None pertinent       Review of Systems  Complete review of systems done and negative unless otherwise noted positive.        Objective:     PHYSICAL EXAM:      VITALS:  BP (!) 149/65   Pulse 93   Temp 97.5 °F (36.4 °C) (Oral)   Resp 20   Ht 5' 2\" (1.575 m)   Wt 200 lb (90.7 kg)   LMP 2019   SpO2 98%   BMI 36.58 kg/m²   24HR INTAKE/OUTPUT:    Intake/Output Summary (Last 24 hours) at 3/1/2023 1223  Last data filed at 3/1/2023 0953  Gross per 24 hour   Intake --   Output 900 ml   Net -900 ml     CURRENT PULSE OXIMETRY:  SpO2: 98 %  24HR PULSE OXIMETRY RANGE:  SpO2  Av.8 %  Min: 93 %  Max: 98 %    General appearance - alert, ill appearing, and in no distress  Mental status - alert, oriented to person, place, and time  Eyes - pupils equal and reactive, extraocular eye movements intact. Normal sclera and conjunctiva   Nose - normal and patent, no erythema   Neck - supple, no significant adenopathy. No thyromegaly. Chest -diminished bilaterally to auscultation, bilateral wheezes, rales or rhonchi, symmetric air entry  Heart - normal rate, regular rhythm, normal S1, S2, no murmurs, rubs, clicks or gallops  Abdomen - soft, nontender, nondistended, no masses or organomegaly. Bowel sounds present. No hernia   Rectal - deferred, not clinically indicated  Neurological - alert, oriented, normal speech, no focal findings or movement disorder noted, motor and sensory grossly normal bilaterally  Musculoskeletal - no joint tenderness, deformity or swelling  Extremities - peripheral pulses normal, no pedal edema, no clubbing or cyanosis  Skin - normal coloration and turgor, no rashes, no suspicious skin lesions noted  Psych: Normal mood          DATA:    CBC:   Recent Labs     03/01/23  0000 03/01/23  0340   WBC 6.8 9.2   HGB 14.8 15.0   HCT 43.8 44.4    194     BMP:    Recent Labs     03/01/23  0000 03/01/23  0340    136   K 3.9 4.2    103   CO2 25 21   BUN 12 13   CREATININE 0.58 0.52   GLUCOSE 130* 160*   CALCIUM 9.2 9.1   MG 1.9  --      HEPATIC:   Recent Labs     03/01/23  0000   AST 12   ALT 14   BILITOT <0.2   ALKPHOS 119     LACTATE: No results for input(s): LACTA in the last 72 hours. PROCALCITONIN: No results for input(s): PROCAL in the last 72 hours.   TROPONIN:   Recent Labs     03/01/23  0000 03/01/23  0340   TROPONINI <0.010 <0.010               Radiology Review:    CXR portable: Results for orders placed during the hospital encounter of 02/28/23    XR CHEST PORTABLE    Narrative  EXAMINATION:  ONE XRAY VIEW OF THE CHEST    3/1/2023 12:07 am    COMPARISON:  12/28/2022    HISTORY:  ORDERING SYSTEM PROVIDED HISTORY: sob  TECHNOLOGIST PROVIDED HISTORY:  Reason for exam:->sob  Is the patient pregnant?->No  What reading provider will be dictating this exam?->CRC    FINDINGS:  The lungs are without acute focal process. There is no effusion or  pneumothorax. The cardiomediastinal silhouette is without acute process. The  osseous structures are without acute process. Impression  No acute process. Impression:       - Acute respiratory failure, due to asthma /COPD overlap syndrome with exacerbation  -Asthma-COPD overlap syndrome with exacerbation.  -Suspect sleep disordered breathing.  - lung nodule. Recommendations:    - IV steroids with taper when clinically improving.   - Bronchodilators around the clock  - Oxygen to keep saturation above 92%  -Agree with Zithromax. Total 5 days.  -Order CT chest.  Had nodules in the past and she is due for follow-up CT. Can also assess for parenchymal abnormalities.  -She is due for repeating pulmonary function test.  -Can benefit from consideration of biological agents as an outpatient.   Will defer to her primary pulmonologist.  - Imaging where reviewed personally   - further recommendations to follow             Electronically signed by Julien Hardin MD on 3/1/2023 at 12:23 PM

## 2023-03-01 NOTE — ED TRIAGE NOTES
Pt presents to ED c/o SOB x3 days with sudden worsening this HS. Pt has COPD; wears 3L NC at home. Pt checked spO2 today and was 84%-87%. Pt called MD office and was told to increase O2 to 4L. Pt encouraged to come to ED if spO2 did not improve to 90%. Pt maintained spO2 90% on 4L; however, felt SOB worsening so called EMS. Pt presents diaphoretic, tachycardic, hypertensive, and in obvious respiratory distress. Pt tachypneic 30-40 breaths per min. Pt unable to speak in complete sentences. Dr. Eng Shows called to bedside.

## 2023-03-01 NOTE — H&P
[place] [id number] MEDICINE    HISTORY AND PHYSICAL EXAM    PATIENT NAME:  Josephine Guerrero    MRN:  [id number]  SERVICE DATE:  3/1/2023   SERVICE TIME:  2:03 AM    Primary Care Physician: GIFTY Rees     SUBJECTIVE  CHIEF COMPLAINT:  Shortness of Breath       HPI:     Josephine Guerrero is a 45 y.o. female presented to the emergency department with complaints of shortness of breath. Her symptoms started when she went to take out the trash 2/17/2023 at night, and it was too windy and cold. A couple of hours later she started having rhinnorrhea, and sneezing. The next day she woke up the same but now has sinus congestion and fever and intermittent chills. Her SOB started after the congestion. She as using the nebulizers until they ran out. The SOB was persistent. She then tried mucinex DM. The mucinex helped and she was able to expectorate mucus. The mucus was yellow/brown then it was white later that week. She has one package of bromide. She was unable to get a refill of her nebulizer and bromide. She was only using rescue inhaler - symbicor and spiriva and albuterol every 4 hour. Her pulse ox was dropping down to 80's and she was having chest pain this past Saturday. The chest pain 8/10, she described it as pressure that lasted from Saturday through Sunday. She was doing breathing exercises and was not helping. She started using oxygen at home. She uses 3L at night and to use the [place] [employer] when out. Since Sunday she has been using 3lnc AROUND THE CLOCK. Josephine Guerrero is a 45 y.o. female  has a past medical history of Anticoagulant long-term use, Asthma (2004), CAD (coronary artery disease), COPD (chronic obstructive pulmonary disease) ([address] Diabetes mellitus ([address] (2014), Hodgkin disease ([address] (1999), Hyperlipidemia, Hypertension (1984), Migraines (1989), Sleep apnea, and Type 2 diabetes mellitus without complication ([address] is being admitted for SOB (shortness of breath). HPI        PAST MEDICAL HISTORY:    Past Medical History:   Diagnosis Date    Anticoagulant long-term use     Asthma 2004    CAD (coronary artery disease)     COPD (chronic obstructive pulmonary disease) ([facility]     Diabetes mellitus ([facility] 2014    Hodgkin disease ([facility]     Hyperlipidemia     Hypertension 1984    Migraines     Sleep apnea     recently tested     Type 2 diabetes mellitus without complication ([facility]     type II     PAST SURGICAL HISTORY:    Past Surgical History:   Procedure Laterality Date    TUBAL LIGATION       FAMILY HISTORY:  History reviewed. No pertinent family history. SOCIAL HISTORY:    Social History     Socioeconomic History    Marital status: Single     Spouse name: Not on file    Number of children: Not on file    Years of education: Not on file    Highest education level: Not on file   Occupational History    Not on file   Tobacco Use    Smoking status: Former     Packs/day: 1.00     Years: 37.00     Pack years: 37.00     Types: Cigarettes     Quit date: 2022     Years since quittin.7    Smokeless tobacco: Never   Vaping Use    Vaping Use: Never used   Substance and Sexual Activity    Alcohol use: Not Currently    Drug use: Never    Sexual activity: Yes     Partners: Male   Other Topics Concern    Not on file   Social History Narrative    Not on file     Social Determinants of Health     Financial Resource Strain: Low Risk     Difficulty of Paying Living Expenses: Not hard at all   Food Insecurity: No Food Insecurity    Worried About Running Out of Food in the Last Year: Never true    [address] in the Last Year: Never true   Transportation Needs: No Transportation Needs    Lack of Transportation (Medical): No    Lack of Transportation (Non-Medical):  No   Physical Activity: Not on file   Stress: Not on file   Social Connections: Not on file   Intimate Partner Violence: Not on file   Housing Stability: Not on file     MEDICATIONS:   Prior to Admission medications Medication Sig Start Date End Date Taking? Authorizing Provider   Icosapent Ethyl (VASCEPA) 1 g CAPS capsule Take 2 capsules by mouth daily 2/1/23   Ileana Rainey MD   benralizumab [facility]) 30 MG/ML SOSY injection Inject 30 mg into the skin    Historical Provider, MD   guaiFENesin (MUCINEX) 600 MG extended release tablet Take 1,200 mg by mouth 2 times daily  Patient not taking: Reported on 1/30/2023    Historical Provider, MD   metoprolol succinate (TOPROL XL) 50 MG extended release tablet Take 1 tablet by mouth daily 1/30/23   Ileana Rainey MD   furosemide (LASIX) 20 MG tablet Take 1 tablet by mouth daily 1/30/23   Ileana Rainey MD   hydrocortisone (ANUSOL-HC) 25 MG suppository Place 1 suppository rectally 2 times daily as needed for Hemorrhoids 1/30/23   GIFTY Parnell   nystatin (MYCOSTATIN) 714014 UNIT/GM powder Apply 3 times daily.  1/30/23   GIFTY Parnell   SYMBICORT 160-4.5 MCG/ACT AERO Inhale 2 puffs by mouth twice daily 1/4/23   Winsome Elizabeth MD   Continuous Blood Gluc Sensor (DEXCOM G6 SENSOR) MISC APPLY NEW SENSOR EVERY 10 DAYS TO ABDOMEN 12/15/22   Historical Provider, MD   potassium chloride (KLOR-CON) 10 MEQ extended release tablet  11/25/22   Historical Provider, MD   ONETOUCH ULTRA strip USE AS INSTRUCTED TO CHECK BLOOD SUGAR TWICE A DAY 12/15/22   Historical Provider, MD   JARDIANCE 25 MG tablet Take 25 mg by mouth once 11/27/22   Historical Provider, MD   metFORMIN (GLUCOPHAGE) 1000 MG tablet TAKE 1 TABLET BY MOUTH TWICE DAILY 12/30/22   GIFTY Parnell   docusate sodium (COLACE) 100 MG capsule Take 1 capsule by mouth 2 times daily as needed for Constipation 12/30/22   GIFTY Parnell   Handicap Placard MISC by Does not apply route Expiration date:12/30/27 12/30/22   GIFTY Parnell   losartan (COZAAR) 25 MG tablet Take 1 tablet by mouth daily 12/29/22   KAMILLE Davidson - CNP   predniSONE (DELTASONE) 10 MG tablet 40 mg x3 days then, 30 mg x3 days then, 20 mg x3 days then, 10 mg x3 days  Patient not taking: Reported on 1/30/2023 12/28/22   Soraya Moment, DO   PROAIR  (46 Base) MCG/ACT inhaler Inhale 1 puff into the lungs every 4 hours as needed for Wheezing 12/28/22   Soraya Moment, DO   tiotropium (SPIRIVA RESPIMAT) 2.5 MCG/ACT AERS inhaler Inhale 2 puffs into the lungs daily 12/28/22   Soraya Moment, DO   Multiple Vitamins-Minerals (THERAPEUTIC MULTIVITAMIN-MINERALS) tablet Take 1 tablet by mouth daily    Historical Provider, MD   budesonide (PULMICORT) 0.5 MG/2ML nebulizer suspension Take 2 mLs by nebulization 2 times daily 12/1/22   Lexis Stafford MD   rosuvastatin (CRESTOR) 40 MG tablet TAKE 1 TABLET BY MOUTH ONCE DAILY 10/24/22   Historical Provider, MD   albuterol (PROVENTIL) (2.5 MG/3ML) 0.083% nebulizer solution Take 3 mLs by nebulization every 4 hours as needed for Wheezing or Shortness of Breath 4/15/22   Chapincito Tyson PA-C   clopidogrel (PLAVIX) 75 MG tablet Take 75 mg by mouth daily    Historical Provider, MD   Icosapent Ethyl (VASCEPA) 1 g CAPS capsule Take 1 g by mouth 2 times daily (with meals)  Patient not taking: Reported on 1/30/2023    Historical Provider, MD   montelukast (SINGULAIR) 10 MG tablet Take 10 mg by mouth nightly 6/9/21   Historical Provider, MD   ipratropium-albuterol (DUONEB) 0.5-2.5 (3) MG/3ML SOLN nebulizer solution Inhale 3 mLs into the lungs 3 times daily as needed for Shortness of Breath 9/20/21 2/8/22  Lexis Stafford MD   EPINEPHrine (EPIPEN) 0.3 MG/0.3ML SOAJ injection Inject 0.3 mg into the muscle as needed 12/21/20   Historical Provider, MD   Esomeprazole Magnesium (NEXIUM 24HR) 20 MG TBEC Take 20 mg by mouth daily as needed    Historical Provider, MD   magnesium oxide (MAG-OX) 400 MG tablet Take 400 mg by mouth 2 times daily 11/13/20   Historical Provider, MD   nicotine (NICODERM CQ) 21 MG/24HR Place 1 patch onto the skin daily 3/17/21 2/8/22  Lexis Stafford MD   potassium chloride (KLOR-CON M) 10 MEQ extended release tablet Take 10 mEq by mouth daily    Historical Provider, MD   aspirin 81 MG EC tablet Take 1 tablet by mouth daily 9/1/20   Bobetta Rinne, MD   nitroGLYCERIN (NITROSTAT) 0.4 MG SL tablet up to max of 3 total doses. If no relief after 1 dose, call 911. 7/23/20   Rosa Elena Love MD       ALLERGIES: Shellfish-derived products, Cumin oil, and Guggulipid-black pepper    REVIEW OF SYSTEM:   Review of Systems   Constitutional:  Positive for chills, diaphoresis and fever. Negative for fatigue. HENT:  Positive for congestion and rhinorrhea. Negative for sore throat and trouble swallowing. Eyes:  Negative for photophobia and visual disturbance. Respiratory:  Positive for cough, chest tightness and shortness of breath. Cardiovascular:  Positive for chest pain. Negative for palpitations and leg swelling. Gastrointestinal:  Negative for abdominal distention, abdominal pain, constipation, diarrhea, nausea and vomiting. Genitourinary:  Negative for difficulty urinating, flank pain and hematuria. Musculoskeletal:  Positive for myalgias and neck stiffness. Negative for back pain and gait problem. Skin:  Negative for color change and wound. Neurological:  Negative for dizziness, syncope, speech difficulty, weakness, light-headedness, numbness and headaches. Psychiatric/Behavioral:  Negative for behavioral problems and confusion. OBJECTIVE  PHYSICAL EXAM: /68   Pulse 98   Temp 98.7 °F (37.1 °C) (Oral)   Resp 24   Ht 5' 2\" (1.575 m)   Wt 200 lb (90.7 kg)   LMP 07/12/2019   SpO2 94%   BMI 36.58 kg/m²   Physical Exam  Vitals and nursing note reviewed. Constitutional:       General: She is awake. She is in acute distress. Appearance: Normal appearance. She is well-developed and normal weight. She is diaphoretic. She is not ill-appearing or toxic-appearing. Interventions: Nasal cannula in place. HENT:      Head: Normocephalic and atraumatic.       Nose: Nose normal. No congestion or rhinorrhea. Mouth/Throat:      Mouth: Mucous membranes are dry. Tongue: Tongue does not deviate from midline. Pharynx: Oropharynx is clear. No oropharyngeal exudate or posterior oropharyngeal erythema. Comments: Lips purple  Eyes:      General: Lids are normal. No visual field deficit or scleral icterus. Extraocular Movements: Extraocular movements intact. Right eye: No nystagmus. Left eye: No nystagmus. Conjunctiva/sclera: Conjunctivae normal.   Neck:      Thyroid: No thyromegaly. Vascular: No JVD. Trachea: Trachea and phonation normal.   Cardiovascular:      Rate and Rhythm: Regular rhythm. Tachycardia present. Pulses: Normal pulses. Radial pulses are 2+ on the right side and 2+ on the left side. Dorsalis pedis pulses are 2+ on the right side and 2+ on the left side. Heart sounds: Normal heart sounds, S1 normal and S2 normal. No murmur heard. Pulmonary:      Effort: Tachypnea and respiratory distress present. Breath sounds: Normal air entry. No stridor or decreased air movement. Wheezing present. No decreased breath sounds, rhonchi or rales. Chest:      Chest wall: No tenderness. Abdominal:      General: Abdomen is flat. Bowel sounds are normal. There is no distension. Palpations: Abdomen is soft. Tenderness: There is no abdominal tenderness. There is no guarding. Musculoskeletal:         General: No swelling, tenderness, deformity or signs of injury. Normal range of motion. Cervical back: Normal range of motion and neck supple. No rigidity or tenderness. Right lower leg: No edema. Left lower leg: No edema. Feet:      Right foot:      Skin integrity: Skin integrity normal.      Left foot:      Skin integrity: Skin integrity normal.   Skin:     General: Skin is warm. Capillary Refill: Capillary refill takes less than 2 seconds. Coloration: Skin is not jaundiced or pale.       Findings: No bruising, erythema, lesion or rash. Nails: There is no clubbing. Neurological:      General: No focal deficit present. Mental Status: She is alert and oriented to person, place, and time. Mental status is at baseline. Sensory: Sensation is intact. Motor: Motor function is intact. No weakness. Psychiatric:         Attention and Perception: Attention and perception normal.         Mood and Affect: Mood and affect normal.         Speech: Speech normal.         Behavior: Behavior normal. Behavior is cooperative. Thought Content: Thought content normal.         Cognition and Memory: Cognition and memory normal.         Judgment: Judgment normal.     Neurologic Exam     Mental Status   Oriented to person, place, and time. Speech: speech is normal      DATA:     Diagnostic tests reviewed for today's visit:    Most recent labs and imaging results reviewed.      LABS:    Recent Results (from the past 24 hour(s))   EKG 12 Lead    Collection Time: 02/28/23 11:50 PM   Result Value Ref Range    Ventricular Rate 96 BPM    Atrial Rate 96 BPM    P-R Interval 168 ms    QRS Duration 66 ms    Q-T Interval 342 ms    QTc Calculation (Bazett) 432 ms    P Axis 66 degrees    R Axis 59 degrees    T Axis 75 degrees   CBC with Auto Differential    Collection Time: 03/01/23 12:00 AM   Result Value Ref Range    WBC 6.8 4.8 - 10.8 K/uL    RBC 4.86 4.20 - 5.40 M/uL    Hemoglobin 14.8 12.0 - 16.0 g/dL    Hematocrit 43.8 37.0 - 47.0 %    MCV 90.1 79.4 - 94.8 fL    MCH 30.5 27.0 - 31.3 pg    MCHC 33.8 33.0 - 37.0 %    RDW 14.2 11.5 - 14.5 %    Platelets 556 301 - 406 K/uL    Neutrophils % 65.6 %    Lymphocytes % 28.1 %    Monocytes % 5.9 %    Eosinophils % 0.0 %    Basophils % 0.4 %    Neutrophils Absolute 4.5 1.4 - 6.5 K/uL    Lymphocytes Absolute 1.9 1.0 - 4.8 K/uL    Monocytes Absolute 0.4 0.2 - 0.8 K/uL    Eosinophils Absolute 0.0 0.0 - 0.7 K/uL    Basophils Absolute 0.0 0.0 - 0.2 K/uL   CMP    Collection Time: 03/01/23 12:00 AM   Result Value Ref Range    Sodium 138 135 - 144 mEq/L    Potassium 3.9 3.4 - 4.9 mEq/L    Chloride 102 95 - 107 mEq/L    CO2 25 20 - 31 mEq/L    Anion Gap 11 9 - 15 mEq/L    Glucose 130 (H) 70 - 99 mg/dL    BUN 12 6 - 20 mg/dL    Creatinine 0.58 0.50 - 0.90 mg/dL    Est, Glom Filt Rate >60.0 >60    Calcium 9.2 8.5 - 9.9 mg/dL    Total Protein 7.2 6.3 - 8.0 g/dL    Albumin 4.3 3.5 - 4.6 g/dL    Total Bilirubin <0.2 0.2 - 0.7 mg/dL    Alkaline Phosphatase 119 40 - 130 U/L    ALT 14 0 - 33 U/L    AST 12 0 - 35 U/L    Globulin 2.9 2.3 - 3.5 g/dL   Lipase    Collection Time: 03/01/23 12:00 AM   Result Value Ref Range    Lipase 24 12 - 95 U/L   Magnesium    Collection Time: 03/01/23 12:00 AM   Result Value Ref Range    Magnesium 1.9 1.7 - 2.4 mg/dL   Troponin    Collection Time: 03/01/23 12:00 AM   Result Value Ref Range    Troponin <0.010 0.000 - 0.010 ng/mL   Brain Natriuretic Peptide    Collection Time: 03/01/23 12:00 AM   Result Value Ref Range    Pro-BNP <5 pg/mL   ETOH    Collection Time: 03/01/23 12:00 AM   Result Value Ref Range    Ethanol Lvl <10 mg/dL    Ethanol percent Not indicated G/dL   Protime-INR    Collection Time: 03/01/23 12:03 AM   Result Value Ref Range    Protime 12.8 12.3 - 14.9 sec    INR 1.0    APTT    Collection Time: 03/01/23 12:03 AM   Result Value Ref Range    aPTT 28.3 24.4 - 36.8 sec   POCT CREATININE    Collection Time: 03/01/23 12:20 AM   Result Value Ref Range    POC CREATININE WHOLE BLOOD 0.6    EKG 12 Lead    Collection Time: 03/01/23 12:50 AM   Result Value Ref Range    Ventricular Rate 99 BPM    Atrial Rate 100 BPM    P-R Interval 174 ms    QRS Duration 58 ms    Q-T Interval 336 ms    QTc Calculation (Bazett) 431 ms    P Axis 56 degrees    R Axis 54 degrees    T Axis 67 degrees       IMAGING:   XR CHEST PORTABLE    Result Date: 3/1/2023  No acute process.         VTE Prophylaxis: low molecular weight heparin -  start     ASSESSMENT AND PLAN    Acute respiratory failure with hypoxia secondary to COPD  Takes bronchodilator combinations budesonide / formoterol (Symbicort), anticholinergic bronchodilators  Tiotropium (Spiriva), Pulmicort, proventil, singulair, duoneb, home 02- 3LNC ordered at night and PRN at home  Admit  Daily labs  Send respiratory viral panel  Consult pulmonology  Respiratory protocol   Symbicort  singulair  Duonebs   Solu Medrol  Incentive Spirometry  Acapella  Mucinex  Hypertension  /68, takes duiretics, losartan, toprolxl at home, denies blurred vision and some chest pain. Will resume home meds, as tolerated, when home med list is completed by nursing. CAD  Takes ASA and plavix toprol XL with diuretics/potassium some initial chest discomfort, new change on EKG examined by cardiologist. Left heart cath no PCI, EF 65%,   Consult cardiology (Dr. Ekaterina Mnotiel follows)  Cycle troponin  Diabetes Mellitis II  Takes metformin and jardiance, last A1C 6.8 on 12/23/22,   Hold any p.o. antidiabetic medication  Medium dose sliding scale coverage  Accu-Cheks AC at bedtime  check A1c  diabetic diet  hypoglycemia rescue meds as needed. Hyperlipidemia  Stable on statin, no history of MI or Stroke. Will resume home meds, as tolerated, when home med list is completed by nursing.       Plan of care discussed with: patient    SIGNATURE: KAMILLE Gil - CNP  DATE: March 1, 2023  TIME: 2:03 Brayan Quintero MD - supervising physician

## 2023-03-01 NOTE — PROGRESS NOTES
[facility] Respiratory Therapy Evaluation   Current Order:  DUONEB Q4 W/A      Home Regimen: PRN PER PT      Ordering Physician: KAMILLE Hancock CNP  Re-evaluation Date:  3/4     Diagnosis: COPD EXAC      Patient Status: Stable / Unstable + Physician notified    The following MDI Criteria must be met in order to convert aerosol to MDI with spacer.  If unable to meet, MDI will be converted to aerosol:  []  Patient able to demonstrate the ability to use MDI effectively  []  Patient alert and cooperative  []  Patient able to take deep breath with 5-10 second hold  []  Medication(s) available in this delivery method   []  Peak flow greater than or equal to 200 ml/min            Current Order Substituted To  (same drug, same frequency)   Aerosol to MDI [] Albuterol Sulfate 0.083% unit dose by aerosol Albuterol Sulfate MDI 2 puffs by inhalation with spacer    [] Levalbuterol 1.25 mg unit dose by aerosol Levalbuterol MDI 2 puffs by inhalation with spacer    [] Levalbuterol 0.63 mg unit dose by aerosol Levalbuterol MDI 2 puffs by inhalation with spacer    [] Ipratropium Bromide 0.02% unit dose by aerosol Ipratropium Bromide MDI 2 puffs by inhalation with spacer    [] Duoneb (Ipratropium + Albuterol) unit dose by aerosol Ipratropium MDI + Albuterol MDI 2 puffs by inhalation w/spacer   MDI to Aerosol [] Albuterol Sulfate MDI Albuterol Sulfate 0.083% unit dose by aerosol    [] Levalbuterol MDI 2 puffs by inhalation Levalbuterol 1.25 mg unit dose by aerosol    [] Ipratropium Bromide MDI by inhalation Ipratropium Bromide 0.02% unit dose by aerosol    [] Combivent (Ipratropium + Albuterol) MDI by inhalation Duoneb (Ipratropium + Albuterol) unit dose by aerosol       Treatment Assessment [Frequency/Schedule]:  Change frequency to: _____________DUONEB TID AND ALBUTEROL Q2 PRN_____________________________________per Protocol, P&T, [facility]      Points 0 1 2 3 4   Pulmonary Status  Non-Smoker  []   Smoking history   < 20 pack years  []   Smoking history  ?  20 pack years  []   Pulmonary Disorder  (acute or chronic)  []   Severe or Chronic w/ Exacerbation  [x]     Surgical Status No [x]   Surgeries     General []   Surgery Lower []   Abdominal Thoracic or []   Upper Abdominal Thoracic with  PulmonaryDisorder  []     Chest X-ray Clear/Not  Ordered     [x]  Chronic Changes  Results Pending  []  Infiltrates, atelectasis, pleural effusion, or edema  []  Infiltrates in more than one lobe []  Infiltrate + Atelectasis, &/or pleural effusion  []    Respiratory Pattern Regular,  RR = 12-20 []  Increased,  RR = 21-25 [x]  KC, irregular,  or RR = 26-30 []  Decreased FEV1  or RR = 31-35 []  Severe SOB, use  of accessory muscles, or RR ? 35  []    Mental Status Alert, oriented,  Cooperative [x]  Confused but Follows commands []  Lethargic or unable to follow commands []  Obtunded  []  Comatose  []    Breath Sounds Clear to  auscultation  []  Decreased unilaterally or  in bases only []  Decreased  bilaterally  []  Crackles or intermittent wheezes []  Wheezes [x]    Cough Strong, Spontan., & nonproductive [x]  Strong,  spontaneous, &  productive []  Weak,  Nonproductive []  Weak, productive or  with wheezes []  No spontaneous  cough or may require suctioning []    Level of Activity Ambulatory [x]  Ambulatory w/ Assist  []  Non-ambulatory []  Paraplegic []  Quadriplegic []    Total    Score:___9____     Triage Score:___4_____      Tri       Triage:     1. (>20) Freq: Q3    2. (16-20) Freq: Q4   3. (11-15) Freq: QID & Albuterol Q2 PRN    4. (6-10) Freq: TID & Albuterol Q2 PRN    5. (0-5) Freq Q4prn

## 2023-03-01 NOTE — PROGRESS NOTES
Hospitalist Daily Progress Note  Name: Brooke Sparks  Age: 45 y.o. Gender: female  CodeStatus: Full Code  Allergies: Shellfish-Derived Products  Cumin Oil  Guggulipid-Black Pepper    Chief Complaint:Shortness of Breath    Primary Care Provider: GIFTY Trotter  InpatientTreatment Team: Treatment Team: Attending Provider: Margaux Prieto DO; Consulting Physician: Jim Harden MD; Registered Nurse: Gene Terrell RN; : Emmett Delgado RN; Utilization Reviewer: Connie Robles RN; Consulting Physician: Yane Hurst DO  Admission Date: 2/28/2023      Subjective: Patient seen evaluated bedside. Feels breathing is improved patient started expectorating some sputum it is changed from purulent to clear white and frothy. No fevers or chills. Breathing improved. Physical Exam  Constitutional:       Appearance: Normal appearance. She is not ill-appearing or diaphoretic. HENT:      Head: Normocephalic and atraumatic. Nose: Nose normal.      Mouth/Throat:      Mouth: Mucous membranes are moist.      Pharynx: Oropharynx is clear. Eyes:      Conjunctiva/sclera: Conjunctivae normal.      Pupils: Pupils are equal, round, and reactive to light. Cardiovascular:      Rate and Rhythm: Normal rate. Heart sounds: No murmur heard. No friction rub. No gallop. Pulmonary:      Breath sounds: Wheezing present. No rhonchi or rales. Abdominal:      General: There is no distension. Tenderness: There is no abdominal tenderness. There is no guarding. Musculoskeletal:      Right lower leg: No edema. Left lower leg: No edema. Neurological:      General: No focal deficit present. Mental Status: She is alert and oriented to person, place, and time.    Psychiatric:         Mood and Affect: Mood normal.         Behavior: Behavior normal.       Review of Systems  14 point ROS reviewed negative except for as above  Medications:  Reviewed    Infusion Medications:    dextrose Scheduled Medications:    enoxaparin  40 mg SubCUTAneous Daily    nicotine  1 patch TransDERmal Daily    guaiFENesin  600 mg Oral BID    insulin lispro  0-8 Units SubCUTAneous TID WC    insulin lispro  0-4 Units SubCUTAneous Nightly    methylPREDNISolone  40 mg IntraVENous Q12H    aspirin  81 mg Oral Daily    clopidogrel  75 mg Oral Daily    furosemide  20 mg Oral Daily    magnesium oxide  400 mg Oral BID    losartan  25 mg Oral Daily    metoprolol succinate  50 mg Oral Daily    montelukast  10 mg Oral Nightly    potassium chloride  10 mEq Oral Daily    rosuvastatin  40 mg Oral Daily    budesonide-formoterol  2 puff Inhalation BID    ipratropium-albuterol  1 ampule Inhalation TID    insulin glargine  4 Units SubCUTAneous Nightly    azithromycin  250 mg IntraVENous Q24H     PRN Meds: ondansetron **OR** ondansetron, polyethylene glycol, acetaminophen **OR** acetaminophen, glucose, dextrose bolus **OR** dextrose bolus, glucagon (rDNA), dextrose, albuterol    Labs:   Recent Labs     03/01/23  0000 03/01/23  0340   WBC 6.8 9.2   HGB 14.8 15.0   HCT 43.8 44.4    194     Recent Labs     03/01/23  0000 03/01/23  0340    136   K 3.9 4.2    103   CO2 25 21   BUN 12 13   CREATININE 0.58 0.52   CALCIUM 9.2 9.1     Recent Labs     03/01/23  0000   AST 12   ALT 14   BILITOT <0.2   ALKPHOS 119     Recent Labs     03/01/23  0003   INR 1.0     Recent Labs     03/01/23  0000 03/01/23  0340   TROPONINI <0.010 <0.010       Urinalysis:   Lab Results   Component Value Date/Time    BLOODU - 12/30/2022 04:20 PM    SPECGRAV 1.015 12/30/2022 04:20 PM    GLUCOSEU 3+ 12/30/2022 04:20 PM       Radiology:   Most recent    Chest CT      WITH CONTRAST:No results found for this or any previous visit. WITHOUT CONTRAST: No results found for this or any previous visit.       CXR      2-view: Results for orders placed during the hospital encounter of 12/22/22    XR CHEST (2 VW)    Narrative  EXAMINATION:  TWO XRAY VIEWS OF THE CHEST    12/26/2022 1:47 pm    COMPARISON:  None. HISTORY:  ORDERING SYSTEM PROVIDED HISTORY: f/u  TECHNOLOGIST PROVIDED HISTORY:  Reason for exam:->f/u  Is the patient pregnant?->No  What reading provider will be dictating this exam?->CRC    FINDINGS:  The lungs are without acute focal process. There is no effusion or  pneumothorax. The cardiomediastinal silhouette is without acute process. The  osseous structures are without acute process. Impression  No acute process. Results for orders placed during the hospital encounter of 01/24/22    XR CHEST (2 VW)    Narrative  EXAMINATION: XR CHEST (2 VW)    CLINICAL HISTORY: SHORTNESS OF BREATH    COMPARISONS: DECEMBER 20, 2021    FINDINGS: Osseous structures intact. Cardiopericardial silhouette normal. Pulmonary vasculature normal. Lungs clear. Impression  NO ACUTE CARDIOPULMONARY DISEASE. Portable: Results for orders placed during the hospital encounter of 02/28/23    XR CHEST PORTABLE    Narrative  EXAMINATION:  ONE XRAY VIEW OF THE CHEST    3/1/2023 12:07 am    COMPARISON:  12/28/2022    HISTORY:  ORDERING SYSTEM PROVIDED HISTORY: sob  TECHNOLOGIST PROVIDED HISTORY:  Reason for exam:->sob  Is the patient pregnant?->No  What reading provider will be dictating this exam?->CRC    FINDINGS:  The lungs are without acute focal process. There is no effusion or  pneumothorax. The cardiomediastinal silhouette is without acute process. The  osseous structures are without acute process. Impression  No acute process. Echo No results found for this or any previous visit. Assessment/Plan:    Active Hospital Problems    Diagnosis Date Noted    SOB (shortness of breath) [R06.02] 03/01/2023     Priority: Medium     Acute on chronic hypoxic respiratory failure secondary to COPD exacerbation: Continue Solu-Medrol today transition to prednisone tomorrow. Azithromycin x3 doses to reduce pulmonary inflammation.   Trend Pro calcitonin to expedite de-escalation appreciate pulmonary recommendation. Aggressive pulmonary hygiene with Mucinex Acapella incentive spirometry and DuoNebs as needed. RVP negative    Type 2 diabetes: Controlled with oral agents at home add 4 units Lantus nightly while on IV Solu-Medrol continue sign scale coverage    Nonobstructive CAD: Continue dual antiplatelet, statin, metoprolol    Hypertension continue losartan metoprolol diuretics    DVT prophylaxis Lovenox    Additional work up or/and treatment plan may be added today or then after based on clinical progression. I am managing a portion of pt care. Some medical issues are handled byother specialists. Additional work up and treatment should be done in out pt setting by pt PCP and other out pt providers. In addition to examining and evaluating pt, I spent additional time explaining care, normaland abnormal findings, and treatment plan. All of pt questions were answered. Counseling, diet and education were provided. Case will be discussed with nursing staff when appropriate. Family will be updated if and whenappropriate.       Electronically signed by Alana Engle DO on 3/1/2023 at 3:50 PM

## 2023-03-01 NOTE — CARE COORDINATION
Case Management Assessment  Initial Evaluation    Date/Time of Evaluation: 3/1/2023 11:15 AM  Assessment Completed by: Lilly Mujica RN    If patient is discharged prior to next notation, then this note serves as note for discharge by case management. Patient Name: Brooke Sparks                   YOB: 1970  Diagnosis: SOB (shortness of breath) [R06.02]  Atypical chest pain [R07.89]  COPD exacerbation ([facility] [J44.1]  Chronic respiratory failure, unspecified whether with hypoxia or hypercapnia ([facility] [address] [facility] [J96.10]                   Date / Time: 2/28/2023 11:36 PM    Patient Admission Status: Inpatient   Readmission Risk (Low < 19, Mod (19-27), High > 27): Readmission Risk Score: 18.1    Current PCP: GIFTY Trotter  PCP verified by CM? Yes    Chart Reviewed: Yes      History Provided by: Patient  Patient Orientation: Alert and Oriented    Patient Cognition: Alert    Hospitalization in the last 30 days (Readmission):  No    If yes, Readmission Assessment in CM Navigator will be completed. Advance Directives:      Code Status: Full Code   Patient's Primary Decision Maker is:        Discharge Planning:    Patient lives with: Spouse/Significant Other, Children Type of Home: House  Primary Care Giver: Self  Patient Support Systems include: Children   Current Financial resources:    Current community resources:    Current services prior to admission: None            Current DME:              Type of Home Care services:  None    ADLS  Prior functional level: Independent in ADLs/IADLs  Current functional level: Assistance with the following: (PT BECOMES SOB MORE EASILY)    PT AM-PAC:   /24  OT AM-PAC:   /24    Family can provide assistance at DC: Yes  Would you like Case Management to discuss the discharge plan with any other family members/significant others, and if so, who?  No  Plans to Return to Present Housing: Yes  Other Identified Issues/Barriers to RETURNING to current housing: N/A  Potential Assistance needed at discharge: N/A            Potential DME:    Patient expects to discharge to: [facility] for transportation at discharge:      Financial    Payor: Renae Atkinson / Plan: Brandon Porter / Product Type: *No Product type* /     Does insurance require precert for SNF: Yes    Potential assistance Purchasing Medications:    Meds-to-Beds request: Yes      [address] [facility]  [address] [address]  [facility]  Phone: [phone] Fax: [phone]      Notes:    Factors facilitating achievement of predicted outcomes: Family support    Barriers to discharge: Medical complications    Additional Case Management Notes: MET W/ PATIENT IN ROOM TO DISCUSS D/C PLAN. PT FROM HOME W HER SON. PT INDEPENDENT AT BASELINE. PT REPORTS HAVING INCREASED DIFFICULTY DOING CHORES YESTERDAY D/T INCREASED SOB. PT HAS HOME O2-3L AT [facility]. PT HAS INCREASED HER O2 USE RECENTLY. PT PREVIOUSLY HAD [address] AT D/C. AGREEABLE TO [address] IF NEEDED. AGREEABLE TO SNF IF NEEDED-WOULD NEED LIST. The Plan for Transition of Care is related to the following treatment goals of SOB (shortness of breath) [R06.02]  Atypical chest pain [R07.89]  COPD exacerbation (HCC) [J44.1]  Chronic respiratory failure, unspecified whether with hypoxia or hypercapnia ([facility] [T66.35]    IF APPLICABLE: The Patient and/or patient representative Justina Oden and her family were provided with a choice of provider and agrees with the discharge plan. Freedom of choice list with basic dialogue that supports the patient's individualized plan of care/goals and shares the quality data associated with the providers was provided to:     Patient Representative Name:       The Patient and/or Patient Representative Agree with the Discharge Plan?       Kandy Garcia RN  Case Management Department  Ph: [phone] Fax:

## 2023-03-02 PROBLEM — J44.1 COPD EXACERBATION (HCC): Status: ACTIVE | Noted: 2023-03-02

## 2023-03-02 LAB
ANION GAP SERPL CALCULATED.3IONS-SCNC: 15 MEQ/L (ref 9–15)
BASOPHILS ABSOLUTE: 0 K/UL (ref 0–0.2)
BASOPHILS RELATIVE PERCENT: 0.3 %
BUN BLDV-MCNC: 22 MG/DL (ref 6–20)
CALCIUM SERPL-MCNC: 9.3 MG/DL (ref 8.5–9.9)
CHLORIDE BLD-SCNC: 105 MEQ/L (ref 95–107)
CO2: 21 MEQ/L (ref 20–31)
CREAT SERPL-MCNC: 0.51 MG/DL (ref 0.5–0.9)
EOSINOPHILS ABSOLUTE: 0 K/UL (ref 0–0.7)
EOSINOPHILS RELATIVE PERCENT: 0 %
GFR SERPL CREATININE-BSD FRML MDRD: >60 ML/MIN/{1.73_M2}
GFR SERPL CREATININE-BSD FRML MDRD: >60 ML/MIN/{1.73_M2}
GLUCOSE BLD-MCNC: 146 MG/DL (ref 70–99)
GLUCOSE BLD-MCNC: 162 MG/DL (ref 70–99)
GLUCOSE BLD-MCNC: 168 MG/DL (ref 70–99)
GLUCOSE BLD-MCNC: 183 MG/DL (ref 70–99)
GLUCOSE BLD-MCNC: 260 MG/DL (ref 70–99)
HCT VFR BLD CALC: 41.5 % (ref 37–47)
HEMOGLOBIN: 13.9 G/DL (ref 12–16)
LYMPHOCYTES ABSOLUTE: 0.7 K/UL (ref 1–4.8)
LYMPHOCYTES RELATIVE PERCENT: 6.6 %
MCH RBC QN AUTO: 29.7 PG (ref 27–31.3)
MCHC RBC AUTO-ENTMCNC: 33.4 % (ref 33–37)
MCV RBC AUTO: 88.9 FL (ref 79.4–94.8)
MONOCYTES ABSOLUTE: 0.2 K/UL (ref 0.2–0.8)
MONOCYTES RELATIVE PERCENT: 1.9 %
NEUTROPHILS ABSOLUTE: 9.3 K/UL (ref 1.4–6.5)
NEUTROPHILS RELATIVE PERCENT: 91.2 %
PDW BLD-RTO: 13.9 % (ref 11.5–14.5)
PERFORMED ON: ABNORMAL
PERFORMED ON: NORMAL
PLATELET # BLD: 215 K/UL (ref 130–400)
POC CREATININE: 0.6 MG/DL (ref 0.6–1.2)
POC SAMPLE TYPE: NORMAL
POTASSIUM REFLEX MAGNESIUM: 4.4 MEQ/L (ref 3.4–4.9)
PROCALCITONIN: 0.04 NG/ML (ref 0–0.15)
RBC # BLD: 4.67 M/UL (ref 4.2–5.4)
SODIUM BLD-SCNC: 141 MEQ/L (ref 135–144)
WBC # BLD: 10.2 K/UL (ref 4.8–10.8)

## 2023-03-02 PROCEDURE — 94640 AIRWAY INHALATION TREATMENT: CPT

## 2023-03-02 PROCEDURE — 99232 SBSQ HOSP IP/OBS MODERATE 35: CPT | Performed by: INTERNAL MEDICINE

## 2023-03-02 PROCEDURE — 85025 COMPLETE CBC W/AUTO DIFF WBC: CPT

## 2023-03-02 PROCEDURE — 2700000000 HC OXYGEN THERAPY PER DAY

## 2023-03-02 PROCEDURE — 84145 PROCALCITONIN (PCT): CPT

## 2023-03-02 PROCEDURE — 36415 COLL VENOUS BLD VENIPUNCTURE: CPT

## 2023-03-02 PROCEDURE — 6360000002 HC RX W HCPCS

## 2023-03-02 PROCEDURE — 2060000000 HC ICU INTERMEDIATE R&B

## 2023-03-02 PROCEDURE — 97535 SELF CARE MNGMENT TRAINING: CPT

## 2023-03-02 PROCEDURE — 94761 N-INVAS EAR/PLS OXIMETRY MLT: CPT

## 2023-03-02 PROCEDURE — 6370000000 HC RX 637 (ALT 250 FOR IP)

## 2023-03-02 PROCEDURE — 6360000002 HC RX W HCPCS: Performed by: INTERNAL MEDICINE

## 2023-03-02 PROCEDURE — 97165 OT EVAL LOW COMPLEX 30 MIN: CPT

## 2023-03-02 PROCEDURE — 80048 BASIC METABOLIC PNL TOTAL CA: CPT

## 2023-03-02 PROCEDURE — 97161 PT EVAL LOW COMPLEX 20 MIN: CPT

## 2023-03-02 PROCEDURE — 2580000003 HC RX 258: Performed by: INTERNAL MEDICINE

## 2023-03-02 PROCEDURE — 6370000000 HC RX 637 (ALT 250 FOR IP): Performed by: INTERNAL MEDICINE

## 2023-03-02 RX ORDER — BENZONATATE 100 MG/1
100 CAPSULE ORAL 3 TIMES DAILY PRN
Status: DISCONTINUED | OUTPATIENT
Start: 2023-03-02 | End: 2023-03-04 | Stop reason: HOSPADM

## 2023-03-02 RX ORDER — MECOBALAMIN 5000 MCG
5 TABLET,DISINTEGRATING ORAL NIGHTLY
Status: DISCONTINUED | OUTPATIENT
Start: 2023-03-02 | End: 2023-03-04 | Stop reason: HOSPADM

## 2023-03-02 RX ORDER — INSULIN GLARGINE 100 [IU]/ML
8 INJECTION, SOLUTION SUBCUTANEOUS 2 TIMES DAILY
Status: DISCONTINUED | OUTPATIENT
Start: 2023-03-02 | End: 2023-03-04 | Stop reason: HOSPADM

## 2023-03-02 RX ORDER — GUAIFENESIN/DEXTROMETHORPHAN 100-10MG/5
5 SYRUP ORAL EVERY 4 HOURS PRN
Status: DISCONTINUED | OUTPATIENT
Start: 2023-03-02 | End: 2023-03-04 | Stop reason: HOSPADM

## 2023-03-02 RX ADMIN — METHYLPREDNISOLONE SODIUM SUCCINATE 40 MG: 40 INJECTION, POWDER, FOR SOLUTION INTRAMUSCULAR; INTRAVENOUS at 14:05

## 2023-03-02 RX ADMIN — GUAIFENESIN 600 MG: 600 TABLET ORAL at 21:57

## 2023-03-02 RX ADMIN — FUROSEMIDE 20 MG: 20 TABLET ORAL at 09:53

## 2023-03-02 RX ADMIN — POTASSIUM CHLORIDE 10 MEQ: 750 TABLET, FILM COATED, EXTENDED RELEASE ORAL at 09:54

## 2023-03-02 RX ADMIN — Medication 400 MG: at 09:54

## 2023-03-02 RX ADMIN — GUAIFENESIN 600 MG: 600 TABLET ORAL at 09:54

## 2023-03-02 RX ADMIN — METOPROLOL SUCCINATE 50 MG: 50 TABLET, EXTENDED RELEASE ORAL at 09:53

## 2023-03-02 RX ADMIN — METHYLPREDNISOLONE SODIUM SUCCINATE 40 MG: 40 INJECTION, POWDER, FOR SOLUTION INTRAMUSCULAR; INTRAVENOUS at 00:48

## 2023-03-02 RX ADMIN — IPRATROPIUM BROMIDE AND ALBUTEROL SULFATE 1 AMPULE: .5; 2.5 SOLUTION RESPIRATORY (INHALATION) at 13:49

## 2023-03-02 RX ADMIN — ACETAMINOPHEN 650 MG: 325 TABLET ORAL at 03:11

## 2023-03-02 RX ADMIN — ENOXAPARIN SODIUM 40 MG: 100 INJECTION SUBCUTANEOUS at 09:54

## 2023-03-02 RX ADMIN — BENZOCAINE 6 MG-MENTHOL 10 MG LOZENGES 1 LOZENGE: at 13:59

## 2023-03-02 RX ADMIN — GUAIFENESIN AND DEXTROMETHORPHAN 5 ML: 100; 10 SYRUP ORAL at 13:59

## 2023-03-02 RX ADMIN — AZITHROMYCIN MONOHYDRATE 250 MG: 500 INJECTION, POWDER, LYOPHILIZED, FOR SOLUTION INTRAVENOUS at 18:29

## 2023-03-02 RX ADMIN — INSULIN GLARGINE 8 UNITS: 100 INJECTION, SOLUTION SUBCUTANEOUS at 21:58

## 2023-03-02 RX ADMIN — IPRATROPIUM BROMIDE AND ALBUTEROL SULFATE 1 AMPULE: .5; 2.5 SOLUTION RESPIRATORY (INHALATION) at 19:53

## 2023-03-02 RX ADMIN — BUDESONIDE AND FORMOTEROL FUMARATE DIHYDRATE 2 PUFF: 160; 4.5 AEROSOL RESPIRATORY (INHALATION) at 19:53

## 2023-03-02 RX ADMIN — Medication 5 MG: at 21:57

## 2023-03-02 RX ADMIN — BUDESONIDE AND FORMOTEROL FUMARATE DIHYDRATE 2 PUFF: 160; 4.5 AEROSOL RESPIRATORY (INHALATION) at 07:27

## 2023-03-02 RX ADMIN — IPRATROPIUM BROMIDE AND ALBUTEROL SULFATE 1 AMPULE: .5; 2.5 SOLUTION RESPIRATORY (INHALATION) at 07:27

## 2023-03-02 RX ADMIN — INSULIN LISPRO 4 UNITS: 100 INJECTION, SOLUTION INTRAVENOUS; SUBCUTANEOUS at 14:05

## 2023-03-02 RX ADMIN — MONTELUKAST 10 MG: 10 TABLET, FILM COATED ORAL at 21:58

## 2023-03-02 RX ADMIN — LOSARTAN POTASSIUM 25 MG: 25 TABLET, FILM COATED ORAL at 09:53

## 2023-03-02 RX ADMIN — ASPIRIN 81 MG: 81 TABLET, COATED ORAL at 09:53

## 2023-03-02 RX ADMIN — GUAIFENESIN AND DEXTROMETHORPHAN 5 ML: 100; 10 SYRUP ORAL at 18:26

## 2023-03-02 RX ADMIN — Medication 400 MG: at 21:58

## 2023-03-02 RX ADMIN — BENZOCAINE 6 MG-MENTHOL 10 MG LOZENGES 1 LOZENGE: at 18:26

## 2023-03-02 RX ADMIN — ROSUVASTATIN CALCIUM 40 MG: 40 TABLET, FILM COATED ORAL at 09:54

## 2023-03-02 RX ADMIN — CLOPIDOGREL BISULFATE 75 MG: 75 TABLET ORAL at 09:54

## 2023-03-02 RX ADMIN — BENZONATATE 100 MG: 100 CAPSULE ORAL at 13:59

## 2023-03-02 RX ADMIN — INSULIN GLARGINE 8 UNITS: 100 INJECTION, SOLUTION SUBCUTANEOUS at 14:05

## 2023-03-02 ASSESSMENT — ENCOUNTER SYMPTOMS
SHORTNESS OF BREATH: 1
GASTROINTESTINAL NEGATIVE: 1
ABDOMINAL PAIN: 0
NAUSEA: 0
VOMITING: 0
ALLERGIC/IMMUNOLOGIC NEGATIVE: 1
WHEEZING: 0
EYES NEGATIVE: 1

## 2023-03-02 ASSESSMENT — PAIN DESCRIPTION - DESCRIPTORS: DESCRIPTORS: ITCHING;SORE

## 2023-03-02 NOTE — PROGRESS NOTES
Chief Complaint   Patient presents with    Shortness of Breath          Patient is a 45 y.o. female who presents with a chief complaint of SOB. Patient is followed on a regular basis by GIFTY Russo. Previous [facility] patient but now follows with Dr Bhumika Smith. Presents with worsening SOB, wheezing a Hx of CAD s/p PCI. Hx of COPD, DM, HTN and HLD  No angina type symptoms. Trop negative. EKG with non specific changes. BNP is less than 5        CV Data:  12/22/22 Cath EF 65  Mild CX 40%  LAD 40-50% RCA 2 prior Stents patent. 12/22 Echo EF 65%     3/2/2023: Doing okay today. No chest pain.   Normal sinus rhythm on telemetry        Past Medical History        Past Medical History:   Diagnosis Date    Anticoagulant long-term use      Asthma 2004    CAD (coronary artery disease)      COPD (chronic obstructive pulmonary disease) ([address]      Diabetes mellitus ([facility] [address] 2014    Hodgkin disease ([facility] [address] [facility] 1999    Hyperlipidemia      Hypertension 1984    Migraines 1989    Sleep apnea       recently tested     Type 2 diabetes mellitus without complication ([facility] [address]       type II             Patient Active Problem List   Diagnosis    Prediabetes    Exacerbation of persistent asthma    Angina at rest [facility])    Abnormal exercise myocardial perfusion study    Acute respiratory failure with hypoxia (HCC)    Chest pain    CAD    COPD     Hypoxia    Essential (primary) hypertension    Coronary artery disease involving native coronary artery of native heart without angina pectoris    Type 2 diabetes mellitus (HCC)    Hypercholesterolemia    Severe persistent asthma without complication    SOB (shortness of breath)         Past Surgical History         Past Surgical History:   Procedure Laterality Date    TUBAL LIGATION                Social History   Social History            Socioeconomic History    Marital status: Single       Spouse name: None    Number of children: None    Years of education: None    Highest education level: None   Tobacco Use Smoking status: Former       Packs/day: 1.00       Years: 37.00       Pack years: 37.00       Types: Cigarettes       Quit date: 2022       Years since quittin.7    Smokeless tobacco: Never   Vaping Use    Vaping Use: Never used   Substance and Sexual Activity    Alcohol use: Not Currently    Drug use: Never    Sexual activity: Yes       Partners: Male      Social Determinants of Health          Financial Resource Strain: Low Risk     Difficulty of Paying Living Expenses: Not hard at all   Food Insecurity: No Food Insecurity    Worried About [address] in the Last Year: Never true    [address] [employer] in the Last Year: Never true   Transportation Needs: No Transportation Needs    Lack of Transportation (Medical): No    Lack of Transportation (Non-Medical): No            Family History   History reviewed. No pertinent family history.         Current Facility-Administered Medications             Current Facility-Administered Medications   Medication Dose Route Frequency Provider Last Rate Last Admin    ondansetron (ZOFRAN-ODT) disintegrating tablet 4 mg  4 mg Oral Q8H PRN KAMILLE Santizo CNP         Or    ondansetron (ZOFRAN) injection 4 mg  4 mg IntraVENous Q6H PRN KAMILLE Santizo CNP        polyethylene glycol (GLYCOLAX) packet 17 g  17 g Oral Daily PRN KAMILLE Del Toro CNP        enoxaparin (LOVENOX) injection 40 mg  40 mg SubCUTAneous Daily KAMILLE Santizo CNP   40 mg at 23 0907    acetaminophen (TYLENOL) tablet 650 mg  650 mg Oral Q6H PRN KAMILLE Del Toro CNP   650 mg at 23 9206     Or    acetaminophen (TYLENOL) suppository 650 mg  650 mg Rectal Q6H PRN KAMILLE Santizo CNP        nicotine (NICODERM CQ) 21 MG/24HR 1 patch  1 patch TransDERmal Daily KAMILLE Santizo CNP        guaiFENesin [facility]) extended release tablet 600 mg  600 mg Oral BID KAMILLE Del Toro CNP   600 mg at 23    insulin lispro (HUMALOG) injection vial 0-8 Units  0-8 Units SubCUTAneous TID WC KAMILLE Santizo CNP   2 Units at 03/01/23 1153    insulin lispro (HUMALOG) injection vial 0-4 Units  0-4 Units SubCUTAneous Nightly KAMILLE Santizo CNP        glucose chewable tablet 16 g  4 tablet Oral PRN KAMILLE Santizo CNP        dextrose bolus 10% 125 mL  125 mL IntraVENous PRN KAMILLE Santizo CNP         Or    dextrose bolus 10% 250 mL  250 mL IntraVENous PRN KAMILLE Santizo CNP        glucagon (rDNA) injection 1 mg  1 mg SubCUTAneous PRN KAMILLE Santizo CNP        dextrose 10 % infusion   IntraVENous Continuous PRN KAMILLE Thomson CNP        methylPREDNISolone sodium (SOLU-MEDROL) injection 40 mg  40 mg IntraVENous Q12H KAMILLE Thomson CNP   40 mg at 03/02/23 0048    aspirin EC tablet 81 mg  81 mg Oral Daily KAMILLE Santizo CNP   81 mg at 03/01/23 9643    clopidogrel (PLAVIX) tablet 75 mg  75 mg Oral Daily KAMILLE Santizo CNP   75 mg at 03/01/23 9199    furosemide (LASIX) tablet 20 mg  20 mg Oral Daily KAMILLE Santizo CNP   20 mg at 03/01/23 0907    magnesium oxide (MAG-OX) tablet 400 mg  400 mg Oral BID KAMILLE Thomson CNP   400 mg at 03/01/23 2024    losartan (COZAAR) tablet 25 mg  25 mg Oral Daily KAMILLE Santizo CNP   25 mg at 03/01/23 4386    metoprolol succinate (TOPROL XL) extended release tablet 50 mg  50 mg Oral Daily KAMILLE Santizo CNP   50 mg at 03/01/23 0907    montelukast (SINGULAIR) tablet 10 mg  10 mg Oral Nightly KAMILLE Thomson CNP   10 mg at 03/01/23 2024    potassium chloride (KLOR-CON) extended release tablet 10 mEq  10 mEq Oral Daily KAMILLE Thomson CNP   10 mEq at 03/01/23 0907    rosuvastatin (CRESTOR) tablet 40 mg  40 mg Oral Daily KAMILLE Santizo CNP   40 mg at 03/01/23 0907    budesonide-formoterol (SYMBICORT) 160-4.5 MCG/ACT inhaler 2 puff  2 puff Inhalation BID KAMILLE Thomson CNP   2 puff at 03/02/23 0727    ipratropium-albuterol (DUONEB) nebulizer solution 1 ampule 1 ampule Inhalation TID KAMILLE Menchaca CNP   1 ampule at 03/02/23 0727    albuterol (PROVENTIL) nebulizer solution 2.5 mg  2.5 mg Nebulization Q2H PRN KAMILLE Menchaca CNP        insulin glargine (LANTUS) injection vial 4 Units  4 Units SubCUTAneous Nightly [place] Sedar, DO   4 Units at 03/01/23 2024    azithromycin (ZITHROMAX) 250 mg in sodium chloride 0.9 % 250 mL IVPB  250 mg IntraVENous Q24H [place] Josiane Sedar, DO   Stopped at 03/01/23 1837            ALLERGIES: Shellfish-derived products, Cumin oil, and Guggulipid-black pepper     Review of Systems   Constitutional: Negative. Negative for chills and fever. HENT: Negative. Eyes: Negative. Respiratory:  Positive for shortness of breath. Negative for wheezing. Cardiovascular:  Positive for leg swelling. Negative for chest pain and palpitations. Gastrointestinal: Negative. Negative for abdominal pain, nausea and vomiting. Endocrine: Negative. Genitourinary: Negative. Musculoskeletal: Negative. Skin: Negative. Negative for rash. Allergic/Immunologic: Negative. Neurological: Negative. Negative for dizziness, weakness and headaches. Hematological: Negative. Psychiatric/Behavioral: Negative. VITALS:  Blood pressure 135/66, pulse 88, temperature 97.8 °F (36.6 °C), temperature source Oral, resp. rate 20, height 5' 2\" (1.575 m), weight 200 lb (90.7 kg), last menstrual period 07/12/2019, SpO2 96 %, not currently breastfeeding. Body mass index is 36.58 kg/m². Physical Exam  Vitals and nursing note reviewed. Constitutional:       Appearance: She is well-developed. She is not diaphoretic. HENT:      Head: Normocephalic and atraumatic. Eyes:      Pupils: Pupils are equal, round, and reactive to light. Neck:      Thyroid: No thyromegaly. Vascular: No JVD. Trachea: No tracheal deviation. Cardiovascular:      Rate and Rhythm: Normal rate and regular rhythm. Chest Wall: PMI is not displaced. Pulses: Intact distal pulses. Heart sounds: Normal heart sounds. Heart sounds not distant. No murmur heard. No friction rub. No gallop. No S3 sounds. Pulmonary:      Effort: No respiratory distress. Breath sounds: No wheezing or rales. Chest:      Chest wall: No tenderness. Abdominal:      General: Bowel sounds are normal. There is no distension. Palpations: Abdomen is soft. There is no mass. Tenderness: There is no abdominal tenderness. There is no guarding or rebound. Musculoskeletal:         General: Normal range of motion. Cervical back: Normal range of motion and neck supple. Skin:     General: Skin is warm and dry. Coloration: Skin is not pale. Findings: No erythema or rash. Neurological:      Mental Status: She is alert and oriented to person, place, and time. Cranial Nerves: No cranial nerve deficit. Psychiatric:         Behavior: Behavior normal.         Thought Content:  Thought content normal.         Judgment: Judgment normal.         LABS:  Recent Results         Recent Results (from the past 24 hour(s))   POCT Glucose     Collection Time: 03/01/23 11:12 AM   Result Value Ref Range     POC Glucose 225 (H) 70 - 99 mg/dl     Performed on ACCU-CHEK     POCT Glucose     Collection Time: 03/01/23  4:28 PM   Result Value Ref Range     POC Glucose 155 (H) 70 - 99 mg/dl     Performed on ACCU-CHEK     Procalcitonin     Collection Time: 03/01/23  4:59 PM   Result Value Ref Range     Procalcitonin 0.05 0.00 - 0.15 ng/mL   POCT Glucose     Collection Time: 03/01/23  8:09 PM   Result Value Ref Range     POC Glucose 174 (H) 70 - 99 mg/dl     Performed on ACCU-CHEK     POCT Glucose     Collection Time: 03/01/23  9:21 PM   Result Value Ref Range     POC Glucose 165 (H) 70 - 99 mg/dl     Performed on ACCU-CHEK     CBC with Auto Differential     Collection Time: 03/02/23  5:45 AM   Result Value Ref Range     WBC 10.2 4.8 - 10.8 K/uL     RBC 4.67 4.20 - 5.40 M/uL Hemoglobin 13.9 12.0 - 16.0 g/dL     Hematocrit 41.5 37.0 - 47.0 %     MCV 88.9 79.4 - 94.8 fL     MCH 29.7 27.0 - 31.3 pg     MCHC 33.4 33.0 - 37.0 %     RDW 13.9 11.5 - 14.5 %     Platelets 111 253 - 708 K/uL     Neutrophils % 91.2 %     Lymphocytes % 6.6 %     Monocytes % 1.9 %     Eosinophils % 0.0 %     Basophils % 0.3 %     Neutrophils Absolute 9.3 (H) 1.4 - 6.5 K/uL     Lymphocytes Absolute 0.7 (L) 1.0 - 4.8 K/uL     Monocytes Absolute 0.2 0.2 - 0.8 K/uL     Eosinophils Absolute 0.0 0.0 - 0.7 K/uL     Basophils Absolute 0.0 0.0 - 0.2 K/uL   Basic Metabolic Panel w/ Reflex to MG     Collection Time: 03/02/23  5:45 AM   Result Value Ref Range     Sodium 141 135 - 144 mEq/L     Potassium reflex Magnesium 4.4 3.4 - 4.9 mEq/L     Chloride 105 95 - 107 mEq/L     CO2 21 20 - 31 mEq/L     Anion Gap 15 9 - 15 mEq/L     Glucose 168 (H) 70 - 99 mg/dL     BUN 22 (H) 6 - 20 mg/dL     Creatinine 0.51 0.50 - 0.90 mg/dL     Est, Glom Filt Rate >60.0 >60     Calcium 9.3 8.5 - 9.9 mg/dL   Procalcitonin     Collection Time: 03/02/23  5:45 AM   Result Value Ref Range     Procalcitonin 0.04 0.00 - 0.15 ng/mL   POCT Glucose     Collection Time: 03/02/23  6:08 AM   Result Value Ref Range     POC Glucose 183 (H) 70 - 99 mg/dl     Performed on ACCU-CHEK     POCT Glucose     Collection Time: 03/02/23  7:37 AM   Result Value Ref Range     POC Glucose 162 (H) 70 - 99 mg/dl     Performed on ACCU-CHEK           Troponin:         Lab Results   Component Value Date/Time     TROPONINI <0.010 03/01/2023 03:40 AM         EKG: normal sinus rhythm, nonspecific ST and T waves changes        ASSESSMENT:           Active Hospital Problems     Diagnosis Date Noted    SOB (shortness of breath) [R06.02] 03/01/2023       Priority: Medium      SOB due to AECOPD. Doubt acute decompensated heart failure  Hx of CHF- appears stable. Euvolemic.    Hx of CAD s/p PCI  DM  HTN  HLD  Obesity  Hx of tob abuse     PLAN:   As always, aggressive risk factor modification is strongly recommended. We should adhere to the JNC VIII guidelines for HTN management and the NCEPATP III guidelines for LDL-C management. Max cardiac meds-aspirin, Plavix, Cozaar, Toprol-XL  Cont with PO lasix. Monitor I's and O's and renal function. DAPT  Pulm recs  Monitor on tele  GI/DVT proph  No smoking  Keep K+>4 and Mg>2  No plans for any cardiac workup at this time. Thank you for allowing me to participate in the care of your patient, please don't hesitate to contact me if you have any further questions.

## 2023-03-02 NOTE — CARE COORDINATION
Definition of COPD discussed. Lung function explained. Types of COPD differentiated for patient. Symptoms discussed including: difficulty breathing, SOB, coughing, excess mucous, weakness and fatigue, or wheezing. Causes discussed. Pt quit smoking, and has been exposed to air pollution or dust.   Different testing for COPD and most common treatments reviewed. Importance of preventing infection including hand hygiene, keeping inhaler mouthpieces clean, and getting the flu and pneumonia vaccinations. Care Map of what to expect while hospitalized reviewed with patient. Ways to ease SOB explained including pursed lip breathing and diaphragmatic breathing. Energy conservation discussed. Possible medications that may be ordered were reviewed. I stressed that their physician would make that decision and explained the importance of taking the medication as directed. She reports that she was out of some due to supply shortages. I asked her to contact her doctor if this occurs again. He may be able to provide samples or prescribe something different. Good nutrition choices discussed. COPD booklet and zone pamphlet left for patient review. Pulmonary rehab pamphlet also left for patient. She has been to this in the past. I encouraged her to ask her pulmonologist to order this for her if he thinks it would be beneficial.  Patient denies any further questions at this time.    Electronically signed by Dawood Miller RN on 3/2/2023 at 12:12 PM

## 2023-03-02 NOTE — PROGRESS NOTES
Physician Progress Note    3/2/2023   12:41 PM    Name:  Kacie Gandhi  MRN:    [id number]     IP Day: 1     Admit Date: 2/28/2023 11:36 PM  PCP: GIFTY Grimes    Code Status:  Full Code    Subjective:     Breathing is improving but she continues to have coughing fits and dyspnea with exertion.     Current Facility-Administered Medications   Medication Dose Route Frequency Provider Last Rate Last Admin    Benzocaine-Menthol (CEPACOL) 1 lozenge  1 lozenge Oral Q2H PRN Codi Cordoba DO        guaiFENesin-dextromethorphan (ROBITUSSIN DM) 100-10 MG/5ML syrup 5 mL  5 mL Oral Q4H PRN Codi Cordoba, DO        benzonatate (TESSALON) capsule 100 mg  100 mg Oral TID PRN Codi Cordoba,         ondansetron (ZOFRAN-ODT) disintegrating tablet 4 mg  4 mg Oral Q8H PRN KAMILLE Snatizo CNP        Or    ondansetron (ZOFRAN) injection 4 mg  4 mg IntraVENous Q6H PRN KAMILLE Santizo CNP        polyethylene glycol (GLYCOLAX) packet 17 g  17 g Oral Daily PRN KAMILLE Whitman CNP        enoxaparin (LOVENOX) injection 40 mg  40 mg SubCUTAneous Daily KAMILLE Santizo CNP   40 mg at 03/02/23 0954    acetaminophen (TYLENOL) tablet 650 mg  650 mg Oral Q6H PRN KAMILLE Whitman CNP   650 mg at 03/02/23 8508    Or    acetaminophen (TYLENOL) suppository 650 mg  650 mg Rectal Q6H PRN KAMILLE Santizo CNP        nicotine (NICODERM CQ) 21 MG/24HR 1 patch  1 patch TransDERmal Daily KAMILLE Santizo CNP        guaiFENesin [facility]) extended release tablet 600 mg  600 mg Oral BID KAMILLE Whitman CNP   600 mg at 03/02/23 0954    insulin lispro (HUMALOG) injection vial 0-8 Units  0-8 Units SubCUTAneous TID WC KAMILLE Santizo CNP   2 Units at 03/01/23 1153    insulin lispro (HUMALOG) injection vial 0-4 Units  0-4 Units SubCUTAneous Nightly KAMILLE Santizo CNP        glucose chewable tablet 16 g  4 tablet Oral PRN KAMILLE Whitman - CNP        dextrose bolus 10% 125 mL  125 mL IntraVENous PRN Jocelyne JayceeKAMILLE Williamson CNP        Or    dextrose bolus 10% 250 mL  250 mL IntraVENous PRN KAMILLE Santizo CNP        glucagon (rDNA) injection 1 mg  1 mg SubCUTAneous PRN KAMILLE Santizo CNP        dextrose 10 % infusion   IntraVENous Continuous PRN KAMILLE Medel CNP        methylPREDNISolone sodium (SOLU-MEDROL) injection 40 mg  40 mg IntraVENous Q12H KAMILLE Medel CNP   40 mg at 03/02/23 0048    aspirin EC tablet 81 mg  81 mg Oral Daily KAMILLE Santizo CNP   81 mg at 03/02/23 0953    clopidogrel (PLAVIX) tablet 75 mg  75 mg Oral Daily KAMILLE Santizo CNP   75 mg at 03/02/23 0954    furosemide (LASIX) tablet 20 mg  20 mg Oral Daily KAMILLE Santizo CNP   20 mg at 03/02/23 0953    magnesium oxide (MAG-OX) tablet 400 mg  400 mg Oral BID KAMILLE Medel CNP   400 mg at 03/02/23 0954    losartan (COZAAR) tablet 25 mg  25 mg Oral Daily KAMILLE Santizo CNP   25 mg at 03/02/23 0953    metoprolol succinate (TOPROL XL) extended release tablet 50 mg  50 mg Oral Daily KAMILLE Santizo CNP   50 mg at 03/02/23 0953    montelukast (SINGULAIR) tablet 10 mg  10 mg Oral Nightly KAMILLE Medel CNP   10 mg at 03/01/23 2024    potassium chloride (KLOR-CON) extended release tablet 10 mEq  10 mEq Oral Daily KAMILLE Medel CNP   10 mEq at 03/02/23 0954    rosuvastatin (CRESTOR) tablet 40 mg  40 mg Oral Daily KAMILLE Santizo CNP   40 mg at 03/02/23 0954    budesonide-formoterol (SYMBICORT) 160-4.5 MCG/ACT inhaler 2 puff  2 puff Inhalation BID KAMILLE Medel CNP   2 puff at 03/02/23 0727    ipratropium-albuterol (DUONEB) nebulizer solution 1 ampule  1 ampule Inhalation TID KAMILLE Medel CNP   1 ampule at 03/02/23 0727    albuterol (PROVENTIL) nebulizer solution 2.5 mg  2.5 mg Nebulization Q2H PRN Emory Carrillo APRN - CNP        insulin glargine (LANTUS) injection vial 4 Units  4 Units SubCUTAneous Nightly Lisa Ruby DO   4 Units at 03/01/23 2024    azithromycin (ZITHROMAX) 250 mg in sodium chloride 0.9 % 250 mL IVPB  250 mg IntraVENous Q24H Ileana Macdonald DO Flori   Stopped at 23 1837       Physical Examination:      Vitals:  /67   Pulse 95   Temp 98.2 °F (36.8 °C)   Resp 20   Ht 5' 2\" (1.575 m)   Wt 200 lb (90.7 kg)   LMP 2019   SpO2 95%   BMI 36.58 kg/m²   Temp (24hrs), Av.9 °F (36.6 °C), Min:97.7 °F (36.5 °C), Max:98.2 °F (36.8 °C)      General appearance: alert, cooperative and no distress. Obese  Mental Status: oriented to person, place and time and normal affect  Lungs: Scattered wheezes throughout, normal effort  Heart: regular rate and rhythm, no murmur  Abdomen: soft, nontender, nondistended, bowel sounds present, no masses  Extremities: no edema, redness, tenderness in the calves. Cap refill <2s  Skin: no gross lesions, rashes    Data:     Labs:  Recent Labs     23  0340 23  0545   WBC 9.2 10.2   HGB 15.0 13.9    215     Recent Labs     23  0340 23  0545    141   K 4.2 4.4    105   CO2 21 21   BUN 13 22*   CREATININE 0.52 0.51   GLUCOSE 160* 168*     Recent Labs     23  0000   AST 12   ALT 14   BILITOT <0.2   ALKPHOS 119       Assessment and Plan:        1. Acute on chronic respiratory failure with hypoxia secondary to exacerbation of COPD/asthma  -Azithromycin, steroids, and supportive respiratory care  -Recommend continued smoking cessation. She quit in 2022  -Likely still ~2 days away from discharge. On 3L home O2  -Pulmonology follow-up with plan for repeat PFTs once improved    2. Type 2 diabetes with hyperglycemia exacerbated by steroids  -Increase Lantus. Continue SSI    Hypertension  CAD on DAPT  Obesity    Diet: ADULT DIET;  Regular; Low Fat/Low Chol/High Fiber/2 gm Na  Ppx: lovenox  Full Code    37 minutes in total care time    Electronically signed by Elissa Wilson DO on 3/2/2023 at 12:41 PM

## 2023-03-02 NOTE — PROGRESS NOTES
[facility] OCCUPATIONAL THERAPY EVALUATION - ACUTE     NAME: Dario Gamino  : 1970 (45 y.o.)  MRN: [id number]  CODE STATUS: Full Code  Room: [place] [address]    Date of Service: 3/2/2023    Patient Diagnosis(es): SOB (shortness of breath) [R06.02]  Atypical chest pain [R07.89]  COPD exacerbation (HCC) [J44.1]  Chronic respiratory failure, unspecified whether with hypoxia or hypercapnia ([address] [J96.10]   Patient Active Problem List    Diagnosis Date Noted    Angina at rest [facility]) 2020    Abnormal exercise myocardial perfusion study 2020    SOB (shortness of breath) 2023    Hypoxia 2022    Coronary artery disease involving native coronary artery of native heart without angina pectoris 2022    Severe persistent asthma without complication     Hypercholesterolemia 2019    Essential (primary) hypertension 2018    Type 2 diabetes mellitus ([address] 2018    CAD 2021    COPD  2021    Chest pain 2020    Acute respiratory failure with hypoxia ([address] 2020    Exacerbation of persistent asthma 2020    Prediabetes 2019        Past Medical History:   Diagnosis Date    Anticoagulant long-term use     Asthma     CAD (coronary artery disease)     COPD (chronic obstructive pulmonary disease) ([address]     Diabetes mellitus ([address]     Hodgkin disease ([address]     Hyperlipidemia     Hypertension     Migraines     Sleep apnea     recently tested     Type 2 diabetes mellitus without complication ([address]     type II     Past Surgical History:   Procedure Laterality Date    TUBAL LIGATION          Restrictions:O2                 Safety Devices: Safety Devices  Type of Devices: Left in bed;Call light within reach     Patient's date of birth confirmed: Yes    General:       Subjective:Pt pleasant and cooperative. \"The pharmacy was out of the medicine and had to order more(Breathing treatment for nebulizer). \"          Pain at start of treatment: No    Pain at end of treatment: No    Location:   Description   Nursing notified: Not Applicable  RN:   Intervention: Repositioned    Prior Level of Function:  Social/Functional History  Lives With: Son  Type of Home: House  Home Layout: Two level, Bed/Bath upstairs  Home Access: Stairs to enter without rails  Entrance Stairs - Number of Steps: 1+1  Bathroom Shower/Tub: Tub/Shower unit  Bathroom Equipment: Grab bars in shower, 3-in-1 commode  Home Equipment: Oxygen  Has the patient had two or more falls in the past year or any fall with injury in the past year?: No  ADL Assistance: Independent  Homemaking Assistance: Independent  Homemaking Responsibilities: Yes  Ambulation Assistance: Independent  Transfer Assistance: Independent  Active : No    OBJECTIVE:     Orientation Status:  Orientation  Orientation Level: Oriented X4    Observation:  Observation/Palpation  Posture: Good  Observation: SOB with exertion    Cognition Status:       Perception Status:  Perception  Overall Perceptual Status: WFL    Vision and Hearing Status:  Vision  Vision Exceptions: Wears glasses for reading  Hearing  Hearing: Within functional limits   Vision - Basic Assessment  Prior Vision: Wears glasses only for reading  Visual History: No significant visual history  Patient Visual Report: No visual complaint reported. Visual Field Cut: No  Oculo Motor Control: WNL    GROSS ASSESSMENT AROM/PROM:  AROM: Within functional limits       ROM:   LUE AROM (degrees)  LUE AROM : WFL  Left Hand AROM (degrees)  Left Hand AROM: WFL  RUE AROM (degrees)  RUE AROM : WFL  Right Hand AROM (degrees)  Right Hand AROM: WFL    UE STRENGTH:  Strength:  Within functional limits (4/5 bilateral UE strength)    UE COORDINATION:  Coordination: Within functional limits    UE TONE:  Tone: Normal    UE SENSATION:  Sensation: Intact    Hand Dominance:  Hand Dominance  Hand Dominance: Right    ADL Status:  ADL  Feeding: Independent  Grooming: Setup; Increased time to complete  UE Bathing: Setup; Increased time to complete  LE Bathing: Setup; Increased time to complete  UE Dressing: Setup; Increased time to complete  LE Dressing: Setup; Increased time to complete  Toileting: Unable to assess(comment)    Functional Mobility:    Transfers  Sit to stand: Independent  Stand to sit:  Independent    Bed Mobility       Seated and Standing Balance:  Balance  Sitting: Intact  Standing: Intact    Functional Endurance:  Activity Tolerance  Activity Tolerance: Patient limited by fatigue;Treatment limited secondary to medical complications (free text)    D/C Recommendations:  OT D/C RECOMMENDATIONS  REQUIRES OT FOLLOW-UP: No    Equipment Recommendations:       OT Education:        OT Follow Up:    OT D/C RECOMMENDATIONS  REQUIRES OT FOLLOW-UP: No       Assessment/Discharge Disposition:     Performance deficits / Impairments: Decreased endurance  Prognosis: Good  Discharge Recommendations: Continue to assess pending progress  No Skilled OT: At baseline function  Decision Making: Low Complexity  History: moderate  Exam: 1 deficit  Assistance / Modification: no assist    AMPAC (Six Click) Self care Score   How much help is needed for putting on and taking off regular lower body clothing?: None  How much help is needed for bathing (which includes washing, rinsing, drying)?: None  How much help is needed for toileting (which includes using toilet, bedpan, or urinal)?: None  How much help is needed for putting on and taking off regular upper body clothing?: None  How much help is needed for taking care of personal grooming?: None  How much help for eating meals?: None  AM-[facility] Inpatient Daily Activity Raw Score: 24  AM-PAC Inpatient ADL T-Scale Score : 57.54  ADL Inpatient CMS 0-100% Score: 0    Therapy key for assistance levels -   Independent/Mod I = Pt. is able to perform task with no assistance but may require a device   Stand by assistance = Pt. does not perform task at an independent level but does not need physical assistance, requires verbal cues  Minimal, Moderate, Maximal Assistance = Pt. requires physical assistance (25%, 50%, 75% assist from helper) for task but is able to actively participate in task   Dependent = Pt. requires total assistance with task and is not able to actively participate with task completion     Plan:  Occupational Therapy Plan  Times Per Week: eval and 1 treatment  Current Treatment Recommendations: Patient/Caregiver education & training    Goals:   Patient will:    - Other :  independent with understanding education for energy conservation and breathing techniques    Patient Goal: Patient goals : To return to home      Discussed and agreed upon: Yes Comments:     [id number]  Pt tx initiated. Pt educated and provided handouts  for home use for energy conservation and use of breathing techniques. Pt exhibiting fair+ understanding after education. Pt issued handouts for home going. No further tx indicated at this time.   Therapy Time:   Individual   Time In 0815   Time Out 0843   Minutes 28          ADL/IADL training: 10 minutes  Eval: 18 minutes     Electronically signed by:    SRAVANTHI Herrera,   7/5/0901, 8:53 AM Electronically signed by SRAVANTHI Herrera on 1/7/91 at 8:57 AM EST

## 2023-03-02 NOTE — PROGRESS NOTES
Pulmonary Progress Note    3/2/2023 1:03 PM    Subjective:   Admit Date: 2023  PCP: GIFTY Rees    Chief Complaint   Patient presents with    Shortness of Breath     Interval History: Feels slightly better compared to yesterday. Continues to be on 3 L of oxygen. Continues to be on steroids and bronchodilators. Had CT chest today which I reviewed personally interpreted the results independently. 14 points review of systems has been obtained and negative except to was mentioned in HPI.      Medications:   Scheduled Meds:   insulin glargine  8 Units SubCUTAneous BID    melatonin  5 mg Oral Nightly    enoxaparin  40 mg SubCUTAneous Daily    nicotine  1 patch TransDERmal Daily    guaiFENesin  600 mg Oral BID    insulin lispro  0-8 Units SubCUTAneous TID WC    insulin lispro  0-4 Units SubCUTAneous Nightly    methylPREDNISolone  40 mg IntraVENous Q12H    aspirin  81 mg Oral Daily    clopidogrel  75 mg Oral Daily    furosemide  20 mg Oral Daily    magnesium oxide  400 mg Oral BID    losartan  25 mg Oral Daily    metoprolol succinate  50 mg Oral Daily    montelukast  10 mg Oral Nightly    potassium chloride  10 mEq Oral Daily    rosuvastatin  40 mg Oral Daily    budesonide-formoterol  2 puff Inhalation BID    ipratropium-albuterol  1 ampule Inhalation TID    azithromycin  250 mg IntraVENous Q24H     Continuous Infusions:   dextrose           Objective:   Vitals:   Temp (24hrs), Av.9 °F (36.6 °C), Min:97.7 °F (36.5 °C), Max:98.2 °F (36.8 °C)    /67   Pulse 95   Temp 98.2 °F (36.8 °C)   Resp 20   Ht 5' 2\" (1.575 m)   Wt 200 lb (90.7 kg)   LMP 2019   SpO2 95%   BMI 36.58 kg/m²   I/O:24HR INTAKE/OUTPUT:  No intake or output data in the 24 hours ending 23 1303  / 0701 -  0700  In: -   Out: 900 [Urine:900]  CVP:        Physical Exam:   General appearance - alert, ill appearing, and in no distress  Mental status - alert, oriented to person, place, and time  Eyes - pupils equal and reactive, extraocular eye movements intact. Normal sclera and conjunctiva   Nose - normal and patent, no erythema   Neck - supple, no significant adenopathy. No thyromegaly. Chest -diminished bilaterally to auscultation, bilateral wheezes, rales or rhonchi, symmetric air entry  Heart - normal rate, regular rhythm, normal S1, S2, no murmurs, rubs, clicks or gallops  Abdomen - soft, nontender, nondistended, no masses or organomegaly. Bowel sounds present. No hernia   Rectal - deferred, not clinically indicated  Neurological - alert, oriented, normal speech, no focal findings or movement disorder noted, motor and sensory grossly normal bilaterally  Musculoskeletal - no joint tenderness, deformity or swelling  Extremities - peripheral pulses normal, no pedal edema, no clubbing or cyanosis  Skin - normal coloration and turgor, no rashes, no suspicious skin lesions noted  Psych: Normal mood               BMP:    Recent Labs     03/01/23  0000 03/01/23  0019 03/01/23  0340 03/02/23  0545     --  136 141   K 3.9  --  4.2 4.4     --  103 105   CO2 25  --  21 21   BUN 12  --  13 22*   CREATININE 0.58   < > 0.52 0.51   GLUCOSE 130*  --  160* 168*    < > = values in this interval not displayed. .  MG:3,PHOS:3)@  Ionized Calcium: No results found for: IONCA  CBC:   Recent Labs     03/01/23  0340 03/02/23  0545   WBC 9.2 10.2   HGB 15.0 13.9    215      ABG: No results for input(s): PH, PCO2, PO2 in the last 72 hours. Assessment and Plan:       Impression:         - Acute respiratory failure, due to asthma /COPD overlap syndrome with exacerbation. Overall better  -Asthma-COPD overlap syndrome with exacerbation.  -Suspect sleep disordered breathing.  - lung nodules. Repeated CT chest showed improvement in bilateral reticular nodules and groundglass opacities. It is more prominent on the right side but overall looks better.            Recommendations:     -Continue IV steroids with taper when clinically improving.   - Bronchodilators around the clock  - Oxygen to keep saturation above 92%  -Continue Zithromax for 5 days. - Consider bronchoscopy. Can be done as inpatient or as an outpatient.   Can do BAL and possible biopsy.    -Tight glucose control  - Imaging where reviewed personally        Electronically signed by Justina Villalobos MD on 3/2/2023 at 1:03 PM

## 2023-03-02 NOTE — PROGRESS NOTES
Physical Therapy Med Surg Initial Assessment  Facility/Department: [address] [place] [address]  [facility]       NAME: Leatrice Cockayne  : 1970 (45 y.o.)  MRN: [id number]  CODE STATUS: Full Code    Date of Service: 3/2/2023    Patient Diagnosis(es): SOB (shortness of breath) [R06.02]  Atypical chest pain [R07.89]  COPD exacerbation (HCC) [J44.1]  Chronic respiratory failure, unspecified whether with hypoxia or hypercapnia ([address] [J96.10]   Chief Complaint   Patient presents with    Shortness of Breath     Patient Active Problem List    Diagnosis Date Noted    COPD exacerbation ([address] 2023    Angina at rest ([address] 2020    Abnormal exercise myocardial perfusion study 2020    SOB (shortness of breath) 2023    Hypoxia 2022    Coronary artery disease involving native coronary artery of native heart without angina pectoris 2022    Severe persistent asthma without complication     Hypercholesterolemia 2019    Essential (primary) hypertension 2018    Type 2 diabetes mellitus ([address] 2018    CAD 2021    COPD  2021    Chest pain 2020    Acute respiratory failure with hypoxia ([address] 2020    Exacerbation of persistent asthma 2020    Prediabetes 2019        Past Medical History:   Diagnosis Date    Anticoagulant long-term use     Asthma     CAD (coronary artery disease)     COPD (chronic obstructive pulmonary disease) ([address]     Diabetes mellitus ([address]     Hodgkin disease ([address]     Hyperlipidemia     Hypertension 1984    Migraines     Sleep apnea     recently tested     Type 2 diabetes mellitus without complication ([address]     type II     Past Surgical History:   Procedure Laterality Date    TUBAL LIGATION              Restrictions:   Up ad karley     SUBJECTIVE:        Pain   No pain reported at beginning or end of session    Prior Level of Function:  Social/Functional History  Lives With: Son  Type of Home: [facility] Layout: Two level, Bed/Bath upstairs  Home Access: Stairs to enter without rails  Entrance Stairs - Number of Steps: 1+1  Bathroom Shower/Tub: Tub/Shower unit  Bathroom Equipment: Grab bars in shower, 3-in-1 commode  Home Equipment: Oxygen  Has the patient had two or more falls in the past year or any fall with injury in the past year?: No  ADL Assistance: Independent  Homemaking Assistance: Independent  Homemaking Responsibilities: Yes  Ambulation Assistance: Independent  Transfer Assistance: Independent  Active : No    OBJECTIVE:   Vision  Vision: Impaired  Vision Exceptions: Wears glasses for reading  Hearing: Within functional limits    Cognition:  Orientation Level: Oriented X4         ROM:WFL       Strength:   WFL    Neuro:  Balance  Sitting - Static: Good  Sitting - Dynamic: Good  Standing - Static: Good  Standing - Dynamic: Good;-                                Bed mobility  Rolling to Left: Independent  Rolling to Right: Independent  Supine to Sit: Independent  Sit to Supine: Independent  Scooting: Independent  Bed Mobility Comments: HOB elevated to 3 pillow level    Transfers  Sit to Stand: Independent  Stand to Sit: Independent  Bed to Chair: Independent  Comment: indep toilet transfer and toileting    Ambulation  Surface: Level tile  Device: No Device  Assistance: Modified Independent  Quality of Gait: one LOB when pt began coughing noted - pt was able to manage LOB and demonstrated good judgement with gait overall. No lob with managing O2 line, opening doors and dual tasking with gait and conversation  Distance: in room - varying distances based on SOB  Comments: Pt with O2 in place - was able to stop gait to adjust for SOB and take break an appropriate length of time. Greater SOB noted with conversation combined with  gait however no LOB    Stairs/Curb  Stairs?: No (discussed with pt her concerns regarding stairs. She states she can perform stairs without issue unless she is SOB.  She reports no buckling and declines trail at this time. Pt does have BSC on first level if needed)              Activity Tolerance  Activity Tolerance: Patient limited by endurance  Activity Tolerance Comments: Pt demonstrated SOB in gait with O2 in place. She takes breaks appropriately    Patient Education  Education Given To: Patient  Education Provided: Role of Therapy;Plan of Care;Energy Conservation  Education Provided Comments: Discussed taking shower and resting before before stairs as well as anticipating need for rest and spreading tasks out througout the day  Education Method: Verbal  Education Outcome: Verbalized understanding       ASSESSMENT:   Decision Making: Low Complexity  History: high  Exam: low  Clinical Presentation: low    Barriers to Learning: none         DISCHARGE RECOMMENDATIONS:  Discharge Recommendations: Home with Home health PT    Assessment: Pt demonstrates deficits in endurance related to pulmonary status. She compensates well and shows good judgement for taking rests. LOB was managed without intervention. Pt feels safe to return to stairs when the SOB is under control. No further PT needs identified at this level of care. Pt may benefit from C to ensure safety in home  Requires PT Follow-Up: No              Safety Devices  Type of Devices: Left in bed, Call light within reach  Restraints  Restraints Initially in Place: No           AMPAC (6 CLICK) BASIC MOBILITY  AM-PAC Inpatient Mobility Raw Score : 24     Therapy Time:   Individual   Time In 1000   Time Out 1020   Minutes 20           Luaren Patel, PT, 03/02/23 at 10:32 AM         Definitions for assistance levels  Independent = pt does not require any physical supervision or assistance from another person for activity completion. Device may be needed.  Stand by assistance = pt requires verbal cues or instructions from another person, close to but not touching, to perform the activity  Minimal assistance= pt performs 75% or more of the  activity; assistance is required to complete the activity  Moderate assistance= pt performs 50% of the activity; assistance is required to complete the activity  Maximal assistance = pt performs 25% of the activity; assistance is required to complete the activity  Dependent = pt requires total physical assistance to accomplish the task

## 2023-03-02 NOTE — CARE COORDINATION
RN SPOKE WITH PATIENT AND SHE HAS 02 AT . REQUESTS TO SPEAK WITH LSW. LSW UPDATED. HOME HEALTH CARE IS ORDERED AND CALLED TO [facility]. PT WILL NEED HOME 02 EVAL IF NEEDS CONTINUOUS 02 AT DISCHARGE.

## 2023-03-02 NOTE — CONSULTS
Chief Complaint   Patient presents with    Shortness of Breath        Patient is a 45 y.o. female who presents with a chief complaint of SOB. Patient is followed on a regular basis by GIFTY Bonds. Previous [facility] patient but now follows with Dr Wesly Clancy. Presents with worsening SOB, wheezing a Hx of CAD s/p PCI. Hx of COPD, DM, HTN and HLD  No angina type symptoms. Trop negative. EKG with non specific changes. BNP is less than 5      CV Data:  22 Cath EF 65  Mild CX 40%  LAD 40-50% RCA 2 prior Stents patent.    Echo EF 65%      Past Medical History:   Diagnosis Date    Anticoagulant long-term use     Asthma     CAD (coronary artery disease)     COPD (chronic obstructive pulmonary disease) (HCC)     Diabetes mellitus [facility]     Hodgkin disease ([facility]     Hyperlipidemia     Hypertension     Migraines     Sleep apnea     recently tested     Type 2 diabetes mellitus without complication ([facility]     type II      Patient Active Problem List   Diagnosis    Prediabetes    Exacerbation of persistent asthma    Angina at rest [facility])    Abnormal exercise myocardial perfusion study    Acute respiratory failure with hypoxia (HCC)    Chest pain    CAD    COPD     Hypoxia    Essential (primary) hypertension    Coronary artery disease involving native coronary artery of native heart without angina pectoris    Type 2 diabetes mellitus (HCC)    Hypercholesterolemia    Severe persistent asthma without complication    SOB (shortness of breath)       Past Surgical History:   Procedure Laterality Date    TUBAL LIGATION         Social History     Socioeconomic History    Marital status: Single     Spouse name: None    Number of children: None    Years of education: None    Highest education level: None   Tobacco Use    Smoking status: Former     Packs/day: 1.00     Years: 37.00     Pack years: 37.00     Types: Cigarettes     Quit date: 2022     Years since quittin.7    Smokeless tobacco: Never Vaping Use    Vaping Use: Never used   Substance and Sexual Activity    Alcohol use: Not Currently    Drug use: Never    Sexual activity: Yes     Partners: Male     Social Determinants of Health     Financial Resource Strain: Low Risk     Difficulty of Paying Living Expenses: Not hard at all   Food Insecurity: No Food Insecurity    Worried About Running Out of Food in the Last Year: Never true    Ran Out of Food in the Last Year: Never true   Transportation Needs: No Transportation Needs    Lack of Transportation (Medical): No    Lack of Transportation (Non-Medical): No       History reviewed. No pertinent family history.     Current Facility-Administered Medications   Medication Dose Route Frequency Provider Last Rate Last Admin    ondansetron (ZOFRAN-ODT) disintegrating tablet 4 mg  4 mg Oral Q8H PRN KAMILLE Santizo CNP        Or    ondansetron (ZOFRAN) injection 4 mg  4 mg IntraVENous Q6H PRN KAMILLE Santizo CNP        polyethylene glycol (GLYCOLAX) packet 17 g  17 g Oral Daily PRN KAMILLE Pierre CNP        enoxaparin (LOVENOX) injection 40 mg  40 mg SubCUTAneous Daily KAMILLE Santizo CNP   40 mg at 03/01/23 0168    acetaminophen (TYLENOL) tablet 650 mg  650 mg Oral Q6H PRN KAMILLE Pierre CNP   650 mg at 03/02/23 9758    Or    acetaminophen (TYLENOL) suppository 650 mg  650 mg Rectal Q6H PRN KAMILLE Santizo CNP        nicotine (NICODERM CQ) 21 MG/24HR 1 patch  1 patch TransDERmal Daily KAMILLE Santizo CNP        guaiFENesin [facility]) extended release tablet 600 mg  600 mg Oral BID KAMILLE Pierre CNP   600 mg at 03/01/23 2024    insulin lispro (HUMALOG) injection vial 0-8 Units  0-8 Units SubCUTAneous TID WC KAMILLE Santizo CNP   2 Units at 03/01/23 1153    insulin lispro (HUMALOG) injection vial 0-4 Units  0-4 Units SubCUTAneous Nightly KAMILLE Santizo CNP        glucose chewable tablet 16 g  4 tablet Oral PRN KAMILLE Pierre CNP        dextrose bolus 10% 125 mL  125 mL IntraVENous PRN KAMILLE Gil CNP        Or    dextrose bolus 10% 250 mL  250 mL IntraVENous PRN KAMILLE Santizo CNP        glucagon (rDNA) injection 1 mg  1 mg SubCUTAneous PRN KAMILLE Santizo CNP        dextrose 10 % infusion   IntraVENous Continuous PRN KAMILLE Gil CNP        methylPREDNISolone sodium (SOLU-MEDROL) injection 40 mg  40 mg IntraVENous Q12H KAMILLE Gil CNP   40 mg at 03/02/23 0048    aspirin EC tablet 81 mg  81 mg Oral Daily KAMILLE Santizo CNP   81 mg at 03/01/23 9276    clopidogrel (PLAVIX) tablet 75 mg  75 mg Oral Daily KAMILLE Chavez CNP   75 mg at 03/01/23 4078    furosemide (LASIX) tablet 20 mg  20 mg Oral Daily KAMILLE Chavez CNP   20 mg at 03/01/23 0907    magnesium oxide (MAG-OX) tablet 400 mg  400 mg Oral BID KAMILLE Gil CNP   400 mg at 03/01/23 2024    losartan (COZAAR) tablet 25 mg  25 mg Oral Daily KAMILLE Chavez CNP   25 mg at 03/01/23 1072    metoprolol succinate (TOPROL XL) extended release tablet 50 mg  50 mg Oral Daily KAMILLE Santizo CNP   50 mg at 03/01/23 0907    montelukast (SINGULAIR) tablet 10 mg  10 mg Oral Nightly KAMILLE Gil - CNP   10 mg at 03/01/23 2024    potassium chloride (KLOR-CON) extended release tablet 10 mEq  10 mEq Oral Daily KAMILLE Gil CNP   10 mEq at 03/01/23 5131    rosuvastatin (CRESTOR) tablet 40 mg  40 mg Oral Daily KAMILLE Santizo CNP   40 mg at 03/01/23 0907    budesonide-formoterol (SYMBICORT) 160-4.5 MCG/ACT inhaler 2 puff  2 puff Inhalation BID KAMILLE Gil CNP   2 puff at 03/02/23 0727    ipratropium-albuterol (DUONEB) nebulizer solution 1 ampule  1 ampule Inhalation TID KAMILLE Gil CNP   1 ampule at 03/02/23 0727    albuterol (PROVENTIL) nebulizer solution 2.5 mg  2.5 mg Nebulization Q2H PRN KAMILLE Gil - SU        insulin glargine (LANTUS) injection vial 4 Units  4 Units SubCUTAneous Nightly Alex Ruby, DO   4 Units at 03/01/23 2024    azithromycin (ZITHROMAX) 250 mg in sodium chloride 0.9 % 250 mL IVPB  250 mg IntraVENous Q24H Mateo Ruby DO   Stopped at 03/01/23 1837       ALLERGIES: Shellfish-derived products, Cumin oil, and Guggulipid-black pepper    Review of Systems   Constitutional: Negative. Negative for chills and fever. HENT: Negative. Eyes: Negative. Respiratory:  Positive for shortness of breath. Negative for wheezing. Cardiovascular:  Positive for leg swelling. Negative for chest pain and palpitations. Gastrointestinal: Negative. Negative for abdominal pain, nausea and vomiting. Endocrine: Negative. Genitourinary: Negative. Musculoskeletal: Negative. Skin: Negative. Negative for rash. Allergic/Immunologic: Negative. Neurological: Negative. Negative for dizziness, weakness and headaches. Hematological: Negative. Psychiatric/Behavioral: Negative. VITALS:  Blood pressure 135/66, pulse 88, temperature 97.8 °F (36.6 °C), temperature source Oral, resp. rate 20, height 5' 2\" (1.575 m), weight 200 lb (90.7 kg), last menstrual period 07/12/2019, SpO2 96 %, not currently breastfeeding. Body mass index is 36.58 kg/m². Physical Exam  Vitals and nursing note reviewed. Constitutional:       Appearance: She is well-developed. She is not diaphoretic. HENT:      Head: Normocephalic and atraumatic. Eyes:      Pupils: Pupils are equal, round, and reactive to light. Neck:      Thyroid: No thyromegaly. Vascular: No JVD. Trachea: No tracheal deviation. Cardiovascular:      Rate and Rhythm: Normal rate and regular rhythm. Chest Wall: PMI is not displaced. Pulses: Intact distal pulses. Heart sounds: Normal heart sounds. Heart sounds not distant. No murmur heard. No friction rub. No gallop. No S3 sounds. Pulmonary:      Effort: No respiratory distress. Breath sounds: No wheezing or rales. Chest:      Chest wall: No tenderness. Abdominal:      General: Bowel sounds are normal. There is no distension. Palpations: Abdomen is soft. There is no mass. Tenderness: There is no abdominal tenderness. There is no guarding or rebound. Musculoskeletal:         General: Normal range of motion. Cervical back: Normal range of motion and neck supple. Skin:     General: Skin is warm and dry. Coloration: Skin is not pale. Findings: No erythema or rash. Neurological:      Mental Status: She is alert and oriented to person, place, and time. Cranial Nerves: No cranial nerve deficit. Psychiatric:         Behavior: Behavior normal.         Thought Content:  Thought content normal.         Judgment: Judgment normal.       LABS:  Recent Results (from the past 24 hour(s))   POCT Glucose    Collection Time: 03/01/23 11:12 AM   Result Value Ref Range    POC Glucose 225 (H) 70 - 99 mg/dl    Performed on ACCU-CHEK    POCT Glucose    Collection Time: 03/01/23  4:28 PM   Result Value Ref Range    POC Glucose 155 (H) 70 - 99 mg/dl    Performed on ACCU-CHEK    Procalcitonin    Collection Time: 03/01/23  4:59 PM   Result Value Ref Range    Procalcitonin 0.05 0.00 - 0.15 ng/mL   POCT Glucose    Collection Time: 03/01/23  8:09 PM   Result Value Ref Range    POC Glucose 174 (H) 70 - 99 mg/dl    Performed on ACCU-CHEK    POCT Glucose    Collection Time: 03/01/23  9:21 PM   Result Value Ref Range    POC Glucose 165 (H) 70 - 99 mg/dl    Performed on ACCU-CHEK    CBC with Auto Differential    Collection Time: 03/02/23  5:45 AM   Result Value Ref Range    WBC 10.2 4.8 - 10.8 K/uL    RBC 4.67 4.20 - 5.40 M/uL    Hemoglobin 13.9 12.0 - 16.0 g/dL    Hematocrit 41.5 37.0 - 47.0 %    MCV 88.9 79.4 - 94.8 fL    MCH 29.7 27.0 - 31.3 pg    MCHC 33.4 33.0 - 37.0 %    RDW 13.9 11.5 - 14.5 %    Platelets 715 096 - 706 K/uL    Neutrophils % 91.2 %    Lymphocytes % 6.6 %    Monocytes % 1.9 %    Eosinophils % 0.0 %    Basophils % 0.3 %    Neutrophils Absolute 9.3 (H) 1.4 - 6.5 K/uL    Lymphocytes Absolute 0.7 (L) 1.0 - 4.8 K/uL    Monocytes Absolute 0.2 0.2 - 0.8 K/uL    Eosinophils Absolute 0.0 0.0 - 0.7 K/uL    Basophils Absolute 0.0 0.0 - 0.2 K/uL   Basic Metabolic Panel w/ Reflex to MG    Collection Time: 03/02/23  5:45 AM   Result Value Ref Range    Sodium 141 135 - 144 mEq/L    Potassium reflex Magnesium 4.4 3.4 - 4.9 mEq/L    Chloride 105 95 - 107 mEq/L    CO2 21 20 - 31 mEq/L    Anion Gap 15 9 - 15 mEq/L    Glucose 168 (H) 70 - 99 mg/dL    BUN 22 (H) 6 - 20 mg/dL    Creatinine 0.51 0.50 - 0.90 mg/dL    Est, Glom Filt Rate >60.0 >60    Calcium 9.3 8.5 - 9.9 mg/dL   Procalcitonin    Collection Time: 03/02/23  5:45 AM   Result Value Ref Range    Procalcitonin 0.04 0.00 - 0.15 ng/mL   POCT Glucose    Collection Time: 03/02/23  6:08 AM   Result Value Ref Range    POC Glucose 183 (H) 70 - 99 mg/dl    Performed on ACCU-CHEK    POCT Glucose    Collection Time: 03/02/23  7:37 AM   Result Value Ref Range    POC Glucose 162 (H) 70 - 99 mg/dl    Performed on ACCU-CHEK      Troponin:   Lab Results   Component Value Date/Time    TROPONINI <0.010 03/01/2023 03:40 AM       EKG: normal sinus rhythm, nonspecific ST and T waves changes      ASSESSMENT:    Active Hospital Problems    Diagnosis Date Noted    SOB (shortness of breath) [R06.02] 03/01/2023     Priority: Medium     SOB due to AECOPD  Hx of CHF- appears stable. Euvolemic. Hx of CAD s/p PCI  DM  HTN  HLD  Obesity  Hx of tob abuse    PLAN:   As always, aggressive risk factor modification is strongly recommended. We should adhere to the JNC VIII guidelines for HTN management and the NCEPATP III guidelines for LDL-C management. Max cardiac meds  Cont with PO lasix  DAPT  Pulm recs  Monitor on tele  GI/DVT proph  No smoking  Keep K+>4 and Mg>2  No plans for any cardiac workup at this time.      Thank you for allowing me to participate in the care of your patient, please don't hesitate to contact me if you have any further questions.     Electronically signed by Avani Chowdhury DO on 3/2/2023 at 9:14 AM

## 2023-03-02 NOTE — PROGRESS NOTES
Spiritual Care Services     Summary of Visit:  Patient was struggling to breathe peacefully. P described discouragement about the repetitive nature of their illness and relayed a story of nearly dying a few months ago. P says that she has a boyfriend to help her. P stopped smoking 3 months ago. Ch prayed for her. Encounter Summary  Encounter Overview/Reason : Initial Encounter  Service Provided For[de-identified] Patient  Referral/Consult From[de-identified] Rounding  Support System: Significant other  Complexity of Encounter: Low  Begin Time: 1120  End Time : 5560  Total Time Calculated: 6 min  Encounter   Type: Initial Screen/Assessment                            Spiritual Assessment/Intervention/Outcomes:    Assessment: Anxious, Concerns with suffering, Despair, Impaired resilience    Intervention: Active listening, Discussed belief system/Restorationist practices/reinaldo, Explored/Affirmed feelings, thoughts, concerns, Explored Coping Skills/Resources, Prayer (assurance of)/[place], Sustaining Presence/Ministry of presence    Outcome: Comfort, Encouraged, Engaged in conversation, Expressed feelings, needs, and concerns, Expressed Gratitude      Care Plan:         Continue to visit      [id number] [address] [facility]   Electronically signed by Gifty Maldonado,  Intern Sofi Romano on 3/2/2023 at 1:02 PM.    To reach a  for emotional and spiritual support, place an [facility] consult request.   If a  is needed immediately, dial 0 and ask to page the on-call .

## 2023-03-03 LAB
ANION GAP SERPL CALCULATED.3IONS-SCNC: 11 MEQ/L (ref 9–15)
BASOPHILS ABSOLUTE: 0.1 K/UL (ref 0–0.2)
BASOPHILS RELATIVE PERCENT: 0.6 %
BUN BLDV-MCNC: 21 MG/DL (ref 6–20)
CALCIUM SERPL-MCNC: 9 MG/DL (ref 8.5–9.9)
CHLORIDE BLD-SCNC: 107 MEQ/L (ref 95–107)
CO2: 24 MEQ/L (ref 20–31)
CREAT SERPL-MCNC: 0.51 MG/DL (ref 0.5–0.9)
EKG ATRIAL RATE: 100 BPM
EKG ATRIAL RATE: 85 BPM
EKG ATRIAL RATE: 96 BPM
EKG P AXIS: 56 DEGREES
EKG P AXIS: 66 DEGREES
EKG P AXIS: 69 DEGREES
EKG P-R INTERVAL: 168 MS
EKG P-R INTERVAL: 174 MS
EKG P-R INTERVAL: 182 MS
EKG Q-T INTERVAL: 336 MS
EKG Q-T INTERVAL: 342 MS
EKG Q-T INTERVAL: 390 MS
EKG QRS DURATION: 58 MS
EKG QRS DURATION: 66 MS
EKG QRS DURATION: 70 MS
EKG QTC CALCULATION (BAZETT): 431 MS
EKG QTC CALCULATION (BAZETT): 432 MS
EKG QTC CALCULATION (BAZETT): 464 MS
EKG R AXIS: 45 DEGREES
EKG R AXIS: 54 DEGREES
EKG R AXIS: 59 DEGREES
EKG T AXIS: 67 DEGREES
EKG T AXIS: 72 DEGREES
EKG T AXIS: 75 DEGREES
EKG VENTRICULAR RATE: 85 BPM
EKG VENTRICULAR RATE: 96 BPM
EKG VENTRICULAR RATE: 99 BPM
EOSINOPHILS ABSOLUTE: 0 K/UL (ref 0–0.7)
EOSINOPHILS RELATIVE PERCENT: 0 %
GFR SERPL CREATININE-BSD FRML MDRD: >60 ML/MIN/{1.73_M2}
GLUCOSE BLD-MCNC: 118 MG/DL (ref 70–99)
GLUCOSE BLD-MCNC: 127 MG/DL (ref 70–99)
GLUCOSE BLD-MCNC: 129 MG/DL (ref 70–99)
GLUCOSE BLD-MCNC: 177 MG/DL (ref 70–99)
GLUCOSE BLD-MCNC: 179 MG/DL (ref 70–99)
HCT VFR BLD CALC: 41.4 % (ref 37–47)
HEMOGLOBIN: 14 G/DL (ref 12–16)
LYMPHOCYTES ABSOLUTE: 2.4 K/UL (ref 1–4.8)
LYMPHOCYTES RELATIVE PERCENT: 25.6 %
MCH RBC QN AUTO: 30.6 PG (ref 27–31.3)
MCHC RBC AUTO-ENTMCNC: 33.8 % (ref 33–37)
MCV RBC AUTO: 90.6 FL (ref 79.4–94.8)
MONOCYTES ABSOLUTE: 0.6 K/UL (ref 0.2–0.8)
MONOCYTES RELATIVE PERCENT: 6.6 %
NEUTROPHILS ABSOLUTE: 6.3 K/UL (ref 1.4–6.5)
NEUTROPHILS RELATIVE PERCENT: 67.2 %
PDW BLD-RTO: 14.1 % (ref 11.5–14.5)
PERFORMED ON: ABNORMAL
PLATELET # BLD: 204 K/UL (ref 130–400)
POTASSIUM REFLEX MAGNESIUM: 4.2 MEQ/L (ref 3.4–4.9)
RBC # BLD: 4.57 M/UL (ref 4.2–5.4)
SODIUM BLD-SCNC: 142 MEQ/L (ref 135–144)
WBC # BLD: 9.4 K/UL (ref 4.8–10.8)

## 2023-03-03 PROCEDURE — 93010 ELECTROCARDIOGRAM REPORT: CPT | Performed by: INTERNAL MEDICINE

## 2023-03-03 PROCEDURE — 6360000002 HC RX W HCPCS

## 2023-03-03 PROCEDURE — 36415 COLL VENOUS BLD VENIPUNCTURE: CPT

## 2023-03-03 PROCEDURE — 6360000002 HC RX W HCPCS: Performed by: INTERNAL MEDICINE

## 2023-03-03 PROCEDURE — 6370000000 HC RX 637 (ALT 250 FOR IP)

## 2023-03-03 PROCEDURE — 6370000000 HC RX 637 (ALT 250 FOR IP): Performed by: INTERNAL MEDICINE

## 2023-03-03 PROCEDURE — 99232 SBSQ HOSP IP/OBS MODERATE 35: CPT | Performed by: INTERNAL MEDICINE

## 2023-03-03 PROCEDURE — 85025 COMPLETE CBC W/AUTO DIFF WBC: CPT

## 2023-03-03 PROCEDURE — 94761 N-INVAS EAR/PLS OXIMETRY MLT: CPT

## 2023-03-03 PROCEDURE — 2060000000 HC ICU INTERMEDIATE R&B

## 2023-03-03 PROCEDURE — 2580000003 HC RX 258: Performed by: INTERNAL MEDICINE

## 2023-03-03 PROCEDURE — 2700000000 HC OXYGEN THERAPY PER DAY

## 2023-03-03 PROCEDURE — 94640 AIRWAY INHALATION TREATMENT: CPT

## 2023-03-03 PROCEDURE — 80048 BASIC METABOLIC PNL TOTAL CA: CPT

## 2023-03-03 RX ADMIN — POTASSIUM CHLORIDE 10 MEQ: 750 TABLET, FILM COATED, EXTENDED RELEASE ORAL at 08:30

## 2023-03-03 RX ADMIN — FUROSEMIDE 20 MG: 20 TABLET ORAL at 08:30

## 2023-03-03 RX ADMIN — GUAIFENESIN 600 MG: 600 TABLET ORAL at 08:30

## 2023-03-03 RX ADMIN — METHYLPREDNISOLONE SODIUM SUCCINATE 40 MG: 40 INJECTION, POWDER, FOR SOLUTION INTRAMUSCULAR; INTRAVENOUS at 14:53

## 2023-03-03 RX ADMIN — IPRATROPIUM BROMIDE AND ALBUTEROL SULFATE 1 AMPULE: .5; 2.5 SOLUTION RESPIRATORY (INHALATION) at 19:30

## 2023-03-03 RX ADMIN — Medication 400 MG: at 21:19

## 2023-03-03 RX ADMIN — BENZOCAINE 6 MG-MENTHOL 10 MG LOZENGES 1 LOZENGE: at 14:53

## 2023-03-03 RX ADMIN — GUAIFENESIN AND DEXTROMETHORPHAN 5 ML: 100; 10 SYRUP ORAL at 08:30

## 2023-03-03 RX ADMIN — BENZOCAINE 6 MG-MENTHOL 10 MG LOZENGES 1 LOZENGE: at 08:29

## 2023-03-03 RX ADMIN — IPRATROPIUM BROMIDE AND ALBUTEROL SULFATE 1 AMPULE: .5; 2.5 SOLUTION RESPIRATORY (INHALATION) at 07:19

## 2023-03-03 RX ADMIN — Medication 400 MG: at 08:30

## 2023-03-03 RX ADMIN — GUAIFENESIN 600 MG: 600 TABLET ORAL at 21:18

## 2023-03-03 RX ADMIN — AZITHROMYCIN MONOHYDRATE 250 MG: 500 INJECTION, POWDER, LYOPHILIZED, FOR SOLUTION INTRAVENOUS at 18:34

## 2023-03-03 RX ADMIN — BUDESONIDE AND FORMOTEROL FUMARATE DIHYDRATE 2 PUFF: 160; 4.5 AEROSOL RESPIRATORY (INHALATION) at 07:19

## 2023-03-03 RX ADMIN — BUDESONIDE AND FORMOTEROL FUMARATE DIHYDRATE 2 PUFF: 160; 4.5 AEROSOL RESPIRATORY (INHALATION) at 19:29

## 2023-03-03 RX ADMIN — BENZONATATE 100 MG: 100 CAPSULE ORAL at 08:29

## 2023-03-03 RX ADMIN — LOSARTAN POTASSIUM 25 MG: 25 TABLET, FILM COATED ORAL at 08:30

## 2023-03-03 RX ADMIN — INSULIN GLARGINE 8 UNITS: 100 INJECTION, SOLUTION SUBCUTANEOUS at 21:15

## 2023-03-03 RX ADMIN — CLOPIDOGREL BISULFATE 75 MG: 75 TABLET ORAL at 08:30

## 2023-03-03 RX ADMIN — GUAIFENESIN AND DEXTROMETHORPHAN 5 ML: 100; 10 SYRUP ORAL at 14:53

## 2023-03-03 RX ADMIN — METHYLPREDNISOLONE SODIUM SUCCINATE 40 MG: 40 INJECTION, POWDER, FOR SOLUTION INTRAMUSCULAR; INTRAVENOUS at 00:52

## 2023-03-03 RX ADMIN — IPRATROPIUM BROMIDE AND ALBUTEROL SULFATE 1 AMPULE: .5; 2.5 SOLUTION RESPIRATORY (INHALATION) at 13:53

## 2023-03-03 RX ADMIN — Medication 5 MG: at 21:18

## 2023-03-03 RX ADMIN — INSULIN GLARGINE 8 UNITS: 100 INJECTION, SOLUTION SUBCUTANEOUS at 08:31

## 2023-03-03 RX ADMIN — ASPIRIN 81 MG: 81 TABLET, COATED ORAL at 08:30

## 2023-03-03 RX ADMIN — ENOXAPARIN SODIUM 40 MG: 100 INJECTION SUBCUTANEOUS at 08:30

## 2023-03-03 RX ADMIN — METOPROLOL SUCCINATE 50 MG: 50 TABLET, EXTENDED RELEASE ORAL at 08:29

## 2023-03-03 RX ADMIN — MONTELUKAST 10 MG: 10 TABLET, FILM COATED ORAL at 21:18

## 2023-03-03 RX ADMIN — ROSUVASTATIN CALCIUM 40 MG: 40 TABLET, FILM COATED ORAL at 08:31

## 2023-03-03 ASSESSMENT — ENCOUNTER SYMPTOMS
COUGH: 1
SHORTNESS OF BREATH: 1
NAUSEA: 0
DIARRHEA: 0
VOMITING: 0

## 2023-03-03 ASSESSMENT — PAIN SCALES - GENERAL: PAINLEVEL_OUTOF10: 3

## 2023-03-03 ASSESSMENT — PAIN DESCRIPTION - LOCATION: LOCATION: THROAT

## 2023-03-03 ASSESSMENT — PAIN DESCRIPTION - DESCRIPTORS: DESCRIPTORS: ACHING

## 2023-03-03 NOTE — CARE COORDINATION
Pt may not be discharged before Monday. Will need home o2 eval prior to discharge. Home with [facility], requested home care orders. Carolyn Aguilera at [facility] notified about possible discharge over the weekend.

## 2023-03-03 NOTE — PROGRESS NOTES
Chief Complaint   Patient presents with    Shortness of Breath          Patient is a 45 y.o. female who presents with a chief complaint of SOB. Patient is followed on a regular basis by GIFTY Dominguez. Previous [facility] patient but now follows with Dr Gauri Contreras. Presents with worsening SOB, wheezing a Hx of CAD s/p PCI. Hx of COPD, DM, HTN and HLD  No angina type symptoms. Trop negative. EKG with non specific changes. BNP is less than 5        CV Data:  12/22/22 Cath EF 65  Mild CX 40%  LAD 40-50% RCA 2 prior Stents patent. 12/22 Echo EF 65%     3/2/2023: Doing okay today. No chest pain. Normal sinus rhythm on telemetry    3/3/2023: Patient continues to be short of breath. She is wheezing. On 3 L nasal cannula.   No chest pain    Past Medical History        Past Medical History:   Diagnosis Date    Anticoagulant long-term use      Asthma 2004    CAD (coronary artery disease)      COPD (chronic obstructive pulmonary disease) ([address]      Diabetes mellitus ([address] 2014    Hodgkin disease ([address] 1999    Hyperlipidemia      Hypertension 1984    Migraines 1989    Sleep apnea       recently tested     Type 2 diabetes mellitus without complication ([address]       type II             Patient Active Problem List   Diagnosis    Prediabetes    Exacerbation of persistent asthma    Angina at rest [facility])    Abnormal exercise myocardial perfusion study    Acute respiratory failure with hypoxia (HCC)    Chest pain    CAD    COPD     Hypoxia    Essential (primary) hypertension    Coronary artery disease involving native coronary artery of native heart without angina pectoris    Type 2 diabetes mellitus (HCC)    Hypercholesterolemia    Severe persistent asthma without complication    SOB (shortness of breath)         Past Surgical History         Past Surgical History:   Procedure Laterality Date    TUBAL LIGATION                Social History   Social History            Socioeconomic History    Marital status: Single       Spouse name: None    Number of children: None    Years of education: None    Highest education level: None   Tobacco Use    Smoking status: Former       Packs/day: 1.00       Years: 37.00       Pack years: 37.00       Types: Cigarettes       Quit date: 2022       Years since quittin.7    Smokeless tobacco: Never   Vaping Use    Vaping Use: Never used   Substance and Sexual Activity    Alcohol use: Not Currently    Drug use: Never    Sexual activity: Yes       Partners: Male      Social Determinants of Health          Financial Resource Strain: Low Risk     Difficulty of Paying Living Expenses: Not hard at all   Food Insecurity: No Food Insecurity    Worried About Running Out of Food in the Last Year: Never true    [address] in the Last Year: Never true   Transportation Needs: No Transportation Needs    Lack of Transportation (Medical): No    Lack of Transportation (Non-Medical): No            Family History   History reviewed. No pertinent family history.         Current Facility-Administered Medications             Current Facility-Administered Medications   Medication Dose Route Frequency Provider Last Rate Last Admin    ondansetron (ZOFRAN-ODT) disintegrating tablet 4 mg  4 mg Oral Q8H PRN KAMILLE Santizo CNP         Or    ondansetron (ZOFRAN) injection 4 mg  4 mg IntraVENous Q6H PRN KAMILLE Santizo CNP        polyethylene glycol (GLYCOLAX) packet 17 g  17 g Oral Daily PRN NaomyKAMILLE Coyne CNP        enoxaparin (LOVENOX) injection 40 mg  40 mg SubCUTAneous Daily KAMILLE Santizo CNP   40 mg at 23 0907    acetaminophen (TYLENOL) tablet 650 mg  650 mg Oral Q6H PRN KAMILLE Menjivar CNP   650 mg at 23 8290     Or    acetaminophen (TYLENOL) suppository 650 mg  650 mg Rectal Q6H PRN KAMILLE Sanitzo CNP        nicotine (NICODERM CQ) 21 MG/24HR 1 patch  1 patch TransDERmal Daily KAMILLE Santizo CNP        guaiFENesin [facility]) extended release tablet 600 mg  600 mg Oral BID KAMILLE Cardenas CNP   600 mg at 03/01/23 2024    insulin lispro (HUMALOG) injection vial 0-8 Units  0-8 Units SubCUTAneous TID WC KAMILLE Santizo CNP   2 Units at 03/01/23 1153    insulin lispro (HUMALOG) injection vial 0-4 Units  0-4 Units SubCUTAneous Nightly KAMILLE Santizo CNP        glucose chewable tablet 16 g  4 tablet Oral PRN KAMILLE Santizo CNP        dextrose bolus 10% 125 mL  125 mL IntraVENous PRN KAMILLE Santizo CNP         Or    dextrose bolus 10% 250 mL  250 mL IntraVENous PRN KAMILLE Santizo CNP        glucagon (rDNA) injection 1 mg  1 mg SubCUTAneous PRN KAMILLE Santizo CNP        dextrose 10 % infusion   IntraVENous Continuous PRN KAMILLE Cardenas CNP        methylPREDNISolone sodium (SOLU-MEDROL) injection 40 mg  40 mg IntraVENous Q12H KAMILLE Cardenas CNP   40 mg at 03/02/23 0048    aspirin EC tablet 81 mg  81 mg Oral Daily KAMILLE Santizo CNP   81 mg at 03/01/23 8692    clopidogrel (PLAVIX) tablet 75 mg  75 mg Oral Daily KAMILLE Santizo CNP   75 mg at 03/01/23 6995    furosemide (LASIX) tablet 20 mg  20 mg Oral Daily KAMILLE Santizo CNP   20 mg at 03/01/23 0907    magnesium oxide (MAG-OX) tablet 400 mg  400 mg Oral BID KAMILLE Cardenas CNP   400 mg at 03/01/23 2024    losartan (COZAAR) tablet 25 mg  25 mg Oral Daily KAMILLE Santizo CNP   25 mg at 03/01/23 2047    metoprolol succinate (TOPROL XL) extended release tablet 50 mg  50 mg Oral Daily KAMILLE Santizo CNP   50 mg at 03/01/23 0907    montelukast (SINGULAIR) tablet 10 mg  10 mg Oral Nightly KAMILLE Cardenas CNP   10 mg at 03/01/23 2024    potassium chloride (KLOR-CON) extended release tablet 10 mEq  10 mEq Oral Daily KAMILLE Cardenas CNP   10 mEq at 03/01/23 0907    rosuvastatin (CRESTOR) tablet 40 mg  40 mg Oral Daily KAMILLE Santizo CNP   40 mg at 03/01/23 0907    budesonide-formoterol (SYMBICORT) 160-4.5 MCG/ACT inhaler 2 puff  2 puff Inhalation BID Jocelyne Ellen Ridley, KAMILLE - CNP   2 puff at 03/02/23 0727    ipratropium-albuterol (DUONEB) nebulizer solution 1 ampule  1 ampule Inhalation TID KAMILLE Luna CNP   1 ampule at 03/02/23 0727    albuterol (PROVENTIL) nebulizer solution 2.5 mg  2.5 mg Nebulization Q2H PRN KAMILLE Luna - CNP        insulin glargine (LANTUS) injection vial 4 Units  4 Units SubCUTAneous Nightly Hellen Carota Sedar, DO   4 Units at 03/01/23 2024    azithromycin (ZITHROMAX) 250 mg in sodium chloride 0.9 % 250 mL IVPB  250 mg IntraVENous Q24H Hellen Carota Sedar, DO   Stopped at 03/01/23 1837            ALLERGIES: Shellfish-derived products, Cumin oil, and Guggulipid-black pepper     Review of Systems   Constitutional: Negative. Negative for chills and fever. HENT: Negative. Eyes: Negative. Respiratory:  Positive for shortness of breath. Negative for wheezing. Cardiovascular:  Positive for leg swelling. Negative for chest pain and palpitations. Gastrointestinal: Negative. Negative for abdominal pain, nausea and vomiting. Endocrine: Negative. Genitourinary: Negative. Musculoskeletal: Negative. Skin: Negative. Negative for rash. Allergic/Immunologic: Negative. Neurological: Negative. Negative for dizziness, weakness and headaches. Hematological: Negative. Psychiatric/Behavioral: Negative. VITALS:  Blood pressure 135/66, pulse 88, temperature 97.8 °F (36.6 °C), temperature source Oral, resp. rate 20, height 5' 2\" (1.575 m), weight 200 lb (90.7 kg), last menstrual period 07/12/2019, SpO2 96 %, not currently breastfeeding. Body mass index is 36.58 kg/m². Physical Exam  Vitals and nursing note reviewed. Constitutional:       Appearance: She is well-developed. She is not diaphoretic. HENT:      Head: Normocephalic and atraumatic. Eyes:      Pupils: Pupils are equal, round, and reactive to light. Neck:      Thyroid: No thyromegaly. Vascular: No JVD. Trachea: No tracheal deviation. Cardiovascular:      Rate and Rhythm: Normal rate and regular rhythm. Chest Wall: PMI is not displaced. Pulses: Intact distal pulses. Heart sounds: Normal heart sounds. Heart sounds not distant. No murmur heard. No friction rub. No gallop. No S3 sounds. Pulmonary:      Effort: No respiratory distress. Breath sounds: No wheezing or rales. Chest:      Chest wall: No tenderness. Abdominal:      General: Bowel sounds are normal. There is no distension. Palpations: Abdomen is soft. There is no mass. Tenderness: There is no abdominal tenderness. There is no guarding or rebound. Musculoskeletal:         General: Normal range of motion. Cervical back: Normal range of motion and neck supple. Skin:     General: Skin is warm and dry. Coloration: Skin is not pale. Findings: No erythema or rash. Neurological:      Mental Status: She is alert and oriented to person, place, and time. Cranial Nerves: No cranial nerve deficit. Psychiatric:         Behavior: Behavior normal.         Thought Content:  Thought content normal.         Judgment: Judgment normal.         LABS:  Recent Results         Recent Results (from the past 24 hour(s))   POCT Glucose     Collection Time: 03/01/23 11:12 AM   Result Value Ref Range     POC Glucose 225 (H) 70 - 99 mg/dl     Performed on ACCU-CHEK     POCT Glucose     Collection Time: 03/01/23  4:28 PM   Result Value Ref Range     POC Glucose 155 (H) 70 - 99 mg/dl     Performed on ACCU-CHEK     Procalcitonin     Collection Time: 03/01/23  4:59 PM   Result Value Ref Range     Procalcitonin 0.05 0.00 - 0.15 ng/mL   POCT Glucose     Collection Time: 03/01/23  8:09 PM   Result Value Ref Range     POC Glucose 174 (H) 70 - 99 mg/dl     Performed on ACCU-CHEK     POCT Glucose     Collection Time: 03/01/23  9:21 PM   Result Value Ref Range     POC Glucose 165 (H) 70 - 99 mg/dl     Performed on ACCU-CHEK     CBC with Auto Differential Collection Time: 03/02/23  5:45 AM   Result Value Ref Range     WBC 10.2 4.8 - 10.8 K/uL     RBC 4.67 4.20 - 5.40 M/uL     Hemoglobin 13.9 12.0 - 16.0 g/dL     Hematocrit 41.5 37.0 - 47.0 %     MCV 88.9 79.4 - 94.8 fL     MCH 29.7 27.0 - 31.3 pg     MCHC 33.4 33.0 - 37.0 %     RDW 13.9 11.5 - 14.5 %     Platelets 664 957 - 744 K/uL     Neutrophils % 91.2 %     Lymphocytes % 6.6 %     Monocytes % 1.9 %     Eosinophils % 0.0 %     Basophils % 0.3 %     Neutrophils Absolute 9.3 (H) 1.4 - 6.5 K/uL     Lymphocytes Absolute 0.7 (L) 1.0 - 4.8 K/uL     Monocytes Absolute 0.2 0.2 - 0.8 K/uL     Eosinophils Absolute 0.0 0.0 - 0.7 K/uL     Basophils Absolute 0.0 0.0 - 0.2 K/uL   Basic Metabolic Panel w/ Reflex to MG     Collection Time: 03/02/23  5:45 AM   Result Value Ref Range     Sodium 141 135 - 144 mEq/L     Potassium reflex Magnesium 4.4 3.4 - 4.9 mEq/L     Chloride 105 95 - 107 mEq/L     CO2 21 20 - 31 mEq/L     Anion Gap 15 9 - 15 mEq/L     Glucose 168 (H) 70 - 99 mg/dL     BUN 22 (H) 6 - 20 mg/dL     Creatinine 0.51 0.50 - 0.90 mg/dL     Est, Glom Filt Rate >60.0 >60     Calcium 9.3 8.5 - 9.9 mg/dL   Procalcitonin     Collection Time: 03/02/23  5:45 AM   Result Value Ref Range     Procalcitonin 0.04 0.00 - 0.15 ng/mL   POCT Glucose     Collection Time: 03/02/23  6:08 AM   Result Value Ref Range     POC Glucose 183 (H) 70 - 99 mg/dl     Performed on ACCU-CHEK     POCT Glucose     Collection Time: 03/02/23  7:37 AM   Result Value Ref Range     POC Glucose 162 (H) 70 - 99 mg/dl     Performed on ACCU-CHEK           Troponin:         Lab Results   Component Value Date/Time     TROPONINI <0.010 03/01/2023 03:40 AM         EKG: normal sinus rhythm, nonspecific ST and T waves changes        ASSESSMENT:           Active Hospital Problems     Diagnosis Date Noted    SOB (shortness of breath) [R06.02] 03/01/2023       Priority: Medium      SOB due to AECOPD. Doubt acute decompensated heart failure  Hx of CHF- appears stable. Euvolemic. Hx of CAD s/p PCI  DM  HTN  HLD  Obesity  Hx of tob abuse     PLAN:   As always, aggressive risk factor modification is strongly recommended. We should adhere to the JNC VIII guidelines for HTN management and the NCEPATP III guidelines for LDL-C management. Max cardiac meds-aspirin, Plavix, Cozaar, Toprol-XL  Cont with PO lasix. Monitor I's and O's and renal function. DAPT  Pulm recs  Monitor on tele  GI/DVT proph  No smoking  Keep K+>4 and Mg>2  No plans for any cardiac workup at this time. Stable from cardiology standpoint for discharge. We will follow from a distance. Thank you for allowing me to participate in the care of your patient, please don't hesitate to contact me if you have any further questions.

## 2023-03-03 NOTE — PROGRESS NOTES
Pulmonary Progress Note    3/3/2023 2:18 PM    Subjective:   Admit Date: 2023  PCP: GIFTY Seymour    Chief Complaint   Patient presents with    Shortness of Breath     Interval History: Symptoms are slightly better. Continues to have wheezing. Continues to be on oxygen at 3 L. No fever overnight. 14 points review of systems has been obtained and negative except to was mentioned in HPI.      Medications:   Scheduled Meds:   insulin glargine  8 Units SubCUTAneous BID    melatonin  5 mg Oral Nightly    enoxaparin  40 mg SubCUTAneous Daily    nicotine  1 patch TransDERmal Daily    guaiFENesin  600 mg Oral BID    insulin lispro  0-8 Units SubCUTAneous TID WC    insulin lispro  0-4 Units SubCUTAneous Nightly    methylPREDNISolone  40 mg IntraVENous Q12H    aspirin  81 mg Oral Daily    clopidogrel  75 mg Oral Daily    furosemide  20 mg Oral Daily    magnesium oxide  400 mg Oral BID    losartan  25 mg Oral Daily    metoprolol succinate  50 mg Oral Daily    montelukast  10 mg Oral Nightly    potassium chloride  10 mEq Oral Daily    rosuvastatin  40 mg Oral Daily    budesonide-formoterol  2 puff Inhalation BID    ipratropium-albuterol  1 ampule Inhalation TID    azithromycin  250 mg IntraVENous Q24H     Continuous Infusions:   dextrose           Objective:   Vitals:   Temp (24hrs), Av.6 °F (37 °C), Min:98.4 °F (36.9 °C), Max:98.8 °F (37.1 °C)    /61   Pulse 70   Temp 98.4 °F (36.9 °C) (Oral)   Resp 18   Ht 5' 2\" (1.575 m)   Wt 200 lb (90.7 kg)   LMP 2019   SpO2 99%   BMI 36.58 kg/m²   I/O:24HR INTAKE/OUTPUT:    Intake/Output Summary (Last 24 hours) at 3/3/2023 1418  Last data filed at 3/2/2023 1829  Gross per 24 hour   Intake 1200 ml   Output --   Net 1200 ml      0701 -  0700  In: 1200 [P.O.:1200]  Out: -   CVP:        Physical Exam:   General appearance - alert, ill appearing, and in no distress  Mental status - alert, oriented to person, place, and time  Eyes - pupils equal and reactive, extraocular eye movements intact. Normal sclera and conjunctiva   Nose - normal and patent, no erythema   Neck - supple, no significant adenopathy. No thyromegaly. Chest -diminished bilaterally to auscultation, bilateral wheezes, rales or rhonchi, symmetric air entry  Heart - normal rate, regular rhythm, normal S1, S2, no murmurs, rubs, clicks or gallops  Abdomen - soft, nontender, nondistended, no masses or organomegaly. Bowel sounds present. No hernia   Rectal - deferred, not clinically indicated  Neurological - alert, oriented, normal speech, no focal findings or movement disorder noted, motor and sensory grossly normal bilaterally  Musculoskeletal - no joint tenderness, deformity or swelling  Extremities - peripheral pulses normal, no pedal edema, no clubbing or cyanosis  Skin - normal coloration and turgor, no rashes, no suspicious skin lesions noted  Psych: Normal mood               BMP:    Recent Labs     03/01/23  0340 03/02/23  0545 03/03/23  0533    141 142   K 4.2 4.4 4.2    105 107   CO2 21 21 24   BUN 13 22* 21*   CREATININE 0.52 0.51 0.51   GLUCOSE 160* 168* 127*      . MG:3,PHOS:3)@  Ionized Calcium: No results found for: IONCA  CBC:   Recent Labs     03/02/23  0545 03/03/23  0533   WBC 10.2 9.4   HGB 13.9 14.0    204        ABG: No results for input(s): PH, PCO2, PO2 in the last 72 hours. Assessment and Plan:       Impression:         - Acute respiratory failure, due to asthma /COPD overlap syndrome with exacerbation. Overall better  -Asthma-COPD overlap syndrome with exacerbation.  -Suspect sleep disordered breathing.  - lung nodules. Repeated CT chest showed improvement in bilateral reticular nodules and groundglass opacities. It is more prominent on the right side but overall looks better. Recommendations:     -Continue IV steroids . Can probably switch to oral tomorrow.   - Bronchodilators around the clock  - Oxygen to keep saturation above 92%  -Continue Zithromax for 5 days. - Consider bronchoscopy. Can be done as an outpatient. Will defer to her primary pulmonologist, Dr. Quan Kim.    -DVT and GI prophylaxis       Electronically signed by Hanna Prince MD on 3/3/2023 at 2:18 PM

## 2023-03-03 NOTE — PROGRESS NOTES
Progress Note  Date:3/3/2023       DNYC:[id number]/[id number]  Patient Eleanor Leon     YOB: 1970     Age:52 y.o. Subjective    Subjective:  Symptoms:  She reports shortness of breath, malaise, cough and weakness. No chest pain, headache, chest pressure, anorexia, diarrhea or anxiety. Diet:  No nausea or vomiting. Review of Systems   Respiratory:  Positive for cough and shortness of breath. Cardiovascular:  Negative for chest pain. Gastrointestinal:  Negative for anorexia, diarrhea, nausea and vomiting. Neurological:  Positive for weakness. Objective         Vitals Last 24 Hours:  TEMPERATURE:  Temp  Av.6 °F (37 °C)  Min: 98.4 °F (36.9 °C)  Max: 98.8 °F (37.1 °C)  RESPIRATIONS RANGE: Resp  Av  Min: 18  Max: 18  PULSE OXIMETRY RANGE: SpO2  Av.3 %  Min: 93 %  Max: 100 %  PULSE RANGE: Pulse  Av.3  Min: 70  Max: 92  BLOOD PRESSURE RANGE: Systolic (51TVO), KNY:982 , Min:117 , SFV:168   ; Diastolic (56XML), DFZ:43, Min:59, Max:61    I/O (24Hr): Intake/Output Summary (Last 24 hours) at 3/3/2023 1213  Last data filed at 3/2/2023 1829  Gross per 24 hour   Intake 1200 ml   Output --   Net 1200 ml     Objective:  General Appearance:  Comfortable, well-appearing and in no acute distress. Vital signs: (most recent): Blood pressure 120/61, pulse 70, temperature 98.4 °F (36.9 °C), temperature source Oral, resp. rate 18, height 5' 2\" (1.575 m), weight 200 lb (90.7 kg), last menstrual period 2019, SpO2 99 %, not currently breastfeeding. HEENT: Normal HEENT exam.    Lungs: There are decreased breath sounds and wheezes. Heart: S1 normal and S2 normal.    Abdomen: Abdomen is soft. Bowel sounds are normal.     Pulses: Distal pulses are intact. Neurological: Patient is alert and oriented to person, place and time. Pupils:  Pupils are equal, round, and reactive to light. Skin:  Warm.     Labs/Imaging/Diagnostics    Labs:  CBC:  Recent Labs 03/01/23  0340 03/02/23  0545 03/03/23  0533   WBC 9.2 10.2 9.4   RBC 4.95 4.67 4.57   HGB 15.0 13.9 14.0   HCT 44.4 41.5 41.4   MCV 89.7 88.9 90.6   RDW 14.2 13.9 14.1    215 204     CHEMISTRIES:  Recent Labs     03/01/23  0000 03/01/23  0019 03/01/23  0340 03/02/23  0545 03/03/23  0533     --  136 141 142   K 3.9  --  4.2 4.4 4.2     --  103 105 107   CO2 25  --  21 21 24   BUN 12  --  13 22* 21*   CREATININE 0.58   < > 0.52 0.51 0.51   GLUCOSE 130*  --  160* 168* 127*   MG 1.9  --   --   --   --     < > = values in this interval not displayed. PT/INR:  Recent Labs     03/01/23  0003   PROTIME 12.8   INR 1.0     APTT:  Recent Labs     03/01/23  0003   APTT 28.3     LIVER PROFILE:  Recent Labs     03/01/23  0000   AST 12   ALT 14   BILITOT <0.2   ALKPHOS 119       Imaging Last 24 Hours:  CT CHEST WO CONTRAST    Result Date: 3/2/2023  EXAMINATION: CT OF THE CHEST WITHOUT CONTRAST 3/1/2023 10:56 pm TECHNIQUE: CT of the chest was performed without the administration of intravenous contrast. Multiplanar reformatted images are provided for review. Automated exposure control, iterative reconstruction, and/or weight based adjustment of the mA/kV was utilized to reduce the radiation dose to as low as reasonably achievable. COMPARISON: 12/22/2022 HISTORY: ORDERING SYSTEM PROVIDED HISTORY: [employer] TECHNOLOGIST PROVIDED HISTORY: Reason for exam:->pnuemonia What reading provider will be dictating this exam?->CRC FINDINGS: Mediastinum: Thyroid is homogeneous in appearance. Cardiac chambers are within normal limits. Minimal coronary artery calcification identified atherosclerotic disease seen at the origin of the great vessels. No pericardial effusion. The cardiac chambers are within normal limits. No bulky hilar or axillary lymphadenopathy. Lungs/pleura: The lung parenchyma reveals scattered micro nodular and reticulonodular density seen throughout the lung fields bilaterally.   There is a larger 8 mm nodular density seen in the left lower lung field which was not seen on the prior examination. Findings are nonspecific and can be seen in atypical infection. There is no airspace infiltration or consolidation identified. There is patchy coarse density seen in the posterior aspect of the right upper lobe stable and unchanged when compared to the prior examination. No evidence of pleural effusion. No pneumothorax. Upper Abdomen: Visualized upper abdomen reveals probable stones seen at the neck of the gallbladder. Soft Tissues/Bones: Minimal degenerative changes seen within the spine. No acute chest wall abnormality. Persistent and new reticulonodular densities and micro nodular density seen in the lung fields bilaterally which is nonspecific and PE can be seen in atypical infections. No consolidative infiltrate present. No definite pleural effusion or pneumothorax     Assessment//Plan           Hospital Problems             Last Modified POA    * (Principal) SOB (shortness of breath) 3/1/2023 Yes    COPD exacerbation ([address] 3/2/2023 Yes     Assessment & Plan  3/3: Patient was seen and evaluated. Continue with steroids, home O2 eval prior to discharge. Possible needs bronchoscopy as inpatient or outpatient. Spoke with nursing about the care. Possible discharge tomorrow. Denies any needs at this time. Continue with oxygen therapy,'s steroids, breathing treatment to keep oxygen saturation above 88%.   Electronically signed by Chas Tracey MD on 3/3/23 at 12:13 PM EST

## 2023-03-04 VITALS
DIASTOLIC BLOOD PRESSURE: 75 MMHG | TEMPERATURE: 97.7 F | WEIGHT: 200 LBS | HEART RATE: 76 BPM | HEIGHT: 62 IN | BODY MASS INDEX: 36.8 KG/M2 | RESPIRATION RATE: 18 BRPM | OXYGEN SATURATION: 95 % | SYSTOLIC BLOOD PRESSURE: 138 MMHG

## 2023-03-04 LAB
ANION GAP SERPL CALCULATED.3IONS-SCNC: 12 MEQ/L (ref 9–15)
BASOPHILS ABSOLUTE: 0 K/UL (ref 0–0.2)
BASOPHILS RELATIVE PERCENT: 0.1 %
BUN BLDV-MCNC: 16 MG/DL (ref 6–20)
CALCIUM SERPL-MCNC: 9.1 MG/DL (ref 8.5–9.9)
CHLORIDE BLD-SCNC: 104 MEQ/L (ref 95–107)
CO2: 25 MEQ/L (ref 20–31)
CREAT SERPL-MCNC: 0.4 MG/DL (ref 0.5–0.9)
EOSINOPHILS ABSOLUTE: 0 K/UL (ref 0–0.7)
EOSINOPHILS RELATIVE PERCENT: 0 %
GFR SERPL CREATININE-BSD FRML MDRD: >60 ML/MIN/{1.73_M2}
GLUCOSE BLD-MCNC: 147 MG/DL (ref 70–99)
GLUCOSE BLD-MCNC: 169 MG/DL (ref 70–99)
GLUCOSE BLD-MCNC: 212 MG/DL (ref 70–99)
HCT VFR BLD CALC: 42.4 % (ref 37–47)
HEMOGLOBIN: 14.2 G/DL (ref 12–16)
LYMPHOCYTES ABSOLUTE: 0.8 K/UL (ref 1–4.8)
LYMPHOCYTES RELATIVE PERCENT: 10.7 %
MCH RBC QN AUTO: 29.8 PG (ref 27–31.3)
MCHC RBC AUTO-ENTMCNC: 33.5 % (ref 33–37)
MCV RBC AUTO: 88.9 FL (ref 79.4–94.8)
MONOCYTES ABSOLUTE: 0.1 K/UL (ref 0.2–0.8)
MONOCYTES RELATIVE PERCENT: 1.7 %
NEUTROPHILS ABSOLUTE: 6.6 K/UL (ref 1.4–6.5)
NEUTROPHILS RELATIVE PERCENT: 87.5 %
PDW BLD-RTO: 13.9 % (ref 11.5–14.5)
PERFORMED ON: ABNORMAL
PERFORMED ON: ABNORMAL
PLATELET # BLD: 209 K/UL (ref 130–400)
POTASSIUM REFLEX MAGNESIUM: 4.4 MEQ/L (ref 3.4–4.9)
RBC # BLD: 4.77 M/UL (ref 4.2–5.4)
SODIUM BLD-SCNC: 141 MEQ/L (ref 135–144)
WBC # BLD: 7.5 K/UL (ref 4.8–10.8)

## 2023-03-04 PROCEDURE — 36415 COLL VENOUS BLD VENIPUNCTURE: CPT

## 2023-03-04 PROCEDURE — 94640 AIRWAY INHALATION TREATMENT: CPT

## 2023-03-04 PROCEDURE — 94664 DEMO&/EVAL PT USE INHALER: CPT

## 2023-03-04 PROCEDURE — 6370000000 HC RX 637 (ALT 250 FOR IP): Performed by: INTERNAL MEDICINE

## 2023-03-04 PROCEDURE — 94761 N-INVAS EAR/PLS OXIMETRY MLT: CPT

## 2023-03-04 PROCEDURE — 6360000002 HC RX W HCPCS: Performed by: INTERNAL MEDICINE

## 2023-03-04 PROCEDURE — 99232 SBSQ HOSP IP/OBS MODERATE 35: CPT | Performed by: INTERNAL MEDICINE

## 2023-03-04 PROCEDURE — 6360000002 HC RX W HCPCS

## 2023-03-04 PROCEDURE — 6370000000 HC RX 637 (ALT 250 FOR IP)

## 2023-03-04 PROCEDURE — 80048 BASIC METABOLIC PNL TOTAL CA: CPT

## 2023-03-04 PROCEDURE — 2700000000 HC OXYGEN THERAPY PER DAY

## 2023-03-04 PROCEDURE — 85025 COMPLETE CBC W/AUTO DIFF WBC: CPT

## 2023-03-04 RX ORDER — PREDNISONE 10 MG/1
TABLET ORAL
Qty: 30 TABLET | Refills: 0 | Status: SHIPPED | OUTPATIENT
Start: 2023-03-04

## 2023-03-04 RX ORDER — ALBUTEROL SULFATE 2.5 MG/3ML
2.5 SOLUTION RESPIRATORY (INHALATION) EVERY 4 HOURS PRN
Qty: 120 EACH | Refills: 1 | Status: SHIPPED | OUTPATIENT
Start: 2023-03-04

## 2023-03-04 RX ORDER — BENZONATATE 100 MG/1
100 CAPSULE ORAL 3 TIMES DAILY PRN
Qty: 21 CAPSULE | Refills: 0 | Status: SHIPPED | OUTPATIENT
Start: 2023-03-04 | End: 2023-03-11

## 2023-03-04 RX ORDER — METHYLPREDNISOLONE SODIUM SUCCINATE 125 MG/2ML
60 INJECTION, POWDER, LYOPHILIZED, FOR SOLUTION INTRAMUSCULAR; INTRAVENOUS ONCE
Status: COMPLETED | OUTPATIENT
Start: 2023-03-04 | End: 2023-03-04

## 2023-03-04 RX ORDER — PREDNISONE 10 MG/1
40 TABLET ORAL DAILY
Status: DISCONTINUED | OUTPATIENT
Start: 2023-03-04 | End: 2023-03-04 | Stop reason: HOSPADM

## 2023-03-04 RX ORDER — GUAIFENESIN 600 MG/1
1200 TABLET, EXTENDED RELEASE ORAL 2 TIMES DAILY
Qty: 14 TABLET | Refills: 0 | Status: SHIPPED | OUTPATIENT
Start: 2023-03-04 | End: 2023-03-11

## 2023-03-04 RX ORDER — BUDESONIDE 0.5 MG/2ML
500 INHALANT ORAL 2 TIMES DAILY
Qty: 60 EACH | Refills: 3 | Status: SHIPPED | OUTPATIENT
Start: 2023-03-04

## 2023-03-04 RX ADMIN — ROSUVASTATIN CALCIUM 40 MG: 40 TABLET, FILM COATED ORAL at 09:31

## 2023-03-04 RX ADMIN — POTASSIUM CHLORIDE 10 MEQ: 750 TABLET, FILM COATED, EXTENDED RELEASE ORAL at 09:31

## 2023-03-04 RX ADMIN — CLOPIDOGREL BISULFATE 75 MG: 75 TABLET ORAL at 09:31

## 2023-03-04 RX ADMIN — IPRATROPIUM BROMIDE AND ALBUTEROL SULFATE 1 AMPULE: .5; 2.5 SOLUTION RESPIRATORY (INHALATION) at 08:35

## 2023-03-04 RX ADMIN — INSULIN LISPRO 2 UNITS: 100 INJECTION, SOLUTION INTRAVENOUS; SUBCUTANEOUS at 11:54

## 2023-03-04 RX ADMIN — Medication 400 MG: at 09:31

## 2023-03-04 RX ADMIN — METOPROLOL SUCCINATE 50 MG: 50 TABLET, EXTENDED RELEASE ORAL at 09:31

## 2023-03-04 RX ADMIN — PREDNISONE 40 MG: 10 TABLET ORAL at 09:31

## 2023-03-04 RX ADMIN — METHYLPREDNISOLONE SODIUM SUCCINATE 40 MG: 40 INJECTION, POWDER, FOR SOLUTION INTRAMUSCULAR; INTRAVENOUS at 00:18

## 2023-03-04 RX ADMIN — ASPIRIN 81 MG: 81 TABLET, COATED ORAL at 09:31

## 2023-03-04 RX ADMIN — BUDESONIDE AND FORMOTEROL FUMARATE DIHYDRATE 2 PUFF: 160; 4.5 AEROSOL RESPIRATORY (INHALATION) at 08:35

## 2023-03-04 RX ADMIN — GUAIFENESIN 600 MG: 600 TABLET ORAL at 09:31

## 2023-03-04 RX ADMIN — LOSARTAN POTASSIUM 25 MG: 25 TABLET, FILM COATED ORAL at 09:30

## 2023-03-04 RX ADMIN — INSULIN GLARGINE 8 UNITS: 100 INJECTION, SOLUTION SUBCUTANEOUS at 09:30

## 2023-03-04 RX ADMIN — FUROSEMIDE 20 MG: 20 TABLET ORAL at 09:30

## 2023-03-04 RX ADMIN — METHYLPREDNISOLONE SODIUM SUCCINATE 60 MG: 125 INJECTION, POWDER, FOR SOLUTION INTRAMUSCULAR; INTRAVENOUS at 11:41

## 2023-03-04 NOTE — PROGRESS NOTES
INPATIENT PROGRESS NOTES    PATIENT NAME: Jasmina Trujillo  MRN: [id number]  SERVICE DATE:  2023   SERVICE TIME:  11:23 AM      PRIMARY SERVICE: Pulmonary Disease    CHIEF COMPLAINTS: Asthma exacerbation    INTERVAL HPI: Patient seen and examined at bedside, Interval Notes, orders reviewed. Nursing notes noted        New information updated in the note today, rest of the examination did not change compared to yesterday. Patient report feeling better, shortness of breath improved, still has wheezing, not coughing, no fever no chills, no worsening lower extremity edema. Review of system:     GI Abdominal pain No  Skin Rash No    Social History     Tobacco Use    Smoking status: Former     Packs/day: 1.00     Years: 37.00     Pack years: 37.00     Types: Cigarettes     Quit date: 2022     Years since quittin.7    Smokeless tobacco: Never   Substance Use Topics    Alcohol use: Not Currently     History reviewed. No pertinent family history. OBJECTIVE    Body mass index is 36.58 kg/m². PHYSICAL EXAM:  Vitals:  BP (!) 155/80   Pulse 77   Temp 97.9 °F (36.6 °C) (Oral)   Resp 18   Ht 5' 2\" (1.575 m)   Wt 200 lb (90.7 kg)   LMP 2019   SpO2 95%   BMI 36.58 kg/m²     General: alert, cooperative, no distress  Head: normocephalic, atraumatic  Eyes:No gross abnormalities. ENT:  MMM no lesions  Neck:  supple and no masses  Chest : Bilateral wheezing, diminished air movement, no rales, nontender, tympanic  Heart[de-identified] Heart sounds are normal.  Regular rate and rhythm without murmur, gallop or rub. ABD:  symmetric, soft, non-tender  Musculoskeletal : no cyanosis, no clubbing, and no edema  Neuro:  Grossly normal  Skin: No rashes or nodules noted.   Lymph node:  no cervical nodes  Urology: No Ruiz   Psychiatric: appropriate    DATA:   Recent Labs     23  0533 23  0454   WBC 9.4 7.5   HGB 14.0 14.2   HCT 41.4 42.4   MCV 90.6 88.9    209     Recent Labs     2333 03/04/23  0454    141   K 4.2 4.4    104   CO2 24 25   BUN 21* 16   CREATININE 0.51 0.40*   GLUCOSE 127* 147*   CALCIUM 9.0 9.1   LABGLOM >60.0 >60.0       MV Settings:          No results for input(s): PHART, ADS8RCQ, PO2ART, RDA9JOU, BEART, N5NLRLSS in the last 72 hours. O2 Device: None (Room air)  O2 Flow Rate (L/min): 1 L/min    ADULT DIET; Regular; Low Fat/Low Chol/High Fiber/2 gm Na     MEDICATIONS during current hospitalization:    Continuous Infusions:   dextrose         Scheduled Meds:   predniSONE  40 mg Oral Daily    insulin glargine  8 Units SubCUTAneous BID    melatonin  5 mg Oral Nightly    enoxaparin  40 mg SubCUTAneous Daily    nicotine  1 patch TransDERmal Daily    guaiFENesin  600 mg Oral BID    insulin lispro  0-8 Units SubCUTAneous TID WC    insulin lispro  0-4 Units SubCUTAneous Nightly    aspirin  81 mg Oral Daily    clopidogrel  75 mg Oral Daily    furosemide  20 mg Oral Daily    magnesium oxide  400 mg Oral BID    losartan  25 mg Oral Daily    metoprolol succinate  50 mg Oral Daily    montelukast  10 mg Oral Nightly    potassium chloride  10 mEq Oral Daily    rosuvastatin  40 mg Oral Daily    budesonide-formoterol  2 puff Inhalation BID    ipratropium-albuterol  1 ampule Inhalation TID       PRN Meds:Benzocaine-Menthol, guaiFENesin-dextromethorphan, benzonatate, ondansetron **OR** ondansetron, polyethylene glycol, acetaminophen **OR** acetaminophen, glucose, dextrose bolus **OR** dextrose bolus, glucagon (rDNA), dextrose, albuterol    Radiology  CT CHEST WO CONTRAST    Result Date: 3/2/2023  EXAMINATION: CT OF THE CHEST WITHOUT CONTRAST 3/1/2023 10:56 pm TECHNIQUE: CT of the chest was performed without the administration of intravenous contrast. Multiplanar reformatted images are provided for review. Automated exposure control, iterative reconstruction, and/or weight based adjustment of the mA/kV was utilized to reduce the radiation dose to as low as reasonably achievable. COMPARISON: 12/22/2022 HISTORY: ORDERING SYSTEM PROVIDED HISTORY: pnuemonia TECHNOLOGIST PROVIDED HISTORY: Reason for exam:->pnuemonia What reading provider will be dictating this exam?->CRC FINDINGS: Mediastinum: Thyroid is homogeneous in appearance. Cardiac chambers are within normal limits. Minimal coronary artery calcification identified atherosclerotic disease seen at the origin of the great vessels. No pericardial effusion. The cardiac chambers are within normal limits. No bulky hilar or axillary lymphadenopathy. Lungs/pleura: The lung parenchyma reveals scattered micro nodular and reticulonodular density seen throughout the lung fields bilaterally. There is a larger 8 mm nodular density seen in the left lower lung field which was not seen on the prior examination. Findings are nonspecific and can be seen in atypical infection. There is no airspace infiltration or consolidation identified. There is patchy coarse density seen in the posterior aspect of the right upper lobe stable and unchanged when compared to the prior examination. No evidence of pleural effusion. No pneumothorax. Upper Abdomen: Visualized upper abdomen reveals probable stones seen at the neck of the gallbladder. Soft Tissues/Bones: Minimal degenerative changes seen within the spine. No acute chest wall abnormality. Persistent and new reticulonodular densities and micro nodular density seen in the lung fields bilaterally which is nonspecific and PE can be seen in atypical infections. No consolidative infiltrate present. No definite pleural effusion or pneumothorax     XR CHEST PORTABLE    Result Date: 3/1/2023  EXAMINATION: ONE XRAY VIEW OF THE CHEST 3/1/2023 12:07 am COMPARISON: 12/28/2022 HISTORY: ORDERING SYSTEM PROVIDED HISTORY: sob TECHNOLOGIST PROVIDED HISTORY: Reason for exam:->sob Is the patient pregnant?->No What reading provider will be dictating this exam?->CRC FINDINGS: The lungs are without acute focal process. There is no effusion or pneumothorax. The cardiomediastinal silhouette is without acute process. The osseous structures are without acute process. No acute process. BLANCA ROSLYN DIGITAL SCREEN BILATERAL    Result Date: 2/2/2023  EXAMINATION: SCREENING DIGITAL BILATERAL MAMMOGRAM WITH TOMOSYNTHESIS, 2/2/2023 10:09 am TECHNIQUE: Screening mammography of the bilateral breasts was performed with tomosynthesis. 2D standard and 3D tomosynthesis combination imaging performed through both breasts in the MLO and CC projection. Computer aided detection was utilized in the interpretation of this exam. COMPARISON: February 18, 2019 HISTORY: Screening. FINDINGS: There are scattered areas of fibroglandular density. There is no new dominant mass, suspicious microcalcification, or area of architectural distortion. No mammographic evidence for malignancy. BIRADS: BIRADS - CATEGORY 1 Negative, no evidence of malignancy. Normal interval follow-up is recommended in 12 months. OVERALL ASSESSMENT - NEGATIVE A letter of notification will be sent to the patient regarding the results. The [employer] [facility] [employer] recommends annual mammograms for women 40 years and older.             IMPRESSION AND SUGGESTION:  Patient is at risk due to   Asthma COPD overlap syndrome with acute exacerbation  Lung nodules, atypical, will need outpatient work-up, will consider bronchoscopy  No obstructive sleep apnea, sleep study 2020 was negative, with no significant weight change    Recommendation  Start taper prednisone  Resume maintenance treatment at home  will consider switching [address] to [address] as outpatient   will consider bronchoscopy as outpatient  Extra Solu-Medrol 60 mg IV x1 prior to discharge  Follow-up with me in 1 to 2 weeks         Electronically signed by Valarie Sanchez MD,  FCCP   on 3/4/2023 at 11:23 AM

## 2023-03-04 NOTE — PROGRESS NOTES
03/04/23 0855   Resting (Room Air)   SpO2 95   HR 99   During Walk (Room Air)   SpO2 93      Walk/Assistance Device Ambulation   Rate of Dyspnea 1   Symptoms Shortness of breath   Comments coughing   After Walk   Does the Patient Qualify for Home O2 No   Does the Patient Need Portable Oxygen Tanks No

## 2023-03-04 NOTE — CARE COORDINATION
Bianka Guido  63[address]/[facility], [place]. NOTIFIED MARCOS  [address]. HOME CARE ORDERS COMPLETED. HOME O2 EVAL TO BE COMPLETED, NURSING WILL NOTIFY CM IF PT HAS O2 NEEDS. DENIES FURTHER NEEDS.

## 2023-03-04 NOTE — PLAN OF CARE
Problem: Discharge Planning  Goal: Discharge to home or other facility with appropriate resources  Outcome: Progressing     Problem: Pain  Goal: Verbalizes/displays adequate comfort level or baseline comfort level  Outcome: Progressing     Problem: Safety - Adult  Goal: Free from fall injury  Outcome: Progressing  Flowsheets (Taken 3/3/2023 1456 by Lelo Adam RN)  Free From Fall Injury: Instruct family/caregiver on patient safety     Problem: ABCDS Injury Assessment  Goal: Absence of physical injury  Outcome: Progressing  Flowsheets (Taken 3/3/2023 1456 by Lelo Adam RN)  Absence of Physical Injury: Implement safety measures based on patient assessment     Problem: Chronic Conditions and Co-morbidities  Goal: Patient's chronic conditions and co-morbidity symptoms are monitored and maintained or improved  Outcome: Progressing

## 2023-03-04 NOTE — DISCHARGE INSTR - COC
Continuity of Care Form    Patient Name: Sofya Caballero   :  1970  MRN:  [id number]    Admit date:  2023  Discharge date:  3/4/23    Code Status Order: Full Code   Advance Directives:     Admitting Physician: Elaine Fong MD  PCP: GIFTY Montes    Discharging Nurse: Adolfo Ross RN  [address] Unit/Room#: T998/Y986-66  Discharging Unit Phone Number: [phone]    Emergency Contact:   Extended Emergency Contact Information  Primary Emergency Contact: Belia Mendoza Phone: [phone]  Mobile Phone: [phone]  Relation: Other   needed?  No  Secondary Emergency Contact: Heaven Villanueva  Phone: [phone]  Relation: Child    Past Surgical History:  Past Surgical History:   Procedure Laterality Date    TUBAL LIGATION         Immunization History:   Immunization History   Administered Date(s) Administered    COVID-19, PFIZER GRAY top, DO NOT Dilute, (age 15 y+), IM, 27 mcg/0.3 mL 2022, 10/07/2022       Active Problems:  Patient Active Problem List   Diagnosis Code    Prediabetes R73.03    Exacerbation of persistent asthma J45.901    Angina at rest [facility]) I20.8    Abnormal exercise myocardial perfusion study R94.39    Acute respiratory failure with hypoxia (HCC) J96.01    Chest pain R07.9    CAD I25.10    COPD  J44.9    Hypoxia R09.02    Essential (primary) hypertension I10    Coronary artery disease involving native coronary artery of native heart without angina pectoris I25.10    Type 2 diabetes mellitus ([facility] [address] E11.9    Hypercholesterolemia E78.00    Severe persistent asthma without complication D72.92    SOB (shortness of breath) R06.02    COPD exacerbation (HCC) J44.1       Isolation/Infection:   Isolation            No Isolation          Patient Infection Status       Infection Onset Added Last Indicated Last Indicated By Review Planned Expiration Resolved Resolved By    None active    Resolved    COVID-19 (Rule Out) 23 Respiratory Panel, Molecular, with COVID-19 (Restricted: peds pts or suitable admitted adults) (Ordered)   03/01/23 Rule-Out Test Resulted    COVID-19 (Rule Out) 12/22/22 12/22/22 12/22/22 Respiratory Panel, Molecular, with COVID-19 (Restricted: peds pts or suitable admitted adults) (Ordered)   12/22/22 Rule-Out Test Resulted    COVID-19 (Rule Out) 12/22/22 12/22/22 12/22/22 COVID-19, Rapid (Ordered)   12/22/22 Rule-Out Test Resulted    COVID-19 (Rule Out) 04/15/22 04/15/22 04/15/22 COVID-19, Rapid (Ordered)   04/15/22 Rule-Out Test Resulted    COVID-19 (Rule Out) 01/24/22 01/24/22 01/24/22 Covid-19 Ambulatory (Ordered)   01/26/22 Rule-Out Test Resulted    COVID-19 (Rule Out) 12/20/21 12/20/21 12/20/21 COVID-19, Rapid (Ordered)   12/20/21 Rule-Out Test Resulted    COVID-19 (Rule Out) 09/30/20 09/30/20 09/30/20 COVID-19 (Ordered)   09/30/20 Rule-Out Test Resulted    COVID-19 (Rule Out) 09/29/20 09/29/20 09/29/20 COVID-19 (Ordered)   09/29/20 Rule-Out Test Resulted    COVID-19 (Rule Out) 07/22/20 07/22/20 07/22/20 COVID-19 (Ordered)   07/22/20 Rule-Out Test Resulted    COVID-19 (Rule Out) 07/21/20 07/21/20 07/21/20 COVID-19 (Ordered)   07/21/20 Rule-Out Test Resulted            Nurse Assessment:  Last Vital Signs: /75   Pulse 76   Temp 97.7 °F (36.5 °C) (Oral)   Resp 18   Ht 5' 2\" (1.575 m)   Wt 200 lb (90.7 kg)   LMP 07/12/2019   SpO2 95%   BMI 36.58 kg/m²     Last documented pain score (0-10 scale): Pain Level: 3  Last Weight:   Wt Readings from Last 1 Encounters:   02/28/23 200 lb (90.7 kg)     Mental Status:  oriented, alert, coherent, logical, thought processes intact, and able to concentrate and follow conversation    IV Access:  - None    Nursing Mobility/ADLs:  Walking   Independent  Transfer  Independent  Bathing  Independent  Dressing  Independent  [address]  Independent  Med Delivery   whole    Wound Care Documentation and Therapy: Elimination:  Continence: Bowel: Yes  Bladder: Yes  Urinary Catheter: None   Colostomy/Ileostomy/Ileal Conduit: No       Date of Last BM: 3/3/23    Intake/Output Summary (Last 24 hours) at 3/4/2023 1239  Last data filed at 3/3/2023 1948  Gross per 24 hour   Intake 753.83 ml   Output --   Net 753.83 ml     I/O last 3 completed shifts: In: 753.8 [IV Piggyback:753.8]  Out: -     Safety Concerns:     None and At Risk for Falls    Impairments/Disabilities:      Activity intolerance due to SOB    Nutrition Therapy:  Current Nutrition Therapy:   - Oral Diet:  Carb Control 4 carbs/meal (1800kcals/day)    Routes of Feeding: Oral  Liquids: Thin Liquids  Daily Fluid Restriction: no  Last Modified Barium Swallow with Video (Video Swallowing Test): not done    Treatments at the Time of Hospital Discharge:   Respiratory Treatments: yes  Oxygen Therapy:  is on oxygen at 2 L/min per nasal cannula. at night and PRN  Ventilator:    - No ventilator support    Rehab Therapies: none  Weight Bearing Status/Restrictions: No weight bearing restrictions  Other Medical Equipment (for information only, NOT a DME order):  {EQUIPMENT:186875836}  Other Treatments: none    Patient's personal belongings (please select all that are sent with patient):  {[facility] DME Belongings:290093681}    RN SIGNATURE:  Electronically signed by Elinor Awad RN on 3/4/23 at 12:43 PM EST    CASE MANAGEMENT/SOCIAL WORK SECTION    Inpatient Status Date: ***    Readmission Risk Assessment Score:  Readmission Risk              Risk of Unplanned Readmission:  17           Discharging to Facility/ Agency   Name:   Address:  Phone:  Fax:    Dialysis Facility (if applicable)   Name:  Address:  Dialysis Schedule:  Phone:  Fax:    / signature: {Esignature:[id number]}    PHYSICIAN SECTION    Prognosis: {Prognosis:5643105049}    Condition at Discharge: [address] [place] [address] Patient Condition:788195072}    Rehab Potential (if transferring to Rehab): {Prognosis:6574187929}    Recommended Labs or Other Treatments After Discharge: ***    Physician Certification: I certify the above information and transfer of Alissa Garcia  is necessary for the continuing treatment of the diagnosis listed and that she requires {Admit to Appropriate Level of Care:25806} for {GREATER/LESS:337274248} 30 days.      Update Admission H&P: {CHP DME Changes in BJXWQ:821010965}    PHYSICIAN SIGNATURE:  {Esignature:[id number]}

## 2023-03-04 NOTE — PROGRESS NOTES
Patient weaned to room air with oxygen saturation 98%. Breathing remains unlabored at rest but lung sounds remain wheezy.      Electronically signed by Lindsay Mendoza RN on 3/3/2023 at 7:48 PM

## 2023-03-04 NOTE — DISCHARGE SUMMARY
Discharge Summary    Date: 3/4/2023  Patient Name: Aury Banegas    YOB: 1970     Age: 45 y.o. Admit Date: 2/28/2023  Discharge Date: 3/4/2023  Discharge Condition: [address]    Admission Diagnosis  SOB (shortness of breath) [R06.02]; Atypical chest pain [R07.89];COPD exacerbation ([address] [J44.1]; Chronic respiratory failure, unspecified whether with hypoxia or hypercapnia (HCC) [J96.10]      Discharge Diagnosis  Principal Problem:    SOB (shortness of breath)  Active Problems:    COPD exacerbation (HCC)  Resolved Problems:    * No resolved hospital problems. [facility] Stay  Narrative of Hospital Course:  Patient comes with acute respiratory failure secondary to asthma/COPD exacerbation. Was eval by pulmonary patient received systemic steroids, DuoNebs and oxygen therapy. Patient had a home O2 eval prior to discharge. Patient is follow with PCP as outpatient and pulmonary for possible bronchoscopy as outpatient. Consultants:  IP CONSULT TO PULMONOLOGY  IP CONSULT TO CARDIOLOGY  IP CONSULT TO HOME CARE NEEDS    Surgeries/procedures Performed:      Treatments:            Discharge Plan/Disposition:  Home    Hospital/Incidental Findings Requiring Follow Up:    Patient Instructions:    Diet:    Activity:  For number of days (if applicable): Other Instructions:    Provider Follow-Up:   No follow-ups on file. Significant Diagnostic Studies:    Recent Labs:  Admission on 02/28/2023  No results displayed because visit has over 200 results. ------------    Radiology last 7 days:  CT CHEST WO CONTRAST    Result Date: 3/2/2023  Persistent and new reticulonodular densities and micro nodular density seen in the lung fields bilaterally which is nonspecific and PE can be seen in atypical infections. No consolidative infiltrate present. No definite pleural effusion or pneumothorax     XR CHEST PORTABLE    Result Date: 3/1/2023  No acute process.         @[facility]@    Discharge Medications Current Discharge Medication List    START taking these medications    benzonatate (TESSALON) 100 MG capsule  Take 1 capsule by mouth 3 times daily as needed for Cough  Qty: 21 capsule Refills: 0          Current Discharge Medication List    CONTINUE these medications which have CHANGED    guaiFENesin (MUCINEX) 600 MG extended release tablet  Take 2 tablets by mouth 2 times daily for 7 days  Qty: 14 tablet Refills: 0    predniSONE (DELTASONE) 10 MG tablet  Please take 40 mg po daily x 3 days, then 30 mg po daily x 3 days, then 20 mg po daily x 3 days then 10 mg po daily x 3 days then stop  Qty: 30 tablet Refills: 0          Current Discharge Medication List    CONTINUE these medications which have NOT CHANGED    benralizumab (FASENRA) 30 MG/ML SOSY injection  Inject 30 mg into the skin    metoprolol succinate (TOPROL XL) 50 MG extended release tablet  Take 1 tablet by mouth daily  Qty: 180 tablet Refills: 3    furosemide (LASIX) 20 MG tablet  Take 1 tablet by mouth daily  Qty: 90 tablet Refills: 3    SYMBICORT 160-4.5 MCG/ACT AERO  Inhale 2 puffs by mouth twice daily  Qty: 1 each Refills: 3  Associated Diagnoses:Severe persistent asthma without complication    Continuous Blood Gluc Sensor (DEXCOM G6 SENSOR) MISC  APPLY NEW SENSOR EVERY 10 DAYS TO ABDOMEN    JARDIANCE 25 MG tablet  Take 25 mg by mouth once    metFORMIN (GLUCOPHAGE) 1000 MG tablet  TAKE 1 TABLET BY MOUTH TWICE DAILY  Qty: 60 tablet Refills: 3  Associated Diagnoses:Type 2 diabetes mellitus without complication, without long-term current use of insulin (HCC)    Handicap Placard MISC  by Does not apply route Expiration date:12/30/27  Qty: 1 each Refills: 0  Associated Diagnoses:Acute respiratory failure with hypoxia ([address]  Dependence on supplemental oxygen when ambulating; Slightly limited mobility    losartan (COZAAR) 25 MG tablet  Take 1 tablet by mouth daily  Qty: 30 tablet Refills: 3    PROAIR  (90 Base) MCG/ACT inhaler  Inhale 1 puff into the lungs every 4 hours as needed for Wheezing  Qty: 9 g Refills: 0  Associated Diagnoses: Moderate persistent asthma without complication    Multiple Vitamins-Minerals (THERAPEUTIC MULTIVITAMIN-MINERALS) tablet  Take 1 tablet by mouth daily    rosuvastatin (CRESTOR) 40 MG tablet  TAKE 1 TABLET BY MOUTH ONCE DAILY    albuterol (PROVENTIL) (2.5 MG/3ML) 0.083% nebulizer solution  Take 3 mLs by nebulization every 4 hours as needed for Wheezing or Shortness of Breath  Qty: 120 each Refills: 1    clopidogrel (PLAVIX) 75 MG tablet  Take 75 mg by mouth daily    montelukast (SINGULAIR) 10 MG tablet  Take 10 mg by mouth nightly    ipratropium-albuterol (DUONEB) 0.5-2.5 (3) MG/3ML SOLN nebulizer solution  Inhale 3 mLs into the lungs 3 times daily as needed for Shortness of Breath  Qty: 270 mL Refills: 0    EPINEPHrine (EPIPEN) 0.3 MG/0.3ML SOAJ injection  Inject 0.3 mg into the muscle as needed    magnesium oxide (MAG-OX) 400 MG tablet  Take 400 mg by mouth 2 times daily    nicotine (NICODERM CQ) 21 MG/24HR  Place 1 patch onto the skin daily  Qty: 42 patch Refills: 0  Associated Diagnoses:Smoking    potassium chloride (KLOR-CON M) 10 MEQ extended release tablet  Take 10 mEq by mouth daily    aspirin 81 MG EC tablet  Take 1 tablet by mouth daily  Qty: 90 tablet Refills: 3    nitroGLYCERIN (NITROSTAT) 0.4 MG SL tablet  up to max of 3 total doses. If no relief after 1 dose, call 911.   Qty: 25 tablet Refills: 3          Current Discharge Medication List    STOP taking these medications    Icosapent Ethyl (VASCEPA) 1 g CAPS capsule  Comments:  Reason for Stopping:    hydrocortisone (ANUSOL-HC) 25 MG suppository  Comments:  Reason for Stopping:    nystatin (MYCOSTATIN) 763421 UNIT/GM powder  Comments:  Reason for Stopping:    potassium chloride (KLOR-CON) 10 MEQ extended release tablet  Comments:  Reason for Stopping:    ONETOUCH ULTRA strip  Comments:  Reason for Stopping:    docusate sodium (COLACE) 100 MG capsule  Comments:  Reason for Stopping:    tiotropium (SPIRIVA RESPIMAT) 2.5 MCG/ACT AERS inhaler  Comments:  Reason for Stopping:    budesonide (PULMICORT) 0.5 MG/2ML nebulizer suspension  Comments:  Reason for Stopping:    Icosapent Ethyl (VASCEPA) 1 g CAPS capsule  Comments:  Reason for Stopping:    Esomeprazole Magnesium (NEXIUM 24HR) 20 MG TBEC  Comments:  Reason for Stopping:          Time Spent on Discharge:  minutes were spent in patient examination, evaluation, counseling as well as medication reconciliation, prescriptions for required medications, discharge plan, and follow up.     Electronically signed by Robel Darby MD on 3/4/23 at 9:37 AM EST   Overtime on dc summary was 45min

## 2023-03-04 NOTE — PROGRESS NOTES
[facility] EMERGENCY [facility] AT [place] Respiratory Therapy Evaluation   Current Order:  duoneb tid and albuterol q2prn  Home Regimen: prn    Ordering Physician: ayesha  Re-evaluation Date:  3/7  Diagnosis: copd exac     Patient Status: Stable / Unstable + Physician notified    The following MDI Criteria must be met in order to convert aerosol to MDI with spacer. If unable to meet, MDI will be converted to aerosol:  []  Patient able to demonstrate the ability to use MDI effectively  []  Patient alert and cooperative  []  Patient able to take deep breath with 5-10 second hold  []  Medication(s) available in this delivery method   []  Peak flow greater than or equal to 200 ml/min            Current Order Substituted To  (same drug, same frequency)   Aerosol to MDI [] Albuterol Sulfate 0.083% unit dose by aerosol Albuterol Sulfate MDI 2 puffs by inhalation with spacer    [] Levalbuterol 1.25 mg unit dose by aerosol Levalbuterol MDI 2 puffs by inhalation with spacer    [] Levalbuterol 0.63 mg unit dose by aerosol Levalbuterol MDI 2 puffs by inhalation with spacer    [] Ipratropium Bromide 0.02% unit dose by aerosol Ipratropium Bromide MDI 2 puffs by inhalation with spacer    [] Duoneb (Ipratropium + Albuterol) unit dose by aerosol Ipratropium MDI + Albuterol MDI 2 puffs by inhalation w/spacer   MDI to Aerosol [] Albuterol Sulfate MDI Albuterol Sulfate 0.083% unit dose by aerosol    [] Levalbuterol MDI 2 puffs by inhalation Levalbuterol 1.25 mg unit dose by aerosol    [] Ipratropium Bromide MDI by inhalation Ipratropium Bromide 0.02% unit dose by aerosol    [] Combivent (Ipratropium + Albuterol) MDI by inhalation Duoneb (Ipratropium + Albuterol) unit dose by aerosol       Treatment Assessment [Frequency/Schedule]:  Change frequency to: ______nio changes____________________________________________per Protocol, P&T, MEC      Points 0 1 2 3 4   Pulmonary Status  Non-Smoker  []   Smoking history   < 20 pack years  []   Smoking history  ?  20 pack years  []   Pulmonary Disorder  (acute or chronic)  [x]   Severe or Chronic w/ Exacerbation  []     Surgical Status No [x]   Surgeries     General []   Surgery Lower []   Abdominal Thoracic or []   Upper Abdominal Thoracic with  PulmonaryDisorder  []     Chest X-ray Clear/Not  Ordered     [x]  Chronic Changes  Results Pending  []  Infiltrates, atelectasis, pleural effusion, or edema  []  Infiltrates in more than one lobe []  Infiltrate + Atelectasis, &/or pleural effusion  []    Respiratory Pattern Regular,  RR = 12-20 [x]  Increased,  RR = 21-25 []  KC, irregular,  or RR = 26-30 []  Decreased FEV1  or RR = 31-35 []  Severe SOB, use  of accessory muscles, or RR ? 35  []    Mental Status Alert, oriented,  Cooperative [x]  Confused but Follows commands []  Lethargic or unable to follow commands []  Obtunded  []  Comatose  []    Breath Sounds Clear to  auscultation  []  Decreased unilaterally or  in bases only []  Decreased  bilaterally  []  Crackles or intermittent wheezes []  Wheezes [x]    Cough Strong, Spontan., & nonproductive [x]  Strong,  spontaneous, &  productive []  Weak,  Nonproductive []  Weak, productive or  with wheezes []  No spontaneous  cough or may require suctioning []    Level of Activity Ambulatory [x]  Ambulatory w/ Assist  []  Non-ambulatory []  Paraplegic []  Quadriplegic []    Total    Score:____7___     Triage Score:___4_____      Tri       Triage:     1. (>20) Freq: Q3    2. (16-20) Freq: Q4   3. (11-15) Freq: QID & Albuterol Q2 PRN    4. (6-10) Freq: TID & Albuterol Q2 PRN    5. (0-5) Freq Q4prn

## 2023-03-04 NOTE — FLOWSHEET NOTE
Dr. Mihaela Harden saw patient. IV solumedrol given per one time order. Patient had called boyfriend for transport.

## 2023-03-04 NOTE — DISCHARGE INSTR - DIET
Good nutrition is important when healing from an illness, injury, or surgery. Follow any nutrition recommendations given to you during your hospital stay. If you were given an oral nutrition supplement while in the hospital, continue to take this supplement at home. You can take it with meals, in-between meals, and/or before bedtime. These supplements can be purchased at most local grocery stores, pharmacies, and chain [employer]stores. If you have any questions about your diet or nutrition, call the hospital and ask for the dietitian.   Heart healthy diabetic/carb controled diet

## 2023-03-06 ENCOUNTER — TELEPHONE (OUTPATIENT)
Dept: FAMILY MEDICINE CLINIC | Age: 53
End: 2023-03-06

## 2023-03-06 NOTE — TELEPHONE ENCOUNTER
Care Transitions Initial Follow Up Call    Outreach made within 2 business days of discharge: Yes    Patient: Cole Isaac Patient : 1970   MRN: [id number]  Reason for Admission: There are no discharge diagnoses documented for the most recent discharge. Discharge Date: 3/4/23       Spoke with: Pt    Discharge department/facility: [facility] Interactive Patient Contact:  Was patient able to fill all prescriptions: Yes  Was patient instructed to bring all medications to the follow-up visit: Yes  Is patient taking all medications as directed in the discharge summary?  Yes  Does patient understand their discharge instructions: Yes  Does patient have questions or concerns that need addressed prior to 7-14 day follow up office visit: no    Scheduled appointment with PCP within 7-14 days    Follow Up  Future Appointments   Date Time Provider Bhavin Tao   3/10/2023 10:00 AM Mani Dawson   3/20/2023  9:00 AM Riky Briones MD  Hospital [facility]   3/28/2023 11:00 AM Param Nettles DO [address]   2023  4:00 PM Hugo Dawson [facility]   2023 12:15 PM Dimitry Chau MD [place], [place]

## 2023-03-06 NOTE — TELEPHONE ENCOUNTER
Shukri Morton with [facility] calling to say that they had a visit with patient on 3/5/2023 and they will follow for nursing, OT, and Pt. She will send over a list of the patients current medications.

## 2023-03-13 ENCOUNTER — OFFICE VISIT (OUTPATIENT)
Dept: FAMILY MEDICINE CLINIC | Age: 53
End: 2023-03-13
Payer: COMMERCIAL

## 2023-03-13 VITALS
WEIGHT: 195 LBS | BODY MASS INDEX: 35.88 KG/M2 | HEIGHT: 62 IN | DIASTOLIC BLOOD PRESSURE: 60 MMHG | HEART RATE: 94 BPM | OXYGEN SATURATION: 97 % | SYSTOLIC BLOOD PRESSURE: 112 MMHG | TEMPERATURE: 97 F

## 2023-03-13 DIAGNOSIS — J44.1 COPD EXACERBATION (HCC): ICD-10-CM

## 2023-03-13 DIAGNOSIS — F17.200 SMOKING: ICD-10-CM

## 2023-03-13 DIAGNOSIS — F33.0 MILD EPISODE OF RECURRENT MAJOR DEPRESSIVE DISORDER (HCC): ICD-10-CM

## 2023-03-13 DIAGNOSIS — Z09 HOSPITAL DISCHARGE FOLLOW-UP: Primary | ICD-10-CM

## 2023-03-13 DIAGNOSIS — B37.0 THRUSH: ICD-10-CM

## 2023-03-13 PROCEDURE — 1111F DSCHRG MED/CURRENT MED MERGE: CPT | Performed by: PHYSICIAN ASSISTANT

## 2023-03-13 PROCEDURE — 99214 OFFICE O/P EST MOD 30 MIN: CPT | Performed by: PHYSICIAN ASSISTANT

## 2023-03-13 RX ORDER — BUPROPION HYDROCHLORIDE 150 MG/1
150 TABLET, EXTENDED RELEASE ORAL DAILY
Qty: 90 TABLET | Refills: 3 | Status: SHIPPED | OUTPATIENT
Start: 2023-03-13 | End: 2023-06-11

## 2023-03-13 RX ORDER — NICOTINE 21 MG/24HR
1 PATCH, TRANSDERMAL 24 HOURS TRANSDERMAL DAILY
Qty: 42 PATCH | Refills: 0 | Status: SHIPPED | OUTPATIENT
Start: 2023-03-13 | End: 2023-04-24

## 2023-03-13 SDOH — ECONOMIC STABILITY: FOOD INSECURITY: WITHIN THE PAST 12 MONTHS, THE FOOD YOU BOUGHT JUST DIDN'T LAST AND YOU DIDN'T HAVE MONEY TO GET MORE.: SOMETIMES TRUE

## 2023-03-13 SDOH — ECONOMIC STABILITY: INCOME INSECURITY: HOW HARD IS IT FOR YOU TO PAY FOR THE VERY BASICS LIKE FOOD, HOUSING, MEDICAL CARE, AND HEATING?: VERY HARD

## 2023-03-13 SDOH — ECONOMIC STABILITY: FOOD INSECURITY: WITHIN THE PAST 12 MONTHS, YOU WORRIED THAT YOUR FOOD WOULD RUN OUT BEFORE YOU GOT MONEY TO BUY MORE.: SOMETIMES TRUE

## 2023-03-13 SDOH — ECONOMIC STABILITY: HOUSING INSECURITY
IN THE LAST 12 MONTHS, WAS THERE A TIME WHEN YOU DID NOT HAVE A STEADY PLACE TO SLEEP OR SLEPT IN A SHELTER (INCLUDING NOW)?: NO

## 2023-03-13 ASSESSMENT — VISUAL ACUITY: OU: 1

## 2023-03-13 NOTE — PROGRESS NOTES
Post-Discharge Transitional Care Follow Up      Ember Cormier   YOB: 1970    Date of Office Visit:  3/13/2023  Date of Hospital Admission: 2/28/23  Date of Hospital Discharge: 3/4/23  Readmission Risk Score (high >=14%. Medium >=10%):Readmission Risk Score: 17.6      Care management risk score Rising risk (score 2-5) and Complex Care (Scores >=6): No Risk Score On File     Non face to face  following discharge, date last encounter closed (first attempt may have been earlier): 03/06/2023     Call initiated 2 business days of discharge: Yes     Hospital discharge follow-up  -     [place] DISCHARGE MEDS RECONCILED W/ CURRENT OUTPATIENT MED LIST  Smoking  -     nicotine (NICODERM CQ) 21 MG/24HR; Place 1 patch onto the skin daily, Disp-42 patch, R-0Normal  - starting to have cravings. Uderstands that she should not be smoking.   - patient would like patches. Will decrease. Thrush  -     nystatin (MYCOSTATIN) [id number] UNIT/ML suspension; Take 5 mLs by mouth 4 times daily for 10 days Retain in mouth as long as possible, Oral, 4 TIMES DAILY Starting Mon 3/13/2023, Until Thu 3/23/2023, For 10 days, Disp-200 mL, R-0, Normal  - Recommend to swish until symptoms resolve. COPD exacerbation ([address]  - improving at this time. Recommend that the patient f/u with Dr. Oralia Doty.   - will continue to monitor. 2L O2. Depression/anxiety  -Wellbutrin started. Will titrate. - Went over possible s/e of the medications. Patient would like to proceed with therapy. - Discussed signs and symptoms which require immediate follow-up in ED/call to 911. Patient verbalized understanding. Medical Decision Making: moderate complexity  Return for Follow Up at next scheduled appointment or sooner if needed. .           Subjective:   HPI    Inpatient course: Discharge summary reviewed- see chart. Interval history/Current status:   Patient was hospitalized for COPD exacerbation.  Patient reports that she was out in the cold which caused an exacerbation. Patient felt SOB and called ambulance. She put herself on oxygen before paramedics arrival. Was brought to [address] [facility]. - patient report OT and PT at home. Having [address] Patient was advised to get shower chair, but currently does not feel that she needs it. Patient is on 2L O2 for night. - Feel better at this time. - patient is continuing prednisone- starting to having thrush. Anxiety  - patient is having more anxiety due to recent loss of income due to having fear of having exacerbation. - patient is avoiding to go out and limits socializing unless if absolutely necessary. Patient Active Problem List   Diagnosis    Prediabetes    Exacerbation of persistent asthma    Angina at rest [facility])    Abnormal exercise myocardial perfusion study    Acute respiratory failure with hypoxia (HCC)    Chest pain    CAD    COPD     Hypoxia    Essential (primary) hypertension    Coronary artery disease involving native coronary artery of native heart without angina pectoris    Type 2 diabetes mellitus ([address]    Hypercholesterolemia    Severe persistent asthma without complication    SOB (shortness of breath)    COPD exacerbation ([address]       Medications listed as ordered at the time of discharge from hospital     Medication List            Accurate as of March 13, 2023  9:44 AM. If you have any questions, ask your nurse or doctor. START taking these medications      buPROPion 150 MG extended release tablet  Commonly known as:  Wellbutrin SR  Take 1 tablet by mouth daily  Started by: GIFTY Pandya     nystatin [id number] UNIT/ML suspension  Commonly known as: MYCOSTATIN  Take 5 mLs by mouth 4 times daily for 10 days Retain in mouth as long as possible  Started by: GIFTY Pandya            CONTINUE taking these medications      aspirin 81 MG EC tablet  Take 1 tablet by mouth daily     budesonide 0.5 MG/2ML nebulizer suspension  Commonly known as: Pulmicort  Take 2 mLs by nebulization 2 times daily     clopidogrel 75 MG tablet  Commonly known as: PLAVIX     Dexcom G6 Sensor Misc     EPINEPHrine 0.3 MG/0.3ML Soaj injection  Commonly known as: EPIPEN     Fasenra 30 MG/ML Sosy injection  Generic drug: benralizumab     furosemide 20 MG tablet  Commonly known as: LASIX  Take 1 tablet by mouth daily     Handicap Placard Misc  by Does not apply route Expiration date:12/30/27     ipratropium 0.02 % nebulizer solution  Commonly known as: ATROVENT  Take 2.5 mLs by nebulization 4 times daily     Jardiance 25 MG tablet  Generic drug: empagliflozin     losartan 25 MG tablet  Commonly known as: COZAAR  Take 1 tablet by mouth daily     magnesium oxide 400 MG tablet  Commonly known as: MAG-OX     metFORMIN 1000 MG tablet  Commonly known as: GLUCOPHAGE  TAKE 1 TABLET BY MOUTH TWICE DAILY     metoprolol succinate 50 MG extended release tablet  Commonly known as: Toprol XL  Take 1 tablet by mouth daily     montelukast 10 MG tablet  Commonly known as: SINGULAIR     nicotine 21 MG/24HR  Commonly known as: NICODERM CQ  Place 1 patch onto the skin daily     nitroGLYCERIN 0.4 MG SL tablet  Commonly known as: NITROSTAT  up to max of 3 total doses. If no relief after 1 dose, call 911.      potassium chloride 10 MEQ extended release tablet  Commonly known as: KLOR-CON M     predniSONE 10 MG tablet  Commonly known as: DELTASONE  Please take 40 mg po daily x 3 days, then 30 mg po daily x 3 days, then 20 mg po daily x 3 days then 10 mg po daily x 3 days then stop     * ProAir  (90 Base) MCG/ACT inhaler  Generic drug: albuterol sulfate HFA  Inhale 1 puff into the lungs every 4 hours as needed for Wheezing     * albuterol (2.5 MG/3ML) 0.083% nebulizer solution  Commonly known as: PROVENTIL  Take 3 mLs by nebulization every 4 hours as needed for Wheezing or Shortness of Breath     rosuvastatin 40 MG tablet  Commonly known as: CRESTOR     Symbicort 160-4.5 MCG/ACT Aero  Generic drug: budesonide-formoterol  Inhale 2 puffs by mouth twice daily     therapeutic multivitamin-minerals tablet           * This list has 2 medication(s) that are the same as other medications prescribed for you. Read the directions carefully, and ask your doctor or other care provider to review them with you.                    Where to Get Your Medications        These medications were sent to [address] [address] [address] [facility] [address] - P [phone] - F [phone]  [address] , [facility] [address]      Phone: [phone]   buPROPion 150 MG extended release tablet  ipratropium 0.02 % nebulizer solution  nicotine 21 MG/24HR  nystatin [id number] UNIT/ML suspension          Medications marked \"taking\" at this time  Outpatient Medications Marked as Taking for the 3/13/23 encounter (Office Visit) with GIFTY Payne   Medication Sig Dispense Refill    nicotine (NICODERM CQ) 21 MG/24HR Place 1 patch onto the skin daily 42 patch 0    nystatin (MYCOSTATIN) [id number] UNIT/ML suspension Take 5 mLs by mouth 4 times daily for 10 days Retain in mouth as long as possible 200 mL 0    ipratropium (ATROVENT) 0.02 % nebulizer solution Take 2.5 mLs by nebulization 4 times daily 2.5 mL 3    buPROPion (WELLBUTRIN SR) 150 MG extended release tablet Take 1 tablet by mouth daily 90 tablet 3    predniSONE (DELTASONE) 10 MG tablet Please take 40 mg po daily x 3 days, then 30 mg po daily x 3 days, then 20 mg po daily x 3 days then 10 mg po daily x 3 days then stop 30 tablet 0    albuterol (PROVENTIL) (2.5 MG/3ML) 0.083% nebulizer solution Take 3 mLs by nebulization every 4 hours as needed for Wheezing or Shortness of Breath 120 each 1    [DISCONTINUED] ipratropium (ATROVENT) 0.02 % nebulizer solution Take 2.5 mLs by nebulization 4 times daily 2.5 mL 3    metoprolol succinate (TOPROL XL) 50 MG extended release tablet Take 1 tablet by mouth daily 180 tablet 3    furosemide (LASIX) 20 MG tablet Take 1 tablet by mouth daily 90 tablet 3    SYMBICORT 160-4.5 MCG/ACT AERO Inhale 2 puffs by mouth twice daily 1 each 3    Continuous Blood Gluc Sensor (DEXCOM G6 SENSOR) MISC       JARDIANCE 25 MG tablet Take 25 mg by mouth once      metFORMIN (GLUCOPHAGE) 1000 MG tablet TAKE 1 TABLET BY MOUTH TWICE DAILY 60 tablet 3    Handicap Placard MISC by Does not apply route Expiration date:12/30/27 1 each 0    losartan (COZAAR) 25 MG tablet Take 1 tablet by mouth daily 30 tablet 3    PROAIR  (90 Base) MCG/ACT inhaler Inhale 1 puff into the lungs every 4 hours as needed for Wheezing 9 g 0    Multiple Vitamins-Minerals (THERAPEUTIC MULTIVITAMIN-MINERALS) tablet Take 1 tablet by mouth daily      rosuvastatin (CRESTOR) 40 MG tablet TAKE 1 TABLET BY MOUTH ONCE DAILY      clopidogrel (PLAVIX) 75 MG tablet Take 75 mg by mouth daily      montelukast (SINGULAIR) 10 MG tablet Take 10 mg by mouth nightly      EPINEPHrine (EPIPEN) 0.3 MG/0.3ML SOAJ injection Inject 0.3 mg into the muscle as needed      magnesium oxide (MAG-OX) 400 MG tablet Take 400 mg by mouth 2 times daily      potassium chloride (KLOR-CON M) 10 MEQ extended release tablet Take 10 mEq by mouth daily      aspirin 81 MG EC tablet Take 1 tablet by mouth daily 90 tablet 3    nitroGLYCERIN (NITROSTAT) 0.4 MG SL tablet up to max of 3 total doses. If no relief after 1 dose, call 911. 25 tablet 3        Medications patient taking as of now reconciled against medications ordered at time of hospital discharge: Yes    Review of Systems    Objective:    /60   Pulse 94   Temp 97 °F (36.1 °C)   Ht 5' 2\" (1.575 m)   Wt 195 lb (88.5 kg)   LMP 07/12/2019   SpO2 97%   BMI 35.67 kg/m²   Physical Exam  Vitals and nursing note reviewed. Constitutional:       General: She is awake. She is not in acute distress. Appearance: Normal appearance. She is well-developed. She is obese. She is not ill-appearing, toxic-appearing or diaphoretic. HENT:      Head: Normocephalic and atraumatic.       Right Ear: Hearing and external ear normal.      Left Ear: Hearing and external ear normal.      Nose: Nose normal.   Eyes:      General: Lids are normal. Vision grossly intact. Gaze aligned appropriately. Conjunctiva/sclera: Conjunctivae normal.      Pupils: Pupils are equal, round, and reactive to light. Cardiovascular:      Rate and Rhythm: Normal rate and regular rhythm. Pulses: Normal pulses. Heart sounds: Normal heart sounds, S1 normal and S2 normal.   Pulmonary:      Effort: Pulmonary effort is normal.      Breath sounds: Normal breath sounds and air entry. Musculoskeletal:      Cervical back: Normal range of motion. Skin:     General: Skin is warm. Capillary Refill: Capillary refill takes less than 2 seconds. Neurological:      Mental Status: She is alert and oriented to person, place, and time. Gait: Gait is intact. Psychiatric:         Attention and Perception: Attention normal.         Mood and Affect: Mood normal.         Speech: Speech normal.         Behavior: Behavior normal. Behavior is cooperative. An electronic signature was used to authenticate this note.   --GIFTY Adler

## 2023-03-13 NOTE — PATIENT INSTRUCTIONS
TRANSPORTATION RESOURCES    [address] [address]:  What they offer:  Transportation to [facility] residents, 24 hrs. a day, 7 days a week. You must call for pricing and private pay by credit card. Phone Number: [phone]  Website: http://31Dover/  [facility] Transportation:  What they offer: Personal care service to airports, corporate concerts, shopping malls and more. Hours are based on customers needs and cost depends on riders location and where they are going. Phone Number: [phone]  Website: https://www.[employer]/. com  [facility] Airlines:  What they offer: An unassisted curb to curb service for [facility] older adult residents. Rides to [facility] Adriel Foster activities, grocery shopping, pharmacies, banking & beauty salons in [place] only, area malls and voting. Cost is free. Phone Happy Hour party supplies & rentals.[employer]. South of [address] [place] [address] Transportation:  What they offer:  Free transportation on [place], [address] and Thursdays for local shopping, banking, and medical appointments to [facility] residents. Phone Number: [phone]  Website: https://www. [employer] for Job.[employer]/city-services/community-transportation  [facility] [address] on aging:  What they offer:   Transportation for [facility] residents ages 61 and over to grocery & drug stores, hair salons, doctors, and dentist appointments. Transportation on Monday, Tuesday, and Saturday from 9am to noon, Thursday, and Friday from 9 am to 4 pm. Wednesday is social trips only. Local trips are $0.50 each way and social trips are $8.00 to $30.00 depending on the trip. Phone Number: [phone] EXT 5  Website: http://cronin.org/? QID=70  [facility] [facility]:  What they offer:  Transportation for aging and disabled individuals for dialysis, medical & personal appointments, hospital visits and social needs. For [facility]  Phone Number: [phone]  Website: https://[employer]/  Alejo Dobbs:  What they offer: Transportations to the [facility] and portions of [address] [place] [address], [address], [facility], [facility], [facility] and [facility]. Cost is $2.00 each way for adults, $1.00 each way for seniors and those with disabilities, $1.00 each way for a student with an id. Phone Number: 7-310.616.9509  Website: https://www. 1EQ/Clare-Connector  Provide a ride:  What they offer:  Healthcare and medical transportation services 24 hours a day 7 days a week. Phone Number: [phone]  Website: https://Venda    Need additional resources? Call 211   Find Help https://[address] and Dentistry   What they offer: [employer] discounts fees for qualifying insured and uninsured persons. We can assist you in signing up for Medicaid, Medicare, and [employer]. They offer 4 locations in [facility], 2 locations in [facility] and 1 in [address] [place] [address],[address]. Phone Number: [phone]  Website: SmartestK12.ee. [address]:  What they offer: We provide health care services to the uninsured and underinsured. From providing quality care to connecting patients to critical community resources, our patients and their needs are our highest priority. Phone number: [phone]  Website: https://[employer]  211: What they offer: Help find lower cost options for phone or internet as well as help paying utility bills. Phone Number: 718  Website: https://alecia-cora.mary/    MEDICATIONS:   Good Rx  What they offer: Good Rx tracks prescription drug prices and provides free drug coupons for discounts on medications. Website: VipAnalysis.is. com/  [facility]:  What they offer: Needy[employer]eds offers free information on medications and healthcare cost savings programs including prescription assistance programs, coupons, and discount programs. Helpline: 648.291.2709  Website: PaymentBack.[employer]ers[employer] org  RX Assist:  What they offer: Information about free and low-cost medicine programs. Website: 645 559 505 $4 Prescription Program:  What they offer: Prescription Program includes up to a 30-day supply for $4 and a 90-day supply for $10 of some covered generic drugs at commonly prescribed dosages  Website: Ofelia  [facility] Drug Repository:  Phone Number: 942.520.1798Hilda    Need additional resources? Find Help https://www. findhelp.org

## 2023-03-20 ENCOUNTER — OFFICE VISIT (OUTPATIENT)
Dept: PULMONOLOGY | Age: 53
End: 2023-03-20
Payer: COMMERCIAL

## 2023-03-20 ENCOUNTER — TELEPHONE (OUTPATIENT)
Dept: PULMONOLOGY | Age: 53
End: 2023-03-20

## 2023-03-20 VITALS
OXYGEN SATURATION: 97 % | TEMPERATURE: 97.1 F | HEART RATE: 88 BPM | SYSTOLIC BLOOD PRESSURE: 134 MMHG | BODY MASS INDEX: 35.85 KG/M2 | DIASTOLIC BLOOD PRESSURE: 76 MMHG | HEIGHT: 62 IN | WEIGHT: 194.8 LBS | RESPIRATION RATE: 16 BRPM

## 2023-03-20 DIAGNOSIS — K21.9 GASTROESOPHAGEAL REFLUX DISEASE WITHOUT ESOPHAGITIS: ICD-10-CM

## 2023-03-20 DIAGNOSIS — E66.09 CLASS 2 OBESITY DUE TO EXCESS CALORIES WITHOUT SERIOUS COMORBIDITY WITH BODY MASS INDEX (BMI) OF 37.0 TO 37.9 IN ADULT: ICD-10-CM

## 2023-03-20 DIAGNOSIS — J45.50 SEVERE PERSISTENT ASTHMA WITHOUT COMPLICATION: ICD-10-CM

## 2023-03-20 DIAGNOSIS — Z09 HOSPITAL DISCHARGE FOLLOW-UP: ICD-10-CM

## 2023-03-20 DIAGNOSIS — F17.200 SMOKING: ICD-10-CM

## 2023-03-20 DIAGNOSIS — R91.1 LUNG NODULE: Primary | ICD-10-CM

## 2023-03-20 DIAGNOSIS — J44.9 ASTHMA-COPD OVERLAP SYNDROME (HCC): ICD-10-CM

## 2023-03-20 DIAGNOSIS — B37.0 THRUSH: ICD-10-CM

## 2023-03-20 PROCEDURE — 99215 OFFICE O/P EST HI 40 MIN: CPT | Performed by: INTERNAL MEDICINE

## 2023-03-20 PROCEDURE — 1111F DSCHRG MED/CURRENT MED MERGE: CPT | Performed by: INTERNAL MEDICINE

## 2023-03-20 PROCEDURE — 3075F SYST BP GE 130 - 139MM HG: CPT | Performed by: INTERNAL MEDICINE

## 2023-03-20 PROCEDURE — 3023F SPIROM DOC REV: CPT | Performed by: INTERNAL MEDICINE

## 2023-03-20 PROCEDURE — G8417 CALC BMI ABV UP PARAM F/U: HCPCS | Performed by: INTERNAL MEDICINE

## 2023-03-20 PROCEDURE — G8484 FLU IMMUNIZE NO ADMIN: HCPCS | Performed by: INTERNAL MEDICINE

## 2023-03-20 PROCEDURE — 3078F DIAST BP <80 MM HG: CPT | Performed by: INTERNAL MEDICINE

## 2023-03-20 PROCEDURE — 3017F COLORECTAL CA SCREEN DOC REV: CPT | Performed by: INTERNAL MEDICINE

## 2023-03-20 PROCEDURE — 1036F TOBACCO NON-USER: CPT | Performed by: INTERNAL MEDICINE

## 2023-03-20 PROCEDURE — G8427 DOCREV CUR MEDS BY ELIG CLIN: HCPCS | Performed by: INTERNAL MEDICINE

## 2023-03-20 RX ORDER — FLUTICASONE FUROATE, UMECLIDINIUM BROMIDE AND VILANTEROL TRIFENATATE 200; 62.5; 25 UG/1; UG/1; UG/1
1 POWDER RESPIRATORY (INHALATION) DAILY
Qty: 1 EACH | Refills: 2 | Status: SHIPPED | OUTPATIENT
Start: 2023-03-20

## 2023-03-20 NOTE — TELEPHONE ENCOUNTER
Patient is having a Navigational Bronchoscopy on 03/31/2023. Patient is on Plavix. Dr. Deb Quintanilla would like to know if she is able to go off of Plavix 5-7 days before procedure?

## 2023-03-20 NOTE — PROGRESS NOTES
serious comorbidity with body mass index (BMI) of 37.0 to 37.9 in adult        6. Asthma-COPD overlap syndrome (HCC)        7. Smoking        8. Gastroesophageal reflux disease without esophagitis          Multiple lung nodules, groundglass and changes, largest is in the right upper lobe, persist on follow-up CT scan, will plan navigational bronchoscopy, patient on Plavix, will need cardiology clearance to hold 5 days prior to procedure. We will tentatively plan for navigational bronchoscopy on Friday, March 31  Discussed with patient risks include but not limited to bleeding, infection, lung collapse , cardiac arrhythmia, need for other procedures or allergy to med, benefits and alternatives discussed with patient. Patient  agrees to proceed with procedure   Asthma, symptoms better controlled, continue Fasenra, start Trelegy, DC Symbicort, continue Singulair. DC budesonide nebs. And continue as needed albuterol. Yearly flu shot recommended.    will obtain CT for navigational bronchoscopy mapping  Weight loss is recommended  Patient quit smoking and congratulated  GERD symptoms controlled, continue PPI, patient has small hiatal hernia      Orders Placed This Encounter   Procedures    CT CHEST WO CONTRAST     Standing Status:   Future     Standing Expiration Date:   3/20/2024     Order Specific Question:   Reason for exam:     Answer:   navigationa bronchoscopy 1 mm cuts    IN DISCHARGE MEDS RECONCILED W/ CURRENT OUTPATIENT MED LIST     Orders Placed This Encounter   Medications    fluticasone-umeclidin-vilant (TRELEGY ELLIPTA) 200-62.5-25 MCG/ACT AEPB inhaler     Sig: Inhale 1 puff into the lungs daily     Dispense:  1 each     Refill:  2    nystatin (MYCOSTATIN) [id number] UNIT/ML suspension     Sig: Take 5 mLs by mouth 4 times daily for 10 days Retain in mouth as long as possible     Dispense:  200 mL     Refill:  0            Discussed with patient the importance of exercise and weight control and  overall health

## 2023-03-21 ENCOUNTER — PREP FOR PROCEDURE (OUTPATIENT)
Dept: PULMONOLOGY | Age: 53
End: 2023-03-21

## 2023-03-21 PROBLEM — R91.1 LUNG NODULE: Status: ACTIVE | Noted: 2023-03-21

## 2023-03-28 ENCOUNTER — OFFICE VISIT (OUTPATIENT)
Dept: OBGYN CLINIC | Age: 53
End: 2023-03-28
Payer: COMMERCIAL

## 2023-03-28 VITALS
BODY MASS INDEX: 35.7 KG/M2 | WEIGHT: 194 LBS | DIASTOLIC BLOOD PRESSURE: 72 MMHG | SYSTOLIC BLOOD PRESSURE: 120 MMHG | HEIGHT: 62 IN

## 2023-03-28 DIAGNOSIS — N89.8 VAGINAL ITCHING: Primary | ICD-10-CM

## 2023-03-28 DIAGNOSIS — N89.8 VAGINAL ITCHING: ICD-10-CM

## 2023-03-28 PROCEDURE — G8417 CALC BMI ABV UP PARAM F/U: HCPCS | Performed by: OBSTETRICS & GYNECOLOGY

## 2023-03-28 PROCEDURE — 3078F DIAST BP <80 MM HG: CPT | Performed by: OBSTETRICS & GYNECOLOGY

## 2023-03-28 PROCEDURE — G8484 FLU IMMUNIZE NO ADMIN: HCPCS | Performed by: OBSTETRICS & GYNECOLOGY

## 2023-03-28 PROCEDURE — 1111F DSCHRG MED/CURRENT MED MERGE: CPT | Performed by: OBSTETRICS & GYNECOLOGY

## 2023-03-28 PROCEDURE — 99203 OFFICE O/P NEW LOW 30 MIN: CPT | Performed by: OBSTETRICS & GYNECOLOGY

## 2023-03-28 PROCEDURE — 1036F TOBACCO NON-USER: CPT | Performed by: OBSTETRICS & GYNECOLOGY

## 2023-03-28 PROCEDURE — G8427 DOCREV CUR MEDS BY ELIG CLIN: HCPCS | Performed by: OBSTETRICS & GYNECOLOGY

## 2023-03-28 PROCEDURE — 3077F SYST BP >= 140 MM HG: CPT | Performed by: OBSTETRICS & GYNECOLOGY

## 2023-03-28 PROCEDURE — 3017F COLORECTAL CA SCREEN DOC REV: CPT | Performed by: OBSTETRICS & GYNECOLOGY

## 2023-03-28 RX ORDER — FLUCONAZOLE 200 MG/1
200 TABLET ORAL DAILY
Qty: 7 TABLET | Refills: 3 | Status: SHIPPED | OUTPATIENT
Start: 2023-03-28

## 2023-03-28 RX ORDER — CLOTRIMAZOLE AND BETAMETHASONE DIPROPIONATE 10; .64 MG/G; MG/G
CREAM TOPICAL
Qty: 45 G | Refills: 1 | Status: SHIPPED | OUTPATIENT
Start: 2023-03-28

## 2023-03-29 ENCOUNTER — OFFICE VISIT (OUTPATIENT)
Dept: PULMONOLOGY | Age: 53
End: 2023-03-29
Payer: COMMERCIAL

## 2023-03-29 ENCOUNTER — TELEPHONE (OUTPATIENT)
Dept: PULMONOLOGY | Age: 53
End: 2023-03-29

## 2023-03-29 ENCOUNTER — HOSPITAL ENCOUNTER (INPATIENT)
Age: 53
LOS: 4 days | Discharge: HOME HEALTH | DRG: 140 | End: 2023-04-02
Attending: EMERGENCY MEDICINE | Admitting: INTERNAL MEDICINE
Payer: COMMERCIAL

## 2023-03-29 ENCOUNTER — APPOINTMENT (OUTPATIENT)
Dept: GENERAL RADIOLOGY | Age: 53
DRG: 140 | End: 2023-03-29
Payer: COMMERCIAL

## 2023-03-29 VITALS — BODY MASS INDEX: 35.48 KG/M2 | HEIGHT: 62 IN

## 2023-03-29 DIAGNOSIS — K21.9 GASTROESOPHAGEAL REFLUX DISEASE WITHOUT ESOPHAGITIS: ICD-10-CM

## 2023-03-29 DIAGNOSIS — E66.09 CLASS 2 OBESITY DUE TO EXCESS CALORIES WITHOUT SERIOUS COMORBIDITY WITH BODY MASS INDEX (BMI) OF 37.0 TO 37.9 IN ADULT: ICD-10-CM

## 2023-03-29 DIAGNOSIS — R91.1 LUNG NODULE: ICD-10-CM

## 2023-03-29 DIAGNOSIS — Z87.891 PERSONAL HISTORY OF TOBACCO USE: ICD-10-CM

## 2023-03-29 DIAGNOSIS — R91.8 LUNG NODULES: ICD-10-CM

## 2023-03-29 DIAGNOSIS — J44.9 ASTHMA-COPD OVERLAP SYNDROME (HCC): ICD-10-CM

## 2023-03-29 DIAGNOSIS — J45.51 SEVERE PERSISTENT ASTHMA WITH ACUTE EXACERBATION: ICD-10-CM

## 2023-03-29 DIAGNOSIS — J96.01 ACUTE RESPIRATORY FAILURE WITH HYPOXIA (HCC): Primary | ICD-10-CM

## 2023-03-29 DIAGNOSIS — J45.41 MODERATE PERSISTENT ASTHMA WITH EXACERBATION: Primary | ICD-10-CM

## 2023-03-29 LAB
ALBUMIN SERPL-MCNC: 4.3 G/DL (ref 3.5–4.6)
ALP SERPL-CCNC: 101 U/L (ref 40–130)
ALT SERPL-CCNC: 19 U/L (ref 0–33)
ANION GAP SERPL CALCULATED.3IONS-SCNC: 10 MEQ/L (ref 9–15)
AST SERPL-CCNC: 13 U/L (ref 0–35)
B PARAP IS1001 DNA NPH QL NAA+NON-PROBE: NOT DETECTED
B PERT.PT PRMT NPH QL NAA+NON-PROBE: NOT DETECTED
BASE EXCESS ARTERIAL: -3 (ref -3–3)
BASOPHILS # BLD: 0 K/UL (ref 0–0.2)
BASOPHILS NFR BLD: 0.1 %
BILIRUB SERPL-MCNC: <0.2 MG/DL (ref 0.2–0.7)
BUN SERPL-MCNC: 10 MG/DL (ref 6–20)
C PNEUM DNA NPH QL NAA+NON-PROBE: NOT DETECTED
CALCIUM IONIZED: 1.23 MMOL/L (ref 1.12–1.32)
CALCIUM SERPL-MCNC: 9.4 MG/DL (ref 8.5–9.9)
CHLORIDE SERPL-SCNC: 109 MEQ/L (ref 95–107)
CLUE CELLS VAG QL WET PREP: ABNORMAL
CO2 SERPL-SCNC: 25 MEQ/L (ref 20–31)
CREAT SERPL-MCNC: 0.6 MG/DL (ref 0.5–0.9)
EOSINOPHIL # BLD: 0 K/UL (ref 0–0.7)
EOSINOPHIL NFR BLD: 0 %
ERYTHROCYTE [DISTWIDTH] IN BLOOD BY AUTOMATED COUNT: 14.3 % (ref 11.5–14.5)
FLUAV RNA NPH QL NAA+NON-PROBE: NOT DETECTED
FLUBV RNA NPH QL NAA+NON-PROBE: NOT DETECTED
GLOBULIN SER CALC-MCNC: 2.6 G/DL (ref 2.3–3.5)
GLUCOSE BLD-MCNC: 161 MG/DL (ref 70–99)
GLUCOSE BLD-MCNC: 177 MG/DL (ref 70–99)
GLUCOSE SERPL-MCNC: 145 MG/DL (ref 70–99)
HADV DNA NPH QL NAA+NON-PROBE: NOT DETECTED
HCO3 ARTERIAL: 22.3 MMOL/L (ref 21–29)
HCOV 229E RNA NPH QL NAA+NON-PROBE: NOT DETECTED
HCOV HKU1 RNA NPH QL NAA+NON-PROBE: NOT DETECTED
HCOV NL63 RNA NPH QL NAA+NON-PROBE: NOT DETECTED
HCOV OC43 RNA NPH QL NAA+NON-PROBE: NOT DETECTED
HCT VFR BLD AUTO: 45 % (ref 37–47)
HCT VFR BLD AUTO: 46 % (ref 36–48)
HGB BLD CALC-MCNC: 15.8 GM/DL (ref 12–16)
HGB BLD-MCNC: 14.9 G/DL (ref 12–16)
HMPV RNA NPH QL NAA+NON-PROBE: NOT DETECTED
HPIV1 RNA NPH QL NAA+NON-PROBE: NOT DETECTED
HPIV2 RNA NPH QL NAA+NON-PROBE: NOT DETECTED
HPIV3 RNA NPH QL NAA+NON-PROBE: NOT DETECTED
HPIV4 RNA NPH QL NAA+NON-PROBE: NOT DETECTED
LACTATE BLDV-SCNC: 2.4 MMOL/L (ref 0.5–2.2)
LACTATE: 2.16 MMOL/L (ref 0.4–2)
LYMPHOCYTES # BLD: 1.8 K/UL (ref 1–4.8)
LYMPHOCYTES NFR BLD: 34.6 %
M PNEUMO DNA NPH QL NAA+NON-PROBE: NOT DETECTED
MCH RBC QN AUTO: 30.3 PG (ref 27–31.3)
MCHC RBC AUTO-ENTMCNC: 33.2 % (ref 33–37)
MCV RBC AUTO: 91.5 FL (ref 79.4–94.8)
MONOCYTES # BLD: 0.3 K/UL (ref 0.2–0.8)
MONOCYTES NFR BLD: 6.2 %
NEUTROPHILS # BLD: 3.1 K/UL (ref 1.4–6.5)
NEUTS SEG NFR BLD: 59.1 %
O2 SAT, ARTERIAL: 90 % (ref 93–100)
PCO2 ARTERIAL: 38 MM HG (ref 35–45)
PERFORMED ON: ABNORMAL
PERFORMED ON: ABNORMAL
PH ARTERIAL: 7.38 (ref 7.35–7.45)
PLATELET # BLD AUTO: 163 K/UL (ref 130–400)
PO2 ARTERIAL: 60 MM HG (ref 75–108)
POC CHLORIDE: 111 MEQ/L (ref 99–110)
POC CREATININE: 0.6 MG/DL (ref 0.6–1.2)
POC FIO2: 21
POC SAMPLE TYPE: ABNORMAL
POTASSIUM SERPL-SCNC: 3.8 MEQ/L (ref 3.5–5.1)
POTASSIUM SERPL-SCNC: 4.3 MEQ/L (ref 3.4–4.9)
PROT SERPL-MCNC: 6.9 G/DL (ref 6.3–8)
RBC # BLD AUTO: 4.92 M/UL (ref 4.2–5.4)
RSV RNA NPH QL NAA+NON-PROBE: NOT DETECTED
RV+EV RNA NPH QL NAA+NON-PROBE: NOT DETECTED
SARS-COV-2 RNA NPH QL NAA+NON-PROBE: NOT DETECTED
SODIUM BLD-SCNC: 142 MEQ/L (ref 136–145)
SODIUM SERPL-SCNC: 144 MEQ/L (ref 135–144)
T VAGINALIS VAG QL WET PREP: ABNORMAL
TCO2 ARTERIAL: 23 MMOL/L (ref 21–32)
TRICHOMONAS VAGINALIS SCREEN: NEGATIVE
TROPONIN T SERPL-MCNC: <0.01 NG/ML (ref 0–0.01)
WBC # BLD AUTO: 5.2 K/UL (ref 4.8–10.8)
YEAST VAG QL WET PREP: ABNORMAL

## 2023-03-29 PROCEDURE — 84132 ASSAY OF SERUM POTASSIUM: CPT

## 2023-03-29 PROCEDURE — 94669 MECHANICAL CHEST WALL OSCILL: CPT

## 2023-03-29 PROCEDURE — 84295 ASSAY OF SERUM SODIUM: CPT

## 2023-03-29 PROCEDURE — 94150 VITAL CAPACITY TEST: CPT

## 2023-03-29 PROCEDURE — G8428 CUR MEDS NOT DOCUMENT: HCPCS | Performed by: INTERNAL MEDICINE

## 2023-03-29 PROCEDURE — 1111F DSCHRG MED/CURRENT MED MERGE: CPT | Performed by: INTERNAL MEDICINE

## 2023-03-29 PROCEDURE — 0202U NFCT DS 22 TRGT SARS-COV-2: CPT

## 2023-03-29 PROCEDURE — 99215 OFFICE O/P EST HI 40 MIN: CPT | Performed by: INTERNAL MEDICINE

## 2023-03-29 PROCEDURE — 94640 AIRWAY INHALATION TREATMENT: CPT

## 2023-03-29 PROCEDURE — 6370000000 HC RX 637 (ALT 250 FOR IP)

## 2023-03-29 PROCEDURE — 6370000000 HC RX 637 (ALT 250 FOR IP): Performed by: EMERGENCY MEDICINE

## 2023-03-29 PROCEDURE — 2700000000 HC OXYGEN THERAPY PER DAY

## 2023-03-29 PROCEDURE — 99285 EMERGENCY DEPT VISIT HI MDM: CPT

## 2023-03-29 PROCEDURE — 36415 COLL VENOUS BLD VENIPUNCTURE: CPT

## 2023-03-29 PROCEDURE — 1036F TOBACCO NON-USER: CPT | Performed by: INTERNAL MEDICINE

## 2023-03-29 PROCEDURE — 85025 COMPLETE CBC W/AUTO DIFF WBC: CPT

## 2023-03-29 PROCEDURE — 36600 WITHDRAWAL OF ARTERIAL BLOOD: CPT

## 2023-03-29 PROCEDURE — 80053 COMPREHEN METABOLIC PANEL: CPT

## 2023-03-29 PROCEDURE — 6360000002 HC RX W HCPCS: Performed by: EMERGENCY MEDICINE

## 2023-03-29 PROCEDURE — 96365 THER/PROPH/DIAG IV INF INIT: CPT

## 2023-03-29 PROCEDURE — 85014 HEMATOCRIT: CPT

## 2023-03-29 PROCEDURE — 82330 ASSAY OF CALCIUM: CPT

## 2023-03-29 PROCEDURE — G8484 FLU IMMUNIZE NO ADMIN: HCPCS | Performed by: INTERNAL MEDICINE

## 2023-03-29 PROCEDURE — 83605 ASSAY OF LACTIC ACID: CPT

## 2023-03-29 PROCEDURE — 82565 ASSAY OF CREATININE: CPT

## 2023-03-29 PROCEDURE — 3023F SPIROM DOC REV: CPT | Performed by: INTERNAL MEDICINE

## 2023-03-29 PROCEDURE — G8417 CALC BMI ABV UP PARAM F/U: HCPCS | Performed by: INTERNAL MEDICINE

## 2023-03-29 PROCEDURE — 3017F COLORECTAL CA SCREEN DOC REV: CPT | Performed by: INTERNAL MEDICINE

## 2023-03-29 PROCEDURE — 82803 BLOOD GASES ANY COMBINATION: CPT

## 2023-03-29 PROCEDURE — 1210000000 HC MED SURG R&B

## 2023-03-29 PROCEDURE — 71045 X-RAY EXAM CHEST 1 VIEW: CPT

## 2023-03-29 PROCEDURE — 93005 ELECTROCARDIOGRAM TRACING: CPT

## 2023-03-29 PROCEDURE — 84484 ASSAY OF TROPONIN QUANT: CPT

## 2023-03-29 PROCEDURE — 96375 TX/PRO/DX INJ NEW DRUG ADDON: CPT

## 2023-03-29 PROCEDURE — 94761 N-INVAS EAR/PLS OXIMETRY MLT: CPT

## 2023-03-29 PROCEDURE — 82435 ASSAY OF BLOOD CHLORIDE: CPT

## 2023-03-29 RX ORDER — NITROGLYCERIN 0.4 MG/1
0.4 TABLET SUBLINGUAL EVERY 5 MIN PRN
Status: DISCONTINUED | OUTPATIENT
Start: 2023-03-29 | End: 2023-04-02 | Stop reason: HOSPADM

## 2023-03-29 RX ORDER — FUROSEMIDE 20 MG/1
20 TABLET ORAL DAILY
Status: DISCONTINUED | OUTPATIENT
Start: 2023-03-30 | End: 2023-04-02 | Stop reason: HOSPADM

## 2023-03-29 RX ORDER — GUAIFENESIN 600 MG/1
600 TABLET, EXTENDED RELEASE ORAL 2 TIMES DAILY
Status: DISCONTINUED | OUTPATIENT
Start: 2023-03-29 | End: 2023-04-02 | Stop reason: HOSPADM

## 2023-03-29 RX ORDER — IPRATROPIUM BROMIDE AND ALBUTEROL SULFATE 2.5; .5 MG/3ML; MG/3ML
1 SOLUTION RESPIRATORY (INHALATION) 4 TIMES DAILY
Status: DISCONTINUED | OUTPATIENT
Start: 2023-03-29 | End: 2023-04-02 | Stop reason: HOSPADM

## 2023-03-29 RX ORDER — ONDANSETRON 4 MG/1
4 TABLET, ORALLY DISINTEGRATING ORAL EVERY 8 HOURS PRN
Status: DISCONTINUED | OUTPATIENT
Start: 2023-03-29 | End: 2023-04-02 | Stop reason: HOSPADM

## 2023-03-29 RX ORDER — IPRATROPIUM BROMIDE AND ALBUTEROL SULFATE 2.5; .5 MG/3ML; MG/3ML
1 SOLUTION RESPIRATORY (INHALATION)
Status: DISCONTINUED | OUTPATIENT
Start: 2023-03-30 | End: 2023-03-29

## 2023-03-29 RX ORDER — ACETAMINOPHEN 325 MG/1
650 TABLET ORAL EVERY 6 HOURS PRN
Status: DISCONTINUED | OUTPATIENT
Start: 2023-03-29 | End: 2023-04-02 | Stop reason: HOSPADM

## 2023-03-29 RX ORDER — BUPROPION HYDROCHLORIDE 150 MG/1
150 TABLET, EXTENDED RELEASE ORAL DAILY
Status: DISCONTINUED | OUTPATIENT
Start: 2023-03-30 | End: 2023-04-02 | Stop reason: HOSPADM

## 2023-03-29 RX ORDER — CALCIUM CARBONATE 200(500)MG
500 TABLET,CHEWABLE ORAL 3 TIMES DAILY PRN
Status: DISCONTINUED | OUTPATIENT
Start: 2023-03-29 | End: 2023-04-02 | Stop reason: HOSPADM

## 2023-03-29 RX ORDER — INSULIN LISPRO 100 [IU]/ML
0-4 INJECTION, SOLUTION INTRAVENOUS; SUBCUTANEOUS NIGHTLY
Status: DISCONTINUED | OUTPATIENT
Start: 2023-03-30 | End: 2023-04-02 | Stop reason: HOSPADM

## 2023-03-29 RX ORDER — MAGNESIUM SULFATE IN WATER 40 MG/ML
4000 INJECTION, SOLUTION INTRAVENOUS ONCE
Status: COMPLETED | OUTPATIENT
Start: 2023-03-29 | End: 2023-03-29

## 2023-03-29 RX ORDER — ACETAMINOPHEN 650 MG/1
650 SUPPOSITORY RECTAL EVERY 6 HOURS PRN
Status: DISCONTINUED | OUTPATIENT
Start: 2023-03-29 | End: 2023-04-02 | Stop reason: HOSPADM

## 2023-03-29 RX ORDER — ENOXAPARIN SODIUM 100 MG/ML
40 INJECTION SUBCUTANEOUS DAILY
Status: DISCONTINUED | OUTPATIENT
Start: 2023-03-30 | End: 2023-04-02 | Stop reason: HOSPADM

## 2023-03-29 RX ORDER — ONDANSETRON 2 MG/ML
4 INJECTION INTRAMUSCULAR; INTRAVENOUS EVERY 6 HOURS PRN
Status: DISCONTINUED | OUTPATIENT
Start: 2023-03-29 | End: 2023-04-02 | Stop reason: HOSPADM

## 2023-03-29 RX ORDER — LOSARTAN POTASSIUM 25 MG/1
25 TABLET ORAL DAILY
Status: DISCONTINUED | OUTPATIENT
Start: 2023-03-30 | End: 2023-04-02 | Stop reason: HOSPADM

## 2023-03-29 RX ORDER — POTASSIUM CHLORIDE 20 MEQ/1
10 TABLET, EXTENDED RELEASE ORAL DAILY
Status: DISCONTINUED | OUTPATIENT
Start: 2023-03-30 | End: 2023-04-02 | Stop reason: HOSPADM

## 2023-03-29 RX ORDER — ROSUVASTATIN CALCIUM 40 MG/1
40 TABLET, COATED ORAL DAILY
Status: DISCONTINUED | OUTPATIENT
Start: 2023-03-30 | End: 2023-04-02 | Stop reason: HOSPADM

## 2023-03-29 RX ORDER — CLOPIDOGREL BISULFATE 75 MG/1
75 TABLET ORAL DAILY
Status: DISCONTINUED | OUTPATIENT
Start: 2023-03-30 | End: 2023-04-02 | Stop reason: HOSPADM

## 2023-03-29 RX ORDER — METOPROLOL SUCCINATE 50 MG/1
50 TABLET, EXTENDED RELEASE ORAL DAILY
Status: DISCONTINUED | OUTPATIENT
Start: 2023-03-30 | End: 2023-04-02 | Stop reason: HOSPADM

## 2023-03-29 RX ORDER — INSULIN LISPRO 100 [IU]/ML
0-8 INJECTION, SOLUTION INTRAVENOUS; SUBCUTANEOUS
Status: DISCONTINUED | OUTPATIENT
Start: 2023-03-30 | End: 2023-04-02 | Stop reason: HOSPADM

## 2023-03-29 RX ORDER — METRONIDAZOLE 500 MG/1
500 TABLET ORAL 2 TIMES DAILY
Qty: 14 TABLET | Refills: 0 | Status: SHIPPED | OUTPATIENT
Start: 2023-03-29 | End: 2023-04-05

## 2023-03-29 RX ORDER — METHYLPREDNISOLONE SODIUM SUCCINATE 125 MG/2ML
125 INJECTION, POWDER, LYOPHILIZED, FOR SOLUTION INTRAMUSCULAR; INTRAVENOUS ONCE
Status: COMPLETED | OUTPATIENT
Start: 2023-03-29 | End: 2023-03-29

## 2023-03-29 RX ORDER — POLYETHYLENE GLYCOL 3350 17 G/17G
17 POWDER, FOR SOLUTION ORAL DAILY PRN
Status: DISCONTINUED | OUTPATIENT
Start: 2023-03-29 | End: 2023-04-02 | Stop reason: HOSPADM

## 2023-03-29 RX ORDER — IPRATROPIUM BROMIDE AND ALBUTEROL SULFATE 2.5; .5 MG/3ML; MG/3ML
1 SOLUTION RESPIRATORY (INHALATION) PRN
Status: DISCONTINUED | OUTPATIENT
Start: 2023-03-29 | End: 2023-03-29

## 2023-03-29 RX ORDER — DEXTROSE MONOHYDRATE 100 MG/ML
INJECTION, SOLUTION INTRAVENOUS CONTINUOUS PRN
Status: DISCONTINUED | OUTPATIENT
Start: 2023-03-29 | End: 2023-04-02 | Stop reason: HOSPADM

## 2023-03-29 RX ORDER — NICOTINE 21 MG/24HR
1 PATCH, TRANSDERMAL 24 HOURS TRANSDERMAL DAILY
Status: DISCONTINUED | OUTPATIENT
Start: 2023-03-30 | End: 2023-04-02 | Stop reason: HOSPADM

## 2023-03-29 RX ORDER — ASPIRIN 81 MG/1
81 TABLET ORAL DAILY
Status: DISCONTINUED | OUTPATIENT
Start: 2023-03-30 | End: 2023-04-02 | Stop reason: HOSPADM

## 2023-03-29 RX ORDER — METHYLPREDNISOLONE SODIUM SUCCINATE 40 MG/ML
40 INJECTION, POWDER, LYOPHILIZED, FOR SOLUTION INTRAMUSCULAR; INTRAVENOUS DAILY
Status: DISCONTINUED | OUTPATIENT
Start: 2023-03-30 | End: 2023-03-31

## 2023-03-29 RX ORDER — METHYLPREDNISOLONE ACETATE 40 MG/ML
80 INJECTION, SUSPENSION INTRA-ARTICULAR; INTRALESIONAL; INTRAMUSCULAR; SOFT TISSUE ONCE
Status: DISCONTINUED | OUTPATIENT
Start: 2023-03-29 | End: 2023-04-02 | Stop reason: HOSPADM

## 2023-03-29 RX ORDER — LANOLIN ALCOHOL/MO/W.PET/CERES
400 CREAM (GRAM) TOPICAL 2 TIMES DAILY
Status: DISCONTINUED | OUTPATIENT
Start: 2023-03-30 | End: 2023-04-02 | Stop reason: HOSPADM

## 2023-03-29 RX ORDER — ALBUTEROL SULFATE 2.5 MG/3ML
2.5 SOLUTION RESPIRATORY (INHALATION)
Status: DISCONTINUED | OUTPATIENT
Start: 2023-03-29 | End: 2023-04-02 | Stop reason: HOSPADM

## 2023-03-29 RX ADMIN — METHYLPREDNISOLONE SODIUM SUCCINATE 125 MG: 125 INJECTION, POWDER, FOR SOLUTION INTRAMUSCULAR; INTRAVENOUS at 16:41

## 2023-03-29 RX ADMIN — ACETAMINOPHEN 650 MG: 325 TABLET ORAL at 22:52

## 2023-03-29 RX ADMIN — IPRATROPIUM BROMIDE AND ALBUTEROL SULFATE 1 AMPULE: .5; 2.5 SOLUTION RESPIRATORY (INHALATION) at 18:11

## 2023-03-29 RX ADMIN — IPRATROPIUM BROMIDE AND ALBUTEROL SULFATE 1 AMPULE: .5; 2.5 SOLUTION RESPIRATORY (INHALATION) at 21:33

## 2023-03-29 RX ADMIN — IPRATROPIUM BROMIDE AND ALBUTEROL SULFATE 1 AMPULE: .5; 2.5 SOLUTION RESPIRATORY (INHALATION) at 16:28

## 2023-03-29 RX ADMIN — GUAIFENESIN 600 MG: 600 TABLET, EXTENDED RELEASE ORAL at 22:14

## 2023-03-29 RX ADMIN — MAGNESIUM SULFATE HEPTAHYDRATE 4000 MG: 40 INJECTION, SOLUTION INTRAVENOUS at 16:43

## 2023-03-29 RX ADMIN — ANTACID TABLETS 500 MG: 500 TABLET, CHEWABLE ORAL at 22:52

## 2023-03-29 ASSESSMENT — ENCOUNTER SYMPTOMS
ABDOMINAL PAIN: 0
NAUSEA: 0
ABDOMINAL PAIN: 0
COUGH: 1
WHEEZING: 1
SHORTNESS OF BREATH: 1
CHEST TIGHTNESS: 1
RHINORRHEA: 0
COLOR CHANGE: 0
TROUBLE SWALLOWING: 0
SHORTNESS OF BREATH: 0
VOMITING: 0
APNEA: 0
CONSTIPATION: 0
ABDOMINAL DISTENTION: 0
PHOTOPHOBIA: 0
ABDOMINAL PAIN: 1
SORE THROAT: 0
DIARRHEA: 0
EYE PAIN: 0
BACK PAIN: 0

## 2023-03-29 ASSESSMENT — PAIN - FUNCTIONAL ASSESSMENT: PAIN_FUNCTIONAL_ASSESSMENT: 0-10

## 2023-03-29 ASSESSMENT — PAIN DESCRIPTION - LOCATION
LOCATION: CHEST;BACK
LOCATION: HEAD

## 2023-03-29 ASSESSMENT — PAIN DESCRIPTION - PAIN TYPE: TYPE: ACUTE PAIN

## 2023-03-29 ASSESSMENT — PAIN SCALES - GENERAL
PAINLEVEL_OUTOF10: 10
PAINLEVEL_OUTOF10: 6

## 2023-03-29 ASSESSMENT — PAIN DESCRIPTION - FREQUENCY: FREQUENCY: CONTINUOUS

## 2023-03-29 ASSESSMENT — PAIN DESCRIPTION - ORIENTATION: ORIENTATION: MID

## 2023-03-29 ASSESSMENT — PAIN DESCRIPTION - DESCRIPTORS: DESCRIPTORS: NUMBNESS;BURNING

## 2023-03-29 NOTE — PROGRESS NOTES
each 1    metoprolol succinate (TOPROL XL) 50 MG extended release tablet Take 1 tablet by mouth daily 180 tablet 3    furosemide (LASIX) 20 MG tablet Take 1 tablet by mouth daily 90 tablet 3    Continuous Blood Gluc Sensor (DEXCOM G6 SENSOR) MISC       JARDIANCE 25 MG tablet Take 25 mg by mouth once      metFORMIN (GLUCOPHAGE) 1000 MG tablet TAKE 1 TABLET BY MOUTH TWICE DAILY 60 tablet 3    Handicap Placard MISC by Does not apply route Expiration date:12/30/27 1 each 0    losartan (COZAAR) 25 MG tablet Take 1 tablet by mouth daily 30 tablet 3    PROAIR  (90 Base) MCG/ACT inhaler Inhale 1 puff into the lungs every 4 hours as needed for Wheezing 9 g 0    Multiple Vitamins-Minerals (THERAPEUTIC MULTIVITAMIN-MINERALS) tablet Take 1 tablet by mouth daily      rosuvastatin (CRESTOR) 40 MG tablet TAKE 1 TABLET BY MOUTH ONCE DAILY      clopidogrel (PLAVIX) 75 MG tablet Take 75 mg by mouth daily      montelukast (SINGULAIR) 10 MG tablet Take 10 mg by mouth nightly      EPINEPHrine (EPIPEN) 0.3 MG/0.3ML SOAJ injection Inject 0.3 mg into the muscle as needed      magnesium oxide (MAG-OX) 400 MG tablet Take 400 mg by mouth 2 times daily      potassium chloride (KLOR-CON M) 10 MEQ extended release tablet Take 10 mEq by mouth daily      aspirin 81 MG EC tablet Take 1 tablet by mouth daily 90 tablet 3    nitroGLYCERIN (NITROSTAT) 0.4 MG SL tablet up to max of 3 total doses. If no relief after 1 dose, call 911. 25 tablet 3    predniSONE (DELTASONE) 10 MG tablet Please take 40 mg po daily x 3 days, then 30 mg po daily x 3 days, then 20 mg po daily x 3 days then 10 mg po daily x 3 days then stop (Patient not taking: No sig reported) 30 tablet 0    benralizumab (FASENRA) 30 MG/ML SOSY injection Inject 30 mg into the skin (Patient not taking: No sig reported)       Allergies:  Shellfish-derived products, Cumin oil, and Guggulipid-black pepper    Review of Systems   Constitutional:  Negative for fatigue and fever.    Respiratory:

## 2023-03-29 NOTE — ED PROVIDER NOTES
Result   No acute process. ED BEDSIDE ULTRASOUND:   Performed by ED Physician - none    LABS:  Labs Reviewed   LACTIC ACID - Abnormal; Notable for the following components:       Result Value    Lactic Acid 2.4 (*)     All other components within normal limits   COMPREHENSIVE METABOLIC PANEL - Abnormal; Notable for the following components:    Chloride 109 (*)     Glucose 145 (*)     All other components within normal limits   CBC WITH AUTO DIFFERENTIAL - Abnormal; Notable for the following components:    Neutrophils Absolute 7.7 (*)     Lymphocytes Absolute 0.7 (*)     Monocytes Absolute 0.1 (*)     All other components within normal limits   BASIC METABOLIC PANEL W/ REFLEX TO MG FOR LOW K - Abnormal; Notable for the following components:    Glucose 147 (*)     All other components within normal limits   LACTIC ACID - Abnormal; Notable for the following components:    Lactic Acid 2.3 (*)     All other components within normal limits   POCT ARTERIAL - Abnormal; Notable for the following components:    POC Chloride 111 (*)     POC Glucose 161 (*)     pO2, Arterial 60 (*)     O2 Sat, Arterial 90 (*)     Lactate 2.16 (*)     All other components within normal limits   POCT GLUCOSE - Abnormal; Notable for the following components:    POC Glucose 177 (*)     All other components within normal limits   POCT GLUCOSE - Abnormal; Notable for the following components:    POC Glucose 179 (*)     All other components within normal limits   POCT GLUCOSE - Abnormal; Notable for the following components:    POC Glucose 162 (*)     All other components within normal limits   RESPIRATORY PANEL, MOLECULAR, WITH COVID-19   TROPONIN   CBC WITH AUTO DIFFERENTIAL   TROPONIN   TROPONIN   POCT EPOC BLOOD GAS, LACTIC ACID, ICA   POCT GLUCOSE   POCT GLUCOSE   POCT GLUCOSE   POCT GLUCOSE       All other labs were within normal range or not returned as of this dictation.     EMERGENCY DEPARTMENT COURSE and DIFFERENTIAL

## 2023-03-29 NOTE — PROGRESS NOTES
Assessment and Plan       Diagnosis Orders   1. Acute respiratory failure with hypoxia (HCC)  methylPREDNISolone acetate (DEPO-MEDROL) injection 80 mg      2. Severe persistent asthma with acute exacerbation  methylPREDNISolone acetate (DEPO-MEDROL) injection 80 mg      3. Lung nodule        4. Asthma-COPD overlap syndrome ([facility] [address]        5. Gastroesophageal reflux disease without esophagitis        6. Class 2 obesity due to excess calories without serious comorbidity with body mass index (BMI) of 37.0 to 37.9 in adult        7. Personal history of tobacco use        8. Lung nodules          Acute hypoxic respiratory failure secondary to asthma exacerbation  Patient is at risk of severe disease, will send urgently to emergency room, I spoke with Dr. Bernabe Barba over the phone  80 mg of methylprednisolone IM x1 given in office  Patient has multiple lung nodules, with groundglass changes, largest in the right upper lobe, plan for navigational bronchoscopy, will cancel procedure this Friday we will reschedule when patient improves. Patient will need CT chest with 1 mm cuts for navigational bronchoscopy  Otherwise continue current maintenance inhalers. No orders of the defined types were placed in this encounter. No orders of the defined types were placed in this encounter. Discussed with patient the importance of exercise and weight control and  overall health and well-being. Reviewed with the patient: current clinical status, medications, activities and diet. Side effects, adverse effects of the medication prescribed today, as well as treatment plan and result expectations have been discussed with the patient who expresses understanding and desires to proceed. Return in about 3 weeks (around 4/19/2023). I spent  40 min with this patient, greater the 50% of this time was spent in counseling and/or coordinating of care.       Prem Alvarez MD

## 2023-03-30 LAB
ANION GAP SERPL CALCULATED.3IONS-SCNC: 13 MEQ/L (ref 9–15)
BASOPHILS # BLD: 0 K/UL (ref 0–0.2)
BASOPHILS NFR BLD: 0.1 %
BUN SERPL-MCNC: 13 MG/DL (ref 6–20)
CALCIUM SERPL-MCNC: 9.3 MG/DL (ref 8.5–9.9)
CHLORIDE SERPL-SCNC: 105 MEQ/L (ref 95–107)
CO2 SERPL-SCNC: 21 MEQ/L (ref 20–31)
CREAT SERPL-MCNC: 0.51 MG/DL (ref 0.5–0.9)
EOSINOPHIL # BLD: 0 K/UL (ref 0–0.7)
EOSINOPHIL NFR BLD: 0 %
ERYTHROCYTE [DISTWIDTH] IN BLOOD BY AUTOMATED COUNT: 14.2 % (ref 11.5–14.5)
GLUCOSE BLD-MCNC: 162 MG/DL (ref 70–99)
GLUCOSE BLD-MCNC: 179 MG/DL (ref 70–99)
GLUCOSE BLD-MCNC: 235 MG/DL (ref 70–99)
GLUCOSE BLD-MCNC: 251 MG/DL (ref 70–99)
GLUCOSE SERPL-MCNC: 147 MG/DL (ref 70–99)
HCT VFR BLD AUTO: 45.7 % (ref 37–47)
HGB BLD-MCNC: 15.5 G/DL (ref 12–16)
LACTATE BLDV-SCNC: 2.3 MMOL/L (ref 0.5–2.2)
LYMPHOCYTES # BLD: 0.7 K/UL (ref 1–4.8)
LYMPHOCYTES NFR BLD: 8.1 %
MCH RBC QN AUTO: 30.3 PG (ref 27–31.3)
MCHC RBC AUTO-ENTMCNC: 33.9 % (ref 33–37)
MCV RBC AUTO: 89.4 FL (ref 79.4–94.8)
MONOCYTES # BLD: 0.1 K/UL (ref 0.2–0.8)
MONOCYTES NFR BLD: 0.7 %
NEUTROPHILS # BLD: 7.7 K/UL (ref 1.4–6.5)
NEUTS SEG NFR BLD: 91.1 %
PERFORMED ON: ABNORMAL
PLATELET # BLD AUTO: 192 K/UL (ref 130–400)
POTASSIUM SERPL-SCNC: 4.5 MEQ/L (ref 3.4–4.9)
RBC # BLD AUTO: 5.12 M/UL (ref 4.2–5.4)
SODIUM SERPL-SCNC: 139 MEQ/L (ref 135–144)
TROPONIN T SERPL-MCNC: <0.01 NG/ML (ref 0–0.01)
TROPONIN T SERPL-MCNC: <0.01 NG/ML (ref 0–0.01)
WBC # BLD AUTO: 8.4 K/UL (ref 4.8–10.8)

## 2023-03-30 PROCEDURE — 6370000000 HC RX 637 (ALT 250 FOR IP)

## 2023-03-30 PROCEDURE — 94640 AIRWAY INHALATION TREATMENT: CPT

## 2023-03-30 PROCEDURE — 99223 1ST HOSP IP/OBS HIGH 75: CPT | Performed by: INTERNAL MEDICINE

## 2023-03-30 PROCEDURE — 94761 N-INVAS EAR/PLS OXIMETRY MLT: CPT

## 2023-03-30 PROCEDURE — 1210000000 HC MED SURG R&B

## 2023-03-30 PROCEDURE — 80048 BASIC METABOLIC PNL TOTAL CA: CPT

## 2023-03-30 PROCEDURE — 36415 COLL VENOUS BLD VENIPUNCTURE: CPT

## 2023-03-30 PROCEDURE — 85025 COMPLETE CBC W/AUTO DIFF WBC: CPT

## 2023-03-30 PROCEDURE — 6360000002 HC RX W HCPCS

## 2023-03-30 PROCEDURE — 2700000000 HC OXYGEN THERAPY PER DAY

## 2023-03-30 PROCEDURE — 84484 ASSAY OF TROPONIN QUANT: CPT

## 2023-03-30 PROCEDURE — 83605 ASSAY OF LACTIC ACID: CPT

## 2023-03-30 PROCEDURE — 6370000000 HC RX 637 (ALT 250 FOR IP): Performed by: INTERNAL MEDICINE

## 2023-03-30 PROCEDURE — 93005 ELECTROCARDIOGRAM TRACING: CPT

## 2023-03-30 RX ORDER — DOCUSATE SODIUM 100 MG/1
100 CAPSULE, LIQUID FILLED ORAL DAILY
Status: DISCONTINUED | OUTPATIENT
Start: 2023-03-30 | End: 2023-04-02 | Stop reason: HOSPADM

## 2023-03-30 RX ORDER — INSULIN GLARGINE 100 [IU]/ML
5 INJECTION, SOLUTION SUBCUTANEOUS NIGHTLY
Status: DISCONTINUED | OUTPATIENT
Start: 2023-03-30 | End: 2023-04-01

## 2023-03-30 RX ADMIN — ANTACID TABLETS 500 MG: 500 TABLET, CHEWABLE ORAL at 06:41

## 2023-03-30 RX ADMIN — INSULIN LISPRO 4 UNITS: 100 INJECTION, SOLUTION INTRAVENOUS; SUBCUTANEOUS at 17:14

## 2023-03-30 RX ADMIN — BUPROPION HYDROCHLORIDE 150 MG: 150 TABLET, EXTENDED RELEASE ORAL at 10:56

## 2023-03-30 RX ADMIN — IPRATROPIUM BROMIDE AND ALBUTEROL SULFATE 1 AMPULE: .5; 2.5 SOLUTION RESPIRATORY (INHALATION) at 11:07

## 2023-03-30 RX ADMIN — GUAIFENESIN 600 MG: 600 TABLET, EXTENDED RELEASE ORAL at 10:56

## 2023-03-30 RX ADMIN — DOCUSATE SODIUM 100 MG: 100 CAPSULE ORAL at 15:35

## 2023-03-30 RX ADMIN — IPRATROPIUM BROMIDE AND ALBUTEROL SULFATE 1 AMPULE: .5; 2.5 SOLUTION RESPIRATORY (INHALATION) at 19:18

## 2023-03-30 RX ADMIN — METOPROLOL SUCCINATE 50 MG: 50 TABLET, EXTENDED RELEASE ORAL at 10:56

## 2023-03-30 RX ADMIN — Medication 400 MG: at 20:27

## 2023-03-30 RX ADMIN — ACETAMINOPHEN 650 MG: 325 TABLET ORAL at 06:38

## 2023-03-30 RX ADMIN — POTASSIUM CHLORIDE 10 MEQ: 1500 TABLET, EXTENDED RELEASE ORAL at 10:55

## 2023-03-30 RX ADMIN — ROSUVASTATIN 40 MG: 40 TABLET, FILM COATED ORAL at 10:55

## 2023-03-30 RX ADMIN — GUAIFENESIN 600 MG: 600 TABLET, EXTENDED RELEASE ORAL at 20:26

## 2023-03-30 RX ADMIN — LOSARTAN POTASSIUM 25 MG: 25 TABLET, FILM COATED ORAL at 10:55

## 2023-03-30 RX ADMIN — IPRATROPIUM BROMIDE AND ALBUTEROL SULFATE 1 AMPULE: .5; 2.5 SOLUTION RESPIRATORY (INHALATION) at 15:19

## 2023-03-30 RX ADMIN — CLOPIDOGREL BISULFATE 75 MG: 75 TABLET ORAL at 10:56

## 2023-03-30 RX ADMIN — ASPIRIN 81 MG: 81 TABLET, COATED ORAL at 10:56

## 2023-03-30 RX ADMIN — INSULIN GLARGINE 5 UNITS: 100 INJECTION, SOLUTION SUBCUTANEOUS at 20:43

## 2023-03-30 RX ADMIN — Medication 400 MG: at 01:21

## 2023-03-30 RX ADMIN — METHYLPREDNISOLONE SODIUM SUCCINATE 40 MG: 40 INJECTION, POWDER, FOR SOLUTION INTRAMUSCULAR; INTRAVENOUS at 10:55

## 2023-03-30 RX ADMIN — FUROSEMIDE 20 MG: 20 TABLET ORAL at 10:55

## 2023-03-30 RX ADMIN — Medication 400 MG: at 10:55

## 2023-03-30 RX ADMIN — ANTACID TABLETS 500 MG: 500 TABLET, CHEWABLE ORAL at 15:35

## 2023-03-30 RX ADMIN — IPRATROPIUM BROMIDE AND ALBUTEROL SULFATE 1 AMPULE: .5; 2.5 SOLUTION RESPIRATORY (INHALATION) at 07:21

## 2023-03-30 RX ADMIN — ENOXAPARIN SODIUM 40 MG: 100 INJECTION SUBCUTANEOUS at 10:56

## 2023-03-30 ASSESSMENT — PAIN SCALES - GENERAL
PAINLEVEL_OUTOF10: 3
PAINLEVEL_OUTOF10: 0
PAINLEVEL_OUTOF10: 6

## 2023-03-30 ASSESSMENT — PAIN DESCRIPTION - LOCATION: LOCATION: HEAD

## 2023-03-30 NOTE — H&P
normal and S2 normal. No murmur heard. Pulmonary:      Effort: Tachypnea and respiratory distress present. Breath sounds: Normal air entry. No stridor or decreased air movement. Examination of the right-middle field reveals rhonchi. Examination of the right-lower field reveals rhonchi. Examination of the left-lower field reveals rhonchi. Wheezing and rhonchi present. No decreased breath sounds or rales. Chest:      Chest wall: No tenderness. Abdominal:      General: Abdomen is flat. Bowel sounds are normal. There is no distension. Palpations: Abdomen is soft. Tenderness: There is no abdominal tenderness. There is no guarding. Musculoskeletal:         General: No swelling, tenderness, deformity or signs of injury. Normal range of motion. Cervical back: Normal range of motion and neck supple. No rigidity or tenderness. Right lower leg: No edema. Left lower leg: No edema. Feet:      Right foot:      Skin integrity: Skin integrity normal.      Left foot:      Skin integrity: Skin integrity normal.   Skin:     General: Skin is warm and dry. Capillary Refill: Capillary refill takes less than 2 seconds. Coloration: Skin is not jaundiced or pale. Findings: No bruising, erythema, lesion or rash. Nails: There is no clubbing. Neurological:      General: No focal deficit present. Mental Status: She is alert and oriented to person, place, and time. Mental status is at baseline. Sensory: Sensation is intact. Motor: Motor function is intact. No weakness. Psychiatric:         Attention and Perception: Attention and perception normal.         Mood and Affect: Mood and affect normal.         Speech: Speech normal.         Behavior: Behavior normal. Behavior is cooperative. Thought Content:  Thought content normal.         Cognition and Memory: Cognition and memory normal.         Judgment: Judgment normal.     Neurologic Exam     Mental Status

## 2023-03-30 NOTE — PLAN OF CARE
Problem: Pain  Goal: Verbalizes/displays adequate comfort level or baseline comfort level  Flowsheets (Taken 3/30/2023 0141)  Verbalizes/displays adequate comfort level or baseline comfort level:   Encourage patient to monitor pain and request assistance   Assess pain using appropriate pain scale

## 2023-03-30 NOTE — PLAN OF CARE
Problem: Discharge Planning  Goal: Discharge to home or other facility with appropriate resources  Recent Flowsheet Documentation  Taken 3/29/2023 8476 by Alyssa Wadsworth RN  Discharge to home or other facility with appropriate resources:   Identify barriers to discharge with patient and caregiver   Arrange for needed discharge resources and transportation as appropriate

## 2023-03-30 NOTE — CONSULTS
There is no airspace infiltration or consolidation identified. There is patchy coarse density seen in the posterior aspect of the right upper lobe stable and unchanged when compared to the prior examination. No evidence of pleural effusion. No pneumothorax. Upper Abdomen: Visualized upper abdomen reveals probable stones seen at the neck of the gallbladder. Soft Tissues/Bones: Minimal degenerative changes seen within the spine. No acute chest wall abnormality. Persistent and new reticulonodular densities and micro nodular density seen in the lung fields bilaterally which is nonspecific and PE can be seen in atypical infections. No consolidative infiltrate present. No definite pleural effusion or pneumothorax     XR CHEST PORTABLE    Result Date: 3/29/2023  EXAMINATION: ONE XRAY VIEW OF THE CHEST 3/29/2023 4:47 pm COMPARISON: 12/22/2020 HISTORY: ORDERING SYSTEM PROVIDED HISTORY: sob TECHNOLOGIST PROVIDED HISTORY: Reason for exam:->sob Is the patient pregnant?->No What reading provider will be dictating this exam?->CRC FINDINGS: The lungs are without acute focal process. There is no effusion or pneumothorax. The cardiomediastinal silhouette is without acute process. The osseous structures are without acute process. Mild reticular opacities in both lungs have not changed. No acute process. XR CHEST PORTABLE    Result Date: 3/1/2023  EXAMINATION: ONE XRAY VIEW OF THE CHEST 3/1/2023 12:07 am COMPARISON: 12/28/2022 HISTORY: ORDERING SYSTEM PROVIDED HISTORY: sob TECHNOLOGIST PROVIDED HISTORY: Reason for exam:->sob Is the patient pregnant?->No What reading provider will be dictating this exam?->CRC FINDINGS: The lungs are without acute focal process. There is no effusion or pneumothorax. The cardiomediastinal silhouette is without acute process. The osseous structures are without acute process. No acute process.        Assessment and  plan:     Acute respiratory failure due to COPD exacerbation  Acute

## 2023-03-31 LAB
ANION GAP SERPL CALCULATED.3IONS-SCNC: 15 MEQ/L (ref 9–15)
BASOPHILS # BLD: 0 K/UL (ref 0–0.2)
BASOPHILS NFR BLD: 0.3 %
BUN SERPL-MCNC: 19 MG/DL (ref 6–20)
CALCIUM SERPL-MCNC: 9 MG/DL (ref 8.5–9.9)
CHLORIDE SERPL-SCNC: 105 MEQ/L (ref 95–107)
CO2 SERPL-SCNC: 19 MEQ/L (ref 20–31)
CREAT SERPL-MCNC: 0.49 MG/DL (ref 0.5–0.9)
EOSINOPHIL # BLD: 0 K/UL (ref 0–0.7)
EOSINOPHIL NFR BLD: 0 %
ERYTHROCYTE [DISTWIDTH] IN BLOOD BY AUTOMATED COUNT: 14.8 % (ref 11.5–14.5)
GLUCOSE BLD-MCNC: 125 MG/DL (ref 70–99)
GLUCOSE BLD-MCNC: 193 MG/DL (ref 70–99)
GLUCOSE BLD-MCNC: 196 MG/DL (ref 70–99)
GLUCOSE BLD-MCNC: 203 MG/DL (ref 70–99)
GLUCOSE SERPL-MCNC: 148 MG/DL (ref 70–99)
HCT VFR BLD AUTO: 41 % (ref 37–47)
HGB BLD-MCNC: 13.9 G/DL (ref 12–16)
LYMPHOCYTES # BLD: 2.3 K/UL (ref 1–4.8)
LYMPHOCYTES NFR BLD: 22.7 %
MCH RBC QN AUTO: 31 PG (ref 27–31.3)
MCHC RBC AUTO-ENTMCNC: 33.9 % (ref 33–37)
MCV RBC AUTO: 91.4 FL (ref 79.4–94.8)
MONOCYTES # BLD: 0.8 K/UL (ref 0.2–0.8)
MONOCYTES NFR BLD: 8.1 %
NEUTROPHILS # BLD: 6.9 K/UL (ref 1.4–6.5)
NEUTS SEG NFR BLD: 68.9 %
PERFORMED ON: ABNORMAL
PLATELET # BLD AUTO: 178 K/UL (ref 130–400)
POTASSIUM SERPL-SCNC: 4.1 MEQ/L (ref 3.4–4.9)
RBC # BLD AUTO: 4.49 M/UL (ref 4.2–5.4)
SODIUM SERPL-SCNC: 139 MEQ/L (ref 135–144)
TROPONIN T SERPL-MCNC: <0.01 NG/ML (ref 0–0.01)
WBC # BLD AUTO: 10 K/UL (ref 4.8–10.8)

## 2023-03-31 PROCEDURE — 1210000000 HC MED SURG R&B

## 2023-03-31 PROCEDURE — 6360000002 HC RX W HCPCS: Performed by: INTERNAL MEDICINE

## 2023-03-31 PROCEDURE — 36415 COLL VENOUS BLD VENIPUNCTURE: CPT

## 2023-03-31 PROCEDURE — 2580000003 HC RX 258: Performed by: INTERNAL MEDICINE

## 2023-03-31 PROCEDURE — 6360000002 HC RX W HCPCS

## 2023-03-31 PROCEDURE — 80048 BASIC METABOLIC PNL TOTAL CA: CPT

## 2023-03-31 PROCEDURE — 6370000000 HC RX 637 (ALT 250 FOR IP)

## 2023-03-31 PROCEDURE — 2700000000 HC OXYGEN THERAPY PER DAY

## 2023-03-31 PROCEDURE — 84484 ASSAY OF TROPONIN QUANT: CPT

## 2023-03-31 PROCEDURE — 85025 COMPLETE CBC W/AUTO DIFF WBC: CPT

## 2023-03-31 PROCEDURE — 99232 SBSQ HOSP IP/OBS MODERATE 35: CPT | Performed by: INTERNAL MEDICINE

## 2023-03-31 PROCEDURE — 6370000000 HC RX 637 (ALT 250 FOR IP): Performed by: INTERNAL MEDICINE

## 2023-03-31 PROCEDURE — A4216 STERILE WATER/SALINE, 10 ML: HCPCS | Performed by: INTERNAL MEDICINE

## 2023-03-31 PROCEDURE — 94761 N-INVAS EAR/PLS OXIMETRY MLT: CPT

## 2023-03-31 PROCEDURE — 2500000003 HC RX 250 WO HCPCS: Performed by: INTERNAL MEDICINE

## 2023-03-31 PROCEDURE — 94640 AIRWAY INHALATION TREATMENT: CPT

## 2023-03-31 RX ORDER — METHYLPREDNISOLONE SODIUM SUCCINATE 40 MG/ML
40 INJECTION, POWDER, LYOPHILIZED, FOR SOLUTION INTRAMUSCULAR; INTRAVENOUS EVERY 8 HOURS
Status: DISCONTINUED | OUTPATIENT
Start: 2023-03-31 | End: 2023-04-01

## 2023-03-31 RX ADMIN — GUAIFENESIN 600 MG: 600 TABLET, EXTENDED RELEASE ORAL at 09:54

## 2023-03-31 RX ADMIN — INSULIN GLARGINE 5 UNITS: 100 INJECTION, SOLUTION SUBCUTANEOUS at 20:59

## 2023-03-31 RX ADMIN — CLOPIDOGREL BISULFATE 75 MG: 75 TABLET ORAL at 09:53

## 2023-03-31 RX ADMIN — FAMOTIDINE 20 MG: 10 INJECTION, SOLUTION INTRAVENOUS at 20:58

## 2023-03-31 RX ADMIN — METHYLPREDNISOLONE SODIUM SUCCINATE 40 MG: 40 INJECTION, POWDER, FOR SOLUTION INTRAMUSCULAR; INTRAVENOUS at 18:34

## 2023-03-31 RX ADMIN — ANTACID TABLETS 500 MG: 500 TABLET, CHEWABLE ORAL at 10:08

## 2023-03-31 RX ADMIN — DOCUSATE SODIUM 100 MG: 100 CAPSULE ORAL at 09:54

## 2023-03-31 RX ADMIN — ENOXAPARIN SODIUM 40 MG: 100 INJECTION SUBCUTANEOUS at 09:52

## 2023-03-31 RX ADMIN — Medication 400 MG: at 20:59

## 2023-03-31 RX ADMIN — ROSUVASTATIN 40 MG: 40 TABLET, FILM COATED ORAL at 09:53

## 2023-03-31 RX ADMIN — AZITHROMYCIN DIHYDRATE 250 MG: 500 INJECTION, POWDER, LYOPHILIZED, FOR SOLUTION INTRAVENOUS at 00:04

## 2023-03-31 RX ADMIN — POTASSIUM CHLORIDE 10 MEQ: 1500 TABLET, EXTENDED RELEASE ORAL at 09:53

## 2023-03-31 RX ADMIN — BUPROPION HYDROCHLORIDE 150 MG: 150 TABLET, EXTENDED RELEASE ORAL at 09:53

## 2023-03-31 RX ADMIN — IPRATROPIUM BROMIDE AND ALBUTEROL SULFATE 1 AMPULE: .5; 2.5 SOLUTION RESPIRATORY (INHALATION) at 19:21

## 2023-03-31 RX ADMIN — Medication 400 MG: at 09:57

## 2023-03-31 RX ADMIN — IPRATROPIUM BROMIDE AND ALBUTEROL SULFATE 1 AMPULE: .5; 2.5 SOLUTION RESPIRATORY (INHALATION) at 07:11

## 2023-03-31 RX ADMIN — ASPIRIN 81 MG: 81 TABLET, COATED ORAL at 09:54

## 2023-03-31 RX ADMIN — IPRATROPIUM BROMIDE AND ALBUTEROL SULFATE 1 AMPULE: .5; 2.5 SOLUTION RESPIRATORY (INHALATION) at 15:30

## 2023-03-31 RX ADMIN — METHYLPREDNISOLONE SODIUM SUCCINATE 40 MG: 40 INJECTION, POWDER, FOR SOLUTION INTRAMUSCULAR; INTRAVENOUS at 09:52

## 2023-03-31 RX ADMIN — METOPROLOL SUCCINATE 50 MG: 50 TABLET, EXTENDED RELEASE ORAL at 09:53

## 2023-03-31 RX ADMIN — INSULIN LISPRO 2 UNITS: 100 INJECTION, SOLUTION INTRAVENOUS; SUBCUTANEOUS at 12:58

## 2023-03-31 RX ADMIN — GUAIFENESIN 600 MG: 600 TABLET, EXTENDED RELEASE ORAL at 20:58

## 2023-03-31 RX ADMIN — LOSARTAN POTASSIUM 25 MG: 25 TABLET, FILM COATED ORAL at 09:53

## 2023-03-31 RX ADMIN — FUROSEMIDE 20 MG: 20 TABLET ORAL at 09:53

## 2023-03-31 RX ADMIN — IPRATROPIUM BROMIDE AND ALBUTEROL SULFATE 1 AMPULE: .5; 2.5 SOLUTION RESPIRATORY (INHALATION) at 10:13

## 2023-03-31 NOTE — CARE COORDINATION
Case Management Assessment  Initial Evaluation    Date/Time of Evaluation: 3/30/2023 11:39 AM  Assessment Completed by: ALANNAH Cee, ADYW    If patient is discharged prior to next notation, then this note serves as note for discharge by case management. Patient Name: Mark Camilo                   YOB: 1970  Diagnosis: Moderate persistent asthma with exacerbation [J45.41]  COPD exacerbation ([address] [J44.1]                   Date / Time: 3/29/2023  4:03 PM    Patient Admission Status: Inpatient   Readmission Risk (Low < 19, Mod (19-27), High > 27): Readmission Risk Score: 18.9    Current PCP: GIFTY Matthews  PCP verified by CM? Yes    Chart Reviewed: Yes      History Provided by:    Patient Orientation: Alert and Oriented    Patient Cognition: Alert    Hospitalization in the last 30 days (Readmission):  No    If yes, Readmission Assessment in CM Navigator will be completed. Advance Directives:      Code Status: Full Code   Patient's Primary Decision Maker is:        Discharge Planning:    Patient lives with: Spouse/Significant Other Type of Home: Apartment  Primary Care Giver: Self  Patient Support Systems include: Children   Current Financial resources:    Current community resources:    Current services prior to admission: Other (Comment) (home health nurse per/pt)            Current DME:              Type of Home Care services:  Nursing Services    ADLS  Prior functional level: Independent in ADLs/IADLs  Current functional level: Assistance with the following:, Bathing, Dressing    PT AM-PAC:   /24  OT AM-PAC:   /24    Family can provide assistance at DC: Yes  Would you like Case Management to discuss the discharge plan with any other family members/significant others, and if so, who?  No  Plans to Return to Present Housing: Yes  Other Identified Issues/Barriers to RETURNING to current housing:   MEDICAL COMPLICATIONS     Potential Assistance needed at discharge: N/A
Definition of COPD discussed again. Pt was educated by myself 3/3/2023. Lung function explained. Types of COPD differentiated for patient. Symptoms discussed including: difficulty breathing, SOB, coughing, excess mucous, weakness and fatigue, or wheezing. Causes discussed. Pt quit smoking, and has been exposed to air pollution or dust.   Different testing for COPD and most common treatments reviewed. Importance of preventing infection including hand hygiene, keeping inhaler mouthpieces clean, and getting the flu and pneumonia vaccinations. Care Map of what to expect while hospitalized reviewed with patient. Ways to ease SOB explained including pursed lip breathing and diaphragmatic breathing. Energy conservation discussed. Possible medications that may be ordered were reviewed. I stressed that their physician would make that decision and explained the importance of taking the medication as directed. Good nutrition choices discussed. Patient attributes this exacerbation to allergies, medication changes, and possible lung nodules. She reports that Dr. Faisal Milligan has mentioned a bronchoscopy to make a final diagnosis. COPD booklet and zone pamphlet left for patient review. Patient denies any further questions at this time.    Electronically signed by Shreya Wallace RN on 3/30/2023 at 4:43 PM
Quality round completed with care management team. LSW meet with pt at bedside. Discuss DC Plan with pt. Pt want to go [facility] with [facility]. Need Resumption order. Notify CM. ADYW will follow.      Electronically signed by MYRTLE Calderon on 3/31/2023 at 11:18 AM
NEEDS AND PLANS TO RETURN HOME WITH [facility]. The Plan for Transition of Care is related to the following treatment goals of Moderate persistent asthma with exacerbation [J45.41]  COPD exacerbation ([address] [K64.7]    IF APPLICABLE: The Patient and/or patient representative Antoine Burns and her family were provided with a choice of provider and agrees with the discharge plan. Freedom of choice list with basic dialogue that supports the patient's individualized plan of care/goals and shares the quality data associated with the providers was provided to: Patient   Patient Representative Name:       The Patient and/or Patient Representative Agree with the Discharge Plan?  Yes    Alyssa Davis RN  Case Management Department

## 2023-04-01 LAB
ANION GAP SERPL CALCULATED.3IONS-SCNC: 11 MEQ/L (ref 9–15)
BASOPHILS # BLD: 0 K/UL (ref 0–0.2)
BASOPHILS NFR BLD: 0.1 %
BUN SERPL-MCNC: 20 MG/DL (ref 6–20)
CALCIUM SERPL-MCNC: 9.3 MG/DL (ref 8.5–9.9)
CHLORIDE SERPL-SCNC: 106 MEQ/L (ref 95–107)
CO2 SERPL-SCNC: 22 MEQ/L (ref 20–31)
CREAT SERPL-MCNC: 0.5 MG/DL (ref 0.5–0.9)
EOSINOPHIL # BLD: 0 K/UL (ref 0–0.7)
EOSINOPHIL NFR BLD: 0 %
ERYTHROCYTE [DISTWIDTH] IN BLOOD BY AUTOMATED COUNT: 14.3 % (ref 11.5–14.5)
GLUCOSE BLD-MCNC: 219 MG/DL (ref 70–99)
GLUCOSE BLD-MCNC: 247 MG/DL (ref 70–99)
GLUCOSE BLD-MCNC: 248 MG/DL (ref 70–99)
GLUCOSE BLD-MCNC: 262 MG/DL (ref 70–99)
GLUCOSE SERPL-MCNC: 178 MG/DL (ref 70–99)
HCT VFR BLD AUTO: 43.4 % (ref 37–47)
HGB BLD-MCNC: 14.4 G/DL (ref 12–16)
LYMPHOCYTES # BLD: 0.9 K/UL (ref 1–4.8)
LYMPHOCYTES NFR BLD: 11.2 %
MCH RBC QN AUTO: 29.9 PG (ref 27–31.3)
MCHC RBC AUTO-ENTMCNC: 33.2 % (ref 33–37)
MCV RBC AUTO: 90 FL (ref 79.4–94.8)
MONOCYTES # BLD: 0.1 K/UL (ref 0.2–0.8)
MONOCYTES NFR BLD: 1.6 %
NEUTROPHILS # BLD: 6.6 K/UL (ref 1.4–6.5)
NEUTS SEG NFR BLD: 87.1 %
PERFORMED ON: ABNORMAL
PLATELET # BLD AUTO: 187 K/UL (ref 130–400)
POTASSIUM SERPL-SCNC: 4.4 MEQ/L (ref 3.4–4.9)
RBC # BLD AUTO: 4.82 M/UL (ref 4.2–5.4)
SODIUM SERPL-SCNC: 139 MEQ/L (ref 135–144)
WBC # BLD AUTO: 7.6 K/UL (ref 4.8–10.8)

## 2023-04-01 PROCEDURE — 6360000002 HC RX W HCPCS: Performed by: INTERNAL MEDICINE

## 2023-04-01 PROCEDURE — 2700000000 HC OXYGEN THERAPY PER DAY

## 2023-04-01 PROCEDURE — 94640 AIRWAY INHALATION TREATMENT: CPT

## 2023-04-01 PROCEDURE — 85025 COMPLETE CBC W/AUTO DIFF WBC: CPT

## 2023-04-01 PROCEDURE — 36415 COLL VENOUS BLD VENIPUNCTURE: CPT

## 2023-04-01 PROCEDURE — 2500000003 HC RX 250 WO HCPCS: Performed by: INTERNAL MEDICINE

## 2023-04-01 PROCEDURE — 94761 N-INVAS EAR/PLS OXIMETRY MLT: CPT

## 2023-04-01 PROCEDURE — 99232 SBSQ HOSP IP/OBS MODERATE 35: CPT | Performed by: INTERNAL MEDICINE

## 2023-04-01 PROCEDURE — 94664 DEMO&/EVAL PT USE INHALER: CPT

## 2023-04-01 PROCEDURE — A4216 STERILE WATER/SALINE, 10 ML: HCPCS | Performed by: INTERNAL MEDICINE

## 2023-04-01 PROCEDURE — 80048 BASIC METABOLIC PNL TOTAL CA: CPT

## 2023-04-01 PROCEDURE — 2580000003 HC RX 258: Performed by: INTERNAL MEDICINE

## 2023-04-01 PROCEDURE — 6370000000 HC RX 637 (ALT 250 FOR IP): Performed by: INTERNAL MEDICINE

## 2023-04-01 PROCEDURE — 1210000000 HC MED SURG R&B

## 2023-04-01 PROCEDURE — 6370000000 HC RX 637 (ALT 250 FOR IP)

## 2023-04-01 PROCEDURE — 6360000002 HC RX W HCPCS

## 2023-04-01 RX ORDER — AZITHROMYCIN 250 MG/1
250 TABLET, FILM COATED ORAL DAILY
Status: DISCONTINUED | OUTPATIENT
Start: 2023-04-02 | End: 2023-04-02 | Stop reason: HOSPADM

## 2023-04-01 RX ORDER — BUDESONIDE 0.5 MG/2ML
0.5 INHALANT ORAL 2 TIMES DAILY
Status: DISCONTINUED | OUTPATIENT
Start: 2023-04-01 | End: 2023-04-02 | Stop reason: HOSPADM

## 2023-04-01 RX ORDER — INSULIN GLARGINE 100 [IU]/ML
6 INJECTION, SOLUTION SUBCUTANEOUS 2 TIMES DAILY
Status: DISCONTINUED | OUTPATIENT
Start: 2023-04-01 | End: 2023-04-02 | Stop reason: HOSPADM

## 2023-04-01 RX ORDER — PREDNISONE 20 MG/1
40 TABLET ORAL DAILY
Status: DISCONTINUED | OUTPATIENT
Start: 2023-04-02 | End: 2023-04-02 | Stop reason: HOSPADM

## 2023-04-01 RX ADMIN — FAMOTIDINE 20 MG: 10 INJECTION, SOLUTION INTRAVENOUS at 09:20

## 2023-04-01 RX ADMIN — LOSARTAN POTASSIUM 25 MG: 25 TABLET, FILM COATED ORAL at 09:19

## 2023-04-01 RX ADMIN — IPRATROPIUM BROMIDE AND ALBUTEROL SULFATE 1 AMPULE: .5; 2.5 SOLUTION RESPIRATORY (INHALATION) at 07:32

## 2023-04-01 RX ADMIN — ROSUVASTATIN 40 MG: 40 TABLET, FILM COATED ORAL at 09:19

## 2023-04-01 RX ADMIN — BUDESONIDE 500 MCG: 0.5 SUSPENSION RESPIRATORY (INHALATION) at 10:41

## 2023-04-01 RX ADMIN — BUDESONIDE 500 MCG: 0.5 SUSPENSION RESPIRATORY (INHALATION) at 19:05

## 2023-04-01 RX ADMIN — DOCUSATE SODIUM 100 MG: 100 CAPSULE ORAL at 09:19

## 2023-04-01 RX ADMIN — IPRATROPIUM BROMIDE AND ALBUTEROL SULFATE 1 AMPULE: .5; 2.5 SOLUTION RESPIRATORY (INHALATION) at 19:05

## 2023-04-01 RX ADMIN — POTASSIUM CHLORIDE 10 MEQ: 1500 TABLET, EXTENDED RELEASE ORAL at 09:21

## 2023-04-01 RX ADMIN — Medication 400 MG: at 09:19

## 2023-04-01 RX ADMIN — ASPIRIN 81 MG: 81 TABLET, COATED ORAL at 09:20

## 2023-04-01 RX ADMIN — FUROSEMIDE 20 MG: 20 TABLET ORAL at 09:20

## 2023-04-01 RX ADMIN — METOPROLOL SUCCINATE 50 MG: 50 TABLET, EXTENDED RELEASE ORAL at 09:19

## 2023-04-01 RX ADMIN — FAMOTIDINE 20 MG: 10 INJECTION, SOLUTION INTRAVENOUS at 22:02

## 2023-04-01 RX ADMIN — INSULIN LISPRO 2 UNITS: 100 INJECTION, SOLUTION INTRAVENOUS; SUBCUTANEOUS at 12:13

## 2023-04-01 RX ADMIN — INSULIN GLARGINE 6 UNITS: 100 INJECTION, SOLUTION SUBCUTANEOUS at 22:02

## 2023-04-01 RX ADMIN — GUAIFENESIN 600 MG: 600 TABLET, EXTENDED RELEASE ORAL at 22:01

## 2023-04-01 RX ADMIN — INSULIN LISPRO 2 UNITS: 100 INJECTION, SOLUTION INTRAVENOUS; SUBCUTANEOUS at 09:16

## 2023-04-01 RX ADMIN — GUAIFENESIN 600 MG: 600 TABLET, EXTENDED RELEASE ORAL at 09:20

## 2023-04-01 RX ADMIN — INSULIN GLARGINE 6 UNITS: 100 INJECTION, SOLUTION SUBCUTANEOUS at 12:13

## 2023-04-01 RX ADMIN — Medication 400 MG: at 22:02

## 2023-04-01 RX ADMIN — METHYLPREDNISOLONE SODIUM SUCCINATE 40 MG: 40 INJECTION, POWDER, FOR SOLUTION INTRAMUSCULAR; INTRAVENOUS at 09:20

## 2023-04-01 RX ADMIN — INSULIN LISPRO 2 UNITS: 100 INJECTION, SOLUTION INTRAVENOUS; SUBCUTANEOUS at 17:18

## 2023-04-01 RX ADMIN — ENOXAPARIN SODIUM 40 MG: 100 INJECTION SUBCUTANEOUS at 09:21

## 2023-04-01 RX ADMIN — AZITHROMYCIN DIHYDRATE 250 MG: 500 INJECTION, POWDER, LYOPHILIZED, FOR SOLUTION INTRAVENOUS at 00:51

## 2023-04-01 RX ADMIN — METHYLPREDNISOLONE SODIUM SUCCINATE 40 MG: 40 INJECTION, POWDER, FOR SOLUTION INTRAMUSCULAR; INTRAVENOUS at 00:48

## 2023-04-01 RX ADMIN — BUPROPION HYDROCHLORIDE 150 MG: 150 TABLET, EXTENDED RELEASE ORAL at 09:19

## 2023-04-01 RX ADMIN — CLOPIDOGREL BISULFATE 75 MG: 75 TABLET ORAL at 09:19

## 2023-04-01 RX ADMIN — IPRATROPIUM BROMIDE AND ALBUTEROL SULFATE 1 AMPULE: .5; 2.5 SOLUTION RESPIRATORY (INHALATION) at 15:34

## 2023-04-01 RX ADMIN — IPRATROPIUM BROMIDE AND ALBUTEROL SULFATE 1 AMPULE: .5; 2.5 SOLUTION RESPIRATORY (INHALATION) at 10:41

## 2023-04-01 ASSESSMENT — PULMONARY FUNCTION TESTS: PEFR_L/MIN: 16

## 2023-04-01 NOTE — PLAN OF CARE
Problem: Discharge Planning  Goal: Discharge to home or other facility with appropriate resources  Outcome: Progressing     Problem: Chronic Conditions and Co-morbidities  Goal: Patient's chronic conditions and co-morbidity symptoms are monitored and maintained or improved  Outcome: Progressing     Problem: Pain  Goal: Verbalizes/displays adequate comfort level or baseline comfort level  Outcome: Adequate for Discharge     Problem: Safety - Adult  Goal: Free from fall injury  Outcome: Adequate for Discharge

## 2023-04-02 VITALS
WEIGHT: 192.4 LBS | TEMPERATURE: 97.5 F | DIASTOLIC BLOOD PRESSURE: 72 MMHG | HEIGHT: 62 IN | SYSTOLIC BLOOD PRESSURE: 112 MMHG | BODY MASS INDEX: 35.41 KG/M2 | HEART RATE: 71 BPM | RESPIRATION RATE: 18 BRPM | OXYGEN SATURATION: 100 %

## 2023-04-02 LAB
ANION GAP SERPL CALCULATED.3IONS-SCNC: 11 MEQ/L (ref 9–15)
BASOPHILS # BLD: 0 K/UL (ref 0–0.2)
BASOPHILS NFR BLD: 0.1 %
BUN SERPL-MCNC: 18 MG/DL (ref 6–20)
CALCIUM SERPL-MCNC: 9.4 MG/DL (ref 8.5–9.9)
CHLORIDE SERPL-SCNC: 104 MEQ/L (ref 95–107)
CO2 SERPL-SCNC: 25 MEQ/L (ref 20–31)
CREAT SERPL-MCNC: 0.52 MG/DL (ref 0.5–0.9)
EKG ATRIAL RATE: 92 BPM
EKG P AXIS: 74 DEGREES
EKG P-R INTERVAL: 166 MS
EKG Q-T INTERVAL: 350 MS
EKG QRS DURATION: 70 MS
EKG QTC CALCULATION (BAZETT): 432 MS
EKG R AXIS: 66 DEGREES
EKG T AXIS: 77 DEGREES
EKG VENTRICULAR RATE: 92 BPM
EOSINOPHIL # BLD: 0 K/UL (ref 0–0.7)
EOSINOPHIL NFR BLD: 0 %
ERYTHROCYTE [DISTWIDTH] IN BLOOD BY AUTOMATED COUNT: 14.1 % (ref 11.5–14.5)
GLUCOSE BLD-MCNC: 125 MG/DL (ref 70–99)
GLUCOSE BLD-MCNC: 176 MG/DL (ref 70–99)
GLUCOSE SERPL-MCNC: 133 MG/DL (ref 70–99)
HCT VFR BLD AUTO: 42.9 % (ref 37–47)
HGB BLD-MCNC: 14.4 G/DL (ref 12–16)
LYMPHOCYTES # BLD: 3.2 K/UL (ref 1–4.8)
LYMPHOCYTES NFR BLD: 33.6 %
MCH RBC QN AUTO: 30.3 PG (ref 27–31.3)
MCHC RBC AUTO-ENTMCNC: 33.5 % (ref 33–37)
MCV RBC AUTO: 90.5 FL (ref 79.4–94.8)
MONOCYTES # BLD: 0.9 K/UL (ref 0.2–0.8)
MONOCYTES NFR BLD: 8.9 %
NEUTROPHILS # BLD: 5.5 K/UL (ref 1.4–6.5)
NEUTS SEG NFR BLD: 57.4 %
PERFORMED ON: ABNORMAL
PERFORMED ON: ABNORMAL
PLATELET # BLD AUTO: 196 K/UL (ref 130–400)
POTASSIUM SERPL-SCNC: 3.9 MEQ/L (ref 3.4–4.9)
RBC # BLD AUTO: 4.73 M/UL (ref 4.2–5.4)
SODIUM SERPL-SCNC: 140 MEQ/L (ref 135–144)
WBC # BLD AUTO: 9.6 K/UL (ref 4.8–10.8)

## 2023-04-02 PROCEDURE — 6370000000 HC RX 637 (ALT 250 FOR IP): Performed by: INTERNAL MEDICINE

## 2023-04-02 PROCEDURE — 36415 COLL VENOUS BLD VENIPUNCTURE: CPT

## 2023-04-02 PROCEDURE — 2700000000 HC OXYGEN THERAPY PER DAY

## 2023-04-02 PROCEDURE — A4216 STERILE WATER/SALINE, 10 ML: HCPCS | Performed by: INTERNAL MEDICINE

## 2023-04-02 PROCEDURE — 94640 AIRWAY INHALATION TREATMENT: CPT

## 2023-04-02 PROCEDURE — 2580000003 HC RX 258: Performed by: INTERNAL MEDICINE

## 2023-04-02 PROCEDURE — 6360000002 HC RX W HCPCS

## 2023-04-02 PROCEDURE — 2500000003 HC RX 250 WO HCPCS: Performed by: INTERNAL MEDICINE

## 2023-04-02 PROCEDURE — 6370000000 HC RX 637 (ALT 250 FOR IP)

## 2023-04-02 PROCEDURE — 85025 COMPLETE CBC W/AUTO DIFF WBC: CPT

## 2023-04-02 PROCEDURE — 99232 SBSQ HOSP IP/OBS MODERATE 35: CPT | Performed by: INTERNAL MEDICINE

## 2023-04-02 PROCEDURE — 6360000002 HC RX W HCPCS: Performed by: INTERNAL MEDICINE

## 2023-04-02 PROCEDURE — 94761 N-INVAS EAR/PLS OXIMETRY MLT: CPT

## 2023-04-02 PROCEDURE — 80048 BASIC METABOLIC PNL TOTAL CA: CPT

## 2023-04-02 RX ORDER — PREDNISONE 10 MG/1
TABLET ORAL
Qty: 40 TABLET | Refills: 0 | Status: SHIPPED | OUTPATIENT
Start: 2023-04-02

## 2023-04-02 RX ADMIN — METOPROLOL SUCCINATE 50 MG: 50 TABLET, EXTENDED RELEASE ORAL at 08:50

## 2023-04-02 RX ADMIN — POTASSIUM CHLORIDE 10 MEQ: 1500 TABLET, EXTENDED RELEASE ORAL at 08:49

## 2023-04-02 RX ADMIN — IPRATROPIUM BROMIDE AND ALBUTEROL SULFATE 1 AMPULE: .5; 2.5 SOLUTION RESPIRATORY (INHALATION) at 07:26

## 2023-04-02 RX ADMIN — AZITHROMYCIN MONOHYDRATE 250 MG: 250 TABLET ORAL at 08:50

## 2023-04-02 RX ADMIN — ASPIRIN 81 MG: 81 TABLET, COATED ORAL at 08:49

## 2023-04-02 RX ADMIN — IPRATROPIUM BROMIDE AND ALBUTEROL SULFATE 1 AMPULE: .5; 2.5 SOLUTION RESPIRATORY (INHALATION) at 15:40

## 2023-04-02 RX ADMIN — LOSARTAN POTASSIUM 25 MG: 25 TABLET, FILM COATED ORAL at 08:50

## 2023-04-02 RX ADMIN — ANTACID TABLETS 500 MG: 500 TABLET, CHEWABLE ORAL at 05:50

## 2023-04-02 RX ADMIN — ENOXAPARIN SODIUM 40 MG: 100 INJECTION SUBCUTANEOUS at 08:51

## 2023-04-02 RX ADMIN — CLOPIDOGREL BISULFATE 75 MG: 75 TABLET ORAL at 08:49

## 2023-04-02 RX ADMIN — BUPROPION HYDROCHLORIDE 150 MG: 150 TABLET, EXTENDED RELEASE ORAL at 08:49

## 2023-04-02 RX ADMIN — DOCUSATE SODIUM 100 MG: 100 CAPSULE ORAL at 08:49

## 2023-04-02 RX ADMIN — FUROSEMIDE 20 MG: 20 TABLET ORAL at 08:50

## 2023-04-02 RX ADMIN — FAMOTIDINE 20 MG: 10 INJECTION, SOLUTION INTRAVENOUS at 08:51

## 2023-04-02 RX ADMIN — PREDNISONE 40 MG: 20 TABLET ORAL at 08:49

## 2023-04-02 RX ADMIN — BUDESONIDE 500 MCG: 0.5 SUSPENSION RESPIRATORY (INHALATION) at 07:26

## 2023-04-02 RX ADMIN — ROSUVASTATIN 40 MG: 40 TABLET, FILM COATED ORAL at 08:50

## 2023-04-02 RX ADMIN — Medication 400 MG: at 08:50

## 2023-04-02 RX ADMIN — IPRATROPIUM BROMIDE AND ALBUTEROL SULFATE 1 AMPULE: .5; 2.5 SOLUTION RESPIRATORY (INHALATION) at 12:21

## 2023-04-02 RX ADMIN — INSULIN GLARGINE 6 UNITS: 100 INJECTION, SOLUTION SUBCUTANEOUS at 08:57

## 2023-04-02 RX ADMIN — GUAIFENESIN 600 MG: 600 TABLET, EXTENDED RELEASE ORAL at 08:49

## 2023-04-02 NOTE — FLOWSHEET NOTE
Shift assessment completed. VS WNL. A&OX4. PM meds given. Inspiratory and expiratory wheeze noted in lungs; Pt c/o congestion-scheduled mucinex given; pt denies needs for breathing treatment at the moment. Call light within reach. Bed in lowest position.

## 2023-04-02 NOTE — DISCHARGE SUMMARY
500 MG tablet  Commonly known as: Flagyl  Take 1 tablet by mouth 2 times daily for 7 days     montelukast 10 MG tablet  Commonly known as: SINGULAIR     nicotine 21 MG/24HR  Commonly known as: NICODERM CQ  Place 1 patch onto the skin daily     nitroGLYCERIN 0.4 MG SL tablet  Commonly known as: NITROSTAT  up to max of 3 total doses. If no relief after 1 dose, call 911. potassium chloride 10 MEQ extended release tablet  Commonly known as: KLOR-CON M     * ProAir  (90 Base) MCG/ACT inhaler  Generic drug: albuterol sulfate HFA  Inhale 1 puff into the lungs every 4 hours as needed for Wheezing     * albuterol (2.5 MG/3ML) 0.083% nebulizer solution  Commonly known as: PROVENTIL  Take 3 mLs by nebulization every 4 hours as needed for Wheezing or Shortness of Breath     rosuvastatin 40 MG tablet  Commonly known as: CRESTOR     therapeutic multivitamin-minerals tablet     Trelegy Ellipta 200-62.5-25 MCG/ACT Aepb inhaler  Generic drug: fluticasone-umeclidin-vilant  Inhale 1 puff into the lungs daily           * This list has 2 medication(s) that are the same as other medications prescribed for you. Read the directions carefully, and ask your doctor or other care provider to review them with you.                 STOP taking these medications      Fasenra 30 MG/ML Sosy injection  Generic drug: benralizumab     nystatin 141270 UNIT/ML suspension  Commonly known as: MYCOSTATIN               Where to Get Your Medications        These medications were sent to [address] - F [phone]  [address] [place] [address] [address]      Phone: [phone]   predniSONE 10 MG tablet  predniSONE 10 MG tablet         DC time 37 minutes    Signed:  Matt Lehman DO  4/2/2023, 2:45 PM

## 2023-04-02 NOTE — DISCHARGE INSTR - COC
at 2:56 PM EDT    CASE MANAGEMENT/SOCIAL WORK SECTION    Inpatient Status Date: ***    Readmission Risk Assessment Score:  Readmission Risk              Risk of Unplanned Readmission:  20           Discharging to Facility/ Agency   Name:   Address:  Phone:  Fax:    Dialysis Facility (if applicable)   Name:  Address:  Dialysis Schedule:  Phone:  Fax:    / signature: {Esignature:[id number]}    PHYSICIAN SECTION    Prognosis: {Prognosis:5614368518}    Condition at Discharge: [address] [place] [address] Patient Condition:[id number]}    Rehab Potential (if transferring to Rehab): {Prognosis:5523929823}    Recommended Labs or Other Treatments After Discharge: ***    Physician Certification: I certify the above information and transfer of Nola Hannon  is necessary for the continuing treatment of the diagnosis listed and that she requires {Admit to Appropriate Level of Care:37491} for {GREATER/LESS:110028661} 30 days.      Update Admission H&P: {CHP DME Changes in FIIWN:634822950}    PHYSICIAN SIGNATURE:  {Esignature:[id number]}

## 2023-04-02 NOTE — PROGRESS NOTES
04/02/23 1210   Resting (On O2)   SpO2 100   HR 88   O2 Device Nasal cannula   O2 Flow Rate (l/min) 2 l/min   Comments PT HAS 2L OXYGEN AT HOME   During Walk (On O2)   SpO2 97   HR 89   O2 Device Nasal cannula   O2 Flow Rate (l/min) 2 l/min   Need Additional O2 Flow Rate Rows No   Walk/Assistance Device Ambulation   Rate of Dyspnea 0   Symptoms Shortness of breath   After Walk   SpO2 97   O2 Device Nasal cannula   O2 Flow Rate (l/min) 2 l/min   Rate of Dyspnea 0   Symptoms Shortness of breath   Does the Patient Qualify for Home O2 Yes   Liter Flow at Rest 2   Liter Flow on Exertion 2   Does the Patient Need Portable Oxygen Tanks Yes
Admission assessment and med rec completed, see flow sheets. Dr Breanna Bah notified. Oriented to room and call light. Call light within reach. No other needs stated by pt at this time.
[facility] Respiratory Therapy Evaluation   Current Order:  duoneb q4 wa      Home Regimen: tid      Ordering Physician: jeannine benton  Re-evaluation Date:  4/1     Diagnosis: asthma exacerbation      Patient Status: Stable / Unstable + Physician notified    The following MDI Criteria must be met in order to convert aerosol to MDI with spacer. If unable to meet, MDI will be converted to aerosol:  []  Patient able to demonstrate the ability to use MDI effectively  []  Patient alert and cooperative  []  Patient able to take deep breath with 5-10 second hold  []  Medication(s) available in this delivery method   []  Peak flow greater than or equal to 200 ml/min            Current Order Substituted To  (same drug, same frequency)   Aerosol to MDI [] Albuterol Sulfate 0.083% unit dose by aerosol Albuterol Sulfate MDI 2 puffs by inhalation with spacer    [] Levalbuterol 1.25 mg unit dose by aerosol Levalbuterol MDI 2 puffs by inhalation with spacer    [] Levalbuterol 0.63 mg unit dose by aerosol Levalbuterol MDI 2 puffs by inhalation with spacer    [] Ipratropium Bromide 0.02% unit dose by aerosol Ipratropium Bromide MDI 2 puffs by inhalation with spacer    [] Duoneb (Ipratropium + Albuterol) unit dose by aerosol Ipratropium MDI + Albuterol MDI 2 puffs by inhalation w/spacer   MDI to Aerosol [] Albuterol Sulfate MDI Albuterol Sulfate 0.083% unit dose by aerosol    [] Levalbuterol MDI 2 puffs by inhalation Levalbuterol 1.25 mg unit dose by aerosol    [] Ipratropium Bromide MDI by inhalation Ipratropium Bromide 0.02% unit dose by aerosol    [] Combivent (Ipratropium + Albuterol) MDI by inhalation Duoneb (Ipratropium + Albuterol) unit dose by aerosol       Treatment Assessment [Frequency/Schedule]:  Change frequency to: _________________qid + q2 alb_________________________________per Protocol, P&T, MEC      Points 0 1 2 3 4   Pulmonary Status  Non-Smoker  []   Smoking history   < 20 pack years  []   Smoking history  ?  [id number] ([place]
Hospitalist Daily Progress Note  Name: Autry Sicard  Age: 45 y.o. Gender: female  CodeStatus: Full Code  Allergies: Shellfish-Derived Products  Cumin Oil  Guggulipid-Black Pepper    Chief Complaint:Respiratory Distress    Primary Care Provider: GIFTY Huffman  InpatientTreatment Team: Treatment Team: Attending Provider: Jillian Maldonado DO; Consulting Physician: Mariely Anguiano MD; Registered Nurse: Sybil Barragan RN; Surgeon: Madhavi Franz MD; : Charito Luna RN; : ALANNAH Pederson, LSW; Patient Care Tech: Oneal Ng  Admission Date: 3/29/2023      Subjective: Patient is seen evaluated bedside continues to have rather significant shortness of breath and wheezing and remains dyspnea to even short conversational statements. Afebrile, tachycardic tachypneic hypertensive on 2 L O2    Physical Exam  Constitutional:       Appearance: Normal appearance. She is ill-appearing and diaphoretic. HENT:      Head: Normocephalic and atraumatic. Nose: Nose normal.      Mouth/Throat:      Mouth: Mucous membranes are moist.      Pharynx: Oropharynx is clear. Eyes:      Conjunctiva/sclera: Conjunctivae normal.      Pupils: Pupils are equal, round, and reactive to light. Cardiovascular:      Rate and Rhythm: Tachycardia present. Heart sounds: Murmur heard. No friction rub. No gallop. Pulmonary:      Breath sounds: Wheezing and rhonchi present. No rales. Abdominal:      General: There is no distension. Tenderness: There is no abdominal tenderness. There is no guarding. Musculoskeletal:         General: No swelling. Right lower leg: No edema. Left lower leg: No edema. Neurological:      General: No focal deficit present. Mental Status: She is alert and oriented to person, place, and time. Psychiatric:         Mood and Affect: Mood normal.         Thought Content:  Thought content normal.         Judgment: Judgment normal.       Review of Systems  14
INPATIENT PROGRESS NOTES    PATIENT NAME: Joey Ahuja  MRN: [id number]  SERVICE DATE:  2023   SERVICE TIME:  9:20 AM      PRIMARY SERVICE: Pulmonary Disease    CHIEF COMPLAINTS: Asthma exacerbation    INTERVAL HPI: Patient seen and examined at bedside, Interval Notes, orders reviewed. Nursing notes noted        New information updated in the note today, rest of the examination did not change compared to yesterday. Patient report feeling better, shortness of breath improved, no chest pain, no coughing, she is on 2 L O2 saturation 93%, no fever    Review of system:     GI Abdominal pain No  Skin Rash No    Social History     Tobacco Use    Smoking status: Former     Packs/day: 1.00     Years: 37.00     Pack years: 37.00     Types: Cigarettes     Quit date: 2022     Years since quittin.8    Smokeless tobacco: Never   Substance Use Topics    Alcohol use: Not Currently     History reviewed. No pertinent family history. OBJECTIVE    Body mass index is 35.19 kg/m². PHYSICAL EXAM:  Vitals:  /76   Pulse 75   Temp 97.7 °F (36.5 °C) (Oral)   Resp 18   Ht 5' 2\" (1.575 m)   Wt 192 lb 6.4 oz (87.3 kg)   LMP 2019   SpO2 93%   BMI 35.19 kg/m²     General: alert, cooperative, no distress  Head: normocephalic, atraumatic  Eyes:No gross abnormalities. ENT:  MMM no lesions  Neck:  supple and no masses  Chest : Improved air movement , bilateral end expiratory wheezing, no rales, nontender, tympanic  Heart[de-identified] Heart sounds are normal.  Regular rate and rhythm without murmur, gallop or rub. ABD:  symmetric, soft, non-tender  Musculoskeletal : no cyanosis, no clubbing, and no edema  Neuro:  Grossly normal  Skin: No rashes or nodules noted.   Lymph node:  no cervical nodes  Urology: No Ruiz   Psychiatric: appropriate    DATA:   Recent Labs     23  0505 23  0518   WBC 10.0 7.6   HGB 13.9 14.4   HCT 41.0 43.4   MCV 91.4 90.0    187     Recent Labs     23  1615
Shift assessment complete. Meds as ordered. Free from falls/injuries at this time. No new skin impairments noted/reported. Patient states that breathing is improving. No s/s of respiratory distress. Bed in lowest position. Call light within reach. Will continue to monitor.
Shift assessment complete. Meds as ordered. Free from falls/injuries. No new skin impairments noted/reported. Patient states that respiratory issues are better. No c/o SOB. Bed in lowest position. Call light within reach. Will continue to monitor.
Shift assessments completed. A&OX 4. Medications given per MAR. Call light within reach. No other needs stated by pt at this time.
Disorder  (acute or chronic)  [x]   Severe or Chronic w/ Exacerbation  []     Surgical Status No [x]   Surgeries     General []   Surgery Lower []   Abdominal Thoracic or []   Upper Abdominal Thoracic with  PulmonaryDisorder  []     Chest X-ray Clear/Not  Ordered     [x]  Chronic Changes  Results Pending  []  Infiltrates, atelectasis, pleural effusion, or edema  []  Infiltrates in more than one lobe []  Infiltrate + Atelectasis, &/or pleural effusion  []    Respiratory Pattern Regular,  RR = 12-20 [x]  Increased,  RR = 21-25 []  KC, irregular,  or RR = 26-30 []  Decreased FEV1  or RR = 31-35 []  Severe SOB, use  of accessory muscles, or RR ? 35  []    Mental Status Alert, oriented,  Cooperative [x]  Confused but Follows commands []  Lethargic or unable to follow commands []  Obtunded  []  Comatose  []    Breath Sounds Clear to  auscultation  []  Decreased unilaterally or  in bases only []  Decreased  bilaterally  []  Crackles or intermittent wheezes []  Wheezes [x]    Cough Strong, Spontan., & nonproductive []  Strong,  spontaneous, &  productive [x]  Weak,  Nonproductive []  Weak, productive or  with wheezes []  No spontaneous  cough or may require suctioning []    Level of Activity Ambulatory []  Ambulatory w/ Assist  [x]  Non-ambulatory []  Paraplegic []  Quadriplegic []    Total    Score:____9___     Triage Score:___4_____      Tri       Triage:     1. (>20) Freq: Q3    2. (16-20) Freq: Q4   3. (11-15) Freq: QID & Albuterol Q2 PRN    4. (6-10) Freq: TID & Albuterol Q2 PRN    5. (0-5) Freq Q4prn
CNP        glucagon (rDNA) injection 1 mg  1 mg SubCUTAneous PRN KAMILLE Santizo CNP        dextrose 10 % infusion   IntraVENous Continuous PRN KAMILLE Santizo CNP           Physical Examination:      Vitals:  /69   Pulse 87   Temp 97.7 °F (36.5 °C) (Oral)   Resp 18   Ht 5' 2\" (1.575 m)   Wt 194 lb (88 kg)   LMP 2019   SpO2 95%   BMI 35.48 kg/m²   Temp (24hrs), Av.2 °F (36.8 °C), Min:97.7 °F (36.5 °C), Max:98.6 °F (37 °C)      General appearance: alert, cooperative but in distress. She has to pause during sentences to catch her breath. Mental Status: oriented to person, place and time and normal affect  Lungs: Diffuse expiratory wheeze  Heart: regular rate and rhythm, no murmur  Abdomen: soft, nontender, nondistended, bowel sounds present, no masses  Extremities: no edema, redness, tenderness in the calves. Cap refill <2s  Skin: no gross lesions, rashes    Data:     Labs:  Recent Labs     23  0450 23  0505   WBC 8.4 10.0   HGB 15.5 13.9    178     Recent Labs     23  0450 23  0505    139   K 4.5 4.1    105   CO2 21 19*   BUN 13 19   CREATININE 0.51 0.49*   GLUCOSE 147* 148*     Recent Labs     23  1615   AST 13   ALT 19   BILITOT <0.2   ALKPHOS 101       Assessment and Plan:        1. Acute respiratory failure with hypoxia secondary to acute exacerbation of COPD  -Persistent wheezing and dyspnea. Increase frequency of IV steroid  -Continue azithromycin and supportive respiratory care  -Pulmonology following  -Still at least 2 days away from discharge    Hypertension  Type 2 diabetes-May need increase insulin regimen  CAD on DAPT  Acid reflux  Hypertension  Obesity    Diet: ADULT DIET; Regular; 5 carb choices (75 gm/meal);  Low Fat/Low Chol/High Fiber/2 gm Na  Ppx: Lovenox  Full Code    37 minutes in total care time    Electronically signed by Maria D Keen DO on 3/31/2023 at 3:48 PM
Friday last week, will plan biopsy in the next 2 to 3 weeks. Evaluate O2 requirement with exertion prior to discharge  Okay to [place] home from pulmonary point of view, follow-up with me in 1 to 2 weeks.          Electronically signed by Dayron Tariq MD,  FCCP   on 4/2/2023 at 11:32 AM
mL IntraVENous PRN KAMILLE Su - CNP        glucagon (rDNA) injection 1 mg  1 mg SubCUTAneous PRN KAMILLE Santizo CNP        dextrose 10 % infusion   IntraVENous Continuous PRN KAMILLE Aquino CNP           Physical Examination:      Vitals:  /76   Pulse 75   Temp 97.7 °F (36.5 °C) (Oral)   Resp 18   Ht 5' 2\" (1.575 m)   Wt 192 lb 6.4 oz (87.3 kg)   LMP 2019   SpO2 93%   BMI 35.19 kg/m²   Temp (24hrs), Av.1 °F (36.7 °C), Min:97.7 °F (36.5 °C), Max:98.4 °F (36.9 °C)      General appearance: alert, cooperative but in distress. She has to pause during sentences to catch her breath. Mental Status: oriented to person, place and time and normal affect  Lungs: Diffuse expiratory wheeze  Heart: regular rate and rhythm, no murmur  Abdomen: soft, nontender, nondistended, bowel sounds present, no masses  Extremities: no edema, redness, tenderness in the calves. Cap refill <2s  Skin: no gross lesions, rashes    Data:     Labs:  Recent Labs     23  0505 23  0518   WBC 10.0 7.6   HGB 13.9 14.4    187     Recent Labs     23  0505 23  0518    139   K 4.1 4.4    106   CO2 19* 22   BUN 19 20   CREATININE 0.49* 0.50   GLUCOSE 148* 178*     Recent Labs     23  1615   AST 13   ALT 19   BILITOT <0.2   ALKPHOS 101       Assessment and Plan:        1. Acute respiratory failure with hypoxia secondary to acute exacerbation of COPD  -Transition to oral steroid  -Continue azithromycin and supportive respiratory care  -Pulmonology following  -If she continues to improve we will plan for discharge home /2  -Biopsy of lung nodules in the next 2 to 3 weeks as outpatient    Hypertension  Type 2 diabetes-May need increase insulin regimen  CAD on DAPT  Acid reflux  Hypertension  Obesity    Diet: ADULT DIET; Regular; 5 carb choices (75 gm/meal);  Low Fat/Low Chol/High Fiber/2 gm Na  Ppx: Lovenox  Full Code    37 minutes in total care time    Electronically
pleural effusion or pneumothorax     XR CHEST PORTABLE    Result Date: 3/29/2023  EXAMINATION: ONE XRAY VIEW OF THE CHEST 3/29/2023 4:47 pm COMPARISON: 12/22/2020 HISTORY: ORDERING SYSTEM PROVIDED HISTORY: sob TECHNOLOGIST PROVIDED HISTORY: Reason for exam:->sob Is the patient pregnant?->No What reading provider will be dictating this exam?->CRC FINDINGS: The lungs are without acute focal process. There is no effusion or pneumothorax. The cardiomediastinal silhouette is without acute process. The osseous structures are without acute process. Mild reticular opacities in both lungs have not changed. No acute process. IMPRESSION AND SUGGESTION:  Acute respiratory failure due to COPD exacerbation  Acute COPD exacerbation  Anxiety  Diabetes mellitus  Hypertension  Coronary artery disease    She is on Solu-Medrol 40 mg daily. Nebulizer with DuoNeb 4 times daily and albuterol every 2 hours as needed Mucinex 600 mg twice daily. Zithromax 500 milligram daily. Continue aggressive pulm hygiene, Acapella and incentive spirometer. .  Continue present treatment plan    NOTE: This report was transcribed using voice recognition software. Every effort was made to ensure accuracy; however, inadvertent computerized transcription errors may be present.       Electronically signed by Tracy Agosto MD, FCCP on 3/31/2023 at 8:42 AM

## 2023-04-03 ENCOUNTER — TELEPHONE (OUTPATIENT)
Dept: FAMILY MEDICINE CLINIC | Age: 53
End: 2023-04-03

## 2023-04-03 LAB
EKG ATRIAL RATE: 108 BPM
EKG P AXIS: 71 DEGREES
EKG P-R INTERVAL: 168 MS
EKG Q-T INTERVAL: 340 MS
EKG QRS DURATION: 72 MS
EKG QTC CALCULATION (BAZETT): 455 MS
EKG R AXIS: 63 DEGREES
EKG T AXIS: 78 DEGREES
EKG VENTRICULAR RATE: 108 BPM

## 2023-04-03 NOTE — TELEPHONE ENCOUNTER
Care Transitions Initial Follow Up Call    Outreach made within 2 business days of discharge: Yes    Patient: Vane Modi Patient : 1970   MRN: [id number]  Reason for Admission: There are no discharge diagnoses documented for the most recent discharge. Discharge Date: 23       Spoke with: Pt    Discharge department/facility: [facility] Interactive Patient Contact:  Was patient able to fill all prescriptions: Yes  Was patient instructed to bring all medications to the follow-up visit: Yes  Is patient taking all medications as directed in the discharge summary?  Yes  Does patient understand their discharge instructions: Yes  Does patient have questions or concerns that need addressed prior to 7-14 day follow up office visit: no, pt declined making appointment    Scheduled appointment with PCP within 7-14 days    Follow Up  Future Appointments   Date Time Provider Bhavin Tao   20[facility]  8:00 AM [facility]   2023  1:00 PM Sandor Ely MD [facility]   2023  4:00 PM Ajit Noe, [address] [facility]   2023 12:15 PM Fiona Lezama MD [place], [place]

## 2023-04-04 ENCOUNTER — HOSPITAL ENCOUNTER (OUTPATIENT)
Dept: CT IMAGING | Age: 53
Discharge: HOME | End: 2023-04-06
Payer: COMMERCIAL

## 2023-04-04 DIAGNOSIS — R91.1 LUNG NODULE: ICD-10-CM

## 2023-04-04 PROCEDURE — 71250 CT THORAX DX C-: CPT

## 2023-04-18 NOTE — PROGRESS NOTES
Anneliese Laughlin 1970 is a 45 y.o. female who presents today with:  Chief Complaint   Patient presents with    [facility] [place] [facility]    Constipation     Since the 22nd     Dysuria     Burning when urinating x3 days; pt is unable to urinate at this time, will have pt give a sample before end of visit        Constipation  This is a chronic problem. Episode onset: intermittent. Her stool frequency is 1 time per week or less. Eating Fiber: states veggies. She Does not exercise regularly. There has Not been adequate water intake. Associated symptoms include bloating, difficulty urinating and flatus. Pertinent negatives include no abdominal pain, anorexia, back pain, diarrhea, fecal incontinence, fever, hematochezia, hemorrhoids, melena, nausea, rectal pain, vomiting or weight loss. She has tried enemas, laxatives, stool softeners, diet changes and fiber for the symptoms. Her past medical history is significant for radiation treatment (Non-Hodgkin Lymphoma 24 years ago). There is no history of abdominal surgery, endocrine disease, inflammatory bowel disease, irritable bowel syndrome, metabolic disease, neurologic disease, neuromuscular disease or psychiatric history. Dysuria   This is a new problem. Episode onset: x3 days ago. The problem occurs every urination. The quality of the pain is described as burning. The pain is mild. There has been no fever. She is Not sexually active. Associated symptoms include frequency, hesitancy and urgency. Pertinent negatives include no chills, discharge, flank pain, hematuria, nausea, possible pregnancy, sweats or vomiting. She has tried increased fluids for the symptoms. There is no history of catheterization, kidney stones, recurrent UTIs, a single kidney, urinary stasis or a urological procedure. DM  Patient is here for DM f/u. Sugars have been fairly well-controlled and patient has not been experiencing hyper- or hypoglycemic symptoms.  Compliance with meds is good Pt would like to speak to a RN, in regards to, she needs the name of the Gastro Dr she was referred to...   and there are no side effects. Patient exercises occasionally and tries to eat healthy. HTN/CAD  Recent NSTEMI. Had catheter placed  Seen in past by Dr. Rik Knott. Daughter and mother would like to switch to [facility]  Hx of CAD s/p PCI and stent of proximal RCA at [address] [facility] appointment with cardiology    COPD  Review of Systems   Constitutional:  Negative for chills, fever and weight loss. Gastrointestinal:  Positive for bloating, constipation and flatus. Negative for abdominal pain, anorexia, diarrhea, hematochezia, hemorrhoids, melena, nausea, rectal pain and vomiting. Genitourinary:  Positive for difficulty urinating, dysuria, frequency, hesitancy and urgency. Negative for flank pain and hematuria. Musculoskeletal:  Negative for back pain.      Past Medical History:   Diagnosis Date    Anticoagulant long-term use     Asthma     CAD (coronary artery disease)     COPD (chronic obstructive pulmonary disease) (HCC)     Diabetes mellitus ([facility] [address]     Hodgkin disease ([facility] [address]     Hyperlipidemia     Hypertension     Migraines     Sleep apnea     recently tested     Type 2 diabetes mellitus without complication ([facility] [address]     type II     Past Surgical History:   Procedure Laterality Date    TUBAL LIGATION       Social History     Socioeconomic History    Marital status: Single     Spouse name: Not on file    Number of children: Not on file    Years of education: Not on file    Highest education level: Not on file   Occupational History    Not on file   Tobacco Use    Smoking status: Former     Packs/day: 1.00     Years: 37.00     Pack years: 37.00     Types: Cigarettes     Quit date: 2022     Years since quittin.6    Smokeless tobacco: Never   Vaping Use    Vaping Use: Never used   Substance and Sexual Activity    Alcohol use: Not Currently    Drug use: Never    Sexual activity: Yes     Partners: Male   Other Topics Concern    Not on file   Social History Narrative    Not on file     Social Determinants of Health     Financial Resource Strain: Low Risk     Difficulty of Paying Living Expenses: Not very hard   Food Insecurity: No Food Insecurity    Worried About Running Out of Food in the Last Year: Never true    Ran Out of Food in the Last Year: Never true   Transportation Needs: Not on file   Physical Activity: Not on file   Stress: Not on file   Social Connections: Not on file   Intimate Partner Violence: Not on file   Housing Stability: Not on file     History reviewed. No pertinent family history.   Allergies   Allergen Reactions    Shellfish-Derived Products Anaphylaxis    Cumin Oil Itching     Cumin powder     Guggulipid-Black Pepper      Ground pepper      Current Outpatient Medications   Medication Sig Dispense Refill    ONETOUCH ULTRA strip USE AS INSTRUCTED TO CHECK BLOOD SUGAR TWICE A DAY      psyllium (METAMUCIL) 28 % packet Take 1 packet by mouth 2 times daily Take with full glass of h20 or juice 60 packet 1    docusate sodium (COLACE) 100 MG capsule Take 1 capsule by mouth 2 times daily as needed for Constipation 60 capsule 0    Handicap Placard MISC by Does not apply route Expiration date:12/30/27 1 each 0    losartan (COZAAR) 25 MG tablet Take 1 tablet by mouth daily 30 tablet 3    dilTIAZem (CARDIZEM CD) 180 MG extended release capsule Take 1 capsule by mouth 2 times daily 30 capsule 3    predniSONE (DELTASONE) 10 MG tablet 40 mg x3 days then, 30 mg x3 days then, 20 mg x3 days then, 10 mg x3 days 30 tablet 0    guaiFENesin (MUCINEX) 600 MG extended release tablet Take 1 tablet by mouth 2 times daily for 10 days 20 tablet 0    PROAIR  (90 Base) MCG/ACT inhaler Inhale 1 puff into the lungs every 4 hours as needed for Wheezing 9 g 0    tiotropium (SPIRIVA RESPIMAT) 2.5 MCG/ACT AERS inhaler Inhale 2 puffs into the lungs daily 1 each 0    Multiple Vitamins-Minerals (THERAPEUTIC MULTIVITAMIN-MINERALS) tablet Take 1 tablet by mouth daily      rosuvastatin (CRESTOR) 40 MG tablet TAKE 1 TABLET BY MOUTH ONCE DAILY      SYMBICORT 160-4.5 MCG/ACT AERO Inhale 2 puffs by mouth twice daily 1 each 3    albuterol (PROVENTIL) (2.5 MG/3ML) 0.083% nebulizer solution Take 3 mLs by nebulization every 4 hours as needed for Wheezing or Shortness of Breath 120 each 1    clopidogrel (PLAVIX) 75 MG tablet Take 75 mg by mouth daily      montelukast (SINGULAIR) 10 MG tablet Take 10 mg by mouth nightly      Esomeprazole Magnesium (NEXIUM 24HR) 20 MG TBEC Take 20 mg by mouth daily as needed      magnesium oxide (MAG-OX) 400 MG tablet Take 400 mg by mouth 2 times daily      potassium chloride (KLOR-CON M) 10 MEQ extended release tablet Take 10 mEq by mouth daily      aspirin 81 MG EC tablet Take 1 tablet by mouth daily 90 tablet 3    nitroGLYCERIN (NITROSTAT) 0.4 MG SL tablet up to max of 3 total doses.  If no relief after 1 dose, call 911. 25 tablet 3    Continuous Blood Gluc Sensor (DEXCOM G6 SENSOR) MISC APPLY NEW SENSOR EVERY 10 DAYS TO ABDOMEN      potassium chloride (KLOR-CON) 10 MEQ extended release tablet  (Patient not taking: Reported on 12/30/2022)      JARDIANCE 25 MG tablet Take 25 mg by mouth once      metFORMIN (GLUCOPHAGE) 1000 MG tablet TAKE 1 TABLET BY MOUTH TWICE DAILY 60 tablet 3    budesonide (PULMICORT) 0.5 MG/2ML nebulizer suspension Take 2 mLs by nebulization 2 times daily (Patient not taking: No sig reported) 60 each 3    Icosapent Ethyl (VASCEPA) 1 g CAPS capsule Take 1 g by mouth 2 times daily (with meals) (Patient not taking: Reported on 12/30/2022)      ipratropium-albuterol (DUONEB) 0.5-2.5 (3) MG/3ML SOLN nebulizer solution Inhale 3 mLs into the lungs 3 times daily as needed for Shortness of Breath 270 mL 0    EPINEPHrine (EPIPEN) 0.3 MG/0.3ML SOAJ injection Inject 0.3 mg into the muscle as needed (Patient not taking: Reported on 12/30/2022)      nicotine (NICODERM CQ) 21 MG/24HR Place 1 patch onto the skin daily 42 patch 0    pantoprazole (PROTONIX) 40 MG tablet Take 1 tablet by mouth every morning (before breakfast) (Patient not taking: No sig reported) 30 tablet 3    furosemide (LASIX) 20 MG tablet Take 20 mg by mouth daily (Patient not taking: Reported on 12/30/2022)       No current facility-administered medications for this visit. PMH, Surgical Hx, Family Hx, and Social Hx reviewed and updated. Health Maintenance reviewed. Objective  Vitals:    12/30/22 1505   BP: 126/74   Site: Right Upper Arm   Position: Sitting   Cuff Size: Medium Adult   Pulse: 81   Temp: 96.8 °F (36 °C)   TempSrc: Temporal   SpO2: 98%   Weight: 191 lb (86.6 kg)   Height: 5' 2\" (1.575 m)     BP Readings from Last 3 Encounters:   12/30/22 126/74   12/28/22 130/63   12/01/22 124/64     Wt Readings from Last 3 Encounters:   12/30/22 191 lb (86.6 kg)   12/25/22 196 lb 5 oz (89 kg)   12/01/22 196 lb 12.8 oz (89.3 kg)       Lab Results   Component Value Date    LABA1C 6.8 (H) 12/23/2022    LABA1C 8.6 (H) 09/29/2020    LABA1C 6.2 (H) 02/21/2019     Lab Results   Component Value Date    CREATININE 0.66 12/27/2022     Lab Results   Component Value Date    ALT 31 12/22/2022    AST 29 12/22/2022     Lab Results   Component Value Date    CHOL 175 12/24/2022    TRIG 200 (H) 12/24/2022    HDL 36 (L) 12/24/2022    LDLCALC 99 12/24/2022          Physical Exam  Vitals and nursing note reviewed. Constitutional:       General: She is awake. She is not in acute distress. Appearance: Normal appearance. She is well-developed. She is not ill-appearing, toxic-appearing or diaphoretic. HENT:      Head: Normocephalic and atraumatic. Right Ear: Hearing and external ear normal.      Left Ear: Hearing and external ear normal.      Nose: Nose normal.   Eyes:      General: Lids are normal. Vision grossly intact. Gaze aligned appropriately. Conjunctiva/sclera: Conjunctivae normal.      Pupils: Pupils are equal, round, and reactive to light. Cardiovascular:      Rate and Rhythm: Normal rate and regular rhythm. Pulses: Normal pulses. Heart sounds: Normal heart sounds, S1 normal and S2 normal.   Pulmonary:      Effort: Pulmonary effort is normal.      Breath sounds: Normal breath sounds and air entry. No decreased breath sounds, rhonchi or rales. Musculoskeletal:      Cervical back: Normal range of motion. Right lower leg: No edema. Left lower leg: No edema. Skin:     General: Skin is warm. Capillary Refill: Capillary refill takes less than 2 seconds. Neurological:      Mental Status: She is alert and oriented to person, place, and time. Gait: Gait is intact. Psychiatric:         Attention and Perception: Attention normal.         Mood and Affect: Mood normal.         Speech: Speech normal.         Behavior: Behavior normal. Behavior is cooperative. Assessment & Plan   1. Coronary artery disease involving native coronary artery of native heart without angina pectoris  -     [facility]  2. Type 2 diabetes mellitus with hyperglycemia, without long-term current use of insulin (HCC)  -     POCT Glucose  3. Simple chronic bronchitis ([address]  4. Hypercholesterolemia  -     [facility]  5. Essential (primary) hypertension  -     [facility]  6. Dysuria  -     POCT Urinalysis No Micro (Auto)  -     Culture, Urine; Future  7. Acute respiratory failure with hypoxia (HCC)  -     Handicap [facility]; Starting Fri 12/30/2022, Disp-1 each, R-0, PrintExpiration date:12/30/27  8. Dependence on supplemental oxygen when ambulating  -     Handicap [facility]; Starting Fri 12/30/2022, Disp-1 each, R-0, PrintExpiration date:12/30/27  9. Slightly limited mobility  -     Handicap [facility]; Starting Fri 12/30/2022, Disp-1 each, R-0, PrintExpiration date:12/30/27  10. Constipation, unspecified constipation type  -     psyllium (METAMUCIL) 28 % packet;  Take 1 packet by mouth 2 times daily Take with full glass of h20 or juice, Disp-60 packet, R-1Normal  -     docusate sodium (COLACE) 100 MG capsule; Take 1 capsule by mouth 2 times daily as needed for Constipation, Disp-60 capsule, R-0Normal  -     XR ABDOMEN (KUB) (SINGLE AP VIEW); Future   Orders Placed This Encounter   Procedures    Culture, Urine     Standing Status:   Future     Number of Occurrences:   1     Standing Expiration Date:   12/30/2023     Order Specific Question:   Specify (ex-cath, midstream, cysto, etc)? Answer:   mid-stream    XR ABDOMEN (KUB) (SINGLE AP VIEW)     Standing Status:   Future     Number of Occurrences:   1     Standing Expiration Date:   12/30/2023     Order Specific Question:   Reason for exam:     Answer:   evaluate fecal impaction    [facility], [facility]     Referral Priority:   Routine     Referral Type:   Eval and Treat     Referral Reason:   Specialty Services Required     Requested Specialty:   Cardiology     Number of Visits Requested:   1    POCT Glucose    POCT Urinalysis No Micro (Auto)     Orders Placed This Encounter   Medications    DISCONTD: Handicap Placard MISC     Sig: by Does not apply route Expiration date:     Dispense:  1 each     Refill:  0    psyllium (METAMUCIL) 28 % packet     Sig: Take 1 packet by mouth 2 times daily Take with full glass of h20 or juice     Dispense:  60 packet     Refill:  1    docusate sodium (COLACE) 100 MG capsule     Sig: Take 1 capsule by mouth 2 times daily as needed for Constipation     Dispense:  60 capsule     Refill:  0    Handicap Placard MISC     Sig: by Does not apply route Expiration date:12/30/27     Dispense:  1 each     Refill:  0     Medications Discontinued During This Encounter   Medication Reason    Handicap Placard MISC LIST CLEANUP    Handicap Placard [facility]      Return in about 4 weeks (around 1/27/2023). Reviewed with the patient: current clinical status, medications, activities and diet.      Side effects, adverse effects of the medication prescribed today, as well as treatment plan/ rationale and result expectations have been discussed with the patient who expresses understanding and desires to proceed. Close follow up to evaluate treatment results and for coordination of care. I have reviewed the patient's medical history in detail and updated the computerized patient record.     Brent Denise, [id number]

## 2023-04-24 ENCOUNTER — OFFICE VISIT (OUTPATIENT)
Dept: PULMONOLOGY | Age: 53
End: 2023-04-24
Payer: COMMERCIAL

## 2023-04-24 VITALS
HEART RATE: 90 BPM | WEIGHT: 197 LBS | RESPIRATION RATE: 16 BRPM | TEMPERATURE: 96.8 F | DIASTOLIC BLOOD PRESSURE: 78 MMHG | BODY MASS INDEX: 36.25 KG/M2 | SYSTOLIC BLOOD PRESSURE: 138 MMHG | OXYGEN SATURATION: 95 % | HEIGHT: 62 IN

## 2023-04-24 DIAGNOSIS — J44.9 ASTHMA-COPD OVERLAP SYNDROME (HCC): ICD-10-CM

## 2023-04-24 DIAGNOSIS — R91.8 LUNG NODULES: ICD-10-CM

## 2023-04-24 DIAGNOSIS — J45.51 SEVERE PERSISTENT ASTHMA WITH ACUTE EXACERBATION: ICD-10-CM

## 2023-04-24 DIAGNOSIS — Z09 HOSPITAL DISCHARGE FOLLOW-UP: Primary | ICD-10-CM

## 2023-04-24 DIAGNOSIS — J45.50 SEVERE PERSISTENT ASTHMA WITHOUT COMPLICATION: ICD-10-CM

## 2023-04-24 DIAGNOSIS — E66.09 CLASS 2 OBESITY DUE TO EXCESS CALORIES WITHOUT SERIOUS COMORBIDITY WITH BODY MASS INDEX (BMI) OF 37.0 TO 37.9 IN ADULT: ICD-10-CM

## 2023-04-24 DIAGNOSIS — K21.9 GASTROESOPHAGEAL REFLUX DISEASE WITHOUT ESOPHAGITIS: ICD-10-CM

## 2023-04-24 PROCEDURE — 1036F TOBACCO NON-USER: CPT | Performed by: INTERNAL MEDICINE

## 2023-04-24 PROCEDURE — G8417 CALC BMI ABV UP PARAM F/U: HCPCS | Performed by: INTERNAL MEDICINE

## 2023-04-24 PROCEDURE — 1111F DSCHRG MED/CURRENT MED MERGE: CPT | Performed by: INTERNAL MEDICINE

## 2023-04-24 PROCEDURE — 3017F COLORECTAL CA SCREEN DOC REV: CPT | Performed by: INTERNAL MEDICINE

## 2023-04-24 PROCEDURE — 3075F SYST BP GE 130 - 139MM HG: CPT | Performed by: INTERNAL MEDICINE

## 2023-04-24 PROCEDURE — 3078F DIAST BP <80 MM HG: CPT | Performed by: INTERNAL MEDICINE

## 2023-04-24 PROCEDURE — G8427 DOCREV CUR MEDS BY ELIG CLIN: HCPCS | Performed by: INTERNAL MEDICINE

## 2023-04-24 PROCEDURE — 3023F SPIROM DOC REV: CPT | Performed by: INTERNAL MEDICINE

## 2023-04-24 PROCEDURE — 99215 OFFICE O/P EST HI 40 MIN: CPT | Performed by: INTERNAL MEDICINE

## 2023-04-24 RX ORDER — PENICILLIN V POTASSIUM 500 MG/1
TABLET ORAL
COMMUNITY
Start: 2023-04-17

## 2023-04-24 RX ORDER — ALBUTEROL SULFATE 2.5 MG/3ML
2.5 SOLUTION RESPIRATORY (INHALATION) EVERY 4 HOURS PRN
Qty: 120 EACH | Refills: 1 | Status: SHIPPED | OUTPATIENT
Start: 2023-04-24

## 2023-04-24 RX ORDER — METHYLPREDNISOLONE ACETATE 40 MG/ML
40 INJECTION, SUSPENSION INTRA-ARTICULAR; INTRALESIONAL; INTRAMUSCULAR; SOFT TISSUE ONCE
Status: SHIPPED | OUTPATIENT
Start: 2023-04-24

## 2023-04-24 RX ORDER — PREDNISONE 10 MG/1
40 TABLET ORAL DAILY
Qty: 20 TABLET | Refills: 0 | Status: SHIPPED | OUTPATIENT
Start: 2023-04-24 | End: 2023-04-29

## 2023-04-24 RX ORDER — FLUTICASONE FUROATE, UMECLIDINIUM BROMIDE AND VILANTEROL TRIFENATATE 200; 62.5; 25 UG/1; UG/1; UG/1
1 POWDER RESPIRATORY (INHALATION) DAILY
Qty: 1 EACH | Refills: 2 | Status: SHIPPED | OUTPATIENT
Start: 2023-04-24

## 2023-04-24 NOTE — PROGRESS NOTES
Subjective:     Dajuan Amezquita is a 45 y.o. female who complains today of:     Chief Complaint   Patient presents with    Follow-Up from Hospital     Asthma Exacerbation    Cough    Wheezing       HPI  Patient presents for asthma      4/24/2023  Patient was doing good, she finished prednisone almost 2 weeks ago, she was active, went to [employer] yesterday however after coming back she noticed increased shortness of breath, with cough productive of brown phlegm, no chest pain, no fever, she has runny nose, she feels this was triggered because of the change of weather and cutting grass in her neighborhood. No lower extremity edema, no fever no chills, she is currently on 2 L O2 continuously, she noticed recently she is desaturating to low 80s with exertion. 3/29/2023  Patient called this morning, with worsening shortness of breath, and cough, symptoms has been getting worse over the last week, however significantly worse this morning, she is unable to walk or do any activity, her O2 sat dropped with minimal exertion today to 87%, denies chest pain, she has cough with clear phlegm, no fever no chills, no worsening lower extremity edema, and no sick contact. 3/20/2023    She is doing better, symptoms controlled, she is currently on Milana [place], she is on Symbicort Spiriva was stopped on hospital discharge, she is also on Singulair, denies chest pain, no coughing, no lower extremity edema, sleeps well at night, no nausea no vomiting. Weight is stable, no heartburn, no nasal congestion or postnasal drip.   Milana [place] was approved however she did not receive it        Allergies:  Shellfish-derived products, Cumin oil, and Guggulipid-black pepper  Past Medical History:   Diagnosis Date    Anticoagulant long-term use     Asthma 2004    CAD (coronary artery disease)     COPD (chronic obstructive pulmonary disease) ([facility] [address]     Diabetes mellitus ([facility] [address] 2014    Hodgkin disease ([facility] [address] 1999    Hyperlipidemia

## 2023-04-26 ENCOUNTER — HOSPITAL ENCOUNTER (OUTPATIENT)
Dept: GENERAL RADIOLOGY | Age: 53
Discharge: HOME | End: 2023-04-28
Payer: COMMERCIAL

## 2023-04-26 DIAGNOSIS — J45.51 SEVERE PERSISTENT ASTHMA WITH ACUTE EXACERBATION: ICD-10-CM

## 2023-04-26 PROCEDURE — 71046 X-RAY EXAM CHEST 2 VIEWS: CPT

## 2023-04-27 ENCOUNTER — TELEPHONE (OUTPATIENT)
Dept: CARDIOLOGY CLINIC | Age: 53
End: 2023-04-27

## 2023-04-27 NOTE — TELEPHONE ENCOUNTER
Pt calling to let Dr Mark Lazaro know that she needs a replacement for metoprolol. Pt states that   Dr Modesta Lopez told her that it reduced her bronchitis and will need a replacement. Pt also requesting cardiac clearance for tooth extraction. When should she stop blood thinner? Pt will have someone drop off forms.     Pt # [phone] [id number]

## 2023-04-28 RX ORDER — DILTIAZEM HYDROCHLORIDE 120 MG/1
120 CAPSULE, COATED, EXTENDED RELEASE ORAL DAILY
Qty: 90 CAPSULE | Refills: 3 | OUTPATIENT
Start: 2023-04-28

## 2023-05-11 ENCOUNTER — OFFICE VISIT (OUTPATIENT)
Dept: FAMILY MEDICINE CLINIC | Age: 53
End: 2023-05-11
Payer: COMMERCIAL

## 2023-05-11 VITALS
HEIGHT: 62 IN | HEART RATE: 90 BPM | BODY MASS INDEX: 36.36 KG/M2 | SYSTOLIC BLOOD PRESSURE: 150 MMHG | WEIGHT: 197.6 LBS | TEMPERATURE: 97.7 F | OXYGEN SATURATION: 95 % | DIASTOLIC BLOOD PRESSURE: 81 MMHG

## 2023-05-11 DIAGNOSIS — I10 ESSENTIAL (PRIMARY) HYPERTENSION: ICD-10-CM

## 2023-05-11 DIAGNOSIS — E11.65 TYPE 2 DIABETES MELLITUS WITH HYPERGLYCEMIA, WITHOUT LONG-TERM CURRENT USE OF INSULIN (HCC): Primary | ICD-10-CM

## 2023-05-11 DIAGNOSIS — F17.200 SMOKING: ICD-10-CM

## 2023-05-11 PROBLEM — C81.90: Status: ACTIVE | Noted: 2018-04-05

## 2023-05-11 PROBLEM — M77.11 LATERAL EPICONDYLITIS, RIGHT ELBOW: Status: ACTIVE | Noted: 2018-04-05

## 2023-05-11 PROBLEM — Z99.81 ON HOME OXYGEN THERAPY: Status: ACTIVE | Noted: 2023-02-10

## 2023-05-11 LAB
BASOPHILS # BLD: 0 K/UL (ref 0–0.2)
BASOPHILS NFR BLD: 0.1 %
EOSINOPHIL # BLD: 0 K/UL (ref 0–0.7)
EOSINOPHIL NFR BLD: 0 %
ERYTHROCYTE [DISTWIDTH] IN BLOOD BY AUTOMATED COUNT: 13.9 % (ref 11.5–14.5)
HBA1C MFR BLD: 9.1 %
HCT VFR BLD AUTO: 43.7 % (ref 37–47)
HGB BLD-MCNC: 14.4 G/DL (ref 12–16)
LYMPHOCYTES # BLD: 2.4 K/UL (ref 1–4.8)
LYMPHOCYTES NFR BLD: 30.1 %
MCH RBC QN AUTO: 30 PG (ref 27–31.3)
MCHC RBC AUTO-ENTMCNC: 33.1 % (ref 33–37)
MCV RBC AUTO: 90.7 FL (ref 79.4–94.8)
MONOCYTES # BLD: 0.5 K/UL (ref 0.2–0.8)
MONOCYTES NFR BLD: 6 %
NEUTROPHILS # BLD: 5.2 K/UL (ref 1.4–6.5)
NEUTS SEG NFR BLD: 63.8 %
PLATELET # BLD AUTO: 202 K/UL (ref 130–400)
RBC # BLD AUTO: 4.82 M/UL (ref 4.2–5.4)
WBC # BLD AUTO: 8.1 K/UL (ref 4.8–10.8)

## 2023-05-11 PROCEDURE — 2022F DILAT RTA XM EVC RTNOPTHY: CPT | Performed by: PHYSICIAN ASSISTANT

## 2023-05-11 PROCEDURE — G8417 CALC BMI ABV UP PARAM F/U: HCPCS | Performed by: PHYSICIAN ASSISTANT

## 2023-05-11 PROCEDURE — 3074F SYST BP LT 130 MM HG: CPT | Performed by: PHYSICIAN ASSISTANT

## 2023-05-11 PROCEDURE — 83036 HEMOGLOBIN GLYCOSYLATED A1C: CPT | Performed by: PHYSICIAN ASSISTANT

## 2023-05-11 PROCEDURE — G8427 DOCREV CUR MEDS BY ELIG CLIN: HCPCS | Performed by: PHYSICIAN ASSISTANT

## 2023-05-11 PROCEDURE — 3078F DIAST BP <80 MM HG: CPT | Performed by: PHYSICIAN ASSISTANT

## 2023-05-11 PROCEDURE — 1036F TOBACCO NON-USER: CPT | Performed by: PHYSICIAN ASSISTANT

## 2023-05-11 PROCEDURE — 3046F HEMOGLOBIN A1C LEVEL >9.0%: CPT | Performed by: PHYSICIAN ASSISTANT

## 2023-05-11 PROCEDURE — 3017F COLORECTAL CA SCREEN DOC REV: CPT | Performed by: PHYSICIAN ASSISTANT

## 2023-05-11 PROCEDURE — 99213 OFFICE O/P EST LOW 20 MIN: CPT | Performed by: PHYSICIAN ASSISTANT

## 2023-05-11 RX ORDER — NICOTINE 21 MG/24HR
1 PATCH, TRANSDERMAL 24 HOURS TRANSDERMAL DAILY
Qty: 42 PATCH | Refills: 0 | Status: SHIPPED | OUTPATIENT
Start: 2023-05-11 | End: 2023-06-22

## 2023-05-11 RX ORDER — LOSARTAN POTASSIUM 25 MG/1
25 TABLET ORAL DAILY
Qty: 30 TABLET | Refills: 3 | Status: SHIPPED | OUTPATIENT
Start: 2023-05-11

## 2023-05-11 RX ORDER — DULAGLUTIDE 0.75 MG/.5ML
0.75 INJECTION, SOLUTION SUBCUTANEOUS WEEKLY
Qty: 4 ADJUSTABLE DOSE PRE-FILLED PEN SYRINGE | Refills: 1 | Status: SHIPPED | OUTPATIENT
Start: 2023-05-11

## 2023-05-15 ASSESSMENT — ENCOUNTER SYMPTOMS
CHEST TIGHTNESS: 0
BACK PAIN: 0
VOMITING: 0
NAUSEA: 0
SINUS PAIN: 0
SINUS PRESSURE: 0
SORE THROAT: 0
DIARRHEA: 0
ABDOMINAL PAIN: 0
COUGH: 0
SHORTNESS OF BREATH: 1

## 2023-05-15 ASSESSMENT — VISUAL ACUITY: OU: 1

## 2023-05-17 ENCOUNTER — HOSPITAL ENCOUNTER (OUTPATIENT)
Dept: DIABETES SERVICES | Age: 53
Setting detail: THERAPIES SERIES
Discharge: HOME | End: 2023-05-17
Payer: COMMERCIAL

## 2023-05-17 PROCEDURE — G0108 DIAB MANAGE TRN  PER INDIV: HCPCS

## 2023-05-17 ASSESSMENT — SLEEP AND FATIGUE QUESTIONNAIRES
HOW MANY HOURS OF SLEEP ARE YOU GETTING, ON AVERAGE: LESS THAN 7
HAVE YOU EVER BEEN TESTED FOR SLEEP APNEA: YES
HAVE YOU BEEN TOLD, OR NOTICED ON YOUR OWN, THAT YOU STOP BREATHING OR STRUGGLE TO BREATHE IN YOUR SLEEP: NO
HOW DO YOU RATE THE QUALITY OF YOUR SLEEP: POOR

## 2023-05-17 ASSESSMENT — PROBLEM AREAS IN DIABETES QUESTIONNAIRE (PAID)
FEELING SCARED WHEN YOU THINK ABOUT LIVING WITH DIABETES: 0
FEELING DEPRESSED WHEN YOU THINK ABOUT LIVING WITH DIABETES: 2
FEELING SCARED WHEN YOU THINK ABOUT LIVING WITH DIABETES: 0
PAID-5 TOTAL SCORE: 4
WORRYING ABOUT THE FUTURE AND THE POSSIBILITY OF SERIOUS COMPLICATIONS: 1
FEELING DEPRESSED WHEN YOU THINK ABOUT LIVING WITH DIABETES: 2
COPING WITH COMPLICATIONS OF DIABETES: 0
FEELING THAT DIABETES IS TAKING UP TOO MUCH OF YOUR MENTAL AND PHYSICAL ENERGY EVERY DAY: 1

## 2023-05-17 NOTE — PROGRESS NOTES
[] Education provided     [] Misses doses:         [] Daily [] Weekly          [] Monthly [] Almost Never         [] Hypoglycemia symptoms & treatment:      [] Demonstrates Competency        [] Education provided    Leatha West RN   [] Discussed insulin stigma and proper technique including site selection and self-injection. []  Reviewed both pen and vial/syringe use. Discussed needle disposal and proper insulin storage and importance of times to take insulin. [] Discussed importance of blood glucose monitoring to assess current treatment effectiveness. Evaluation rating: On Insulin? []Yes []No           Injection site used: __________     Rotating sites? []Yes [] No     Problems? []Yes []No      Storing insulin correctly? []Yes [] No     Injecting at proper times? []Yes []No   IRisk Reduction - chronic complications:  Describe the relationship of blood glucose levels to long term complications of diabetes. Identify preventative measures & standards of care. Assessment Ratin23  NC  See Diabetes Assessment for last completed chronic complication prevention appointments. [] Patient is up-to-date on preventative exams and vaccines  [x] Patient is not up-to date on preventative exams and vaccines and advised to discuss with PCM    [] Blood pressure is at goal  [x] Blood pressure is not at goal    [x] Cholesterol is at goal  [] Cholesterol is not at goal    [] Has microvascular complications  [] Has macrovascular complications  Leatha West RN   [] Reviewed natural course of T2DM with group and how chronic complications are related to elevated blood glucose. [] Discussed macro and microvascular complications and the need for control of lipids, blood pressure, glucose levels, weight reduction and smoking cessation to help reduce risks. [] Instructed on goals for blood pressure, lipids, and glucose for optimal health.      [] Individualized scorecard provided with

## 2023-05-22 ENCOUNTER — OFFICE VISIT (OUTPATIENT)
Dept: PULMONOLOGY | Age: 53
End: 2023-05-22
Payer: COMMERCIAL

## 2023-05-22 VITALS
DIASTOLIC BLOOD PRESSURE: 82 MMHG | HEIGHT: 62 IN | RESPIRATION RATE: 16 BRPM | SYSTOLIC BLOOD PRESSURE: 144 MMHG | BODY MASS INDEX: 35.63 KG/M2 | WEIGHT: 193.6 LBS | HEART RATE: 82 BPM | OXYGEN SATURATION: 97 % | TEMPERATURE: 96.9 F

## 2023-05-22 DIAGNOSIS — J44.9 ASTHMA-COPD OVERLAP SYNDROME (HCC): ICD-10-CM

## 2023-05-22 DIAGNOSIS — K21.9 GASTROESOPHAGEAL REFLUX DISEASE WITHOUT ESOPHAGITIS: ICD-10-CM

## 2023-05-22 DIAGNOSIS — J45.51 SEVERE PERSISTENT ASTHMA WITH ACUTE EXACERBATION: Primary | ICD-10-CM

## 2023-05-22 DIAGNOSIS — E66.09 CLASS 2 OBESITY DUE TO EXCESS CALORIES WITHOUT SERIOUS COMORBIDITY WITH BODY MASS INDEX (BMI) OF 37.0 TO 37.9 IN ADULT: ICD-10-CM

## 2023-05-22 DIAGNOSIS — R91.8 LUNG NODULES: ICD-10-CM

## 2023-05-22 PROCEDURE — G8417 CALC BMI ABV UP PARAM F/U: HCPCS | Performed by: INTERNAL MEDICINE

## 2023-05-22 PROCEDURE — 3017F COLORECTAL CA SCREEN DOC REV: CPT | Performed by: INTERNAL MEDICINE

## 2023-05-22 PROCEDURE — G8427 DOCREV CUR MEDS BY ELIG CLIN: HCPCS | Performed by: INTERNAL MEDICINE

## 2023-05-22 PROCEDURE — 3023F SPIROM DOC REV: CPT | Performed by: INTERNAL MEDICINE

## 2023-05-22 PROCEDURE — 3077F SYST BP >= 140 MM HG: CPT | Performed by: INTERNAL MEDICINE

## 2023-05-22 PROCEDURE — 1036F TOBACCO NON-USER: CPT | Performed by: INTERNAL MEDICINE

## 2023-05-22 PROCEDURE — 99214 OFFICE O/P EST MOD 30 MIN: CPT | Performed by: INTERNAL MEDICINE

## 2023-05-22 PROCEDURE — 3079F DIAST BP 80-89 MM HG: CPT | Performed by: INTERNAL MEDICINE

## 2023-05-22 NOTE — PROGRESS NOTES
tablet Take 1 tablet by mouth 2 times daily      potassium chloride (KLOR-CON M) 10 MEQ extended release tablet Take 1 tablet by mouth daily      aspirin 81 MG EC tablet Take 1 tablet by mouth daily 90 tablet 3    nitroGLYCERIN (NITROSTAT) 0.4 MG SL tablet up to max of 3 total doses. If no relief after 1 dose, call 911. 25 tablet 3    Dulaglutide (TRULICITY) 6.47 GQ/6.0QJ SOPN Inject 0.75 mg into the skin once a week (Patient not taking: Reported on 5/17/2023) 4 Adjustable Dose Pre-filled Pen Syringe 1    penicillin v potassium (VEETID) 500 MG tablet TAKE 1 TABLET BY MOUTH EVERY 6 HOURS UNTIL GONE (Patient not taking: Reported on 5/22/2023)      albuterol (PROVENTIL) (2.5 MG/3ML) 0.083% nebulizer solution Take 3 mLs by nebulization every 4 hours as needed for Wheezing or Shortness of Breath (Patient not taking: Reported on 5/22/2023) 120 each 1    ipratropium (ATROVENT) 0.02 % nebulizer solution Take 2.5 mLs by nebulization 4 times daily (Patient not taking: Reported on 5/22/2023) 2.5 mL 3    Continuous Blood Gluc Sensor (DEXCOM G6 SENSOR) MISC  (Patient not taking: Reported on 5/17/2023)      Handicap Placard MISC by Does not apply route Expiration date:12/30/27 (Patient not taking: Reported on 5/22/2023) 1 each 0     Current Facility-Administered Medications   Medication Dose Route Frequency Provider Last Rate Last Admin    methylPREDNISolone acetate (DEPO-MEDROL) injection 40 mg  40 mg IntraMUSCular Once Milton Angulo MD           Objective:     Vitals:    05/22/23 1320 05/22/23 1359   BP: (!) 142/82 (!) 144/82   Site: Left Upper Arm Right Upper Arm   Position: Sitting Sitting   Cuff Size: Large Adult Large Adult   Pulse: 82    Resp: 16    Temp: 96.9 °F (36.1 °C)    TempSrc: Infrared    SpO2: 97%    Weight: 193 lb 9.6 oz (87.8 kg)    Height: 5' 2\" (1.575 m)          Physical Exam  Constitutional:       General: She is not in acute distress. Appearance: She is well-developed. She is not diaphoretic.    HENT:

## 2023-05-24 ENCOUNTER — HOSPITAL ENCOUNTER (OUTPATIENT)
Dept: ULTRASOUND IMAGING | Age: 53
Discharge: HOME | End: 2023-05-26
Payer: COMMERCIAL

## 2023-05-24 DIAGNOSIS — R09.89 BILATERAL CAROTID BRUITS: ICD-10-CM

## 2023-05-24 DIAGNOSIS — I10 HYPERTENSION, UNSPECIFIED TYPE: ICD-10-CM

## 2023-05-24 DIAGNOSIS — F17.200 SMOKER: ICD-10-CM

## 2023-05-24 PROCEDURE — 76706 US ABDL AORTA SCREEN AAA: CPT | Performed by: INTERNAL MEDICINE

## 2023-05-24 PROCEDURE — 76706 US ABDL AORTA SCREEN AAA: CPT

## 2023-05-24 PROCEDURE — 93880 EXTRACRANIAL BILAT STUDY: CPT | Performed by: INTERNAL MEDICINE

## 2023-05-24 PROCEDURE — 93880 EXTRACRANIAL BILAT STUDY: CPT

## 2023-06-01 ENCOUNTER — TELEPHONE (OUTPATIENT)
Dept: DIABETES SERVICES | Age: 53
End: 2023-06-01

## 2023-06-01 ENCOUNTER — OFFICE VISIT (OUTPATIENT)
Dept: CARDIOLOGY CLINIC | Age: 53
End: 2023-06-01
Payer: COMMERCIAL

## 2023-06-01 VITALS
HEART RATE: 97 BPM | SYSTOLIC BLOOD PRESSURE: 128 MMHG | OXYGEN SATURATION: 98 % | BODY MASS INDEX: 35.81 KG/M2 | DIASTOLIC BLOOD PRESSURE: 62 MMHG | WEIGHT: 195.8 LBS

## 2023-06-01 DIAGNOSIS — I25.10 CORONARY ARTERY DISEASE DUE TO LIPID RICH PLAQUE: ICD-10-CM

## 2023-06-01 DIAGNOSIS — I10 HYPERTENSION, UNSPECIFIED TYPE: Primary | ICD-10-CM

## 2023-06-01 DIAGNOSIS — E78.5 DYSLIPIDEMIA: ICD-10-CM

## 2023-06-01 DIAGNOSIS — I25.83 CORONARY ARTERY DISEASE DUE TO LIPID RICH PLAQUE: ICD-10-CM

## 2023-06-01 DIAGNOSIS — R09.89 BILATERAL CAROTID BRUITS: ICD-10-CM

## 2023-06-01 PROCEDURE — 3074F SYST BP LT 130 MM HG: CPT | Performed by: INTERNAL MEDICINE

## 2023-06-01 PROCEDURE — 1036F TOBACCO NON-USER: CPT | Performed by: INTERNAL MEDICINE

## 2023-06-01 PROCEDURE — 3017F COLORECTAL CA SCREEN DOC REV: CPT | Performed by: INTERNAL MEDICINE

## 2023-06-01 PROCEDURE — 3078F DIAST BP <80 MM HG: CPT | Performed by: INTERNAL MEDICINE

## 2023-06-01 PROCEDURE — G8427 DOCREV CUR MEDS BY ELIG CLIN: HCPCS | Performed by: INTERNAL MEDICINE

## 2023-06-01 PROCEDURE — G8417 CALC BMI ABV UP PARAM F/U: HCPCS | Performed by: INTERNAL MEDICINE

## 2023-06-01 PROCEDURE — 99214 OFFICE O/P EST MOD 30 MIN: CPT | Performed by: INTERNAL MEDICINE

## 2023-06-01 RX ORDER — NITROGLYCERIN 0.4 MG/1
TABLET SUBLINGUAL
Qty: 25 TABLET | Refills: 3 | Status: SHIPPED | OUTPATIENT
Start: 2023-06-01

## 2023-06-01 RX ORDER — DILTIAZEM HYDROCHLORIDE 120 MG/1
120 CAPSULE, COATED, EXTENDED RELEASE ORAL 2 TIMES DAILY
Qty: 180 CAPSULE | Refills: 3 | Status: SHIPPED | OUTPATIENT
Start: 2023-06-01

## 2023-06-01 ASSESSMENT — ENCOUNTER SYMPTOMS
CHEST TIGHTNESS: 0
EYES NEGATIVE: 1
WHEEZING: 0
BLOOD IN STOOL: 0
COUGH: 0
SHORTNESS OF BREATH: 0
STRIDOR: 0
RESPIRATORY NEGATIVE: 1
NAUSEA: 0
GASTROINTESTINAL NEGATIVE: 1

## 2023-06-01 NOTE — PROGRESS NOTES
OFFICEISIT      Patient: Raul Boles  YOB: 1970  MRN: [id number]    Chief Complaint:  Chief Complaint   Patient presents with    Follow-up     4 month    Hypertension    Results     Carotid US/AAA screening       CV Data:  22 Cath EF 65  Mild CX 40%  LAD 40-50% RCA 2 prior Stents patent.  Echo EF 94%   CUS LICA 19-54  0/57 Abd US - no AAA    Subjective/HPI  pt changing from [facility]. she has premature CAD. No cp no sob no falls no bleed.  more pals and related C sharp CP. She was on higher CCB in the past  is not enough for her. No cp b no bleed     EKG: SR 66    +++FH  Former smoker  No etoh  Lives w boyfriend and son. Work - .      Past Medical History:   Diagnosis Date    Anticoagulant long-term use     Asthma     CAD (coronary artery disease)     COPD (chronic obstructive pulmonary disease) (HCC)     Diabetes mellitus ([facility] [address]     Hodgkin disease ([facility] [address]     Hyperlipidemia     Hypertension     Migraines     Sleep apnea     recently tested     Type 2 diabetes mellitus without complication ([facility] [address]     type II       Past Surgical History:   Procedure Laterality Date    TUBAL LIGATION         Family History   Problem Relation Age of Onset    Diabetes Maternal Grandmother     Diabetes Paternal Grandmother        Social History     Socioeconomic History    Marital status: Single     Spouse name: None    Number of children: None    Years of education: None    Highest education level: None   Tobacco Use    Smoking status: Former     Packs/day: 1.00     Years: 37.00     Pack years: 37.00     Types: Cigarettes     Quit date: 2022     Years since quittin.0    Smokeless tobacco: Never   Vaping Use    Vaping Use: Never used   Substance and Sexual Activity    Alcohol use: Not Currently    Drug use: Never    Sexual activity: Yes     Partners: Male     Social Determinants of Health     Financial Resource Strain: High Risk    Difficulty of Paying

## 2023-06-01 NOTE — PROGRESS NOTES
Diabetes Self- Management Education Program Assessment and Education Record-   Also see Diabetic Screening    Patient, Germaine Real, has been diagnosed with  [] Type 1 [x] Type 2 diabetes. [] Patient is new to diabetes, and here for initial diabetes self-management assessment and education. [x] Patient is here for review of diabetes self-management assessment and education. Today's visit was in an [x] individual [] group setting. Patient came [] alone [] with family member.     [x] Patient was diagnosed with diabetes in: 2019    Goals setting:  Initial Goal Set with Patient  [x]Yes  [] NO      MEDICAL HISTORY:  Past Medical History:   Diagnosis Date    Anticoagulant long-term use     Asthma 2004    CAD (coronary artery disease)     COPD (chronic obstructive pulmonary disease) ([facility]     Diabetes mellitus ([address] 2014    Hodgkin disease ([facility] 1999    Hyperlipidemia     Hypertension 1984    Migraines 1989    Sleep apnea     recently tested     Type 2 diabetes mellitus without complication ([facility] [facility]     type II     Family History   Problem Relation Age of Onset    Diabetes Maternal Grandmother     Diabetes Paternal Grandmother      Shellfish-derived products, Cumin oil, and Guggulipid-black pepper   Immunization History   Administered Date(s) Administered    COVID-19, PFIZER GRAY top, DO NOT Dilute, (age 15 y+), IM, 30 mcg/0.3 mL 09/07/2022, 10/07/2022       Current Medications  Current Outpatient Medications   Medication Sig Dispense Refill    JARDIANCE 25 MG tablet Take 1 tablet by mouth once      metFORMIN (GLUCOPHAGE) 1000 MG tablet TAKE 1 TABLET BY MOUTH TWICE DAILY 60 tablet 3    aspirin 81 MG EC tablet Take 1 tablet by mouth daily 90 tablet 3    nicotine (NICODERM CQ) 21 MG/24HR Place 1 patch onto the skin daily 42 patch 0    losartan (COZAAR) 25 MG tablet Take 1 tablet by mouth daily 30 tablet 3    Dulaglutide (TRULICITY) 7.18 JM/2.5XX SOPN Inject 0.75 mg into the skin once a week (Patient not taking:

## 2023-06-08 ENCOUNTER — OFFICE VISIT (OUTPATIENT)
Dept: FAMILY MEDICINE CLINIC | Age: 53
End: 2023-06-08
Payer: COMMERCIAL

## 2023-06-08 VITALS
HEIGHT: 62 IN | RESPIRATION RATE: 18 BRPM | TEMPERATURE: 96.9 F | DIASTOLIC BLOOD PRESSURE: 72 MMHG | OXYGEN SATURATION: 96 % | WEIGHT: 197 LBS | BODY MASS INDEX: 36.25 KG/M2 | SYSTOLIC BLOOD PRESSURE: 128 MMHG | HEART RATE: 107 BPM

## 2023-06-08 DIAGNOSIS — M25.552 BILATERAL HIP PAIN: ICD-10-CM

## 2023-06-08 DIAGNOSIS — R20.0 NUMBNESS OR TINGLING: ICD-10-CM

## 2023-06-08 DIAGNOSIS — M79.671 BILATERAL FOOT PAIN: ICD-10-CM

## 2023-06-08 DIAGNOSIS — R29.898 MUSCULAR DECONDITIONING: ICD-10-CM

## 2023-06-08 DIAGNOSIS — S60.469A BUG BITE OF FINGER, INITIAL ENCOUNTER: ICD-10-CM

## 2023-06-08 DIAGNOSIS — G47.62 NOCTURNAL LEG CRAMPS: Primary | ICD-10-CM

## 2023-06-08 DIAGNOSIS — M25.50 MULTIPLE JOINT PAIN: ICD-10-CM
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
## 2023-06-08 DIAGNOSIS — M54.42 CHRONIC BILATERAL LOW BACK PAIN WITH BILATERAL SCIATICA: ICD-10-CM

## 2023-06-08 DIAGNOSIS — R20.2 NUMBNESS OR TINGLING: ICD-10-CM

## 2023-06-08 DIAGNOSIS — E11.65 TYPE 2 DIABETES MELLITUS WITH HYPERGLYCEMIA, WITHOUT LONG-TERM CURRENT USE OF INSULIN (HCC): ICD-10-CM

## 2023-06-08 DIAGNOSIS — G47.62 NOCTURNAL LEG CRAMPS: ICD-10-CM

## 2023-06-08 DIAGNOSIS — G89.29 CHRONIC BILATERAL LOW BACK PAIN WITH BILATERAL SCIATICA: ICD-10-CM

## 2023-06-08 DIAGNOSIS — M79.672 BILATERAL FOOT PAIN: ICD-10-CM

## 2023-06-08 DIAGNOSIS — M54.41 CHRONIC BILATERAL LOW BACK PAIN WITH BILATERAL SCIATICA: ICD-10-CM

## 2023-06-08 DIAGNOSIS — M25.551 BILATERAL HIP PAIN: ICD-10-CM

## 2023-06-08 LAB
ANION GAP SERPL CALCULATED.3IONS-SCNC: 14 MEQ/L (ref 9–15)
BUN SERPL-MCNC: 12 MG/DL (ref 6–20)
CALCIUM SERPL-MCNC: 9.2 MG/DL (ref 8.5–9.9)
CHLORIDE SERPL-SCNC: 105 MEQ/L (ref 95–107)
CO2 SERPL-SCNC: 22 MEQ/L (ref 20–31)
CREAT SERPL-MCNC: 0.51 MG/DL (ref 0.5–0.9)
CRP SERPL HS-MCNC: 23.6 MG/L (ref 0–5)
ERYTHROCYTE [SEDIMENTATION RATE] IN BLOOD BY WESTERGREN METHOD: 49 MM (ref 0–30)
GLUCOSE SERPL-MCNC: 120 MG/DL (ref 70–99)
MAGNESIUM SERPL-MCNC: 2 MG/DL (ref 1.7–2.4)
POTASSIUM SERPL-SCNC: 4.2 MEQ/L (ref 3.4–4.9)
RHEUMATOID FACTOR: 119.1 IU/ML
SODIUM SERPL-SCNC: 141 MEQ/L (ref 135–144)

## 2023-06-08 PROCEDURE — 3046F HEMOGLOBIN A1C LEVEL >9.0%: CPT | Performed by: PHYSICIAN ASSISTANT

## 2023-06-08 PROCEDURE — 3074F SYST BP LT 130 MM HG: CPT | Performed by: PHYSICIAN ASSISTANT

## 2023-06-08 PROCEDURE — 3078F DIAST BP <80 MM HG: CPT | Performed by: PHYSICIAN ASSISTANT

## 2023-06-08 PROCEDURE — G8427 DOCREV CUR MEDS BY ELIG CLIN: HCPCS | Performed by: PHYSICIAN ASSISTANT

## 2023-06-08 PROCEDURE — G8417 CALC BMI ABV UP PARAM F/U: HCPCS | Performed by: PHYSICIAN ASSISTANT

## 2023-06-08 PROCEDURE — 99214 OFFICE O/P EST MOD 30 MIN: CPT | Performed by: PHYSICIAN ASSISTANT

## 2023-06-08 PROCEDURE — 3017F COLORECTAL CA SCREEN DOC REV: CPT | Performed by: PHYSICIAN ASSISTANT

## 2023-06-08 PROCEDURE — 1036F TOBACCO NON-USER: CPT | Performed by: PHYSICIAN ASSISTANT

## 2023-06-08 PROCEDURE — 2022F DILAT RTA XM EVC RTNOPTHY: CPT | Performed by: PHYSICIAN ASSISTANT

## 2023-06-08 RX ORDER — DUPILUMAB 100 MG/.67ML
INJECTION, SOLUTION SUBCUTANEOUS
COMMUNITY

## 2023-06-08 RX ORDER — METHYLPREDNISOLONE 4 MG/1
TABLET ORAL
Qty: 1 KIT | Refills: 0 | Status: SHIPPED | OUTPATIENT
Start: 2023-06-08 | End: 2023-06-14

## 2023-06-08 ASSESSMENT — ENCOUNTER SYMPTOMS
SINUS PRESSURE: 0
SHORTNESS OF BREATH: 0
SINUS PAIN: 0
ABDOMINAL PAIN: 0
COUGH: 0
CHEST TIGHTNESS: 0
DIARRHEA: 0
NAUSEA: 0
BACK PAIN: 0
VOMITING: 0
SORE THROAT: 0

## 2023-06-08 ASSESSMENT — COLUMBIA-SUICIDE SEVERITY RATING SCALE - C-SSRS
2. HAVE YOU ACTUALLY HAD ANY THOUGHTS OF KILLING YOURSELF?: NO
6. HAVE YOU EVER DONE ANYTHING, STARTED TO DO ANYTHING, OR PREPARED TO DO ANYTHING TO END YOUR LIFE?: NO
1. WITHIN THE PAST MONTH, HAVE YOU WISHED YOU WERE DEAD OR WISHED YOU COULD GO TO SLEEP AND NOT WAKE UP?: NO

## 2023-06-08 ASSESSMENT — PATIENT HEALTH QUESTIONNAIRE - PHQ9
2. FEELING DOWN, DEPRESSED OR HOPELESS: 1
10. IF YOU CHECKED OFF ANY PROBLEMS, HOW DIFFICULT HAVE THESE PROBLEMS MADE IT FOR YOU TO DO YOUR WORK, TAKE CARE OF THINGS AT HOME, OR GET ALONG WITH OTHER PEOPLE: 1
SUM OF ALL RESPONSES TO PHQ QUESTIONS 1-9: 9
SUM OF ALL RESPONSES TO PHQ QUESTIONS 1-9: 8
7. TROUBLE CONCENTRATING ON THINGS, SUCH AS READING THE NEWSPAPER OR WATCHING TELEVISION: 1
3. TROUBLE FALLING OR STAYING ASLEEP: 1
5. POOR APPETITE OR OVEREATING: 1
SUM OF ALL RESPONSES TO PHQ QUESTIONS 1-9: 9
6. FEELING BAD ABOUT YOURSELF - OR THAT YOU ARE A FAILURE OR HAVE LET YOURSELF OR YOUR FAMILY DOWN: 1
9. THOUGHTS THAT YOU WOULD BE BETTER OFF DEAD, OR OF HURTING YOURSELF: 1
8. MOVING OR SPEAKING SO SLOWLY THAT OTHER PEOPLE COULD HAVE NOTICED. OR THE OPPOSITE, BEING SO FIGETY OR RESTLESS THAT YOU HAVE BEEN MOVING AROUND A LOT MORE THAN USUAL: 1
SUM OF ALL RESPONSES TO PHQ QUESTIONS 1-9: 9
SUM OF ALL RESPONSES TO PHQ9 QUESTIONS 1 & 2: 2
4. FEELING TIRED OR HAVING LITTLE ENERGY: 1
1. LITTLE INTEREST OR PLEASURE IN DOING THINGS: 1

## 2023-06-08 ASSESSMENT — VISUAL ACUITY: OU: 1

## 2023-06-08 NOTE — PROGRESS NOTES
Future     Number of Occurrences:   1     Standing Expiration Date:   6/8/2024    Rheumatoid Factor     Standing Status:   Future     Number of Occurrences:   1     Standing Expiration Date:   6/8/2024    [facility] Physical Therapy - Glenn/Mary     Referral Priority:   Routine     Referral Type:   Eval and Treat     Referral Reason:   Specialty Services Required     Requested Specialty:   Physical Therapist     Number of Visits Requested:   1    EMG     Standing Status:   Future     Standing Expiration Date:   8/7/2023     Order Specific Question:   Which body part? Answer:   B/l lower extremity     Orders Placed This Encounter   Medications    triamcinolone (KENALOG) 0.1 % ointment     Sig: Apply thin layer topically 2 times daily for max of 2 weeks. Dispense:  80 g     Refill:  0    methylPREDNISolone (MEDROL, OMARI,) 4 MG tablet     Sig: Take by mouth. Dispense:  1 kit     Refill:  0     There are no discontinued medications. No follow-ups on file. Reviewed with the patient: current clinical status, medications, activities and diet. Side effects, adverse effects of the medication prescribed today, as well as treatment plan/ rationale and result expectations have been discussed with the patient who expresses understanding and desires to proceed. Close follow up to evaluate treatment results and for coordination of care. I have reviewed the patient's medical history in detail and updated the computerized patient record.     Hugo Ch

## 2023-06-10 LAB — NUCLEAR IGG SER QL IA: NORMAL

## 2023-06-28 ENCOUNTER — HOSPITAL ENCOUNTER (OUTPATIENT)
Dept: PHYSICAL THERAPY | Age: 53
Setting detail: THERAPIES SERIES
Discharge: HOME | End: 2023-06-28
Payer: COMMERCIAL

## 2023-06-28 PROCEDURE — 97162 PT EVAL MOD COMPLEX 30 MIN: CPT

## 2023-06-28 ASSESSMENT — PAIN DESCRIPTION - PAIN TYPE: TYPE: CHRONIC PAIN

## 2023-06-28 ASSESSMENT — PAIN DESCRIPTION - LOCATION: LOCATION: BACK;HIP

## 2023-06-28 ASSESSMENT — PAIN DESCRIPTION - ORIENTATION: ORIENTATION: RIGHT;LEFT

## 2023-06-28 ASSESSMENT — PAIN SCALES - GENERAL: PAINLEVEL_OUTOF10: 0

## 2023-06-28 ASSESSMENT — PAIN DESCRIPTION - DESCRIPTORS: DESCRIPTORS: DISCOMFORT;SHARP

## 2023-07-03 DIAGNOSIS — J44.9 ASTHMA-COPD OVERLAP SYNDROME (HCC): Primary | ICD-10-CM

## 2023-07-03 NOTE — TELEPHONE ENCOUNTER
Please approve or deny this request.    Rx requested:  Requested Prescriptions     Pending Prescriptions Disp Refills    dupilumab (DUPIXENT) 300 MG/2ML SOSY injection 6 each 3     Sig: Inject 300 mg (1 injection) subcutaneously every other week starting on day 15         Last Office Visit:   Visit date not found      Next Visit Date:  Future Appointments   Date Time Provider [address]  [facility]   7/5/2023  9:30 AM [address]   7/12/2023 10:15 AM GIFTY De Oliveira [place] [address] [place] [address]   7/12/2023  2:00  Dr. Ren[address]  [facility] ED 1 [facility]   8/4/2023  8:30 AM Gurinder Menard  [facility] EMG 1 Sh[facility]   8/22/2023 10:00 AM Diane Cruz MD [facility]   9/1/2023 12:30 PM Deneen Mcintyre MD [facility]

## 2023-07-05 ENCOUNTER — HOSPITAL ENCOUNTER (OUTPATIENT)
Dept: PHYSICAL THERAPY | Age: 53
Setting detail: THERAPIES SERIES
Discharge: HOME | End: 2023-07-05

## 2023-07-05 NOTE — PROGRESS NOTES
Therapy                            Cancellation/No-show Note      Date: 2023  Patient: Von Leon (73 y.o. female)  : 1970  MRN:  [id number]  Referring Physician: GIFTY Navarrete    Medical Diagnosis: Chronic bilateral low back pain with bilateral sciatica [M54.42, M54.41, G89.29]  Bilateral hip pain [M25.551, M25.552]  Muscular deconditioning [R29.898]      Visit Information:  Visits to Date 1   No Show/Cancelled Appts: 0 /       For today's appointment patient:  [x]  Cancelled  []  Rescheduled appointment  []  No-show   []  Called pt to remind of next appointment     Reason given by patient:  [x]  Patient ill  []  Conflicting appointment  []  No transportation    []  Conflict with work  []  No reason given  []  Other:      [] Pt has future appointments scheduled, no follow up needed  [] Pt requests to be on hold. Reason:   If > 2 weeks please discuss with therapist.  [] Therapist to call pt for follow up     Comments:   Needs to schedule additional appts.     Signature: Electronically signed by Danuta Dodge PTA on 23 at 8:46 AM EDT

## 2023-07-12 ENCOUNTER — TELEPHONE (OUTPATIENT)
Dept: FAMILY MEDICINE CLINIC | Age: 53
End: 2023-07-12

## 2023-07-18 DIAGNOSIS — E11.65 TYPE 2 DIABETES MELLITUS WITH HYPERGLYCEMIA, WITHOUT LONG-TERM CURRENT USE OF INSULIN (HCC): ICD-10-CM

## 2023-07-18 RX ORDER — DULAGLUTIDE 0.75 MG/.5ML
0.75 INJECTION, SOLUTION SUBCUTANEOUS WEEKLY
Qty: 4 ADJUSTABLE DOSE PRE-FILLED PEN SYRINGE | Refills: 1 | Status: SHIPPED | OUTPATIENT
Start: 2023-07-18

## 2023-07-24 RX ORDER — DUPILUMAB 300 MG/2ML
INJECTION, SOLUTION SUBCUTANEOUS
COMMUNITY
Start: 2023-07-14

## 2023-07-24 RX ORDER — PSEUDOEPHED/ACETAMINOPH/DIPHEN 30MG-500MG
TABLET ORAL
COMMUNITY
Start: 2023-06-16

## 2023-07-24 RX ORDER — PROCHLORPERAZINE 25 MG/1
SUPPOSITORY RECTAL
COMMUNITY
Start: 2023-06-21

## 2023-07-25 ENCOUNTER — TELEMEDICINE (OUTPATIENT)
Dept: FAMILY MEDICINE CLINIC | Age: 53
End: 2023-07-25
Payer: COMMERCIAL

## 2023-07-25 DIAGNOSIS — I10 ESSENTIAL (PRIMARY) HYPERTENSION: Primary | ICD-10-CM

## 2023-07-25 DIAGNOSIS — M05.80 POLYARTHRITIS WITH POSITIVE RHEUMATOID FACTOR (HCC): ICD-10-CM

## 2023-07-25 DIAGNOSIS — E66.01 CLASS 2 SEVERE OBESITY DUE TO EXCESS CALORIES WITH SERIOUS COMORBIDITY AND BODY MASS INDEX (BMI) OF 36.0 TO 36.9 IN ADULT (HCC): ICD-10-CM

## 2023-07-25 PROBLEM — E66.812 CLASS 2 SEVERE OBESITY DUE TO EXCESS CALORIES WITH SERIOUS COMORBIDITY AND BODY MASS INDEX (BMI) OF 36.0 TO 36.9 IN ADULT: Status: ACTIVE | Noted: 2023-07-25

## 2023-07-25 PROCEDURE — 99213 OFFICE O/P EST LOW 20 MIN: CPT | Performed by: PHYSICIAN ASSISTANT

## 2023-07-25 PROCEDURE — G8427 DOCREV CUR MEDS BY ELIG CLIN: HCPCS | Performed by: PHYSICIAN ASSISTANT

## 2023-07-25 PROCEDURE — 3017F COLORECTAL CA SCREEN DOC REV: CPT | Performed by: PHYSICIAN ASSISTANT

## 2023-07-25 RX ORDER — POTASSIUM CHLORIDE 750 MG/1
10 TABLET, EXTENDED RELEASE ORAL DAILY
Qty: 60 TABLET | Refills: 0 | Status: SHIPPED | OUTPATIENT
Start: 2023-07-25

## 2023-07-25 ASSESSMENT — ENCOUNTER SYMPTOMS
SORE THROAT: 0
CHEST TIGHTNESS: 0
SHORTNESS OF BREATH: 0
COUGH: 0
SINUS PAIN: 0
NAUSEA: 0
SINUS PRESSURE: 0
DIARRHEA: 0
VOMITING: 0
BACK PAIN: 0
ABDOMINAL PAIN: 0

## 2023-07-25 NOTE — PROGRESS NOTES
Caitlyn Kidd MD   clotrimazole-betamethasone (LOTRISONE) 1-0.05 % cream Apply topically 2 times daily. Yes Rella Masker, DO   buPROPion (WELLBUTRIN SR) 150 MG extended release tablet Take 1 tablet by mouth daily Yes GIFTY Gomez   JARDIANCE 25 MG tablet Take 1 tablet by mouth once Yes Historical Provider, MD   metFORMIN (GLUCOPHAGE) 1000 MG tablet TAKE 1 TABLET BY MOUTH TWICE DAILY Yes GIFTY Navarrete   Multiple Vitamins-Minerals (THERAPEUTIC MULTIVITAMIN-MINERALS) tablet Take 1 tablet by mouth daily Yes Historical Provider, MD   rosuvastatin (CRESTOR) 40 MG tablet TAKE 1 TABLET BY MOUTH ONCE DAILY Yes Historical Provider, MD   clopidogrel (PLAVIX) 75 MG tablet Take 1 tablet by mouth daily Yes Historical Provider, MD   montelukast (SINGULAIR) 10 MG tablet Take 1 tablet by mouth nightly Yes Historical Provider, MD   EPINEPHrine (EPIPEN) 0.3 MG/0.3ML SOAJ injection Inject 0.3 mLs into the muscle as needed Yes Historical Provider, MD   magnesium oxide (MAG-OX) 400 MG tablet Take 1 tablet by mouth 2 times daily Yes Historical Provider, MD   aspirin 81 MG EC tablet Take 1 tablet by mouth daily Yes Miguel Townsend MD   Continuous Blood Gluc Transmit (DEXCOM G6 TRANSMITTER) MISC APPLY A NEW TRANSMITTER EVERY 90 DAYS.  CLEAN TRANSMITTER WITH ALCOHOL SWAB WITH EACH SENSOR CHANGE  Patient not taking: Reported on 7/25/2023  Historical Provider, MD   [facility] 300 MG/2ML SOPN injection   Historical Provider, MD   Dupilumab ([facility]) 100 MG/0.67ML SOSY Inject into the skin  Patient not taking: Reported on 7/25/2023  Historical Provider, MD   nicotine (NICODERM CQ) 21 MG/24HR Place 1 patch onto the skin daily  GIFTY Navarrete   albuterol (PROVENTIL) (2.5 MG/3ML) 0.083% nebulizer solution Take 3 mLs by nebulization every 4 hours as needed for Wheezing or Shortness of Breath  Patient not taking: Reported on 5/22/2023  Kyle Francois MD   furosemide (LASIX) 20 MG tablet Take 1 tablet by mouth daily  Edith Leblanc MD
06/01/23 128/62   05/22/23 (!) 144/82     Wt Readings from Last 3 Encounters:   06/08/23 197 lb (89.4 kg)   06/01/23 195 lb 12.8 oz (88.8 kg)   05/22/23 193 lb 9.6 oz (87.8 kg)       Lab Results   Component Value Date    LABA1C 9.1 05/11/2023    LABA1C 6.8 (H) 12/23/2022    LABA1C 8.6 (H) 09/29/2020     Lab Results   Component Value Date    CREATININE 0.51 06/08/2023     Lab Results   Component Value Date    ALT 19 03/29/2023    AST 13 03/29/2023     Lab Results   Component Value Date    CHOL 175 12/24/2022    TRIG 200 (H) 12/24/2022    HDL 36 (L) 12/24/2022    LDLCALC 99 12/24/2022          Physical Exam  Assessment & Plan   {There are no diagnoses linked to this encounter. (Refresh or delete this SmartLink)}   No orders of the defined types were placed in this encounter. No orders of the defined types were placed in this encounter. There are no discontinued medications. No follow-ups on file. {Time Documentation Optional:[id number]}    Reviewed with the patient: current clinical status, medications, activities and diet. Side effects, adverse effects of the medication prescribed today, as well as treatment plan/ rationale and result expectations have been discussed with the patient who expresses understanding and desires to proceed. Close follow up to evaluate treatment results and for coordination of care. I have reviewed the patient's medical history in detail and updated the computerized patient record.     Jesús Nelson [place]

## 2023-08-04 ENCOUNTER — HOSPITAL ENCOUNTER (OUTPATIENT)
Dept: NEUROLOGY | Age: 53
Discharge: HOME | End: 2023-08-04
Payer: COMMERCIAL

## 2023-08-04 DIAGNOSIS — M79.671 BILATERAL FOOT PAIN: ICD-10-CM

## 2023-08-04 DIAGNOSIS — M79.672 BILATERAL FOOT PAIN: ICD-10-CM

## 2023-08-04 DIAGNOSIS — R20.2 NUMBNESS OR TINGLING: ICD-10-CM

## 2023-08-04 DIAGNOSIS — R20.0 NUMBNESS OR TINGLING: ICD-10-CM

## 2023-08-04 PROCEDURE — 95912 NRV CNDJ TEST 11-12 STUDIES: CPT

## 2023-08-04 PROCEDURE — 95886 MUSC TEST DONE W/N TEST COMP: CPT

## 2023-08-04 NOTE — PROCEDURES
[place] [address] [facility]                             ELECTROMYOGRAM REPORT    PATIENT NAME: May Daley                    :        1970  MED REC NO:   [id number]                            ROOM:  ACCOUNT NO:   [de-identified]                           ADMIT DATE: 2023  PROVIDER:     Katrina Mills MD    DATE OF EM2023    REASON FOR STUDY:  Neurophysiological studies are requested for the  patient's underlying numbness in the toes. FINDINGS:  MOTOR NERVE CONDUCTION STUDIES:  Motor nerve conduction study of the  right common peroneal nerve shows normal amplitudes, latency, and  conduction velocity. Motor nerve conduction study of the left common  peroneal nerve shows normal amplitudes, latency, and conduction  velocity. Motor nerve conduction study of the right tibial nerve shows  normal amplitudes and latency and mildly decreased conduction velocity. Motor nerve conduction study of the left tibial nerve shows normal  amplitudes, latency, and conduction velocity. SENSORY NERVE CONDUCTION STUDIES:  The right sural nerve conduction  study shows normal peak latency, low amplitudes, and normal conduction  velocity. The left sural nerve conduction study shows normal peak  latency, low amplitudes, and normal conduction velocity. Right  superficial peroneal nerve shows normal peak latency, low amplitudes,  and normal conduction velocity. Left superficial nerve is not  recordable and saphenous nerves are not recordable. Needle electrode examination is normal.    IMPRESSION:  Essentially normal nerve conduction studies of the lower  extremities. There is no large fiber neuropathy. Minor small fiber  neuropathies may coexist with diabetes. No active or chronic  radiculopathies notable. This test is personally performed and interpreted by me.     Multiple sensory nerve conduction studies are evaluated to

## 2023-08-07 PROBLEM — R20.0 NUMBNESS OR TINGLING: Status: ACTIVE | Noted: 2023-08-07

## 2023-08-07 PROBLEM — R20.2 NUMBNESS OR TINGLING: Status: ACTIVE | Noted: 2023-08-07

## 2023-08-22 ENCOUNTER — OFFICE VISIT (OUTPATIENT)
Dept: PULMONOLOGY | Age: 53
End: 2023-08-22
Payer: COMMERCIAL

## 2023-08-22 VITALS
BODY MASS INDEX: 36.73 KG/M2 | HEART RATE: 98 BPM | RESPIRATION RATE: 16 BRPM | TEMPERATURE: 97 F | SYSTOLIC BLOOD PRESSURE: 138 MMHG | OXYGEN SATURATION: 97 % | HEIGHT: 62 IN | WEIGHT: 199.6 LBS | DIASTOLIC BLOOD PRESSURE: 76 MMHG

## 2023-08-22 DIAGNOSIS — K21.9 GASTROESOPHAGEAL REFLUX DISEASE WITHOUT ESOPHAGITIS: ICD-10-CM

## 2023-08-22 DIAGNOSIS — J44.9 ASTHMA-COPD OVERLAP SYNDROME (HCC): ICD-10-CM

## 2023-08-22 DIAGNOSIS — E66.09 CLASS 2 OBESITY DUE TO EXCESS CALORIES WITHOUT SERIOUS COMORBIDITY WITH BODY MASS INDEX (BMI) OF 37.0 TO 37.9 IN ADULT: ICD-10-CM

## 2023-08-22 DIAGNOSIS — R91.8 LUNG NODULES: ICD-10-CM

## 2023-08-22 DIAGNOSIS — J45.50 SEVERE PERSISTENT ASTHMA WITHOUT COMPLICATION: Primary | ICD-10-CM

## 2023-08-22 PROCEDURE — G8427 DOCREV CUR MEDS BY ELIG CLIN: HCPCS | Performed by: INTERNAL MEDICINE

## 2023-08-22 PROCEDURE — 99214 OFFICE O/P EST MOD 30 MIN: CPT | Performed by: INTERNAL MEDICINE

## 2023-08-22 PROCEDURE — 3078F DIAST BP <80 MM HG: CPT | Performed by: INTERNAL MEDICINE

## 2023-08-22 PROCEDURE — 1036F TOBACCO NON-USER: CPT | Performed by: INTERNAL MEDICINE

## 2023-08-22 PROCEDURE — 3075F SYST BP GE 130 - 139MM HG: CPT | Performed by: INTERNAL MEDICINE

## 2023-08-22 PROCEDURE — 3023F SPIROM DOC REV: CPT | Performed by: INTERNAL MEDICINE

## 2023-08-22 PROCEDURE — 3017F COLORECTAL CA SCREEN DOC REV: CPT | Performed by: INTERNAL MEDICINE

## 2023-08-22 PROCEDURE — G8417 CALC BMI ABV UP PARAM F/U: HCPCS | Performed by: INTERNAL MEDICINE

## 2023-08-22 RX ORDER — GUAIFENESIN 600 MG/1
1200 TABLET, EXTENDED RELEASE ORAL 2 TIMES DAILY
COMMUNITY

## 2023-08-22 RX ORDER — FLUTICASONE FUROATE, UMECLIDINIUM BROMIDE AND VILANTEROL TRIFENATATE 200; 62.5; 25 UG/1; UG/1; UG/1
1 POWDER RESPIRATORY (INHALATION) DAILY
Qty: 1 EACH | Refills: 2 | Status: SHIPPED | OUTPATIENT
Start: 2023-08-22

## 2023-08-22 RX ORDER — TEZEPELUMAB-EKKO 210 MG/1.9ML
210 INJECTION, SOLUTION SUBCUTANEOUS ONCE
Qty: 1.91 ML | Refills: 0 | Status: SHIPPED | OUTPATIENT
Start: 2023-08-22 | End: 2023-08-22

## 2023-08-22 NOTE — PROGRESS NOTES
Subjective:     Aye Sparks is a 52 y.o. female who complains today of:     Chief Complaint   Patient presents with    Follow-up     3 Month F/U for Severe persistent asthma and Lung Nodules       HPI  Patient presents for asthma    8/22/2023  She feels better, currently on Dupixent and symptoms improved, she has no shortness of breath able to do her daily activities, no coughing. No chest pain. No fever, her weight is stable, no nasal congestion or postnasal drip. However she reports allergy to injection sites of Dupixent, she has been using Benadryl, presents as a rash with some itching. Resolves with Benadryl, she continues to take Dupixent despite no symptoms in light of significant asthma improvement    5/22/2023  Doing much better, currently on Trelegy and Qvar, she is due for her Fasenra dose, symptoms improved, no chest pain, no shortness of breath, she has not been needing to use her oxygen, no lower extremity edema, no fever or chills weight is stable, no nasal congestion or postnasal drip and no heartburn. 4/24/2023  Patient was doing good, she finished prednisone almost 2 weeks ago, she was active, went to [employer] yesterday however after coming back she noticed increased shortness of breath, with cough productive of brown phlegm, no chest pain, no fever, she has runny nose, she feels this was triggered because of the change of weather and cutting grass in her neighborhood. No lower extremity edema, no fever no chills, she is currently on 2 L O2 continuously, she noticed recently she is desaturating to low 80s with exertion.     3/29/2023  Patient called this morning, with worsening shortness of breath, and cough, symptoms has been getting worse over the last week, however significantly worse this morning, she is unable to walk or do any activity, her O2 sat dropped with minimal exertion today to 87%, denies chest pain, she has cough with clear phlegm, no fever no chills, no

## 2023-09-01 ENCOUNTER — OFFICE VISIT (OUTPATIENT)
Dept: CARDIOLOGY CLINIC | Age: 53
End: 2023-09-01
Payer: COMMERCIAL

## 2023-09-01 VITALS
HEART RATE: 93 BPM | WEIGHT: 197.2 LBS | OXYGEN SATURATION: 97 % | HEIGHT: 62 IN | BODY MASS INDEX: 36.29 KG/M2 | SYSTOLIC BLOOD PRESSURE: 130 MMHG | DIASTOLIC BLOOD PRESSURE: 88 MMHG

## 2023-09-01 DIAGNOSIS — J45.50 SEVERE PERSISTENT ASTHMA WITHOUT COMPLICATION: Primary | ICD-10-CM

## 2023-09-01 DIAGNOSIS — I25.83 CORONARY ARTERY DISEASE DUE TO LIPID RICH PLAQUE: Primary | ICD-10-CM

## 2023-09-01 DIAGNOSIS — R09.89 BILATERAL CAROTID BRUITS: ICD-10-CM

## 2023-09-01 DIAGNOSIS — I25.10 CORONARY ARTERY DISEASE DUE TO LIPID RICH PLAQUE: Primary | ICD-10-CM

## 2023-09-01 DIAGNOSIS — E78.5 DYSLIPIDEMIA: ICD-10-CM

## 2023-09-01 DIAGNOSIS — I10 HYPERTENSION, UNSPECIFIED TYPE: ICD-10-CM

## 2023-09-01 PROCEDURE — G8427 DOCREV CUR MEDS BY ELIG CLIN: HCPCS | Performed by: INTERNAL MEDICINE

## 2023-09-01 PROCEDURE — 1036F TOBACCO NON-USER: CPT | Performed by: INTERNAL MEDICINE

## 2023-09-01 PROCEDURE — 3075F SYST BP GE 130 - 139MM HG: CPT | Performed by: INTERNAL MEDICINE

## 2023-09-01 PROCEDURE — 3079F DIAST BP 80-89 MM HG: CPT | Performed by: INTERNAL MEDICINE

## 2023-09-01 PROCEDURE — 99214 OFFICE O/P EST MOD 30 MIN: CPT | Performed by: INTERNAL MEDICINE

## 2023-09-01 PROCEDURE — G8417 CALC BMI ABV UP PARAM F/U: HCPCS | Performed by: INTERNAL MEDICINE

## 2023-09-01 PROCEDURE — 3017F COLORECTAL CA SCREEN DOC REV: CPT | Performed by: INTERNAL MEDICINE

## 2023-09-01 RX ORDER — TEZEPELUMAB-EKKO 210 MG/1.9ML
210 INJECTION, SOLUTION SUBCUTANEOUS ONCE
Qty: 1.91 ML | Refills: 0 | COMMUNITY
Start: 2023-09-01 | End: 2023-09-01

## 2023-09-01 ASSESSMENT — ENCOUNTER SYMPTOMS
CHEST TIGHTNESS: 0
RESPIRATORY NEGATIVE: 1
STRIDOR: 0
BLOOD IN STOOL: 0
GASTROINTESTINAL NEGATIVE: 1
SHORTNESS OF BREATH: 0
NAUSEA: 0
EYES NEGATIVE: 1
WHEEZING: 0
COUGH: 0

## 2023-09-01 NOTE — PROGRESS NOTES
OFFICEISIT      Patient: John Leon  YOB: 1970  MRN: [id number]    Chief Complaint:  Chief Complaint   Patient presents with    3 Month Follow-Up    Hypertension       CV Data:  22 Cath EF 65  Mild CX 40%  LAD 40-50% RCA 2 prior Stents patent.  Echo EF 81%  /44 CUS LICA 09-77  3/00 Abd US - no AAA    Subjective/HPI  pt changing from [facility]. she has premature CAD. No cp no sob no falls no bleed.  more pals and related [facility] CP. She was on higher CCB in the past  is not enough for her. No cp b no bleed    23 just getting over URI. Doing better. No cp no sob no falls no bleed takes meds. EKG: SR 66    +++FH  Former smoker  No etoh  Lives w boyfriend and son. Work - .      Past Medical History:   Diagnosis Date    Anticoagulant long-term use     Asthma     CAD (coronary artery disease)     COPD (chronic obstructive pulmonary disease) (HCC)     Diabetes mellitus ([facility])     Hodgkin disease ([facility])     Hyperlipidemia     Hypertension 1984    Migraines     Sleep apnea     recently tested     Type 2 diabetes mellitus without complication ([facility])     type II       Past Surgical History:   Procedure Laterality Date    TUBAL LIGATION         Family History   Problem Relation Age of Onset    Diabetes Maternal Grandmother     Diabetes Paternal Grandmother        Social History     Socioeconomic History    Marital status: Single     Spouse name: None    Number of children: None    Years of education: None    Highest education level: None   Tobacco Use    Smoking status: Former     Packs/day: 1.00     Years: 37.00     Pack years: 37.00     Types: Cigarettes     Quit date: 2022     Years since quittin.2    Smokeless tobacco: Never   Vaping Use    Vaping Use: Never used   Substance and Sexual Activity    Alcohol use: Not Currently    Drug use: Never    Sexual activity: Yes     Partners: Male     Social Determinants of Health     Financial

## 2023-09-07 ENCOUNTER — OFFICE VISIT (OUTPATIENT)
Dept: FAMILY MEDICINE CLINIC | Age: 53
End: 2023-09-07
Payer: COMMERCIAL

## 2023-09-07 VITALS
BODY MASS INDEX: 36.25 KG/M2 | RESPIRATION RATE: 16 BRPM | TEMPERATURE: 96.8 F | DIASTOLIC BLOOD PRESSURE: 82 MMHG | WEIGHT: 197 LBS | OXYGEN SATURATION: 98 % | HEIGHT: 62 IN | SYSTOLIC BLOOD PRESSURE: 132 MMHG | HEART RATE: 92 BPM

## 2023-09-07 DIAGNOSIS — Z12.12 ENCOUNTER FOR COLORECTAL CANCER SCREENING: ICD-10-CM

## 2023-09-07 DIAGNOSIS — M25.50 MULTIPLE JOINT PAIN: ICD-10-CM

## 2023-09-07 DIAGNOSIS — L20.9 ATOPIC DERMATITIS, UNSPECIFIED TYPE: ICD-10-CM

## 2023-09-07 DIAGNOSIS — E66.01 CLASS 2 SEVERE OBESITY DUE TO EXCESS CALORIES WITH SERIOUS COMORBIDITY AND BODY MASS INDEX (BMI) OF 36.0 TO 36.9 IN ADULT (HCC): Primary | ICD-10-CM

## 2023-09-07 DIAGNOSIS — E11.65 TYPE 2 DIABETES MELLITUS WITH HYPERGLYCEMIA, WITHOUT LONG-TERM CURRENT USE OF INSULIN (HCC): ICD-10-CM

## 2023-09-07 DIAGNOSIS — Z11.59 NEED FOR HEPATITIS C SCREENING TEST: ICD-10-CM

## 2023-09-07 DIAGNOSIS — G47.62 NOCTURNAL LEG CRAMPS: ICD-10-CM

## 2023-09-07 DIAGNOSIS — Z12.11 ENCOUNTER FOR COLORECTAL CANCER SCREENING: ICD-10-CM

## 2023-09-07 LAB
ALBUMIN SERPL-MCNC: 3.9 G/DL (ref 3.5–4.6)
ALP SERPL-CCNC: 98 U/L (ref 40–130)
ALT SERPL-CCNC: 14 U/L (ref 0–33)
ANION GAP SERPL CALCULATED.3IONS-SCNC: 13 MEQ/L (ref 9–15)
AST SERPL-CCNC: 12 U/L (ref 0–35)
BILIRUB SERPL-MCNC: 0.3 MG/DL (ref 0.2–0.7)
BUN SERPL-MCNC: 8 MG/DL (ref 6–20)
CALCIUM SERPL-MCNC: 9.1 MG/DL (ref 8.5–9.9)
CHLORIDE SERPL-SCNC: 105 MEQ/L (ref 95–107)
CO2 SERPL-SCNC: 22 MEQ/L (ref 20–31)
CREAT SERPL-MCNC: 0.65 MG/DL (ref 0.5–0.9)
CREAT UR-MCNC: 307.1 MG/DL
GLOBULIN SER CALC-MCNC: 3.5 G/DL (ref 2.3–3.5)
GLUCOSE SERPL-MCNC: 100 MG/DL (ref 70–99)
MICROALBUMIN UR-MCNC: 1.9 MG/DL
MICROALBUMIN/CREAT UR-RTO: 6.2 MG/G (ref 0–30)
POTASSIUM SERPL-SCNC: 4 MEQ/L (ref 3.4–4.9)
PROT SERPL-MCNC: 7.4 G/DL (ref 6.3–8)
SODIUM SERPL-SCNC: 140 MEQ/L (ref 135–144)

## 2023-09-07 PROCEDURE — G8417 CALC BMI ABV UP PARAM F/U: HCPCS | Performed by: PHYSICIAN ASSISTANT

## 2023-09-07 PROCEDURE — 3017F COLORECTAL CA SCREEN DOC REV: CPT | Performed by: PHYSICIAN ASSISTANT

## 2023-09-07 PROCEDURE — 1036F TOBACCO NON-USER: CPT | Performed by: PHYSICIAN ASSISTANT

## 2023-09-07 PROCEDURE — 3075F SYST BP GE 130 - 139MM HG: CPT | Performed by: PHYSICIAN ASSISTANT

## 2023-09-07 PROCEDURE — 99214 OFFICE O/P EST MOD 30 MIN: CPT | Performed by: PHYSICIAN ASSISTANT

## 2023-09-07 PROCEDURE — 3079F DIAST BP 80-89 MM HG: CPT | Performed by: PHYSICIAN ASSISTANT

## 2023-09-07 PROCEDURE — 3046F HEMOGLOBIN A1C LEVEL >9.0%: CPT | Performed by: PHYSICIAN ASSISTANT

## 2023-09-07 PROCEDURE — 2022F DILAT RTA XM EVC RTNOPTHY: CPT | Performed by: PHYSICIAN ASSISTANT

## 2023-09-07 PROCEDURE — G8427 DOCREV CUR MEDS BY ELIG CLIN: HCPCS | Performed by: PHYSICIAN ASSISTANT

## 2023-09-07 PROCEDURE — 83036 HEMOGLOBIN GLYCOSYLATED A1C: CPT | Performed by: PHYSICIAN ASSISTANT

## 2023-09-07 RX ORDER — METHYLPREDNISOLONE 4 MG/1
TABLET ORAL
Qty: 1 KIT | Refills: 0 | Status: SHIPPED | OUTPATIENT
Start: 2023-09-07 | End: 2023-09-13

## 2023-09-07 RX ORDER — EMPAGLIFLOZIN 25 MG/1
25 TABLET, FILM COATED ORAL DAILY
Qty: 30 TABLET | Refills: 2 | Status: SHIPPED | OUTPATIENT
Start: 2023-09-07 | End: 2023-12-06

## 2023-09-07 RX ORDER — HYDROXYZINE 50 MG/1
50 TABLET, FILM COATED ORAL EVERY 8 HOURS PRN
Qty: 30 TABLET | Refills: 0 | Status: SHIPPED | OUTPATIENT
Start: 2023-09-07 | End: 2023-09-17

## 2023-09-07 RX ORDER — MELOXICAM 15 MG/1
15 TABLET ORAL DAILY
Qty: 30 TABLET | Refills: 3 | Status: SHIPPED | OUTPATIENT
Start: 2023-09-07

## 2023-09-07 ASSESSMENT — PATIENT HEALTH QUESTIONNAIRE - PHQ9
SUM OF ALL RESPONSES TO PHQ9 QUESTIONS 1 & 2: 2
SUM OF ALL RESPONSES TO PHQ QUESTIONS 1-9: 2
7. TROUBLE CONCENTRATING ON THINGS, SUCH AS READING THE NEWSPAPER OR WATCHING TELEVISION: 0
SUM OF ALL RESPONSES TO PHQ QUESTIONS 1-9: 2
6. FEELING BAD ABOUT YOURSELF - OR THAT YOU ARE A FAILURE OR HAVE LET YOURSELF OR YOUR FAMILY DOWN: 0
2. FEELING DOWN, DEPRESSED OR HOPELESS: 1
SUM OF ALL RESPONSES TO PHQ QUESTIONS 1-9: 2
10. IF YOU CHECKED OFF ANY PROBLEMS, HOW DIFFICULT HAVE THESE PROBLEMS MADE IT FOR YOU TO DO YOUR WORK, TAKE CARE OF THINGS AT HOME, OR GET ALONG WITH OTHER PEOPLE: 0
8. MOVING OR SPEAKING SO SLOWLY THAT OTHER PEOPLE COULD HAVE NOTICED. OR THE OPPOSITE, BEING SO FIGETY OR RESTLESS THAT YOU HAVE BEEN MOVING AROUND A LOT MORE THAN USUAL: 0
3. TROUBLE FALLING OR STAYING ASLEEP: 0
SUM OF ALL RESPONSES TO PHQ QUESTIONS 1-9: 2
1. LITTLE INTEREST OR PLEASURE IN DOING THINGS: 1
5. POOR APPETITE OR OVEREATING: 0
9. THOUGHTS THAT YOU WOULD BE BETTER OFF DEAD, OR OF HURTING YOURSELF: 0
4. FEELING TIRED OR HAVING LITTLE ENERGY: 0

## 2023-09-07 ASSESSMENT — ENCOUNTER SYMPTOMS
SORE THROAT: 0
BACK PAIN: 0
CHEST TIGHTNESS: 0
NAUSEA: 0
ABDOMINAL PAIN: 0
SHORTNESS OF BREATH: 0
SINUS PAIN: 0
SINUS PRESSURE: 0
COUGH: 0
DIARRHEA: 0
VOMITING: 0

## 2023-09-07 ASSESSMENT — VISUAL ACUITY: OU: 1

## 2023-09-07 NOTE — PROGRESS NOTES
09/01/23 130/88   08/22/23 138/76     Wt Readings from Last 3 Encounters:   09/07/23 197 lb (89.4 kg)   09/01/23 197 lb 3.2 oz (89.4 kg)   08/22/23 199 lb 9.6 oz (90.5 kg)       Lab Results   Component Value Date    LABA1C 9.1 05/11/2023    LABA1C 6.8 (H) 12/23/2022    LABA1C 8.6 (H) 09/29/2020     Lab Results   Component Value Date    CREATININE 0.65 09/07/2023     Lab Results   Component Value Date    ALT 14 09/07/2023    AST 12 09/07/2023     Lab Results   Component Value Date    CHOL 175 12/24/2022    TRIG 200 (H) 12/24/2022    HDL 36 (L) 12/24/2022    LDLCALC 99 12/24/2022          Physical Exam  Vitals and nursing note reviewed. Constitutional:       General: She is awake. She is not in acute distress. Appearance: Normal appearance. She is well-developed. She is not ill-appearing, toxic-appearing or diaphoretic. HENT:      Head: Normocephalic and atraumatic. Right Ear: Hearing and external ear normal.      Left Ear: Hearing and external ear normal.      Nose: Nose normal.   Eyes:      General: Lids are normal. Vision grossly intact. Gaze aligned appropriately. Conjunctiva/sclera: Conjunctivae normal.      Pupils: Pupils are equal, round, and reactive to light. Cardiovascular:      Rate and Rhythm: Normal rate and regular rhythm. Pulses: Normal pulses. Heart sounds: Normal heart sounds, S1 normal and S2 normal.   Pulmonary:      Effort: Pulmonary effort is normal.      Breath sounds: Normal breath sounds and air entry. Musculoskeletal:      Cervical back: Normal range of motion. Feet:      Comments: There are circular lesion on the sides of bilateral feet. Skin:     General: Skin is warm. Capillary Refill: Capillary refill takes less than 2 seconds. Neurological:      Mental Status: She is alert and oriented to person, place, and time. Gait: Gait is intact.    Psychiatric:         Attention and Perception: Attention normal.         Mood and Affect: Mood normal.

## 2023-09-08 LAB — HEPATITIS C ANTIBODY: NONREACTIVE

## 2023-09-26 LAB — NONINV COLON CA DNA+OCC BLD SCRN STL QL: NEGATIVE

## 2023-10-24 ENCOUNTER — HOSPITAL ENCOUNTER (OUTPATIENT)
Dept: CT IMAGING | Age: 53
Discharge: HOME | End: 2023-10-26
Attending: INTERNAL MEDICINE
Payer: COMMERCIAL

## 2023-10-24 DIAGNOSIS — R91.8 LUNG NODULES: ICD-10-CM

## 2023-10-24 PROCEDURE — 71250 CT THORAX DX C-: CPT

## 2023-10-24 PROCEDURE — 94060 EVALUATION OF WHEEZING: CPT

## 2023-10-24 PROCEDURE — 94729 DIFFUSING CAPACITY: CPT

## 2023-10-24 PROCEDURE — 94726 PLETHYSMOGRAPHY LUNG VOLUMES: CPT

## 2023-10-24 RX ORDER — ALBUTEROL SULFATE 2.5 MG/3ML
SOLUTION RESPIRATORY (INHALATION)
Status: DISPENSED
Start: 2023-10-24 | End: 2023-10-24

## 2023-10-27 ENCOUNTER — HOSPITAL ENCOUNTER (OUTPATIENT)
Dept: PULMONOLOGY | Age: 53
Discharge: HOME | End: 2023-10-24
Attending: INTERNAL MEDICINE
Payer: COMMERCIAL

## 2023-10-27 DIAGNOSIS — J45.50 SEVERE PERSISTENT ASTHMA WITHOUT COMPLICATION: ICD-10-CM

## 2023-10-27 PROCEDURE — 94060 EVALUATION OF WHEEZING: CPT

## 2023-10-27 PROCEDURE — 94729 DIFFUSING CAPACITY: CPT

## 2023-10-27 PROCEDURE — 94726 PLETHYSMOGRAPHY LUNG VOLUMES: CPT

## 2023-10-27 RX ORDER — ALBUTEROL SULFATE 2.5 MG/3ML
2.5 SOLUTION RESPIRATORY (INHALATION) ONCE
Status: COMPLETED | OUTPATIENT
Start: 2023-10-27 | End: 2023-10-27

## 2023-10-27 RX ADMIN — ALBUTEROL SULFATE 2.5 MG: 2.5 SOLUTION RESPIRATORY (INHALATION) at 11:56

## 2023-10-30 ENCOUNTER — OFFICE VISIT (OUTPATIENT)
Dept: PULMONOLOGY | Age: 53
End: 2023-10-30
Payer: COMMERCIAL

## 2023-10-30 VITALS
HEART RATE: 95 BPM | BODY MASS INDEX: 36.91 KG/M2 | TEMPERATURE: 97 F | SYSTOLIC BLOOD PRESSURE: 132 MMHG | RESPIRATION RATE: 16 BRPM | HEIGHT: 62 IN | OXYGEN SATURATION: 96 % | DIASTOLIC BLOOD PRESSURE: 74 MMHG | WEIGHT: 200.6 LBS

## 2023-10-30 DIAGNOSIS — R93.89 ABNORMAL CT OF THE CHEST: ICD-10-CM

## 2023-10-30 DIAGNOSIS — J44.9 ASTHMA-COPD OVERLAP SYNDROME: ICD-10-CM

## 2023-10-30 DIAGNOSIS — K21.9 GASTROESOPHAGEAL REFLUX DISEASE WITHOUT ESOPHAGITIS: ICD-10-CM

## 2023-10-30 DIAGNOSIS — R91.8 LUNG NODULES: ICD-10-CM

## 2023-10-30 DIAGNOSIS — E66.09 CLASS 2 OBESITY DUE TO EXCESS CALORIES WITHOUT SERIOUS COMORBIDITY WITH BODY MASS INDEX (BMI) OF 37.0 TO 37.9 IN ADULT: ICD-10-CM

## 2023-10-30 DIAGNOSIS — J45.50 SEVERE PERSISTENT ASTHMA WITHOUT COMPLICATION: Primary | ICD-10-CM

## 2023-10-30 DIAGNOSIS — J45.50 SEVERE PERSISTENT ASTHMA WITHOUT COMPLICATION: ICD-10-CM

## 2023-10-30 PROCEDURE — 1036F TOBACCO NON-USER: CPT | Performed by: INTERNAL MEDICINE

## 2023-10-30 PROCEDURE — 99214 OFFICE O/P EST MOD 30 MIN: CPT | Performed by: INTERNAL MEDICINE

## 2023-10-30 PROCEDURE — 3078F DIAST BP <80 MM HG: CPT | Performed by: INTERNAL MEDICINE

## 2023-10-30 PROCEDURE — 3017F COLORECTAL CA SCREEN DOC REV: CPT | Performed by: INTERNAL MEDICINE

## 2023-10-30 PROCEDURE — G8484 FLU IMMUNIZE NO ADMIN: HCPCS | Performed by: INTERNAL MEDICINE

## 2023-10-30 PROCEDURE — 3075F SYST BP GE 130 - 139MM HG: CPT | Performed by: INTERNAL MEDICINE

## 2023-10-30 PROCEDURE — G8427 DOCREV CUR MEDS BY ELIG CLIN: HCPCS | Performed by: INTERNAL MEDICINE

## 2023-10-30 PROCEDURE — 3023F SPIROM DOC REV: CPT | Performed by: INTERNAL MEDICINE

## 2023-10-30 PROCEDURE — G8417 CALC BMI ABV UP PARAM F/U: HCPCS | Performed by: INTERNAL MEDICINE

## 2023-10-30 RX ORDER — IPRATROPIUM BROMIDE AND ALBUTEROL SULFATE 2.5; .5 MG/3ML; MG/3ML
1 SOLUTION RESPIRATORY (INHALATION) EVERY 4 HOURS
Qty: 360 ML | Refills: 4 | Status: SHIPPED | OUTPATIENT
Start: 2023-10-30

## 2023-10-30 RX ORDER — PREDNISONE 10 MG/1
40 TABLET ORAL DAILY
Qty: 20 TABLET | Refills: 0 | Status: SHIPPED | OUTPATIENT
Start: 2023-10-30 | End: 2023-11-04

## 2023-10-30 RX ORDER — TEZEPELUMAB-EKKO 210 MG/1.9ML
210 INJECTION, SOLUTION SUBCUTANEOUS ONCE
Qty: 1.91 ML | Refills: 0 | Status: SHIPPED | COMMUNITY
Start: 2023-10-30 | End: 2023-10-30

## 2023-10-30 RX ORDER — TEZEPELUMAB-EKKO 210 MG/1.9ML
210 INJECTION, SOLUTION SUBCUTANEOUS ONCE
Qty: 1.91 ML | Refills: 0 | Status: SHIPPED | OUTPATIENT
Start: 2023-10-30 | End: 2023-10-30 | Stop reason: SDUPTHER

## 2023-10-30 NOTE — PROGRESS NOTES
Subjective:     Gabi Gray is a 52 y.o. female who complains today of:     Chief Complaint   Patient presents with    Follow-up     2 Month F/U for Severe persistent asthma and Lung Nodule    Results     CT chest and PFT    Cough       HPI  Patient presents for asthma    10/30/2023  Overall doing better, she is currently on Tezspire, Singulair, Trelegy, and Qvar, she recently noticed wheezing and uses DuoNeb once overnight, mainly after weather change and cold air exposure. No chest pain, minimal coughing, no fever, no lower extremity edema, and no heartburn. She would like to remove oxygen therapy she has not been using oxygen. No heartburn, no nasal congestion or postnasal drip. No allergy to [facility]    8/22/2023  She feels better, currently on Dupixent and symptoms improved, she has no shortness of breath able to do her daily activities, no coughing. No chest pain. No fever, her weight is stable, no nasal congestion or postnasal drip. However she reports allergy to injection sites of Dupixent, she has been using Benadryl, presents as a rash with some itching. Resolves with Benadryl, she continues to take Dupixent despite no symptoms in light of significant asthma improvement    5/22/2023  Doing much better, currently on Trelegy and Qvar, she is due for her Fasenra dose, symptoms improved, no chest pain, no shortness of breath, she has not been needing to use her oxygen, no lower extremity edema, no fever or chills weight is stable, no nasal congestion or postnasal drip and no heartburn. 4/24/2023  Patient was doing good, she finished prednisone almost 2 weeks ago, she was active, went to [employer] yesterday however after coming back she noticed increased shortness of breath, with cough productive of brown phlegm, no chest pain, no fever, she has runny nose, she feels this was triggered because of the change of weather and cutting grass in her neighborhood.   No lower extremity edema, no

## 2023-12-02 DIAGNOSIS — J45.50 SEVERE PERSISTENT ASTHMA WITHOUT COMPLICATION: ICD-10-CM

## 2023-12-04 DIAGNOSIS — J45.50 SEVERE PERSISTENT ASTHMA WITHOUT COMPLICATION: ICD-10-CM

## 2023-12-04 RX ORDER — BECLOMETHASONE DIPROPIONATE HFA 80 UG/1
AEROSOL, METERED RESPIRATORY (INHALATION)
Qty: 1 EACH | Refills: 2 | Status: SHIPPED | OUTPATIENT
Start: 2023-12-04

## 2023-12-04 NOTE — TELEPHONE ENCOUNTER
Rx requested:  Requested Prescriptions     Pending Prescriptions Disp Refills    QVAR REDIHALER 80 MCG/ACT AERB inhaler [Pharmacy Med Name: Ozzy Mcginnis 80 MCG/ACT Inhalation Aerosol Breath Activated] 11 g 0     Sig: INHALE 2 PUFFS BY MOUTH IN THE MORNING AND 2 IN THE EVENING       Last Office Visit:   10/30/2023      Next Visit Date:  Future Appointments   Date Time Provider [address] [place] [address]   12/11/2023 11:15 AM Trena Gilbert MD [facility]   1/8/2024  8:00 AM GIFTY [facility]   1/8/2024  1:30 PM Paulo Gunn MD [facility]

## 2023-12-04 NOTE — TELEPHONE ENCOUNTER
Rx requested:  Requested Prescriptions     Pending Prescriptions Disp Refills    beclomethasone (QVAR REDIHALER) 80 MCG/ACT AERB inhaler 1 each 3     Sig: Inhale 2 puffs into the lungs in the morning and 2 puffs in the evening.        Last Office Visit:   10/30/2023      Next Visit Date:  Future Appointments   Date Time Provider [facility] [address]   12/11/2023 11:15 AM Dwight Solis MD [facility]   1/8/2024  8:00 AM GIFTY [facility]   1/8/2024  1:30 PM [facility]

## 2023-12-11 ENCOUNTER — TELEPHONE (OUTPATIENT)
Dept: PULMONOLOGY | Age: 53
End: 2023-12-11

## 2023-12-11 NOTE — TELEPHONE ENCOUNTER
I will do PA. Patient had an appointment today but no showed.  Can you please reschedule her to next available

## 2024-01-07 PROBLEM — I21.4 NSTEMI (NON-ST ELEVATED MYOCARDIAL INFARCTION) (HCC): Status: ACTIVE | Noted: 2022-12-01

## 2024-01-07 PROBLEM — M05.80 POLYARTHRITIS WITH POSITIVE RHEUMATOID FACTOR (HCC): Status: ACTIVE | Noted: 2024-01-07

## 2024-01-07 PROBLEM — F17.201 TOBACCO USE DISORDER, MILD, IN EARLY REMISSION: Status: ACTIVE | Noted: 2018-04-05

## 2024-01-07 RX ORDER — TEZEPELUMAB-EKKO 210 MG/1.9ML
210 INJECTION, SOLUTION SUBCUTANEOUS ONCE
COMMUNITY
Start: 2023-11-22

## 2024-01-07 NOTE — PROGRESS NOTES
Subjective  Beatrice Catalan 1970 is a 53 y.o. female who presents today with:  Chief Complaint   Patient presents with    Follow-up     4 month follow up     Diabetes    Pharyngitis    Generalized Body Aches       URI   This is a new problem. Associated symptoms include congestion, headaches, rhinorrhea, sinus pain and a sore throat. Pertinent negatives include no abdominal pain, chest pain, coughing, diarrhea, dysuria, ear pain, joint pain, joint swelling, nausea, neck pain, plugged ear sensation, rash, sneezing, swollen glands, vomiting or wheezing. She has tried acetaminophen for the symptoms. Improvement on treatment: nebulizer 4 times a day.       Rheumatology  - patient had high levels of rheumatoid factor and CRP levels.  Was referred to Dr. Jasmine, rheumatologist, but was having trouble getting to location d/t transportation. Patient states she didn't realize it.      DM  - POCT A1C 6.4% on 09/07/23  - POCT A1c today 8.0%- notes that she is not eating currently.  - DM Med: Jardiance, metformin, and trulicity.  - Trulicity 0.75 mg. Doing well with Trulicity.  - Patient is here for DM f/u. Sugars have been fairly well-controlled and patient has not been experiencing hyper- or hypoglycemic symptoms. Compliance with meds is good and there are no side effects. Patient exercises occasionally and tries to eat healthy.   - on arb (losartan) and statin (crestor)  - DM Eye: eye has not completed.   - DM Foot: has not completed.      Tobacco Use  - Zyban, nicotine patches  - was using wellbutrin sr for smoking cessation. on and off smoking. Using more for depression and anxiety.   - using Nicotine patches- not using as directed. Informed proper directions      COPD/Asthma  - followed by Dr. Spangler. Currently on inhalers and Tezspire.   -  having mild SOB at this time with URI symptoms. Patient notes is using nebulizer. She is doing well otherwise.     Hodgkin disease  - remission for 25 years diagnosed

## 2024-01-08 ENCOUNTER — OFFICE VISIT (OUTPATIENT)
Dept: FAMILY MEDICINE CLINIC | Age: 54
End: 2024-01-08
Payer: COMMERCIAL

## 2024-01-08 VITALS
BODY MASS INDEX: 37.54 KG/M2 | OXYGEN SATURATION: 97 % | HEIGHT: 62 IN | SYSTOLIC BLOOD PRESSURE: 118 MMHG | DIASTOLIC BLOOD PRESSURE: 78 MMHG | HEART RATE: 91 BPM | RESPIRATION RATE: 18 BRPM | TEMPERATURE: 96.1 F | WEIGHT: 204 LBS

## 2024-01-08 DIAGNOSIS — E78.2 MIXED HYPERLIPIDEMIA: ICD-10-CM

## 2024-01-08 DIAGNOSIS — F33.0 MILD EPISODE OF RECURRENT MAJOR DEPRESSIVE DISORDER (HCC): ICD-10-CM

## 2024-01-08 DIAGNOSIS — E11.65 TYPE 2 DIABETES MELLITUS WITH HYPERGLYCEMIA, WITHOUT LONG-TERM CURRENT USE OF INSULIN (HCC): ICD-10-CM

## 2024-01-08 DIAGNOSIS — J41.0 SIMPLE CHRONIC BRONCHITIS (HCC): ICD-10-CM

## 2024-01-08 DIAGNOSIS — U07.1 COVID-19: ICD-10-CM

## 2024-01-08 DIAGNOSIS — E66.01 CLASS 2 SEVERE OBESITY DUE TO EXCESS CALORIES WITH SERIOUS COMORBIDITY AND BODY MASS INDEX (BMI) OF 36.0 TO 36.9 IN ADULT (HCC): ICD-10-CM

## 2024-01-08 DIAGNOSIS — I10 ESSENTIAL (PRIMARY) HYPERTENSION: Primary | ICD-10-CM

## 2024-01-08 DIAGNOSIS — E78.00 HYPERCHOLESTEROLEMIA: ICD-10-CM

## 2024-01-08 DIAGNOSIS — R52 GENERALIZED BODY ACHES: ICD-10-CM

## 2024-01-08 DIAGNOSIS — C81.90 HODGKIN LYMPHOMA, UNSPECIFIED HODGKIN LYMPHOMA TYPE, UNSPECIFIED BODY REGION (HCC): ICD-10-CM

## 2024-01-08 DIAGNOSIS — F17.200 SMOKING: ICD-10-CM

## 2024-01-08 DIAGNOSIS — M05.80 POLYARTHRITIS WITH POSITIVE RHEUMATOID FACTOR (HCC): ICD-10-CM

## 2024-01-08 LAB
HBA1C MFR BLD: 8 %
INFLUENZA A ANTIBODY: NORMAL
INFLUENZA B ANTIBODY: NORMAL
Lab: ABNORMAL
PERFORMING INSTRUMENT: ABNORMAL
QC PASS/FAIL: ABNORMAL
SARS-COV-2, POC: DETECTED

## 2024-01-08 PROCEDURE — 3074F SYST BP LT 130 MM HG: CPT | Performed by: PHYSICIAN ASSISTANT

## 2024-01-08 PROCEDURE — 3017F COLORECTAL CA SCREEN DOC REV: CPT | Performed by: PHYSICIAN ASSISTANT

## 2024-01-08 PROCEDURE — 3023F SPIROM DOC REV: CPT | Performed by: PHYSICIAN ASSISTANT

## 2024-01-08 PROCEDURE — 83036 HEMOGLOBIN GLYCOSYLATED A1C: CPT | Performed by: PHYSICIAN ASSISTANT

## 2024-01-08 PROCEDURE — 3078F DIAST BP <80 MM HG: CPT | Performed by: PHYSICIAN ASSISTANT

## 2024-01-08 PROCEDURE — 1036F TOBACCO NON-USER: CPT | Performed by: PHYSICIAN ASSISTANT

## 2024-01-08 PROCEDURE — 99214 OFFICE O/P EST MOD 30 MIN: CPT | Performed by: PHYSICIAN ASSISTANT

## 2024-01-08 PROCEDURE — G8427 DOCREV CUR MEDS BY ELIG CLIN: HCPCS | Performed by: PHYSICIAN ASSISTANT

## 2024-01-08 PROCEDURE — G8484 FLU IMMUNIZE NO ADMIN: HCPCS | Performed by: PHYSICIAN ASSISTANT

## 2024-01-08 PROCEDURE — 87804 INFLUENZA ASSAY W/OPTIC: CPT | Performed by: PHYSICIAN ASSISTANT

## 2024-01-08 PROCEDURE — 2022F DILAT RTA XM EVC RTNOPTHY: CPT | Performed by: PHYSICIAN ASSISTANT

## 2024-01-08 PROCEDURE — 3052F HG A1C>EQUAL 8.0%<EQUAL 9.0%: CPT | Performed by: PHYSICIAN ASSISTANT

## 2024-01-08 PROCEDURE — G8417 CALC BMI ABV UP PARAM F/U: HCPCS | Performed by: PHYSICIAN ASSISTANT

## 2024-01-08 PROCEDURE — 87426 SARSCOV CORONAVIRUS AG IA: CPT | Performed by: PHYSICIAN ASSISTANT

## 2024-01-08 RX ORDER — DULAGLUTIDE 0.75 MG/.5ML
0.75 INJECTION, SOLUTION SUBCUTANEOUS WEEKLY
Qty: 4 ADJUSTABLE DOSE PRE-FILLED PEN SYRINGE | Refills: 2 | Status: SHIPPED | OUTPATIENT
Start: 2024-01-08

## 2024-01-08 RX ORDER — NICOTINE 21 MG/24HR
1 PATCH, TRANSDERMAL 24 HOURS TRANSDERMAL DAILY
Qty: 42 PATCH | Refills: 0 | Status: SHIPPED | OUTPATIENT
Start: 2024-01-08 | End: 2024-02-19

## 2024-01-08 RX ORDER — BUPROPION HYDROCHLORIDE 150 MG/1
150 TABLET, EXTENDED RELEASE ORAL DAILY
Qty: 90 TABLET | Refills: 3 | Status: SHIPPED | OUTPATIENT
Start: 2024-01-08 | End: 2025-01-02

## 2024-01-08 RX ORDER — ROSUVASTATIN CALCIUM 40 MG/1
40 TABLET, COATED ORAL DAILY
Qty: 90 TABLET | Refills: 3 | Status: SHIPPED | OUTPATIENT
Start: 2024-01-08

## 2024-01-08 ASSESSMENT — PATIENT HEALTH QUESTIONNAIRE - PHQ9
5. POOR APPETITE OR OVEREATING: 0
4. FEELING TIRED OR HAVING LITTLE ENERGY: 0
SUM OF ALL RESPONSES TO PHQ QUESTIONS 1-9: 0
3. TROUBLE FALLING OR STAYING ASLEEP: 0
SUM OF ALL RESPONSES TO PHQ QUESTIONS 1-9: 0
SUM OF ALL RESPONSES TO PHQ9 QUESTIONS 1 & 2: 0
2. FEELING DOWN, DEPRESSED OR HOPELESS: 0
1. LITTLE INTEREST OR PLEASURE IN DOING THINGS: 0
7. TROUBLE CONCENTRATING ON THINGS, SUCH AS READING THE NEWSPAPER OR WATCHING TELEVISION: 0
SUM OF ALL RESPONSES TO PHQ QUESTIONS 1-9: 0
8. MOVING OR SPEAKING SO SLOWLY THAT OTHER PEOPLE COULD HAVE NOTICED. OR THE OPPOSITE, BEING SO FIGETY OR RESTLESS THAT YOU HAVE BEEN MOVING AROUND A LOT MORE THAN USUAL: 0
10. IF YOU CHECKED OFF ANY PROBLEMS, HOW DIFFICULT HAVE THESE PROBLEMS MADE IT FOR YOU TO DO YOUR WORK, TAKE CARE OF THINGS AT HOME, OR GET ALONG WITH OTHER PEOPLE: 0
6. FEELING BAD ABOUT YOURSELF - OR THAT YOU ARE A FAILURE OR HAVE LET YOURSELF OR YOUR FAMILY DOWN: 0
9. THOUGHTS THAT YOU WOULD BE BETTER OFF DEAD, OR OF HURTING YOURSELF: 0
SUM OF ALL RESPONSES TO PHQ QUESTIONS 1-9: 0

## 2024-01-08 ASSESSMENT — ENCOUNTER SYMPTOMS
SINUS PRESSURE: 0
VOMITING: 0
NAUSEA: 0
SWOLLEN GLANDS: 0
SHORTNESS OF BREATH: 0
SORE THROAT: 1
BACK PAIN: 0
SINUS PAIN: 1
ABDOMINAL PAIN: 0
WHEEZING: 0
DIARRHEA: 0
RHINORRHEA: 1
COUGH: 0
CHEST TIGHTNESS: 0

## 2024-01-15 ENCOUNTER — TELEPHONE (OUTPATIENT)
Dept: CARDIOLOGY CLINIC | Age: 54
End: 2024-01-15

## 2024-01-15 NOTE — TELEPHONE ENCOUNTER
Appointment For: Beatrice Catalan ([id number])   Visit Type: OFFICE VISIT (1004)      2/1/2024     2:15 PM  15 mins.  Dr. Andrzej Gunn MD        [facility]      Patient Comments:      ----------------------------------   This appointment rescheduled from:   1/16/2024   12:15 PM  15 mins.  Dr. Andrzej Gunn MD        [facility]     Appointment rescheduled from MyChart  (Newest Message First)  View All Conversations on this Encounter  Sandra, Batch Job User  Beatrice Catalan D7 hours ago (11:55 PM)     Beatrice Catalan  P [facility] Front Desk2 days ago     MM  Appointment For: ChantellKEVIN Catalan ([id number])  Visit Type: OFFICE VISIT (1004)     2/1/2024     2:15 PM  15 mins.  Dr. Andrzej Gunn MD        [facility]     Patient Comments:     ----------------------------------  This appointment rescheduled from:  1/16/2024   12:15 PM  15 mins.  Dr. Andrzej Gunn MD        [facility]

## 2024-01-23 ENCOUNTER — OFFICE VISIT (OUTPATIENT)
Dept: PULMONOLOGY | Age: 54
End: 2024-01-23
Payer: COMMERCIAL

## 2024-01-23 VITALS
OXYGEN SATURATION: 98 % | HEIGHT: 62 IN | DIASTOLIC BLOOD PRESSURE: 80 MMHG | BODY MASS INDEX: 37.13 KG/M2 | WEIGHT: 201.8 LBS | HEART RATE: 99 BPM | RESPIRATION RATE: 18 BRPM | TEMPERATURE: 97.5 F | SYSTOLIC BLOOD PRESSURE: 138 MMHG

## 2024-01-23 DIAGNOSIS — R93.89 ABNORMAL CT OF THE CHEST: ICD-10-CM

## 2024-01-23 DIAGNOSIS — J45.50 SEVERE PERSISTENT ASTHMA WITHOUT COMPLICATION: Primary | ICD-10-CM

## 2024-01-23 DIAGNOSIS — K21.9 GASTROESOPHAGEAL REFLUX DISEASE WITHOUT ESOPHAGITIS: ICD-10-CM

## 2024-01-23 DIAGNOSIS — J44.9 ASTHMA-COPD OVERLAP SYNDROME: ICD-10-CM

## 2024-01-23 DIAGNOSIS — E66.09 CLASS 2 OBESITY DUE TO EXCESS CALORIES WITHOUT SERIOUS COMORBIDITY WITH BODY MASS INDEX (BMI) OF 37.0 TO 37.9 IN ADULT: ICD-10-CM

## 2024-01-23 PROCEDURE — G8484 FLU IMMUNIZE NO ADMIN: HCPCS | Performed by: INTERNAL MEDICINE

## 2024-01-23 PROCEDURE — 3017F COLORECTAL CA SCREEN DOC REV: CPT | Performed by: INTERNAL MEDICINE

## 2024-01-23 PROCEDURE — 3023F SPIROM DOC REV: CPT | Performed by: INTERNAL MEDICINE

## 2024-01-23 PROCEDURE — 3075F SYST BP GE 130 - 139MM HG: CPT | Performed by: INTERNAL MEDICINE

## 2024-01-23 PROCEDURE — G8417 CALC BMI ABV UP PARAM F/U: HCPCS | Performed by: INTERNAL MEDICINE

## 2024-01-23 PROCEDURE — 1036F TOBACCO NON-USER: CPT | Performed by: INTERNAL MEDICINE

## 2024-01-23 PROCEDURE — G8427 DOCREV CUR MEDS BY ELIG CLIN: HCPCS | Performed by: INTERNAL MEDICINE

## 2024-01-23 PROCEDURE — 3079F DIAST BP 80-89 MM HG: CPT | Performed by: INTERNAL MEDICINE

## 2024-01-23 PROCEDURE — 99214 OFFICE O/P EST MOD 30 MIN: CPT | Performed by: INTERNAL MEDICINE

## 2024-01-23 NOTE — PROGRESS NOTES
place, and time.   Psychiatric:         Judgment: Judgment normal.         Imaging studies reviewed and interpreted by me CT chest October 2023, stable scarring, no mass or nodule, report indicate respiratory bronchiolitis  PFT   October 2023 shows FEV1 86% FEV1/FVC 0.82    August 2020, shows FEV1 91%, FEV1/FVC 0.81  ECHO: August 2020 EF 60%  Sleep study October 2020 no KEITH     Assessment and Plan       Diagnosis Orders   1. Severe persistent asthma without complication        2. Asthma-COPD overlap syndrome        3. Class 2 obesity due to excess calories without serious comorbidity with body mass index (BMI) of 37.0 to 37.9 in adult        4. Gastroesophageal reflux disease without esophagitis        5. Abnormal CT of the chest  CT CHEST HIGH RESOLUTION        Severe persistent asthma with asthma COPD overlap syndrome  Continue Tezspire, symptoms better controlled and no recent exacerbation  Continue Singulair, Trelegy, Qvar and Spiriva  Repeat high-resolution CAT scan in April if bronchiolitis changes persisted we will consider bronchoscopy and biopsy  Patient did not wish to proceed with bronchoscopy at this time  Weight loss is recommended  GERD symptoms controlled      Orders Placed This Encounter   Procedures    CT CHEST HIGH RESOLUTION     Standing Status:   Future     Standing Expiration Date:   1/23/2025         No orders of the defined types were placed in this encounter.               Discussed with patient the importance of exercise and weight control and  overall health and well-being.     Reviewed with the patient: current clinical status, medications, activities and diet.      Side effects, adverse effects of the medication prescribed today, as well as treatment plan and result expectations have been discussed with the patient who expresses understanding and desires to proceed.       Return in about 3 months (around 4/23/2024).           Issa Spangler MD

## 2024-01-29 ENCOUNTER — TELEPHONE (OUTPATIENT)
Dept: CARDIOLOGY CLINIC | Age: 54
End: 2024-01-29

## 2024-01-29 NOTE — TELEPHONE ENCOUNTER
Appointment For: Beatrice Catalan ([id number])   Visit Type: OFFICE VISIT (1004)      2/16/2024   12:15 PM  15 mins.  Dr. Andrzej Gunn MD        [facility]      Patient Comments:      ----------------------------------   This appointment rescheduled from:   2/1/2024     2:15 PM  15 mins.  Dr. Andrzej Gunn MD        [facility]     Appointment rescheduled from [employer]  [employer]  View All Conversations on this Encounter  Sandra, Batch Job User  CatalanBeatrice D10 hours ago (11:55 PM)     Beatrice Catalan  P [facility] Front Desk18 hours ago (3:50 PM)       Appointment For: ChantellKEVIN Catalan ([id number])  Visit Type: OFFICE VISIT (1004)     2/16/2024   12:15 PM  15 mins.  Dr. Andrzej Gunn MD        [facility]     Patient Comments:     ----------------------------------  This appointment rescheduled from:  2/1/2024     2:15 PM  15 mins.  Dr. Andrzej Gunn MD        [facility]

## 2024-02-16 ENCOUNTER — OFFICE VISIT (OUTPATIENT)
Dept: CARDIOLOGY CLINIC | Age: 54
End: 2024-02-16
Payer: COMMERCIAL

## 2024-02-16 VITALS
BODY MASS INDEX: 36.33 KG/M2 | HEART RATE: 81 BPM | OXYGEN SATURATION: 98 % | DIASTOLIC BLOOD PRESSURE: 64 MMHG | HEIGHT: 62 IN | SYSTOLIC BLOOD PRESSURE: 116 MMHG | WEIGHT: 197.4 LBS

## 2024-02-16 DIAGNOSIS — E78.5 DYSLIPIDEMIA: ICD-10-CM

## 2024-02-16 DIAGNOSIS — I25.83 CORONARY ARTERY DISEASE DUE TO LIPID RICH PLAQUE: ICD-10-CM

## 2024-02-16 DIAGNOSIS — I25.10 CORONARY ARTERY DISEASE DUE TO LIPID RICH PLAQUE: ICD-10-CM

## 2024-02-16 DIAGNOSIS — F17.200 SMOKER: ICD-10-CM

## 2024-02-16 DIAGNOSIS — R09.89 BILATERAL CAROTID BRUITS: ICD-10-CM

## 2024-02-16 DIAGNOSIS — Z00.00 PE (PHYSICAL EXAM), ANNUAL: Primary | ICD-10-CM

## 2024-02-16 DIAGNOSIS — I10 HYPERTENSION, UNSPECIFIED TYPE: ICD-10-CM

## 2024-02-16 PROCEDURE — G8427 DOCREV CUR MEDS BY ELIG CLIN: HCPCS | Performed by: INTERNAL MEDICINE

## 2024-02-16 PROCEDURE — 99214 OFFICE O/P EST MOD 30 MIN: CPT | Performed by: INTERNAL MEDICINE

## 2024-02-16 PROCEDURE — 3074F SYST BP LT 130 MM HG: CPT | Performed by: INTERNAL MEDICINE

## 2024-02-16 PROCEDURE — 3078F DIAST BP <80 MM HG: CPT | Performed by: INTERNAL MEDICINE

## 2024-02-16 PROCEDURE — 1036F TOBACCO NON-USER: CPT | Performed by: INTERNAL MEDICINE

## 2024-02-16 PROCEDURE — 3017F COLORECTAL CA SCREEN DOC REV: CPT | Performed by: INTERNAL MEDICINE

## 2024-02-16 PROCEDURE — G8484 FLU IMMUNIZE NO ADMIN: HCPCS | Performed by: INTERNAL MEDICINE

## 2024-02-16 PROCEDURE — 93000 ELECTROCARDIOGRAM COMPLETE: CPT | Performed by: INTERNAL MEDICINE

## 2024-02-16 PROCEDURE — G8417 CALC BMI ABV UP PARAM F/U: HCPCS | Performed by: INTERNAL MEDICINE

## 2024-02-16 NOTE — PROGRESS NOTES
OFFICEISIT      Patient: Beatrice Catalan  YOB: 1970  MRN: [id number]    Chief Complaint:  Chief Complaint   Patient presents with    Follow-up     4 month    Coronary Artery Disease       CV Data:  22 Cath EF 65  Mild CX 40%  LAD 40-50% RCA 2 prior Stents patent.   Echo EF 65%   CUS LICA 50-69   Abd US - no AAA    Subjective/HPI  pt changing from [facility].  she has premature CAD. No cp no sob no falls no bleed.      more pals and related C sharp CP. She was on higher CCB in the past  is not enough for her. No cp b no bleed    23 just getting over URI. Doing better. No cp no sob no falls no bleed takes meds.     24 doing well no cp no sob no falls no bleed takes meds. Feels well.      EKG: SR 82    +++FH  Former smoker  No etoh  Lives w boyfriend and son.   Work - .     Past Medical History:   Diagnosis Date    Anticoagulant long-term use     Asthma     CAD (coronary artery disease)     COPD (chronic obstructive pulmonary disease) (HCC)     Diabetes mellitus (HCC)     Hodgkin disease (HCC)     Hyperlipidemia     Hypertension     Migraines     Sleep apnea     recently tested     Type 2 diabetes mellitus without complication ([facility])     type II       Past Surgical History:   Procedure Laterality Date    TUBAL LIGATION         Family History   Problem Relation Age of Onset    Diabetes Maternal Grandmother     Diabetes Paternal Grandmother        Social History     Socioeconomic History    Marital status: Single     Spouse name: None    Number of children: None    Years of education: None    Highest education level: None   Tobacco Use    Smoking status: Former     Current packs/day: 0.00     Average packs/day: 1 pack/day for 37.0 years (37.0 ttl pk-yrs)     Types: Cigarettes     Start date: 1985     Quit date: 2022     Years since quittin.7    Smokeless tobacco: Never   Vaping Use    Vaping Use: Never used   Substance and Sexual

## 2024-04-09 DIAGNOSIS — E11.65 TYPE 2 DIABETES MELLITUS WITH HYPERGLYCEMIA, WITHOUT LONG-TERM CURRENT USE OF INSULIN (HCC): ICD-10-CM

## 2024-04-09 NOTE — TELEPHONE ENCOUNTER
Rx request   Requested Prescriptions     Pending Prescriptions Disp Refills    Dulaglutide (TRULICITY) 0.75 MG/0.5ML SOPN 4 Adjustable Dose Pre-filled Pen Syringe 2     Sig: Inject 0.75 mg into the skin once a week     LOV 1/8/2024  Next Visit Date:  Future Appointments   Date Time Provider Department Center   4/17/2024  8:30 AM Laura Mims PA [facility]   4/23/2024  1:45 PM Issa Spangler MD [facility]   6/28/2024 11:30 AM Andrzej Gunn MD [facility]

## 2024-04-10 DIAGNOSIS — E11.65 TYPE 2 DIABETES MELLITUS WITH HYPERGLYCEMIA, WITHOUT LONG-TERM CURRENT USE OF INSULIN (HCC): ICD-10-CM

## 2024-04-10 RX ORDER — DULAGLUTIDE 0.75 MG/.5ML
0.75 INJECTION, SOLUTION SUBCUTANEOUS WEEKLY
Qty: 4 ADJUSTABLE DOSE PRE-FILLED PEN SYRINGE | Refills: 2 | Status: SHIPPED | OUTPATIENT
Start: 2024-04-10

## 2024-04-16 PROBLEM — I21.4 NSTEMI (NON-ST ELEVATED MYOCARDIAL INFARCTION) (HCC): Status: RESOLVED | Noted: 2022-12-01 | Resolved: 2024-04-16

## 2024-04-16 PROBLEM — J96.01 ACUTE RESPIRATORY FAILURE WITH HYPOXIA (HCC): Status: RESOLVED | Noted: 2020-09-27 | Resolved: 2024-04-16

## 2024-04-16 NOTE — PROGRESS NOTES
Subjective  Beatrice Catalan 1970 is a 53 y.o. female who presents today with:  Chief Complaint   Patient presents with    Follow-up     Patient has been working long hours at work and is a little stressed because of that and after receiving a parking ticket     Diabetes     Is taking medications as prescribed     Foot Pain     Right heel pain x 1-2 months and has been taking tylenol with no relief        HPI  DM  - POCT A1c 01/8/24 8.0%- notes that she is not eating correctly. A1c today 6.3%  - DM Med: Jardiance, metformin, and trulicity.   - Trulicity 0.75 mg. Doing well with Trulicity.  - compliant with the medication. Went low to 60s. Becoming more hypoglycemic. Will go down to metformin 1000 once daily. Continue trulicty and jardiance.   - on arb (losartan) and statin (crestor)  - DM Eye: eye has not completed.   - DM Foot: has not completed.     Multiple Joint Pain  - R Ankle pain around the achilles tendon  - has not seen rheumatologist. Will on 05/28/24    HO MI/CAD/HTN  - patient had two stents placed in 12/22/22. Currently on plavix.  - Losartan, furosemide, asa, cardizem cd, and nitrostat prn  - used nitro in the last x1-2 months. Relates it to stress from tax preporation test. Was relieved with one dose.   - followed by Dr. Gunn.   - Patient is here for f/u HTN. Is compliant with meds and has no side effects from them. Avoids added salt. Tries to eat healthy. Exercises occasionally. Has no chest pain, shortness of breath, palpitations or edema.  Review of Systems   Constitutional:  Negative for activity change, appetite change, chills and fever.   HENT:  Negative for congestion, drooling, sinus pressure, sinus pain and sore throat.    Eyes:  Negative for visual disturbance.   Respiratory:  Negative for cough, chest tightness and shortness of breath.    Cardiovascular:  Negative for chest pain.   Gastrointestinal:  Negative for abdominal pain, diarrhea, nausea and vomiting.   Endocrine: Negative

## 2024-04-17 ENCOUNTER — OFFICE VISIT (OUTPATIENT)
Dept: FAMILY MEDICINE CLINIC | Age: 54
End: 2024-04-17
Payer: COMMERCIAL

## 2024-04-17 VITALS
HEART RATE: 93 BPM | OXYGEN SATURATION: 97 % | TEMPERATURE: 96.5 F | DIASTOLIC BLOOD PRESSURE: 80 MMHG | HEIGHT: 62 IN | WEIGHT: 196 LBS | BODY MASS INDEX: 36.07 KG/M2 | RESPIRATION RATE: 16 BRPM | SYSTOLIC BLOOD PRESSURE: 136 MMHG

## 2024-04-17 DIAGNOSIS — I10 ESSENTIAL (PRIMARY) HYPERTENSION: ICD-10-CM

## 2024-04-17 DIAGNOSIS — M76.61 RIGHT ACHILLES TENDINITIS: ICD-10-CM

## 2024-04-17 DIAGNOSIS — E11.65 TYPE 2 DIABETES MELLITUS WITH HYPERGLYCEMIA, WITHOUT LONG-TERM CURRENT USE OF INSULIN (HCC): Primary | ICD-10-CM

## 2024-04-17 LAB — HBA1C MFR BLD: 6.3 %

## 2024-04-17 PROCEDURE — 3017F COLORECTAL CA SCREEN DOC REV: CPT | Performed by: PHYSICIAN ASSISTANT

## 2024-04-17 PROCEDURE — 2022F DILAT RTA XM EVC RTNOPTHY: CPT | Performed by: PHYSICIAN ASSISTANT

## 2024-04-17 PROCEDURE — 3079F DIAST BP 80-89 MM HG: CPT | Performed by: PHYSICIAN ASSISTANT

## 2024-04-17 PROCEDURE — 99214 OFFICE O/P EST MOD 30 MIN: CPT | Performed by: PHYSICIAN ASSISTANT

## 2024-04-17 PROCEDURE — 83036 HEMOGLOBIN GLYCOSYLATED A1C: CPT | Performed by: PHYSICIAN ASSISTANT

## 2024-04-17 PROCEDURE — G8427 DOCREV CUR MEDS BY ELIG CLIN: HCPCS | Performed by: PHYSICIAN ASSISTANT

## 2024-04-17 PROCEDURE — 3075F SYST BP GE 130 - 139MM HG: CPT | Performed by: PHYSICIAN ASSISTANT

## 2024-04-17 PROCEDURE — G8417 CALC BMI ABV UP PARAM F/U: HCPCS | Performed by: PHYSICIAN ASSISTANT

## 2024-04-17 PROCEDURE — 3044F HG A1C LEVEL LT 7.0%: CPT | Performed by: PHYSICIAN ASSISTANT

## 2024-04-17 PROCEDURE — 1036F TOBACCO NON-USER: CPT | Performed by: PHYSICIAN ASSISTANT

## 2024-04-17 RX ORDER — LOSARTAN POTASSIUM 25 MG/1
25 TABLET ORAL DAILY
Qty: 30 TABLET | Refills: 3 | Status: SHIPPED | OUTPATIENT
Start: 2024-04-17

## 2024-04-17 SDOH — ECONOMIC STABILITY: FOOD INSECURITY: WITHIN THE PAST 12 MONTHS, THE FOOD YOU BOUGHT JUST DIDN'T LAST AND YOU DIDN'T HAVE MONEY TO GET MORE.: NEVER TRUE

## 2024-04-17 SDOH — ECONOMIC STABILITY: FOOD INSECURITY: WITHIN THE PAST 12 MONTHS, YOU WORRIED THAT YOUR FOOD WOULD RUN OUT BEFORE YOU GOT MONEY TO BUY MORE.: NEVER TRUE

## 2024-04-17 SDOH — ECONOMIC STABILITY: INCOME INSECURITY: HOW HARD IS IT FOR YOU TO PAY FOR THE VERY BASICS LIKE FOOD, HOUSING, MEDICAL CARE, AND HEATING?: SOMEWHAT HARD

## 2024-04-17 ASSESSMENT — PATIENT HEALTH QUESTIONNAIRE - PHQ9
10. IF YOU CHECKED OFF ANY PROBLEMS, HOW DIFFICULT HAVE THESE PROBLEMS MADE IT FOR YOU TO DO YOUR WORK, TAKE CARE OF THINGS AT HOME, OR GET ALONG WITH OTHER PEOPLE: NOT DIFFICULT AT ALL
SUM OF ALL RESPONSES TO PHQ9 QUESTIONS 1 & 2: 0
SUM OF ALL RESPONSES TO PHQ QUESTIONS 1-9: 0
3. TROUBLE FALLING OR STAYING ASLEEP: NOT AT ALL
SUM OF ALL RESPONSES TO PHQ QUESTIONS 1-9: 0
1. LITTLE INTEREST OR PLEASURE IN DOING THINGS: NOT AT ALL
SUM OF ALL RESPONSES TO PHQ QUESTIONS 1-9: 0
9. THOUGHTS THAT YOU WOULD BE BETTER OFF DEAD, OR OF HURTING YOURSELF: NOT AT ALL
8. MOVING OR SPEAKING SO SLOWLY THAT OTHER PEOPLE COULD HAVE NOTICED. OR THE OPPOSITE, BEING SO FIGETY OR RESTLESS THAT YOU HAVE BEEN MOVING AROUND A LOT MORE THAN USUAL: NOT AT ALL
7. TROUBLE CONCENTRATING ON THINGS, SUCH AS READING THE NEWSPAPER OR WATCHING TELEVISION: NOT AT ALL
2. FEELING DOWN, DEPRESSED OR HOPELESS: NOT AT ALL
6. FEELING BAD ABOUT YOURSELF - OR THAT YOU ARE A FAILURE OR HAVE LET YOURSELF OR YOUR FAMILY DOWN: NOT AT ALL
4. FEELING TIRED OR HAVING LITTLE ENERGY: NOT AT ALL
5. POOR APPETITE OR OVEREATING: NOT AT ALL
SUM OF ALL RESPONSES TO PHQ QUESTIONS 1-9: 0

## 2024-04-17 ASSESSMENT — ENCOUNTER SYMPTOMS
VOMITING: 0
NAUSEA: 0
SINUS PAIN: 0
SHORTNESS OF BREATH: 0
COUGH: 0
BACK PAIN: 0
SORE THROAT: 0
CHEST TIGHTNESS: 0
SINUS PRESSURE: 0
DIARRHEA: 0
ABDOMINAL PAIN: 0

## 2024-04-17 ASSESSMENT — VISUAL ACUITY: OU: 1

## 2024-04-17 NOTE — PATIENT INSTRUCTIONS
[employer]*  (Call 211 if need more resources.)      Medical:      [facility]   What they offer: [employer] discounts fees for qualifying insured and uninsured persons.  We can assist you in signing up for Medicaid, Medicare, and Marketplace Exchange insurance plans.  They offer 4 locations in [place], 2 locations in [place] and 1 in [facility].    Phone Number: [phone]  Website: https://[email or web address].[email or web address]    [facility]:  What they offer: We provide health care services to the uninsured and underinsured. From providing quality care to connecting patients to critical community resources, our patients and their needs are our highest priority.  Phone number: [phone]  Website: https://whistleBox.PrintEco      Utility:         211:  What they offer: Help find lower cost options for phone or internet as well as help paying utility bills.   Phone Number: 211  Website: https://wwwTorch Technologies/get-help/utilities-expenses     [facility]  What they offer:  Assistance with utilities  Phone:  [phone]  Website: https://www.[employer].org/usn/                       [facility]   What they Offer: Assistance with utilities  Phone: 618.403.8475  Website: https://www.[employer].BIO-NEMS/    [facility] [place]  What they Offer: Assistance with utilities  Phone: 159.599.6201  Website: https://[employer].org/      MEDICATIONS:   [facility]  What they offer: [employer] tracks prescription drug prices and provides free drug coupons for discounts on medications.  Website: https://www.LIBCAST/    [employer]:  What they offer: [employer] offers free information on medications and healthcare cost savings programs including prescription assistance programs, coupons, and discount programs.  Helpline: 139.676.5078  Website: https://www.[employer]ee[employer]s.org    [facility]:  What they offer: Medication assistance  Website: https://www.rxassist.org/patient

## 2024-04-23 DIAGNOSIS — J45.50 SEVERE PERSISTENT ASTHMA WITHOUT COMPLICATION: ICD-10-CM

## 2024-04-23 RX ORDER — ALBUTEROL SULFATE 90 UG/1
1 AEROSOL, METERED RESPIRATORY (INHALATION) EVERY 4 HOURS PRN
Qty: 1 EACH | Refills: 3 | Status: SHIPPED | OUTPATIENT
Start: 2024-04-23

## 2024-04-23 RX ORDER — MONTELUKAST SODIUM 10 MG/1
10 TABLET ORAL NIGHTLY
Qty: 30 TABLET | Refills: 3 | Status: SHIPPED | OUTPATIENT
Start: 2024-04-23

## 2024-04-23 RX ORDER — FLUTICASONE FUROATE, UMECLIDINIUM BROMIDE AND VILANTEROL TRIFENATATE 200; 62.5; 25 UG/1; UG/1; UG/1
1 POWDER RESPIRATORY (INHALATION) DAILY
Qty: 1 EACH | Refills: 3 | Status: SHIPPED | OUTPATIENT
Start: 2024-04-23

## 2024-04-23 NOTE — TELEPHONE ENCOUNTER
Rx requested:  Requested Prescriptions     Pending Prescriptions Disp Refills    fluticasone-umeclidin-vilant (TRELEGY ELLIPTA) 200-62.5-25 MCG/ACT AEPB inhaler 1 each 3     Sig: Inhale 1 puff into the lungs daily       Last Office Visit:   1/23/2024      Next Visit Date:  Future Appointments   Date Time Provider Department Center   4/29/2024  5:00 PM [facility] CT ROOM 1 ML CT [facility]   5/20/2024  1:00 PM Issa Spangler MD [facility]   6/28/2024 11:30 AM Andrzej Gunn MD [facility]   8/19/2024 10:00 AM Laura Mims PA [facility]

## 2024-04-29 ENCOUNTER — HOSPITAL ENCOUNTER (OUTPATIENT)
Dept: CT IMAGING | Age: 54
Discharge: HOME | End: 2024-05-01
Attending: INTERNAL MEDICINE
Payer: COMMERCIAL

## 2024-04-29 DIAGNOSIS — R93.89 ABNORMAL CT OF THE CHEST: ICD-10-CM

## 2024-04-29 PROCEDURE — 71250 CT THORAX DX C-: CPT

## 2024-05-20 ENCOUNTER — OFFICE VISIT (OUTPATIENT)
Dept: PULMONOLOGY | Age: 54
End: 2024-05-20
Payer: COMMERCIAL

## 2024-05-20 VITALS
DIASTOLIC BLOOD PRESSURE: 79 MMHG | BODY MASS INDEX: 36.07 KG/M2 | HEIGHT: 62 IN | HEART RATE: 99 BPM | OXYGEN SATURATION: 96 % | SYSTOLIC BLOOD PRESSURE: 139 MMHG | WEIGHT: 196 LBS | RESPIRATION RATE: 16 BRPM

## 2024-05-20 DIAGNOSIS — E66.09 CLASS 2 OBESITY DUE TO EXCESS CALORIES WITHOUT SERIOUS COMORBIDITY WITH BODY MASS INDEX (BMI) OF 37.0 TO 37.9 IN ADULT: ICD-10-CM

## 2024-05-20 DIAGNOSIS — K21.9 GASTROESOPHAGEAL REFLUX DISEASE WITHOUT ESOPHAGITIS: ICD-10-CM

## 2024-05-20 DIAGNOSIS — J45.50 SEVERE PERSISTENT ASTHMA WITHOUT COMPLICATION: ICD-10-CM

## 2024-05-20 DIAGNOSIS — B37.0 THRUSH: Primary | ICD-10-CM

## 2024-05-20 PROCEDURE — 1036F TOBACCO NON-USER: CPT | Performed by: INTERNAL MEDICINE

## 2024-05-20 PROCEDURE — G8417 CALC BMI ABV UP PARAM F/U: HCPCS | Performed by: INTERNAL MEDICINE

## 2024-05-20 PROCEDURE — 3017F COLORECTAL CA SCREEN DOC REV: CPT | Performed by: INTERNAL MEDICINE

## 2024-05-20 PROCEDURE — 3078F DIAST BP <80 MM HG: CPT | Performed by: INTERNAL MEDICINE

## 2024-05-20 PROCEDURE — 99214 OFFICE O/P EST MOD 30 MIN: CPT | Performed by: INTERNAL MEDICINE

## 2024-05-20 PROCEDURE — G8427 DOCREV CUR MEDS BY ELIG CLIN: HCPCS | Performed by: INTERNAL MEDICINE

## 2024-05-20 PROCEDURE — 3075F SYST BP GE 130 - 139MM HG: CPT | Performed by: INTERNAL MEDICINE

## 2024-05-20 NOTE — PROGRESS NOTES
Subjective:     Beatrice Catalan is a 53 y.o. female who complains today of:     Chief Complaint   Patient presents with    Follow-up     $month follow up for COPD, Asthma       HPI  Patient presents for asthma  5/20/2024  She is doing good, symptoms controlled, rare to no use of rescue inhalers, no nighttime symptoms, no shortness of breath, she is able to do her daily activities with no issues, no lower extremity edema, no heartburn, no nasal congestion or postnasal drip.        10/30/2023  Overall doing better, she is currently on Tezspire, Singulair, Trelegy, and Qvar, she recently noticed wheezing and uses DuoNeb once overnight, mainly after weather change and cold air exposure.  No chest pain, minimal coughing, no fever, no lower extremity edema, and no heartburn.  She would like to remove oxygen therapy she has not been using oxygen.  No heartburn, no nasal congestion or postnasal drip.  No allergy to Tezspire    8/22/2023  She feels better, currently on Dupixent and symptoms improved, she has no shortness of breath able to do her daily activities, no coughing.  No chest pain.  No fever, her weight is stable, no nasal congestion or postnasal drip.  However she reports allergy to injection sites of Dupixent, she has been using Benadryl, presents as a rash with some itching.  Resolves with Benadryl, she continues to take Dupixent despite no symptoms in light of significant asthma improvement    5/22/2023  Doing much better, currently on Trelegy and Qvar, she is due for her Fasenra dose, symptoms improved, no chest pain, no shortness of breath, she has not been needing to use her oxygen, no lower extremity edema, no fever or chills weight is stable, no nasal congestion or postnasal drip and no heartburn.    4/24/2023  Patient was doing good, she finished prednisone almost 2 weeks ago, she was active, went to [facility] yesterday however after coming back she noticed increased shortness of breath, with

## 2024-06-15 ENCOUNTER — HOSPITAL ENCOUNTER (EMERGENCY)
Age: 54
Discharge: HOME | End: 2024-06-15
Payer: COMMERCIAL

## 2024-06-15 VITALS
RESPIRATION RATE: 16 BRPM | BODY MASS INDEX: 36.8 KG/M2 | WEIGHT: 200 LBS | SYSTOLIC BLOOD PRESSURE: 165 MMHG | DIASTOLIC BLOOD PRESSURE: 81 MMHG | TEMPERATURE: 97.9 F | HEART RATE: 74 BPM | OXYGEN SATURATION: 99 % | HEIGHT: 62 IN

## 2024-06-15 DIAGNOSIS — K08.89 PAIN, DENTAL: Primary | ICD-10-CM

## 2024-06-15 DIAGNOSIS — K04.7 DENTAL ABSCESS: ICD-10-CM

## 2024-06-15 PROCEDURE — 6360000002 HC RX W HCPCS: Performed by: STUDENT IN AN ORGANIZED HEALTH CARE EDUCATION/TRAINING PROGRAM

## 2024-06-15 PROCEDURE — 96372 THER/PROPH/DIAG INJ SC/IM: CPT

## 2024-06-15 PROCEDURE — 99284 EMERGENCY DEPT VISIT MOD MDM: CPT

## 2024-06-15 RX ORDER — OXYCODONE HYDROCHLORIDE 5 MG/1
5 TABLET ORAL ONCE
Status: DISCONTINUED | OUTPATIENT
Start: 2024-06-15 | End: 2024-06-15

## 2024-06-15 RX ORDER — AMOXICILLIN AND CLAVULANATE POTASSIUM 875; 125 MG/1; MG/1
1 TABLET, FILM COATED ORAL 2 TIMES DAILY
Qty: 20 TABLET | Refills: 0 | Status: SHIPPED | OUTPATIENT
Start: 2024-06-15 | End: 2024-06-25

## 2024-06-15 RX ORDER — KETOROLAC TROMETHAMINE 15 MG/ML
15 INJECTION, SOLUTION INTRAMUSCULAR; INTRAVENOUS ONCE
Status: COMPLETED | OUTPATIENT
Start: 2024-06-15 | End: 2024-06-15

## 2024-06-15 RX ADMIN — KETOROLAC TROMETHAMINE 15 MG: 15 INJECTION, SOLUTION INTRAMUSCULAR; INTRAVENOUS at 17:23

## 2024-06-15 ASSESSMENT — PAIN DESCRIPTION - LOCATION
LOCATION: TEETH
LOCATION: TEETH

## 2024-06-15 ASSESSMENT — PAIN DESCRIPTION - ORIENTATION
ORIENTATION: LEFT
ORIENTATION: LEFT

## 2024-06-15 ASSESSMENT — PAIN SCALES - GENERAL
PAINLEVEL_OUTOF10: 9
PAINLEVEL_OUTOF10: 6
PAINLEVEL_OUTOF10: 10

## 2024-06-15 ASSESSMENT — PAIN DESCRIPTION - DESCRIPTORS: DESCRIPTORS: THROBBING;ACHING

## 2024-06-15 ASSESSMENT — PAIN - FUNCTIONAL ASSESSMENT
PAIN_FUNCTIONAL_ASSESSMENT: 0-10

## 2024-06-15 ASSESSMENT — PAIN DESCRIPTION - PAIN TYPE: TYPE: ACUTE PAIN

## 2024-06-15 ASSESSMENT — ENCOUNTER SYMPTOMS
TROUBLE SWALLOWING: 0
ABDOMINAL PAIN: 0
SHORTNESS OF BREATH: 0
SORE THROAT: 0
BACK PAIN: 0
COUGH: 0
VOMITING: 0
DIARRHEA: 0

## 2024-06-15 ASSESSMENT — LIFESTYLE VARIABLES
HOW MANY STANDARD DRINKS CONTAINING ALCOHOL DO YOU HAVE ON A TYPICAL DAY: PATIENT DOES NOT DRINK
HOW OFTEN DO YOU HAVE A DRINK CONTAINING ALCOHOL: NEVER

## 2024-06-15 NOTE — ED NOTES
D/C instructions given. Aware the rx was electronically sent to [facility] on [address], as well as time the next dose is due. Verbalized understanding. States pain is down to 6/10. Denies any further complaints. Amb out with steady gait in no acute distress.

## 2024-06-15 NOTE — ED NOTES
Pt states pain is starting to go down. Pt more calm now. BP taken and PA made aware. To hold medications at this time.

## 2024-06-15 NOTE — ED TRIAGE NOTES
A & Ox4. Skin pink warm and dry. Pt points to left lower tooth for pain x 1 1/2 weeks. No obvious facial edema noted. States she has taken Tylenol, Aleve and Orajel but it's not getting better. States she called around to dentists yesterday but \"everyone is closed on Fridays\".

## 2024-06-15 NOTE — ED PROVIDER NOTES
Procedures    No orders to display       Coding     FINAL IMPRESSION      1. Pain, dental    2. Dental abscess          DISPOSITION/PLAN   DISPOSITION Decision To Discharge 06/15/2024 05:53:56 PM          Edgar Acharya PA-C (electronically signed)  Attending Emergency Physician  Supervising physician: Dr. Devika Acharya, YEFRI Hernández  06/15/24 2005

## 2024-06-18 DIAGNOSIS — E11.65 TYPE 2 DIABETES MELLITUS WITH HYPERGLYCEMIA, WITHOUT LONG-TERM CURRENT USE OF INSULIN (HCC): ICD-10-CM

## 2024-06-18 DIAGNOSIS — E78.00 HYPERCHOLESTEROLEMIA: ICD-10-CM

## 2024-06-18 DIAGNOSIS — I10 ESSENTIAL (PRIMARY) HYPERTENSION: ICD-10-CM

## 2024-06-18 RX ORDER — MAGNESIUM OXIDE 400 MG/1
400 TABLET ORAL 2 TIMES DAILY
Qty: 180 TABLET | Refills: 3 | Status: SHIPPED | OUTPATIENT
Start: 2024-06-18

## 2024-06-18 RX ORDER — POTASSIUM CHLORIDE 750 MG/1
10 TABLET, EXTENDED RELEASE ORAL DAILY
Qty: 90 TABLET | Refills: 3 | Status: SHIPPED | OUTPATIENT
Start: 2024-06-18

## 2024-06-18 RX ORDER — DILTIAZEM HYDROCHLORIDE 120 MG/1
120 CAPSULE, COATED, EXTENDED RELEASE ORAL 2 TIMES DAILY
Qty: 180 CAPSULE | Refills: 3 | Status: SHIPPED | OUTPATIENT
Start: 2024-06-18

## 2024-06-18 RX ORDER — ROSUVASTATIN CALCIUM 40 MG/1
40 TABLET, COATED ORAL DAILY
Qty: 90 TABLET | Refills: 3 | Status: SHIPPED | OUTPATIENT
Start: 2024-06-18

## 2024-06-18 NOTE — TELEPHONE ENCOUNTER
Requesting medication refill. Please approve or deny this request.    Rx requested:  Requested Prescriptions     Pending Prescriptions Disp Refills    dilTIAZem (CARDIZEM CD) 120 MG extended release capsule 180 capsule 3     Sig: Take 1 capsule by mouth in the morning and at bedtime    rosuvastatin (CRESTOR) 40 MG tablet 90 tablet 3     Sig: Take 1 tablet by mouth daily    potassium chloride (KLOR-CON M) 10 MEQ extended release tablet 60 tablet 0     Sig: Take 1 tablet by mouth daily    magnesium oxide (MAG-OX) 400 MG tablet 30 tablet      Sig: Take 1 tablet by mouth 2 times daily         Last Office Visit:   2/16/2024      Next Visit Date:  Future Appointments   Date Time Provider Department Center   7/2/2024  3:30 PM Andrzej Gunn MD [facility]   8/19/2024 10:00 AM Laura Mims PA [facility]   9/23/2024  1:45 PM Issa Spangler MD [facility]

## 2024-07-05 ENCOUNTER — OFFICE VISIT (OUTPATIENT)
Dept: CARDIOLOGY CLINIC | Age: 54
End: 2024-07-05
Payer: COMMERCIAL

## 2024-07-05 VITALS
RESPIRATION RATE: 17 BRPM | WEIGHT: 197 LBS | DIASTOLIC BLOOD PRESSURE: 74 MMHG | OXYGEN SATURATION: 98 % | HEART RATE: 93 BPM | HEIGHT: 62 IN | SYSTOLIC BLOOD PRESSURE: 128 MMHG | BODY MASS INDEX: 36.25 KG/M2

## 2024-07-05 DIAGNOSIS — I10 HYPERTENSION, UNSPECIFIED TYPE: ICD-10-CM

## 2024-07-05 DIAGNOSIS — F17.200 SMOKER: ICD-10-CM

## 2024-07-05 DIAGNOSIS — E11.65 TYPE 2 DIABETES MELLITUS WITH HYPERGLYCEMIA, WITHOUT LONG-TERM CURRENT USE OF INSULIN (HCC): Primary | ICD-10-CM

## 2024-07-05 DIAGNOSIS — I25.83 CORONARY ARTERY DISEASE DUE TO LIPID RICH PLAQUE: ICD-10-CM

## 2024-07-05 DIAGNOSIS — I25.10 CORONARY ARTERY DISEASE DUE TO LIPID RICH PLAQUE: ICD-10-CM

## 2024-07-05 DIAGNOSIS — E78.5 DYSLIPIDEMIA: ICD-10-CM

## 2024-07-05 DIAGNOSIS — I10 ESSENTIAL (PRIMARY) HYPERTENSION: ICD-10-CM

## 2024-07-05 DIAGNOSIS — E78.00 HYPERCHOLESTEROLEMIA: ICD-10-CM

## 2024-07-05 DIAGNOSIS — R09.89 BILATERAL CAROTID BRUITS: ICD-10-CM

## 2024-07-05 PROCEDURE — 1036F TOBACCO NON-USER: CPT | Performed by: INTERNAL MEDICINE

## 2024-07-05 PROCEDURE — 3078F DIAST BP <80 MM HG: CPT | Performed by: INTERNAL MEDICINE

## 2024-07-05 PROCEDURE — 99214 OFFICE O/P EST MOD 30 MIN: CPT | Performed by: INTERNAL MEDICINE

## 2024-07-05 PROCEDURE — G8417 CALC BMI ABV UP PARAM F/U: HCPCS | Performed by: INTERNAL MEDICINE

## 2024-07-05 PROCEDURE — 3044F HG A1C LEVEL LT 7.0%: CPT | Performed by: INTERNAL MEDICINE

## 2024-07-05 PROCEDURE — 3074F SYST BP LT 130 MM HG: CPT | Performed by: INTERNAL MEDICINE

## 2024-07-05 PROCEDURE — 2022F DILAT RTA XM EVC RTNOPTHY: CPT | Performed by: INTERNAL MEDICINE

## 2024-07-05 PROCEDURE — 3017F COLORECTAL CA SCREEN DOC REV: CPT | Performed by: INTERNAL MEDICINE

## 2024-07-05 PROCEDURE — G8427 DOCREV CUR MEDS BY ELIG CLIN: HCPCS | Performed by: INTERNAL MEDICINE

## 2024-07-05 ASSESSMENT — ENCOUNTER SYMPTOMS
COUGH: 0
RESPIRATORY NEGATIVE: 1
NAUSEA: 0
EYES NEGATIVE: 1
CHEST TIGHTNESS: 0
SHORTNESS OF BREATH: 0
BLOOD IN STOOL: 0
STRIDOR: 0
GASTROINTESTINAL NEGATIVE: 1
WHEEZING: 0

## 2024-07-05 NOTE — PROGRESS NOTES
disorder, mild, in early remission       There are no discontinued medications.        Modified Medications    No medications on file       No orders of the defined types were placed in this encounter.      Assessment/Plan:    1. Hypertension, unspecified type  Contineu meds. Low salt diet   - US SCREENING FOR AAA; - negative    2. Smoker   stopped    3. Dyslipidemia  Statin low fat diet f/u labs     4. Coronary artery disease due to lipid rich plaque  Advance Diltiazem to 120 bid. ASA no BB due to asthma.     5. Bilateral carotid bruits     - US CAROTID ARTERY BILATERAL - mild.     Counseling:  Heart Healthy Lifestyle, Improve BMI, Low Salt Diet, Take Precautions to Prevent Falls, and Regular Exercise    Return in about 6 months (around 1/5/2025).      Electronically signed by NINO MENDEZ MD on 7/5/2024 at 2:51 PM

## 2024-07-11 ENCOUNTER — HOSPITAL ENCOUNTER (EMERGENCY)
Age: 54
Discharge: HOME | End: 2024-07-11
Attending: STUDENT IN AN ORGANIZED HEALTH CARE EDUCATION/TRAINING PROGRAM
Payer: COMMERCIAL

## 2024-07-11 VITALS
TEMPERATURE: 97.2 F | WEIGHT: 197 LBS | BODY MASS INDEX: 36.25 KG/M2 | OXYGEN SATURATION: 98 % | HEART RATE: 84 BPM | DIASTOLIC BLOOD PRESSURE: 92 MMHG | RESPIRATION RATE: 17 BRPM | SYSTOLIC BLOOD PRESSURE: 174 MMHG | HEIGHT: 62 IN

## 2024-07-11 DIAGNOSIS — K08.89 PAIN, DENTAL: Primary | ICD-10-CM

## 2024-07-11 PROCEDURE — 96372 THER/PROPH/DIAG INJ SC/IM: CPT

## 2024-07-11 PROCEDURE — 99284 EMERGENCY DEPT VISIT MOD MDM: CPT

## 2024-07-11 PROCEDURE — 6370000000 HC RX 637 (ALT 250 FOR IP): Performed by: STUDENT IN AN ORGANIZED HEALTH CARE EDUCATION/TRAINING PROGRAM

## 2024-07-11 PROCEDURE — 6360000002 HC RX W HCPCS: Performed by: STUDENT IN AN ORGANIZED HEALTH CARE EDUCATION/TRAINING PROGRAM

## 2024-07-11 RX ORDER — CLINDAMYCIN HYDROCHLORIDE 150 MG/1
450 CAPSULE ORAL ONCE
Status: COMPLETED | OUTPATIENT
Start: 2024-07-11 | End: 2024-07-11

## 2024-07-11 RX ORDER — CLINDAMYCIN HYDROCHLORIDE 150 MG/1
450 CAPSULE ORAL 3 TIMES DAILY
Qty: 63 CAPSULE | Refills: 0 | Status: SHIPPED | OUTPATIENT
Start: 2024-07-11 | End: 2024-07-18

## 2024-07-11 RX ORDER — KETOROLAC TROMETHAMINE 30 MG/ML
30 INJECTION, SOLUTION INTRAMUSCULAR; INTRAVENOUS ONCE
Status: COMPLETED | OUTPATIENT
Start: 2024-07-11 | End: 2024-07-11

## 2024-07-11 RX ORDER — HYDROCODONE BITARTRATE AND ACETAMINOPHEN 5; 325 MG/1; MG/1
1 TABLET ORAL EVERY 8 HOURS PRN
Qty: 5 TABLET | Refills: 0 | Status: SHIPPED | OUTPATIENT
Start: 2024-07-11 | End: 2024-07-13

## 2024-07-11 RX ADMIN — CLINDAMYCIN HYDROCHLORIDE 450 MG: 150 CAPSULE ORAL at 06:13

## 2024-07-11 RX ADMIN — KETOROLAC TROMETHAMINE 30 MG: 30 INJECTION INTRAMUSCULAR; INTRAVENOUS at 06:13

## 2024-07-11 ASSESSMENT — PAIN SCALES - GENERAL: PAINLEVEL_OUTOF10: 7

## 2024-07-11 ASSESSMENT — PAIN DESCRIPTION - LOCATION: LOCATION: TEETH

## 2024-07-11 ASSESSMENT — PAIN - FUNCTIONAL ASSESSMENT: PAIN_FUNCTIONAL_ASSESSMENT: 0-10

## 2024-07-11 NOTE — ED PROVIDER NOTES
[facility] ED  Emergency Department Encounter  Emergency Medicine      Pt Name: Beatrice Catalan  MRN:[id number]  Birthdate 1970  Date of evaluation: 7/11/24  PCP:  Laura Mims PA    CHIEF COMPLAINT       Chief Complaint   Patient presents with    Dental Pain     Pt to ER with c/o dental pain left lower back       HISTORY OF PRESENT ILLNESS  (Location/Symptom, Timing/Onset, Context/Setting, Quality, Duration, ModifyingFactors, Severity.)      Beatrice Catalan is a 54 y.o. female presents with left lower posterior dental pain that started a couple days ago.  Says she has been trying Tylenol Motrin with some relief but does not last very long.  She had similar pain a month ago and was started on antibiotics which she completed and seem to go away and then come back.  Her appointment with her dentist at [facility] is next week.  She denies any difficulty swallowing no shortness of breath no fevers headache no chest pain no cough no nausea vomiting no rash    PAST MEDICAL / SURGICAL / SOCIAL /FAMILY HISTORY      has a past medical history of Anticoagulant long-term use, Asthma, CAD (coronary artery disease), COPD (chronic obstructive pulmonary disease) ([facility]), Diabetes mellitus ([facility]), Hodgkin disease ([facility]), Hyperlipidemia, Hypertension, Migraines, NSTEMI (non-ST elevated myocardial infarction) ([facility]), Sleep apnea, and Type 2 diabetes mellitus without complication ([facility]).  No other pertinent PMH on review with patient/guardian.     has a past surgical history that includes Tubal ligation.  No other pertinent PSH on review with patient/guardian.  Social History     Socioeconomic History    Marital status: Single     Spouse name: Not on file    Number of children: Not on file    Years of education: Not on file    Highest education level: Not on file   Occupational History    Not on file   Tobacco Use    Smoking status: Former     Current packs/day: 0.00     Average packs/day: 1 pack/day

## 2024-07-11 NOTE — DISCHARGE INSTRUCTIONS
Not drive or operate machinery while taking narcotics as it can make you drowsy  Continue taking Tylenol and Motrin for pain relief, you can try Orajel over-the-counter  Take your antibiotics as prescribed to completion  Keep your appointment with your dentist    Take your medication as indicated and prescribed.  If you are given an antibiotic, then make sure you get the prescription filled and take the antibiotics until finished.  Drink plenty of water while taking the antibiotics.  Avoid drinking alcohol or drinks that have caffeine in it while taking antibiotics.       For pain use ibuprofen (Motrin / Advil) or acetaminophen (Tylenol), unless prescribed medications that have acetaminophen in it.  You can take over the counter acetaminophen tablets (1 - 2 tablets of the 500-mg strength every 6 hours) or ibuprofen tablets (2 tablets every 4 hours).    PLEASE RETURN TO THE EMERGENCY DEPARTMENT IMMEDIATELY for worsening symptoms, swelling to your face, redness on your face, drainage from the tooth, or if you develop any concerning symptoms such as: high fever not relieved by acetaminophen (Tylenol) and/or ibuprofen (Motrin / Advil), chills, shortness of breath, chest pain, feeling of your heart fluttering or racing, persistent nausea and/or vomiting, vomiting up blood, blood in your stool, numbness, loss of consciousness, weakness or tingling in the arms or legs or change in color of the extremities, changes in mental status, persistent headache, blurry vision, loss of bladder / bowel control, unable to follow up with your physician, or other any other care or concern.

## 2024-07-15 ENCOUNTER — HOSPITAL ENCOUNTER (EMERGENCY)
Age: 54
Discharge: HOME | End: 2024-07-15
Payer: COMMERCIAL

## 2024-07-15 VITALS
RESPIRATION RATE: 19 BRPM | DIASTOLIC BLOOD PRESSURE: 87 MMHG | HEART RATE: 79 BPM | OXYGEN SATURATION: 95 % | HEIGHT: 62 IN | WEIGHT: 195 LBS | TEMPERATURE: 97 F | BODY MASS INDEX: 35.88 KG/M2 | SYSTOLIC BLOOD PRESSURE: 187 MMHG

## 2024-07-15 DIAGNOSIS — K02.9 DENTAL DECAY: ICD-10-CM

## 2024-07-15 DIAGNOSIS — K08.89 PAIN, DENTAL: Primary | ICD-10-CM

## 2024-07-15 PROCEDURE — 6370000000 HC RX 637 (ALT 250 FOR IP)

## 2024-07-15 PROCEDURE — 99283 EMERGENCY DEPT VISIT LOW MDM: CPT

## 2024-07-15 RX ORDER — OXYCODONE HYDROCHLORIDE AND ACETAMINOPHEN 5; 325 MG/1; MG/1
1 TABLET ORAL EVERY 6 HOURS PRN
Qty: 12 TABLET | Refills: 0 | Status: SHIPPED | OUTPATIENT
Start: 2024-07-15 | End: 2024-07-18

## 2024-07-15 RX ORDER — OXYCODONE HYDROCHLORIDE AND ACETAMINOPHEN 5; 325 MG/1; MG/1
1 TABLET ORAL ONCE
Status: COMPLETED | OUTPATIENT
Start: 2024-07-15 | End: 2024-07-15

## 2024-07-15 RX ADMIN — OXYCODONE HYDROCHLORIDE AND ACETAMINOPHEN 1 TABLET: 5; 325 TABLET ORAL at 19:09

## 2024-07-15 ASSESSMENT — PAIN SCALES - GENERAL
PAINLEVEL_OUTOF10: 7
PAINLEVEL_OUTOF10: 10

## 2024-07-15 ASSESSMENT — ENCOUNTER SYMPTOMS
VOMITING: 0
VOICE CHANGE: 0
COUGH: 0
DIARRHEA: 0
PHOTOPHOBIA: 0
NAUSEA: 0
SHORTNESS OF BREATH: 0
SORE THROAT: 0
TROUBLE SWALLOWING: 0
ABDOMINAL PAIN: 0

## 2024-07-15 ASSESSMENT — PAIN - FUNCTIONAL ASSESSMENT: PAIN_FUNCTIONAL_ASSESSMENT: 0-10

## 2024-07-15 ASSESSMENT — PAIN DESCRIPTION - LOCATION
LOCATION: TEETH
LOCATION: MOUTH

## 2024-07-15 ASSESSMENT — PAIN DESCRIPTION - DESCRIPTORS: DESCRIPTORS: DISCOMFORT

## 2024-07-15 NOTE — ED NOTES
Patient stated that she went to the dental office today for tooth pain, but was told to go to [facility] for BP. Pt stated that the telephone number that she was provided stated that [facility] was closed. Patient BP is 187/86. Patient stated that she took her BP medication today.

## 2024-07-15 NOTE — ED NOTES
Pt a&ox4, skin w/d/pink, 0 distress, Pa Kell aware of pt's bp, 0 new orders, per pa ok to d/c pt home.  Pt out form ed with , steady gait noted.

## 2024-07-15 NOTE — ED PROVIDER NOTES
intervention.      CONSULTS:  None    PROCEDURES:  Unless otherwise noted below, none     Procedures        FINAL IMPRESSION      1. Pain, dental    2. Dental decay          DISPOSITION/PLAN   DISPOSITION Decision To Discharge 07/15/2024 07:23:37 PM      PATIENT REFERRED TO:  [facility]    Call in 1 day        DISCHARGE MEDICATIONS:  New Prescriptions    OXYCODONE-ACETAMINOPHEN (PERCOCET) 5-325 MG PER TABLET    Take 1 tablet by mouth every 6 hours as needed for Pain for up to 3 days. Intended supply: 3 days. Take lowest dose possible to manage pain Max Daily Amount: 4 tablets     Controlled Substances Monitoring:          No data to display                (Please note that portions of this note were completed with a voice recognition program.  Efforts were made to edit the dictations but occasionally words are mis-transcribed.)    GIFTY Noel (electronically signed)  Attending Emergency Physician  Supervising Physician Kell Pressley PA  07/15/24 1930

## 2024-07-19 ENCOUNTER — HOSPITAL ENCOUNTER (OUTPATIENT)
Dept: ULTRASOUND IMAGING | Age: 54
End: 2024-07-19
Attending: INTERNAL MEDICINE
Payer: COMMERCIAL

## 2024-07-19 DIAGNOSIS — R09.89 BILATERAL CAROTID BRUITS: ICD-10-CM

## 2024-07-19 LAB
VAS LEFT CCA DIST EDV: 28.2 CM/S
VAS LEFT CCA DIST PSV: 114 CM/S
VAS LEFT CCA MID EDV: 28.2 CM/S
VAS LEFT CCA MID PSV: 104 CM/S
VAS LEFT CCA PROX EDV: 23.5 CM/S
VAS LEFT CCA PROX PSV: 126 CM/S
VAS LEFT ECA EDV: 30 CM/S
VAS LEFT ECA PSV: 170 CM/S
VAS LEFT ICA DIST EDV: 23.8 CM/S
VAS LEFT ICA DIST PSV: 84.8 CM/S
VAS LEFT ICA MID EDV: 49.4 CM/S
VAS LEFT ICA MID PSV: 177 CM/S
VAS LEFT ICA PROX EDV: 32.9 CM/S
VAS LEFT ICA PROX PSV: 124 CM/S
VAS LEFT ICA/CCA PSV: 1.7
VAS LEFT VERTEBRAL EDV: 13.4 CM/S
VAS LEFT VERTEBRAL PSV: 56.6 CM/S
VAS RIGHT CCA DIST EDV: 24.9 CM/S
VAS RIGHT CCA DIST PSV: 98.2 CM/S
VAS RIGHT CCA MID EDV: 22.4 CM/S
VAS RIGHT CCA MID PSV: 111 CM/S
VAS RIGHT CCA PROX EDV: 16.8 CM/S
VAS RIGHT CCA PROX PSV: 109 CM/S
VAS RIGHT ECA EDV: 23.2 CM/S
VAS RIGHT ECA PSV: 159 CM/S
VAS RIGHT ICA DIST EDV: 32.2 CM/S
VAS RIGHT ICA DIST PSV: 110 CM/S
VAS RIGHT ICA MID EDV: 22.4 CM/S
VAS RIGHT ICA MID PSV: 90.4 CM/S
VAS RIGHT ICA PROX EDV: 34.9 CM/S
VAS RIGHT ICA PROX PSV: 107 CM/S
VAS RIGHT ICA/CCA PSV: 0.99
VAS RIGHT VERTEBRAL EDV: 16.5 CM/S
VAS RIGHT VERTEBRAL PSV: 65.3 CM/S

## 2024-07-19 PROCEDURE — 93880 EXTRACRANIAL BILAT STUDY: CPT

## 2024-08-20 DIAGNOSIS — J45.50 SEVERE PERSISTENT ASTHMA WITHOUT COMPLICATION: Primary | ICD-10-CM

## 2024-08-20 NOTE — TELEPHONE ENCOUNTER
Rx requested:  Requested Prescriptions     Pending Prescriptions Disp Refills    tezepelumab-ekko (TEZSPIRE) 210 MG/1.91ML SOAJ injection [Pharmacy Med Name: Tezspire 210 Mg/1.91 mL Pen 210  Each] 1 each 5     Sig: Inject 210mg (1.91ml) subcutaneously once every 28 days.       Last Office Visit:   Visit date not found      Next Visit Date:  Future Appointments   Date Time Provider Department Center   9/23/2024  1:45 PM Issa Spangler MD [facility]   1/10/2025 11:45 AM Andrzej Gunn MD [facility]

## 2024-08-21 RX ORDER — TEZEPELUMAB-EKKO 210 MG/1.9ML
INJECTION, SOLUTION SUBCUTANEOUS
Qty: 1 EACH | Refills: 5 | Status: SHIPPED | OUTPATIENT
Start: 2024-08-21

## 2024-09-04 RX ORDER — TEZEPELUMAB-EKKO 210 MG/1.9ML
210 INJECTION, SOLUTION SUBCUTANEOUS
Qty: 3 EACH | Refills: 2 | Status: ACTIVE | OUTPATIENT
Start: 2024-09-04

## 2024-09-04 NOTE — TELEPHONE ENCOUNTER
Rx requested:  Requested Prescriptions     Pending Prescriptions Disp Refills    tezepelumab-ekko (TEZSPIRE) 210 MG/1.91ML SOAJ injection 3 each 2     Sig: Inject 1.91 mLs into the skin every 28 days       Last Office Visit:   5/20/2024      Next Visit Date:  Future Appointments   Date Time Provider Department Center   9/23/2024  1:45 PM Issa Spangler MD [facility]   1/10/2025 11:45 AM Andrzej Gunn MD [facility]        Needs to go for patient assistance

## 2024-09-13 DIAGNOSIS — J45.50 SEVERE PERSISTENT ASTHMA WITHOUT COMPLICATION: Primary | ICD-10-CM

## 2024-09-13 RX ORDER — MONTELUKAST SODIUM 10 MG/1
10 TABLET ORAL NIGHTLY
Qty: 30 TABLET | Refills: 3 | Status: SHIPPED | OUTPATIENT
Start: 2024-09-13

## 2024-10-15 ENCOUNTER — OFFICE VISIT (OUTPATIENT)
Dept: PULMONOLOGY | Age: 54
End: 2024-10-15
Payer: COMMERCIAL

## 2024-10-15 VITALS
OXYGEN SATURATION: 96 % | WEIGHT: 199.8 LBS | TEMPERATURE: 97.3 F | RESPIRATION RATE: 18 BRPM | HEART RATE: 80 BPM | SYSTOLIC BLOOD PRESSURE: 138 MMHG | DIASTOLIC BLOOD PRESSURE: 76 MMHG | BODY MASS INDEX: 36.77 KG/M2 | HEIGHT: 62 IN

## 2024-10-15 DIAGNOSIS — J45.50 SEVERE PERSISTENT ASTHMA WITHOUT COMPLICATION: ICD-10-CM

## 2024-10-15 DIAGNOSIS — E66.09 CLASS 2 OBESITY DUE TO EXCESS CALORIES WITHOUT SERIOUS COMORBIDITY WITH BODY MASS INDEX (BMI) OF 37.0 TO 37.9 IN ADULT: Primary | ICD-10-CM

## 2024-10-15 DIAGNOSIS — J44.89 ASTHMA-COPD OVERLAP SYNDROME (HCC): ICD-10-CM

## 2024-10-15 DIAGNOSIS — E66.812 CLASS 2 OBESITY DUE TO EXCESS CALORIES WITHOUT SERIOUS COMORBIDITY WITH BODY MASS INDEX (BMI) OF 37.0 TO 37.9 IN ADULT: Primary | ICD-10-CM

## 2024-10-15 DIAGNOSIS — R93.89 ABNORMAL CT OF THE CHEST: ICD-10-CM

## 2024-10-15 DIAGNOSIS — K21.9 GASTROESOPHAGEAL REFLUX DISEASE WITHOUT ESOPHAGITIS: ICD-10-CM

## 2024-10-15 PROCEDURE — 99214 OFFICE O/P EST MOD 30 MIN: CPT | Performed by: INTERNAL MEDICINE

## 2024-10-15 PROCEDURE — 3078F DIAST BP <80 MM HG: CPT | Performed by: INTERNAL MEDICINE

## 2024-10-15 PROCEDURE — 3075F SYST BP GE 130 - 139MM HG: CPT | Performed by: INTERNAL MEDICINE

## 2024-10-15 RX ORDER — FLUTICASONE FUROATE, UMECLIDINIUM BROMIDE AND VILANTEROL TRIFENATATE 200; 62.5; 25 UG/1; UG/1; UG/1
1 POWDER RESPIRATORY (INHALATION) DAILY
Qty: 60 EACH | Refills: 5 | Status: SHIPPED | OUTPATIENT
Start: 2024-10-15

## 2024-10-15 NOTE — PROGRESS NOTES
2 times daily 180 tablet 3    albuterol sulfate HFA (PROAIR HFA) 108 (90 Base) MCG/ACT inhaler Inhale 1 puff into the lungs every 4 hours as needed for Wheezing 1 each 3    losartan (COZAAR) 25 MG tablet Take 1 tablet by mouth daily 30 tablet 3    metFORMIN (GLUCOPHAGE) 1000 MG tablet Take 1 tablet by mouth daily (with breakfast) 90 tablet 3    Dulaglutide (TRULICITY) 0.75 MG/0.5ML SOPN Inject 0.75 mg into the skin once a week 4 Adjustable Dose Pre-filled Pen Syringe 2    buPROPion (WELLBUTRIN SR) 150 MG extended release tablet Take 1 tablet by mouth daily 90 tablet 3    ipratropium 0.5 mg-albuterol 2.5 mg (DUONEB) 0.5-2.5 (3) MG/3ML SOLN nebulizer solution Inhale 3 mLs into the lungs every 4 hours 360 mL 4    nitroGLYCERIN (NITROSTAT) 0.4 MG SL tablet up to max of 3 total doses. If no relief after 1 dose, call 911. 25 tablet 3    clotrimazole-betamethasone (LOTRISONE) 1-0.05 % cream Apply topically 2 times daily. 45 g 1    furosemide (LASIX) 20 MG tablet Take 1 tablet by mouth daily 90 tablet 3    Multiple Vitamins-Minerals (THERAPEUTIC MULTIVITAMIN-MINERALS) tablet Take 1 tablet by mouth daily      clopidogrel (PLAVIX) 75 MG tablet Take 1 tablet by mouth daily      EPINEPHrine (EPIPEN) 0.3 MG/0.3ML SOAJ injection Inject 0.3 mLs into the muscle as needed      aspirin 81 MG EC tablet Take 1 tablet by mouth daily 90 tablet 3    empagliflozin (JARDIANCE) 25 MG tablet Take 1 tablet by mouth daily for 180 doses 90 tablet 1    nicotine (NICODERM CQ) 21 MG/24HR Place 1 patch onto the skin daily 42 patch 0     No current facility-administered medications for this visit.       Objective:     Vitals:    10/15/24 1500   BP: 138/76   Site: Left Upper Arm   Position: Sitting   Cuff Size: Large Adult   Pulse: 80   Resp: 18   Temp: 97.3 °F (36.3 °C)   TempSrc: Infrared   SpO2: 96%   Weight: 90.6 kg (199 lb 12.8 oz)   Height: 1.575 m (5' 2\")         Physical Exam  Constitutional:       General: She is not in acute distress.

## 2025-02-11 ENCOUNTER — OFFICE VISIT (OUTPATIENT)
Dept: PULMONOLOGY | Age: 55
End: 2025-02-11
Payer: COMMERCIAL

## 2025-02-11 VITALS
BODY MASS INDEX: 35.26 KG/M2 | HEIGHT: 62 IN | OXYGEN SATURATION: 99 % | HEART RATE: 76 BPM | WEIGHT: 191.6 LBS | SYSTOLIC BLOOD PRESSURE: 138 MMHG | RESPIRATION RATE: 18 BRPM | DIASTOLIC BLOOD PRESSURE: 76 MMHG | TEMPERATURE: 97.4 F

## 2025-02-11 DIAGNOSIS — J44.89 ASTHMA-COPD OVERLAP SYNDROME (HCC): ICD-10-CM

## 2025-02-11 DIAGNOSIS — K21.9 GASTROESOPHAGEAL REFLUX DISEASE WITHOUT ESOPHAGITIS: ICD-10-CM

## 2025-02-11 DIAGNOSIS — J45.50 SEVERE PERSISTENT ASTHMA WITHOUT COMPLICATION: ICD-10-CM

## 2025-02-11 DIAGNOSIS — E66.812 CLASS 2 OBESITY DUE TO EXCESS CALORIES WITHOUT SERIOUS COMORBIDITY WITH BODY MASS INDEX (BMI) OF 37.0 TO 37.9 IN ADULT: Primary | ICD-10-CM

## 2025-02-11 DIAGNOSIS — E66.09 CLASS 2 OBESITY DUE TO EXCESS CALORIES WITHOUT SERIOUS COMORBIDITY WITH BODY MASS INDEX (BMI) OF 37.0 TO 37.9 IN ADULT: Primary | ICD-10-CM

## 2025-02-11 PROCEDURE — 3075F SYST BP GE 130 - 139MM HG: CPT | Performed by: INTERNAL MEDICINE

## 2025-02-11 PROCEDURE — 99214 OFFICE O/P EST MOD 30 MIN: CPT | Performed by: INTERNAL MEDICINE

## 2025-02-11 PROCEDURE — 3078F DIAST BP <80 MM HG: CPT | Performed by: INTERNAL MEDICINE

## 2025-02-11 RX ORDER — FLUTICASONE FUROATE, UMECLIDINIUM BROMIDE AND VILANTEROL TRIFENATATE 200; 62.5; 25 UG/1; UG/1; UG/1
1 POWDER RESPIRATORY (INHALATION) DAILY
Qty: 60 EACH | Refills: 5 | Status: SHIPPED | OUTPATIENT
Start: 2025-02-11

## 2025-02-11 RX ORDER — ALBUTEROL SULFATE 90 UG/1
1 INHALANT RESPIRATORY (INHALATION) EVERY 4 HOURS PRN
Qty: 1 EACH | Refills: 3 | Status: SHIPPED | OUTPATIENT
Start: 2025-02-11

## 2025-02-11 RX ORDER — IPRATROPIUM BROMIDE AND ALBUTEROL SULFATE 2.5; .5 MG/3ML; MG/3ML
1 SOLUTION RESPIRATORY (INHALATION) EVERY 4 HOURS
Qty: 360 ML | Refills: 4 | Status: SHIPPED | OUTPATIENT
Start: 2025-02-11

## 2025-02-11 RX ORDER — MONTELUKAST SODIUM 10 MG/1
10 TABLET ORAL NIGHTLY
Qty: 30 TABLET | Refills: 3 | Status: SHIPPED | OUTPATIENT
Start: 2025-02-11

## 2025-02-11 NOTE — PROGRESS NOTES
person, place, and time.   Psychiatric:         Judgment: Judgment normal.         Imaging studies reviewed and interpreted by me CT chest April 2024  Mild haziness in the right upper lobe no acute interstitial lung disease, air trapping    PFT   October 2023 shows FEV1 86% FEV1/FVC 0.82    August 2020, shows FEV1 91%, FEV1/FVC 0.81  ECHO: August 2020 EF 60%      Sleep study October 2020 no KEITH     Assessment and Plan       Diagnosis Orders   1. Class 2 obesity due to excess calories without serious comorbidity with body mass index (BMI) of 37.0 to 37.9 in adult        2. Severe persistent asthma without complication  fluticasone-umeclidin-vilant (TRELEGY ELLIPTA) 200-62.5-25 MCG/ACT AEPB inhaler    albuterol sulfate HFA (PROAIR HFA) 108 (90 Base) MCG/ACT inhaler    ipratropium 0.5 mg-albuterol 2.5 mg (DUONEB) 0.5-2.5 (3) MG/3ML SOLN nebulizer solution    montelukast (SINGULAIR) 10 MG tablet      3. Asthma-COPD overlap syndrome (HCC)        4. Gastroesophageal reflux disease without esophagitis            Severe persistent asthma symptoms controlled    Continue Tezspire,   Restart Singulair and Trelegy  As needed albuterol  High-resolution CT scan shows mild area of haziness in the right apex no impressive finding suggestive of ILD   Weight loss is recommended  GERD symptoms controlled           No orders of the defined types were placed in this encounter.        Orders Placed This Encounter   Medications    fluticasone-umeclidin-vilant (TRELEGY ELLIPTA) 200-62.5-25 MCG/ACT AEPB inhaler     Sig: Inhale 1 puff into the lungs daily     Dispense:  60 each     Refill:  5    albuterol sulfate HFA (PROAIR HFA) 108 (90 Base) MCG/ACT inhaler     Sig: Inhale 1 puff into the lungs every 4 hours as needed for Wheezing     Dispense:  1 each     Refill:  3    ipratropium 0.5 mg-albuterol 2.5 mg (DUONEB) 0.5-2.5 (3) MG/3ML SOLN nebulizer solution     Sig: Inhale 3 mLs into the lungs every 4 hours     Dispense:  360 mL     Refill:

## 2025-02-28 ENCOUNTER — OFFICE VISIT (OUTPATIENT)
Dept: CARDIOLOGY CLINIC | Age: 55
End: 2025-02-28

## 2025-02-28 VITALS
WEIGHT: 191 LBS | SYSTOLIC BLOOD PRESSURE: 136 MMHG | HEART RATE: 92 BPM | DIASTOLIC BLOOD PRESSURE: 80 MMHG | BODY MASS INDEX: 34.93 KG/M2 | OXYGEN SATURATION: 96 %

## 2025-02-28 DIAGNOSIS — E11.65 TYPE 2 DIABETES MELLITUS WITH HYPERGLYCEMIA, WITHOUT LONG-TERM CURRENT USE OF INSULIN (HCC): ICD-10-CM

## 2025-02-28 DIAGNOSIS — Z41.2 ROUTINE AND RITUAL CIRCUMCISION: Primary | ICD-10-CM

## 2025-02-28 DIAGNOSIS — I10 ESSENTIAL (PRIMARY) HYPERTENSION: ICD-10-CM

## 2025-02-28 RX ORDER — LOSARTAN POTASSIUM 25 MG/1
25 TABLET ORAL DAILY
Qty: 90 TABLET | Refills: 3 | Status: SHIPPED | OUTPATIENT
Start: 2025-02-28

## 2025-02-28 RX ORDER — CLOPIDOGREL BISULFATE 75 MG/1
75 TABLET ORAL DAILY
Qty: 90 TABLET | Refills: 3 | Status: SHIPPED | OUTPATIENT
Start: 2025-02-28

## 2025-02-28 RX ORDER — NITROGLYCERIN 0.4 MG/1
TABLET SUBLINGUAL
Qty: 25 TABLET | Refills: 3 | Status: SHIPPED | OUTPATIENT
Start: 2025-02-28

## 2025-02-28 RX ORDER — FUROSEMIDE 20 MG/1
20 TABLET ORAL DAILY
Qty: 90 TABLET | Refills: 3 | Status: SHIPPED | OUTPATIENT
Start: 2025-02-28

## 2025-02-28 ASSESSMENT — ENCOUNTER SYMPTOMS
RESPIRATORY NEGATIVE: 1
STRIDOR: 0
EYES NEGATIVE: 1
COUGH: 0
WHEEZING: 0
SHORTNESS OF BREATH: 0
BLOOD IN STOOL: 0
GASTROINTESTINAL NEGATIVE: 1
CHEST TIGHTNESS: 0
NAUSEA: 0

## 2025-02-28 NOTE — PROGRESS NOTES
37.0 years (37.0 ttl pk-yrs)     Types: Cigarettes     Start date: 1985     Quit date: 2022     Years since quittin.7    Smokeless tobacco: Never   Vaping Use    Vaping status: Never Used   Substance and Sexual Activity    Alcohol use: Not Currently    Drug use: Never    Sexual activity: Yes     Partners: Male     Social Determinants of Health     Financial Resource Strain: Medium Risk (2024)    Overall Financial Resource Strain (CARDIA)     Difficulty of Paying Living Expenses: Somewhat hard   Food Insecurity: No Food Insecurity (2024)    Hunger Vital Sign     Worried About Running Out of Food in the Last Year: Never true     Ran Out of Food in the Last Year: Never true   Transportation Needs: Unknown (2024)    PRAPARE - Transportation     Lack of Transportation (Non-Medical): No   Physical Activity: Insufficiently Active (2020)    Received from [facility]    Exercise Vital Sign     Days of Exercise per Week: 2 days     Minutes of Exercise per Session: 20 min   Stress: No Stress Concern Present (2020)    Received from [facility]    [employer] - Occupational Stress Questionnaire     Feeling of Stress : Only a little   Social Connections: Unknown (2020)    Received from [facility]    Social Connection and Isolation Panel [NHANES]     Frequency of Communication with Friends and Family: Three times a week     Marital Status:    Housing Stability: Unknown (2024)    Housing Stability Vital Sign     Unstable Housing in the Last Year: No       Allergies   Allergen Reactions    Shellfish-Derived Products Anaphylaxis    Cumin Oil Itching     Cumin powder     Guggulipid-Black Pepper      Ground pepper        Current Outpatient Medications   Medication Sig Dispense Refill    clopidogrel (PLAVIX) 75 MG tablet Take 1 tablet by mouth daily 90 tablet 3    nitroGLYCERIN

## 2025-06-09 ENCOUNTER — TRANSCRIBE ORDERS (OUTPATIENT)
Dept: ADMINISTRATIVE | Age: 55
End: 2025-06-09

## 2025-06-09 DIAGNOSIS — Z12.31 ENCOUNTER FOR SCREENING MAMMOGRAM FOR MALIGNANT NEOPLASM OF BREAST: Primary | ICD-10-CM

## 2025-06-16 ENCOUNTER — OFFICE VISIT (OUTPATIENT)
Age: 55
End: 2025-06-16
Payer: COMMERCIAL

## 2025-06-16 VITALS
DIASTOLIC BLOOD PRESSURE: 76 MMHG | WEIGHT: 197 LBS | HEIGHT: 62 IN | OXYGEN SATURATION: 96 % | SYSTOLIC BLOOD PRESSURE: 131 MMHG | HEART RATE: 105 BPM | RESPIRATION RATE: 18 BRPM | BODY MASS INDEX: 36.25 KG/M2

## 2025-06-16 DIAGNOSIS — E66.812 CLASS 2 OBESITY DUE TO EXCESS CALORIES WITHOUT SERIOUS COMORBIDITY WITH BODY MASS INDEX (BMI) OF 37.0 TO 37.9 IN ADULT: ICD-10-CM

## 2025-06-16 DIAGNOSIS — J45.50 SEVERE PERSISTENT ASTHMA WITHOUT COMPLICATION (HCC): ICD-10-CM

## 2025-06-16 DIAGNOSIS — J44.89 ASTHMA-COPD OVERLAP SYNDROME (HCC): ICD-10-CM

## 2025-06-16 DIAGNOSIS — E66.09 CLASS 2 OBESITY DUE TO EXCESS CALORIES WITHOUT SERIOUS COMORBIDITY WITH BODY MASS INDEX (BMI) OF 37.0 TO 37.9 IN ADULT: ICD-10-CM

## 2025-06-16 DIAGNOSIS — R07.0 THROAT DISCOMFORT: Primary | ICD-10-CM

## 2025-06-16 DIAGNOSIS — K21.9 GASTROESOPHAGEAL REFLUX DISEASE WITHOUT ESOPHAGITIS: ICD-10-CM

## 2025-06-16 PROCEDURE — 99214 OFFICE O/P EST MOD 30 MIN: CPT | Performed by: INTERNAL MEDICINE

## 2025-06-16 PROCEDURE — 3075F SYST BP GE 130 - 139MM HG: CPT | Performed by: INTERNAL MEDICINE

## 2025-06-16 PROCEDURE — 3078F DIAST BP <80 MM HG: CPT | Performed by: INTERNAL MEDICINE

## 2025-06-16 RX ORDER — FLUTICASONE FUROATE, UMECLIDINIUM BROMIDE AND VILANTEROL TRIFENATATE 200; 62.5; 25 UG/1; UG/1; UG/1
1 POWDER RESPIRATORY (INHALATION) DAILY
Qty: 60 EACH | Refills: 5 | Status: SHIPPED | OUTPATIENT
Start: 2025-06-16

## 2025-06-16 RX ORDER — NICOTINE 21 MG/24HR
PATCH, TRANSDERMAL 24 HOURS TRANSDERMAL
COMMUNITY
Start: 2025-04-11

## 2025-06-16 RX ORDER — BUPROPION HYDROCHLORIDE 150 MG/1
150 TABLET ORAL DAILY
COMMUNITY
Start: 2025-05-13

## 2025-06-16 NOTE — PROGRESS NOTES
Subjective:     Beatrice Catalan is a 54 y.o. female who complains today of:     Chief Complaint   Patient presents with    Follow-up     4 month f/u       HPI  Patient presents for asthma      6/16/2025  Overall doing good, asthma symptoms controlled, no recent exacerbation or use of systemic steroids, she recently started noticing throat tightness and discomfort, making her feel short of breath, she has been using albuterol more frequently due to that.  No coughing, no lower extremity edema, no heartburn, no nasal congestion or postnasal drip and weight is stable    2/11/2025  Doing good, symptoms controlled, she has shortness of breath mainly in cold weather, otherwise she is doing well after starting Tezspire.  She has not been taking Singulair or Trelegy, but she is going to restart soon since her new insurance started already.  No chest pain, no coughing, no lower extremity edema, weight is stable, no heartburn, no nasal congestion or postnasal drip.      10/15/2024  Presents for follow-up, symptoms controlled, responded well to Tezspire, she started working, able to do her daily activities with no issues, no coughing, no lower extremity edema, no heartburn, no nasal congestion or postnasal drip.  No chest pain    5/20/2024  She is doing good, symptoms controlled, rare to no use of rescue inhalers, no nighttime symptoms, no shortness of breath, she is able to do her daily activities with no issues, no lower extremity edema, no heartburn, no nasal congestion or postnasal drip.        10/30/2023  Overall doing better, she is currently on Tezspire, Singulair, Trelegy, and Qvar, she recently noticed wheezing and uses DuoNeb once overnight, mainly after weather change and cold air exposure.  No chest pain, minimal coughing, no fever, no lower extremity edema, and no heartburn.  She would like to remove oxygen therapy she has not been using oxygen.  No heartburn, no nasal congestion or postnasal drip.  No allergy to

## 2025-06-19 ENCOUNTER — HOSPITAL ENCOUNTER (OUTPATIENT)
Dept: WOMENS IMAGING | Age: 55
Discharge: HOME | End: 2025-06-21
Payer: COMMERCIAL

## 2025-06-19 DIAGNOSIS — Z12.31 ENCOUNTER FOR SCREENING MAMMOGRAM FOR MALIGNANT NEOPLASM OF BREAST: ICD-10-CM

## 2025-06-19 PROCEDURE — 77063 BREAST TOMOSYNTHESIS BI: CPT

## 2025-07-08 ENCOUNTER — HOSPITAL ENCOUNTER (OUTPATIENT)
Dept: PULMONOLOGY | Age: 55
Discharge: HOME | End: 2025-07-08
Payer: COMMERCIAL

## 2025-07-08 DIAGNOSIS — J45.50 SEVERE PERSISTENT ASTHMA WITHOUT COMPLICATION (HCC): ICD-10-CM

## 2025-07-08 PROCEDURE — 94729 DIFFUSING CAPACITY: CPT

## 2025-07-08 PROCEDURE — 94726 PLETHYSMOGRAPHY LUNG VOLUMES: CPT

## 2025-07-08 PROCEDURE — 6360000002 HC RX W HCPCS

## 2025-07-08 PROCEDURE — 94060 EVALUATION OF WHEEZING: CPT

## 2025-07-08 RX ORDER — ALBUTEROL SULFATE 0.83 MG/ML
SOLUTION RESPIRATORY (INHALATION)
Status: COMPLETED
Start: 2025-07-08 | End: 2025-07-08

## 2025-07-08 RX ADMIN — ALBUTEROL SULFATE 2.5 MG: 2.5 SOLUTION RESPIRATORY (INHALATION) at 07:55
